# Patient Record
Sex: FEMALE | Race: WHITE | NOT HISPANIC OR LATINO | Employment: OTHER | URBAN - METROPOLITAN AREA
[De-identification: names, ages, dates, MRNs, and addresses within clinical notes are randomized per-mention and may not be internally consistent; named-entity substitution may affect disease eponyms.]

---

## 2017-01-02 PROBLEM — N17.9 ACUTE RENAL FAILURE (ARF) (HCC): Status: ACTIVE | Noted: 2017-01-02

## 2017-01-03 ENCOUNTER — APPOINTMENT (OUTPATIENT)
Dept: RADIOLOGY | Facility: MEDICAL CENTER | Age: 77
DRG: 418 | End: 2017-01-03
Attending: HOSPITALIST
Payer: MEDICARE

## 2017-01-03 PROBLEM — J98.11 ATELECTASIS: Status: ACTIVE | Noted: 2017-01-03

## 2017-01-03 PROBLEM — J90 PLEURAL EFFUSION, BILATERAL: Status: ACTIVE | Noted: 2017-01-03

## 2017-01-03 PROCEDURE — 71020 DX-CHEST-2 VIEWS: CPT

## 2017-01-04 ENCOUNTER — HOME HEALTH ADMISSION (OUTPATIENT)
Dept: HOME HEALTH SERVICES | Facility: HOME HEALTHCARE | Age: 77
End: 2017-01-04
Payer: MEDICARE

## 2017-01-04 ENCOUNTER — TELEPHONE (OUTPATIENT)
Dept: MEDICAL GROUP | Facility: MEDICAL CENTER | Age: 77
End: 2017-01-04

## 2017-01-04 NOTE — TELEPHONE ENCOUNTER
1. [facility]                                         Call Back Number: [phone] (home)         Patient approves a detailed voicemail message: yes    [facility] WANTS TO KNOW IF YOU ARE WILLING TO FOLLOW PATIENT FOR HOME HEALTH

## 2017-01-06 ENCOUNTER — HOME CARE VISIT (OUTPATIENT)
Dept: HOME HEALTH SERVICES | Facility: HOME HEALTHCARE | Age: 77
End: 2017-01-06
Payer: MEDICARE

## 2017-01-06 PROCEDURE — 665001 SOC-HOME HEALTH

## 2017-01-06 PROCEDURE — G0162 HHC RN E&M PLAN SVS, 15 MIN: HCPCS

## 2017-01-06 PROCEDURE — 665998 HH PPS REVENUE CREDIT

## 2017-01-06 PROCEDURE — 665999 HH PPS REVENUE DEBIT

## 2017-01-07 VITALS
BODY MASS INDEX: 32.27 KG/M2 | HEART RATE: 72 BPM | RESPIRATION RATE: 14 BRPM | SYSTOLIC BLOOD PRESSURE: 112 MMHG | HEIGHT: 64 IN | DIASTOLIC BLOOD PRESSURE: 60 MMHG | TEMPERATURE: 99.4 F | WEIGHT: 189 LBS

## 2017-01-07 PROCEDURE — 665999 HH PPS REVENUE DEBIT

## 2017-01-07 PROCEDURE — 665998 HH PPS REVENUE CREDIT

## 2017-01-07 SDOH — ECONOMIC STABILITY: HOUSING INSECURITY: UNSAFE APPLIANCES: 0

## 2017-01-07 SDOH — ECONOMIC STABILITY: HOUSING INSECURITY: UNSAFE COOKING RANGE AREA: 0

## 2017-01-07 SDOH — ECONOMIC STABILITY: HOUSING INSECURITY: HOME SAFETY: IALS PRESENT IN THE HOME.

## 2017-01-07 SDOH — ECONOMIC STABILITY: HOUSING INSECURITY
HOME SAFETY: IN THE HOME. SMOKE ALARMS ARE PRESENT AND FUNCTIONAL ON EACH LEVEL OF THE HOME. PATIENT DOES HAVE A FIRE ESCAPE PLAN DEVELOPED. PATIENT DOES NOT HAVE FLAMMABLE MATERIALS PRESENT IN THE HOME PRESENTING A FIRE HAZARD. NO EVIDENCE FOUND OF SMOKING MATER

## 2017-01-07 SDOH — ECONOMIC STABILITY: HOUSING INSECURITY
HOME SAFETY: OXYGEN SAFETY RISK ASSESSMENT PERFORMED. PATIENT DOES NOT HAVE A NO SMOKING SIGN POSTED IN THE HOME., PT WAS GIVEN A NO SMOKING SIGN AND PROVIDED EDUCATION ABOUT WHY IT IS IMPORTANT TO PLACE ONE. PATIENT DOES HAVE A WORKING FIRE EXTINGUISHER PRESENT

## 2017-01-07 ASSESSMENT — ENCOUNTER SYMPTOMS
VOMITING: DENIES
NAUSEA: DENIES

## 2017-01-07 ASSESSMENT — PATIENT HEALTH QUESTIONNAIRE - PHQ9
2. FEELING DOWN, DEPRESSED, IRRITABLE, OR HOPELESS: 00
1. LITTLE INTEREST OR PLEASURE IN DOING THINGS: 00

## 2017-01-07 ASSESSMENT — ACTIVITIES OF DAILY LIVING (ADL)
OASIS_M1830: 01
HOME_HEALTH_OASIS: 01

## 2017-01-08 PROCEDURE — 665998 HH PPS REVENUE CREDIT

## 2017-01-08 PROCEDURE — 665999 HH PPS REVENUE DEBIT

## 2017-01-09 ENCOUNTER — HOME CARE VISIT (OUTPATIENT)
Dept: HOME HEALTH SERVICES | Facility: HOME HEALTHCARE | Age: 77
End: 2017-01-09
Payer: MEDICARE

## 2017-01-09 PROCEDURE — 665999 HH PPS REVENUE DEBIT

## 2017-01-09 PROCEDURE — 665998 HH PPS REVENUE CREDIT

## 2017-01-09 PROCEDURE — G0496 LPN CARE TRAIN/EDU IN HH: HCPCS

## 2017-01-10 ENCOUNTER — HOME CARE VISIT (OUTPATIENT)
Dept: HOME HEALTH SERVICES | Facility: HOME HEALTHCARE | Age: 77
End: 2017-01-10
Payer: MEDICARE

## 2017-01-10 VITALS
RESPIRATION RATE: 16 BRPM | HEART RATE: 75 BPM | TEMPERATURE: 100.2 F | DIASTOLIC BLOOD PRESSURE: 57 MMHG | SYSTOLIC BLOOD PRESSURE: 107 MMHG

## 2017-01-10 VITALS
RESPIRATION RATE: 16 BRPM | TEMPERATURE: 99.1 F | HEART RATE: 58 BPM | SYSTOLIC BLOOD PRESSURE: 124 MMHG | DIASTOLIC BLOOD PRESSURE: 58 MMHG

## 2017-01-10 VITALS — TEMPERATURE: 99.4 F | RESPIRATION RATE: 16 BRPM

## 2017-01-10 PROCEDURE — G0151 HHCP-SERV OF PT,EA 15 MIN: HCPCS

## 2017-01-10 PROCEDURE — 665999 HH PPS REVENUE DEBIT

## 2017-01-10 PROCEDURE — 665998 HH PPS REVENUE CREDIT

## 2017-01-10 PROCEDURE — G0152 HHCP-SERV OF OT,EA 15 MIN: HCPCS

## 2017-01-10 SDOH — ECONOMIC STABILITY: HOUSING INSECURITY: UNSAFE COOKING RANGE AREA: 0

## 2017-01-10 SDOH — ECONOMIC STABILITY: HOUSING INSECURITY
HOME SAFETY: OXYGEN SAFETY RISK ASSESSMENT PERFORMED. PATIENT DOES HAVE A NO SMOKING SIGN POSTED IN THE HOME. PATIENT DOES HAVE A WORKING FIRE EXTINGUISHER PRESENT IN THE HOME. SMOKE ALARMS ARE PRESENT AND FUNCTIONAL ON EACH LEVEL OF THE HOME. PATIENT DOES HAVE A

## 2017-01-10 SDOH — ECONOMIC STABILITY: HOUSING INSECURITY
HOME SAFETY: FIRE ESCAPE PLAN DEVELOPED. PATIENT DOES NOT HAVE FLAMMABLE MATERIALS PRESENT IN THE HOME PRESENTING A FIRE HAZARD. NO EVIDENCE FOUND OF SMOKING MATERIALS PRESENT IN THE HOME.

## 2017-01-10 SDOH — ECONOMIC STABILITY: HOUSING INSECURITY: UNSAFE APPLIANCES: 0

## 2017-01-10 ASSESSMENT — ACTIVITIES OF DAILY LIVING (ADL)
DRESSING_UB_ASSISTANCE: 0
LAUNDRY_ASSISTANCE: 6
DRESSING_LB_ASSISTANCE: 0
MEAL_PREP_ASSISTANCE: 5
DRESSING_UB_ASSISTANCE: 0
EATING_ASSISTANCE: 0
ADLS_COMMENTS: <!--EPICS-->SEE OT REPORT<!--EPICE-->
ORAL_CARE_ASSISTANCE: 0
HOUSEKEEPING_ASSISTANCE: 6
TELEPHONE_ASSISTANCE: 0
GROOMING_ASSISTANCE: 0
BATHING_ASSISTANCE: 0
HOUSEKEEPING_ASSISTANCE: 5
TOILETING_ASSISTANCE: 0
TOILETING_ASSISTANCE: 0
IADLS_COMMENTS: <!--EPICS-->SEE OT REPORT<!--EPICE-->
EATING_ASSISTANCE: 0
TRANSPORTATION_ASSISTANCE: 6
SHOPPING_ASSISTANCE: 6
IADLS_COMMENTS: <!--EPICS-->PT HAS FAMILY/PCG TO HELP WITH IADL'S AS NEEDED.<!--EPICE-->
TRANSPORTATION_ASSISTANCE: 6
SHOPPING_ASSISTANCE: 6
ORAL_CARE_ASSISTANCE: 0
LAUNDRY_ASSISTANCE: 6
DRESSING_LB_ASSISTANCE: 0
TELEPHONE_ASSISTANCE: 0
GROOMING_ASSISTANCE: 0
MEAL_PREP_ASSISTANCE: 6
BATHING_ASSISTANCE: 0
DRESSING_UB_EQUIPMENT_USED: REACHER

## 2017-01-10 ASSESSMENT — GAIT ASSESSMENTS
INITIATION OF GAIT IMMEDIATELY AFTER GO: 1
SURVEY COMPLETE: TRUE
TRUNK: 2
RIGHT STEP PASS: 1
STEP SYMMETRY: 1
LEFT STEP PASS: 1
STEP CONTINUITY: 1
TIME: 1
LEFT STEP CLEAR: 1
GAIT SCORE: 11
PATH: 1
RIGHT STEP CLEAR: 1
BALANCE AND GAIT SCORE: 27

## 2017-01-10 ASSESSMENT — BALANCE ASSESSMENTS
TURNING 360 DEGREES STEPS: 1
IMMEDIATE STANDING BALANCE FIRST 5 SECONDS: 2
STANDING BALANCE: 2
SITTING DOWN: 2
SURVEY COMPLETE: TRUE
TURNING 360 DEGREES STEADINESS: 1
EYES CLOSED AT MAXIMUM POSITION NUDGED: 1
ATTEMPTS TO ARISE: 2
SITTING BALANCE: 1
ARISES: 2
BALANCE SCORE: 16
NUDGED: 2

## 2017-01-11 PROCEDURE — 665998 HH PPS REVENUE CREDIT

## 2017-01-11 PROCEDURE — 665999 HH PPS REVENUE DEBIT

## 2017-01-12 PROCEDURE — 665999 HH PPS REVENUE DEBIT

## 2017-01-12 PROCEDURE — 665998 HH PPS REVENUE CREDIT

## 2017-01-13 ENCOUNTER — HOME CARE VISIT (OUTPATIENT)
Dept: HOME HEALTH SERVICES | Facility: HOME HEALTHCARE | Age: 77
End: 2017-01-13
Payer: MEDICARE

## 2017-01-13 VITALS
RESPIRATION RATE: 14 BRPM | WEIGHT: 196.13 LBS | HEART RATE: 67 BPM | SYSTOLIC BLOOD PRESSURE: 116 MMHG | BODY MASS INDEX: 33.65 KG/M2 | TEMPERATURE: 99.3 F | DIASTOLIC BLOOD PRESSURE: 84 MMHG

## 2017-01-13 PROCEDURE — 665999 HH PPS REVENUE DEBIT

## 2017-01-13 PROCEDURE — 665998 HH PPS REVENUE CREDIT

## 2017-01-13 PROCEDURE — G0495 RN CARE TRAIN/EDU IN HH: HCPCS

## 2017-01-14 PROCEDURE — 665999 HH PPS REVENUE DEBIT

## 2017-01-14 PROCEDURE — 665998 HH PPS REVENUE CREDIT

## 2017-01-15 PROCEDURE — 665999 HH PPS REVENUE DEBIT

## 2017-01-15 PROCEDURE — 665998 HH PPS REVENUE CREDIT

## 2017-01-16 ENCOUNTER — HOME CARE VISIT (OUTPATIENT)
Dept: HOME HEALTH SERVICES | Facility: HOME HEALTHCARE | Age: 77
End: 2017-01-16
Payer: MEDICARE

## 2017-01-16 PROCEDURE — 665999 HH PPS REVENUE DEBIT

## 2017-01-16 PROCEDURE — 665998 HH PPS REVENUE CREDIT

## 2017-01-16 PROCEDURE — G0495 RN CARE TRAIN/EDU IN HH: HCPCS

## 2017-01-17 VITALS — HEART RATE: 61 BPM | TEMPERATURE: 98.4 F | RESPIRATION RATE: 16 BRPM

## 2017-01-17 PROCEDURE — 665999 HH PPS REVENUE DEBIT

## 2017-01-17 PROCEDURE — 665998 HH PPS REVENUE CREDIT

## 2017-01-17 ASSESSMENT — ACTIVITIES OF DAILY LIVING (ADL): HOME_HEALTH_OASIS: 01

## 2017-01-17 ASSESSMENT — ENCOUNTER SYMPTOMS
POOR JUDGMENT: 1
DEBILITATING PAIN: 1

## 2017-01-18 PROCEDURE — 665999 HH PPS REVENUE DEBIT

## 2017-01-18 PROCEDURE — 665998 HH PPS REVENUE CREDIT

## 2017-01-19 PROCEDURE — 665998 HH PPS REVENUE CREDIT

## 2017-01-19 PROCEDURE — 665999 HH PPS REVENUE DEBIT

## 2017-01-20 ENCOUNTER — HOME CARE VISIT (OUTPATIENT)
Dept: HOME HEALTH SERVICES | Facility: HOME HEALTHCARE | Age: 77
End: 2017-01-20
Payer: MEDICARE

## 2017-01-20 PROCEDURE — 665998 HH PPS REVENUE CREDIT

## 2017-01-20 PROCEDURE — G0496 LPN CARE TRAIN/EDU IN HH: HCPCS

## 2017-01-20 PROCEDURE — 665999 HH PPS REVENUE DEBIT

## 2017-01-21 VITALS
TEMPERATURE: 100 F | SYSTOLIC BLOOD PRESSURE: 98 MMHG | DIASTOLIC BLOOD PRESSURE: 54 MMHG | HEART RATE: 64 BPM | RESPIRATION RATE: 18 BRPM

## 2017-01-21 PROCEDURE — 665998 HH PPS REVENUE CREDIT

## 2017-01-21 PROCEDURE — 665999 HH PPS REVENUE DEBIT

## 2017-01-21 ASSESSMENT — ACTIVITIES OF DAILY LIVING (ADL)
EATING_ASSISTANCE: 0
TRANSPORTATION_ASSISTANCE: 6
HOUSEKEEPING_ASSISTANCE: 5
SHOPPING_ASSISTANCE: 6
ORAL_CARE_ASSISTANCE: 0
LAUNDRY_ASSISTANCE: 6
MEAL_PREP_ASSISTANCE: 5
SHOPPING_ASSISTANCE: 5
TELEPHONE_ASSISTANCE: 0
TOILETING_ASSISTANCE: 0
BATHING_ASSISTANCE: 0
GROOMING_ASSISTANCE: 0
MEAL_PREP_ASSISTANCE: 4
IADLS_COMMENTS: <!--EPICS-->PT HAS FAMILY/PCG TO HELP WITH IADL'S AS NEEDED.<!--EPICE-->
DRESSING_LB_ASSISTANCE: 0
DRESSING_UB_ASSISTANCE: 0

## 2017-01-21 ASSESSMENT — ENCOUNTER SYMPTOMS: RESPIRATORY SYMPTOMS COMMENTS: NO SOB NOTED OR REPORTED.

## 2017-01-22 PROCEDURE — 665999 HH PPS REVENUE DEBIT

## 2017-01-22 PROCEDURE — 665998 HH PPS REVENUE CREDIT

## 2017-01-23 PROCEDURE — 665998 HH PPS REVENUE CREDIT

## 2017-01-23 PROCEDURE — 665999 HH PPS REVENUE DEBIT

## 2017-01-24 PROCEDURE — 665999 HH PPS REVENUE DEBIT

## 2017-01-24 PROCEDURE — 665998 HH PPS REVENUE CREDIT

## 2017-01-25 PROCEDURE — 665999 HH PPS REVENUE DEBIT

## 2017-01-25 PROCEDURE — G0180 MD CERTIFICATION HHA PATIENT: HCPCS | Performed by: INTERNAL MEDICINE

## 2017-01-25 PROCEDURE — 665998 HH PPS REVENUE CREDIT

## 2017-01-25 RX ORDER — HYDROCHLOROTHIAZIDE 25 MG/1
TABLET ORAL
Qty: 30 TAB | Refills: 0 | Status: SHIPPED | OUTPATIENT
Start: 2017-01-25 | End: 2017-02-01 | Stop reason: SDUPTHER

## 2017-01-25 RX ORDER — OMEPRAZOLE 20 MG/1
CAPSULE, DELAYED RELEASE ORAL
Qty: 30 CAP | Refills: 0 | Status: SHIPPED | OUTPATIENT
Start: 2017-01-25 | End: 2017-02-16 | Stop reason: SDUPTHER

## 2017-01-26 PROCEDURE — 665998 HH PPS REVENUE CREDIT

## 2017-01-26 PROCEDURE — 665999 HH PPS REVENUE DEBIT

## 2017-01-27 ENCOUNTER — HOME CARE VISIT (OUTPATIENT)
Dept: HOME HEALTH SERVICES | Facility: HOME HEALTHCARE | Age: 77
End: 2017-01-27
Payer: MEDICARE

## 2017-01-27 ENCOUNTER — HOME CARE VISIT (OUTPATIENT)
Dept: HOME HEALTH SERVICES | Facility: HOME HEALTHCARE | Age: 77
End: 2017-01-27

## 2017-01-27 VITALS
RESPIRATION RATE: 20 BRPM | TEMPERATURE: 98.8 F | HEART RATE: 55 BPM | DIASTOLIC BLOOD PRESSURE: 50 MMHG | SYSTOLIC BLOOD PRESSURE: 106 MMHG

## 2017-01-27 PROCEDURE — 665998 HH PPS REVENUE CREDIT

## 2017-01-27 PROCEDURE — 665999 HH PPS REVENUE DEBIT

## 2017-01-27 PROCEDURE — G0162 HHC RN E&M PLAN SVS, 15 MIN: HCPCS

## 2017-01-27 SDOH — ECONOMIC STABILITY: HOUSING INSECURITY
HOME SAFETY: FIRE ESCAPE PLAN DEVELOPED. PATIENT DOES HAVE FLAMMABLE MATERIALS PRESENT IN THE HOME PRESENTING A FIRE HAZARD. NO EVIDENCE FOUND OF SMOKING MATERIALS PRESENT IN THE HOME.

## 2017-01-28 PROCEDURE — 665999 HH PPS REVENUE DEBIT

## 2017-01-28 PROCEDURE — 665998 HH PPS REVENUE CREDIT

## 2017-01-29 PROCEDURE — 665998 HH PPS REVENUE CREDIT

## 2017-01-29 PROCEDURE — 665999 HH PPS REVENUE DEBIT

## 2017-01-30 PROCEDURE — 665998 HH PPS REVENUE CREDIT

## 2017-01-30 PROCEDURE — 665999 HH PPS REVENUE DEBIT

## 2017-01-30 ASSESSMENT — ENCOUNTER SYMPTOMS: DEPRESSED MOOD: 1

## 2017-01-31 ENCOUNTER — HOSPITAL ENCOUNTER (OUTPATIENT)
Dept: LAB | Facility: MEDICAL CENTER | Age: 77
End: 2017-01-31
Attending: NURSE PRACTITIONER
Payer: MEDICARE

## 2017-01-31 ENCOUNTER — OFFICE VISIT (OUTPATIENT)
Dept: MEDICAL GROUP | Facility: MEDICAL CENTER | Age: 77
End: 2017-01-31
Payer: MEDICARE

## 2017-01-31 VITALS
HEART RATE: 77 BPM | DIASTOLIC BLOOD PRESSURE: 72 MMHG | TEMPERATURE: 98.2 F | HEIGHT: 62 IN | WEIGHT: 182 LBS | OXYGEN SATURATION: 97 % | SYSTOLIC BLOOD PRESSURE: 110 MMHG | BODY MASS INDEX: 33.49 KG/M2

## 2017-01-31 DIAGNOSIS — E78.5 DYSLIPIDEMIA: ICD-10-CM

## 2017-01-31 DIAGNOSIS — E11.9 CONTROLLED TYPE 2 DIABETES MELLITUS WITHOUT COMPLICATION, WITHOUT LONG-TERM CURRENT USE OF INSULIN (HCC): ICD-10-CM

## 2017-01-31 DIAGNOSIS — Z86.711 HX PULMONARY EMBOLISM: ICD-10-CM

## 2017-01-31 DIAGNOSIS — E89.0 POSTOPERATIVE HYPOTHYROIDISM: ICD-10-CM

## 2017-01-31 DIAGNOSIS — E83.42 HYPOMAGNESEMIA: ICD-10-CM

## 2017-01-31 DIAGNOSIS — Z79.01 CHRONIC ANTICOAGULATION: ICD-10-CM

## 2017-01-31 DIAGNOSIS — M54.50 CHRONIC LOW BACK PAIN WITHOUT SCIATICA, UNSPECIFIED BACK PAIN LATERALITY: ICD-10-CM

## 2017-01-31 DIAGNOSIS — G89.29 CHRONIC LOW BACK PAIN WITHOUT SCIATICA, UNSPECIFIED BACK PAIN LATERALITY: ICD-10-CM

## 2017-01-31 DIAGNOSIS — I10 ESSENTIAL HYPERTENSION: ICD-10-CM

## 2017-01-31 DIAGNOSIS — Z90.49 S/P CHOLECYSTECTOMY: ICD-10-CM

## 2017-01-31 PROBLEM — J98.11 ATELECTASIS: Status: RESOLVED | Noted: 2017-01-03 | Resolved: 2017-01-31

## 2017-01-31 PROBLEM — N17.9 ACUTE RENAL FAILURE (ARF) (HCC): Status: RESOLVED | Noted: 2017-01-02 | Resolved: 2017-01-31

## 2017-01-31 PROBLEM — J90 PLEURAL EFFUSION, BILATERAL: Status: RESOLVED | Noted: 2017-01-03 | Resolved: 2017-01-31

## 2017-01-31 LAB
ALBUMIN SERPL BCP-MCNC: 4.7 G/DL (ref 3.2–4.9)
ALBUMIN/GLOB SERPL: 1.6 G/DL
ALP SERPL-CCNC: 43 U/L (ref 30–99)
ALT SERPL-CCNC: 8 U/L (ref 2–50)
ANION GAP SERPL CALC-SCNC: 16 MMOL/L (ref 0–11.9)
AST SERPL-CCNC: 19 U/L (ref 12–45)
BILIRUB SERPL-MCNC: 1 MG/DL (ref 0.1–1.5)
BUN SERPL-MCNC: 21 MG/DL (ref 8–22)
CALCIUM SERPL-MCNC: 8.4 MG/DL (ref 8.5–10.5)
CHLORIDE SERPL-SCNC: 97 MMOL/L (ref 96–112)
CHOLEST SERPL-MCNC: 100 MG/DL (ref 100–199)
CO2 SERPL-SCNC: 26 MMOL/L (ref 20–33)
CREAT SERPL-MCNC: 1.3 MG/DL (ref 0.5–1.4)
GLOBULIN SER CALC-MCNC: 2.9 G/DL (ref 1.9–3.5)
GLUCOSE SERPL-MCNC: 88 MG/DL (ref 65–99)
HDLC SERPL-MCNC: 29 MG/DL
LDLC SERPL CALC-MCNC: 36 MG/DL
MAGNESIUM SERPL-MCNC: 1.1 MG/DL (ref 1.5–2.5)
POTASSIUM SERPL-SCNC: 3.8 MMOL/L (ref 3.6–5.5)
PROT SERPL-MCNC: 7.6 G/DL (ref 6–8.2)
SODIUM SERPL-SCNC: 139 MMOL/L (ref 135–145)
TRIGL SERPL-MCNC: 177 MG/DL (ref 0–149)
TSH SERPL DL<=0.005 MIU/L-ACNC: 25.94 UIU/ML (ref 0.3–3.7)
VIT B12 SERPL-MCNC: 290 PG/ML (ref 211–911)

## 2017-01-31 PROCEDURE — 3044F HG A1C LEVEL LT 7.0%: CPT | Performed by: NURSE PRACTITIONER

## 2017-01-31 PROCEDURE — G8432 DEP SCR NOT DOC, RNG: HCPCS | Performed by: NURSE PRACTITIONER

## 2017-01-31 PROCEDURE — 80061 LIPID PANEL: CPT

## 2017-01-31 PROCEDURE — G8482 FLU IMMUNIZE ORDER/ADMIN: HCPCS | Performed by: NURSE PRACTITIONER

## 2017-01-31 PROCEDURE — 665999 HH PPS REVENUE DEBIT

## 2017-01-31 PROCEDURE — 665998 HH PPS REVENUE CREDIT

## 2017-01-31 PROCEDURE — 36415 COLL VENOUS BLD VENIPUNCTURE: CPT

## 2017-01-31 PROCEDURE — 82607 VITAMIN B-12: CPT

## 2017-01-31 PROCEDURE — 99214 OFFICE O/P EST MOD 30 MIN: CPT | Performed by: NURSE PRACTITIONER

## 2017-01-31 PROCEDURE — 83735 ASSAY OF MAGNESIUM: CPT

## 2017-01-31 PROCEDURE — 84443 ASSAY THYROID STIM HORMONE: CPT

## 2017-01-31 PROCEDURE — 4040F PNEUMOC VAC/ADMIN/RCVD: CPT | Performed by: NURSE PRACTITIONER

## 2017-01-31 PROCEDURE — G8419 CALC BMI OUT NRM PARAM NOF/U: HCPCS | Performed by: NURSE PRACTITIONER

## 2017-01-31 PROCEDURE — 3014F SCREEN MAMMO DOC REV: CPT | Mod: 8P | Performed by: NURSE PRACTITIONER

## 2017-01-31 PROCEDURE — G8598 ASA/ANTIPLAT THER USED: HCPCS | Performed by: NURSE PRACTITIONER

## 2017-01-31 PROCEDURE — 80053 COMPREHEN METABOLIC PANEL: CPT

## 2017-01-31 PROCEDURE — 1111F DSCHRG MED/CURRENT MED MERGE: CPT | Performed by: NURSE PRACTITIONER

## 2017-01-31 PROCEDURE — 3017F COLORECTAL CA SCREEN DOC REV: CPT | Performed by: NURSE PRACTITIONER

## 2017-01-31 PROCEDURE — 1101F PT FALLS ASSESS-DOCD LE1/YR: CPT | Performed by: NURSE PRACTITIONER

## 2017-01-31 PROCEDURE — 4004F PT TOBACCO SCREEN RCVD TLK: CPT | Performed by: NURSE PRACTITIONER

## 2017-01-31 RX ORDER — OXYCODONE AND ACETAMINOPHEN 7.5; 325 MG/1; MG/1
1 TABLET ORAL 2 TIMES DAILY
Qty: 60 TAB | Refills: 0 | Status: SHIPPED | OUTPATIENT
Start: 2017-01-31 | End: 2017-04-12 | Stop reason: SDUPTHER

## 2017-01-31 ASSESSMENT — ENCOUNTER SYMPTOMS: BACK PAIN: 1

## 2017-01-31 NOTE — PROGRESS NOTES
Subjective:      Sanjuanita Sosa is a 76 y.o. female who presents with Hospital Follow-up            HPI Sanjuanita Sosa is here today for follow-up on multiple problems. I have not seen patient since March of last year.      1. Chronic low back pain without sciatica, unspecified back pain laterality  Patient has history of chronic back pain for which she has been on both Percocet and hydrocodone in the past. She is currently on hydrocodone and does not feel it is helpful and would like to go back to Percocet. She also has been using this for postsurgical pain.    2. Dyslipidemia  Patient due for lipid panel and is currently on a statin and reports no side effects.    3. Hx pulmonary embolism  Patient has history of PE and DVT for which she is currently on Pradaxa.    4. Essential hypertension  Blood pressure has remained well controlled on amlodipine and losartan which patient states she continues to take.    5. Controlled type 2 diabetes mellitus without complication, without long-term current use of insulin (CMS-[facility])  Patient currently on metformin and glimepiride. She had a hemoglobin A1c through the hospital was good at 6.6 on December 28. She reports no hypoglycemia. Again, I have not seen patient for her diabetes since March.    6. Postoperative hypothyroidism  Patient had thyroid cancer for which she had thyroidectomy and now follows with endocrinology. She is currently on levothyroxine through their office.    7. S/P cholecystectomy  Patient had a cholecystectomy and then had complications with hypo-magnesium, hypokalemia and confusion. She underwent a second surgery a few months ago to remove the remaining stones. She is supposed to have follow-up with her surgeon tomorrow.    8. Chronic anticoagulation  Patient currently on Pradaxa but feels this is too expensive and would like to switch to something less expensive. Her next appointment with Dr. simmons in March.    Current Outpatient  Prescriptions   Medication Sig Dispense Refill   • oxycodone-acetaminophen (PERCOCET) 7.5-325 MG per tablet Take 1 Tab by mouth 2 Times a Day. 60 Tab 0   • amlodipine (NORVASC) 10 MG Tab TAKE ONE TABLET BY MOUTH ONCE DAILY ** GENERIC FOR NORVASC 30 Tab 0   • dabigatran (PRADAXA) 150 MG Cap capsule Take 1 Cap by mouth 2 Times a Day. 60 Cap 10   • atorvastatin (LIPITOR) 80 MG tablet Take 1 Tab by mouth every evening. 30 Tab 11   • metformin (GLUCOPHAGE) 500 MG Tab TAKE 2 TABLETS BY MOUTH EACH MORNING WITH  BREAKFAST AND TAKE 1 TABLET EACH EVENING   WITH DINNER 90 Tab 11   • glimepiride (AMARYL) 2 MG Tab Take 2 mg by mouth every morning.     • hydrochlorothiazide (HYDRODIURIL) 25 MG Tab TAKE ONE TABLET BY MOUTH EVERY DAY 30 Tab 0   • omeprazole (PRILOSEC) 20 MG delayed-release capsule TAKE ONE CAPSULE BY MOUTH EVERY DAY 30 Cap 0   • Cholecalciferol (VITAMIN D PO) Spray 1.25 mg in mouth/throat every 7 days.     • levothyroxine (SYNTHROID) 100 MCG Tab Take 1 Tab by mouth Every morning on an empty stomach. 30 Tab 3   • calcium carbonate (CALCIUM CARBONATE) 500 MG Tab Take 1 Tab by mouth 2 times a day, with meals. 60 Tab 3   • docusate sodium 100 MG Cap Take 100 mg by mouth every morning. 30 Cap 0   • ondansetron (ZOFRAN ODT) 4 MG TABLET DISPERSIBLE Take 1 Tab by mouth every four hours as needed for Nausea/Vomiting (give PO if IV route is unavailable. May give per feeding tube.). 10 Tab 0   • losartan (COZAAR) 50 MG Tab TAKE ONE TABLET BY MOUTH ONCE DAILY ** GENERIC FOR COZAAR 30 Tab 0   • trazodone (DESYREL) 100 MG Tab Take 1 Tab by mouth 1 time daily as needed for Sleep. 30 Tab 11     No current facility-administered medications for this visit.     Facility-Administered Medications Ordered in Other Visits   Medication Dose Route Frequency Provider Last Rate Last Dose   • iodixanol (VISIPAQUE) SOLN    Intra-Op Once PRN Shekhar Rosado M.D.   10 mL at 01/18/17 0619     Social History   Substance Use Topics   • Smoking  "status: Current Every Day Smoker -- 0.50 packs/day for 40 years     Types: Cigarettes     Last Attempt to Quit: 02/27/2013   • Smokeless tobacco: Never Used   • Alcohol Use: No     Family History   Problem Relation Age of Onset   • Hyperlipidemia Mother    • Stroke Mother    • Hyperlipidemia Father    • Stroke Father    • Heart Disease Brother      CABG     Past Medical History   Diagnosis Date   • Nonspecific abnormal electrocardiogram (ECG) (EKG) 3/19/2012   • Autoimmune hepatitis (CMS-HCC) 3/19/2012   • H/O: HTN (hypertension) 3/19/2012   • Mixed hyperlipidemia 3/19/2012   • Murmur 3/19/2012   • Overweight(278.02) 3/19/2012   • Preoperative cardiovascular examination 3/19/2012   • Palpitations    • Hyperlipidemia    • Hypertension    • Arthritis    • Unspecified urinary incontinence    • Hepatitis B    • Heart valve disease    • Breath shortness    • Pain    • Diabetes      pre-diabetic   • Gallstones    • AAA (abdominal aortic aneurysm) (CMS-[facility])    • Anticoagulation monitoring, special range    • Kidney disease    • Hiatus hernia syndrome    • Cancer (CMS-HCC)      thyroid   • Disorder of thyroid 2016     thyroid cancer   • Blood clotting disorder (CMS-HCC) 2015     clot in leg and lung   • Arrhythmia    • Cataract      left eye needs surgery       Review of Systems   Musculoskeletal: Positive for back pain.   All other systems reviewed and are negative.         Objective:     /72 mmHg  Pulse 77  Temp(Src) 36.8 °C (98.2 °F)  Ht 1.575 m (5' 2.01\")  Wt 82.555 kg (182 lb)  BMI 33.28 kg/m2  SpO2 97%  LMP 10/12/1970     Physical Exam   Constitutional: She is oriented to person, place, and time. She appears well-developed and well-nourished. No distress.   HENT:   Head: Normocephalic and atraumatic.   Right Ear: External ear normal.   Left Ear: External ear normal.   Nose: Nose normal.   Eyes: Right eye exhibits no discharge. Left eye exhibits no discharge.   Neck: Normal range of motion. Neck supple. No " thyromegaly present.   Cardiovascular: Normal rate, regular rhythm and normal heart sounds.  Exam reveals no gallop and no friction rub.    No murmur heard.  Pulmonary/Chest: Effort normal and breath sounds normal. She has no wheezes. She has no rales.   Musculoskeletal: She exhibits no edema or tenderness.   Neurological: She is alert and oriented to person, place, and time. She displays normal reflexes.   Skin: Skin is warm and dry. No rash noted. She is not diaphoretic.   Psychiatric: She has a normal mood and affect. Her behavior is normal. Judgment and thought content normal.   Nursing note and vitals reviewed.              Assessment/Plan:     1. Chronic low back pain without sciatica, unspecified back pain laterality  Patient's Percocet was refilled. I reminded her not to use the medicine makes her too drowsy. I reminded her of this medicine cannot be prescribed with refills. Pharmacy review shows she is not abusing the medicine.  - oxycodone-acetaminophen (PERCOCET) 7.5-325 MG per tablet; Take 1 Tab by mouth 2 Times a Day.  Dispense: 60 Tab; Refill: 0    2. Dyslipidemia  Patient due for lab work to see if she is getting adequate control from her statin.  - LIPID PROFILE; Future    3. Hx pulmonary embolism  I advised patient to contact the Coumadin clinic to see about switching her off Pradaxa which she states is becoming too expensive for her.    4. Essential hypertension  Blood pressure remains well controlled on current medicines.    5. Controlled type 2 diabetes mellitus without complication, without long-term current use of insulin (CMS-HCC)  Hemoglobin A1c from one month ago was very good and no change in medicines made. I will recheck magnesium levels which have been low in the past.  - COMP METABOLIC PANEL; Future  - MAGNESIUM  - VITAMIN B12; Future    6. Postoperative hypothyroidism  Patient is following with endocrinology and has an appointment in one month.  - TSH; Future    7. S/P  cholecystectomy  I advised patient that she should see her surgeon for post hospital follow-up. She was not wanting to go to the appointment tomorrow.  - REFERRAL TO PHYSICAL THERAPY Reason for Therapy: Eval/Treat/Report    8. Chronic anticoagulation  Patient will be following with the Coumadin clinic in the next month.

## 2017-01-31 NOTE — MR AVS SNAPSHOT
"        Sanjuanita Sosa   2017 8:20 AM   Office Visit   MRN: [id number]    Department:  [address] [place] [address]   Dept Phone:  [phone]    Description:  Female : 1940   Provider:  WENDY Baker           Reason for Visit     Hospital Follow-up surgery       Allergies as of 2017     Allergen Noted Reactions    Aspirin 2015   Anaphylaxis    Penicillins 2015   Anaphylaxis    As a child      You were diagnosed with     Chronic low back pain without sciatica, unspecified back pain laterality   [8743304]       Dyslipidemia   [280118]       Hx pulmonary embolism   [597561]       Essential hypertension   [9174773]       Controlled type 2 diabetes mellitus without complication, without long-term current use of insulin (CMS-HCC)   [8159496]       Postoperative hypothyroidism   [790630]       S/P cholecystectomy   [551842]       Chronic anticoagulation   [739051]         Vital Signs     Blood Pressure Pulse Temperature Height Weight Body Mass Index    110/72 mmHg 77 36.8 °C (98.2 °F) 1.575 m (5' 2.01\") 82.555 kg (182 lb) 33.28 kg/m2    Oxygen Saturation Last Menstrual Period Smoking Status             97% 10/12/1970 Current Every Day Smoker         Basic Information     Date Of Birth Sex Race Ethnicity Preferred Language    1940 Female White Non- English      Your appointments     2017 To Be Determined   SN-HH-HOME VISIT with ANTHONY Briceno [facility] (--)    [facility] [address] [facility]  [facility] [address]   [phone]            2017 To Be Determined   [facility]    [facility] [address]  [facility] [address]   [phone]            Feb 15, 2017 To Be Determined   SN-HH-HOME VISIT with ANTHONY Briceno [facility] (--)    [facility] [address]  [facility] [address]   [phone]            2017 To Be Determined   SN-HH-HOME VISIT with ANTHONY Briceno [facility] (--)    [facility] [address]  [place] [address]   [phone]            Feb 27, 2017 To Be Determined   SN-HH-OASIS RECERTIFICATION with Christiana Sauer R.N.   [facility] (--)    [address] [facility]  [place] [address]   [phone]            Feb 28, 2017  1:00 PM   Established Patient with Sundar Albert M.D.   [facility]    [address]  [place] [address] [phone]           You will be receiving a confirmation call a few days before your appointment from our automated call confirmation system.            Mar 14, 2017  9:40 AM   Follow Up Visit with Michael J Bloch, M.D.   [facility]  (--)    [address] [facility] [id number] [phone]              Problem List              ICD-10-CM Priority Class Noted - Resolved    Autoimmune hepatitis (CMS-HCC) (Chronic) K75.4 High  3/19/2012 - Present    Carotid artery stenosis I65.29   10/7/2014 - Present    Insomnia G47.00   11/4/2014 - Present    History of hepatitis B Z86.19   4/28/2015 - Present    Dyslipidemia E78.5   4/28/2015 - Present    Hx pulmonary embolism Z86.711   5/12/2015 - Present    Essential hypertension I10   8/5/2015 - Present    BMI 33.0-33.9,adult Z68.33   9/8/2015 - Present    Risk for falls Z91.81   9/8/2015 - Present    CKD (chronic kidney disease) stage 3, GFR 30-59 ml/min N18.3   11/19/2015 - Present    Vitamin D deficiency disease E55.9   11/24/2015 - Present    Abdominal aneurysm (HCC) I71.4   11/24/2015 - Present    Diabetes mellitus type II, controlled (CMS-HCC) E11.9   11/24/2015 - Present    Gastroesophageal reflux disease without esophagitis K21.9   12/3/2015 - Present    Anemia D64.9   12/29/2016 - Present    Chronic low back pain without sciatica M54.5, G89.29   1/31/2017 - Present    Postoperative hypothyroidism E89.0   1/31/2017 - Present    Chronic anticoagulation Z79.01   1/31/2017 - Present      Health Maintenance        Date  Due Completion Dates    PAP SMEAR 3/16/1963 ---    IMM ZOSTER VACCINE 3/16/2002 ---    MAMMOGRAM 10/7/2015 10/7/2014 (Prv Comp)    Override on 10/7/2014: Previously completed    IMM PNEUMOCOCCAL 65+ (ADULT) LOW/MEDIUM RISK SERIES (2 of 2 - PCV13) 8/5/2016 8/5/2015    DIABETES MONOFILAMENT / LE EXAM 8/5/2016 8/5/2015 (Done)    Override on 8/5/2015: Done    RETINAL SCREENING 8/12/2016 8/12/2015 (Done)    Override on 8/12/2015: Done    IMM INFLUENZA (1) 9/1/2016 10/12/2015, 11/1/2012    BONE DENSITY 10/7/2016 10/7/2011 (Prv Comp)    Override on 10/7/2011: Previously completed    URINE ACR / MICROALBUMIN 3/16/2017 3/16/2016, 11/17/2015, 10/15/2014    A1C SCREENING 6/28/2017 12/28/2016, 11/16/2016, 8/16/2016, 3/15/2016, 11/17/2015, 4/28/2015, 4/21/2015, 10/14/2014, 3/7/2013    FASTING LIPID PROFILE 12/29/2017 12/29/2016, 11/16/2016, 8/16/2016, 6/22/2016, 3/15/2016, 11/17/2015, 4/29/2015, 10/14/2014, 3/14/2013    SERUM CREATININE 1/3/2018 1/3/2017, 1/2/2017, 1/1/2017, 12/31/2016, 12/30/2016, 12/29/2016, 12/28/2016, 11/16/2016, 10/6/2016, 8/16/2016, 6/22/2016, 5/10/2016, 11/17/2015, 10/12/2015, 10/11/2015, 10/10/2015, 10/9/2015, 10/8/2015, 10/7/2015, 10/6/2015, 4/30/2015, 4/29/2015, 4/28/2015, 4/27/2015, 4/21/2015, 1/31/2015, 10/14/2014, 3/14/2013, 3/13/2013, 3/12/2013, 3/8/2013, 3/7/2013, 3/4/2013, 3/2/2013, 3/1/2013, 2/28/2013, 2/26/2013, 5/19/2009    COLONOSCOPY 10/7/2019 10/7/2009 (Prv Comp)    Override on 10/7/2009: Previously completed    IMM DTaP/Tdap/Td Vaccine (2 - Td) 3/6/2023 3/6/2013            Current Immunizations     HIB Vaccine (ACTHIB/HIBERIX) 3/6/2013 11:45 AM    INFLUENZA VACCINE H1N1 10/12/2016    Influenza TIV (IM) 11/1/2012    Influenza Vaccine Adult HD 10/12/2015  8:46 AM    Meningococcal Polysaccharide Vaccine MPSV4 3/6/2013 12:00 PM    Pneumococcal Vaccine (PCV7) Historical Data 10/12/2016    Pneumococcal Vaccine (UF)Historical Data 12/1/2012    Pneumococcal polysaccharide vaccine (PPSV-23)  8/5/2015    Tdap Vaccine 3/6/2013 11:45 AM      Below and/or attached are the medications your provider expects you to take. Review all of your home medications and newly ordered medications with your provider and/or pharmacist. Follow medication instructions as directed by your provider and/or pharmacist. Please keep your medication list with you and share with your provider. Update the information when medications are discontinued, doses are changed, or new medications (including over-the-counter products) are added; and carry medication information at all times in the event of emergency situations     Allergies:  ASPIRIN - Anaphylaxis     PENICILLINS - Anaphylaxis               Medications  Valid as of: January 31, 2017 -  9:15 AM    Generic Name Brand Name Tablet Size Instructions for use    AmLODIPine Besylate (Tab) NORVASC 10 MG TAKE ONE TABLET BY MOUTH ONCE DAILY ** GENERIC FOR NORVASC        Atorvastatin Calcium (Tab) LIPITOR 80 MG Take 1 Tab by mouth every evening.        Cholecalciferol   Spray 1.25 mg in mouth/throat every 7 days.        Dabigatran Etexilate Mesylate (Cap) PRADAXA 150 MG Take 1 Cap by mouth 2 Times a Day.        Docusate Sodium (Cap)  MG Take 100 mg by mouth every morning.        Glimepiride (Tab) AMARYL 2 MG Take 2 mg by mouth every morning.        HydroCHLOROthiazide (Tab) HYDRODIURIL 25 MG TAKE ONE TABLET BY MOUTH EVERY DAY        Levothyroxine Sodium (Tab) SYNTHROID 100 MCG Take 1 Tab by mouth Every morning on an empty stomach.        Losartan Potassium (Tab) COZAAR 50 MG TAKE ONE TABLET BY MOUTH ONCE DAILY ** GENERIC FOR COZAAR        MetFORMIN HCl (Tab) GLUCOPHAGE 500 MG TAKE 2 TABLETS BY MOUTH EACH MORNING WITH  BREAKFAST AND TAKE 1 TABLET EACH EVENING   WITH DINNER        Omeprazole (CAPSULE DELAYED RELEASE) PRILOSEC 20 MG TAKE ONE CAPSULE BY MOUTH EVERY DAY        Ondansetron (TABLET DISPERSIBLE) ZOFRAN ODT 4 MG Take 1 Tab by mouth every four hours as needed for  Nausea/Vomiting (give PO if IV route is unavailable. May give per feeding tube.).        Oxycodone-Acetaminophen (Tab) PERCOCET 7.5-325 MG Take 1 Tab by mouth 2 Times a Day.        Oyster Shell (Tab) OS-MARKELL 500 500 MG Take 1 Tab by mouth 2 times a day, with meals.        TraZODone HCl (Tab) DESYREL 100 MG Take 1 Tab by mouth 1 time daily as needed for Sleep.        .                 Medicines prescribed today were sent to:     [id number]  [place], [place] - [address] [facility]    [address] [facility] [place] [address]    Phone: [phone] Fax: [phone]    Open 24 Hours?: No      Medication refill instructions:       If your prescription bottle indicates you have medication refills left, it is not necessary to call your provider’s office. Please contact your pharmacy and they will refill your medication.    If your prescription bottle indicates you do not have any refills left, you may request refills at any time through one of the following ways: The online [email or web address] system (except Urgent Care), by calling your provider’s office, or by asking your pharmacy to contact your provider’s office with a refill request. Medication refills are processed only during regular business hours and may not be available until the next business day. Your provider may request additional information or to have a follow-up visit with you prior to refilling your medication.   *Please Note: Medication refills are assigned a new Rx number when refilled electronically. Your pharmacy may indicate that no refills were authorized even though a new prescription for the same medication is available at the pharmacy. Please request the medicine by name with the pharmacy before contacting your provider for a refill.        Your To Do List     Future Labs/Procedures Complete By Expires    COMP METABOLIC PANEL  As directed 2/1/2018    LIPID PROFILE  As directed 2/1/2018    TSH  As directed 2/1/2018    VITAMIN B12  As directed 1/31/2018         Referral     A referral request has been sent to our patient care coordination department. Please allow 3-5 business days for us to process this request and contact you either by phone or mail. If you do not hear from us by the 5th business day, please call us at [phone].           MyChart Status: Patient Declined

## 2017-02-01 ENCOUNTER — OFFICE VISIT (OUTPATIENT)
Dept: VASCULAR LAB | Facility: MEDICAL CENTER | Age: 77
End: 2017-02-01
Attending: NURSE PRACTITIONER
Payer: MEDICARE

## 2017-02-01 VITALS
HEIGHT: 62 IN | SYSTOLIC BLOOD PRESSURE: 84 MMHG | WEIGHT: 183 LBS | HEART RATE: 168 BPM | DIASTOLIC BLOOD PRESSURE: 60 MMHG | BODY MASS INDEX: 33.68 KG/M2

## 2017-02-01 DIAGNOSIS — Z86.711 HX PULMONARY EMBOLISM: ICD-10-CM

## 2017-02-01 DIAGNOSIS — E89.0 POSTOPERATIVE HYPOTHYROIDISM: ICD-10-CM

## 2017-02-01 DIAGNOSIS — E11.9 CONTROLLED TYPE 2 DIABETES MELLITUS WITHOUT COMPLICATION, WITHOUT LONG-TERM CURRENT USE OF INSULIN (HCC): ICD-10-CM

## 2017-02-01 DIAGNOSIS — O22.30 DVT (DEEP VEIN THROMBOSIS) IN PREGNANCY: ICD-10-CM

## 2017-02-01 DIAGNOSIS — E78.5 DYSLIPIDEMIA: ICD-10-CM

## 2017-02-01 DIAGNOSIS — E78.5 HYPERLIPIDEMIA, UNSPECIFIED HYPERLIPIDEMIA TYPE: ICD-10-CM

## 2017-02-01 DIAGNOSIS — I71.43 ANEURYSM OF INFRARENAL ABDOMINAL AORTA (HCC): ICD-10-CM

## 2017-02-01 DIAGNOSIS — I10 ESSENTIAL HYPERTENSION: ICD-10-CM

## 2017-02-01 DIAGNOSIS — F17.210 CIGARETTE NICOTINE DEPENDENCE WITHOUT COMPLICATION: ICD-10-CM

## 2017-02-01 PROCEDURE — 3044F HG A1C LEVEL LT 7.0%: CPT | Performed by: INTERNAL MEDICINE

## 2017-02-01 PROCEDURE — 665999 HH PPS REVENUE DEBIT

## 2017-02-01 PROCEDURE — 3017F COLORECTAL CA SCREEN DOC REV: CPT | Performed by: INTERNAL MEDICINE

## 2017-02-01 PROCEDURE — 3014F SCREEN MAMMO DOC REV: CPT | Mod: 8P | Performed by: INTERNAL MEDICINE

## 2017-02-01 PROCEDURE — 99214 OFFICE O/P EST MOD 30 MIN: CPT | Performed by: INTERNAL MEDICINE

## 2017-02-01 PROCEDURE — 1101F PT FALLS ASSESS-DOCD LE1/YR: CPT | Performed by: INTERNAL MEDICINE

## 2017-02-01 PROCEDURE — G8432 DEP SCR NOT DOC, RNG: HCPCS | Performed by: INTERNAL MEDICINE

## 2017-02-01 PROCEDURE — 4004F PT TOBACCO SCREEN RCVD TLK: CPT | Performed by: INTERNAL MEDICINE

## 2017-02-01 PROCEDURE — G8598 ASA/ANTIPLAT THER USED: HCPCS | Performed by: INTERNAL MEDICINE

## 2017-02-01 PROCEDURE — 1111F DSCHRG MED/CURRENT MED MERGE: CPT | Performed by: INTERNAL MEDICINE

## 2017-02-01 PROCEDURE — 99212 OFFICE O/P EST SF 10 MIN: CPT

## 2017-02-01 PROCEDURE — 665998 HH PPS REVENUE CREDIT

## 2017-02-01 PROCEDURE — G8482 FLU IMMUNIZE ORDER/ADMIN: HCPCS | Performed by: INTERNAL MEDICINE

## 2017-02-01 PROCEDURE — G8419 CALC BMI OUT NRM PARAM NOF/U: HCPCS | Performed by: INTERNAL MEDICINE

## 2017-02-01 PROCEDURE — 4040F PNEUMOC VAC/ADMIN/RCVD: CPT | Performed by: INTERNAL MEDICINE

## 2017-02-01 RX ORDER — HYDROCHLOROTHIAZIDE 25 MG/1
12.5 TABLET ORAL DAILY
Qty: 30 TAB | Refills: 11
Start: 2017-02-01 | End: 2017-03-14

## 2017-02-01 RX ORDER — LEVOTHYROXINE SODIUM 0.12 MG/1
125 TABLET ORAL
Qty: 30 TAB | Refills: 11 | Status: SHIPPED | OUTPATIENT
Start: 2017-02-01 | End: 2017-03-10 | Stop reason: SDUPTHER

## 2017-02-01 RX ORDER — ATORVASTATIN CALCIUM 80 MG/1
40 TABLET, FILM COATED ORAL EVERY EVENING
Qty: 30 TAB | Refills: 11
Start: 2017-02-01 | End: 2017-05-11 | Stop reason: SDUPTHER

## 2017-02-01 ASSESSMENT — ENCOUNTER SYMPTOMS
SHORTNESS OF BREATH: 0
FOCAL WEAKNESS: 0
BRUISES/BLEEDS EASILY: 0
PALPITATIONS: 0
DIZZINESS: 0
CLAUDICATION: 0
HEADACHES: 0
NERVOUS/ANXIOUS: 0
MYALGIAS: 0
HEMOPTYSIS: 0
BACK PAIN: 1
SPEECH CHANGE: 0
WEIGHT LOSS: 0
COUGH: 0
DEPRESSION: 1
WHEEZING: 0

## 2017-02-01 NOTE — PATIENT INSTRUCTIONS
BRING IN A LIST OF ALL MEDICATIONS YOU ARE ACTUALLY TAKING TO THE NEXT VISIT    BELLY PAIN - Talk to Dr. Valles    KIDNEY ISSUES and LOW CALCIUM - Talk to Dr. Najjar    CHOLESTEROL  - Decrease atorvastatin to 1/2 pill daily    BLOOD PRESSURE  - decrease hydrochlorothiazide (HCTZ or hydrodiuril) to 1/2 tab daily  - continue same amlodipine for now  - continue same losartan for now    DIABETES  - continue glimeride and metformin  - call if you have lows    BLOOD THINNERS  - stop clopidogrel or plavix  - stop pradaxa  - start xarelto 20 mg daily    THYROID   - take new dose of thyroid repletion  -  follow up with Dr. Armendariz    NO SMOKING    Follow Up:  March 14 at 940a    Michael Bloch, MD  [phone]

## 2017-02-02 ENCOUNTER — HOME CARE VISIT (OUTPATIENT)
Dept: HOME HEALTH SERVICES | Facility: HOME HEALTHCARE | Age: 77
End: 2017-02-02
Payer: MEDICARE

## 2017-02-02 PROCEDURE — G0495 RN CARE TRAIN/EDU IN HH: HCPCS

## 2017-02-02 PROCEDURE — 665998 HH PPS REVENUE CREDIT

## 2017-02-02 PROCEDURE — 665999 HH PPS REVENUE DEBIT

## 2017-02-03 VITALS — TEMPERATURE: 98.7 F | HEART RATE: 83 BPM | RESPIRATION RATE: 20 BRPM

## 2017-02-03 PROCEDURE — 665999 HH PPS REVENUE DEBIT

## 2017-02-03 PROCEDURE — 665998 HH PPS REVENUE CREDIT

## 2017-02-03 ASSESSMENT — ENCOUNTER SYMPTOMS
POOR JUDGMENT: 1
DEBILITATING PAIN: 1

## 2017-02-04 PROCEDURE — 665998 HH PPS REVENUE CREDIT

## 2017-02-04 PROCEDURE — 665999 HH PPS REVENUE DEBIT

## 2017-02-05 PROCEDURE — 665998 HH PPS REVENUE CREDIT

## 2017-02-05 PROCEDURE — 665999 HH PPS REVENUE DEBIT

## 2017-02-06 PROCEDURE — 665999 HH PPS REVENUE DEBIT

## 2017-02-06 PROCEDURE — 665998 HH PPS REVENUE CREDIT

## 2017-02-07 PROCEDURE — 665998 HH PPS REVENUE CREDIT

## 2017-02-07 PROCEDURE — 665999 HH PPS REVENUE DEBIT

## 2017-02-08 ENCOUNTER — HOME CARE VISIT (OUTPATIENT)
Dept: HOME HEALTH SERVICES | Facility: HOME HEALTHCARE | Age: 77
End: 2017-02-08
Payer: MEDICARE

## 2017-02-08 ENCOUNTER — OFFICE VISIT (OUTPATIENT)
Dept: NEPHROLOGY | Facility: MEDICAL CENTER | Age: 77
End: 2017-02-08
Payer: MEDICARE

## 2017-02-08 VITALS
BODY MASS INDEX: 33.31 KG/M2 | HEIGHT: 62 IN | RESPIRATION RATE: 16 BRPM | HEART RATE: 107 BPM | SYSTOLIC BLOOD PRESSURE: 122 MMHG | DIASTOLIC BLOOD PRESSURE: 60 MMHG | OXYGEN SATURATION: 92 % | WEIGHT: 181 LBS | TEMPERATURE: 99.2 F

## 2017-02-08 DIAGNOSIS — E11.9 CONTROLLED TYPE 2 DIABETES MELLITUS WITHOUT COMPLICATION, WITHOUT LONG-TERM CURRENT USE OF INSULIN (HCC): ICD-10-CM

## 2017-02-08 DIAGNOSIS — I10 ESSENTIAL HYPERTENSION: ICD-10-CM

## 2017-02-08 DIAGNOSIS — N18.30 CKD (CHRONIC KIDNEY DISEASE) STAGE 3, GFR 30-59 ML/MIN (HCC): ICD-10-CM

## 2017-02-08 PROCEDURE — 3014F SCREEN MAMMO DOC REV: CPT | Mod: 8P | Performed by: INTERNAL MEDICINE

## 2017-02-08 PROCEDURE — G8482 FLU IMMUNIZE ORDER/ADMIN: HCPCS | Performed by: INTERNAL MEDICINE

## 2017-02-08 PROCEDURE — G0162 HHC RN E&M PLAN SVS, 15 MIN: HCPCS

## 2017-02-08 PROCEDURE — 665999 HH PPS REVENUE DEBIT

## 2017-02-08 PROCEDURE — G8598 ASA/ANTIPLAT THER USED: HCPCS | Performed by: INTERNAL MEDICINE

## 2017-02-08 PROCEDURE — G8419 CALC BMI OUT NRM PARAM NOF/U: HCPCS | Performed by: INTERNAL MEDICINE

## 2017-02-08 PROCEDURE — 3044F HG A1C LEVEL LT 7.0%: CPT | Performed by: INTERNAL MEDICINE

## 2017-02-08 PROCEDURE — 3017F COLORECTAL CA SCREEN DOC REV: CPT | Performed by: INTERNAL MEDICINE

## 2017-02-08 PROCEDURE — 1101F PT FALLS ASSESS-DOCD LE1/YR: CPT | Performed by: INTERNAL MEDICINE

## 2017-02-08 PROCEDURE — 4040F PNEUMOC VAC/ADMIN/RCVD: CPT | Performed by: INTERNAL MEDICINE

## 2017-02-08 PROCEDURE — 99214 OFFICE O/P EST MOD 30 MIN: CPT | Performed by: INTERNAL MEDICINE

## 2017-02-08 PROCEDURE — G8432 DEP SCR NOT DOC, RNG: HCPCS | Performed by: INTERNAL MEDICINE

## 2017-02-08 PROCEDURE — 665998 HH PPS REVENUE CREDIT

## 2017-02-08 PROCEDURE — 1036F TOBACCO NON-USER: CPT | Performed by: INTERNAL MEDICINE

## 2017-02-08 ASSESSMENT — ENCOUNTER SYMPTOMS
FEVER: 0
DIZZINESS: 1
VOMITING: 0
CHILLS: 0
ROS GI COMMENTS: DECREASED APPETITE
HYPERTENSION: 1
NAUSEA: 0
ORTHOPNEA: 0
SHORTNESS OF BREATH: 0

## 2017-02-08 NOTE — PROGRESS NOTES
"Subjective:      Sanjuanita Sosa is a 76 y.o. female who presents with Hypertension and Chronic Kidney Disease    Patient was recently hospitalized for cholecystectomy, hypertension is present on the lower side after hospitalization, she has occasional dizziness, decreased by mouth intake and poor appetite.        Hypertension  This is a chronic problem. The current episode started more than 1 year ago. The problem is unchanged. The problem is uncontrolled. Pertinent negatives include no chest pain, orthopnea, peripheral edema or shortness of breath. There are no associated agents to hypertension. Risk factors for coronary artery disease include post-menopausal state and diabetes mellitus. Past treatments include ACE inhibitors, calcium channel blockers and diuretics. The current treatment provides significant improvement. There are no compliance problems.  Hypertensive end-organ damage includes kidney disease. Identifiable causes of hypertension include chronic renal disease.   Chronic Kidney Disease  This is a chronic problem. The current episode started more than 1 year ago. The problem occurs constantly. The problem has been unchanged. Pertinent negatives include no chest pain, chills, fever, nausea, urinary symptoms or vomiting.       Review of Systems   Constitutional: Negative for fever and chills.   Respiratory: Negative for shortness of breath.    Cardiovascular: Negative for chest pain, orthopnea and leg swelling.   Gastrointestinal: Negative for nausea and vomiting.        Decreased appetite   Genitourinary: Negative for dysuria, urgency and frequency.   Neurological: Positive for dizziness.          Objective:     /60 mmHg  Pulse 107  Temp(Src) 37.3 °C (99.2 °F)  Resp 16  Ht 1.575 m (5' 2\")  Wt 82.101 kg (181 lb)  BMI 33.10 kg/m2  SpO2 92%  LMP 10/12/1970     Physical Exam   Constitutional: She is oriented to person, place, and time. She appears well-developed and well-nourished. No " distress.   HENT:   Head: Normocephalic and atraumatic.   Nose: Nose normal.   Eyes: Right eye exhibits no discharge. Left eye exhibits no discharge. No scleral icterus.   Cardiovascular: Normal rate and regular rhythm.    Pulmonary/Chest: Effort normal and breath sounds normal.   Musculoskeletal: She exhibits no edema.   Neurological: She is alert and oriented to person, place, and time.   Skin: Skin is warm and dry. She is not diaphoretic. No erythema.   Psychiatric: She has a normal mood and affect. Her behavior is normal.   Nursing note and vitals reviewed.              Assessment/Plan:     1. Essential hypertension  Blood pressure is on the lower side  I advised the patient to stop Norvasc as well as hydrochlorothiazide  Continued low salt diet  Check her blood pressure at home regularly and call us with the results    2. CKD (chronic kidney disease) stage 3, GFR 30-59 ml/min  Creatinine stable  No uremic symptoms  Renal dose all medication  Avoid nephrotoxins    3. Controlled type 2 diabetes mellitus without complication, without long-term current use of insulin (CMS-[facility])

## 2017-02-08 NOTE — MR AVS SNAPSHOT
"        Sanjuanita Sosa   2017 10:45 AM   Office Visit   MRN: [id number]    Department:  [facility]   Dept Phone:  [phone]    Description:  Female : 1940   Provider:  Fadi Najjar, M.D.           Reason for Visit     Hypertension     Chronic Kidney Disease           Allergies as of 2017     Allergen Noted Reactions    Aspirin 2015   Anaphylaxis    Penicillins 2015   Anaphylaxis    As a child      You were diagnosed with     Essential hypertension   [0491514]       CKD (chronic kidney disease) stage 3, GFR 30-59 ml/min   [069505]       Controlled type 2 diabetes mellitus without complication, without long-term current use of insulin (CMS-HCC)   [6874446]         Vital Signs     Blood Pressure Pulse Temperature Respirations Height Weight    122/60 mmHg 107 37.3 °C (99.2 °F) 16 1.575 m (5' 2\") 82.101 kg (181 lb)    Body Mass Index Oxygen Saturation Last Menstrual Period Smoking Status          33.10 kg/m2 92% 10/12/1970 Former Smoker        Basic Information     Date Of Birth Sex Race Ethnicity Preferred Language    1940 Female White Non- English      Your appointments     Feb 15, 2017 To Be Determined   SN-HH-HOME VISIT with Christiana Sauer R.N.   [facility] (--)    [address] [address]  [facility] [address]   [phone]            2017 To Be Determined   SN-HH-HOME VISIT with Christiana Sauer R.N.   [facility] (--)    [address]  [facility] [address]   [phone]            2017 To Be Determined   SN-HH-OASIS RECERTIFICATION with Christiana Sauer R.N.   [facility] (--)    [facility] [address]  [facility] [address]   [phone]            2017  1:00 PM   Established Patient with Sundar Albert M.D.   [facility]    [address], [address]  [facility] [address] [phone]           You will be receiving a confirmation call a few days before your appointment from our automated " call confirmation system.            Mar 14, 2017  9:40 AM   Follow Up Visit with Michael J Bloch, M.D.   [facility]  (--)    [address] [address]   [phone]            May 09, 2017 10:00 AM   Follow Up Visit with Michael J Bloch, M.D.   [facility]  (--)    [address] [address]   [phone]              Problem List              ICD-10-CM Priority Class Noted - Resolved    Autoimmune hepatitis (CMS-HCC) (Chronic) K75.4 High  3/19/2012 - Present    Carotid artery stenosis I65.29   10/7/2014 - Present    Insomnia G47.00   11/4/2014 - Present    History of hepatitis B Z86.19   4/28/2015 - Present    Dyslipidemia E78.5   4/28/2015 - Present    Hx pulmonary embolism Z86.711   5/12/2015 - Present    Essential hypertension I10   8/5/2015 - Present    BMI 33.0-33.9,adult Z68.33   9/8/2015 - Present    Risk for falls Z91.81   9/8/2015 - Present    CKD (chronic kidney disease) stage 3, GFR 30-59 ml/min N18.3   11/19/2015 - Present    Vitamin D deficiency disease E55.9   11/24/2015 - Present    Abdominal aneurysm (HCC) I71.4   11/24/2015 - Present    Diabetes mellitus type II, controlled (CMS-HCC) E11.9   11/24/2015 - Present    Gastroesophageal reflux disease without esophagitis K21.9   12/3/2015 - Present    Anemia D64.9   12/29/2016 - Present    Chronic low back pain without sciatica M54.5, G89.29   1/31/2017 - Present    Postoperative hypothyroidism E89.0   1/31/2017 - Present    Chronic anticoagulation Z79.01   1/31/2017 - Present      Health Maintenance        Date Due Completion Dates    PAP SMEAR 3/16/1963 ---    IMM ZOSTER VACCINE 3/16/2002 ---    MAMMOGRAM 10/7/2015 10/7/2014 (Prv Comp)    Override on 10/7/2014: Previously completed    IMM PNEUMOCOCCAL 65+ (ADULT) LOW/MEDIUM RISK SERIES (2 of 2 - PCV13) 8/5/2016 8/5/2015    DIABETES MONOFILAMENT / LE EXAM 8/5/2016  8/5/2015 (Done)    Override on 8/5/2015: Done    RETINAL SCREENING 8/12/2016 8/12/2015 (Done)    Override on 8/12/2015: Done    IMM INFLUENZA (1) 9/1/2016 10/12/2015, 11/1/2012    BONE DENSITY 10/7/2016 10/7/2011 (Prv Comp)    Override on 10/7/2011: Previously completed    URINE ACR / MICROALBUMIN 3/16/2017 3/16/2016, 11/17/2015, 10/15/2014    A1C SCREENING 6/28/2017 12/28/2016, 11/16/2016, 8/16/2016, 3/15/2016, 11/17/2015, 4/28/2015, 4/21/2015, 10/14/2014, 3/7/2013    FASTING LIPID PROFILE 1/31/2018 1/31/2017, 12/29/2016, 11/16/2016, 8/16/2016, 6/22/2016, 3/15/2016, 11/17/2015, 4/29/2015, 10/14/2014, 3/14/2013    SERUM CREATININE 1/31/2018 1/31/2017, 1/3/2017, 1/2/2017, 1/1/2017, 12/31/2016, 12/30/2016, 12/29/2016, 12/28/2016, 11/16/2016, 10/6/2016, 8/16/2016, 6/22/2016, 5/10/2016, 11/17/2015, 10/12/2015, 10/11/2015, 10/10/2015, 10/9/2015, 10/8/2015, 10/7/2015, 10/6/2015, 4/30/2015, 4/29/2015, 4/28/2015, 4/27/2015, 4/21/2015, 1/31/2015, 10/14/2014, 3/14/2013, 3/13/2013, 3/12/2013, 3/8/2013, 3/7/2013, 3/4/2013, 3/2/2013, 3/1/2013, 2/28/2013, 2/26/2013, 5/19/2009    COLONOSCOPY 10/7/2019 10/7/2009 (Prv Comp)    Override on 10/7/2009: Previously completed    IMM DTaP/Tdap/Td Vaccine (2 - Td) 3/6/2023 3/6/2013            Current Immunizations     HIB Vaccine (ACTHIB/HIBERIX) 3/6/2013 11:45 AM    INFLUENZA VACCINE H1N1 10/12/2016    Influenza TIV (IM) 11/1/2012    Influenza Vaccine Adult HD 10/12/2015  8:46 AM    Meningococcal Polysaccharide Vaccine MPSV4 3/6/2013 12:00 PM    Pneumococcal Vaccine (PCV7) Historical Data 10/12/2016    Pneumococcal Vaccine (UF)Historical Data 12/1/2012    Pneumococcal polysaccharide vaccine (PPSV-23) 8/5/2015    Tdap Vaccine 3/6/2013 11:45 AM      Below and/or attached are the medications your provider expects you to take. Review all of your home medications and newly ordered medications with your provider and/or pharmacist. Follow medication instructions as directed by your provider  and/or pharmacist. Please keep your medication list with you and share with your provider. Update the information when medications are discontinued, doses are changed, or new medications (including over-the-counter products) are added; and carry medication information at all times in the event of emergency situations     Allergies:  ASPIRIN - Anaphylaxis     PENICILLINS - Anaphylaxis               Medications  Valid as of: February 08, 2017 - 11:11 AM    Generic Name Brand Name Tablet Size Instructions for use    AmLODIPine Besylate (Tab) NORVASC 10 MG TAKE ONE TABLET BY MOUTH ONCE DAILY ** GENERIC FOR NORVASC        Atorvastatin Calcium (Tab) LIPITOR 80 MG Take 0.5 Tabs by mouth every evening.        Cholecalciferol   Spray 1.25 mg in mouth/throat every 7 days.        Docusate Sodium (Cap)  MG Take 100 mg by mouth every morning.        Glimepiride (Tab) AMARYL 2 MG Take 2 mg by mouth every morning.        HydroCHLOROthiazide (Tab) HYDRODIURIL 25 MG Take 0.5 Tabs by mouth every day.        Levothyroxine Sodium (Tab) SYNTHROID 125 MCG Take 1 Tab by mouth Every morning on an empty stomach.        Losartan Potassium (Tab) COZAAR 50 MG TAKE ONE TABLET BY MOUTH ONCE DAILY ** GENERIC FOR COZAAR        Magnesium (Cap) Magnesium 100 MG Take 1 Cap by mouth every day.        MetFORMIN HCl (Tab) GLUCOPHAGE 500 MG TAKE 2 TABLETS BY MOUTH EACH MORNING WITH  BREAKFAST AND TAKE 1 TABLET EACH EVENING   WITH DINNER        Omeprazole (CAPSULE DELAYED RELEASE) PRILOSEC 20 MG TAKE ONE CAPSULE BY MOUTH EVERY DAY        Ondansetron (TABLET DISPERSIBLE) ZOFRAN ODT 4 MG Take 1 Tab by mouth every four hours as needed for Nausea/Vomiting (give PO if IV route is unavailable. May give per feeding tube.).        Oxycodone-Acetaminophen (Tab) PERCOCET 7.5-325 MG Take 1 Tab by mouth 2 Times a Day.        Oyster Shell (Tab) OS-MARKELL 500 500 MG Take 1 Tab by mouth 2 times a day, with meals.        Rivaroxaban (Tab) XARELTO 20 MG Take 1 Tab by  mouth with dinner.        TraZODone HCl (Tab) DESYREL 100 MG Take 1 Tab by mouth 1 time daily as needed for Sleep.        .                 Medicines prescribed today were sent to:     [facility] [place] [facility] [address] [facility] [place] [address]    Phone: [phone] Fax: [phone]    Open 24 Hours?: No      Medication refill instructions:       If your prescription bottle indicates you have medication refills left, it is not necessary to call your provider’s office. Please contact your pharmacy and they will refill your medication.    If your prescription bottle indicates you do not have any refills left, you may request refills at any time through one of the following ways: The online Instantis system (except Urgent Care), by calling your provider’s office, or by asking your pharmacy to contact your provider’s office with a refill request. Medication refills are processed only during regular business hours and may not be available until the next business day. Your provider may request additional information or to have a follow-up visit with you prior to refilling your medication.   *Please Note: Medication refills are assigned a new Rx number when refilled electronically. Your pharmacy may indicate that no refills were authorized even though a new prescription for the same medication is available at the pharmacy. Please request the medicine by name with the pharmacy before contacting your provider for a refill.        Your To Do List     Future Labs/Procedures Complete By Expires    BASIC METABOLIC PANEL  As directed 2/8/2018         Instantis Status: Patient Declined

## 2017-02-09 VITALS
DIASTOLIC BLOOD PRESSURE: 50 MMHG | RESPIRATION RATE: 20 BRPM | SYSTOLIC BLOOD PRESSURE: 104 MMHG | HEART RATE: 93 BPM | TEMPERATURE: 100.7 F

## 2017-02-09 PROCEDURE — 665999 HH PPS REVENUE DEBIT

## 2017-02-09 PROCEDURE — 665998 HH PPS REVENUE CREDIT

## 2017-02-09 ASSESSMENT — ENCOUNTER SYMPTOMS: DEBILITATING PAIN: 1

## 2017-02-10 PROCEDURE — 665998 HH PPS REVENUE CREDIT

## 2017-02-10 PROCEDURE — 665999 HH PPS REVENUE DEBIT

## 2017-02-11 PROCEDURE — 665999 HH PPS REVENUE DEBIT

## 2017-02-11 PROCEDURE — 665998 HH PPS REVENUE CREDIT

## 2017-02-12 PROCEDURE — 665999 HH PPS REVENUE DEBIT

## 2017-02-12 PROCEDURE — 665998 HH PPS REVENUE CREDIT

## 2017-02-13 PROCEDURE — 665998 HH PPS REVENUE CREDIT

## 2017-02-13 PROCEDURE — 665999 HH PPS REVENUE DEBIT

## 2017-02-14 PROCEDURE — 665999 HH PPS REVENUE DEBIT

## 2017-02-14 PROCEDURE — 665998 HH PPS REVENUE CREDIT

## 2017-02-15 ENCOUNTER — HOME CARE VISIT (OUTPATIENT)
Dept: HOME HEALTH SERVICES | Facility: HOME HEALTHCARE | Age: 77
End: 2017-02-15
Payer: MEDICARE

## 2017-02-15 VITALS — TEMPERATURE: 99.7 F | DIASTOLIC BLOOD PRESSURE: 62 MMHG | SYSTOLIC BLOOD PRESSURE: 120 MMHG | RESPIRATION RATE: 20 BRPM

## 2017-02-15 PROCEDURE — G0162 HHC RN E&M PLAN SVS, 15 MIN: HCPCS

## 2017-02-15 PROCEDURE — 665999 HH PPS REVENUE DEBIT

## 2017-02-15 PROCEDURE — 665998 HH PPS REVENUE CREDIT

## 2017-02-15 SDOH — ECONOMIC STABILITY: HOUSING INSECURITY: UNSAFE APPLIANCES: 0

## 2017-02-15 SDOH — ECONOMIC STABILITY: HOUSING INSECURITY: UNSAFE COOKING RANGE AREA: 0

## 2017-02-15 ASSESSMENT — ACTIVITIES OF DAILY LIVING (ADL)
OASIS_M1830: 00
HOME_HEALTH_OASIS: 00

## 2017-02-16 PROCEDURE — 665998 HH PPS REVENUE CREDIT

## 2017-02-16 PROCEDURE — 665999 HH PPS REVENUE DEBIT

## 2017-02-16 RX ORDER — OMEPRAZOLE 20 MG/1
CAPSULE, DELAYED RELEASE ORAL
Qty: 30 CAP | Refills: 0 | Status: SHIPPED | OUTPATIENT
Start: 2017-02-16 | End: 2017-03-20 | Stop reason: SDUPTHER

## 2017-02-16 RX ORDER — LOSARTAN POTASSIUM 50 MG/1
TABLET ORAL
Qty: 30 TAB | Refills: 1 | Status: SHIPPED | OUTPATIENT
Start: 2017-02-16 | End: 2017-05-25 | Stop reason: SDUPTHER

## 2017-02-17 PROCEDURE — 665998 HH PPS REVENUE CREDIT

## 2017-02-17 PROCEDURE — 665999 HH PPS REVENUE DEBIT

## 2017-02-18 PROCEDURE — 665999 HH PPS REVENUE DEBIT

## 2017-02-18 PROCEDURE — 665998 HH PPS REVENUE CREDIT

## 2017-02-19 PROCEDURE — 665999 HH PPS REVENUE DEBIT

## 2017-02-19 PROCEDURE — 665998 HH PPS REVENUE CREDIT

## 2017-02-20 PROCEDURE — 665998 HH PPS REVENUE CREDIT

## 2017-02-20 PROCEDURE — 665999 HH PPS REVENUE DEBIT

## 2017-02-21 PROCEDURE — 665999 HH PPS REVENUE DEBIT

## 2017-02-21 PROCEDURE — 665998 HH PPS REVENUE CREDIT

## 2017-02-22 PROCEDURE — 665998 HH PPS REVENUE CREDIT

## 2017-02-22 PROCEDURE — 665999 HH PPS REVENUE DEBIT

## 2017-02-23 PROCEDURE — 665998 HH PPS REVENUE CREDIT

## 2017-02-23 PROCEDURE — 665999 HH PPS REVENUE DEBIT

## 2017-02-24 PROCEDURE — 665999 HH PPS REVENUE DEBIT

## 2017-02-24 PROCEDURE — 665998 HH PPS REVENUE CREDIT

## 2017-02-25 PROCEDURE — 665999 HH PPS REVENUE DEBIT

## 2017-02-25 PROCEDURE — 665998 HH PPS REVENUE CREDIT

## 2017-02-26 PROCEDURE — 665999 HH PPS REVENUE DEBIT

## 2017-02-26 PROCEDURE — 665998 HH PPS REVENUE CREDIT

## 2017-02-27 PROCEDURE — 665999 HH PPS REVENUE DEBIT

## 2017-02-27 PROCEDURE — 665998 HH PPS REVENUE CREDIT

## 2017-02-27 RX ORDER — LANCETS
EACH MISCELLANEOUS
Qty: 100 EACH | Refills: 11 | Status: SHIPPED | OUTPATIENT
Start: 2017-02-27 | End: 2018-03-21

## 2017-02-28 ENCOUNTER — OFFICE VISIT (OUTPATIENT)
Dept: ENDOCRINOLOGY | Facility: MEDICAL CENTER | Age: 77
End: 2017-02-28
Payer: MEDICARE

## 2017-02-28 VITALS
OXYGEN SATURATION: 93 % | SYSTOLIC BLOOD PRESSURE: 112 MMHG | DIASTOLIC BLOOD PRESSURE: 68 MMHG | HEART RATE: 91 BPM | BODY MASS INDEX: 30.35 KG/M2 | WEIGHT: 177.8 LBS | HEIGHT: 64 IN

## 2017-02-28 DIAGNOSIS — C73 PAPILLARY THYROID CARCINOMA (HCC): ICD-10-CM

## 2017-02-28 DIAGNOSIS — E89.0 POSTSURGICAL HYPOTHYROIDISM: ICD-10-CM

## 2017-02-28 PROCEDURE — 1101F PT FALLS ASSESS-DOCD LE1/YR: CPT | Performed by: INTERNAL MEDICINE

## 2017-02-28 PROCEDURE — 1036F TOBACCO NON-USER: CPT | Performed by: INTERNAL MEDICINE

## 2017-02-28 PROCEDURE — 665999 HH PPS REVENUE DEBIT

## 2017-02-28 PROCEDURE — 4040F PNEUMOC VAC/ADMIN/RCVD: CPT | Performed by: INTERNAL MEDICINE

## 2017-02-28 PROCEDURE — G8598 ASA/ANTIPLAT THER USED: HCPCS | Performed by: INTERNAL MEDICINE

## 2017-02-28 PROCEDURE — G8419 CALC BMI OUT NRM PARAM NOF/U: HCPCS | Performed by: INTERNAL MEDICINE

## 2017-02-28 PROCEDURE — 665998 HH PPS REVENUE CREDIT

## 2017-02-28 PROCEDURE — G8482 FLU IMMUNIZE ORDER/ADMIN: HCPCS | Performed by: INTERNAL MEDICINE

## 2017-02-28 PROCEDURE — 3017F COLORECTAL CA SCREEN DOC REV: CPT | Performed by: INTERNAL MEDICINE

## 2017-02-28 PROCEDURE — 99213 OFFICE O/P EST LOW 20 MIN: CPT | Mod: 25 | Performed by: INTERNAL MEDICINE

## 2017-02-28 PROCEDURE — 3014F SCREEN MAMMO DOC REV: CPT | Mod: 8P | Performed by: INTERNAL MEDICINE

## 2017-02-28 NOTE — PROGRESS NOTES
PCP: WENDY Baker    CC: Thyroid cancer    HPI:  Sanjuanita Sosa is a 76 y.o. old patient who comes in today for follow-up.     Papillary thyroid carcinoma: She underwent total thyroidectomy on October 12, 2016. She received 100 mCi for radioactive iodine ablation in December 2016, post therapy scan showed uptake only in the neck. No history of neck radiation. No family history of thyroid cancer. She denies mild neck discomfort. No numbness or tingling in fingers.     Postsurgical hypothyroidism: She is currently on levothyroxine 125 µg daily. She reports compliance with medications. She takes levothyroxine on a stomach in the morning.    ROS:  Constitutional: No unintentional weight loss  Cardiac: No palpitations or racing heart  All other systems were reviewed and were negative.    Past Medical History:  Patient Active Problem List    Diagnosis Date Noted   • Autoimmune hepatitis (CMS-HCC) 03/19/2012     Priority: High   • Chronic low back pain without sciatica 01/31/2017   • Postoperative hypothyroidism 01/31/2017   • Chronic anticoagulation 01/31/2017   • Anemia 12/29/2016   • Gastroesophageal reflux disease without esophagitis 12/03/2015   • Vitamin D deficiency disease 11/24/2015   • Abdominal aneurysm (HCC) 11/24/2015   • Diabetes mellitus type II, controlled (CMS-[facility]) 11/24/2015   • CKD (chronic kidney disease) stage 3, GFR 30-59 ml/min 11/19/2015   • BMI 33.0-33.9,adult 09/08/2015   • Risk for falls 09/08/2015   • Essential hypertension 08/05/2015   • Hx pulmonary embolism 05/12/2015   • History of hepatitis B 04/28/2015   • Dyslipidemia 04/28/2015   • Insomnia 11/04/2014   • Carotid artery stenosis 10/07/2014       Past Surgical History:  Past Surgical History   Procedure Laterality Date   • Bladder suspension  5/27/2009     Performed by RINA HART at [facility]   • Cystoscopy  5/27/2009     [facility]   •  Rectocele repair  5/27/2009     Performed by RINA HART at [facility]   • Appendectomy     • Hysterectomy laparoscopy     • Knee replacement, total     • Tonsillectomy     • Exploratory laparotomy  2/27/2013     Performed by Edson Craft M.D. at SUR[facility]ERY [facility]   • Splenectomy  2/27/2013     Performed by Edson Craft M.D. at SURGERY [facility]   • Node dissection  2/27/2013     Performed by Edson Craft M.D. at SURGERY [facility]   • Oophorectomy  2/27/2013     Performed by Levi Nolan M.D. at SURGERY [facility]   • Thyroidectomy total N/A 10/12/2016     Procedure: THYROIDECTOMY TOTAL;  Surgeon: Nuno Duncan M.D.;  Location: [facility]ERY [facility] DAY [facility];  Service:    • Darren by laparoscopy  1/2/2017     Procedure: DARREN BY LAPAROSCOPY;  Surgeon: Chele Valles M.D.;  Location: SURGERY [facility];  Service:    • Ercp in or  12/30/2016     Procedure: ERCP IN OR;  Surgeon: Shekhar Rosado M.D.;  Location: SURGERY SAME DAY [facility];  Service:        Allergies:  Aspirin and Penicillins    Social History:  Social History     Social History   • Marital Status:      Spouse Name: N/A   • Number of Children: N/A   • Years of Education: N/A     Occupational History   • Not on file.     Social History Main Topics   • Smoking status: Former Smoker -- 0.50 packs/day for 40 years     Types: Cigarettes     Quit date: 02/27/2013   • Smokeless tobacco: Never Used   • Alcohol Use: No   • Drug Use: No   • Sexual Activity: Not on file     Other Topics Concern   • Not on file     Social History Narrative       Family History:  Family History   Problem Relation Age of Onset   • Hyperlipidemia Mother    • Stroke Mother    • Hyperlipidemia Father    • Stroke Father    • Heart Disease Brother      CABG       Medications:    Current outpatient prescriptions:   •  MICROLET LANCETS Misc, TEST BLOOD SUGAR TWO TIMES DAILY, Disp: 100  Each, Rfl: 11  •  losartan (COZAAR) 50 MG Tab, TAKE ONE TABLET BY MOUTH EVERY DAY ** GENERIC FOR COZAAR, Disp: 30 Tab, Rfl: 1  •  omeprazole (PRILOSEC) 20 MG delayed-release capsule, TAKE ONE CAPSULE BY MOUTH EVERY DAY, Disp: 30 Cap, Rfl: 0  •  levothyroxine (SYNTHROID) 125 MCG Tab, Take 1 Tab by mouth Every morning on an empty stomach., Disp: 30 Tab, Rfl: 11  •  hydrochlorothiazide (HYDRODIURIL) 25 MG Tab, Take 0.5 Tabs by mouth every day., Disp: 30 Tab, Rfl: 11  •  atorvastatin (LIPITOR) 80 MG tablet, Take 0.5 Tabs by mouth every evening., Disp: 30 Tab, Rfl: 11  •  rivaroxaban (XARELTO) 20 MG Tab tablet, Take 1 Tab by mouth with dinner., Disp: 30 Tab, Rfl: 11  •  Magnesium 100 MG Cap, Take 1 Cap by mouth every day., Disp: 30 Cap, Rfl: 1  •  Cholecalciferol (VITAMIN D PO), Spray 1.25 mg in mouth/throat every 7 days., Disp: , Rfl:   •  calcium carbonate (CALCIUM CARBONATE) 500 MG Tab, Take 1 Tab by mouth 2 times a day, with meals., Disp: 60 Tab, Rfl: 3  •  docusate sodium 100 MG Cap, Take 100 mg by mouth every morning., Disp: 30 Cap, Rfl: 0  •  amlodipine (NORVASC) 10 MG Tab, TAKE ONE TABLET BY MOUTH ONCE DAILY ** GENERIC FOR NORVASC, Disp: 30 Tab, Rfl: 0  •  trazodone (DESYREL) 100 MG Tab, Take 1 Tab by mouth 1 time daily as needed for Sleep., Disp: 30 Tab, Rfl: 11  •  metformin (GLUCOPHAGE) 500 MG Tab, TAKE 2 TABLETS BY MOUTH EACH MORNING WITH  BREAKFAST AND TAKE 1 TABLET EACH EVENING   WITH DINNER, Disp: 90 Tab, Rfl: 11  •  glimepiride (AMARYL) 2 MG Tab, Take 2 mg by mouth every morning., Disp: , Rfl:   •  oxycodone-acetaminophen (PERCOCET) 7.5-325 MG per tablet, Take 1 Tab by mouth 2 Times a Day., Disp: 60 Tab, Rfl: 0  •  ondansetron (ZOFRAN ODT) 4 MG TABLET DISPERSIBLE, Take 1 Tab by mouth every four hours as needed for Nausea/Vomiting (give PO if IV route is unavailable. May give per feeding tube.)., Disp: 10 Tab, Rfl: 0  •  losartan (COZAAR) 50 MG Tab, TAKE ONE TABLET BY MOUTH ONCE DAILY ** GENERIC FOR  "COZAAR, Disp: 30 Tab, Rfl: 0  No current facility-administered medications for this visit.    Facility-Administered Medications Ordered in Other Visits:   •  iodixanol (VISIPAQUE) SOLN, , , Intra-Op Once PRN, Shekhar Rosado M.D., 10 mL at 01/18/17 0619    Labs:  Results for NAKUL LAMAS (MRN [id number]) as of 2/28/2017 13:01   Ref. Range 8/30/2016 11:19 11/16/2016 10:09 11/16/2016 10:11 12/28/2016 09:03 1/2/2017 08:29 1/31/2017 09:12   TSH Latest Ref Range: 0.300-3.700 uIU/mL  61.740 (H)  64.910 (H)  25.940 (H)   Free T-4 Latest Ref Range: 0.53-1.43 ng/dL  0.43 (L)   <0.25 (L)    Thyroglobulin by LC-MC/MS-S/P Latest Ref Range: 1.3-31.8 ng/mL Not Applicable Not Applicable       Thyroglobulin Latest Ref Range: 1.3-31.8 ng/mL 7.8 14.1       Pth, Intact Latest Ref Range: 14.0-72.0 pg/mL     108.1 (H)    Anti-Thyroglobulin Latest Ref Range: 0.0-4.0 IU/mL <0.9 <0.9           Physical Examination:  Vital signs: /68 mmHg  Pulse 91  Ht 1.626 m (5' 4\")  Wt 80.65 kg (177 lb 12.8 oz)  BMI 30.50 kg/m2  SpO2 93%  LMP 10/12/1970  General: No apparent distress, cooperative  Neck: Well-healed thyroidectomy scar  Resp: Normal effort  Extremities: No edema  Neuro: Alert and oriented  Skin: No rash  Psych: Normal mood and affect    Assessment and Plan:    Papillary thyroid carcinoma  · Status post total thyroidectomy on October 12, 2016  · Status post radioactive iodine ablation with 100 mCi in December 2016, post therapy scan showed uptake only in the neck  · Pathology report from October 2016: 2 cm papillary thyroid carcinoma, classic variant, with extra thyroidal extension into strap muscles and 1/1 perithyroidal lymph node with metastatic papillary thyroid carcinoma  · We will obtain thyroid bed ultrasound in 4 months  · We will repeat labs now: TSH, free T4, thyroglobulin panel; and repeat these labs in 4 months as well    Postsurgical hypothyroidism  · Goal TSH at this time is less than 0.1  · We will " repeat TSH and free T4 now  · For now continue on levothyroxine 125 µg daily    Return in about 4 months (around 6/28/2017).    Thank you for allowing me to participate in the care of this patient.    Sundar Albert M.D.  11/03/2016    CC:   BETHANY Baker.    This note was created using voice recognition software (Dragon). The accuracy of the dictation is limited by the abilities of the software. I have reviewed the note prior to signing, however some errors in grammar and context are still possible. If you have any questions related to this note please do not hesitate to contact our office.

## 2017-02-28 NOTE — MR AVS SNAPSHOT
"        Sanjuanita Sosa   2017 1:00 PM   Office Visit   MRN: [id number]    Department:  Endocrinology [facility]   Dept Phone:  [phone]    Description:  Female : 1940   Provider:  Sundar Albert M.D.           Reason for Visit     Follow-Up Papillary Thyroid Carcinoma      Allergies as of 2017     Allergen Noted Reactions    Aspirin 2015   Anaphylaxis    Penicillins 2015   Anaphylaxis    As a child      You were diagnosed with     Papillary thyroid carcinoma (CMS-HCC)   [126397]         Vital Signs     Blood Pressure Pulse Height Weight Body Mass Index Oxygen Saturation    112/68 mmHg 91 1.626 m (5' 4\") 80.65 kg (177 lb 12.8 oz) 30.50 kg/m2 93%    Last Menstrual Period Smoking Status                10/12/1970 Former Smoker          Basic Information     Date Of Birth Sex Race Ethnicity Preferred Language    1940 Female White Non- English      Your appointments     2017  1:00 PM   Established Patient with Sundar Albert M.D.   [facility]    [address]  [facility] [id number] [phone]           You will be receiving a confirmation call a few days before your appointment from our automated call confirmation system.            Mar 14, 2017  9:40 AM   Follow Up Visit with Michael J Bloch, M.D.   [facility]  (--)    [address] [facility] [address]   [phone]            May 09, 2017 10:00 AM   Follow Up Visit with Michael J Bloch, M.D.   [facility]  (--)    [address] [place] [id number]   [phone]              Problem List              ICD-10-CM Priority Class Noted - Resolved    Autoimmune hepatitis (CMS-HCC) (Chronic) K75.4 High  3/19/2012 - Present    Carotid artery stenosis I65.29   10/7/2014 - Present    Insomnia G47.00   2014 - Present    History of hepatitis B " Z86.19   4/28/2015 - Present    Dyslipidemia E78.5   4/28/2015 - Present    Hx pulmonary embolism Z86.711   5/12/2015 - Present    Essential hypertension I10   8/5/2015 - Present    BMI 33.0-33.9,adult Z68.33   9/8/2015 - Present    Risk for falls Z91.81   9/8/2015 - Present    CKD (chronic kidney disease) stage 3, GFR 30-59 ml/min N18.3   11/19/2015 - Present    Vitamin D deficiency disease E55.9   11/24/2015 - Present    Abdominal aneurysm (HCC) I71.4   11/24/2015 - Present    Diabetes mellitus type II, controlled (CMS-HCC) E11.9   11/24/2015 - Present    Gastroesophageal reflux disease without esophagitis K21.9   12/3/2015 - Present    Anemia D64.9   12/29/2016 - Present    Chronic low back pain without sciatica M54.5, G89.29   1/31/2017 - Present    Postoperative hypothyroidism E89.0   1/31/2017 - Present    Chronic anticoagulation Z79.01   1/31/2017 - Present      Health Maintenance        Date Due Completion Dates    PAP SMEAR 3/16/1963 ---    IMM ZOSTER VACCINE 3/16/2002 ---    MAMMOGRAM 10/7/2015 10/7/2014 (Prv Comp)    Override on 10/7/2014: Previously completed    IMM PNEUMOCOCCAL 65+ (ADULT) LOW/MEDIUM RISK SERIES (2 of 2 - PCV13) 8/5/2016 8/5/2015    DIABETES MONOFILAMENT / LE EXAM 8/5/2016 8/5/2015 (Done)    Override on 8/5/2015: Done    RETINAL SCREENING 8/12/2016 8/12/2015 (Done)    Override on 8/12/2015: Done    IMM INFLUENZA (1) 9/1/2016 10/12/2015, 11/1/2012    BONE DENSITY 10/7/2016 10/7/2011 (Prv Comp)    Override on 10/7/2011: Previously completed    URINE ACR / MICROALBUMIN 3/16/2017 3/16/2016, 11/17/2015, 10/15/2014    A1C SCREENING 6/28/2017 12/28/2016, 11/16/2016, 8/16/2016, 3/15/2016, 11/17/2015, 4/28/2015, 4/21/2015, 10/14/2014, 3/7/2013    FASTING LIPID PROFILE 1/31/2018 1/31/2017, 12/29/2016, 11/16/2016, 8/16/2016, 6/22/2016, 3/15/2016, 11/17/2015, 4/29/2015, 10/14/2014, 3/14/2013    SERUM CREATININE 1/31/2018 1/31/2017, 1/3/2017, 1/2/2017, 1/1/2017, 12/31/2016, 12/30/2016, 12/29/2016,  12/28/2016, 11/16/2016, 10/6/2016, 8/16/2016, 6/22/2016, 5/10/2016, 11/17/2015, 10/12/2015, 10/11/2015, 10/10/2015, 10/9/2015, 10/8/2015, 10/7/2015, 10/6/2015, 4/30/2015, 4/29/2015, 4/28/2015, 4/27/2015, 4/21/2015, 1/31/2015, 10/14/2014, 3/14/2013, 3/13/2013, 3/12/2013, 3/8/2013, 3/7/2013, 3/4/2013, 3/2/2013, 3/1/2013, 2/28/2013, 2/26/2013, 5/19/2009    COLONOSCOPY 10/7/2019 10/7/2009 (Prv Comp)    Override on 10/7/2009: Previously completed    IMM DTaP/Tdap/Td Vaccine (2 - Td) 3/6/2023 3/6/2013            Current Immunizations     HIB Vaccine (ACTHIB/HIBERIX) 3/6/2013 11:45 AM    INFLUENZA VACCINE H1N1 10/12/2016    Influenza TIV (IM) 11/1/2012    Influenza Vaccine Adult HD 10/12/2015  8:46 AM    Meningococcal Polysaccharide Vaccine MPSV4 3/6/2013 12:00 PM    Pneumococcal Vaccine (PCV7) Historical Data 10/12/2016    Pneumococcal Vaccine (UF)Historical Data 12/1/2012    Pneumococcal polysaccharide vaccine (PPSV-23) 8/5/2015    Tdap Vaccine 3/6/2013 11:45 AM      Below and/or attached are the medications your provider expects you to take. Review all of your home medications and newly ordered medications with your provider and/or pharmacist. Follow medication instructions as directed by your provider and/or pharmacist. Please keep your medication list with you and share with your provider. Update the information when medications are discontinued, doses are changed, or new medications (including over-the-counter products) are added; and carry medication information at all times in the event of emergency situations     Allergies:  ASPIRIN - Anaphylaxis     PENICILLINS - Anaphylaxis               Medications  Valid as of: February 28, 2017 - 12:59 PM    Generic Name Brand Name Tablet Size Instructions for use    AmLODIPine Besylate (Tab) NORVASC 10 MG TAKE ONE TABLET BY MOUTH ONCE DAILY ** GENERIC FOR NORVASC        Atorvastatin Calcium (Tab) LIPITOR 80 MG Take 0.5 Tabs by mouth every evening.        Cholecalciferol    Spray 1.25 mg in mouth/throat every 7 days.        Docusate Sodium (Cap)  MG Take 100 mg by mouth every morning.        Glimepiride (Tab) AMARYL 2 MG Take 2 mg by mouth every morning.        HydroCHLOROthiazide (Tab) HYDRODIURIL 25 MG Take 0.5 Tabs by mouth every day.        Lancets (Misc) MICROLET LANCETS  TEST BLOOD SUGAR TWO TIMES DAILY        Levothyroxine Sodium (Tab) SYNTHROID 125 MCG Take 1 Tab by mouth Every morning on an empty stomach.        Losartan Potassium (Tab) COZAAR 50 MG TAKE ONE TABLET BY MOUTH ONCE DAILY ** GENERIC FOR COZAAR        Losartan Potassium (Tab) COZAAR 50 MG TAKE ONE TABLET BY MOUTH EVERY DAY ** GENERIC FOR COZAAR        Magnesium (Cap) Magnesium 100 MG Take 1 Cap by mouth every day.        MetFORMIN HCl (Tab) GLUCOPHAGE 500 MG TAKE 2 TABLETS BY MOUTH EACH MORNING WITH  BREAKFAST AND TAKE 1 TABLET EACH EVENING   WITH DINNER        Omeprazole (CAPSULE DELAYED RELEASE) PRILOSEC 20 MG TAKE ONE CAPSULE BY MOUTH EVERY DAY        Ondansetron (TABLET DISPERSIBLE) ZOFRAN ODT 4 MG Take 1 Tab by mouth every four hours as needed for Nausea/Vomiting (give PO if IV route is unavailable. May give per feeding tube.).        Oxycodone-Acetaminophen (Tab) PERCOCET 7.5-325 MG Take 1 Tab by mouth 2 Times a Day.        Oyster Shell (Tab) OS-MARKELL 500 500 MG Take 1 Tab by mouth 2 times a day, with meals.        Rivaroxaban (Tab) XARELTO 20 MG Take 1 Tab by mouth with dinner.        TraZODone HCl (Tab) DESYREL 100 MG Take 1 Tab by mouth 1 time daily as needed for Sleep.        .                 Medicines prescribed today were sent to:     [id number] [place] - [address] [address] [facility] [place] [address]    Phone: [phone] Fax: [phone]    Open 24 Hours?: No      Medication refill instructions:       If your prescription bottle indicates you have medication refills left, it is not necessary to call your provider’s office. Please contact your pharmacy and they will  refill your medication.    If your prescription bottle indicates you do not have any refills left, you may request refills at any time through one of the following ways: The online [facility] system (except Urgent Care), by calling your provider’s office, or by asking your pharmacy to contact your provider’s office with a refill request. Medication refills are processed only during regular business hours and may not be available until the next business day. Your provider may request additional information or to have a follow-up visit with you prior to refilling your medication.   *Please Note: Medication refills are assigned a new Rx number when refilled electronically. Your pharmacy may indicate that no refills were authorized even though a new prescription for the same medication is available at the pharmacy. Please request the medicine by name with the pharmacy before contacting your provider for a refill.        Your To Do List     Future Labs/Procedures Complete By Expires    FREE THYROXINE  6/18/2017 2/28/2018    THYROGLOBULIN, QT  6/18/2017 2/28/2018    TSH  6/18/2017 2/28/2018    US-SOFT TISSUES OF HEAD - NECK  6/19/2017 2/28/2018    FREE THYROXINE  As directed 2/28/2018    THYROGLOBULIN, QT  As directed 2/28/2018    TSH  As directed 2/28/2018         [employer] [facility] Status: Patient Declined

## 2017-03-01 DIAGNOSIS — Z86.711 HISTORY OF PULMONARY EMBOLISM: ICD-10-CM

## 2017-03-01 PROCEDURE — 665999 HH PPS REVENUE DEBIT

## 2017-03-01 PROCEDURE — 665998 HH PPS REVENUE CREDIT

## 2017-03-02 PROCEDURE — 665998 HH PPS REVENUE CREDIT

## 2017-03-02 PROCEDURE — 665999 HH PPS REVENUE DEBIT

## 2017-03-03 PROCEDURE — 665998 HH PPS REVENUE CREDIT

## 2017-03-03 PROCEDURE — 665999 HH PPS REVENUE DEBIT

## 2017-03-04 PROCEDURE — 665999 HH PPS REVENUE DEBIT

## 2017-03-04 PROCEDURE — 665998 HH PPS REVENUE CREDIT

## 2017-03-05 PROCEDURE — 665999 HH PPS REVENUE DEBIT

## 2017-03-05 PROCEDURE — 665998 HH PPS REVENUE CREDIT

## 2017-03-06 PROCEDURE — 665999 HH PPS REVENUE DEBIT

## 2017-03-06 PROCEDURE — 665998 HH PPS REVENUE CREDIT

## 2017-03-07 ENCOUNTER — HOSPITAL ENCOUNTER (OUTPATIENT)
Dept: LAB | Facility: MEDICAL CENTER | Age: 77
End: 2017-03-07
Attending: INTERNAL MEDICINE
Payer: MEDICARE

## 2017-03-07 DIAGNOSIS — N18.30 CKD (CHRONIC KIDNEY DISEASE) STAGE 3, GFR 30-59 ML/MIN (HCC): ICD-10-CM

## 2017-03-07 DIAGNOSIS — I10 ESSENTIAL HYPERTENSION: ICD-10-CM

## 2017-03-07 DIAGNOSIS — E11.9 CONTROLLED TYPE 2 DIABETES MELLITUS WITHOUT COMPLICATION, WITHOUT LONG-TERM CURRENT USE OF INSULIN (HCC): ICD-10-CM

## 2017-03-07 DIAGNOSIS — C73 PAPILLARY THYROID CARCINOMA (HCC): ICD-10-CM

## 2017-03-07 LAB
ANION GAP SERPL CALC-SCNC: 10 MMOL/L (ref 0–11.9)
BUN SERPL-MCNC: 27 MG/DL (ref 8–22)
CALCIUM SERPL-MCNC: 9.8 MG/DL (ref 8.5–10.5)
CHLORIDE SERPL-SCNC: 105 MMOL/L (ref 96–112)
CO2 SERPL-SCNC: 22 MMOL/L (ref 20–33)
CREAT SERPL-MCNC: 1.09 MG/DL (ref 0.5–1.4)
GLUCOSE SERPL-MCNC: 102 MG/DL (ref 65–99)
POTASSIUM SERPL-SCNC: 5.6 MMOL/L (ref 3.6–5.5)
SODIUM SERPL-SCNC: 137 MMOL/L (ref 135–145)
T4 FREE SERPL-MCNC: 1.49 NG/DL (ref 0.53–1.43)
TSH SERPL DL<=0.005 MIU/L-ACNC: 1.57 UIU/ML (ref 0.3–3.7)

## 2017-03-07 PROCEDURE — 36415 COLL VENOUS BLD VENIPUNCTURE: CPT

## 2017-03-07 PROCEDURE — 84443 ASSAY THYROID STIM HORMONE: CPT

## 2017-03-07 PROCEDURE — 86800 THYROGLOBULIN ANTIBODY: CPT

## 2017-03-07 PROCEDURE — 84432 ASSAY OF THYROGLOBULIN: CPT | Mod: 91

## 2017-03-07 PROCEDURE — 84439 ASSAY OF FREE THYROXINE: CPT

## 2017-03-08 ENCOUNTER — TELEPHONE (OUTPATIENT)
Dept: NEPHROLOGY | Facility: MEDICAL CENTER | Age: 77
End: 2017-03-08

## 2017-03-08 ENCOUNTER — HOSPITAL ENCOUNTER (OUTPATIENT)
Dept: LAB | Facility: MEDICAL CENTER | Age: 77
End: 2017-03-08
Attending: INTERNAL MEDICINE
Payer: MEDICARE

## 2017-03-08 DIAGNOSIS — N18.30 CKD (CHRONIC KIDNEY DISEASE) STAGE 3, GFR 30-59 ML/MIN (HCC): ICD-10-CM

## 2017-03-08 LAB
CREAT UR-MCNC: 38.7 MG/DL
MICROALBUMIN UR-MCNC: <0.7 MG/DL
MICROALBUMIN/CREAT UR: NORMAL MG/G (ref 0–30)

## 2017-03-08 PROCEDURE — 82570 ASSAY OF URINE CREATININE: CPT

## 2017-03-08 PROCEDURE — 82043 UR ALBUMIN QUANTITATIVE: CPT

## 2017-03-08 NOTE — TELEPHONE ENCOUNTER
Helena from the lab [phone] called, she states that the order for microalbumin creatinine ratio was cancelled yesterday because pt was unable to void, but pt returned today with sample. They need a new order placed by the end of day or they will have to toss sample and recollect once the order has been placed.

## 2017-03-09 LAB
THYROGLOB AB SERPL-ACNC: <0.9 IU/ML (ref 0–4)
THYROGLOB SERPL-MCNC: 0.5 NG/ML (ref 1.3–31.8)
THYROGLOB SERPL-MCNC: ABNORMAL NG/ML (ref 1.3–31.8)

## 2017-03-10 ENCOUNTER — TELEPHONE (OUTPATIENT)
Dept: ENDOCRINOLOGY | Facility: MEDICAL CENTER | Age: 77
End: 2017-03-10

## 2017-03-10 DIAGNOSIS — E89.0 POSTOPERATIVE HYPOTHYROIDISM: ICD-10-CM

## 2017-03-10 RX ORDER — LEVOTHYROXINE SODIUM 137 UG/1
137 TABLET ORAL
Qty: 30 TAB | Refills: 6 | Status: SHIPPED | OUTPATIENT
Start: 2017-03-10 | End: 2017-06-22 | Stop reason: SDUPTHER

## 2017-03-10 NOTE — TELEPHONE ENCOUNTER
Called Pt and notified    Thyroid hormone levels are not at goal.     Please advise patient that I have sent a new prescription for levothyroxine 137 µg daily. Repeat labs for TSH and free T4 in 6 weeks.    Thank you  Angi

## 2017-03-14 ENCOUNTER — OFFICE VISIT (OUTPATIENT)
Dept: VASCULAR LAB | Facility: MEDICAL CENTER | Age: 77
End: 2017-03-14
Attending: INTERNAL MEDICINE
Payer: MEDICARE

## 2017-03-14 VITALS
HEIGHT: 64 IN | HEART RATE: 91 BPM | WEIGHT: 175 LBS | BODY MASS INDEX: 29.88 KG/M2 | DIASTOLIC BLOOD PRESSURE: 41 MMHG | SYSTOLIC BLOOD PRESSURE: 91 MMHG

## 2017-03-14 DIAGNOSIS — E03.9 HYPOTHYROIDISM, UNSPECIFIED TYPE: ICD-10-CM

## 2017-03-14 DIAGNOSIS — I71.40 ABDOMINAL AORTIC ANEURYSM (AAA) WITHOUT RUPTURE (HCC): ICD-10-CM

## 2017-03-14 DIAGNOSIS — E78.5 DYSLIPIDEMIA: ICD-10-CM

## 2017-03-14 DIAGNOSIS — E11.9 CONTROLLED TYPE 2 DIABETES MELLITUS WITHOUT COMPLICATION, WITHOUT LONG-TERM CURRENT USE OF INSULIN (HCC): ICD-10-CM

## 2017-03-14 DIAGNOSIS — N18.3 CKD (CHRONIC KIDNEY DISEASE), STAGE 3 (MODERATE): ICD-10-CM

## 2017-03-14 DIAGNOSIS — I10 ESSENTIAL HYPERTENSION: ICD-10-CM

## 2017-03-14 DIAGNOSIS — O22.30 DVT (DEEP VEIN THROMBOSIS) IN PREGNANCY: ICD-10-CM

## 2017-03-14 PROCEDURE — 1101F PT FALLS ASSESS-DOCD LE1/YR: CPT | Performed by: INTERNAL MEDICINE

## 2017-03-14 PROCEDURE — 1036F TOBACCO NON-USER: CPT | Performed by: INTERNAL MEDICINE

## 2017-03-14 PROCEDURE — 99214 OFFICE O/P EST MOD 30 MIN: CPT | Performed by: INTERNAL MEDICINE

## 2017-03-14 PROCEDURE — 3044F HG A1C LEVEL LT 7.0%: CPT | Performed by: INTERNAL MEDICINE

## 2017-03-14 PROCEDURE — 99212 OFFICE O/P EST SF 10 MIN: CPT

## 2017-03-14 PROCEDURE — 4040F PNEUMOC VAC/ADMIN/RCVD: CPT | Performed by: INTERNAL MEDICINE

## 2017-03-14 PROCEDURE — G8482 FLU IMMUNIZE ORDER/ADMIN: HCPCS | Performed by: INTERNAL MEDICINE

## 2017-03-14 PROCEDURE — G8598 ASA/ANTIPLAT THER USED: HCPCS | Performed by: INTERNAL MEDICINE

## 2017-03-14 PROCEDURE — G8432 DEP SCR NOT DOC, RNG: HCPCS | Performed by: INTERNAL MEDICINE

## 2017-03-14 PROCEDURE — G8419 CALC BMI OUT NRM PARAM NOF/U: HCPCS | Performed by: INTERNAL MEDICINE

## 2017-03-14 PROCEDURE — 3014F SCREEN MAMMO DOC REV: CPT | Mod: 8P | Performed by: INTERNAL MEDICINE

## 2017-03-14 PROCEDURE — 3017F COLORECTAL CA SCREEN DOC REV: CPT | Performed by: INTERNAL MEDICINE

## 2017-03-14 RX ORDER — GLIMEPIRIDE 2 MG/1
TABLET ORAL
Qty: 30 TAB | Refills: 5 | Status: SHIPPED | OUTPATIENT
Start: 2017-03-14 | End: 2017-08-16 | Stop reason: SDUPTHER

## 2017-03-14 ASSESSMENT — ENCOUNTER SYMPTOMS
WHEEZING: 0
CLAUDICATION: 0
PALPITATIONS: 0
NERVOUS/ANXIOUS: 0
COUGH: 0
DIZZINESS: 0
DEPRESSION: 1
HEADACHES: 0
MYALGIAS: 0
SPEECH CHANGE: 0
BRUISES/BLEEDS EASILY: 0
WEIGHT LOSS: 0
SHORTNESS OF BREATH: 0
BACK PAIN: 1
FOCAL WEAKNESS: 0
HEMOPTYSIS: 0

## 2017-03-14 NOTE — PROGRESS NOTES
"  FOLLOW-UP VASCULAR VISIT  Subjective:   Sanjuanita Sosa is a 75 y.o. female who presents today 3/14/17 for   No chief complaint on file.    HPI:     Patient here for f/u of AAA, PE, anticoagulation, htn, and dyslipidemia  Will be going back to see nephrology - has an appt to see them    On xarelto -  but concerned that it is too expensive.  No bleeding.  Still on atorvastatin for cholesterol  States that bp has been ok in other MD offices, but low here today.  Not checking BP at home  Brings in list of meds - not on hctz  Leg swelling mild and tolerable  States she is taking glimiperide and metformin  FS usually around 100-120 at home  No hypoglycemic epsides  Went back to smoking 2-3 cigs per day  Energy level pretty good on this dose of thyroid    Social History   Substance Use Topics   • Smoking status: Former Smoker -- 0.50 packs/day for 40 years     Types: Cigarettes     Quit date: 02/27/2013   • Smokeless tobacco: Never Used   • Alcohol Use: No     DIET AND EXERCISE:  Weight Change: stable  Diet: Diabetic diet  Exercise: minimal exercise due to back pain    Review of Systems   Constitutional: Positive for malaise/fatigue. Negative for weight loss.   Respiratory: Negative for cough, hemoptysis, shortness of breath and wheezing.    Cardiovascular: Positive for leg swelling. Negative for chest pain, palpitations and claudication.   Musculoskeletal: Positive for back pain and joint pain. Negative for myalgias.   Neurological: Negative for dizziness, speech change, focal weakness and headaches.   Endo/Heme/Allergies: Does not bruise/bleed easily.   Psychiatric/Behavioral: Positive for depression. The patient is not nervous/anxious.       Objective:     Filed Vitals:    03/14/17 0926 03/14/17 0930   BP: 138/110 91/41   Pulse: 176 91   Height: 1.626 m (5' 4.02\")    Weight: 79.379 kg (175 lb)       Body mass index is 30.02 kg/(m^2).  Physical Exam   Constitutional: She is oriented to person, place, and time. " No distress.   Cardiovascular: Normal rate and regular rhythm.    Murmur heard.  Pulmonary/Chest: Effort normal and breath sounds normal. No respiratory distress. She has no wheezes. She has no rales.   Musculoskeletal: Normal range of motion. She exhibits no edema.   Neurological: She is alert and oriented to person, place, and time. No cranial nerve deficit. Coordination normal.   Skin: She is not diaphoretic.   Psychiatric: Her speech is normal and behavior is normal. Thought content normal. Her mood appears anxious. She exhibits a depressed mood. She expresses no suicidal ideation.     Lab Results   Component Value Date    CHOLSTRLTOT 100 01/31/2017    LDL 36 01/31/2017    HDL 29* 01/31/2017    TRIGLYCERIDE 177* 01/31/2017      Lab Results   Component Value Date    PROTHROMBTM 17.2* 12/28/2016    INR 1.36* 12/28/2016       Lab Results   Component Value Date    HBA1C 6.6* 12/28/2016      Lab Results   Component Value Date    SODIUM 137 03/07/2017    POTASSIUM 5.6* 03/07/2017    CHLORIDE 105 03/07/2017    CO2 22 03/07/2017    GLUCOSE 102* 03/07/2017    BUN 27* 03/07/2017    CREATININE 1.09 03/07/2017    IFAFRICA 59* 03/07/2017    IFNOTAFR 49* 03/07/2017        Lab Results   Component Value Date    WBC 10.7 01/03/2017    RBC 3.57* 01/03/2017    HEMOGLOBIN 11.0* 01/03/2017    HEMATOCRIT 33.0* 01/03/2017    MCV 92.4 01/03/2017    MCH 30.8 01/03/2017    MCHC 33.3* 01/03/2017    MPV 10.2 01/03/2017     CT chest 4/27/2015  There are pulmonary emboli extending into the right upper lobe, right middle lobe, right lower lobe, and left lower lobe. The main pulmonary artery is of normal caliber. No shift of the intraventricular septum or reflux of contrast into the hepatic veins. There are scattered arterial calcifications. No significant pericardial effusion.  There is mild left basilar atelectasis. No significant pleural fluid. The central airways are clear.  No adenopathy is identified.  Limited visualization of the upper  abdomen demonstrates severe atherosclerotic disease.    echo 4/28/2015  Technically difficult study.   Normal left ventricular size and function.  Left ventricular ejection fraction is 60% to 65%.  Grade I diastolic dysfunction - mitral inflow E/A is <1.0.  Mild mitral regurgitation.  Mild aortic stenosis.  Mild to moderate aortic insufficiency.  Moderate tricuspid regurgitation.  Right ventricular systolic pressure is estimated to be 43-48 mmHg.  No prior study is available for comparison.      CT scan chest abdomen and pelvis 10/2015   Thoracic abdominal aortic penetrating atheromatous ulcer without evidence for intramural hematoma or dissection.   Exclusion of early dissection or intramural hematoma (although no evidence for this entity) is not possible on this contrast only study.  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  Ventral hernia containing small bowel and without evidence for obstruction or inflammation    CTA 5/17/2016  1. Considerable partially ulcerated plaque within the aorta similar to previous findings. No dissection. No mediastinal hematoma.  2. The abdominal aorta measures up to 2.6 cm in diameter. No change compared with previous. No retroperitoneal hematoma.  3. High-grade stenosis celiac artery.  4. Atelectasis within both lungs and tiny right apical pleural-based nodule unchanged.  5. Surgical absent spleen. Small splenules and chronic loculated fluid collection left upper quadrant again demonstrated.  6. Diverticula colon without evidence of diverticulitis.  7. Ventral abdominal wall hernia containing nonobstructed small bowel loops.  8. Large and small bowel incompletely imaged. No free fluid within the abdomen identified.  9. Partial  resection pancreas.  10. Tiny gallstones.  11. Coronary artery calcifications.  12. 1.3 cm left lobe thyroid nodule. Ultrasound followup is consideration.    U/S Aorta Dec 2016:  1.  Fusiform infrarenal abdominal aortic aneurysm measures 2.9 x 2.9 cm in maximum axial dimensions and is located at the mid aortic level. Measurement on prior CT was 2.6 cm.  2.  Extensive aortic atherosclerosis.    Medical Decision Making:  Today's Assessment / Status / Plan:     Patient Type: Secondary Prevention    Etiology of Established CVD if Present:   1.  Ulceration thoracic aorta - not necessarily penetrating - stable on serial imaging.  2.  AAA, small  3.  DVT and PE      Lipid Management: Qualifies for Statin Therapy Based on 2013 ACC/AHA Guidelines: yes  Calculated 10-Year Risk of ASCVD: N/A  Currently on Statin: Yes  Perhaps overtreated on most recent labs  -continue decreased atorvastatin to 40 mg daily (1/2 tab)  -Report any myalgias immediately  -recheck fasting lipids prior to next visit    Blood Pressure Management:Goal: JNC8 (2013) Office BP Goal:<140/90; Under Control: yes  Unclear level of control  Can't afford home monitor at present  Has CKD but stable or improved  Does not appear to be on hctz at present  Mild leg swelling on amlodipine, but tolerable  -Continue Amlodipine 10 mg daily  -Continue losartan 50 mg daily    Glycemic Status: Diabetic-  last A1c under excellent control 5.8  -Continue metformin for now, but may need to consider d/c if GFR falls further  -continue glimiperide for now, but decrease dose or stop if a1c well controlled next visit  -Continue to follow fasting blood sugars at home  -Continue TLC  -Yearly flu shot, eye exam    Anti-Platelet/Anti-Coagulant Tx:   Unable to afford pradaxa or xarelto  Patient in agreement that long term benefit of anticoag likely outweighs risk  - will have patient come to anticoagulation clinic to arrange change from DOAC to warfarin    Smoking:  Continue complete  cessation    Physical Activity: encouraged exercise class 2-3 x per week    Weight Management and Nutrition: Dietary plan was discussed with patient at this visit including low fats and carb, diabetic diet    Other:     1. Chronic renal disease stage IIIB, improved to IIIA on most recent blood work.  Continue ARB and BP control. Check GFR, urine for ma, pth, vit D, and CBC before next visit. - will otherwise defer management to nephrology who she will be seeing next week  2. Hypocalcemia - defer management to nepnrology  3.  Hypothyroidism,  Dose recently increased by pcp.  Will defer all management to pcp  4.  H/o papillary thyroid cancer - defer to pcp and oncology  5.  Ulceration, thoracic aorta - stable on surveillance imaging.  Continue medical management  6.  AAA, small and stable on serial imaging - continue medical management and surveillance    Instructed to follow-up with PCP for remainder of adult medical needs: yes  We will partner with other providers in the management of established vascular disease and cardiometabolic risk factors.    Studies to Be Obtained: Abdominal aortic ultrasound 2-3 years  Labs to Be Obtained:  As above prior to next visit    Follow up in: 6-8 weeks    Michael J Bloch, M.D.     CC:   Dr. Najjar Dr. Devia Dr. Hansen David Burgio

## 2017-03-15 ENCOUNTER — TELEPHONE (OUTPATIENT)
Dept: VASCULAR LAB | Facility: MEDICAL CENTER | Age: 77
End: 2017-03-15

## 2017-03-15 NOTE — TELEPHONE ENCOUNTER
LM for patient to contact Anticoagulation Clinic to establish care.  She is to transition from Xarelto to warfarin.  Omid Min, DARYLD

## 2017-03-21 RX ORDER — AMLODIPINE BESYLATE 10 MG/1
TABLET ORAL
Qty: 30 TAB | Refills: 10 | Status: SHIPPED | OUTPATIENT
Start: 2017-03-21 | End: 2017-05-09

## 2017-03-21 RX ORDER — OMEPRAZOLE 20 MG/1
CAPSULE, DELAYED RELEASE ORAL
Qty: 30 CAP | Refills: 10 | Status: SHIPPED | OUTPATIENT
Start: 2017-03-21 | End: 2017-05-08 | Stop reason: SDUPTHER

## 2017-03-22 ENCOUNTER — ANTICOAGULATION VISIT (OUTPATIENT)
Dept: VASCULAR LAB | Facility: MEDICAL CENTER | Age: 77
End: 2017-03-22
Attending: INTERNAL MEDICINE
Payer: MEDICARE

## 2017-03-22 DIAGNOSIS — Z86.711 HX PULMONARY EMBOLISM: ICD-10-CM

## 2017-03-22 LAB
INR BLD: 1.1 (ref 0.9–1.2)
INR PPP: 1.1 (ref 2–3.5)

## 2017-03-22 PROCEDURE — 99203 OFFICE O/P NEW LOW 30 MIN: CPT | Performed by: NURSE PRACTITIONER

## 2017-03-22 PROCEDURE — 85610 PROTHROMBIN TIME: CPT

## 2017-03-22 RX ORDER — WARFARIN SODIUM 5 MG/1
TABLET ORAL
Qty: 30 TAB | Refills: 2 | Status: SHIPPED | OUTPATIENT
Start: 2017-03-22 | End: 2017-05-30

## 2017-03-22 NOTE — MR AVS SNAPSHOT
Sanjuanita Sosa   3/22/2017 10:00 AM   Anticoagulation Visit   MRN: [id number]    Department:  Vascular Medicine   Dept Phone:  [phone]    Description:  Female : 1940   Provider:  V EXAM 1           Allergies as of 3/22/2017     Allergen Noted Reactions    Aspirin 2015   Anaphylaxis    Penicillins 2015   Anaphylaxis    As a child      You were diagnosed with     Hx pulmonary embolism   [[id number]]         Vital Signs     Last Menstrual Period Smoking Status                10/12/1970 Former Smoker          Basic Information     Date Of Birth Sex Race Ethnicity Preferred Language    1940 Female White Non- English      Your appointments     Mar 22, 2017 10:00 AM   New Patient with IHVH EXAM 1   [facility]  (--)    [address] [facility] [place] [address]   [phone]            Mar 29, 2017 10:00 AM   Established Patient with IHVH EXAM 4   [facility]  (--)    [address] [facility] [address] [place] [address]   [phone]            May 09, 2017 10:00 AM   Follow Up Visit with Michael J Bloch, M.D.   [facility]  (--)    [address] [facility] [place] [address]   [phone]            2017 10:30 AM   US [id number] with [address] US 1   [facility] IMAGING - ULTRASOUND - [address])    [address]  [place] [id number] [phone]              Problem List              ICD-10-CM Priority Class Noted - Resolved    Autoimmune hepatitis (CMS-HCC) (Chronic) K75.4 High  3/19/2012 - Present    Carotid artery stenosis I65.29   10/7/2014 - Present    Insomnia G47.00   2014 - Present    History of hepatitis B Z86.19   2015 - Present    Dyslipidemia E78.5   2015 - Present    Hx pulmonary embolism Z86.711   2015 - Present    Essential hypertension I10   2015 - Present    BMI 33.0-33.9,adult  Z68.33   9/8/2015 - Present    Risk for falls Z91.81   9/8/2015 - Present    CKD (chronic kidney disease) stage 3, GFR 30-59 ml/min N18.3   11/19/2015 - Present    Vitamin D deficiency disease E55.9   11/24/2015 - Present    Abdominal aneurysm (HCC) I71.4   11/24/2015 - Present    Diabetes mellitus type II, controlled (CMS-HCC) E11.9   11/24/2015 - Present    Gastroesophageal reflux disease without esophagitis K21.9   12/3/2015 - Present    Anemia D64.9   12/29/2016 - Present    Chronic low back pain without sciatica M54.5, G89.29   1/31/2017 - Present    Postoperative hypothyroidism E89.0   1/31/2017 - Present    Chronic anticoagulation Z79.01   1/31/2017 - Present      Health Maintenance        Date Due Completion Dates    PAP SMEAR 3/16/1963 ---    IMM ZOSTER VACCINE 3/16/2002 ---    MAMMOGRAM 10/7/2015 10/7/2014 (Prv Comp)    Override on 10/7/2014: Previously completed    IMM PNEUMOCOCCAL 65+ (ADULT) LOW/MEDIUM RISK SERIES (2 of 2 - PCV13) 8/5/2016 8/5/2015    DIABETES MONOFILAMENT / LE EXAM 8/5/2016 8/5/2015 (Done)    Override on 8/5/2015: Done    RETINAL SCREENING 8/12/2016 8/12/2015 (Done)    Override on 8/12/2015: Done    IMM INFLUENZA (1) 9/1/2016 10/12/2015, 11/1/2012    BONE DENSITY 10/7/2016 10/7/2011 (Prv Comp)    Override on 10/7/2011: Previously completed    A1C SCREENING 6/28/2017 12/28/2016, 11/16/2016, 8/16/2016, 3/15/2016, 11/17/2015, 4/28/2015, 4/21/2015, 10/14/2014, 3/7/2013    FASTING LIPID PROFILE 1/31/2018 1/31/2017, 12/29/2016, 11/16/2016, 8/16/2016, 6/22/2016, 3/15/2016, 11/17/2015, 4/29/2015, 10/14/2014, 3/14/2013    SERUM CREATININE 3/7/2018 3/7/2017, 1/31/2017, 1/3/2017, 1/2/2017, 1/1/2017, 12/31/2016, 12/30/2016, 12/29/2016, 12/28/2016, 11/16/2016, 10/6/2016, 8/16/2016, 6/22/2016, 5/10/2016, 11/17/2015, 10/12/2015, 10/11/2015, 10/10/2015, 10/9/2015, 10/8/2015, 10/7/2015, 10/6/2015, 4/30/2015, 4/29/2015, 4/28/2015, 4/27/2015, 4/21/2015, 1/31/2015, 10/14/2014, 3/14/2013, 3/13/2013,  3/12/2013, 3/8/2013, 3/7/2013, 3/4/2013, 3/2/2013, 3/1/2013, 2/28/2013, 2/26/2013, 5/19/2009    URINE ACR / MICROALBUMIN 3/8/2018 3/8/2017, 3/16/2016, 11/17/2015, 10/15/2014    COLONOSCOPY 10/7/2019 10/7/2009 (Prv Comp)    Override on 10/7/2009: Previously completed    IMM DTaP/Tdap/Td Vaccine (2 - Td) 3/6/2023 3/6/2013            Results     POCT Protime      Component    INR    1.1                        Current Immunizations     HIB Vaccine (ACTHIB/HIBERIX) 3/6/2013 11:45 AM    INFLUENZA VACCINE H1N1 10/12/2016    Influenza TIV (IM) 11/1/2012    Influenza Vaccine Adult HD 10/12/2015  8:46 AM    Meningococcal Polysaccharide Vaccine MPSV4 3/6/2013 12:00 PM    Pneumococcal Vaccine (PCV7) Historical Data 10/12/2016    Pneumococcal Vaccine (UF)Historical Data 12/1/2012    Pneumococcal polysaccharide vaccine (PPSV-23) 8/5/2015    Tdap Vaccine 3/6/2013 11:45 AM      Below and/or attached are the medications your provider expects you to take. Review all of your home medications and newly ordered medications with your provider and/or pharmacist. Follow medication instructions as directed by your provider and/or pharmacist. Please keep your medication list with you and share with your provider. Update the information when medications are discontinued, doses are changed, or new medications (including over-the-counter products) are added; and carry medication information at all times in the event of emergency situations     Allergies:  ASPIRIN - Anaphylaxis     PENICILLINS - Anaphylaxis               Medications  Valid as of: March 22, 2017 -  9:50 AM    Generic Name Brand Name Tablet Size Instructions for use    AmLODIPine Besylate (Tab) NORVASC 10 MG TAKE ONE TABLET BY MOUTH ONCE DAILY ** GENERIC FOR NORVASC        AmLODIPine Besylate (Tab) NORVASC 10 MG TAKE ONE TABLET BY MOUTH EVERY DAY        Atorvastatin Calcium (Tab) LIPITOR 80 MG Take 0.5 Tabs by mouth every evening.        Cholecalciferol   Spray 1.25 mg in  mouth/throat every 7 days.        Docusate Sodium (Cap)  MG Take 100 mg by mouth every morning.        Glimepiride (Tab) AMARYL 2 MG TAKE 1 TABLET BY MOUTH EACH MORNING ** GENERIC FOR AMARYL        Lancets (Misc) MICROLET LANCETS  TEST BLOOD SUGAR TWO TIMES DAILY        Levothyroxine Sodium (Tab) SYNTHROID 137 MCG Take 1 Tab by mouth Every morning on an empty stomach.        Losartan Potassium (Tab) COZAAR 50 MG TAKE ONE TABLET BY MOUTH ONCE DAILY ** GENERIC FOR COZAAR        MetFORMIN HCl (Tab) GLUCOPHAGE 500 MG TAKE 2 TABLETS BY MOUTH EACH MORNING WITH  BREAKFAST AND TAKE 1 TABLET EACH EVENING   WITH DINNER        Omeprazole (CAPSULE DELAYED RELEASE) PRILOSEC 20 MG TAKE ONE CAPSULE BY MOUTH EVERY DAY        Oxycodone-Acetaminophen (Tab) PERCOCET 7.5-325 MG Take 1 Tab by mouth 2 Times a Day.        Oyster Shell (Tab) OS-MARKELL 500 500 MG Take 1 Tab by mouth 2 times a day, with meals.        Rivaroxaban (Tab) XARELTO 20 MG Take 1 Tab by mouth with dinner.        TraZODone HCl (Tab) DESYREL 100 MG Take 1 Tab by mouth 1 time daily as needed for Sleep.        Warfarin Sodium (Tab) COUMADIN 5 MG Take 1 tab by mouth daily or as directed by coumadin clinic        .                 Medicines prescribed today were sent to:     [facility] [address]  [place] [facility] [address] [address]    Phone: [phone] Fax: [phone]    Open 24 Hours?: No      Medication refill instructions:       If your prescription bottle indicates you have medication refills left, it is not necessary to call your provider’s office. Please contact your pharmacy and they will refill your medication.    If your prescription bottle indicates you do not have any refills left, you may request refills at any time through one of the following ways: The online [employer] system (except Urgent Care), by calling your provider’s office, or by asking your pharmacy to contact your provider’s office with a refill request.  Medication refills are processed only during regular business hours and may not be available until the next business day. Your provider may request additional information or to have a follow-up visit with you prior to refilling your medication.   *Please Note: Medication refills are assigned a new Rx number when refilled electronically. Your pharmacy may indicate that no refills were authorized even though a new prescription for the same medication is available at the pharmacy. Please request the medicine by name with the pharmacy before contacting your provider for a refill.        Warfarin Dosing Calendar   March 2017 Details    Sun Mon Tue Wed Thu Fri Sat        1               2               3               4                 5               6               7               8               9               10               11                 12               13               14               15               16               17               18                 19               20               21               22   1.1   2.5 mg   See details      23      2.5 mg   See details      24      2.5 mg   See details      25      2.5 mg   See details        26      2.5 mg   See details      27      2.5 mg   See details      28      2.5 mg   See details      29      2.5 mg         30               31                 Date Details   03/22 This INR check   Xarelto 20 mg daily   INR: 1.1      03/23 Xarelto 20 mg daily      03/24 Xarelto 20 mg daily      03/25 Xarelto 20 mg daily      03/26 Xarelto 20 mg daily      03/27 Xarelto 20 mg daily      03/28 Xarelto 20 mg daily       Date of next INR:  3/29/2017         How to take your warfarin dose     To take:  2.5 mg Take 0.5 of a 5 mg tablet.              MyChart Status: Patient Declined

## 2017-03-22 NOTE — PROGRESS NOTES
Anticoagulation Summary as of 3/22/2017     INR goal 2.0-3.0   Selected INR 1.1! (3/22/2017)   Maintenance plan 2.5 mg (5 mg x 0.5) every day   Weekly total 17.5 mg   Plan last modified Farheen Laws A.P.NMary (3/22/2017)   Next INR check 3/29/2017   Target end date Indefinite    Indications   DVT (deep venous thrombosis)  left (Resolved) [I82.402]  Hx pulmonary embolism [Z86.711]         Anticoagulation Episode Summary     INR check location     Preferred lab     Send INR reminders to     Comments       Anticoagulation Care Providers     Provider Role Specialty Phone number    WENDY Baker Referring Family Medicine [phone]    [facility] Responsible  [phone]        Patient is currently on Xarelto but is switching to warfarin due to cost. Hx of PE/DVTs in the past (no recent VTEs). She is taking Xarelto 20 mg once daily, however, she took her last tablet that night. Sample bottle of Xarelto provided. Will send rx for warfarin to [facility]. Pt will  today. Education given to patient regarding instructions for taking warfarin, food/drug interactions, drug/drug interactions and signs/symptoms of bleeding or thrombosis. Discussed anticoagulation in detail including dosing, INR levels and safety. Discussed smoking cessation as she currently does smoke but she is not interested at this time. Discussed avoidance of  OCPs.    INR 1.1 today. Reviewed her medications. No current symptoms of bleeding or thrombosis reported. Continue taking Xarelto 20 mg once daily (do not stop until directed to by our clinic) AND start warfarin 2.5 mg daily. She isn't able to return for one week, therefore, will start with low dose warfarin. Advised to monitor for any s/sx of bleeding and to seek medical attention if she notices this.    Next Appointment: Wednesday, March 29, 2017 at 10:00 am.     Farheen CARPENTER

## 2017-03-29 ENCOUNTER — ANTICOAGULATION VISIT (OUTPATIENT)
Dept: VASCULAR LAB | Facility: MEDICAL CENTER | Age: 77
End: 2017-03-29
Attending: INTERNAL MEDICINE
Payer: MEDICARE

## 2017-03-29 VITALS — HEART RATE: 79 BPM | DIASTOLIC BLOOD PRESSURE: 50 MMHG | SYSTOLIC BLOOD PRESSURE: 126 MMHG

## 2017-03-29 DIAGNOSIS — Z86.711 HX PULMONARY EMBOLISM: ICD-10-CM

## 2017-03-29 LAB
INR BLD: 1.1 (ref 0.9–1.2)
INR PPP: 1.1 (ref 2–3.5)

## 2017-03-29 PROCEDURE — 99212 OFFICE O/P EST SF 10 MIN: CPT | Performed by: PHARMACIST

## 2017-03-29 PROCEDURE — 85610 PROTHROMBIN TIME: CPT

## 2017-03-29 NOTE — MR AVS SNAPSHOT
Sanjuanita Sosa   3/29/2017 10:00 AM   Anticoagulation Visit   MRN: [id number]    Department:  Vascular Medicine   Dept Phone:  [phone]    Description:  Female : 1940   Provider:  V EXAM 4           Allergies as of 3/29/2017     Allergen Noted Reactions    Aspirin 2015   Anaphylaxis    Penicillins 2015   Anaphylaxis    As a child      You were diagnosed with     Hx pulmonary embolism   [[id number]]         Vital Signs     Blood Pressure Pulse Last Menstrual Period Smoking Status          126/50 mmHg 79 10/12/1970 Former Smoker        Basic Information     Date Of Birth Sex Race Ethnicity Preferred Language    1940 Female White Non- English      Your appointments     Mar 29, 2017 10:00 AM   Established Patient with IHV EXAM 4   [facility]  (--)    [address] [facility] [address]   [phone]            2017  9:45 AM   Established Patient with IHV EXAM 1   [facility]  (--)    [address] [facility] [address]   [phone]            May 09, 2017 10:00 AM   Follow Up Visit with Michael J Bloch, M.D.   [facility]  (--)    [address] [facility] [address]   [phone]            2017 10:30 AM   US [id number] with [address] US 1   [facility] IMAGING - ULTRASOUND - [address])    [address]  [facility] [id number] [phone]              Problem List              ICD-10-CM Priority Class Noted - Resolved    Autoimmune hepatitis (CMS-HCC) (Chronic) K75.4 High  3/19/2012 - Present    Carotid artery stenosis I65.29   10/7/2014 - Present    Insomnia G47.00   2014 - Present    History of hepatitis B Z86.19   2015 - Present    Dyslipidemia E78.5   2015 - Present    Hx pulmonary embolism Z86.711   2015 - Present    Essential hypertension I10    8/5/2015 - Present    BMI 33.0-33.9,adult Z68.33   9/8/2015 - Present    Risk for falls Z91.81   9/8/2015 - Present    CKD (chronic kidney disease) stage 3, GFR 30-59 ml/min N18.3   11/19/2015 - Present    Vitamin D deficiency disease E55.9   11/24/2015 - Present    Abdominal aneurysm (HCC) I71.4   11/24/2015 - Present    Diabetes mellitus type II, controlled (CMS-HCC) E11.9   11/24/2015 - Present    Gastroesophageal reflux disease without esophagitis K21.9   12/3/2015 - Present    Anemia D64.9   12/29/2016 - Present    Chronic low back pain without sciatica M54.5, G89.29   1/31/2017 - Present    Postoperative hypothyroidism E89.0   1/31/2017 - Present    Chronic anticoagulation Z79.01   1/31/2017 - Present      Health Maintenance        Date Due Completion Dates    PAP SMEAR 3/16/1963 ---    IMM ZOSTER VACCINE 3/16/2002 ---    MAMMOGRAM 10/7/2015 10/7/2014 (Prv Comp)    Override on 10/7/2014: Previously completed    IMM PNEUMOCOCCAL 65+ (ADULT) LOW/MEDIUM RISK SERIES (2 of 2 - PCV13) 8/5/2016 8/5/2015    DIABETES MONOFILAMENT / LE EXAM 8/5/2016 8/5/2015 (Done)    Override on 8/5/2015: Done    RETINAL SCREENING 8/12/2016 8/12/2015 (Done)    Override on 8/12/2015: Done    IMM INFLUENZA (1) 9/1/2016 10/12/2015, 11/1/2012    BONE DENSITY 10/7/2016 10/7/2011 (Prv Comp)    Override on 10/7/2011: Previously completed    A1C SCREENING 6/28/2017 12/28/2016, 11/16/2016, 8/16/2016, 3/15/2016, 11/17/2015, 4/28/2015, 4/21/2015, 10/14/2014, 3/7/2013    FASTING LIPID PROFILE 1/31/2018 1/31/2017, 12/29/2016, 11/16/2016, 8/16/2016, 6/22/2016, 3/15/2016, 11/17/2015, 4/29/2015, 10/14/2014, 3/14/2013    SERUM CREATININE 3/7/2018 3/7/2017, 1/31/2017, 1/3/2017, 1/2/2017, 1/1/2017, 12/31/2016, 12/30/2016, 12/29/2016, 12/28/2016, 11/16/2016, 10/6/2016, 8/16/2016, 6/22/2016, 5/10/2016, 11/17/2015, 10/12/2015, 10/11/2015, 10/10/2015, 10/9/2015, 10/8/2015, 10/7/2015, 10/6/2015, 4/30/2015, 4/29/2015, 4/28/2015, 4/27/2015, 4/21/2015,  1/31/2015, 10/14/2014, 3/14/2013, 3/13/2013, 3/12/2013, 3/8/2013, 3/7/2013, 3/4/2013, 3/2/2013, 3/1/2013, 2/28/2013, 2/26/2013, 5/19/2009    URINE ACR / MICROALBUMIN 3/8/2018 3/8/2017, 3/16/2016, 11/17/2015, 10/15/2014    COLONOSCOPY 10/7/2019 10/7/2009 (Prv Comp)    Override on 10/7/2009: Previously completed    IMM DTaP/Tdap/Td Vaccine (2 - Td) 3/6/2023 3/6/2013            Results     POCT Protime      Component    INR    1.1                        Current Immunizations     HIB Vaccine (ACTHIB/HIBERIX) 3/6/2013 11:45 AM    INFLUENZA VACCINE H1N1 10/12/2016    Influenza TIV (IM) 11/1/2012    Influenza Vaccine Adult HD 10/12/2015  8:46 AM    Meningococcal Polysaccharide Vaccine MPSV4 3/6/2013 12:00 PM    Pneumococcal Vaccine (PCV7) Historical Data 10/12/2016    Pneumococcal Vaccine (UF)Historical Data 12/1/2012    Pneumococcal polysaccharide vaccine (PPSV-23) 8/5/2015    Tdap Vaccine 3/6/2013 11:45 AM      Below and/or attached are the medications your provider expects you to take. Review all of your home medications and newly ordered medications with your provider and/or pharmacist. Follow medication instructions as directed by your provider and/or pharmacist. Please keep your medication list with you and share with your provider. Update the information when medications are discontinued, doses are changed, or new medications (including over-the-counter products) are added; and carry medication information at all times in the event of emergency situations     Allergies:  ASPIRIN - Anaphylaxis     PENICILLINS - Anaphylaxis               Medications  Valid as of: March 29, 2017 -  9:58 AM    Generic Name Brand Name Tablet Size Instructions for use    AmLODIPine Besylate (Tab) NORVASC 10 MG TAKE ONE TABLET BY MOUTH ONCE DAILY ** GENERIC FOR NORVASC        AmLODIPine Besylate (Tab) NORVASC 10 MG TAKE ONE TABLET BY MOUTH EVERY DAY        Atorvastatin Calcium (Tab) LIPITOR 80 MG Take 0.5 Tabs by mouth every evening.           Cholecalciferol   Spray 1.25 mg in mouth/throat every 7 days.        Docusate Sodium (Cap)  MG Take 100 mg by mouth every morning.        Glimepiride (Tab) AMARYL 2 MG TAKE 1 TABLET BY MOUTH EACH MORNING ** GENERIC FOR AMARYL        Lancets (Misc) MICROLET LANCETS  TEST BLOOD SUGAR TWO TIMES DAILY        Levothyroxine Sodium (Tab) SYNTHROID 137 MCG Take 1 Tab by mouth Every morning on an empty stomach.        Losartan Potassium (Tab) COZAAR 50 MG TAKE ONE TABLET BY MOUTH ONCE DAILY ** GENERIC FOR COZAAR        MetFORMIN HCl (Tab) GLUCOPHAGE 500 MG TAKE 2 TABLETS BY MOUTH EACH MORNING WITH  BREAKFAST AND TAKE 1 TABLET EACH EVENING   WITH DINNER        Omeprazole (CAPSULE DELAYED RELEASE) PRILOSEC 20 MG TAKE ONE CAPSULE BY MOUTH EVERY DAY        Oxycodone-Acetaminophen (Tab) PERCOCET 7.5-325 MG Take 1 Tab by mouth 2 Times a Day.        Oyster Shell (Tab) OS-MARKELL 500 500 MG Take 1 Tab by mouth 2 times a day, with meals.        TraZODone HCl (Tab) DESYREL 100 MG Take 1 Tab by mouth 1 time daily as needed for Sleep.        Warfarin Sodium (Tab) COUMADIN 5 MG Take 1 tab by mouth daily or as directed by coumadin clinic        .                 Medicines prescribed today were sent to:     [facility] [place], [place]  [facility] [address] [address]    Phone: [phone] Fax: [phone]    Open 24 Hours?: No      Medication refill instructions:       If your prescription bottle indicates you have medication refills left, it is not necessary to call your provider’s office. Please contact your pharmacy and they will refill your medication.    If your prescription bottle indicates you do not have any refills left, you may request refills at any time through one of the following ways: The online [employer] system (except Urgent Care), by calling your provider’s office, or by asking your pharmacy to contact your provider’s office with a refill request. Medication refills are processed  only during regular business hours and may not be available until the next business day. Your provider may request additional information or to have a follow-up visit with you prior to refilling your medication.   *Please Note: Medication refills are assigned a new Rx number when refilled electronically. Your pharmacy may indicate that no refills were authorized even though a new prescription for the same medication is available at the pharmacy. Please request the medicine by name with the pharmacy before contacting your provider for a refill.        Warfarin Dosing Calendar   March 2017 Details    Sun Mon Tue Wed Thu Fri Sat        1               2               3               4                 5               6               7               8               9               10               11                 12               13               14               15               16               17               18                 19               20               21               22               23               24               25                 26               27               28               29   1.1   5 mg   See details      30      5 mg         31      5 mg           Date Details   03/29 This INR check   INR: 1.1               How to take your warfarin dose     To take:  5 mg Take 1 of the 5 mg tablets.           Warfarin Dosing Calendar   April 2017 Details    Sun Mon Tue Wed Thu Fri Sat           1      5 mg           2      5 mg         3      5 mg         4      5 mg         5      5 mg         6               7               8                 9               10               11               12               13               14               15                 16               17               18               19               20               21               22                 23               24               25               26               27               28               29                 30                         Date Details   No additional details    Date of next INR:  4/5/2017         How to take your warfarin dose     To take:  5 mg Take 1 of the 5 mg tablets.              MyChart Status: Patient Declined

## 2017-03-29 NOTE — PROGRESS NOTES
Anticoagulation Summary as of 3/29/2017     INR goal 2.0-3.0   Selected INR 1.1! (3/29/2017)   Maintenance plan 5 mg (5 mg x 1) every day   Weekly total 35 mg   Plan last modified Omid Min PHARMD (3/29/2017)   Next INR check 4/5/2017   Target end date Indefinite    Indications   DVT (deep venous thrombosis)  left (Resolved) [I82.402]  Hx pulmonary embolism [Z86.711]         Anticoagulation Episode Summary     INR check location     Preferred lab     Send INR reminders to     Comments       Anticoagulation Care Providers     Provider Role Specialty Phone number    WENDY Baker Referring Family Medicine [phone]    [facility] Responsible  [phone]        Anticoagulation Patient Findings   Negatives Missed Doses, Extra Doses, Medication Changes, Antibiotic Use, Diet Changes, Dental/Other Procedures, Hospitalization, Bleeding Gums, Nose Bleeds, Blood in Urine, Blood in Stool, Any Bruising, Other Complaints        Patient is subtherapeutic today with INR of 1.1.  Pt denies any unusual s/s of bleeding, bruising, clotting or any changes to diet or medications.  She will continuing dosing Xarelto 20mg daily along with warfarin until INR >2.0.  Additional samples given.  Instructed her to increase to warfarin 5mg daily until next INR.  Follow up in 1 week as she only has transportation on Tues/Wed.    Omid Min, DARYLD

## 2017-04-05 ENCOUNTER — ANTICOAGULATION VISIT (OUTPATIENT)
Dept: VASCULAR LAB | Facility: MEDICAL CENTER | Age: 77
End: 2017-04-05
Attending: INTERNAL MEDICINE
Payer: MEDICARE

## 2017-04-05 DIAGNOSIS — Z86.711 HX PULMONARY EMBOLISM: ICD-10-CM

## 2017-04-05 LAB — INR PPP: 1.4 (ref 2–3.5)

## 2017-04-05 PROCEDURE — 99212 OFFICE O/P EST SF 10 MIN: CPT

## 2017-04-05 PROCEDURE — 85610 PROTHROMBIN TIME: CPT

## 2017-04-05 NOTE — PROGRESS NOTES
Anticoagulation Summary as of 4/5/2017     INR goal 2.0-3.0   Selected INR 1.4! (4/5/2017)   Maintenance plan 5 mg (5 mg x 1) on Mon, Wed, Fri; 7.5 mg (5 mg x 1.5) all other days   Weekly total 45 mg   Plan last modified Burke Matthew, PHARMD (4/5/2017)   Next INR check 4/11/2017   Target end date Indefinite    Indications   DVT (deep venous thrombosis)  left (Resolved) [I82.402]  Hx pulmonary embolism [Z86.711]         Anticoagulation Episode Summary     INR check location     Preferred lab     Send INR reminders to     Comments       Anticoagulation Care Providers     Provider Role Specialty Phone number    WENDY Baker Referring Family Medicine [phone]    [facility] Responsible  [phone]        Anticoagulation Patient Findings    Patient's INR was SUB therapeutic today in clinic.     Pt continues to take Xarelto while on warfarin therapy.  States her last DVT was >2 years ago, therefore will provide Xarelto therapy for only 2 more days of overlap.  Pt denies any unusual s/s of bleeding, bruising, clotting or any changes to diet or medications.  Bolus warfarin x2 days then begin 30% increased regimen.    Earliest pt can return is 6 days due to transportation issues.    Burke Matthew, PHARMD

## 2017-04-07 LAB — INR BLD: 1.4 (ref 0.9–1.2)

## 2017-04-11 ENCOUNTER — ANTICOAGULATION VISIT (OUTPATIENT)
Dept: VASCULAR LAB | Facility: MEDICAL CENTER | Age: 77
End: 2017-04-11
Attending: INTERNAL MEDICINE
Payer: MEDICARE

## 2017-04-11 DIAGNOSIS — Z86.711 HX PULMONARY EMBOLISM: ICD-10-CM

## 2017-04-11 LAB — INR PPP: 2.2 (ref 2–3.5)

## 2017-04-11 PROCEDURE — 85610 PROTHROMBIN TIME: CPT

## 2017-04-11 PROCEDURE — 99212 OFFICE O/P EST SF 10 MIN: CPT | Performed by: NURSE PRACTITIONER

## 2017-04-11 NOTE — MR AVS SNAPSHOT
Sanjuanita Sosa   2017 10:00 AM   Anticoagulation Visit   MRN: [id number]    Department:  Vascular Medicine   Dept Phone:  [phone]    Description:  Female : 1940   Provider:  [facility] EXAM 5           Allergies as of 2017     Allergen Noted Reactions    Aspirin 2015   Anaphylaxis    Penicillins 2015   Anaphylaxis    As a child      You were diagnosed with     Hx pulmonary embolism   [106651]         Vital Signs     Last Menstrual Period Smoking Status                10/12/1970 Former Smoker          Basic Information     Date Of Birth Sex Race Ethnicity Preferred Language    1940 Female White Non- English      Your appointments     2017 10:00 AM   Established Patient with IHV EXAM 5   [facility]  (--)    [address] [place] [address]   [phone]            2017 11:00 AM   Established Patient with WENDY Baker   [facility] [address]    [address]  [address] [address]  [facility] [id number] [phone]           You will be receiving a confirmation call a few days before your appointment from our automated call confirmation system.            2017 10:30 AM   Established Patient with IHV EXAM 5   [facility]  (--)    [address] [facility] [address]   [phone]            May 09, 2017 10:00 AM   Follow Up Visit with Michael J Bloch, M.D.   [facility]  (--)    [address] [place] [address]   [phone]            2017 10:30 AM   US [id number] with [address] US 1   [facility] - ULTRASOUND - [address]    [address]  [place] [id number] [phone]              Problem List              ICD-10-CM Priority Class Noted - Resolved    Autoimmune hepatitis (CMS-HCC) (Chronic) K75.4 High  3/19/2012 -  Present    Carotid artery stenosis I65.29   10/7/2014 - Present    Insomnia G47.00   11/4/2014 - Present    History of hepatitis B Z86.19   4/28/2015 - Present    Dyslipidemia E78.5   4/28/2015 - Present    Hx pulmonary embolism Z86.711   5/12/2015 - Present    Essential hypertension I10   8/5/2015 - Present    BMI 33.0-33.9,adult Z68.33   9/8/2015 - Present    Risk for falls Z91.81   9/8/2015 - Present    CKD (chronic kidney disease) stage 3, GFR 30-59 ml/min N18.3   11/19/2015 - Present    Vitamin D deficiency disease E55.9   11/24/2015 - Present    Abdominal aneurysm (HCC) I71.4   11/24/2015 - Present    Diabetes mellitus type II, controlled (CMS-HCC) E11.9   11/24/2015 - Present    Gastroesophageal reflux disease without esophagitis K21.9   12/3/2015 - Present    Anemia D64.9   12/29/2016 - Present    Chronic low back pain without sciatica M54.5, G89.29   1/31/2017 - Present    Postoperative hypothyroidism E89.0   1/31/2017 - Present    Chronic anticoagulation Z79.01   1/31/2017 - Present      Health Maintenance        Date Due Completion Dates    PAP SMEAR 3/16/1963 ---    IMM ZOSTER VACCINE 3/16/2002 ---    MAMMOGRAM 10/7/2015 10/7/2014 (Prv Comp)    Override on 10/7/2014: Previously completed    IMM PNEUMOCOCCAL 65+ (ADULT) LOW/MEDIUM RISK SERIES (2 of 2 - PCV13) 8/5/2016 8/5/2015    DIABETES MONOFILAMENT / LE EXAM 8/5/2016 8/5/2015 (Done)    Override on 8/5/2015: Done    RETINAL SCREENING 8/12/2016 8/12/2015 (Done)    Override on 8/12/2015: Done    BONE DENSITY 10/7/2016 10/7/2011 (Prv Comp)    Override on 10/7/2011: Previously completed    A1C SCREENING 6/28/2017 12/28/2016, 11/16/2016, 8/16/2016, 3/15/2016, 11/17/2015, 4/28/2015, 4/21/2015, 10/14/2014, 3/7/2013    FASTING LIPID PROFILE 1/31/2018 1/31/2017, 12/29/2016, 11/16/2016, 8/16/2016, 6/22/2016, 3/15/2016, 11/17/2015, 4/29/2015, 10/14/2014, 3/14/2013    SERUM CREATININE 3/7/2018 3/7/2017, 1/31/2017, 1/3/2017, 1/2/2017, 1/1/2017, 12/31/2016,  12/30/2016, 12/29/2016, 12/28/2016, 11/16/2016, 10/6/2016, 8/16/2016, 6/22/2016, 5/10/2016, 11/17/2015, 10/12/2015, 10/11/2015, 10/10/2015, 10/9/2015, 10/8/2015, 10/7/2015, 10/6/2015, 4/30/2015, 4/29/2015, 4/28/2015, 4/27/2015, 4/21/2015, 1/31/2015, 10/14/2014, 3/14/2013, 3/13/2013, 3/12/2013, 3/8/2013, 3/7/2013, 3/4/2013, 3/2/2013, 3/1/2013, 2/28/2013, 2/26/2013, 5/19/2009    URINE ACR / MICROALBUMIN 3/8/2018 3/8/2017, 3/16/2016, 11/17/2015, 10/15/2014    COLONOSCOPY 10/7/2019 10/7/2009 (Prv Comp)    Override on 10/7/2009: Previously completed    IMM DTaP/Tdap/Td Vaccine (2 - Td) 3/6/2023 3/6/2013            Results     POCT Protime      Component    INR    2.2                        Current Immunizations     HIB Vaccine (ACTHIB/HIBERIX) 3/6/2013 11:45 AM    INFLUENZA VACCINE H1N1 10/12/2016    Influenza TIV (IM) 11/1/2012    Influenza Vaccine Adult HD 10/12/2015  8:46 AM    Meningococcal Polysaccharide Vaccine MPSV4 3/6/2013 12:00 PM    Pneumococcal Vaccine (PCV7) Historical Data 10/12/2016    Pneumococcal Vaccine (UF)Historical Data 12/1/2012    Pneumococcal polysaccharide vaccine (PPSV-23) 8/5/2015    Tdap Vaccine 3/6/2013 11:45 AM      Below and/or attached are the medications your provider expects you to take. Review all of your home medications and newly ordered medications with your provider and/or pharmacist. Follow medication instructions as directed by your provider and/or pharmacist. Please keep your medication list with you and share with your provider. Update the information when medications are discontinued, doses are changed, or new medications (including over-the-counter products) are added; and carry medication information at all times in the event of emergency situations     Allergies:  ASPIRIN - Anaphylaxis     PENICILLINS - Anaphylaxis               Medications  Valid as of: April 11, 2017 -  9:57 AM    Generic Name Brand Name Tablet Size Instructions for use    AmLODIPine Besylate (Tab) NORVASC  10 MG TAKE ONE TABLET BY MOUTH ONCE DAILY ** GENERIC FOR NORVASC        AmLODIPine Besylate (Tab) NORVASC 10 MG TAKE ONE TABLET BY MOUTH EVERY DAY        Atorvastatin Calcium (Tab) LIPITOR 80 MG Take 0.5 Tabs by mouth every evening.        Cholecalciferol   Spray 1.25 mg in mouth/throat every 7 days.        Docusate Sodium (Cap)  MG Take 100 mg by mouth every morning.        Glimepiride (Tab) AMARYL 2 MG TAKE 1 TABLET BY MOUTH EACH MORNING ** GENERIC FOR AMARYL        Lancets (Misc) MICROLET LANCETS  TEST BLOOD SUGAR TWO TIMES DAILY        Levothyroxine Sodium (Tab) SYNTHROID 137 MCG Take 1 Tab by mouth Every morning on an empty stomach.        Losartan Potassium (Tab) COZAAR 50 MG TAKE ONE TABLET BY MOUTH ONCE DAILY ** GENERIC FOR COZAAR        MetFORMIN HCl (Tab) GLUCOPHAGE 500 MG TAKE 2 TABLETS BY MOUTH EACH MORNING WITH  BREAKFAST AND TAKE 1 TABLET EACH EVENING   WITH DINNER        Omeprazole (CAPSULE DELAYED RELEASE) PRILOSEC 20 MG TAKE ONE CAPSULE BY MOUTH EVERY DAY        Oxycodone-Acetaminophen (Tab) PERCOCET 7.5-325 MG Take 1 Tab by mouth 2 Times a Day.        Oyster Shell (Tab) OS-MARKELL 500 500 MG Take 1 Tab by mouth 2 times a day, with meals.        TraZODone HCl (Tab) DESYREL 100 MG Take 1 Tab by mouth 1 time daily as needed for Sleep.        Warfarin Sodium (Tab) COUMADIN 5 MG Take 1 tab by mouth daily or as directed by coumadin clinic        .                 Medicines prescribed today were sent to:     [id number] - [place], [place] - [address] [place] [address]    Phone: [phone] Fax: [phone]    Open 24 Hours?: No      Medication refill instructions:       If your prescription bottle indicates you have medication refills left, it is not necessary to call your provider’s office. Please contact your pharmacy and they will refill your medication.    If your prescription bottle indicates you do not have any refills left, you may request refills at any  time through one of the following ways: The online [employer] system (except Urgent Care), by calling your provider’s office, or by asking your pharmacy to contact your provider’s office with a refill request. Medication refills are processed only during regular business hours and may not be available until the next business day. Your provider may request additional information or to have a follow-up visit with you prior to refilling your medication.   *Please Note: Medication refills are assigned a new Rx number when refilled electronically. Your pharmacy may indicate that no refills were authorized even though a new prescription for the same medication is available at the pharmacy. Please request the medicine by name with the pharmacy before contacting your provider for a refill.        Warfarin Dosing Calendar   April 2017 Details    Sun Mon Tue Wed Thu Fri Sat           1                 2               3               4               5               6               7               8                 9               10               11   2.2   7.5 mg   See details      12      5 mg         13      7.5 mg         14      5 mg         15      7.5 mg           16      7.5 mg         17      7.5 mg         18      7.5 mg         19               20               21               22                 23               24               25               26               27               28               29                 30                      Date Details   04/11 This INR check   INR: 2.2       Date of next INR:  4/18/2017         How to take your warfarin dose     To take:  5 mg Take 1 of the 5 mg tablets.    To take:  7.5 mg Take 1.5 of the 5 mg tablets.              MyChart Status: Patient Declined

## 2017-04-11 NOTE — PROGRESS NOTES
Anticoagulation Summary as of 4/11/2017     INR goal 2.0-3.0   Selected INR 2.2 (4/11/2017)   Maintenance plan 5 mg (5 mg x 1) on Wed, Fri; 7.5 mg (5 mg x 1.5) all other days   Weekly total 47.5 mg   Plan last modified YUE BrennanPSTEFAN (4/11/2017)   Next INR check 4/18/2017   Target end date Indefinite    Indications   DVT (deep venous thrombosis)  left (Resolved) [I82.402]  Hx pulmonary embolism [Z86.711]         Anticoagulation Episode Summary     INR check location     Preferred lab     Send INR reminders to     Comments       Anticoagulation Care Providers     Provider Role Specialty Phone number    WENDY Baker Referring Family Medicine [phone]    [facility] Respons[facility]  [phone]        Anticoagulation Patient Findings    Pt is therapeutic today. Denies any changes in medications or diet. Med rec completed and reviewed. Patient denies any s/sx of bleeding or clotting. Pt took a dose slightly different than prescribed-- will continue this dosing as outlined in calendar. Will follow-up with pt in 1 week.    Brianna CARPENTER

## 2017-04-12 ENCOUNTER — OFFICE VISIT (OUTPATIENT)
Dept: MEDICAL GROUP | Facility: MEDICAL CENTER | Age: 77
End: 2017-04-12
Payer: MEDICARE

## 2017-04-12 VITALS
HEIGHT: 64 IN | RESPIRATION RATE: 16 BRPM | SYSTOLIC BLOOD PRESSURE: 116 MMHG | TEMPERATURE: 98.4 F | WEIGHT: 179 LBS | BODY MASS INDEX: 30.56 KG/M2 | DIASTOLIC BLOOD PRESSURE: 70 MMHG | HEART RATE: 85 BPM | OXYGEN SATURATION: 94 %

## 2017-04-12 DIAGNOSIS — N18.30 CKD (CHRONIC KIDNEY DISEASE) STAGE 3, GFR 30-59 ML/MIN (HCC): ICD-10-CM

## 2017-04-12 DIAGNOSIS — E11.9 CONTROLLED TYPE 2 DIABETES MELLITUS WITHOUT COMPLICATION, WITHOUT LONG-TERM CURRENT USE OF INSULIN (HCC): ICD-10-CM

## 2017-04-12 DIAGNOSIS — I10 ESSENTIAL HYPERTENSION: ICD-10-CM

## 2017-04-12 DIAGNOSIS — Z79.01 CHRONIC ANTICOAGULATION: ICD-10-CM

## 2017-04-12 DIAGNOSIS — M54.50 CHRONIC LOW BACK PAIN WITHOUT SCIATICA, UNSPECIFIED BACK PAIN LATERALITY: ICD-10-CM

## 2017-04-12 DIAGNOSIS — E89.0 POSTOPERATIVE HYPOTHYROIDISM: ICD-10-CM

## 2017-04-12 DIAGNOSIS — L65.9 HAIR LOSS: ICD-10-CM

## 2017-04-12 DIAGNOSIS — I71.40 ABDOMINAL AORTIC ANEURYSM WITHOUT RUPTURE (HCC): ICD-10-CM

## 2017-04-12 DIAGNOSIS — G89.29 CHRONIC LOW BACK PAIN WITHOUT SCIATICA, UNSPECIFIED BACK PAIN LATERALITY: ICD-10-CM

## 2017-04-12 LAB — INR BLD: 2.2 (ref 0.9–1.2)

## 2017-04-12 PROCEDURE — 99214 OFFICE O/P EST MOD 30 MIN: CPT | Performed by: NURSE PRACTITIONER

## 2017-04-12 PROCEDURE — 1036F TOBACCO NON-USER: CPT | Performed by: NURSE PRACTITIONER

## 2017-04-12 PROCEDURE — 4040F PNEUMOC VAC/ADMIN/RCVD: CPT | Performed by: NURSE PRACTITIONER

## 2017-04-12 PROCEDURE — G8417 CALC BMI ABV UP PARAM F/U: HCPCS | Performed by: NURSE PRACTITIONER

## 2017-04-12 PROCEDURE — G8598 ASA/ANTIPLAT THER USED: HCPCS | Performed by: NURSE PRACTITIONER

## 2017-04-12 PROCEDURE — G8432 DEP SCR NOT DOC, RNG: HCPCS | Performed by: NURSE PRACTITIONER

## 2017-04-12 PROCEDURE — 1101F PT FALLS ASSESS-DOCD LE1/YR: CPT | Performed by: NURSE PRACTITIONER

## 2017-04-12 PROCEDURE — 3017F COLORECTAL CA SCREEN DOC REV: CPT | Performed by: NURSE PRACTITIONER

## 2017-04-12 PROCEDURE — 3046F HEMOGLOBIN A1C LEVEL >9.0%: CPT | Mod: 8P | Performed by: NURSE PRACTITIONER

## 2017-04-12 RX ORDER — OXYCODONE AND ACETAMINOPHEN 7.5; 325 MG/1; MG/1
1 TABLET ORAL 2 TIMES DAILY
Qty: 60 TAB | Refills: 0 | Status: SHIPPED | OUTPATIENT
Start: 2017-04-12 | End: 2017-07-11 | Stop reason: SDUPTHER

## 2017-04-12 ASSESSMENT — ENCOUNTER SYMPTOMS: BACK PAIN: 1

## 2017-04-12 ASSESSMENT — PATIENT HEALTH QUESTIONNAIRE - PHQ9: CLINICAL INTERPRETATION OF PHQ2 SCORE: 0

## 2017-04-12 NOTE — MR AVS SNAPSHOT
"        Sanjuanitasaira Johnsonstevie   2017 11:00 AM   Office Visit   MRN: [id number]    Department:  [address] [place], [place] [address]   Dept Phone:  [phone]    Description:  Female : 1940   Provider:  WENDY Baker           Reason for Visit     Other losing hair      Allergies as of 2017     Allergen Noted Reactions    Aspirin 2015   Anaphylaxis    Penicillins 2015   Anaphylaxis    As a child      You were diagnosed with     CKD (chronic kidney disease) stage 3, GFR 30-59 ml/min   [185323]       Controlled type 2 diabetes mellitus without complication, without long-term current use of insulin (CMS-HCC)   [3999585]       Hair loss   [868955]       Abdominal aortic aneurysm without rupture (CMS-HCC)   [746416]       Essential hypertension   [0242856]       Postoperative hypothyroidism   [332250]       Chronic anticoagulation   [236012]       Chronic low back pain without sciatica, unspecified back pain laterality   [1896659]         Vital Signs     Blood Pressure Pulse Temperature Respirations Height Weight    116/70 mmHg 85 36.9 °C (98.4 °F) 16 1.626 m (5' 4\") 81.194 kg (179 lb)    Body Mass Index Oxygen Saturation Last Menstrual Period Smoking Status          30.71 kg/m2 94% 10/12/1970 Former Smoker        Basic Information     Date Of Birth Sex Race Ethnicity Preferred Language    1940 Female White Non- English      Your appointments     2017 10:30 AM   Established Patient with V EXAM 5   [facility]  (--)    [address] [facility] [place] [address]   [phone]            May 09, 2017 10:00 AM   Follow Up Visit with Michael J Bloch, M.D.   [facility]  (--)    [address] [facility] [place] [address]   [phone]            2017 10:30 AM   US [id number] with [address] US 1   [facility] - ULTRASOUND - [address]    [address]  [place] " [address]   [phone]            Jul 11, 2017 10:00 AM   Established Patient with WENDY Baker   [facility] [address]    [address]  [address]  [place] [address]   [phone]           You will be receiving a confirmation call a few days before your appointment from our automated call confirmation system.              Problem List              ICD-10-CM Priority Class Noted - Resolved    Autoimmune hepatitis (CMS-HCC) (Chronic) K75.4 High  3/19/2012 - Present    Carotid artery stenosis I65.29   10/7/2014 - Present    Insomnia G47.00   11/4/2014 - Present    History of hepatitis B Z86.19   4/28/2015 - Present    Dyslipidemia E78.5   4/28/2015 - Present    Hx pulmonary embolism Z86.711   5/12/2015 - Present    Essential hypertension I10   8/5/2015 - Present    BMI 33.0-33.9,adult Z68.33   9/8/2015 - Present    Risk for falls Z91.81   9/8/2015 - Present    CKD (chronic kidney disease) stage 3, GFR 30-59 ml/min N18.3   11/19/2015 - Present    Vitamin D deficiency disease E55.9   11/24/2015 - Present    Diabetes mellitus type II, controlled (CMS-HCC) E11.9   11/24/2015 - Present    Gastroesophageal reflux disease without esophagitis K21.9   12/3/2015 - Present    Chronic low back pain without sciatica M54.5, G89.29   1/31/2017 - Present    Postoperative hypothyroidism E89.0   1/31/2017 - Present    Chronic anticoagulation Z79.01   1/31/2017 - Present    Abdominal aortic aneurysm without rupture (CMS-HCC) I71.4   4/12/2017 - Present      Health Maintenance        Date Due Completion Dates    PAP SMEAR 3/16/1963 ---    IMM ZOSTER VACCINE 3/16/2002 ---    MAMMOGRAM 10/7/2015 10/7/2014 (Prv Comp)    Override on 10/7/2014: Previously completed    IMM PNEUMOCOCCAL 65+ (ADULT) LOW/MEDIUM RISK SERIES (2 of 2 - PCV13) 8/5/2016 8/5/2015    DIABETES MONOFILAMENT / LE EXAM 8/5/2016 8/5/2015 (Done)    Override on 8/5/2015: Done    RETINAL SCREENING 8/12/2016 8/12/2015 (Done)    Override on  8/12/2015: Done    BONE DENSITY 10/7/2016 10/7/2011 (Prv Comp)    Override on 10/7/2011: Previously completed    A1C SCREENING 6/28/2017 12/28/2016, 11/16/2016, 8/16/2016, 3/15/2016, 11/17/2015, 4/28/2015, 4/21/2015, 10/14/2014, 3/7/2013    FASTING LIPID PROFILE 1/31/2018 1/31/2017, 12/29/2016, 11/16/2016, 8/16/2016, 6/22/2016, 3/15/2016, 11/17/2015, 4/29/2015, 10/14/2014, 3/14/2013    SERUM CREATININE 3/7/2018 3/7/2017, 1/31/2017, 1/3/2017, 1/2/2017, 1/1/2017, 12/31/2016, 12/30/2016, 12/29/2016, 12/28/2016, 11/16/2016, 10/6/2016, 8/16/2016, 6/22/2016, 5/10/2016, 11/17/2015, 10/12/2015, 10/11/2015, 10/10/2015, 10/9/2015, 10/8/2015, 10/7/2015, 10/6/2015, 4/30/2015, 4/29/2015, 4/28/2015, 4/27/2015, 4/21/2015, 1/31/2015, 10/14/2014, 3/14/2013, 3/13/2013, 3/12/2013, 3/8/2013, 3/7/2013, 3/4/2013, 3/2/2013, 3/1/2013, 2/28/2013, 2/26/2013, 5/19/2009    URINE ACR / MICROALBUMIN 3/8/2018 3/8/2017, 3/16/2016, 11/17/2015, 10/15/2014    COLONOSCOPY 10/7/2019 10/7/2009 (Prv Comp)    Override on 10/7/2009: Previously completed    IMM DTaP/Tdap/Td Vaccine (2 - Td) 3/6/2023 3/6/2013            Current Immunizations     HIB Vaccine (ACTHIB/HIBERIX) 3/6/2013 11:45 AM    INFLUENZA VACCINE H1N1 10/12/2016    Influenza TIV (IM) 11/1/2012    Influenza Vaccine Adult HD 10/12/2015  8:46 AM    Meningococcal Polysaccharide Vaccine MPSV4 3/6/2013 12:00 PM    Pneumococcal Vaccine (PCV7) Historical Data 10/12/2016    Pneumococcal Vaccine (UF)Historical Data 12/1/2012    Pneumococcal polysaccharide vaccine (PPSV-23) 8/5/2015    Tdap Vaccine 3/6/2013 11:45 AM      Below and/or attached are the medications your provider expects you to take. Review all of your home medications and newly ordered medications with your provider and/or pharmacist. Follow medication instructions as directed by your provider and/or pharmacist. Please keep your medication list with you and share with your provider. Update the information when medications are discontinued,  doses are changed, or new medications (including over-the-counter products) are added; and carry medication information at all times in the event of emergency situations     Allergies:  ASPIRIN - Anaphylaxis     PENICILLINS - Anaphylaxis               Medications  Valid as of: April 12, 2017 - 11:21 AM    Generic Name Brand Name Tablet Size Instructions for use    AmLODIPine Besylate (Tab) NORVASC 10 MG TAKE ONE TABLET BY MOUTH ONCE DAILY ** GENERIC FOR NORVASC        AmLODIPine Besylate (Tab) NORVASC 10 MG TAKE ONE TABLET BY MOUTH EVERY DAY        Atorvastatin Calcium (Tab) LIPITOR 80 MG Take 0.5 Tabs by mouth every evening.        Cholecalciferol   Spray 1.25 mg in mouth/throat every 7 days.        Docusate Sodium (Cap)  MG Take 100 mg by mouth every morning.        Glimepiride (Tab) AMARYL 2 MG TAKE 1 TABLET BY MOUTH EACH MORNING ** GENERIC FOR AMARYL        Lancets (Misc) MICROLET LANCETS  TEST BLOOD SUGAR TWO TIMES DAILY        Levothyroxine Sodium (Tab) SYNTHROID 137 MCG Take 1 Tab by mouth Every morning on an empty stomach.        Losartan Potassium (Tab) COZAAR 50 MG TAKE ONE TABLET BY MOUTH ONCE DAILY ** GENERIC FOR COZAAR        MetFORMIN HCl (Tab) GLUCOPHAGE 500 MG TAKE 2 TABLETS BY MOUTH EACH MORNING WITH  BREAKFAST AND TAKE 1 TABLET EACH EVENING   WITH DINNER        Omeprazole (CAPSULE DELAYED RELEASE) PRILOSEC 20 MG TAKE ONE CAPSULE BY MOUTH EVERY DAY        Oxycodone-Acetaminophen (Tab) PERCOCET 7.5-325 MG Take 1 Tab by mouth 2 Times a Day.        Oyster Shell (Tab) OS-MARKELL 500 500 MG Take 1 Tab by mouth 2 times a day, with meals.        TraZODone HCl (Tab) DESYREL 100 MG Take 1 Tab by mouth 1 time daily as needed for Sleep.        Warfarin Sodium (Tab) COUMADIN 5 MG Take 1 tab by mouth daily or as directed by coumadin clinic        .                 Medicines prescribed today were sent to:     [id number] - [place], [place] - [address] [address] [facility] [place] [address]     Phone: [phone] Fax: [phone]    Open 24 Hours?: No      Medication refill instructions:       If your prescription bottle indicates you have medication refills left, it is not necessary to call your provider’s office. Please contact your pharmacy and they will refill your medication.    If your prescription bottle indicates you do not have any refills left, you may request refills at any time through one of the following ways: The online [employer] system (except Urgent Care), by calling your provider’s office, or by asking your pharmacy to contact your provider’s office with a refill request. Medication refills are processed only during regular business hours and may not be available until the next business day. Your provider may request additional information or to have a follow-up visit with you prior to refilling your medication.   *Please Note: Medication refills are assigned a new Rx number when refilled electronically. Your pharmacy may indicate that no refills were authorized even though a new prescription for the same medication is available at the pharmacy. Please request the medicine by name with the pharmacy before contacting your provider for a refill.        Referral     A referral request has been sent to our patient care coordination department. Please allow 3-5 business days for us to process this request and contact you either by phone or mail. If you do not hear from us by the 5th business day, please call us at [phone].           [employer] Status: Patient Declined

## 2017-04-12 NOTE — PROGRESS NOTES
Subjective:      Sanjuanita Sosa is a 77 y.o. female who presents with Other            Other    Sanjuanita Sosa is here today mainly for hair concerns but also follow-up on chronic problems   And medication refills.    1. CKD (chronic kidney disease) stage 3, GFR 30-59 ml/min  Patient has been seen by nephrology and recommendations were made. Her most recent GFR was 49. She is on a ARB and blood pressure has been controlled. She is on metformin but her GFR has never gone below 45.    2. Controlled type 2 diabetes mellitus without complication, without long-term current use of insulin (CMS-HCC)  Patient now follows with endocrinology for her diabetes. Her last hemoglobin A1c was 6.6 with glimepiride  and metformin low-dose. She does not have a follow-up appointment with endocrinology.    3. Hair loss  Patient's main concern today is she feels she has been losing her hair over the past few weeks. The only thing different she states is that she was started on Coumadin a few weeks ago per her request because of cost of her previous anticoagulant. She states it is a general hair loss and not just in one area.    4. Abdominal aortic aneurysm without rupture (CMS-HCC)  Patient's most recent imaging showed stable aneurysm without rupture. Blood pressure has been controlled.    5. Essential hypertension  Patient on amlodipine and losartan and reports no side effects.    6. Postoperative hypothyroidism  Most recent TSH was therapeutic and she goes to endocrinology.    7. Chronic anticoagulation  Patient recently switched off one of the newer anticoagulants due to cost and is being followed by the INR clinic with warfarin.    8. Chronic low back pain without sciatica, unspecified back pain laterality  Patient would like to get a refill on her Percocet. Typically, a prescription of #60 pills will last about 2 months or so. She does not appear to be getting medications from other offices. She reports no  dizziness or falls on the medicine. She is not on benzodiazepines.    Current Outpatient Prescriptions   Medication Sig Dispense Refill   • oxycodone-acetaminophen (PERCOCET) 7.5-325 MG per tablet Take 1 Tab by mouth 2 Times a Day. 60 Tab 0   • warfarin (COUMADIN) 5 MG Tab Take 1 tab by mouth daily or as directed by coumadin clinic 30 Tab 2   • omeprazole (PRILOSEC) 20 MG delayed-release capsule TAKE ONE CAPSULE BY MOUTH EVERY DAY 30 Cap 10   • amlodipine (NORVASC) 10 MG Tab TAKE ONE TABLET BY MOUTH EVERY DAY 30 Tab 10   • glimepiride (AMARYL) 2 MG Tab TAKE 1 TABLET BY MOUTH EACH MORNING ** GENERIC FOR AMARYL 30 Tab 5   • levothyroxine (SYNTHROID) 137 MCG Tab Take 1 Tab by mouth Every morning on an empty stomach. 30 Tab 6   • MICROLET LANCETS Misc TEST BLOOD SUGAR TWO TIMES DAILY 100 Each 11   • atorvastatin (LIPITOR) 80 MG tablet Take 0.5 Tabs by mouth every evening. 30 Tab 11   • Cholecalciferol (VITAMIN D PO) Spray 1.25 mg in mouth/throat every 7 days.     • calcium carbonate (CALCIUM CARBONATE) 500 MG Tab Take 1 Tab by mouth 2 times a day, with meals. 60 Tab 3   • docusate sodium 100 MG Cap Take 100 mg by mouth every morning. 30 Cap 0   • amlodipine (NORVASC) 10 MG Tab TAKE ONE TABLET BY MOUTH ONCE DAILY ** GENERIC FOR NORVASC 30 Tab 0   • losartan (COZAAR) 50 MG Tab TAKE ONE TABLET BY MOUTH ONCE DAILY ** GENERIC FOR COZAAR 30 Tab 0   • trazodone (DESYREL) 100 MG Tab Take 1 Tab by mouth 1 time daily as needed for Sleep. 30 Tab 11   • metformin (GLUCOPHAGE) 500 MG Tab TAKE 2 TABLETS BY MOUTH EACH MORNING WITH  BREAKFAST AND TAKE 1 TABLET EACH EVENING   WITH DINNER 90 Tab 11     No current facility-administered medications for this visit.     Facility-Administered Medications Ordered in Other Visits   Medication Dose Route Frequency Provider Last Rate Last Dose   • iodixanol (VISIPAQUE) SOLN    Intra-Op Once PRN Shekhar Rosado M.D.   10 mL at 01/18/17 0619     Social History   Substance Use Topics   • Smoking  "status: Former Smoker -- 0.50 packs/day for 40 years     Types: Cigarettes     Quit date: 02/27/2013   • Smokeless tobacco: Never Used   • Alcohol Use: No     Past Medical History   Diagnosis Date   • Nonspecific abnormal electrocardiogram (ECG) (EKG) 3/19/2012   • Autoimmune hepatitis (CMS-HCC) 3/19/2012   • H/O: HTN (hypertension) 3/19/2012   • Mixed hyperlipidemia 3/19/2012   • Murmur 3/19/2012   • Overweight(278.02) 3/19/2012   • Preoperative cardiovascular examination 3/19/2012   • Palpitations    • Hyperlipidemia    • Hypertension    • Arthritis    • Unspecified urinary incontinence    • Hepatitis B    • Heart valve disease    • Breath shortness    • Pain    • Diabetes      pre-diabetic   • Gallstones    • AAA (abdominal aortic aneurysm) (CMS-HCC)    • Anticoagulation monitoring, special range    • Kidney disease    • Hiatus hernia syndrome    • Cancer (CMS-[facility])      thyroid   • Disorder of thyroid 2016     thyroid cancer   • Blood clotting disorder (CMS-HCC) 2015     clot in leg and lung   • Arrhythmia    • Cataract      left eye needs surgery     Family History   Problem Relation Age of Onset   • Hyperlipidemia Mother    • Stroke Mother    • Hyperlipidemia Father    • Stroke Father    • Heart Disease Brother      CABG       Review of Systems   Musculoskeletal: Positive for back pain.   All other systems reviewed and are negative.         Objective:     /70 mmHg  Pulse 85  Temp(Src) 36.9 °C (98.4 °F)  Resp 16  Ht 1.626 m (5' 4\")  Wt 81.194 kg (179 lb)  BMI 30.71 kg/m2  SpO2 94%  LMP 10/12/1970     Physical Exam   Constitutional: She is oriented to person, place, and time. She appears well-developed and well-nourished. No distress.   HENT:   Head: Normocephalic and atraumatic.   Right Ear: External ear normal.   Left Ear: External ear normal.   Nose: Nose normal.   Eyes: Right eye exhibits no discharge. Left eye exhibits no discharge.   Neck: Normal range of motion. Neck supple. No thyromegaly " "present.   Cardiovascular: Normal rate, regular rhythm and normal heart sounds.  Exam reveals no gallop and no friction rub.    No murmur heard.  Pulmonary/Chest: Effort normal and breath sounds normal. She has no wheezes. She has no rales.   Musculoskeletal: She exhibits no edema or tenderness.        Lumbar back: She exhibits decreased range of motion and pain.   Neurological: She is alert and oriented to person, place, and time. She displays normal reflexes.   Skin: Skin is warm and dry. No rash noted. She is not diaphoretic.   There is a mild diffuse thinning of the hair.   Psychiatric: She has a normal mood and affect. Her behavior is normal. Judgment and thought content normal.   Nursing note and vitals reviewed.              Assessment/Plan:     1. CKD (chronic kidney disease) stage 3, GFR 30-59 ml/min  I reminded patient that review of note shows she is supposed to follow back with nephrology. If her GFR starts going lower she will need to come off her metformin but currently it is at 49.    2. Controlled type 2 diabetes mellitus without complication, without long-term current use of insulin (CMS-HCC)  Hemoglobin A1c at 6.6 and she follows with endocrinology. If GFR falls below 45 she will need to have change in medication. She currently does not want to go on insulin or make any changes. I reminded her to make a follow-up appointment with endocrinology.    3. Hair loss  I reviewed on \"up to date\"possible medications that can cause hair loss and anticoagulants are listed. Patient's symptoms also started soon after starting on Coumadin. I explained that I have never seen this person only, but it is possible. I will refer her to dermatology to see if anything else can be done. She does not wish to switch back to her more expensive anticoagulant.  - REFERRAL TO DERMATOLOGY    4. Abdominal aortic aneurysm without rupture (CMS-HCC)  Appears stable.    5. Essential hypertension  Blood pressure well controlled on " current medication and no hypotension reported.    6. Postoperative hypothyroidism  Last TSH therapeutic and she will continue to follow with endocrinology.    7. Chronic anticoagulation  Patient will continue to follow through the Coumadin clinic.    8. Chronic low back pain without sciatica, unspecified back pain laterality  Patient provided one month supply of Percocet after signing drug contract. She does not wish to be referred to pain management or go back to physical therapy.  - oxycodone-acetaminophen (PERCOCET) 7.5-325 MG per tablet; Take 1 Tab by mouth 2 Times a Day.  Dispense: 60 Tab; Refill: 0  - Controlled Substance Treatment Agreement

## 2017-04-18 ENCOUNTER — ANTICOAGULATION VISIT (OUTPATIENT)
Dept: VASCULAR LAB | Facility: MEDICAL CENTER | Age: 77
End: 2017-04-18
Attending: INTERNAL MEDICINE
Payer: MEDICARE

## 2017-04-18 DIAGNOSIS — Z86.711 HX PULMONARY EMBOLISM: ICD-10-CM

## 2017-04-18 LAB — INR PPP: 2.2 (ref 2–3.5)

## 2017-04-18 PROCEDURE — 99211 OFF/OP EST MAY X REQ PHY/QHP: CPT | Performed by: NURSE PRACTITIONER

## 2017-04-18 PROCEDURE — 85610 PROTHROMBIN TIME: CPT

## 2017-04-18 NOTE — PROGRESS NOTES
OP Anticoagulation Service Note    Date: 4/18/2017       Plan:  Pt is therapeutic.  Denies any s/s of bleeding or clotting.  Denies any changes in medications or diet. Will continue warfarin dosing as follows.  Follow up appointment in 2 week(s).       Anticoagulation Summary as of 4/18/2017     INR goal 2.0-3.0   Selected INR 2.2 (4/18/2017)   Maintenance plan 5 mg (5 mg x 1) on Wed, Fri; 7.5 mg (5 mg x 1.5) all other days   Weekly total 47.5 mg   Plan last modified Brianna Dillon A.P.NMary (4/11/2017)   Next INR check 5/2/2017   Target end date Indefinite    Indications   DVT (deep venous thrombosis)  left (Resolved) [I82.402]  Hx pulmonary embolism [Z86.711]         Anticoagulation Episode Summary     INR check location     Preferred lab     Send INR reminders to     Comments       Anticoagulation Care Providers     Provider Role Specialty Phone number    YUE BakerPMaryNMary Referring Family Medicine [phone]    [facility] Responsible  [phone]            PAULINE Grimaldo  [facility]

## 2017-04-18 NOTE — MR AVS SNAPSHOT
Sanjuanitasaira Sosa   2017 10:30 AM   Anticoagulation Visit   MRN: [id number]    Department:  Vascular Medicine   Dept Phone:  [phone]    Description:  Female : 1940   Provider:  [facility] EXAM 5           Allergies as of 2017     Allergen Noted Reactions    Aspirin 2015   Anaphylaxis    Penicillins 2015   Anaphylaxis    As a child      You were diagnosed with     Hx pulmonary embolism   [[id number]]         Vital Signs     Last Menstrual Period Smoking Status                10/12/1970 Former Smoker          Basic Information     Date Of Birth Sex Race Ethnicity Preferred Language    1940 Female White Non- English      Your appointments     May 02, 2017 10:00 AM   Established Patient with [facility] EXAM 4   [facility]  (--)    [address]  [place] [address]   [phone]            May 09, 2017 10:00 AM   Follow Up Visit with Michael J Bloch, M.D.   [facility]  (--)    [address] [place] [address]   [phone]            2017 10:30 AM   US [id number] with [address]   [facility] - ULTRASOUND - [address]    [address]  [place] [id number] [phone]            2017 10:00 AM   Established Patient with WENDY Baker   [facility] [address]    [address]  [address]  [place] [address] [phone]           You will be receiving a confirmation call a few days before your appointment from our automated call confirmation system.              Problem List              ICD-10-CM Priority Class Noted - Resolved    Autoimmune hepatitis (CMS-HCC) (Chronic) K75.4 High  3/19/2012 - Present    Carotid artery stenosis I65.29   10/7/2014 - Present    Insomnia G47.00   2014 - Present    History of hepatitis B Z86.19   2015 - Present    Dyslipidemia E78.5   2015 -  Present    Hx pulmonary embolism Z86.711   5/12/2015 - Present    Essential hypertension I10   8/5/2015 - Present    BMI 33.0-33.9,adult Z68.33   9/8/2015 - Present    Risk for falls Z91.81   9/8/2015 - Present    CKD (chronic kidney disease) stage 3, GFR 30-59 ml/min N18.3   11/19/2015 - Present    Vitamin D deficiency disease E55.9   11/24/2015 - Present    Diabetes mellitus type II, controlled (CMS-HCC) E11.9   11/24/2015 - Present    Gastroesophageal reflux disease without esophagitis K21.9   12/3/2015 - Present    Chronic low back pain without sciatica M54.5, G89.29   1/31/2017 - Present    Postoperative hypothyroidism E89.0   1/31/2017 - Present    Chronic anticoagulation Z79.01   1/31/2017 - Present    Abdominal aortic aneurysm without rupture (CMS-HCC) I71.4   4/12/2017 - Present      Health Maintenance        Date Due Completion Dates    PAP SMEAR 3/16/1963 ---    IMM ZOSTER VACCINE 3/16/2002 ---    IMM PNEUMOCOCCAL 65+ (ADULT) LOW/MEDIUM RISK SERIES (2 of 2 - PCV13) 8/5/2016 8/5/2015    DIABETES MONOFILAMENT / LE EXAM 8/5/2016 8/5/2015 (Done)    Override on 8/5/2015: Done    RETINAL SCREENING 8/12/2016 8/12/2015 (Done)    Override on 8/12/2015: Done    BONE DENSITY 10/7/2016 10/7/2011 (Prv Comp)    Override on 10/7/2011: Previously completed    A1C SCREENING 6/28/2017 12/28/2016, 11/16/2016, 8/16/2016, 3/15/2016, 11/17/2015, 4/28/2015, 4/21/2015, 10/14/2014, 3/7/2013    FASTING LIPID PROFILE 1/31/2018 1/31/2017, 12/29/2016, 11/16/2016, 8/16/2016, 6/22/2016, 3/15/2016, 11/17/2015, 4/29/2015, 10/14/2014, 3/14/2013    SERUM CREATININE 3/7/2018 3/7/2017, 1/31/2017, 1/3/2017, 1/2/2017, 1/1/2017, 12/31/2016, 12/30/2016, 12/29/2016, 12/28/2016, 11/16/2016, 10/6/2016, 8/16/2016, 6/22/2016, 5/10/2016, 11/17/2015, 10/12/2015, 10/11/2015, 10/10/2015, 10/9/2015, 10/8/2015, 10/7/2015, 10/6/2015, 4/30/2015, 4/29/2015, 4/28/2015, 4/27/2015, 4/21/2015, 1/31/2015, 10/14/2014, 3/14/2013, 3/13/2013, 3/12/2013, 3/8/2013,  3/7/2013, 3/4/2013, 3/2/2013, 3/1/2013, 2/28/2013, 2/26/2013, 5/19/2009    URINE ACR / MICROALBUMIN 3/8/2018 3/8/2017, 3/16/2016, 11/17/2015, 10/15/2014    COLONOSCOPY 10/7/2019 10/7/2009 (Prv Comp)    Override on 10/7/2009: Previously completed    IMM DTaP/Tdap/Td Vaccine (2 - Td) 3/6/2023 3/6/2013            Results     POCT Protime      Component    INR    2.2                        Current Immunizations     HIB Vaccine (ACTHIB/HIBERIX) 3/6/2013 11:45 AM    INFLUENZA VACCINE H1N1 10/12/2016    Influenza TIV (IM) 11/1/2012    Influenza Vaccine Adult HD 10/12/2015  8:46 AM    Meningococcal Polysaccharide Vaccine MPSV4 3/6/2013 12:00 PM    Pneumococcal Vaccine (PCV7) Historical Data 10/12/2016    Pneumococcal Vaccine (UF)Historical Data 12/1/2012    Pneumococcal polysaccharide vaccine (PPSV-23) 8/5/2015    Tdap Vaccine 3/6/2013 11:45 AM      Below and/or attached are the medications your provider expects you to take. Review all of your home medications and newly ordered medications with your provider and/or pharmacist. Follow medication instructions as directed by your provider and/or pharmacist. Please keep your medication list with you and share with your provider. Update the information when medications are discontinued, doses are changed, or new medications (including over-the-counter products) are added; and carry medication information at all times in the event of emergency situations     Allergies:  ASPIRIN - Anaphylaxis     PENICILLINS - Anaphylaxis               Medications  Valid as of: April 18, 2017 - 10:30 AM    Generic Name Brand Name Tablet Size Instructions for use    AmLODIPine Besylate (Tab) NORVASC 10 MG TAKE ONE TABLET BY MOUTH ONCE DAILY ** GENERIC FOR NORVASC        AmLODIPine Besylate (Tab) NORVASC 10 MG TAKE ONE TABLET BY MOUTH EVERY DAY        Atorvastatin Calcium (Tab) LIPITOR 80 MG Take 0.5 Tabs by mouth every evening.        Cholecalciferol   Spray 1.25 mg in mouth/throat every 7 days.         Docusate Sodium (Cap)  MG Take 100 mg by mouth every morning.        Glimepiride (Tab) AMARYL 2 MG TAKE 1 TABLET BY MOUTH EACH MORNING ** GENERIC FOR AMARYL        Lancets (Misc) MICROLET LANCETS  TEST BLOOD SUGAR TWO TIMES DAILY        Levothyroxine Sodium (Tab) SYNTHROID 137 MCG Take 1 Tab by mouth Every morning on an empty stomach.        Losartan Potassium (Tab) COZAAR 50 MG TAKE ONE TABLET BY MOUTH ONCE DAILY ** GENERIC FOR COZAAR        MetFORMIN HCl (Tab) GLUCOPHAGE 500 MG TAKE 2 TABLETS BY MOUTH EACH MORNING WITH  BREAKFAST AND TAKE 1 TABLET EACH EVENING   WITH DINNER        Omeprazole (CAPSULE DELAYED RELEASE) PRILOSEC 20 MG TAKE ONE CAPSULE BY MOUTH EVERY DAY        Oxycodone-Acetaminophen (Tab) PERCOCET 7.5-325 MG Take 1 Tab by mouth 2 Times a Day.        Oyster Shell (Tab) OS-MARKELL 500 500 MG Take 1 Tab by mouth 2 times a day, with meals.        TraZODone HCl (Tab) DESYREL 100 MG Take 1 Tab by mouth 1 time daily as needed for Sleep.        Warfarin Sodium (Tab) COUMADIN 5 MG Take 1 tab by mouth daily or as directed by coumadin clinic        .                 Medicines prescribed today were sent to:     [id number] - [place], [place] - [address] [address]    Phone: [phone] Fax: [phone]    Open 24 Hours?: No      Medication refill instructions:       If your prescription bottle indicates you have medication refills left, it is not necessary to call your provider’s office. Please contact your pharmacy and they will refill your medication.    If your prescription bottle indicates you do not have any refills left, you may request refills at any time through one of the following ways: The online [employer] system (except Urgent Care), by calling your provider’s office, or by asking your pharmacy to contact your provider’s office with a refill request. Medication refills are processed only during regular business hours and may not be available until  the next business day. Your provider may request additional information or to have a follow-up visit with you prior to refilling your medication.   *Please Note: Medication refills are assigned a new Rx number when refilled electronically. Your pharmacy may indicate that no refills were authorized even though a new prescription for the same medication is available at the pharmacy. Please request the medicine by name with the pharmacy before contacting your provider for a refill.        Warfarin Dosing Calendar   April 2017 Details    Sun Mon Tue Wed Thu Fri Sat           1                 2               3               4               5               6               7               8                 9               10               11               12               13               14               15                 16               17               18   2.2   7.5 mg   See details      19      5 mg         20      7.5 mg         21      5 mg         22      7.5 mg           23      7.5 mg         24      7.5 mg         25      7.5 mg         26      5 mg         27      7.5 mg         28      5 mg         29      7.5 mg           30      7.5 mg                Date Details   04/18 This INR check   INR: 2.2               How to take your warfarin dose     To take:  5 mg Take 1 of the 5 mg tablets.    To take:  7.5 mg Take 1.5 of the 5 mg tablets.           Warfarin Dosing Calendar   May 2017 Details    Sun Mon Tue Wed Thu Fri Sat      1      7.5 mg         2      7.5 mg         3               4               5               6                 7               8               9               10               11               12               13                 14               15               16               17               18               19               20                 21               22               23               24               25               26               27                 28               29                 30               31                   Date Details   No additional details    Date of next INR:  5/2/2017         How to take your warfarin dose     To take:  7.5 mg Take 1.5 of the 5 mg tablets.              MyChart Status: Patient Declined

## 2017-04-19 LAB — INR BLD: 2.2 (ref 0.9–1.2)

## 2017-05-02 ENCOUNTER — ANTICOAGULATION VISIT (OUTPATIENT)
Dept: VASCULAR LAB | Facility: MEDICAL CENTER | Age: 77
End: 2017-05-02
Attending: INTERNAL MEDICINE
Payer: MEDICARE

## 2017-05-02 DIAGNOSIS — Z86.711 HX PULMONARY EMBOLISM: ICD-10-CM

## 2017-05-02 LAB — INR PPP: 2 (ref 2–3.5)

## 2017-05-02 PROCEDURE — 85610 PROTHROMBIN TIME: CPT

## 2017-05-02 PROCEDURE — 99211 OFF/OP EST MAY X REQ PHY/QHP: CPT | Performed by: NURSE PRACTITIONER

## 2017-05-02 NOTE — PROGRESS NOTES
OP Anticoagulation Service Note    Date: 5/2/2017       Plan:  Pt is therapeutic.  Denies any s/s of bleeding or clotting.  Denies any changes in medications or diet. Will continue warfarin dosing as follows.  Follow up appointment in 4 week(s).       Anticoagulation Summary as of 5/2/2017     INR goal 2.0-3.0   Selected INR 2.0 (5/2/2017)   Maintenance plan 5 mg (5 mg x 1) on Wed, Fri; 7.5 mg (5 mg x 1.5) all other days   Weekly total 47.5 mg   Plan last modified Brianna Dillon A.P.NMary (4/11/2017)   Next INR check 5/30/2017   Target end date Indefinite    Indications   DVT (deep venous thrombosis)  left (Resolved) [I82.402]  Hx pulmonary embolism [Z86.711]         Anticoagulation Episode Summary     INR check location     Preferred lab     Send INR reminders to     Comments       Anticoagulation Care Providers     Provider Role Specialty Phone number    YUE BakerP.NMary Referring Family Medicine [phone]    [facility] Antico[facility] Responsible  [phone]            PAULINE Grimaldo  [facility] [facility]

## 2017-05-02 NOTE — MR AVS SNAPSHOT
Sanjuanita Sosa   2017 10:00 AM   Anticoagulation Visit   MRN: [id number]    Department:  Vascular Medicine   Dept Phone:  [phone]    Description:  Female : 1940   Provider:  [facility] EXAM 4           Allergies as of 2017     Allergen Noted Reactions    Aspirin 2015   Anaphylaxis    Penicillins 2015   Anaphylaxis    As a child      You were diagnosed with     Hx pulmonary embolism   [[id number]]         Vital Signs     Last Menstrual Period Smoking Status                10/12/1970 Former Smoker          Basic Information     Date Of Birth Sex Race Ethnicity Preferred Language    1940 Female White Non- English      Your appointments     May 02, 2017 10:00 AM   Established Patient with [facility] EXAM 4   [facility]  (--)    [address]  [place] [address]   [phone]            May 09, 2017 10:00 AM   Follow Up Visit with Michael J Bloch, M.D.   [facility]  (--)    [address]  [place] [address]   [phone]            May 30, 2017 10:30 AM   Established Patient with IHV EXAM 5   [facility]  (--)    [address] [place] [address]   [phone]            2017 10:30 AM   US [id number] with [address] US 1   [facility] IMAGING - ULTRASOUND - [address]    [address]  [place] [address]   [phone]            2017 10:00 AM   Established Patient with WENDY Baker   [facility] [address]    [address]  [place] [address] [phone]           You will be receiving a confirmation call a few days before your appointment from our automated call confirmation system.              Problem List              ICD-10-CM Priority Class Noted - Resolved    Autoimmune hepatitis (CMS-HCC) (Chronic) K75.4 High  3/19/2012 -  Present    Carotid artery stenosis I65.29   10/7/2014 - Present    Insomnia G47.00   11/4/2014 - Present    History of hepatitis B Z86.19   4/28/2015 - Present    Dyslipidemia E78.5   4/28/2015 - Present    Hx pulmonary embolism Z86.711   5/12/2015 - Present    Essential hypertension I10   8/5/2015 - Present    BMI 33.0-33.9,adult Z68.33   9/8/2015 - Present    Risk for falls Z91.81   9/8/2015 - Present    CKD (chronic kidney disease) stage 3, GFR 30-59 ml/min N18.3   11/19/2015 - Present    Vitamin D deficiency disease E55.9   11/24/2015 - Present    Diabetes mellitus type II, controlled (CMS-HCC) E11.9   11/24/2015 - Present    Gastroesophageal reflux disease without esophagitis K21.9   12/3/2015 - Present    Chronic low back pain without sciatica M54.5, G89.29   1/31/2017 - Present    Postoperative hypothyroidism E89.0   1/31/2017 - Present    Chronic anticoagulation Z79.01   1/31/2017 - Present    Abdominal aortic aneurysm without rupture (CMS-HCC) I71.4   4/12/2017 - Present      Health Maintenance        Date Due Completion Dates    PAP SMEAR 3/16/1963 ---    IMM ZOSTER VACCINE 3/16/2002 ---    IMM PNEUMOCOCCAL 65+ (ADULT) LOW/MEDIUM RISK SERIES (2 of 2 - PCV13) 8/5/2016 8/5/2015    DIABETES MONOFILAMENT / LE EXAM 8/5/2016 8/5/2015 (Done)    Override on 8/5/2015: Done    RETINAL SCREENING 8/12/2016 8/12/2015 (Done)    Override on 8/12/2015: Done    BONE DENSITY 10/7/2016 10/7/2011 (Prv Comp)    Override on 10/7/2011: Previously completed    A1C SCREENING 6/28/2017 12/28/2016, 11/16/2016, 8/16/2016, 3/15/2016, 11/17/2015, 4/28/2015, 4/21/2015, 10/14/2014, 3/7/2013    FASTING LIPID PROFILE 1/31/2018 1/31/2017, 12/29/2016, 11/16/2016, 8/16/2016, 6/22/2016, 3/15/2016, 11/17/2015, 4/29/2015, 10/14/2014, 3/14/2013    SERUM CREATININE 3/7/2018 3/7/2017, 1/31/2017, 1/3/2017, 1/2/2017, 1/1/2017, 12/31/2016, 12/30/2016, 12/29/2016, 12/28/2016, 11/16/2016, 10/6/2016, 8/16/2016, 6/22/2016, 5/10/2016, 11/17/2015,  10/12/2015, 10/11/2015, 10/10/2015, 10/9/2015, 10/8/2015, 10/7/2015, 10/6/2015, 4/30/2015, 4/29/2015, 4/28/2015, 4/27/2015, 4/21/2015, 1/31/2015, 10/14/2014, 3/14/2013, 3/13/2013, 3/12/2013, 3/8/2013, 3/7/2013, 3/4/2013, 3/2/2013, 3/1/2013, 2/28/2013, 2/26/2013, 5/19/2009    URINE ACR / MICROALBUMIN 3/8/2018 3/8/2017, 3/16/2016, 11/17/2015, 10/15/2014    COLONOSCOPY 10/7/2019 10/7/2009 (Prv Comp)    Override on 10/7/2009: Previously completed    IMM DTaP/Tdap/Td Vaccine (2 - Td) 3/6/2023 3/6/2013            Results     POCT Protime      Component    INR    2.0                        Current Immunizations     HIB Vaccine (ACTHIB/HIBERIX) 3/6/2013 11:45 AM    INFLUENZA VACCINE H1N1 10/12/2016    Influenza TIV (IM) 11/1/2012    Influenza Vaccine Adult HD 10/12/2015  8:46 AM    Meningococcal Polysaccharide Vaccine MPSV4 3/6/2013 12:00 PM    Pneumococcal Vaccine (PCV7) Historical Data 10/12/2016    Pneumococcal Vaccine (UF)Historical Data 12/1/2012    Pneumococcal polysaccharide vaccine (PPSV-23) 8/5/2015    Tdap Vaccine 3/6/2013 11:45 AM      Below and/or attached are the medications your provider expects you to take. Review all of your home medications and newly ordered medications with your provider and/or pharmacist. Follow medication instructions as directed by your provider and/or pharmacist. Please keep your medication list with you and share with your provider. Update the information when medications are discontinued, doses are changed, or new medications (including over-the-counter products) are added; and carry medication information at all times in the event of emergency situations     Allergies:  ASPIRIN - Anaphylaxis     PENICILLINS - Anaphylaxis               Medications  Valid as of: May 02, 2017 -  9:38 AM    Generic Name Brand Name Tablet Size Instructions for use    AmLODIPine Besylate (Tab) NORVASC 10 MG TAKE ONE TABLET BY MOUTH ONCE DAILY ** GENERIC FOR NORVASC        AmLODIPine Besylate (Tab) NORVASC  10 MG TAKE ONE TABLET BY MOUTH EVERY DAY        Atorvastatin Calcium (Tab) LIPITOR 80 MG Take 0.5 Tabs by mouth every evening.        Cholecalciferol   Spray 1.25 mg in mouth/throat every 7 days.        Docusate Sodium (Cap)  MG Take 100 mg by mouth every morning.        Glimepiride (Tab) AMARYL 2 MG TAKE 1 TABLET BY MOUTH EACH MORNING ** GENERIC FOR AMARYL        Lancets (Misc) MICROLET LANCETS  TEST BLOOD SUGAR TWO TIMES DAILY        Levothyroxine Sodium (Tab) SYNTHROID 137 MCG Take 1 Tab by mouth Every morning on an empty stomach.        Losartan Potassium (Tab) COZAAR 50 MG TAKE ONE TABLET BY MOUTH ONCE DAILY ** GENERIC FOR COZAAR        MetFORMIN HCl (Tab) GLUCOPHAGE 500 MG TAKE 2 TABLETS BY MOUTH EACH MORNING WITH  BREAKFAST AND TAKE 1 TABLET EACH EVENING   WITH DINNER        Omeprazole (CAPSULE DELAYED RELEASE) PRILOSEC 20 MG TAKE ONE CAPSULE BY MOUTH EVERY DAY        Oxycodone-Acetaminophen (Tab) PERCOCET 7.5-325 MG Take 1 Tab by mouth 2 Times a Day.        Oyster Shell (Tab) OS-MARKELL 500 500 MG Take 1 Tab by mouth 2 times a day, with meals.        TraZODone HCl (Tab) DESYREL 100 MG Take 1 Tab by mouth 1 time daily as needed for Sleep.        Warfarin Sodium (Tab) COUMADIN 5 MG Take 1 tab by mouth daily or as directed by coumadin clinic        .                 Medicines prescribed today were sent to:     [facility] - [place], [place] - [address] [address]    Phone: [phone] Fax: [phone]    Open 24 Hours?: No      Medication refill instructions:       If your prescription bottle indicates you have medication refills left, it is not necessary to call your provider’s office. Please contact your pharmacy and they will refill your medication.    If your prescription bottle indicates you do not have any refills left, you may request refills at any time through one of the following ways: The online Slingjot system (except Urgent Care), by calling your  provider’s office, or by asking your pharmacy to contact your provider’s office with a refill request. Medication refills are processed only during regular business hours and may not be available until the next business day. Your provider may request additional information or to have a follow-up visit with you prior to refilling your medication.   *Please Note: Medication refills are assigned a new Rx number when refilled electronically. Your pharmacy may indicate that no refills were authorized even though a new prescription for the same medication is available at the pharmacy. Please request the medicine by name with the pharmacy before contacting your provider for a refill.        Warfarin Dosing Calendar   May 2017 Details    Sun Mon Tue Wed Thu Fri Sat      1               2   2.0   7.5 mg   See details      3      5 mg         4      7.5 mg         5      5 mg         6      7.5 mg           7      7.5 mg         8      7.5 mg         9      7.5 mg         10      5 mg         11      7.5 mg         12      5 mg         13      7.5 mg           14      7.5 mg         15      7.5 mg         16      7.5 mg         17      5 mg         18      7.5 mg         19      5 mg         20      7.5 mg           21      7.5 mg         22      7.5 mg         23      7.5 mg         24      5 mg         25      7.5 mg         26      5 mg         27      7.5 mg           28      7.5 mg         29      7.5 mg         30      7.5 mg         31                   Date Details   05/02 This INR check   INR: 2.0       Date of next INR:  5/30/2017         How to take your warfarin dose     To take:  5 mg Take 1 of the 5 mg tablets.    To take:  7.5 mg Take 1.5 of the 5 mg tablets.              PictureMe Universe Access Code: [email or web address]  Expires: 6/1/2017  9:38 AM    PictureMe Universe  A secure, online tool to manage your health information     [employer]’s PictureMe Universe® is a secure, online tool that connects you to your personalized health  information from the privacy of your home -- day or night - making it very easy for you to manage your healthcare. Once the activation process is completed, you can even access your medical information using the Rebel Monkey nighat, which is available for free in the Apple Nighat store or Google Play store.     Rebel Monkey provides the following levels of access (as shown below):   My Chart Features   [facility] Primary [facility] Doctor [facility]  Specialists [facility]  Urgent  Care Non-[facility]  Primary Care  Doctor   Email your healthcare team securely and privately 24/7 X X X    Manage appointments: schedule your next appointment; view details of past/upcoming appointments X      Request prescription refills. X      View recent personal medical records, including lab and immunizations X X X X   View health record, including health history, allergies, medications X X X X   Read reports about your outpatient visits, procedures, consult and ER notes X X X X   See your discharge summary, which is a recap of your hospital and/or ER visit that includes your diagnosis, lab results, and care plan. X X       How to register for Rebel Monkey:  1. Go to  [email or web address].  2. Click on the Sign Up Now box, which takes you to the New Member Sign Up page. You will need to provide the following information:  a. Enter your Rebel Monkey Access Code exactly as it appears at the top of this page. (You will not need to use this code after you’ve completed the sign-up process. If you do not sign up before the expiration date, you must request a new code.)   b. Enter your date of birth.   c. Enter your home email address.   d. Click Submit, and follow the next screen’s instructions.  3. Create a Rebel Monkey ID. This will be your Rebel Monkey login ID and cannot be changed, so think of one that is secure and easy to remember.  4. Create a Rebel Monkey password. You can change your password at any time.  5. Enter your Password Reset Question and Answer. This can be used at a later  time if you forget your password.   6. Enter your e-mail address. This allows you to receive e-mail notifications when new information is available in Divide.  7. Click Sign Up. You can now view your health information.    For assistance activating your Divide account, call [phone]

## 2017-05-08 DIAGNOSIS — E55.9 VITAMIN D DEFICIENCY DISEASE: ICD-10-CM

## 2017-05-09 ENCOUNTER — OFFICE VISIT (OUTPATIENT)
Dept: VASCULAR LAB | Facility: MEDICAL CENTER | Age: 77
End: 2017-05-09
Attending: INTERNAL MEDICINE
Payer: MEDICARE

## 2017-05-09 VITALS
BODY MASS INDEX: 31.58 KG/M2 | DIASTOLIC BLOOD PRESSURE: 63 MMHG | HEIGHT: 64 IN | HEART RATE: 79 BPM | SYSTOLIC BLOOD PRESSURE: 120 MMHG | WEIGHT: 185 LBS

## 2017-05-09 DIAGNOSIS — I10 ESSENTIAL HYPERTENSION: ICD-10-CM

## 2017-05-09 DIAGNOSIS — E78.5 DYSLIPIDEMIA: ICD-10-CM

## 2017-05-09 DIAGNOSIS — E11.9 CONTROLLED TYPE 2 DIABETES MELLITUS WITHOUT COMPLICATION, WITHOUT LONG-TERM CURRENT USE OF INSULIN (HCC): ICD-10-CM

## 2017-05-09 DIAGNOSIS — Z86.711 HX PULMONARY EMBOLISM: ICD-10-CM

## 2017-05-09 DIAGNOSIS — N18.3 CKD (CHRONIC KIDNEY DISEASE), STAGE 3 (MODERATE): ICD-10-CM

## 2017-05-09 PROCEDURE — G8598 ASA/ANTIPLAT THER USED: HCPCS | Performed by: INTERNAL MEDICINE

## 2017-05-09 PROCEDURE — G8432 DEP SCR NOT DOC, RNG: HCPCS | Performed by: INTERNAL MEDICINE

## 2017-05-09 PROCEDURE — 1101F PT FALLS ASSESS-DOCD LE1/YR: CPT | Performed by: INTERNAL MEDICINE

## 2017-05-09 PROCEDURE — 3017F COLORECTAL CA SCREEN DOC REV: CPT | Performed by: INTERNAL MEDICINE

## 2017-05-09 PROCEDURE — G8417 CALC BMI ABV UP PARAM F/U: HCPCS | Performed by: INTERNAL MEDICINE

## 2017-05-09 PROCEDURE — 99214 OFFICE O/P EST MOD 30 MIN: CPT | Performed by: INTERNAL MEDICINE

## 2017-05-09 PROCEDURE — 1036F TOBACCO NON-USER: CPT | Performed by: INTERNAL MEDICINE

## 2017-05-09 PROCEDURE — 4040F PNEUMOC VAC/ADMIN/RCVD: CPT | Performed by: INTERNAL MEDICINE

## 2017-05-09 PROCEDURE — 99212 OFFICE O/P EST SF 10 MIN: CPT

## 2017-05-09 PROCEDURE — 3046F HEMOGLOBIN A1C LEVEL >9.0%: CPT | Mod: 8P | Performed by: INTERNAL MEDICINE

## 2017-05-09 RX ORDER — ERGOCALCIFEROL 1.25 MG/1
50000 CAPSULE ORAL
Qty: 4 CAP | Refills: 11 | Status: SHIPPED | OUTPATIENT
Start: 2017-05-09 | End: 2017-05-30 | Stop reason: SDUPTHER

## 2017-05-09 RX ORDER — LOSARTAN POTASSIUM 50 MG/1
TABLET ORAL
Qty: 30 TAB | Refills: 11 | Status: ON HOLD | OUTPATIENT
Start: 2017-05-09 | End: 2018-03-02

## 2017-05-09 RX ORDER — TRAZODONE HYDROCHLORIDE 100 MG/1
100 TABLET ORAL
Qty: 30 TAB | Refills: 11 | Status: SHIPPED | OUTPATIENT
Start: 2017-05-09 | End: 2017-05-31 | Stop reason: SDUPTHER

## 2017-05-09 RX ORDER — OMEPRAZOLE 20 MG/1
20 CAPSULE, DELAYED RELEASE ORAL
Qty: 30 CAP | Refills: 11 | Status: SHIPPED | OUTPATIENT
Start: 2017-05-09 | End: 2018-03-05 | Stop reason: SDUPTHER

## 2017-05-09 ASSESSMENT — ENCOUNTER SYMPTOMS
FOCAL WEAKNESS: 0
HEMOPTYSIS: 0
SPEECH CHANGE: 0
BACK PAIN: 1
WHEEZING: 0
DEPRESSION: 1
FALLS: 1
CLAUDICATION: 0
MYALGIAS: 0
COUGH: 0
NERVOUS/ANXIOUS: 0
HEADACHES: 0
WEIGHT LOSS: 0
SHORTNESS OF BREATH: 0
DIZZINESS: 0
PALPITATIONS: 0
BRUISES/BLEEDS EASILY: 0

## 2017-05-09 NOTE — PROGRESS NOTES
"  FOLLOW-UP VASCULAR VISIT  Subjective:   Sanjuanita Sosa is a 75 y.o. female who presents today 5/9/2017 for   Chief Complaint   Patient presents with   • Amb Vascular Reason For Visit     HPI:     Patient here for f/u of AAA, PE, anticoagulation, htn, and dyslipidemia  Has had a couple of falls.  Denies syncope  Denies lightheadedness  Poor historian, but it sounds like these are ground level falls - tripping  On atorvastatin - 1/2 tablet  Not checking BP at home  Does not appear to be on hctz  Good energy on her current thyroid medications  No change in mild leg swelling.   Still on glimiperide - 100-130  No hypoglycemic epsides  No bleeding on warfarin - following up regularly in warfarin clinic  Has increased to 7 cigs daily  No f/u with nephrology as far as she knows  Denies taking K supplement - has about 1/2 banana daily    Social History   Substance Use Topics   • Smoking status: Former Smoker -- 0.50 packs/day for 40 years     Types: Cigarettes     Quit date: 02/27/2013   • Smokeless tobacco: Never Used   • Alcohol Use: No     DIET AND EXERCISE:  Weight Change: stable  Diet: Diabetic diet  Exercise: minimal exercise due to back pain    Review of Systems   Constitutional: Positive for malaise/fatigue. Negative for weight loss.   Respiratory: Negative for cough, hemoptysis, shortness of breath and wheezing.    Cardiovascular: Positive for leg swelling. Negative for chest pain, palpitations and claudication.   Musculoskeletal: Positive for back pain, joint pain and falls. Negative for myalgias.   Neurological: Negative for dizziness, speech change, focal weakness and headaches.   Endo/Heme/Allergies: Does not bruise/bleed easily.   Psychiatric/Behavioral: Positive for depression. The patient is not nervous/anxious.       Objective:     Filed Vitals:    05/09/17 1001 05/09/17 1008   BP: 129/57 120/63   Pulse: 82 79   Height: 1.626 m (5' 4.02\")    Weight: 83.915 kg (185 lb)       Body mass index is " 31.74 kg/(m^2).  Physical Exam   Constitutional: She is oriented to person, place, and time. No distress.   Cardiovascular: Normal rate and regular rhythm.    Murmur heard.  Pulmonary/Chest: Effort normal and breath sounds normal. No respiratory distress. She has no wheezes. She has no rales.   Musculoskeletal: Normal range of motion. She exhibits no edema.   Neurological: She is alert and oriented to person, place, and time. No cranial nerve deficit. Coordination normal.   Bit of a wide spread unsteady gait   Skin: She is not diaphoretic.   Psychiatric: Her speech is normal and behavior is normal. Thought content normal. She expresses no suicidal ideation.     Lab Results   Component Value Date    CHOLSTRLTOT 100 01/31/2017    LDL 36 01/31/2017    HDL 29* 01/31/2017    TRIGLYCERIDE 177* 01/31/2017      Lab Results   Component Value Date    PROTHROMBTM 17.2* 12/28/2016    INR 2.0 05/02/2017       Lab Results   Component Value Date    HBA1C 6.6* 12/28/2016      Lab Results   Component Value Date    SODIUM 137 03/07/2017    POTASSIUM 5.6* 03/07/2017    CHLORIDE 105 03/07/2017    CO2 22 03/07/2017    GLUCOSE 102* 03/07/2017    BUN 27* 03/07/2017    CREATININE 1.09 03/07/2017    IFAFRICA 59* 03/07/2017    IFNOTAFR 49* 03/07/2017        Lab Results   Component Value Date    WBC 10.7 01/03/2017    RBC 3.57* 01/03/2017    HEMOGLOBIN 11.0* 01/03/2017    HEMATOCRIT 33.0* 01/03/2017    MCV 92.4 01/03/2017    MCH 30.8 01/03/2017    MCHC 33.3* 01/03/2017    MPV 10.2 01/03/2017     CT chest 4/27/2015  There are pulmonary emboli extending into the right upper lobe, right middle lobe, right lower lobe, and left lower lobe. The main pulmonary artery is of normal caliber. No shift of the intraventricular septum or reflux of contrast into the hepatic veins. There are scattered arterial calcifications. No significant pericardial effusion.  There is mild left basilar atelectasis. No significant pleural fluid. The central airways are  clear.  No adenopathy is identified.  Limited visualization of the upper abdomen demonstrates severe atherosclerotic disease.    echo 4/28/2015  Technically difficult study.   Normal left ventricular size and function.  Left ventricular ejection fraction is 60% to 65%.  Grade I diastolic dysfunction - mitral inflow E/A is <1.0.  Mild mitral regurgitation.  Mild aortic stenosis.  Mild to moderate aortic insufficiency.  Moderate tricuspid regurgitation.  Right ventricular systolic pressure is estimated to be 43-48 mmHg.  No prior study is available for comparison.      CT scan chest abdomen and pelvis 10/2015   Thoracic abdominal aortic penetrating atheromatous ulcer without evidence for intramural hematoma or dissection.   Exclusion of early dissection or intramural hematoma (although no evidence for this entity) is not possible on this contrast only study.  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  Ventral hernia containing small bowel and without evidence for obstruction or inflammation    CTA 5/17/2016  1. Considerable partially ulcerated plaque within the aorta similar to previous findings. No dissection. No mediastinal hematoma.  2. The abdominal aorta measures up to 2.6 cm in diameter. No change compared with previous. No retroperitoneal hematoma.  3. High-grade stenosis celiac artery.  4. Atelectasis within both lungs and tiny right apical pleural-based nodule unchanged.  5. Surgical absent spleen. Small splenules and chronic loculated fluid collection left upper quadrant again demonstrated.  6. Diverticula colon without evidence of diverticulitis.  7. Ventral abdominal wall hernia containing nonobstructed small bowel loops.  8. Large and small bowel  incompletely imaged. No free fluid within the abdomen identified.  9. Partial resection pancreas.  10. Tiny gallstones.  11. Coronary artery calcifications.  12. 1.3 cm left lobe thyroid nodule. Ultrasound followup is consideration.    U/S Aorta Dec 2016:  1.  Fusiform infrarenal abdominal aortic aneurysm measures 2.9 x 2.9 cm in maximum axial dimensions and is located at the mid aortic level. Measurement on prior CT was 2.6 cm.  2.  Extensive aortic atherosclerosis.    Medical Decision Making:  Today's Assessment / Status / Plan:     Patient Type: Secondary Prevention    Etiology of Established CVD if Present:   1.  Ulceration thoracic aorta - not necessarily penetrating - stable on serial imaging.  2.  AAA, small  3.  DVT and PE      Lipid Management: Qualifies for Statin Therapy Based on 2013 ACC/AHA Guidelines: yes  Calculated 10-Year Risk of ASCVD: N/A  Currently on Statin: Yes  Perhaps overtreated on most recent labs - has decreased atorva dose  -continue decreased atorvastatin to 40 mg daily (1/2 tab)  -Report any myalgias immediately  -recheck fasting lipids in about 3 weeks    Blood Pressure Management:Goal: JNC8 (2013) Office BP Goal:<140/90; Under Control: yes  Under excellent control at least in office  Can't afford home monitor at present  Has CKD but stable or improved  Does not appear to be on hctz at present  Mild leg swelling on amlodipine, but tolerable  -Continue Amlodipine 10 mg daily  -Continue losartan 50 mg daily    Glycemic Status: Diabetic-  last A1c under excellent control 5.8  -Continue metformin for now, but may need to consider d/c if GFR falls further  -continue glimiperide for now, but decrease dose or stop if a1c well controlled next visit  -Continue to follow fasting blood sugars at home  -Continue TLC  -Yearly flu shot, eye exam    Anti-Platelet/Anti-Coagulant Tx:   Unable to afford pradaxa or xarelto  Patient in agreement that long term benefit of anticoag likely outweighs risk  -  continue indefinite anticoagulation with warfarin  - follow up in coag clinic    Smoking:  Has had significant recidivism.  Unwilling to consider quit date at present.  Will continue to address at each visit    Physical Activity: encouraged exercise class 2-3 x per week    Weight Management and Nutrition: Dietary plan was discussed with patient at this visit including low fats and carb, diabetic diet    Other:     1. Chronic renal disease stage IIIB, relatively stable.  Continue ARB and BP control. Check GFR, urine for ma, pth, vit D, and CBC in 3 weeks. Recheck K - follow up with nephrology as needed or scheduled  2. Hypocalcemia - defer management to nephrology  3.  Hypothyroidism,  Dose recently increased by pcp.  Recheck TSH.  Otherwise Will defer all management to pcp  4.  H/o papillary thyroid cancer - defer to pcp and oncology  5.  Ulceration, thoracic aorta - stable on surveillance imaging.  Continue medical management  6.  AAA, small and stable on serial imaging - continue medical management and surveillance - will repeat u/s every 2-3 years  7.  Falls - does not sound like lightheadedness, syncope or presyncope.  Defer further w/u if indicated to PCP.  Perhaps pt/ot or neuro consult may be helpful if continues    Instructed to follow-up with PCP for remainder of adult medical needs: yes  We will partner with other providers in the management of established vascular disease and cardiometabolic risk factors.    Studies to Be Obtained: Abdominal aortic ultrasound 2-3 years  Labs to Be Obtained:  As above prior to next visit    Follow up in: 3 months    Michael J Bloch, M.D.     CC:   Dr. Najjar Dr. Hansen David Burgio

## 2017-05-11 DIAGNOSIS — E78.5 HYPERLIPIDEMIA, UNSPECIFIED HYPERLIPIDEMIA TYPE: ICD-10-CM

## 2017-05-11 RX ORDER — ATORVASTATIN CALCIUM 80 MG/1
40 TABLET, FILM COATED ORAL EVERY EVENING
Qty: 30 TAB | Refills: 11 | Status: SHIPPED | OUTPATIENT
Start: 2017-05-11 | End: 2018-06-02 | Stop reason: SDUPTHER

## 2017-05-12 LAB — INR BLD: 2 (ref 0.9–1.2)

## 2017-05-23 ENCOUNTER — HOSPITAL ENCOUNTER (OUTPATIENT)
Dept: LAB | Facility: MEDICAL CENTER | Age: 77
End: 2017-05-23
Attending: INTERNAL MEDICINE
Payer: MEDICARE

## 2017-05-23 DIAGNOSIS — E78.5 DYSLIPIDEMIA: ICD-10-CM

## 2017-05-23 DIAGNOSIS — I10 ESSENTIAL HYPERTENSION: ICD-10-CM

## 2017-05-23 DIAGNOSIS — E11.9 CONTROLLED TYPE 2 DIABETES MELLITUS WITHOUT COMPLICATION, WITHOUT LONG-TERM CURRENT USE OF INSULIN (HCC): ICD-10-CM

## 2017-05-23 DIAGNOSIS — N18.3 CKD (CHRONIC KIDNEY DISEASE), STAGE 3 (MODERATE): ICD-10-CM

## 2017-05-23 DIAGNOSIS — E03.9 HYPOTHYROIDISM, UNSPECIFIED TYPE: ICD-10-CM

## 2017-05-23 LAB
25(OH)D3 SERPL-MCNC: 57 NG/ML (ref 30–100)
ALBUMIN SERPL BCP-MCNC: 3.9 G/DL (ref 3.2–4.9)
ALBUMIN/GLOB SERPL: 1.1 G/DL
ALP SERPL-CCNC: 65 U/L (ref 30–99)
ALT SERPL-CCNC: 12 U/L (ref 2–50)
ANION GAP SERPL CALC-SCNC: 6 MMOL/L (ref 0–11.9)
AST SERPL-CCNC: 15 U/L (ref 12–45)
BASOPHILS # BLD AUTO: 1.2 % (ref 0–1.8)
BASOPHILS # BLD: 0.12 K/UL (ref 0–0.12)
BILIRUB SERPL-MCNC: 0.5 MG/DL (ref 0.1–1.5)
BUN SERPL-MCNC: 27 MG/DL (ref 8–22)
CALCIUM SERPL-MCNC: 9.5 MG/DL (ref 8.5–10.5)
CHLORIDE SERPL-SCNC: 106 MMOL/L (ref 96–112)
CHOLEST SERPL-MCNC: 164 MG/DL (ref 100–199)
CO2 SERPL-SCNC: 27 MMOL/L (ref 20–33)
CREAT SERPL-MCNC: 1.11 MG/DL (ref 0.5–1.4)
CREAT UR-MCNC: 72.3 MG/DL
EOSINOPHIL # BLD AUTO: 1.16 K/UL (ref 0–0.51)
EOSINOPHIL NFR BLD: 11.3 % (ref 0–6.9)
ERYTHROCYTE [DISTWIDTH] IN BLOOD BY AUTOMATED COUNT: 45.2 FL (ref 35.9–50)
EST. AVERAGE GLUCOSE BLD GHB EST-MCNC: 137 MG/DL
GFR SERPL CREATININE-BSD FRML MDRD: 48 ML/MIN/1.73 M 2
GLOBULIN SER CALC-MCNC: 3.5 G/DL (ref 1.9–3.5)
GLUCOSE SERPL-MCNC: 84 MG/DL (ref 65–99)
HBA1C MFR BLD: 6.4 % (ref 0–5.6)
HCT VFR BLD AUTO: 39 % (ref 37–47)
HDLC SERPL-MCNC: 55 MG/DL
HGB BLD-MCNC: 12.7 G/DL (ref 12–16)
IMM GRANULOCYTES # BLD AUTO: 0.03 K/UL (ref 0–0.11)
IMM GRANULOCYTES NFR BLD AUTO: 0.3 % (ref 0–0.9)
LDLC SERPL CALC-MCNC: 78 MG/DL
LYMPHOCYTES # BLD AUTO: 2.9 K/UL (ref 1–4.8)
LYMPHOCYTES NFR BLD: 28.2 % (ref 22–41)
MCH RBC QN AUTO: 29.5 PG (ref 27–33)
MCHC RBC AUTO-ENTMCNC: 32.6 G/DL (ref 33.6–35)
MCV RBC AUTO: 90.5 FL (ref 81.4–97.8)
MICROALBUMIN UR-MCNC: <0.7 MG/DL
MICROALBUMIN/CREAT UR: NORMAL MG/G (ref 0–30)
MONOCYTES # BLD AUTO: 1.03 K/UL (ref 0–0.85)
MONOCYTES NFR BLD AUTO: 10 % (ref 0–13.4)
NEUTROPHILS # BLD AUTO: 5.06 K/UL (ref 2–7.15)
NEUTROPHILS NFR BLD: 49 % (ref 44–72)
NRBC # BLD AUTO: 0 K/UL
NRBC BLD AUTO-RTO: 0 /100 WBC
PLATELET # BLD AUTO: 449 K/UL (ref 164–446)
PMV BLD AUTO: 9.4 FL (ref 9–12.9)
POTASSIUM SERPL-SCNC: 4.7 MMOL/L (ref 3.6–5.5)
PROT SERPL-MCNC: 7.4 G/DL (ref 6–8.2)
PTH-INTACT SERPL-MCNC: 37.5 PG/ML (ref 14–72)
RBC # BLD AUTO: 4.31 M/UL (ref 4.2–5.4)
SODIUM SERPL-SCNC: 139 MMOL/L (ref 135–145)
T4 SERPL-MCNC: 12.8 UG/DL (ref 4–12)
TRIGL SERPL-MCNC: 157 MG/DL (ref 0–149)
TSH SERPL DL<=0.005 MIU/L-ACNC: 1.25 UIU/ML (ref 0.3–3.7)
WBC # BLD AUTO: 10.3 K/UL (ref 4.8–10.8)

## 2017-05-23 PROCEDURE — 85025 COMPLETE CBC W/AUTO DIFF WBC: CPT

## 2017-05-23 PROCEDURE — 83970 ASSAY OF PARATHORMONE: CPT

## 2017-05-23 PROCEDURE — 82306 VITAMIN D 25 HYDROXY: CPT

## 2017-05-23 PROCEDURE — 83036 HEMOGLOBIN GLYCOSYLATED A1C: CPT | Mod: GA

## 2017-05-23 PROCEDURE — 84436 ASSAY OF TOTAL THYROXINE: CPT

## 2017-05-23 PROCEDURE — 82570 ASSAY OF URINE CREATININE: CPT

## 2017-05-23 PROCEDURE — 80053 COMPREHEN METABOLIC PANEL: CPT

## 2017-05-23 PROCEDURE — 80061 LIPID PANEL: CPT

## 2017-05-23 PROCEDURE — 84443 ASSAY THYROID STIM HORMONE: CPT

## 2017-05-23 PROCEDURE — 36415 COLL VENOUS BLD VENIPUNCTURE: CPT

## 2017-05-23 PROCEDURE — 82043 UR ALBUMIN QUANTITATIVE: CPT

## 2017-05-25 RX ORDER — LOSARTAN POTASSIUM 50 MG/1
TABLET ORAL
Qty: 30 TAB | Refills: 10 | Status: SHIPPED | OUTPATIENT
Start: 2017-05-25 | End: 2018-05-29 | Stop reason: SDUPTHER

## 2017-05-30 ENCOUNTER — ANTICOAGULATION VISIT (OUTPATIENT)
Dept: VASCULAR LAB | Facility: MEDICAL CENTER | Age: 77
End: 2017-05-30
Attending: INTERNAL MEDICINE
Payer: MEDICARE

## 2017-05-30 DIAGNOSIS — E55.9 VITAMIN D DEFICIENCY DISEASE: ICD-10-CM

## 2017-05-30 DIAGNOSIS — Z86.711 HISTORY OF PULMONARY EMBOLISM: ICD-10-CM

## 2017-05-30 DIAGNOSIS — Z86.711 HX PULMONARY EMBOLISM: ICD-10-CM

## 2017-05-30 LAB
INR BLD: 2.1 (ref 0.9–1.2)
INR PPP: 2.1 (ref 2–3.5)

## 2017-05-30 PROCEDURE — 99211 OFF/OP EST MAY X REQ PHY/QHP: CPT | Performed by: NURSE PRACTITIONER

## 2017-05-30 PROCEDURE — 85610 PROTHROMBIN TIME: CPT

## 2017-05-30 RX ORDER — ERGOCALCIFEROL 1.25 MG/1
50000 CAPSULE ORAL
Qty: 4 CAP | Refills: 11 | Status: SHIPPED | OUTPATIENT
Start: 2017-05-30 | End: 2018-07-28 | Stop reason: SDUPTHER

## 2017-05-30 RX ORDER — WARFARIN SODIUM 5 MG/1
TABLET ORAL
Qty: 135 TAB | Refills: 1 | Status: SHIPPED | OUTPATIENT
Start: 2017-05-30 | End: 2018-01-25 | Stop reason: SDUPTHER

## 2017-05-30 NOTE — PROGRESS NOTES
OP Anticoagulation Service Note    Date: 5/30/2017       Plan:  Pt is therapeutic.  Denies any s/s of bleeding or clotting.  Denies any changes in medications or diet. Will continue warfarin dosing as follows.  Follow up appointment in 5 week(s).       Anticoagulation Summary as of 5/30/2017     INR goal 2.0-3.0   Selected INR 2.1 (5/30/2017)   Maintenance plan 5 mg (5 mg x 1) on Wed, Fri; 7.5 mg (5 mg x 1.5) all other days   Weekly total 47.5 mg   Plan last modified Brianna Dillon A.P.NMary (4/11/2017)   Next INR check 7/5/2017   Target end date Indefinite    Indications   DVT (deep venous thrombosis)  left (Resolved) [I82.402]  Hx pulmonary embolism [Z86.711]         Anticoagulation Episode Summary     INR check location     Preferred lab     Send INR reminders to     Comments       Anticoagulation Care Providers     Provider Role Specialty Phone number    YUE BakerP.NMary Referring Family Medicine [phone]    [facility] Responsible  [phone]            PAULINE Grimaldo  [facility]

## 2017-05-30 NOTE — MR AVS SNAPSHOT
Sanjuanita Sosa   2017 10:30 AM   Anticoagulation Visit   MRN: [id number]    Department:  Vascular Medicine   Dept Phone:  [phone]    Description:  Female : 1940   Provider:  [facility] EXAM 5           Allergies as of 2017     Allergen Noted Reactions    Aspirin 2015   Anaphylaxis    Penicillins 2015   Anaphylaxis    As a child      You were diagnosed with     Hx pulmonary embolism   [789182]         Vital Signs     Last Menstrual Period Smoking Status                10/12/1970 Former Smoker          Basic Information     Date Of Birth Sex Race Ethnicity Preferred Language    1940 Female White Non- English      Your appointments     2017 10:30 AM   US [id number] with [address]   [facility] IMAGING - ULTRASOUND - [address]    [address]  [place] [address]   [phone]            2017  9:45 AM   Established Patient with [facility] EXAM 4   [facility]  (--)    [address] [facility] [place] [address]   [phone]            2017 10:00 AM   Established Patient with WENDY Baker   [facility] [address]    [address]  [address]  [place] [address] [phone]           You will be receiving a confirmation call a few days before your appointment from our automated call confirmation system.            Aug 08, 2017  9:40 AM   Follow Up Visit with Michael J Bloch, M.D.   [facility]  (--)    [address] [facility] [place] [address]   [phone]              Problem List              ICD-10-CM Priority Class Noted - Resolved    Autoimmune hepatitis (CMS-HCC) (Chronic) K75.4 High  3/19/2012 - Present    Carotid artery stenosis I65.29   10/7/2014 - Present    Insomnia G47.00   2014 - Present    History of hepatitis B Z86.19   2015 - Present    Dyslipidemia E78.5   2015 -  Present    Hx pulmonary embolism Z86.711   5/12/2015 - Present    Essential hypertension I10   8/5/2015 - Present    BMI 33.0-33.9,adult Z68.33   9/8/2015 - Present    Risk for falls Z91.81   9/8/2015 - Present    CKD (chronic kidney disease) stage 3, GFR 30-59 ml/min N18.3   11/19/2015 - Present    Vitamin D deficiency disease E55.9   11/24/2015 - Present    Diabetes mellitus type II, controlled (CMS-HCC) E11.9   11/24/2015 - Present    Gastroesophageal reflux disease without esophagitis K21.9   12/3/2015 - Present    Chronic low back pain without sciatica M54.5, G89.29   1/31/2017 - Present    Postoperative hypothyroidism E89.0   1/31/2017 - Present    Chronic anticoagulation Z79.01   1/31/2017 - Present    Abdominal aortic aneurysm without rupture (CMS-HCC) I71.4   4/12/2017 - Present      Health Maintenance        Date Due Completion Dates    PAP SMEAR 3/16/1963 ---    IMM ZOSTER VACCINE 3/16/2002 ---    IMM PNEUMOCOCCAL 65+ (ADULT) LOW/MEDIUM RISK SERIES (2 of 2 - PCV13) 8/5/2016 8/5/2015    DIABETES MONOFILAMENT / LE EXAM 8/5/2016 8/5/2015 (Done)    Override on 8/5/2015: Done    RETINAL SCREENING 8/12/2016 8/12/2015 (Done)    Override on 8/12/2015: Done    BONE DENSITY 10/7/2016 10/7/2011 (Prv Comp)    Override on 10/7/2011: Previously completed    A1C SCREENING 11/23/2017 5/23/2017, 12/28/2016, 11/16/2016, 8/16/2016, 3/15/2016, 11/17/2015, 4/28/2015, 4/21/2015, 10/14/2014, 3/7/2013    FASTING LIPID PROFILE 5/23/2018 5/23/2017, 1/31/2017, 12/29/2016, 11/16/2016, 8/16/2016, 6/22/2016, 3/15/2016, 11/17/2015, 4/29/2015, 10/14/2014, 3/14/2013    URINE ACR / MICROALBUMIN 5/23/2018 5/23/2017, 3/8/2017, 3/16/2016, 11/17/2015, 10/15/2014    SERUM CREATININE 5/23/2018 5/23/2017, 3/7/2017, 1/31/2017, 1/3/2017, 1/2/2017, 1/1/2017, 12/31/2016, 12/30/2016, 12/29/2016, 12/28/2016, 11/16/2016, 10/6/2016, 8/16/2016, 6/22/2016, 5/10/2016, 11/17/2015, 10/12/2015, 10/11/2015, 10/10/2015, 10/9/2015, 10/8/2015, 10/7/2015,  10/6/2015, 4/30/2015, 4/29/2015, 4/28/2015, 4/27/2015, 4/21/2015, 1/31/2015, 10/14/2014, 3/14/2013, 3/13/2013, 3/12/2013, 3/8/2013, 3/7/2013, 3/4/2013, 3/2/2013, 3/1/2013, 2/28/2013, 2/26/2013, 5/19/2009    COLONOSCOPY 10/7/2019 10/7/2009 (Prv Comp)    Override on 10/7/2009: Previously completed    IMM DTaP/Tdap/Td Vaccine (2 - Td) 3/6/2023 3/6/2013            Results     POCT Protime      Component    INR    2.1                        Current Immunizations     HIB Vaccine (ACTHIB/HIBERIX) 3/6/2013 11:45 AM    INFLUENZA VACCINE H1N1 10/12/2016    Influenza TIV (IM) 11/1/2012    Influenza Vaccine Adult HD 10/12/2015  8:46 AM    Meningococcal Polysaccharide Vaccine MPSV4 3/6/2013 12:00 PM    Pneumococcal Vaccine (PCV7) Historical Data 10/12/2016    Pneumococcal Vaccine (UF)Historical Data 12/1/2012    Pneumococcal polysaccharide vaccine (PPSV-23) 8/5/2015    Tdap Vaccine 3/6/2013 11:45 AM      Below and/or attached are the medications your provider expects you to take. Review all of your home medications and newly ordered medications with your provider and/or pharmacist. Follow medication instructions as directed by your provider and/or pharmacist. Please keep your medication list with you and share with your provider. Update the information when medications are discontinued, doses are changed, or new medications (including over-the-counter products) are added; and carry medication information at all times in the event of emergency situations     Allergies:  ASPIRIN - Anaphylaxis     PENICILLINS - Anaphylaxis               Medications  Valid as of: May 30, 2017 - 10:30 AM    Generic Name Brand Name Tablet Size Instructions for use    AmLODIPine Besylate (Tab) NORVASC 10 MG TAKE ONE TABLET BY MOUTH ONCE DAILY ** GENERIC FOR NORVASC        Atorvastatin Calcium (Tab) LIPITOR 80 MG Take 0.5 Tabs by mouth every evening.        Docusate Sodium (Cap)  MG Take 100 mg by mouth every morning.        Ergocalciferol (Cap)  DRISDOL 08144 UNITS Take 1 Cap by mouth every 7 days.        Glimepiride (Tab) AMARYL 2 MG TAKE 1 TABLET BY MOUTH EACH MORNING ** GENERIC FOR AMARYL        Lancets (Misc) MICROLET LANCETS  TEST BLOOD SUGAR TWO TIMES DAILY        Levothyroxine Sodium (Tab) SYNTHROID 137 MCG Take 1 Tab by mouth Every morning on an empty stomach.        Losartan Potassium (Tab) COZAAR 50 MG Take daily        Losartan Potassium (Tab) COZAAR 50 MG TAKE ONE TABLET BY MOUTH EVERY DAY        MetFORMIN HCl (Tab) GLUCOPHAGE 500 MG TAKE 2 TABLETS BY MOUTH EACH MORNING WITH  BREAKFAST AND TAKE 1 TABLET EACH EVENING   WITH DINNER        Omeprazole (CAPSULE DELAYED RELEASE) PRILOSEC 20 MG Take 1 Cap by mouth every day.        Oxycodone-Acetaminophen (Tab) PERCOCET 7.5-325 MG Take 1 Tab by mouth 2 Times a Day.        Oyster Shell (Tab) OS-MARKELL 500 500 MG Take 1 Tab by mouth 2 times a day, with meals.        TraZODone HCl (Tab) DESYREL 100 MG Take 1 Tab by mouth 1 time daily as needed for Sleep.        Warfarin Sodium (Tab) COUMADIN 5 MG Take 1 tab by mouth daily or as directed by coumadin clinic        .                 Medicines prescribed today were sent to:     [id number] - [place], [place] - [address]    [address] [facility] [address]    Phone: [phone] Fax: [phone]    Open 24 Hours?: No    [facility] - [place], [place] - [address] [address]    Phone: [phone] Fax: [phone]    Open 24 Hours?: No      Medication refill instructions:       If your prescription bottle indicates you have medication refills left, it is not necessary to call your provider’s office. Please contact your pharmacy and they will refill your medication.    If your prescription bottle indicates you do not have any refills left, you may request refills at any time through one of the following ways: The online Finco system (except Urgent Care), by calling your provider’s office, or by asking  your pharmacy to contact your provider’s office with a refill request. Medication refills are processed only during regular business hours and may not be available until the next business day. Your provider may request additional information or to have a follow-up visit with you prior to refilling your medication.   *Please Note: Medication refills are assigned a new Rx number when refilled electronically. Your pharmacy may indicate that no refills were authorized even though a new prescription for the same medication is available at the pharmacy. Please request the medicine by name with the pharmacy before contacting your provider for a refill.        Warfarin Dosing Calendar   May 2017 Details    Sun Mon Tue Wed Thu Fri Sat      1               2               3               4               5               6                 7               8               9               10               11               12               13                 14               15               16               17               18               19               20                 21               22               23               24               25               26               27                 28               29               30   2.1   7.5 mg   See details      31      5 mg             Date Details   05/30 This INR check   INR: 2.1               How to take your warfarin dose     To take:  5 mg Take 1 of the 5 mg tablets.    To take:  7.5 mg Take 1.5 of the 5 mg tablets.           Warfarin Dosing Calendar   June 2017 Details    Sun Mon Tue Wed Thu Fri Sat         1      7.5 mg         2      5 mg         3      7.5 mg           4      7.5 mg         5      7.5 mg         6      7.5 mg         7      5 mg         8      7.5 mg         9      5 mg         10      7.5 mg           11      7.5 mg         12      7.5 mg         13      7.5 mg         14      5 mg         15      7.5 mg         16      5 mg         17      7.5 mg             18      7.5 mg         19      7.5 mg         20      7.5 mg         21      5 mg         22      7.5 mg         23      5 mg         24      7.5 mg           25      7.5 mg         26      7.5 mg         27      7.5 mg         28      5 mg         29      7.5 mg         30      5 mg           Date Details   No additional details            How to take your warfarin dose     To take:  5 mg Take 1 of the 5 mg tablets.    To take:  7.5 mg Take 1.5 of the 5 mg tablets.           Warfarin Dosing Calendar   July 2017 Details    Sun Mon Tue Wed Thu Fri Sat           1      7.5 mg           2      7.5 mg         3      7.5 mg         4      7.5 mg         5      5 mg         6               7               8                 9               10               11               12               13               14               15                 16               17               18               19               20               21               22                 23               24               25               26               27               28               29                 30               31                     Date Details   No additional details    Date of next INR:  7/5/2017         How to take your warfarin dose     To take:  5 mg Take 1 of the 5 mg tablets.    To take:  7.5 mg Take 1.5 of the 5 mg tablets.              Doculogy Access Code: [email or web address]  Expires: 6/1/2017  9:38 AM    Doculogy  A secure, online tool to manage your health information     [employer] Doculogy® is a secure, online tool that connects you to your personalized health information from the privacy of your home -- day or night - making it very easy for you to manage your healthcare. Once the activation process is completed, you can even access your medical information using the Doculogy nighat, which is available for free in the Apple Nighat store or Google Play store.     Doculogy provides the following levels of access (as shown  below):   My Chart Features   [facility] Doctor [facility]  Urgent  Care Non-[facility]  Primary Care  Doctor   Email your healthcare team securely and privately 24/7 X X X    Manage appointments: schedule your next appointment; view details of past/upcoming appointments X      Request prescription refills. X      View recent personal medical records, including lab and immunizations X X X X   View health record, including health history, allergies, medications X X X X   Read reports about your outpatient visits, procedures, consult and ER notes X X X X   See your discharge summary, which is a recap of your hospital and/or ER visit that includes your diagnosis, lab results, and care plan. X X       How to register for Targazyme:  1. Go to  [email or web address].  2. Click on the Sign Up Now box, which takes you to the New Member Sign Up page. You will need to provide the following information:  a. Enter your Targazyme Access Code exactly as it appears at the top of this page. (You will not need to use this code after you’ve completed the sign-up process. If you do not sign up before the expiration date, you must request a new code.)   b. Enter your date of birth.   c. Enter your home email address.   d. Click Submit, and follow the next screen’s instructions.  3. Create a Targazyme ID. This will be your Targazyme login ID and cannot be changed, so think of one that is secure and easy to remember.  4. Create a Targazyme password. You can change your password at any time.  5. Enter your Password Reset Question and Answer. This can be used at a later time if you forget your password.   6. Enter your e-mail address. This allows you to receive e-mail notifications when new information is available in Targazyme.  7. Click Sign Up. You can now view your health information.    For assistance activating your Targazyme account, call [phone]

## 2017-05-31 DIAGNOSIS — E11.9 CONTROLLED TYPE 2 DIABETES MELLITUS WITHOUT COMPLICATION, WITHOUT LONG-TERM CURRENT USE OF INSULIN (HCC): ICD-10-CM

## 2017-05-31 RX ORDER — TRAZODONE HYDROCHLORIDE 100 MG/1
100 TABLET ORAL
Qty: 30 TAB | Refills: 11 | Status: SHIPPED | OUTPATIENT
Start: 2017-05-31 | End: 2017-07-11 | Stop reason: SDUPTHER

## 2017-06-06 ENCOUNTER — HOSPITAL ENCOUNTER (OUTPATIENT)
Dept: RADIOLOGY | Facility: MEDICAL CENTER | Age: 77
End: 2017-06-06
Attending: INTERNAL MEDICINE
Payer: MEDICARE

## 2017-06-06 DIAGNOSIS — C73 PAPILLARY THYROID CARCINOMA (HCC): ICD-10-CM

## 2017-06-06 PROCEDURE — 76536 US EXAM OF HEAD AND NECK: CPT

## 2017-06-06 RX ORDER — AMLODIPINE BESYLATE 10 MG/1
TABLET ORAL
Qty: 90 TAB | Refills: 3 | Status: SHIPPED | OUTPATIENT
Start: 2017-06-06 | End: 2017-11-08

## 2017-06-13 ENCOUNTER — OFFICE VISIT (OUTPATIENT)
Dept: ENDOCRINOLOGY | Facility: MEDICAL CENTER | Age: 77
End: 2017-06-13
Payer: MEDICARE

## 2017-06-13 VITALS
HEART RATE: 85 BPM | BODY MASS INDEX: 32.33 KG/M2 | OXYGEN SATURATION: 90 % | HEIGHT: 64 IN | SYSTOLIC BLOOD PRESSURE: 120 MMHG | DIASTOLIC BLOOD PRESSURE: 62 MMHG | WEIGHT: 189.4 LBS

## 2017-06-13 DIAGNOSIS — E89.0 POSTSURGICAL HYPOTHYROIDISM: ICD-10-CM

## 2017-06-13 DIAGNOSIS — E11.9 TYPE 2 DIABETES MELLITUS WITHOUT COMPLICATION, WITHOUT LONG-TERM CURRENT USE OF INSULIN (HCC): ICD-10-CM

## 2017-06-13 DIAGNOSIS — C73 PAPILLARY THYROID CARCINOMA (HCC): ICD-10-CM

## 2017-06-13 PROCEDURE — 99214 OFFICE O/P EST MOD 30 MIN: CPT | Performed by: INTERNAL MEDICINE

## 2017-06-13 NOTE — MR AVS SNAPSHOT
"        Sanjuanita Sosa   2017 8:20 AM   Office Visit   MRN: [id number]    Department:  Endocrinology [facility]   Dept Phone:  [phone]    Description:  Female : 1940   Provider:  Sundar Albert M.D.           Reason for Visit     Follow-Up Papillary Thyroid Carcinoma      Allergies as of 2017     Allergen Noted Reactions    Aspirin 2015   Anaphylaxis    Penicillins 2015   Anaphylaxis    As a child      You were diagnosed with     Papillary thyroid carcinoma (CMS-HCC)   [680413]       Postsurgical hypothyroidism   [244.0.ICD-9-CM]       Type 2 diabetes mellitus without complication, without long-term current use of insulin (CMS-HCC)   [1080034]         Vital Signs     Blood Pressure Pulse Height Weight Body Mass Index Oxygen Saturation    120/62 mmHg 85 1.613 m (5' 3.5\") 85.911 kg (189 lb 6.4 oz) 33.02 kg/m2 90%    Last Menstrual Period Smoking Status                10/12/1970 Former Smoker          Basic Information     Date Of Birth Sex Race Ethnicity Preferred Language    1940 Female White Non- English      Your appointments     2017  9:45 AM   Established Patient with [facility] EXAM 4   [facility]  (--)    [address] [facility] [address]   [phone]            2017 10:00 AM   Established Patient with WENDY Baker   [facility] [address]    [address]  [address]  [facility] [address] [phone]           You will be receiving a confirmation call a few days before your appointment from our automated call confirmation system.            Aug 08, 2017  9:40 AM   Follow Up Visit with Michael J Bloch, M.D.   [facility]  (--)    [address] [facility] [address]   [phone]            Oct 11, 2017  9:00 AM   Established Patient with Sundar Albert M.D.   [facility] ([facility]" [place])    [address]  [place] [address] [phone]           You will be receiving a confirmation call a few days before your appointment from our automated call confirmation system.              Problem List              ICD-10-CM Priority Class Noted - Resolved    Autoimmune hepatitis (CMS-HCC) (Chronic) K75.4 High  3/19/2012 - Present    Carotid artery stenosis I65.29   10/7/2014 - Present    Insomnia G47.00   11/4/2014 - Present    History of hepatitis B Z86.19   4/28/2015 - Present    Dyslipidemia E78.5   4/28/2015 - Present    Hx pulmonary embolism Z86.711   5/12/2015 - Present    Essential hypertension I10   8/5/2015 - Present    BMI 33.0-33.9,adult Z68.33   9/8/2015 - Present    Risk for falls Z91.81   9/8/2015 - Present    CKD (chronic kidney disease) stage 3, GFR 30-59 ml/min N18.3   11/19/2015 - Present    Vitamin D deficiency disease E55.9   11/24/2015 - Present    Diabetes mellitus type II, controlled (CMS-HCC) E11.9   11/24/2015 - Present    Gastroesophageal reflux disease without esophagitis K21.9   12/3/2015 - Present    Chronic low back pain without sciatica M54.5, G89.29   1/31/2017 - Present    Postoperative hypothyroidism E89.0   1/31/2017 - Present    Chronic anticoagulation Z79.01   1/31/2017 - Present    Abdominal aortic aneurysm without rupture (CMS-HCC) I71.4   4/12/2017 - Present      Health Maintenance        Date Due Completion Dates    PAP SMEAR 3/16/1963 ---    IMM ZOSTER VACCINE 3/16/2002 ---    IMM PNEUMOCOCCAL 65+ (ADULT) LOW/MEDIUM RISK SERIES (2 of 2 - PCV13) 8/5/2016 8/5/2015    DIABETES MONOFILAMENT / LE EXAM 8/5/2016 8/5/2015 (Done)    Override on 8/5/2015: Done    RETINAL SCREENING 8/12/2016 8/12/2015 (Done)    Override on 8/12/2015: Done    BONE DENSITY 10/7/2016 10/7/2011 (Prv Comp)    Override on 10/7/2011: Previously completed    A1C SCREENING 11/23/2017 5/23/2017, 12/28/2016, 11/16/2016, 8/16/2016, 3/15/2016, 11/17/2015, 4/28/2015, 4/21/2015,  10/14/2014, 3/7/2013    FASTING LIPID PROFILE 5/23/2018 5/23/2017, 1/31/2017, 12/29/2016, 11/16/2016, 8/16/2016, 6/22/2016, 3/15/2016, 11/17/2015, 4/29/2015, 10/14/2014, 3/14/2013    URINE ACR / MICROALBUMIN 5/23/2018 5/23/2017, 3/8/2017, 3/16/2016, 11/17/2015, 10/15/2014    SERUM CREATININE 5/23/2018 5/23/2017, 3/7/2017, 1/31/2017, 1/3/2017, 1/2/2017, 1/1/2017, 12/31/2016, 12/30/2016, 12/29/2016, 12/28/2016, 11/16/2016, 10/6/2016, 8/16/2016, 6/22/2016, 5/10/2016, 11/17/2015, 10/12/2015, 10/11/2015, 10/10/2015, 10/9/2015, 10/8/2015, 10/7/2015, 10/6/2015, 4/30/2015, 4/29/2015, 4/28/2015, 4/27/2015, 4/21/2015, 1/31/2015, 10/14/2014, 3/14/2013, 3/13/2013, 3/12/2013, 3/8/2013, 3/7/2013, 3/4/2013, 3/2/2013, 3/1/2013, 2/28/2013, 2/26/2013, 5/19/2009    COLONOSCOPY 10/7/2019 10/7/2009 (Prv Comp)    Override on 10/7/2009: Previously completed    IMM DTaP/Tdap/Td Vaccine (2 - Td) 3/6/2023 3/6/2013            Current Immunizations     HIB Vaccine (ACTHIB/HIBERIX) 3/6/2013 11:45 AM    INFLUENZA VACCINE H1N1 10/12/2016    Influenza TIV (IM) 11/1/2012    Influenza Vaccine Adult HD 10/12/2015  8:46 AM    Meningococcal Polysaccharide Vaccine MPSV4 3/6/2013 12:00 PM    Pneumococcal Vaccine (PCV7) Historical Data 10/12/2016    Pneumococcal Vaccine (UF)Historical Data 12/1/2012    Pneumococcal polysaccharide vaccine (PPSV-23) 8/5/2015    Tdap Vaccine 3/6/2013 11:45 AM      Below and/or attached are the medications your provider expects you to take. Review all of your home medications and newly ordered medications with your provider and/or pharmacist. Follow medication instructions as directed by your provider and/or pharmacist. Please keep your medication list with you and share with your provider. Update the information when medications are discontinued, doses are changed, or new medications (including over-the-counter products) are added; and carry medication information at all times in the event of emergency situations      Allergies:  ASPIRIN - Anaphylaxis     PENICILLINS - Anaphylaxis               Medications  Valid as of: June 13, 2017 -  8:29 AM    Generic Name Brand Name Tablet Size Instructions for use    AmLODIPine Besylate (Tab) NORVASC 10 MG TAKE ONE TABLET BY MOUTH ONCE DAILY ** GENERIC FOR NORVASC        Atorvastatin Calcium (Tab) LIPITOR 80 MG Take 0.5 Tabs by mouth every evening.        Docusate Sodium (Cap)  MG Take 100 mg by mouth every morning.        Ergocalciferol (Cap) DRISDOL 54716 UNITS Take 1 Cap by mouth every 7 days.        Glimepiride (Tab) AMARYL 2 MG TAKE 1 TABLET BY MOUTH EACH MORNING ** GENERIC FOR AMARYL        Lancets (Misc) MICROLET LANCETS  TEST BLOOD SUGAR TWO TIMES DAILY        Levothyroxine Sodium (Tab) SYNTHROID 137 MCG Take 1 Tab by mouth Every morning on an empty stomach.        Losartan Potassium (Tab) COZAAR 50 MG Take daily        Losartan Potassium (Tab) COZAAR 50 MG TAKE ONE TABLET BY MOUTH EVERY DAY        MetFORMIN HCl (Tab) GLUCOPHAGE 500 MG TAKE 2 TABLETS BY MOUTH EACH MORNING WITH  BREAKFAST AND TAKE 1 TABLET EACH EVENING   WITH DINNER        Omeprazole (CAPSULE DELAYED RELEASE) PRILOSEC 20 MG Take 1 Cap by mouth every day.        Oxycodone-Acetaminophen (Tab) PERCOCET 7.5-325 MG Take 1 Tab by mouth 2 Times a Day.        Oyster Shell (Tab) OS-MARKELL 500 500 MG Take 1 Tab by mouth 2 times a day, with meals.        TraZODone HCl (Tab) DESYREL 100 MG Take 1 Tab by mouth 1 time daily as needed for Sleep.        Warfarin Sodium (Tab) COUMADIN 5 MG Take one to one and one-half (1-1.5) tablets daily as directed by [facility]        .                 Medicines prescribed today were sent to:     [id number] - [place], [place] - [address]    [address] [place] [address]    Phone: [phone] Fax: [phone]    Open 24 Hours?: No      Medication refill instructions:       If your prescription bottle indicates you have medication refills left, it is  not necessary to call your provider’s office. Please contact your pharmacy and they will refill your medication.    If your prescription bottle indicates you do not have any refills left, you may request refills at any time through one of the following ways: The online CorMed[employer] system (except Urgent Care), by calling your provider’s office, or by asking your pharmacy to contact your provider’s office with a refill request. Medication refills are processed only during regular business hours and may not be available until the next business day. Your provider may request additional information or to have a follow-up visit with you prior to refilling your medication.   *Please Note: Medication refills are assigned a new Rx number when refilled electronically. Your pharmacy may indicate that no refills were authorized even though a new prescription for the same medication is available at the pharmacy. Please request the medicine by name with the pharmacy before contacting your provider for a refill.        Your To Do List     Future Labs/Procedures Complete By Expires    TSH WITH REFLEX TO FT4  8/11/2017 6/13/2019    ANTITHYROGLOBULIN AB  10/2/2017 6/13/2018    FREE THYROXINE  10/2/2017 6/13/2018    THYROGLOBULIN, QT  10/2/2017 6/13/2018    TSH  10/2/2017 6/13/2018    ANTITHYROGLOBULIN AB  As directed 6/13/2018    FREE THYROXINE  As directed 6/13/2018    THYROGLOBULIN, QT  As directed 6/13/2018    TSH  As directed 6/13/2018         [facility] Status: Patient Declined

## 2017-06-13 NOTE — PROGRESS NOTES
PCP: WENDY Baker    CC: Thyroid cancer    HPI:  Sanjuanita Sosa is a 76 y.o. old patient who comes in today for follow-up.     Papillary thyroid carcinoma: She underwent total thyroidectomy in October 2016. She received 100 mCi for radioactive iodine ablation in December 2016, post therapy scan showed uptake only in the neck. No history of neck radiation. No family history of thyroid cancer. She recently noticed a lump in the left neck, thyroid ultrasound shows to borderline enlarged lymph nodes in the left neck.    Postsurgical hypothyroidism: She is currently on levothyroxine 137 µg daily. She reports compliance with medications.     Type 2 diabetes: Recent hemoglobin A1c is 6.4%. She is on glimepiride. No hypoglycemia.    ROS:  Constitutional: No unintentional weight loss  Cardiac: No palpitations or racing heart    Past Medical History:  Patient Active Problem List    Diagnosis Date Noted   • Autoimmune hepatitis (CMS-[facility]) 03/19/2012     Priority: High   • Abdominal aortic aneurysm without rupture (CMS-[facility]) 04/12/2017   • Chronic low back pain without sciatica 01/31/2017   • Postoperative hypothyroidism 01/31/2017   • Chronic anticoagulation 01/31/2017   • Gastroesophageal reflux disease without esophagitis 12/03/2015   • Vitamin D deficiency disease 11/24/2015   • Diabetes mellitus type II, controlled (CMS-[facility]) 11/24/2015   • CKD (chronic kidney disease) stage 3, GFR 30-59 ml/min 11/19/2015   • BMI 33.0-33.9,adult 09/08/2015   • Risk for falls 09/08/2015   • Essential hypertension 08/05/2015   • Hx pulmonary embolism 05/12/2015   • History of hepatitis B 04/28/2015   • Dyslipidemia 04/28/2015   • Insomnia 11/04/2014   • Carotid artery stenosis 10/07/2014       Past Surgical History:  Past Surgical History   Procedure Laterality Date   • Bladder suspension  5/27/2009     Performed by RINA HART at SURGERY SAME DAY [facility]   • Cystoscopy  5/27/2009     Performed by RINA HART  BRIDGET at SURGERY SAME DAY [facility] ORS   • Rectocele repair  5/27/2009     Performed by RINA HART at SURGERY SAME DAY [facility]   • Appendectomy     • Hysterectomy laparoscopy     • Knee replacement, total     • Tonsillectomy     • Exploratory laparotomy  2/27/2013     Performed by Edson Craft M.D. at SURGERY [facility]   • Splenectomy  2/27/2013     Performed by Edson Craft M.D. at SURGERY [facility]   • Node dissection  2/27/2013     Performed by Edson Craft M.D. at SURGERY [facility]   • Oophorectomy  2/27/2013     Performed by Levi Nolan M.D. at SURGERY [facility]   • Thyroidectomy total N/A 10/12/2016     Procedure: THYROIDECTOMY TOTAL;  Surgeon: Nuno Duncan M.D.;  Location: SURGERY SAME DAY [facility];  Service:    • Darren by laparoscopy  1/2/2017     Procedure: DARREN BY LAPAROSCOPY;  Surgeon: Chele Valles M.D.;  Location: SURGERY [facility];  Service:    • Ercp in or  12/30/2016     Procedure: ERCP IN OR;  Surgeon: Shekhar Rosado M.D.;  Location: SURGERY SAME DAY [facility];  Service:        Allergies:  Aspirin and Penicillins    Social History:  Social History     Social History   • Marital Status:      Spouse Name: N/A   • Number of Children: N/A   • Years of Education: N/A     Occupational History   • Not on file.     Social History Main Topics   • Smoking status: Former Smoker -- 0.50 packs/day for 40 years     Types: Cigarettes     Quit date: 02/27/2013   • Smokeless tobacco: Never Used   • Alcohol Use: No   • Drug Use: No   • Sexual Activity: Not on file     Other Topics Concern   • Not on file     Social History Narrative       Family History:  Family History   Problem Relation Age of Onset   • Hyperlipidemia Mother    • Stroke Mother    • Hyperlipidemia Father    • Stroke Father    • Heart Disease Brother      CABG       Medications:    Current outpatient prescriptions:   •  amlodipine (NORVASC) 10 MG Tab,  TAKE ONE TABLET BY MOUTH ONCE DAILY ** GENERIC FOR NORVASC, Disp: 90 Tab, Rfl: 3  •  trazodone (DESYREL) 100 MG Tab, Take 1 Tab by mouth 1 time daily as needed for Sleep., Disp: 30 Tab, Rfl: 11  •  metformin (GLUCOPHAGE) 500 MG Tab, TAKE 2 TABLETS BY MOUTH EACH MORNING WITH  BREAKFAST AND TAKE 1 TABLET EACH EVENING   WITH DINNER, Disp: 90 Tab, Rfl: 11  •  vitamin D, Ergocalciferol, (DRISDOL) 76659 UNITS Cap capsule, Take 1 Cap by mouth every 7 days., Disp: 4 Cap, Rfl: 11  •  warfarin (COUMADIN) 5 MG Tab, Take one to one and one-half (1-1.5) tablets daily as directed by [facility], Disp: 135 Tab, Rfl: 1  •  losartan (COZAAR) 50 MG Tab, TAKE ONE TABLET BY MOUTH EVERY DAY, Disp: 30 Tab, Rfl: 10  •  atorvastatin (LIPITOR) 80 MG tablet, Take 0.5 Tabs by mouth every evening., Disp: 30 Tab, Rfl: 11  •  omeprazole (PRILOSEC) 20 MG delayed-release capsule, Take 1 Cap by mouth every day., Disp: 30 Cap, Rfl: 11  •  losartan (COZAAR) 50 MG Tab, Take daily, Disp: 30 Tab, Rfl: 11  •  oxycodone-acetaminophen (PERCOCET) 7.5-325 MG per tablet, Take 1 Tab by mouth 2 Times a Day., Disp: 60 Tab, Rfl: 0  •  glimepiride (AMARYL) 2 MG Tab, TAKE 1 TABLET BY MOUTH EACH MORNING ** GENERIC FOR AMARYL, Disp: 30 Tab, Rfl: 5  •  levothyroxine (SYNTHROID) 137 MCG Tab, Take 1 Tab by mouth Every morning on an empty stomach., Disp: 30 Tab, Rfl: 6  •  MICROLET LANCETS Misc, TEST BLOOD SUGAR TWO TIMES DAILY, Disp: 100 Each, Rfl: 11  •  calcium carbonate (CALCIUM CARBONATE) 500 MG Tab, Take 1 Tab by mouth 2 times a day, with meals., Disp: 60 Tab, Rfl: 3  •  docusate sodium 100 MG Cap, Take 100 mg by mouth every morning., Disp: 30 Cap, Rfl: 0  No current facility-administered medications for this visit.    Facility-Administered Medications Ordered in Other Visits:   •  iodixanol (VISIPAQUE) SOLN, , , Intra-Op Once PRN, Shekhar Rosado M.D., 10 mL at 01/18/17 0619    Labs:  Results for NAKUL LAMAS (MRN [id number]) as of  "6/13/2017 08:28   Ref. Range 3/7/2017 10:19 5/23/2017 09:38   TSH Latest Ref Range: 0.300-3.700 uIU/mL 1.570 1.250   Thyroxine -T4 Latest Ref Range: 4.0-12.0 ug/dL  12.8 (H)   Free T-4 Latest Ref Range: 0.53-1.43 ng/dL 1.49 (H)    Thyroglobulin by LC-MC/MS-S/P Latest Ref Range: 1.3-31.8 ng/mL Not Applicable    Thyroglobulin Latest Ref Range: 1.3-31.8 ng/mL 0.5 (L)    Pth, Intact Latest Ref Range: 14.0-72.0 pg/mL  37.5   Anti-Thyroglobulin Latest Ref Range: 0.0-4.0 IU/mL <0.9    Results for NAKUL LAMAS (MRN [id number]) as of 6/13/2017 08:28   Ref. Range 5/23/2017 09:38   Glycohemoglobin Latest Ref Range: 0.0-5.6 % 6.4 (H)   Estim. Avg Glu Latest Units: mg/dL 137   Cholesterol,Tot Latest Ref Range: 100-199 mg/dL 164   Triglycerides Latest Ref Range: 0-149 mg/dL 157 (H)   HDL Latest Ref Range: >=40 mg/dL 55   LDL Latest Ref Range: <100 mg/dL 78       Physical Examination:  Vital signs: /62 mmHg  Pulse 85  Ht 1.613 m (5' 3.5\")  Wt 85.911 kg (189 lb 6.4 oz)  BMI 33.02 kg/m2  SpO2 90%  LMP 10/12/1970  General: No apparent distress, cooperative  Neck: Well-healed thyroidectomy scar  Resp: Normal effort  Extremities: No edema  Neuro: Alert and oriented  Skin: No rash  Psych: Normal mood and affect    Assessment and Plan:    Papillary thyroid carcinoma  · Status post total thyroidectomy in October 2016  · Status post radioactive iodine ablation with 100 mCi in December 2016, post therapy scan showed uptake only in the neck  · Pathology report from October 2016: 2 cm papillary thyroid carcinoma, classic variant, with extra thyroidal extension into strap muscles and 1/1 perithyroidal lymph node with metastatic papillary thyroid carcinoma  · Thyroid bed ultrasound in June 2017 showed to borderline enlarged lymph nodes in the left neck  · Most recent thyroglobulin level is 0.5 in March 2017  · We will repeat thyroglobulin panel now and based on the trend of thyroglobulin levels we might have to consider " biopsy of the lymph nodes in the left neck, in any case we will repeat ultrasound in 6 months  · Repeat thyroglobulin panel in 4 months as well    Postsurgical hypothyroidism  · Goal TSH at this time is less than 0.1  · We will repeat TSH and free T4 now, in 2 months and then in 4 months  · For now continue on levothyroxine 137 µg daily    Type 2 diabetes without complications, without current long-term use of insulin  · Hemoglobin A1c last month was 6.4%  · Continue glimepiride    Return in about 4 months (around 10/13/2017).    Thank you for allowing me to participate in the care of this patient.    Sundar Albert M.D.     CC:   BETHANY Baker.    This note was created using voice recognition software (Dragon). The accuracy of the dictation is limited by the abilities of the software. I have reviewed the note prior to signing, however some errors in grammar and context are still possible. If you have any questions related to this note please do not hesitate to contact our office.

## 2017-06-21 ENCOUNTER — HOSPITAL ENCOUNTER (OUTPATIENT)
Dept: LAB | Facility: MEDICAL CENTER | Age: 77
End: 2017-06-21
Attending: INTERNAL MEDICINE
Payer: MEDICARE

## 2017-06-21 DIAGNOSIS — E89.0 POSTSURGICAL HYPOTHYROIDISM: ICD-10-CM

## 2017-06-21 DIAGNOSIS — C73 PAPILLARY THYROID CARCINOMA (HCC): ICD-10-CM

## 2017-06-21 LAB
T4 FREE SERPL-MCNC: 1.72 NG/DL (ref 0.53–1.43)
TSH SERPL DL<=0.005 MIU/L-ACNC: 1.24 UIU/ML (ref 0.3–3.7)

## 2017-06-21 PROCEDURE — 84439 ASSAY OF FREE THYROXINE: CPT

## 2017-06-21 PROCEDURE — 86800 THYROGLOBULIN ANTIBODY: CPT

## 2017-06-21 PROCEDURE — 36415 COLL VENOUS BLD VENIPUNCTURE: CPT

## 2017-06-21 PROCEDURE — 84432 ASSAY OF THYROGLOBULIN: CPT

## 2017-06-21 PROCEDURE — 84443 ASSAY THYROID STIM HORMONE: CPT

## 2017-06-22 DIAGNOSIS — E89.0 POSTOPERATIVE HYPOTHYROIDISM: ICD-10-CM

## 2017-06-22 RX ORDER — LEVOTHYROXINE SODIUM 0.15 MG/1
150 TABLET ORAL
Qty: 30 TAB | Refills: 6 | Status: SHIPPED | OUTPATIENT
Start: 2017-06-22 | End: 2017-09-14 | Stop reason: SDUPTHER

## 2017-06-23 ENCOUNTER — TELEPHONE (OUTPATIENT)
Dept: ENDOCRINOLOGY | Facility: MEDICAL CENTER | Age: 77
End: 2017-06-23

## 2017-06-23 NOTE — TELEPHONE ENCOUNTER
Called Pt and notified    Please inform patient that thyroid hormone levels are still slightly lower than goal, I am sending new prescription for levothyroxine 150 µg daily. Labs for TSH and free T4 in 2 months.    Thank you  Angi

## 2017-06-30 ENCOUNTER — TELEPHONE (OUTPATIENT)
Dept: MEDICAL GROUP | Facility: MEDICAL CENTER | Age: 77
End: 2017-06-30

## 2017-06-30 NOTE — TELEPHONE ENCOUNTER
Future Appointments       Provider Department Center    7/5/2017 9:45 AM [facility] EXAM 4 [facility]      7/11/2017 10:00 AM WENDY Baker [facility] [address]    8/8/2017 9:40 AM Michael J Bloch, M.D. [facility] for Heart and Vascular Health      10/11/2017 9:00 AM Sundar Albert M.D. [facility]        ESTABLISHED PATIENT PRE-VISIT PLANNING     Note: Patient will not be contacted if there is no indication to call.     1.  Reviewed note from last office visit with PCP and/or other med group provider: Yes    2.  If any orders were placed at last visit, do we have Results/Consult Notes?        •  Labs - Labs were not ordered at last office visit.       •  Imaging - Imaging was not ordered at last office visit.       •  Referrals - Referral ordered, patient has NOT been seen.    3.  Immunizations were updated in [facility] using WebIZ?: Yes       •  Web Iz Recommendations: HEPATITIS A  TD ZOSTAVAX (Shingles)    4.  Patient is due for the following Health Maintenance Topics:   Health Maintenance Due   Topic Date Due   • PAP SMEAR  03/16/1961   • IMM ZOSTER VACCINE  03/16/2002   • IMM PNEUMOCOCCAL 65+ (ADULT) LOW/MEDIUM RISK SERIES (2 of 2 - PCV13) 08/05/2016   • DIABETES MONOFILAMENT / LE EXAM  08/05/2016   • RETINAL SCREENING  08/12/2016   • BONE DENSITY  10/07/2016   • Annual Wellness Visit  03/24/2017           5.  Patient was not informed to arrive 15 min prior to their scheduled appointment and bring in their medication bottles.

## 2017-07-05 ENCOUNTER — ANTICOAGULATION VISIT (OUTPATIENT)
Dept: VASCULAR LAB | Facility: MEDICAL CENTER | Age: 77
End: 2017-07-05
Attending: INTERNAL MEDICINE
Payer: MEDICARE

## 2017-07-05 VITALS — DIASTOLIC BLOOD PRESSURE: 41 MMHG | HEART RATE: 69 BPM | SYSTOLIC BLOOD PRESSURE: 113 MMHG

## 2017-07-05 DIAGNOSIS — Z86.711 HX PULMONARY EMBOLISM: ICD-10-CM

## 2017-07-05 LAB — INR PPP: 2.2 (ref 2–3.5)

## 2017-07-05 PROCEDURE — 85610 PROTHROMBIN TIME: CPT

## 2017-07-05 PROCEDURE — 99211 OFF/OP EST MAY X REQ PHY/QHP: CPT | Performed by: NURSE PRACTITIONER

## 2017-07-05 NOTE — MR AVS SNAPSHOT
Sanjuanita Sosa   2017 9:45 AM   Anticoagulation Visit   MRN: [id number]    Department:  Vascular Medicine   Dept Phone:  [phone]    Description:  Female : 1940   Provider:  [facility] EXAM 4           Allergies as of 2017     Allergen Noted Reactions    Aspirin 2015   Anaphylaxis    Penicillins 2015   Anaphylaxis    As a child      You were diagnosed with     Hx pulmonary embolism   [[id number]]         Vital Signs     Blood Pressure Pulse Last Menstrual Period Smoking Status          113/41 mmHg 69 10/12/1970 Former Smoker        Basic Information     Date Of Birth Sex Race Ethnicity Preferred Language    1940 Female White Non- English      Your appointments     2017  9:45 AM   Established Patient with IHV EXAM 4   [facility] for [facility] and [facility]  (--)    [address] [place] [address]   [phone]            2017 10:00 AM   Established Patient with WENDY Baker   [facility] [address]    [address]  [address]  [facility] [address] [phone]           You will be receiving a confirmation call a few days before your appointment from our automated call confirmation system.            Aug 08, 2017  9:40 AM   Follow Up Visit with Michael J Bloch, M.D.   [facility]  (--)    [address] [place] [address]   [phone]            Aug 08, 2017 10:00 AM   Established Patient with IHV EXAM 5   [facility]  (--)    [address] [place] [address]   [phone]            Oct 11, 2017  9:00 AM   Established Patient with Sundar Albert M.D.   [facility]    [address]  [facility] [address] [phone]           You will be receiving a confirmation call a few days before your appointment from our  automated call confirmation system.              Problem List              ICD-10-CM Priority Class Noted - Resolved    Autoimmune hepatitis (CMS-HCC) (Chronic) K75.4 High  3/19/2012 - Present    Carotid artery stenosis I65.29   10/7/2014 - Present    Insomnia G47.00   11/4/2014 - Present    History of hepatitis B Z86.19   4/28/2015 - Present    Dyslipidemia E78.5   4/28/2015 - Present    Hx pulmonary embolism Z86.711   5/12/2015 - Present    Essential hypertension I10   8/5/2015 - Present    BMI 33.0-33.9,adult Z68.33   9/8/2015 - Present    Risk for falls Z91.81   9/8/2015 - Present    CKD (chronic kidney disease) stage 3, GFR 30-59 ml/min N18.3   11/19/2015 - Present    Vitamin D deficiency disease E55.9   11/24/2015 - Present    Diabetes mellitus type II, controlled (CMS-HCC) E11.9   11/24/2015 - Present    Gastroesophageal reflux disease without esophagitis K21.9   12/3/2015 - Present    Chronic low back pain without sciatica M54.5, G89.29   1/31/2017 - Present    Postoperative hypothyroidism E89.0   1/31/2017 - Present    Chronic anticoagulation Z79.01   1/31/2017 - Present    Abdominal aortic aneurysm without rupture (CMS-HCC) I71.4   4/12/2017 - Present      Health Maintenance        Date Due Completion Dates    PAP SMEAR 3/16/1961 ---    IMM ZOSTER VACCINE 3/16/2002 ---    IMM PNEUMOCOCCAL 65+ (ADULT) LOW/MEDIUM RISK SERIES (2 of 2 - PCV13) 8/5/2016 8/5/2015    DIABETES MONOFILAMENT / LE EXAM 8/5/2016 8/5/2015 (Done)    Override on 8/5/2015: Done    RETINAL SCREENING 8/12/2016 8/12/2015 (Done)    Override on 8/12/2015: Done    BONE DENSITY 10/7/2016 10/7/2011 (Prv Comp)    Override on 10/7/2011: Previously completed    IMM INFLUENZA (1) 9/1/2017 10/12/2015, 11/1/2012    A1C SCREENING 11/23/2017 5/23/2017, 12/28/2016, 11/16/2016, 8/16/2016, 3/15/2016, 11/17/2015, 4/28/2015, 4/21/2015, 10/14/2014, 3/7/2013    FASTING LIPID PROFILE 5/23/2018 5/23/2017, 1/31/2017, 12/29/2016, 11/16/2016, 8/16/2016, 6/22/2016,  3/15/2016, 11/17/2015, 4/29/2015, 10/14/2014, 3/14/2013    URINE ACR / MICROALBUMIN 5/23/2018 5/23/2017, 3/8/2017, 12/28/2016, 3/16/2016, 11/17/2015, 10/15/2014    SERUM CREATININE 5/23/2018 5/23/2017, 3/7/2017, 1/31/2017, 1/3/2017, 1/2/2017, 1/1/2017, 12/31/2016, 12/30/2016, 12/29/2016, 12/28/2016, 11/16/2016, 10/6/2016, 8/16/2016, 6/22/2016, 5/10/2016, 11/17/2015, 10/12/2015, 10/11/2015, 10/10/2015, 10/9/2015, 10/8/2015, 10/7/2015, 10/6/2015, 4/30/2015, 4/29/2015, 4/28/2015, 4/27/2015, 4/21/2015, 1/31/2015, 10/14/2014, 3/14/2013, 3/13/2013, 3/12/2013, 3/8/2013, 3/7/2013, 3/4/2013, 3/2/2013, 3/1/2013, 2/28/2013, 2/26/2013, 5/19/2009    COLONOSCOPY 10/7/2019 10/7/2009 (Prv Comp)    Override on 10/7/2009: Previously completed    IMM DTaP/Tdap/Td Vaccine (2 - Td) 3/6/2023 3/6/2013            Results     POCT Protime      Component    INR    2.2                        Current Immunizations     HIB Vaccine (ACTHIB/HIBERIX) 3/6/2013 11:45 AM    INFLUENZA VACCINE H1N1 10/12/2016    Influenza TIV (IM) 11/1/2012    Influenza Vaccine Adult HD 10/12/2015  8:46 AM    Meningococcal Polysaccharide Vaccine MPSV4 3/6/2013 12:00 PM    Pneumococcal Vaccine (PCV7) Historical Data 10/12/2016    Pneumococcal Vaccine (UF)Historical Data 12/1/2012    Pneumococcal polysaccharide vaccine (PPSV-23) 8/5/2015    Tdap Vaccine 3/6/2013 11:45 AM      Below and/or attached are the medications your provider expects you to take. Review all of your home medications and newly ordered medications with your provider and/or pharmacist. Follow medication instructions as directed by your provider and/or pharmacist. Please keep your medication list with you and share with your provider. Update the information when medications are discontinued, doses are changed, or new medications (including over-the-counter products) are added; and carry medication information at all times in the event of emergency situations     Allergies:  ASPIRIN - Anaphylaxis      PENICILLINS - Anaphylaxis               Medications  Valid as of: July 05, 2017 -  9:26 AM    Generic Name Brand Name Tablet Size Instructions for use    AmLODIPine Besylate (Tab) NORVASC 10 MG TAKE ONE TABLET BY MOUTH ONCE DAILY ** GENERIC FOR NORVASC        Atorvastatin Calcium (Tab) LIPITOR 80 MG Take 0.5 Tabs by mouth every evening.        Docusate Sodium (Cap)  MG Take 100 mg by mouth every morning.        Ergocalciferol (Cap) DRISDOL 47458 UNITS Take 1 Cap by mouth every 7 days.        Glimepiride (Tab) AMARYL 2 MG TAKE 1 TABLET BY MOUTH EACH MORNING ** GENERIC FOR AMARYL        Lancets (Misc) MICROLET LANCETS  TEST BLOOD SUGAR TWO TIMES DAILY        Levothyroxine Sodium (Tab) SYNTHROID 150 MCG Take 1 Tab by mouth Every morning on an empty stomach.        Losartan Potassium (Tab) COZAAR 50 MG Take daily        Losartan Potassium (Tab) COZAAR 50 MG TAKE ONE TABLET BY MOUTH EVERY DAY        MetFORMIN HCl (Tab) GLUCOPHAGE 500 MG TAKE 2 TABLETS BY MOUTH EACH MORNING WITH  BREAKFAST AND TAKE 1 TABLET EACH EVENING   WITH DINNER        Omeprazole (CAPSULE DELAYED RELEASE) PRILOSEC 20 MG Take 1 Cap by mouth every day.        Oxycodone-Acetaminophen (Tab) PERCOCET 7.5-325 MG Take 1 Tab by mouth 2 Times a Day.        Oyster Shell (Tab) OS-MARKELL 500 500 MG Take 1 Tab by mouth 2 times a day, with meals.        TraZODone HCl (Tab) DESYREL 100 MG Take 1 Tab by mouth 1 time daily as needed for Sleep.        Warfarin Sodium (Tab) COUMADIN 5 MG Take one to one and one-half (1-1.5) tablets daily as directed by [facility]        .                 Medicines prescribed today were sent to:     [id number] - [place], [place] - [address] [place] [address]    Phone: [phone] Fax: [phone]    Open 24 Hours?: No      Medication refill instructions:       If your prescription bottle indicates you have medication refills left, it is not necessary to call your provider’s  office. Please contact your pharmacy and they will refill your medication.    If your prescription bottle indicates you do not have any refills left, you may request refills at any time through one of the following ways: The online Linkua system (except Urgent Care), by calling your provider’s office, or by asking your pharmacy to contact your provider’s office with a refill request. Medication refills are processed only during regular business hours and may not be available until the next business day. Your provider may request additional information or to have a follow-up visit with you prior to refilling your medication.   *Please Note: Medication refills are assigned a new Rx number when refilled electronically. Your pharmacy may indicate that no refills were authorized even though a new prescription for the same medication is available at the pharmacy. Please request the medicine by name with the pharmacy before contacting your provider for a refill.        Warfarin Dosing Calendar   July 2017 Details    Sun Mon Tue Wed Thu Fri Sat           1                 2               3               4               5   2.2   5 mg   See details      6      7.5 mg         7      5 mg         8      7.5 mg           9      7.5 mg         10      7.5 mg         11      7.5 mg         12      5 mg         13      7.5 mg         14      5 mg         15      7.5 mg           16      7.5 mg         17      7.5 mg         18      7.5 mg         19      5 mg         20      7.5 mg         21      5 mg         22      7.5 mg           23      7.5 mg         24      7.5 mg         25      7.5 mg         26      5 mg         27      7.5 mg         28      5 mg         29      7.5 mg           30      7.5 mg         31      7.5 mg               Date Details   07/05 This INR check   INR: 2.2               How to take your warfarin dose     To take:  5 mg Take 1 of the 5 mg tablets.    To take:  7.5 mg Take 1.5 of the 5 mg tablets.            Warfarin Dosing Calendar   August 2017 Details    Sun Mon Tue Wed Thu Fri Sat       1      7.5 mg         2      5 mg         3      7.5 mg         4      5 mg         5      7.5 mg           6      7.5 mg         7      7.5 mg         8      7.5 mg         9               10               11               12                 13               14               15               16               17               18               19                 20               21               22               23               24               25               26                 27               28               29               30               31                  Date Details   No additional details    Date of next INR:  8/8/2017         How to take your warfarin dose     To take:  5 mg Take 1 of the 5 mg tablets.    To take:  7.5 mg Take 1.5 of the 5 mg tablets.              MyChart Status: Patient Declined

## 2017-07-05 NOTE — PROGRESS NOTES
Anticoagulation Summary as of 7/5/2017     INR goal 2.0-3.0   Selected INR 2.2 (7/5/2017)   Maintenance plan 5 mg (5 mg x 1) on Wed, Fri; 7.5 mg (5 mg x 1.5) all other days   Weekly total 47.5 mg   No change documented YUE VickPSTEFAN   Plan last modified WENDY Brennan (4/11/2017)   Next INR check 8/16/2017   Target end date Indefinite    Indications   DVT (deep venous thrombosis)  left (Resolved) [I82.402]  Hx pulmonary embolism [Z86.711]         Anticoagulation Episode Summary     INR check location     Preferred lab     Send INR reminders to     Comments       Anticoagulation Care Providers     Provider Role Specialty Phone number    WENDY Baker Referring Family Medicine [phone]    [facility] Responsible  [phone]        Patient is therapeutic today. Denies any medication or diet changes. No current symptoms of bleeding or thrombosis reported. Continue current regimen. Follow up in 5 weeks.    Next Appointment: Tuesday, August 8, 2017 at 10:00 am.     Farheen CARPENTER

## 2017-07-07 LAB — INR BLD: 2.2 (ref 0.9–1.2)

## 2017-07-10 RX ORDER — CALCIUM CARBONATE 500(1250)
500 TABLET ORAL 2 TIMES DAILY WITH MEALS
Qty: 60 TAB | Refills: 5 | Status: SHIPPED | OUTPATIENT
Start: 2017-07-10 | End: 2017-10-10

## 2017-07-11 ENCOUNTER — OFFICE VISIT (OUTPATIENT)
Dept: MEDICAL GROUP | Facility: MEDICAL CENTER | Age: 77
End: 2017-07-11
Payer: MEDICARE

## 2017-07-11 VITALS
BODY MASS INDEX: 33.66 KG/M2 | TEMPERATURE: 97.7 F | RESPIRATION RATE: 16 BRPM | HEART RATE: 75 BPM | OXYGEN SATURATION: 95 % | WEIGHT: 190 LBS | HEIGHT: 63 IN | SYSTOLIC BLOOD PRESSURE: 120 MMHG | DIASTOLIC BLOOD PRESSURE: 68 MMHG

## 2017-07-11 DIAGNOSIS — Z79.01 CHRONIC ANTICOAGULATION: ICD-10-CM

## 2017-07-11 DIAGNOSIS — I71.40 ABDOMINAL AORTIC ANEURYSM WITHOUT RUPTURE (HCC): ICD-10-CM

## 2017-07-11 DIAGNOSIS — Z91.81 RISK FOR FALLS: ICD-10-CM

## 2017-07-11 DIAGNOSIS — I65.21 STENOSIS OF RIGHT CAROTID ARTERY: ICD-10-CM

## 2017-07-11 DIAGNOSIS — Z86.711 HX PULMONARY EMBOLISM: ICD-10-CM

## 2017-07-11 DIAGNOSIS — E11.9 CONTROLLED TYPE 2 DIABETES MELLITUS WITHOUT COMPLICATION, WITHOUT LONG-TERM CURRENT USE OF INSULIN (HCC): ICD-10-CM

## 2017-07-11 DIAGNOSIS — F51.01 PRIMARY INSOMNIA: ICD-10-CM

## 2017-07-11 DIAGNOSIS — Z00.00 MEDICARE ANNUAL WELLNESS VISIT, SUBSEQUENT: ICD-10-CM

## 2017-07-11 DIAGNOSIS — M54.50 CHRONIC LOW BACK PAIN WITHOUT SCIATICA, UNSPECIFIED BACK PAIN LATERALITY: ICD-10-CM

## 2017-07-11 DIAGNOSIS — E78.5 DYSLIPIDEMIA: ICD-10-CM

## 2017-07-11 DIAGNOSIS — G89.29 CHRONIC LOW BACK PAIN WITHOUT SCIATICA, UNSPECIFIED BACK PAIN LATERALITY: ICD-10-CM

## 2017-07-11 DIAGNOSIS — E89.0 POSTOPERATIVE HYPOTHYROIDISM: ICD-10-CM

## 2017-07-11 DIAGNOSIS — I10 ESSENTIAL HYPERTENSION: ICD-10-CM

## 2017-07-11 DIAGNOSIS — E55.9 VITAMIN D DEFICIENCY DISEASE: ICD-10-CM

## 2017-07-11 DIAGNOSIS — K21.9 GASTROESOPHAGEAL REFLUX DISEASE WITHOUT ESOPHAGITIS: ICD-10-CM

## 2017-07-11 DIAGNOSIS — N18.30 CKD (CHRONIC KIDNEY DISEASE) STAGE 3, GFR 30-59 ML/MIN (HCC): ICD-10-CM

## 2017-07-11 PROCEDURE — 99213 OFFICE O/P EST LOW 20 MIN: CPT | Mod: 25 | Performed by: NURSE PRACTITIONER

## 2017-07-11 PROCEDURE — G0439 PPPS, SUBSEQ VISIT: HCPCS | Mod: 25 | Performed by: NURSE PRACTITIONER

## 2017-07-11 RX ORDER — TRAZODONE HYDROCHLORIDE 100 MG/1
150 TABLET ORAL
Qty: 45 TAB | Refills: 11 | Status: SHIPPED | OUTPATIENT
Start: 2017-07-11 | End: 2017-09-12 | Stop reason: SDUPTHER

## 2017-07-11 RX ORDER — OXYCODONE AND ACETAMINOPHEN 7.5; 325 MG/1; MG/1
1 TABLET ORAL 2 TIMES DAILY
Qty: 60 TAB | Refills: 0 | Status: SHIPPED | OUTPATIENT
Start: 2017-07-11 | End: 2017-09-12 | Stop reason: SDUPTHER

## 2017-07-11 ASSESSMENT — PATIENT HEALTH QUESTIONNAIRE - PHQ9: CLINICAL INTERPRETATION OF PHQ2 SCORE: 0

## 2017-07-11 NOTE — PROGRESS NOTES
CC: Patient is here today for annual Medicare wellness visit as well as to discuss dosage changes for her sleeping pills and refills on narcotics for back pain.    HPI:   Sanjuanita presents today with the following.    1. Medicare annual wellness visit, subsequent  Screening performed below.    2. Chronic low back pain without sciatica, unspecified back pain laterality  Patient has requested a refill on her Percocet. A prescription typically last for a few months and pharmacy review shows she is not going to other offices for medicine. She cannot take anti-inflammatories because of her diabetes and Tylenol does not always work. She denies side effects from the medicine.    3. Primary insomnia  Patient currently on trazodone 100 mg and has had problems with sleep for many years. She has denied sleep apnea symptoms. She states the medicine does not work as well as it used to and once took 2 pills which worked better. She would like to go to a higher dosage.    4. Stenosis of right carotid artery  Previously found on imaging but patient reports no CVA symptoms.    5. Dyslipidemia  Patient continues on Lipitor 80 mg and reports no myalgias.    6. Hx pulmonary embolism  Patient on lifelong Coumadin therapy for which she follows with the Coumadin clinic.    7. Essential hypertension  Blood pressure has been well controlled on amlodipine and losartan.    8. CKD (chronic kidney disease) stage 3, GFR 30-59 ml/min  GFR has remained stable and is not decreasing.    9. Vitamin D deficiency disease  Patient taking calcium and vitamin D.    10. Controlled type 2 diabetes mellitus without complication, without long-term current use of insulin (CMS-[facility])  Patient follows with endocrinology for this and last hemoglobin A1c was in the 6 range.    11. Gastroesophageal reflux disease without esophagitis  Patient reports no problems with acid reflux as long she takes her PPI.    12. Postoperative hypothyroidism  Patient follows with  endocrinology who is monitoring TSH and providing levothyroxine.    13. Chronic anticoagulation  Patient follows at the Coumadin clinic and has an appointment with Dr. phillips this month.    14. Abdominal aortic aneurysm without rupture (CMS-HCC)  This has remained stable and is being imaged as required.    15. Risk for falls  Patient has a walker but she had that she does not like to use it. She reports no recent falls.      Depression Screening    Little interest or pleasure in doing things?  0 - not at all  Feeling down, depressed , or hopeless? 0 - not at all  Trouble falling or staying asleep, or sleeping too much?     Feeling tired or having little energy?     Poor appetite or overeating?     Feeling bad about yourself - or that you are a failure or have let yourself or your family down?    Trouble concentrating on things, such as reading the newspaper or watching television?    Moving or speaking so slowly that other people could have noticed.  Or the opposite - being so fidgety or restless that you have been moving around a lot more than usual?     Thoughts that you would be better off dead, or of hurting yourself?     Patient Health Questionnaire Score:    If depressive symptoms identified deferred to follow up visit unless specifically addressed in assessment and plan.    Interpretation of PHQ-9 Total Score   Score Severity   1-4 Minimal Depression   5-9 Mild Depression   10-14 Moderate Depression   15-19 Moderately Severe Depression   20-27 Severe Depression    Screening for Cognitive Impairment    Three Minute Recall (banana, sunrise, fence)  3/3    Draw clock face with all 12 numbers set to the hand to show 10 minures past 11 o'clock  1    If cognitive concerns identified deferred to follow up visit unless specifically addressed in assessment and plan.    Fall Risk Assessment    Has the patient had two or more falls in the last year or any fall with injury in the last year?  Yes  If Fall Risk identified  deferred to follow up visit unless specifically addressed in assessment and plan.    Safety Assessment    Throw rugs on floor.  Yes  Handrails on all stairs.  Yes  Good lighting in all hallways.  Yes  Difficulty hearing.  No  Patient counseled about all safety risks that were identified.    Functional Assessment ADLs    Are there any barriers preventing you from cooking for yourself or meeting nutritional needs?  No.    Are there any barriers preventing you from driving safely or obtaining transportation?  No.    Are there any barriers preventing you from using a telephone or calling for help?  No.    Are there any barriers preventing you from shopping?  No.    Are there any barriers preventing you from taking care of your own finances?  No.    Are there any barriers preventing you from managing your medications?  No.    Are currently engaing any exercise or physical activity?  No.       Health Maintenance Summary                IMM ZOSTER VACCINE Overdue 3/16/2002     IMM PNEUMOCOCCAL 65+ (ADULT) LOW/MEDIUM RISK SERIES Overdue 8/5/2016      Done 8/5/2015 Imm Admin: Pneumococcal polysaccharide vaccine (PPSV-23)    DIABETES MONOFILAMENT / LE EXAM Overdue 8/5/2016      Done 8/5/2015     RETINAL SCREENING Overdue 8/12/2016      Done 8/12/2015     BONE DENSITY Overdue 10/7/2016      Previously completed 10/7/2011     Annual Wellness Visit Overdue 3/24/2017      Done 3/23/2016 Visit Dx: Medicare annual wellness visit, subsequent     Patient has more history with this topic...    IMM INFLUENZA Next Due 9/1/2017      Done 10/12/2015 Imm Admin: Influenza Vaccine Adult HD     Patient has more history with this topic...    A1C SCREENING Next Due 11/23/2017      Done 5/23/2017 HEMOGLOBIN A1C (A)     Patient has more history with this topic...    FASTING LIPID PROFILE Next Due 5/23/2018      Done 5/23/2017 LIPID PROFILE (A)     Patient has more history with this topic...    URINE ACR / MICROALBUMIN Next Due 5/23/2018      Done  5/23/2017 MICROALBUMIN CREAT RATIO URINE     Patient has more history with this topic...    SERUM CREATININE Next Due 5/23/2018      Done 5/23/2017 COMP METABOLIC PANEL (A)     Patient has more history with this topic...    COLONOSCOPY Next Due 10/7/2019      Previously completed 10/7/2009     IMM DTaP/Tdap/Td Vaccine Next Due 3/6/2023      Done 3/6/2013 Imm Admin: Tdap Vaccine          Patient Care Team:  WENDY Baker as PCP - General (Family Medicine)  Romero Peña M.D. as Consulting Physician (Gastroenterology)  Fadi Najjar, M.D. (Nephrology)  Sundar Albert M.D. as Consulting Physician (Endocrinology)        Patient Active Problem List    Diagnosis Date Noted   • Primary insomnia 07/11/2017   • Abdominal aortic aneurysm without rupture (CMS-HCC) 04/12/2017   • Chronic low back pain without sciatica 01/31/2017   • Postoperative hypothyroidism 01/31/2017   • Chronic anticoagulation 01/31/2017   • Gastroesophageal reflux disease without esophagitis 12/03/2015   • Vitamin D deficiency disease 11/24/2015   • Diabetes mellitus type II, controlled (CMS-HCC) 11/24/2015   • CKD (chronic kidney disease) stage 3, GFR 30-59 ml/min 11/19/2015   • BMI 33.0-33.9,adult 09/08/2015   • Risk for falls 09/08/2015   • Essential hypertension 08/05/2015   • Hx pulmonary embolism 05/12/2015   • Dyslipidemia 04/28/2015   • Carotid artery stenosis 10/07/2014       Current Outpatient Prescriptions   Medication Sig Dispense Refill   • trazodone (DESYREL) 100 MG Tab Take 1.5 Tabs by mouth 1 time daily as needed for Sleep. 45 Tab 11   • oxycodone-acetaminophen (PERCOCET) 7.5-325 MG per tablet Take 1 Tab by mouth 2 Times a Day. 60 Tab 0   • calcium carbonate (CALCIUM CARBONATE) 500 MG Tab Take 1 Tab by mouth 2 times a day, with meals. 60 Tab 5   • levothyroxine (SYNTHROID) 150 MCG Tab Take 1 Tab by mouth Every morning on an empty stomach. 30 Tab 6   • amlodipine (NORVASC) 10 MG Tab TAKE ONE TABLET BY MOUTH ONCE DAILY **  "GENERIC FOR NORVASC 90 Tab 3   • metformin (GLUCOPHAGE) 500 MG Tab TAKE 2 TABLETS BY MOUTH EACH MORNING WITH  BREAKFAST AND TAKE 1 TABLET EACH EVENING   WITH DINNER 90 Tab 11   • vitamin D, Ergocalciferol, (DRISDOL) 26038 UNITS Cap capsule Take 1 Cap by mouth every 7 days. 4 Cap 11   • warfarin (COUMADIN) 5 MG Tab Take one to one and one-half (1-1.5) tablets daily as directed by [facility] 135 Tab 1   • losartan (COZAAR) 50 MG Tab TAKE ONE TABLET BY MOUTH EVERY DAY 30 Tab 10   • atorvastatin (LIPITOR) 80 MG tablet Take 0.5 Tabs by mouth every evening. 30 Tab 11   • omeprazole (PRILOSEC) 20 MG delayed-release capsule Take 1 Cap by mouth every day. 30 Cap 11   • losartan (COZAAR) 50 MG Tab Take daily 30 Tab 11   • glimepiride (AMARYL) 2 MG Tab TAKE 1 TABLET BY MOUTH EACH MORNING ** GENERIC FOR AMARYL 30 Tab 5   • MICROLET LANCETS Misc TEST BLOOD SUGAR TWO TIMES DAILY 100 Each 11   • docusate sodium 100 MG Cap Take 100 mg by mouth every morning. 30 Cap 0     No current facility-administered medications for this visit.     Facility-Administered Medications Ordered in Other Visits   Medication Dose Route Frequency Provider Last Rate Last Dose   • iodixanol (VISIPAQUE) SOLN    Intra-Op Once PRN Shekhar Rosado M.D.   10 mL at 01/18/17 0619         Allergies as of 07/11/2017 - Khoa as Reviewed 07/11/2017   Allergen Reaction Noted   • Aspirin Anaphylaxis 04/27/2015   • Penicillins Anaphylaxis 04/27/2015        ROS: As per HPI.    /68 mmHg  Pulse 75  Temp(Src) 36.5 °C (97.7 °F)  Resp 16  Ht 1.6 m (5' 3\")  Wt 86.183 kg (190 lb)  BMI 33.67 kg/m2  SpO2 95%  LMP 10/12/1970    Physical Exam:  Gen:         Alert and oriented, No apparent distress.  Neck:        No Lymphadenopathy or Bruits.  Lungs:     Clear to auscultation bilaterally  CV:          Regular rate and rhythm. No murmurs, rubs or gallops.  Abd:         Soft non tender, non distended. Normal active bowel sounds.  No  " Hepatosplenomegaly, No pulsatile masses.                   Ext:          No clubbing, cyanosis, edema. Some stiffness with walking through halls. Patient able to feel monofilament to all areas of feet bilaterally.      Assessment and Plan.   77 y.o. female with the following issues.    1. Medicare annual wellness visit, subsequent  Annual wellness topics discussed, review of chronic medical problems completed    - Annual Wellness Visit - Includes PPPS Subsequent ()    2. Chronic low back pain without sciatica, unspecified back pain laterality  Patient does not appear to be abusing medication and the prescription typically last a few months. She will continue to only use it as needed and may use Tylenol in between. She does not wish to go to pain management or physical therapy. She is not on benzodiazepines.  - oxycodone-acetaminophen (PERCOCET) 7.5-325 MG per tablet; Take 1 Tab by mouth 2 Times a Day.  Dispense: 60 Tab; Refill: 0    3. Primary insomnia  I advised patient not to take 200 mg of trazodone but she has built up some tolerance to 100 mg so I will increase it only 150 mg per night.  - trazodone (DESYREL) 100 MG Tab; Take 1.5 Tabs by mouth 1 time daily as needed for Sleep.  Dispense: 45 Tab; Refill: 11    4. Stenosis of right carotid artery  Patient should have repeat ultrasound in the next year.    5. Dyslipidemia  Patient will do lab work yearly and continue on her statin.    6. Hx pulmonary embolism  Patient on chronic anticoagulation through the Coumadin clinic.    7. Essential hypertension  Blood pressure has remained controlled.    8. CKD (chronic kidney disease) stage 3, GFR 30-59 ml/min  GFR has remained stable.    9. Vitamin D deficiency disease  Patient will continue on vitamin D as long as endocrinology feels necessary.    10. Controlled type 2 diabetes mellitus without complication, without long-term current use of insulin (CMS-[facility])  Patient will be following with endocrinology next month.  She may be developing some neuropathy based on her symptoms.  - Diabetic Monofilament Lower Extremity Exam    11. Gastroesophageal reflux disease without esophagitis  Patient will use her PPI as needed.    12. Postoperative hypothyroidism  TSH levels being evaluated by endocrinology regularly.    13. Chronic anticoagulation  Patient will continue with the Coumadin clinic and has appointment with Dr. phillips.    14. Abdominal aortic aneurysm without rupture (CMS-HCC)  Patient will get abdominal ultrasound as requested by the vascular clinic.    15. Risk for falls    - Patient identified as fall risk.  Appropriate orders and counseling given.

## 2017-07-11 NOTE — MR AVS SNAPSHOT
"        Sanjuanitasaira Johnsonstevie   2017 10:00 AM   Office Visit   MRN: [id number]    Department:  [address] [place] [address]   Dept Phone:  [phone]    Description:  Female : 1940   Provider:  WENDY Baker           Reason for Visit     Follow-Up 3 month       Allergies as of 2017     Allergen Noted Reactions    Aspirin 2015   Anaphylaxis    Penicillins 2015   Anaphylaxis    As a child      You were diagnosed with     Medicare annual wellness visit, subsequent   [008548]       Chronic low back pain without sciatica, unspecified back pain laterality   [6416121]       Primary insomnia   [404148]       Stenosis of right carotid artery   [649415]       Dyslipidemia   [543932]       Hx pulmonary embolism   [872285]       Essential hypertension   [7768010]       CKD (chronic kidney disease) stage 3, GFR 30-59 ml/min   [366469]       Vitamin D deficiency disease   [318298]       Controlled type 2 diabetes mellitus without complication, without long-term current use of insulin (CMS-HCC)   [7337942]       Gastroesophageal reflux disease without esophagitis   [073798]       Postoperative hypothyroidism   [628028]       Chronic anticoagulation   [948061]       Abdominal aortic aneurysm without rupture (CMS-HCC)   [866563]       Risk for falls   [869510]         Vital Signs     Blood Pressure Pulse Temperature Respirations Height Weight    120/68 mmHg 75 36.5 °C (97.7 °F) 16 1.6 m (5' 3\") 86.183 kg (190 lb)    Body Mass Index Oxygen Saturation Last Menstrual Period Smoking Status          33.67 kg/m2 95% 10/12/1970 Former Smoker        Basic Information     Date Of Birth Sex Race Ethnicity Preferred Language    1940 Female White Non- English      Your appointments     Aug 08, 2017  9:40 AM   Follow Up Visit with Michael J Bloch, M.D.   [facility]  (--)    [address] [facility] [address]   [phone]            Aug 08, " 2017 10:00 AM   Established Patient with [facility] EXAM 5   [facility]  (--)    [address]  [place] [address]   [phone]            Oct 10, 2017 10:00 AM   Established Patient with WENDY Baker   [facility] [address]    [address]  [place] [id number] [phone]           You will be receiving a confirmation call a few days before your appointment from our automated call confirmation system.            Oct 11, 2017  9:00 AM   Established Patient with Sundar Albert M.D.   [facility])    [address]  [facility] [address] [phone]           You will be receiving a confirmation call a few days before your appointment from our automated call confirmation system.              Problem List              ICD-10-CM Priority Class Noted - Resolved    Carotid artery stenosis I65.29   10/7/2014 - Present    History of hepatitis B Z86.19   4/28/2015 - Present    Dyslipidemia E78.5   4/28/2015 - Present    Hx pulmonary embolism Z86.711   5/12/2015 - Present    Essential hypertension I10   8/5/2015 - Present    BMI 33.0-33.9,adult Z68.33   9/8/2015 - Present    Risk for falls Z91.81   9/8/2015 - Present    CKD (chronic kidney disease) stage 3, GFR 30-59 ml/min N18.3   11/19/2015 - Present    Vitamin D deficiency disease E55.9   11/24/2015 - Present    Diabetes mellitus type II, controlled (CMS-HCC) E11.9   11/24/2015 - Present    Gastroesophageal reflux disease without esophagitis K21.9   12/3/2015 - Present    Chronic low back pain without sciatica M54.5, G89.29   1/31/2017 - Present    Postoperative hypothyroidism E89.0   1/31/2017 - Present    Chronic anticoagulation Z79.01   1/31/2017 - Present    Abdominal aortic aneurysm without rupture (CMS-HCC) I71.4   4/12/2017 - Present    Primary insomnia F51.01   7/11/2017 - Present      Health Maintenance          Date Due Completion Dates    IMM ZOSTER VACCINE 3/16/2002 ---    IMM PNEUMOCOCCAL 65+ (ADULT) LOW/MEDIUM RISK SERIES (2 of 2 - PCV13) 8/5/2016 8/5/2015    DIABETES MONOFILAMENT / LE EXAM 8/5/2016 8/5/2015 (Done)    Override on 8/5/2015: Done    RETINAL SCREENING 8/12/2016 8/12/2015 (Done)    Override on 8/12/2015: Done    BONE DENSITY 10/7/2016 10/7/2011 (Prv Comp)    Override on 10/7/2011: Previously completed    IMM INFLUENZA (1) 9/1/2017 10/12/2015, 11/1/2012    A1C SCREENING 11/23/2017 5/23/2017, 12/28/2016, 11/16/2016, 8/16/2016, 3/15/2016, 11/17/2015, 4/28/2015, 4/21/2015, 10/14/2014, 3/7/2013    FASTING LIPID PROFILE 5/23/2018 5/23/2017, 1/31/2017, 12/29/2016, 11/16/2016, 8/16/2016, 6/22/2016, 3/15/2016, 11/17/2015, 4/29/2015, 10/14/2014, 3/14/2013    URINE ACR / MICROALBUMIN 5/23/2018 5/23/2017, 3/8/2017, 12/28/2016, 3/16/2016, 11/17/2015, 10/15/2014    SERUM CREATININE 5/23/2018 5/23/2017, 3/7/2017, 1/31/2017, 1/3/2017, 1/2/2017, 1/1/2017, 12/31/2016, 12/30/2016, 12/29/2016, 12/28/2016, 11/16/2016, 10/6/2016, 8/16/2016, 6/22/2016, 5/10/2016, 11/17/2015, 10/12/2015, 10/11/2015, 10/10/2015, 10/9/2015, 10/8/2015, 10/7/2015, 10/6/2015, 4/30/2015, 4/29/2015, 4/28/2015, 4/27/2015, 4/21/2015, 1/31/2015, 10/14/2014, 3/14/2013, 3/13/2013, 3/12/2013, 3/8/2013, 3/7/2013, 3/4/2013, 3/2/2013, 3/1/2013, 2/28/2013, 2/26/2013, 5/19/2009    COLONOSCOPY 10/7/2019 10/7/2009 (Prv Comp)    Override on 10/7/2009: Previously completed    IMM DTaP/Tdap/Td Vaccine (2 - Td) 3/6/2023 3/6/2013            Current Immunizations     HIB Vaccine (ACTHIB/HIBERIX) 3/6/2013 11:45 AM    INFLUENZA VACCINE H1N1 10/12/2016    Influenza TIV (IM) 11/1/2012    Influenza Vaccine Adult HD 10/12/2015  8:46 AM    Meningococcal Polysaccharide Vaccine MPSV4 3/6/2013 12:00 PM    Pneumococcal Vaccine (PCV7) Historical Data 10/12/2016    Pneumococcal Vaccine (UF)Historical Data 12/1/2012    Pneumococcal polysaccharide vaccine (PPSV-23) 8/5/2015    Tdap  Vaccine 3/6/2013 11:45 AM      Below and/or attached are the medications your provider expects you to take. Review all of your home medications and newly ordered medications with your provider and/or pharmacist. Follow medication instructions as directed by your provider and/or pharmacist. Please keep your medication list with you and share with your provider. Update the information when medications are discontinued, doses are changed, or new medications (including over-the-counter products) are added; and carry medication information at all times in the event of emergency situations     Allergies:  ASPIRIN - Anaphylaxis     PENICILLINS - Anaphylaxis               Medications  Valid as of: July 11, 2017 - 10:17 AM    Generic Name Brand Name Tablet Size Instructions for use    AmLODIPine Besylate (Tab) NORVASC 10 MG TAKE ONE TABLET BY MOUTH ONCE DAILY ** GENERIC FOR NORVASC        Atorvastatin Calcium (Tab) LIPITOR 80 MG Take 0.5 Tabs by mouth every evening.        Docusate Sodium (Cap)  MG Take 100 mg by mouth every morning.        Ergocalciferol (Cap) DRISDOL 90377 UNITS Take 1 Cap by mouth every 7 days.        Glimepiride (Tab) AMARYL 2 MG TAKE 1 TABLET BY MOUTH EACH MORNING ** GENERIC FOR AMARYL        Lancets (Misc) MICROLET LANCETS  TEST BLOOD SUGAR TWO TIMES DAILY        Levothyroxine Sodium (Tab) SYNTHROID 150 MCG Take 1 Tab by mouth Every morning on an empty stomach.        Losartan Potassium (Tab) COZAAR 50 MG Take daily        Losartan Potassium (Tab) COZAAR 50 MG TAKE ONE TABLET BY MOUTH EVERY DAY        MetFORMIN HCl (Tab) GLUCOPHAGE 500 MG TAKE 2 TABLETS BY MOUTH EACH MORNING WITH  BREAKFAST AND TAKE 1 TABLET EACH EVENING   WITH DINNER        Omeprazole (CAPSULE DELAYED RELEASE) PRILOSEC 20 MG Take 1 Cap by mouth every day.        Oxycodone-Acetaminophen (Tab) PERCOCET 7.5-325 MG Take 1 Tab by mouth 2 Times a Day.        Oyster Shell (Tab) OS-MARKELL 500 500 MG Take 1 Tab by mouth 2 times a day, with  meals.        TraZODone HCl (Tab) DESYREL 100 MG Take 1.5 Tabs by mouth 1 time daily as needed for Sleep.        Warfarin Sodium (Tab) COUMADIN 5 MG Take one to one and one-half (1-1.5) tablets daily as directed by [facility]        .                 Medicines prescribed today were sent to:     [id number] - [place], [place] - [address] [place] [address]    Phone: [phone] Fax: [phone]    Open 24 Hours?: No      Medication refill instructions:       If your prescription bottle indicates you have medication refills left, it is not necessary to call your provider’s office. Please contact your pharmacy and they will refill your medication.    If your prescription bottle indicates you do not have any refills left, you may request refills at any time through one of the following ways: The online [facility] system (except Urgent Care), by calling your provider’s office, or by asking your pharmacy to contact your provider’s office with a refill request. Medication refills are processed only during regular business hours and may not be available until the next business day. Your provider may request additional information or to have a follow-up visit with you prior to refilling your medication.   *Please Note: Medication refills are assigned a new Rx number when refilled electronically. Your pharmacy may indicate that no refills were authorized even though a new prescription for the same medication is available at the pharmacy. Please request the medicine by name with the pharmacy before contacting your provider for a refill.           Scutumhart Status: Patient Declined

## 2017-08-08 ENCOUNTER — OFFICE VISIT (OUTPATIENT)
Dept: VASCULAR LAB | Facility: MEDICAL CENTER | Age: 77
End: 2017-08-08
Attending: INTERNAL MEDICINE
Payer: MEDICARE

## 2017-08-08 VITALS
DIASTOLIC BLOOD PRESSURE: 49 MMHG | HEART RATE: 93 BPM | WEIGHT: 193 LBS | BODY MASS INDEX: 34.2 KG/M2 | HEIGHT: 63 IN | SYSTOLIC BLOOD PRESSURE: 98 MMHG

## 2017-08-08 DIAGNOSIS — I71.40 ABDOMINAL AORTIC ANEURYSM (AAA) WITHOUT RUPTURE (HCC): ICD-10-CM

## 2017-08-08 DIAGNOSIS — N18.3 CKD (CHRONIC KIDNEY DISEASE), STAGE 3 (MODERATE): ICD-10-CM

## 2017-08-08 DIAGNOSIS — E03.9 HYPOTHYROIDISM, UNSPECIFIED TYPE: ICD-10-CM

## 2017-08-08 DIAGNOSIS — E11.9 CONTROLLED TYPE 2 DIABETES MELLITUS WITHOUT COMPLICATION, WITHOUT LONG-TERM CURRENT USE OF INSULIN (HCC): ICD-10-CM

## 2017-08-08 DIAGNOSIS — Z86.711 HX PULMONARY EMBOLISM: ICD-10-CM

## 2017-08-08 DIAGNOSIS — E78.5 DYSLIPIDEMIA: ICD-10-CM

## 2017-08-08 LAB — INR PPP: 2.2 (ref 2–3.5)

## 2017-08-08 PROCEDURE — 85610 PROTHROMBIN TIME: CPT

## 2017-08-08 PROCEDURE — 99214 OFFICE O/P EST MOD 30 MIN: CPT | Performed by: INTERNAL MEDICINE

## 2017-08-08 PROCEDURE — 99211 OFF/OP EST MAY X REQ PHY/QHP: CPT

## 2017-08-08 ASSESSMENT — ENCOUNTER SYMPTOMS
BRUISES/BLEEDS EASILY: 0
PALPITATIONS: 0
HEADACHES: 0
SPEECH CHANGE: 0
DEPRESSION: 1
SHORTNESS OF BREATH: 0
FOCAL WEAKNESS: 0
WEIGHT LOSS: 0
DIZZINESS: 0
WHEEZING: 0
NERVOUS/ANXIOUS: 0
CLAUDICATION: 0
COUGH: 0
HEMOPTYSIS: 0
FALLS: 1
MYALGIAS: 0
BACK PAIN: 1

## 2017-08-08 NOTE — PROGRESS NOTES
Anticoagulation Summary as of 8/8/2017     INR goal 2.0-3.0   Selected INR 2.2 (8/8/2017)   Maintenance plan 5 mg (5 mg x 1) on Wed, Fri; 7.5 mg (5 mg x 1.5) all other days   Weekly total 47.5 mg   No change documented Ailyn Kaplan   Plan last modified YUE BrennanPSTEFAN (4/11/2017)   Next INR check 9/19/2017   Target end date Indefinite    Indications   DVT (deep venous thrombosis)  left (Resolved) [I82.402]  Hx pulmonary embolism [Z86.711]         Anticoagulation Episode Summary     INR check location     Preferred lab     Send INR reminders to     Comments       Anticoagulation Care Providers     Provider Role Specialty Phone number    WENDY Baker Referring Family Medicine [phone]    [facility] Responsible  [phone]        Anticoagulation Patient Findings   Negatives Missed Doses, Extra Doses, Medication Changes, Antibiotic Use, Diet Changes, Dental/Other Procedures, Hospitalization, Bleeding Gums, Nose Bleeds, Blood in Urine, Blood in Stool, Any Bruising, Other Complaints        Patient seen in clinic today, and INR was therapeutic at 2.2.  Confirmed the current warfarin dosing regimen and patient compliance. Patient denies any interval changes to diet and/or medications. Patient denies any signs/symptoms of bleeding or clotting. Patient will continue with the current warfarin dosing regimen, and will follow up again in 6 weeks.    Next appt:  Tuesday, Sept 19, 2017  10am    Edelmira BaltazarD

## 2017-08-08 NOTE — PROGRESS NOTES
"  FOLLOW-UP VASCULAR VISIT  Subjective:   Sanjuanita Ssoa is a 75 y.o. female who presents today 8/8/2017 for   Chief Complaint   Patient presents with   • Amb Vascular Reason For Visit     HPI:     Patient here for f/u of AAA, PE, anticoagulation, htn, and dyslipidemia  Has been struggling with insomnia - seeing PCP  No further falls.  Denies lightheadedness  On atorvastatin - 1/2 tablet  Not checking BP at home  Good energy on her current thyroid medications  No change in mild leg swelling.   FS usually around 100 - has had a couple of relative lows  No hypoglycemic epsides  No bleeding on warfarin - following up regularly in warfarin clinic  Has increased to 7-10 cigs daily  No f/u with nephrology as far as she knows  No nsaids    Social History   Substance Use Topics   • Smoking status: Former Smoker -- 0.50 packs/day for 40 years     Types: Cigarettes     Quit date: 02/27/2013   • Smokeless tobacco: Never Used   • Alcohol Use: No     DIET AND EXERCISE:  Weight Change: stable  Diet: reasonable Diabetic diet  Exercise: minimal exercise due to back pain    Review of Systems   Constitutional: Positive for malaise/fatigue. Negative for weight loss.   Respiratory: Negative for cough, hemoptysis, shortness of breath and wheezing.    Cardiovascular: Positive for leg swelling. Negative for chest pain, palpitations and claudication.   Musculoskeletal: Positive for back pain, joint pain and falls. Negative for myalgias.   Neurological: Negative for dizziness, speech change, focal weakness and headaches.   Endo/Heme/Allergies: Does not bruise/bleed easily.   Psychiatric/Behavioral: Positive for depression. The patient is not nervous/anxious.       Objective:     Filed Vitals:    08/08/17 0927 08/08/17 0930   BP: 118/37 98/49   Pulse: 89 93   Height: 1.6 m (5' 3\")    Weight: 87.544 kg (193 lb)       Body mass index is 34.2 kg/(m^2).  Physical Exam   Constitutional: She is oriented to person, place, and time. No " distress.   Cardiovascular: Normal rate and regular rhythm.    Murmur heard.  Pulmonary/Chest: Effort normal and breath sounds normal. No respiratory distress. She has no wheezes. She has no rales.   Musculoskeletal: Normal range of motion. She exhibits no edema.   Neurological: She is alert and oriented to person, place, and time. No cranial nerve deficit. Coordination normal.   Bit of a wide spread unsteady gait   Skin: She is not diaphoretic.   Psychiatric: Her speech is normal and behavior is normal. Thought content normal. She expresses no suicidal ideation.     Lab Results   Component Value Date    CHOLSTRLTOT 164 05/23/2017    LDL 78 05/23/2017    HDL 55 05/23/2017    TRIGLYCERIDE 157* 05/23/2017      Lab Results   Component Value Date    PROTHROMBTM 17.2* 12/28/2016    INR 2.2 08/08/2017       Lab Results   Component Value Date    HBA1C 6.4* 05/23/2017      Lab Results   Component Value Date    SODIUM 139 05/23/2017    POTASSIUM 4.7 05/23/2017    CHLORIDE 106 05/23/2017    CO2 27 05/23/2017    GLUCOSE 84 05/23/2017    BUN 27* 05/23/2017    CREATININE 1.11 05/23/2017    IFAFRICA 58* 05/23/2017    IFNOTAFR 48* 05/23/2017        Lab Results   Component Value Date    WBC 10.3 05/23/2017    RBC 4.31 05/23/2017    HEMOGLOBIN 12.7 05/23/2017    HEMATOCRIT 39.0 05/23/2017    MCV 90.5 05/23/2017    MCH 29.5 05/23/2017    MCHC 32.6* 05/23/2017    MPV 9.4 05/23/2017     CT chest 4/27/2015  There are pulmonary emboli extending into the right upper lobe, right middle lobe, right lower lobe, and left lower lobe. The main pulmonary artery is of normal caliber. No shift of the intraventricular septum or reflux of contrast into the hepatic veins. There are scattered arterial calcifications. No significant pericardial effusion.  There is mild left basilar atelectasis. No significant pleural fluid. The central airways are clear.  No adenopathy is identified.  Limited visualization of the upper abdomen demonstrates severe  atherosclerotic disease.    echo 4/28/2015  Technically difficult study.   Normal left ventricular size and function.  Left ventricular ejection fraction is 60% to 65%.  Grade I diastolic dysfunction - mitral inflow E/A is <1.0.  Mild mitral regurgitation.  Mild aortic stenosis.  Mild to moderate aortic insufficiency.  Moderate tricuspid regurgitation.  Right ventricular systolic pressure is estimated to be 43-48 mmHg.  No prior study is available for comparison.      CT scan chest abdomen and pelvis 10/2015   Thoracic abdominal aortic penetrating atheromatous ulcer without evidence for intramural hematoma or dissection.   Exclusion of early dissection or intramural hematoma (although no evidence for this entity) is not possible on this contrast only study.  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  Ventral hernia containing small bowel and without evidence for obstruction or inflammation    CTA 5/17/2016  1. Considerable partially ulcerated plaque within the aorta similar to previous findings. No dissection. No mediastinal hematoma.  2. The abdominal aorta measures up to 2.6 cm in diameter. No change compared with previous. No retroperitoneal hematoma.  3. High-grade stenosis celiac artery.  4. Atelectasis within both lungs and tiny right apical pleural-based nodule unchanged.  5. Surgical absent spleen. Small splenules and chronic loculated fluid collection left upper quadrant again demonstrated.  6. Diverticula colon without evidence of diverticulitis.  7. Ventral abdominal wall hernia containing nonobstructed small bowel loops.  8. Large and small bowel incompletely imaged. No free fluid within the abdomen identified.  9. Partial resection pancreas.  10. Tiny  gallstones.  11. Coronary artery calcifications.  12. 1.3 cm left lobe thyroid nodule. Ultrasound followup is consideration.    U/S Aorta Dec 2016:  1.  Fusiform infrarenal abdominal aortic aneurysm measures 2.9 x 2.9 cm in maximum axial dimensions and is located at the mid aortic level. Measurement on prior CT was 2.6 cm.  2.  Extensive aortic atherosclerosis.    Medical Decision Making:  Today's Assessment / Status / Plan:     Patient Type: Secondary Prevention    Etiology of Established CVD if Present:   1.  Ulceration thoracic aorta - not necessarily penetrating - stable on serial imaging.  2.  AAA, small  3.  DVT and PE      Lipid Management: Qualifies for Statin Therapy Based on 2013 ACC/AHA Guidelines: yes  Calculated 10-Year Risk of ASCVD: N/A  Currently on Statin: Yes  Excellent control on most recent blood work (may)  -continue decreased atorvastatin to 40 mg daily (1/2 tab)  -Report any myalgias immediately  -recheck fasting lipids prior to next visit    Blood Pressure Management:Goal: JNC8 (2013) Office BP Goal:<140/90; Under Control: yes  Under excellent control at least in office  Can't afford home monitor at present  Has CKD but stable or improved  Does not appear to be on hctz at present  Mild leg swelling on amlodipine, but tolerable  -Continue Amlodipine 10 mg daily  -Continue losartan 50 mg daily  - if BP remains low consider decrease in dose of amlodipine or even discontinuation at next visit  - if any degree of increased albuminuria would consider increase in losartan to 100 mg daily    Glycemic Status: Diabetic-  last A1c under excellent control  -Continue metformin for now, but may need to consider d/c if GFR falls further  -continue glimiperide for now, but decrease dose or stop if a1c well controlled next visit  -Continue to follow fasting blood sugars at home - call if hypoglycemic episodes  -Continue TLC  -Yearly flu shot, eye exam - reminded patient  - recheck A1C prior to next  visit    Anti-Platelet/Anti-Coagulant Tx:   Unable to afford pradaxa or xarelto  Patient in agreement that long term benefit of anticoag likely outweighs risk  - continue indefinite anticoagulation with warfarin  - follow up in coag clinic    Smoking:  Has had significant recidivism.  Unwilling to consider quit date at present.  Will continue to address at each visit    Physical Activity: encouraged exercise class 2-3 x per week    Weight Management and Nutrition: Dietary plan was discussed with patient at this visit including low fats and carb, diabetic diet    Other:     1. Chronic renal disease stage G3B in past with modest improvement to G3A on most recent blood work. No albuminuria (A0) in past. No longer seeing nephrology., Continue ARB and BP control. Check GFR, urine for ma, pth, vit D, and CBC prior to next visit.   2.  Hypothyroidism with h/o papillary thyroid cancer. On levothyroxien - dose adjusted by PCP. Recheck TSH.  Otherwise Will defer all management to pcp  3.  Ulceration, thoracic aorta - stable on surveillance imaging.  Continue medical management  4.  AAA, small and stable on serial imaging - continue medical management and surveillance - will repeat u/s every 2-3 years - next end of 2018 or early 2019    Instructed to follow-up with PCP for remainder of adult medical needs: yes  We will partner with other providers in the management of established vascular disease and cardiometabolic risk factors.    Studies to Be Obtained: Abdominal aortic ultrasound 2-3 years  Labs to Be Obtained:  As above prior to next visit    Follow up in: 3 months    Michael J Bloch, M.D.     CC:   Osmin Armendariz

## 2017-08-08 NOTE — MR AVS SNAPSHOT
Sanjuanitasaira Johnsonstevie   2017 10:00 AM   Anticoagulation Visit   MRN: [id number]    Department:  Vascular Medicine   Dept Phone:  [phone]    Description:  Female : 1940   Provider:  [facility] EXAM 5           Allergies as of 2017     Allergen Noted Reactions    Aspirin 2015   Anaphylaxis    Penicillins 2015   Anaphylaxis    As a child      You were diagnosed with     Hx pulmonary embolism   [[id number]]         Vital Signs     Last Menstrual Period Smoking Status                10/12/1970 Former Smoker          Basic Information     Date Of Birth Sex Race Ethnicity Preferred Language    1940 Female White Non- English      Your appointments     Aug 08, 2017  9:40 AM   Follow Up Visit with Michael J Bloch, M.D.   [facility]  (--)    [address] [facility] [address]   [phone]            Aug 08, 2017 10:00 AM   Established Patient with [facility] EXAM 5   [facility]  (--)    [address] [facility] [address]   [phone]            Sep 19, 2017 10:00 AM   Established Patient with [facility] EXAM 4   [facility]  (--)    [address] [facility] [address]   [phone]            Oct 10, 2017 10:00 AM   Established Patient with WENDY Baker   [facility] [address]    [address]  [address]  [facility] [id number] [phone]           You will be receiving a confirmation call a few days before your appointment from our automated call confirmation system.            Oct 11, 2017  9:00 AM   Established Patient with Sundar Albert M.D.   [facility]    [address]  [facility] [address] [phone]           You will be receiving a confirmation call a few days before your appointment from our automated call confirmation  system.              Problem List              ICD-10-CM Priority Class Noted - Resolved    Carotid artery stenosis I65.29   10/7/2014 - Present    Dyslipidemia E78.5   4/28/2015 - Present    Hx pulmonary embolism Z86.711   5/12/2015 - Present    Essential hypertension I10   8/5/2015 - Present    BMI 33.0-33.9,adult Z68.33   9/8/2015 - Present    Risk for falls Z91.81   9/8/2015 - Present    CKD (chronic kidney disease) stage 3, GFR 30-59 ml/min N18.3   11/19/2015 - Present    Vitamin D deficiency disease E55.9   11/24/2015 - Present    Diabetes mellitus type II, controlled (CMS-HCC) E11.9   11/24/2015 - Present    Gastroesophageal reflux disease without esophagitis K21.9   12/3/2015 - Present    Chronic low back pain without sciatica M54.5, G89.29   1/31/2017 - Present    Postoperative hypothyroidism E89.0   1/31/2017 - Present    Chronic anticoagulation Z79.01   1/31/2017 - Present    Abdominal aortic aneurysm without rupture (CMS-HCC) I71.4   4/12/2017 - Present    Primary insomnia F51.01   7/11/2017 - Present      Health Maintenance        Date Due Completion Dates    IMM ZOSTER VACCINE 3/16/2002 ---    IMM PNEUMOCOCCAL 65+ (ADULT) LOW/MEDIUM RISK SERIES (2 of 2 - PCV13) 8/5/2016 8/5/2015    RETINAL SCREENING 8/12/2016 8/12/2015 (Done)    Override on 8/12/2015: Done    BONE DENSITY 10/7/2016 10/7/2011 (Prv Comp)    Override on 10/7/2011: Previously completed    IMM INFLUENZA (1) 9/1/2017 10/12/2015, 11/1/2012    A1C SCREENING 11/23/2017 5/23/2017, 12/28/2016, 11/16/2016, 8/16/2016, 3/15/2016, 11/17/2015, 4/28/2015, 4/21/2015, 10/14/2014, 3/7/2013    FASTING LIPID PROFILE 5/23/2018 5/23/2017, 1/31/2017, 12/29/2016, 11/16/2016, 8/16/2016, 6/22/2016, 3/15/2016, 11/17/2015, 4/29/2015, 10/14/2014, 3/14/2013    URINE ACR / MICROALBUMIN 5/23/2018 5/23/2017, 3/8/2017, 12/28/2016, 3/16/2016, 11/17/2015, 10/15/2014    SERUM CREATININE 5/23/2018 5/23/2017, 3/7/2017, 1/31/2017, 1/3/2017, 1/2/2017, 1/1/2017, 12/31/2016,  12/30/2016, 12/29/2016, 12/28/2016, 11/16/2016, 10/6/2016, 8/16/2016, 6/22/2016, 5/10/2016, 11/17/2015, 10/12/2015, 10/11/2015, 10/10/2015, 10/9/2015, 10/8/2015, 10/7/2015, 10/6/2015, 4/30/2015, 4/29/2015, 4/28/2015, 4/27/2015, 4/21/2015, 1/31/2015, 10/14/2014, 3/14/2013, 3/13/2013, 3/12/2013, 3/8/2013, 3/7/2013, 3/4/2013, 3/2/2013, 3/1/2013, 2/28/2013, 2/26/2013, 5/19/2009    DIABETES MONOFILAMENT / LE EXAM 7/11/2018 7/11/2017, 8/5/2015 (Done)    Override on 8/5/2015: Done    COLONOSCOPY 10/7/2019 10/7/2009 (Prv Comp)    Override on 10/7/2009: Previously completed    IMM DTaP/Tdap/Td Vaccine (2 - Td) 3/6/2023 3/6/2013            Results     POCT Protime      Component    INR    2.2                        Current Immunizations     HIB Vaccine (ACTHIB/HIBERIX) 3/6/2013 11:45 AM    INFLUENZA VACCINE H1N1 10/12/2016    Influenza TIV (IM) 11/1/2012    Influenza Vaccine Adult HD 10/12/2015  8:46 AM    Meningococcal Polysaccharide Vaccine MPSV4 3/6/2013 12:00 PM    Pneumococcal Vaccine (PCV7) Historical Data 10/12/2016    Pneumococcal Vaccine (UF)Historical Data 12/1/2012    Pneumococcal polysaccharide vaccine (PPSV-23) 8/5/2015    Tdap Vaccine 3/6/2013 11:45 AM      Below and/or attached are the medications your provider expects you to take. Review all of your home medications and newly ordered medications with your provider and/or pharmacist. Follow medication instructions as directed by your provider and/or pharmacist. Please keep your medication list with you and share with your provider. Update the information when medications are discontinued, doses are changed, or new medications (including over-the-counter products) are added; and carry medication information at all times in the event of emergency situations     Allergies:  ASPIRIN - Anaphylaxis     PENICILLINS - Anaphylaxis               Medications  Valid as of: August 08, 2017 -  9:17 AM    Generic Name Brand Name Tablet Size Instructions for use    AmLODIPine  Besylate (Tab) NORVASC 10 MG TAKE ONE TABLET BY MOUTH ONCE DAILY ** GENERIC FOR NORVASC        Atorvastatin Calcium (Tab) LIPITOR 80 MG Take 0.5 Tabs by mouth every evening.        Docusate Sodium (Cap)  MG Take 100 mg by mouth every morning.        Ergocalciferol (Cap) DRISDOL 53804 UNITS Take 1 Cap by mouth every 7 days.        Glimepiride (Tab) AMARYL 2 MG TAKE 1 TABLET BY MOUTH EACH MORNING ** GENERIC FOR AMARYL        Lancets (Misc) MICROLET LANCETS  TEST BLOOD SUGAR TWO TIMES DAILY        Levothyroxine Sodium (Tab) SYNTHROID 150 MCG Take 1 Tab by mouth Every morning on an empty stomach.        Losartan Potassium (Tab) COZAAR 50 MG Take daily        Losartan Potassium (Tab) COZAAR 50 MG TAKE ONE TABLET BY MOUTH EVERY DAY        MetFORMIN HCl (Tab) GLUCOPHAGE 500 MG TAKE 2 TABLETS BY MOUTH EACH MORNING WITH  BREAKFAST AND TAKE 1 TABLET EACH EVENING   WITH DINNER        Omeprazole (CAPSULE DELAYED RELEASE) PRILOSEC 20 MG Take 1 Cap by mouth every day.        Oxycodone-Acetaminophen (Tab) PERCOCET 7.5-325 MG Take 1 Tab by mouth 2 Times a Day.        Oyster Shell (Tab) OS-MARKELL 500 500 MG Take 1 Tab by mouth 2 times a day, with meals.        TraZODone HCl (Tab) DESYREL 100 MG Take 1.5 Tabs by mouth 1 time daily as needed for Sleep.        Warfarin Sodium (Tab) COUMADIN 5 MG Take one to one and one-half (1-1.5) tablets daily as directed by [facility]        .                 Medicines prescribed today were sent to:     [id number] - [place], [place] - [address] [address]    Phone: [phone] Fax: [phone]    Open 24 Hours?: No      Medication refill instructions:       If your prescription bottle indicates you have medication refills left, it is not necessary to call your provider’s office. Please contact your pharmacy and they will refill your medication.    If your prescription bottle indicates you do not have any refills left, you may  request refills at any time through one of the following ways: The online [employer] system (except Urgent Care), by calling your provider’s office, or by asking your pharmacy to contact your provider’s office with a refill request. Medication refills are processed only during regular business hours and may not be available until the next business day. Your provider may request additional information or to have a follow-up visit with you prior to refilling your medication.   *Please Note: Medication refills are assigned a new Rx number when refilled electronically. Your pharmacy may indicate that no refills were authorized even though a new prescription for the same medication is available at the pharmacy. Please request the medicine by name with the pharmacy before contacting your provider for a refill.        Warfarin Dosing Calendar August 2017 Details    Sun Mon Tue Wed Thu Fri Sat       1               2               3               4               5                 6               7               8   2.2   7.5 mg   See details      9      5 mg         10      7.5 mg         11      5 mg         12      7.5 mg           13      7.5 mg         14      7.5 mg         15      7.5 mg         16      5 mg         17      7.5 mg         18      5 mg         19      7.5 mg           20      7.5 mg         21      7.5 mg         22      7.5 mg         23      5 mg         24      7.5 mg         25      5 mg         26      7.5 mg           27      7.5 mg         28      7.5 mg         29      7.5 mg         30      5 mg         31      7.5 mg            Date Details   08/08 This INR check   INR: 2.2               How to take your warfarin dose     To take:  5 mg Take 1 of the 5 mg tablets.    To take:  7.5 mg Take 1.5 of the 5 mg tablets.           Warfarin Dosing Calendar September 2017 Details    Sun Mon Tue Wed Thu Fri Sat          1      5 mg         2      7.5 mg           3      7.5 mg         4      7.5 mg           5      7.5 mg         6      5 mg         7      7.5 mg         8      5 mg         9      7.5 mg           10      7.5 mg         11      7.5 mg         12      7.5 mg         13      5 mg         14      7.5 mg         15      5 mg         16      7.5 mg           17      7.5 mg         18      7.5 mg         19      7.5 mg         20               21               22               23                 24               25               26               27               28               29               30                Date Details   No additional details    Date of next INR:  9/19/2017         How to take your warfarin dose     To take:  5 mg Take 1 of the 5 mg tablets.    To take:  7.5 mg Take 1.5 of the 5 mg tablets.              MyChart Status: Patient Declined

## 2017-08-08 NOTE — MR AVS SNAPSHOT
Sanjuanitasaira Johnsonstevie   2017 9:40 AM   Appointment   MRN: [id number]    Department:  Vascular Medicine   Dept Phone:  [phone]    Description:  Female : 1940   Provider:  Michael J Bloch, M.D.           Allergies as of 2017     Allergen Noted Reactions    Aspirin 2015   Anaphylaxis    Penicillins 2015   Anaphylaxis    As a child      Vital Signs     Last Menstrual Period Smoking Status                10/12/1970 Former Smoker          Basic Information     Date Of Birth Sex Race Ethnicity Preferred Language    1940 Female White Non- English      Your appointments     Aug 08, 2017  9:40 AM   Follow Up Visit with Michael J Bloch, M.D.   [facility]  (--)    [address] [facility] [place] [address]   [phone]            Aug 08, 2017 10:00 AM   Established Patient with IHVH EXAM 5   [facility]  (--)    [address] [facility] [place] [address]   [phone]            Sep 19, 2017 10:00 AM   Established Patient with IHVH EXAM 4   [facility]  (--)    [address] [facility] [place] [address]   [phone]            Oct 10, 2017 10:00 AM   Established Patient with WENDY Baker   [facility] [address]    [address]  [address]  [facility] [address] [phone]           You will be receiving a confirmation call a few days before your appointment from our automated call confirmation system.            Oct 11, 2017  9:00 AM   Established Patient with Sundar Albert M.D.   [facility])    [address]  [facility] [address] [phone]           You will be receiving a confirmation call a few days before your appointment from our automated call confirmation system.              Problem List              ICD-10-CM Priority Class  Noted - Resolved    Carotid artery stenosis I65.29   10/7/2014 - Present    Dyslipidemia E78.5   4/28/2015 - Present    Hx pulmonary embolism Z86.711   5/12/2015 - Present    Essential hypertension I10   8/5/2015 - Present    BMI 33.0-33.9,adult Z68.33   9/8/2015 - Present    Risk for falls Z91.81   9/8/2015 - Present    CKD (chronic kidney disease) stage 3, GFR 30-59 ml/min N18.3   11/19/2015 - Present    Vitamin D deficiency disease E55.9   11/24/2015 - Present    Diabetes mellitus type II, controlled (CMS-HCC) E11.9   11/24/2015 - Present    Gastroesophageal reflux disease without esophagitis K21.9   12/3/2015 - Present    Chronic low back pain without sciatica M54.5, G89.29   1/31/2017 - Present    Postoperative hypothyroidism E89.0   1/31/2017 - Present    Chronic anticoagulation Z79.01   1/31/2017 - Present    Abdominal aortic aneurysm without rupture (CMS-HCC) I71.4   4/12/2017 - Present    Primary insomnia F51.01   7/11/2017 - Present      Health Maintenance        Date Due Completion Dates    IMM ZOSTER VACCINE 3/16/2002 ---    IMM PNEUMOCOCCAL 65+ (ADULT) LOW/MEDIUM RISK SERIES (2 of 2 - PCV13) 8/5/2016 8/5/2015    RETINAL SCREENING 8/12/2016 8/12/2015 (Done)    Override on 8/12/2015: Done    BONE DENSITY 10/7/2016 10/7/2011 (Prv Comp)    Override on 10/7/2011: Previously completed    IMM INFLUENZA (1) 9/1/2017 10/12/2015, 11/1/2012    A1C SCREENING 11/23/2017 5/23/2017, 12/28/2016, 11/16/2016, 8/16/2016, 3/15/2016, 11/17/2015, 4/28/2015, 4/21/2015, 10/14/2014, 3/7/2013    FASTING LIPID PROFILE 5/23/2018 5/23/2017, 1/31/2017, 12/29/2016, 11/16/2016, 8/16/2016, 6/22/2016, 3/15/2016, 11/17/2015, 4/29/2015, 10/14/2014, 3/14/2013    URINE ACR / MICROALBUMIN 5/23/2018 5/23/2017, 3/8/2017, 12/28/2016, 3/16/2016, 11/17/2015, 10/15/2014    SERUM CREATININE 5/23/2018 5/23/2017, 3/7/2017, 1/31/2017, 1/3/2017, 1/2/2017, 1/1/2017, 12/31/2016, 12/30/2016, 12/29/2016, 12/28/2016, 11/16/2016, 10/6/2016, 8/16/2016,  6/22/2016, 5/10/2016, 11/17/2015, 10/12/2015, 10/11/2015, 10/10/2015, 10/9/2015, 10/8/2015, 10/7/2015, 10/6/2015, 4/30/2015, 4/29/2015, 4/28/2015, 4/27/2015, 4/21/2015, 1/31/2015, 10/14/2014, 3/14/2013, 3/13/2013, 3/12/2013, 3/8/2013, 3/7/2013, 3/4/2013, 3/2/2013, 3/1/2013, 2/28/2013, 2/26/2013, 5/19/2009    DIABETES MONOFILAMENT / LE EXAM 7/11/2018 7/11/2017, 8/5/2015 (Done)    Override on 8/5/2015: Done    COLONOSCOPY 10/7/2019 10/7/2009 (Prv Comp)    Override on 10/7/2009: Previously completed    IMM DTaP/Tdap/Td Vaccine (2 - Td) 3/6/2023 3/6/2013            Current Immunizations     HIB Vaccine (ACTHIB/HIBERIX) 3/6/2013 11:45 AM    INFLUENZA VACCINE H1N1 10/12/2016    Influenza TIV (IM) 11/1/2012    Influenza Vaccine Adult HD 10/12/2015  8:46 AM    Meningococcal Polysaccharide Vaccine MPSV4 3/6/2013 12:00 PM    Pneumococcal Vaccine (PCV7) Historical Data 10/12/2016    Pneumococcal Vaccine (UF)Historical Data 12/1/2012    Pneumococcal polysaccharide vaccine (PPSV-23) 8/5/2015    Tdap Vaccine 3/6/2013 11:45 AM      Below and/or attached are the medications your provider expects you to take. Review all of your home medications and newly ordered medications with your provider and/or pharmacist. Follow medication instructions as directed by your provider and/or pharmacist. Please keep your medication list with you and share with your provider. Update the information when medications are discontinued, doses are changed, or new medications (including over-the-counter products) are added; and carry medication information at all times in the event of emergency situations     Allergies:  ASPIRIN - Anaphylaxis     PENICILLINS - Anaphylaxis               Medications  Valid as of: August 08, 2017 -  9:16 AM    Generic Name Brand Name Tablet Size Instructions for use    AmLODIPine Besylate (Tab) NORVASC 10 MG TAKE ONE TABLET BY MOUTH ONCE DAILY ** GENERIC FOR NORVASC        Atorvastatin Calcium (Tab) LIPITOR 80 MG Take 0.5  Tabs by mouth every evening.        Docusate Sodium (Cap)  MG Take 100 mg by mouth every morning.        Ergocalciferol (Cap) DRISDOL 12014 UNITS Take 1 Cap by mouth every 7 days.        Glimepiride (Tab) AMARYL 2 MG TAKE 1 TABLET BY MOUTH EACH MORNING ** GENERIC FOR AMARYL        Lancets (Misc) MICROLET LANCETS  TEST BLOOD SUGAR TWO TIMES DAILY        Levothyroxine Sodium (Tab) SYNTHROID 150 MCG Take 1 Tab by mouth Every morning on an empty stomach.        Losartan Potassium (Tab) COZAAR 50 MG Take daily        Losartan Potassium (Tab) COZAAR 50 MG TAKE ONE TABLET BY MOUTH EVERY DAY        MetFORMIN HCl (Tab) GLUCOPHAGE 500 MG TAKE 2 TABLETS BY MOUTH EACH MORNING WITH  BREAKFAST AND TAKE 1 TABLET EACH EVENING   WITH DINNER        Omeprazole (CAPSULE DELAYED RELEASE) PRILOSEC 20 MG Take 1 Cap by mouth every day.        Oxycodone-Acetaminophen (Tab) PERCOCET 7.5-325 MG Take 1 Tab by mouth 2 Times a Day.        Oyster Shell (Tab) OS-MARKELL 500 500 MG Take 1 Tab by mouth 2 times a day, with meals.        TraZODone HCl (Tab) DESYREL 100 MG Take 1.5 Tabs by mouth 1 time daily as needed for Sleep.        Warfarin Sodium (Tab) COUMADIN 5 MG Take one to one and one-half (1-1.5) tablets daily as directed by [facility]        .                 Medicines prescribed today were sent to:     [id number] - [place], [place] - [address] [address]    Phone: [phone] Fax: [phone]    Open 24 Hours?: No      Medication refill instructions:       If your prescription bottle indicates you have medication refills left, it is not necessary to call your provider’s office. Please contact your pharmacy and they will refill your medication.    If your prescription bottle indicates you do not have any refills left, you may request refills at any time through one of the following ways: The online [employer] system (except Urgent Care), by calling your provider’s office, or by  asking your pharmacy to contact your provider’s office with a refill request. Medication refills are processed only during regular business hours and may not be available until the next business day. Your provider may request additional information or to have a follow-up visit with you prior to refilling your medication.   *Please Note: Medication refills are assigned a new Rx number when refilled electronically. Your pharmacy may indicate that no refills were authorized even though a new prescription for the same medication is available at the pharmacy. Please request the medicine by name with the pharmacy before contacting your provider for a refill.           MyChart Status: Patient Declined

## 2017-08-16 RX ORDER — GLIMEPIRIDE 2 MG/1
TABLET ORAL
Qty: 30 TAB | Refills: 11 | Status: SHIPPED | OUTPATIENT
Start: 2017-08-16 | End: 2018-05-07 | Stop reason: SDUPTHER

## 2017-08-18 LAB — INR BLD: 2.2 (ref 0.9–1.2)

## 2017-08-22 ENCOUNTER — HOSPITAL ENCOUNTER (OUTPATIENT)
Dept: RADIOLOGY | Facility: MEDICAL CENTER | Age: 77
End: 2017-08-22
Attending: NURSE PRACTITIONER
Payer: MEDICARE

## 2017-08-22 ENCOUNTER — OFFICE VISIT (OUTPATIENT)
Dept: MEDICAL GROUP | Facility: MEDICAL CENTER | Age: 77
End: 2017-08-22
Payer: MEDICARE

## 2017-08-22 VITALS
HEIGHT: 62 IN | BODY MASS INDEX: 35.33 KG/M2 | TEMPERATURE: 98.7 F | HEART RATE: 74 BPM | WEIGHT: 192 LBS | DIASTOLIC BLOOD PRESSURE: 68 MMHG | OXYGEN SATURATION: 94 % | RESPIRATION RATE: 16 BRPM | SYSTOLIC BLOOD PRESSURE: 118 MMHG

## 2017-08-22 DIAGNOSIS — I10 ESSENTIAL HYPERTENSION: ICD-10-CM

## 2017-08-22 DIAGNOSIS — R05.9 COUGH: ICD-10-CM

## 2017-08-22 DIAGNOSIS — E11.9 CONTROLLED TYPE 2 DIABETES MELLITUS WITHOUT COMPLICATION, WITHOUT LONG-TERM CURRENT USE OF INSULIN (HCC): ICD-10-CM

## 2017-08-22 DIAGNOSIS — E89.0 POSTOPERATIVE HYPOTHYROIDISM: ICD-10-CM

## 2017-08-22 PROCEDURE — 99213 OFFICE O/P EST LOW 20 MIN: CPT | Performed by: NURSE PRACTITIONER

## 2017-08-22 PROCEDURE — 71020 DX-CHEST-2 VIEWS: CPT

## 2017-08-22 RX ORDER — AZITHROMYCIN 250 MG/1
TABLET, FILM COATED ORAL
Qty: 1 QUANTITY SUFFICIENT | Refills: 0 | Status: SHIPPED | OUTPATIENT
Start: 2017-08-22 | End: 2017-09-12

## 2017-08-22 ASSESSMENT — ENCOUNTER SYMPTOMS: COUGH: 1

## 2017-08-22 NOTE — MR AVS SNAPSHOT
"        Sanjuanita Sosa   2017 1:20 PM   Office Visit   MRN: [id number]    Department:  [address] [place] [address] Group   Dept Phone:  [phone]    Description:  Female : 1940   Provider:  WENDY Baker           Reason for Visit     Cough 1 week      Allergies as of 2017     Allergen Noted Reactions    Aspirin 2015   Anaphylaxis    Penicillins 2015   Anaphylaxis    As a child      You were diagnosed with     Cough   [786.2.ICD-9-CM]       Controlled type 2 diabetes mellitus without complication, without long-term current use of insulin (CMS-formerly [facility])   [2492799]       Essential hypertension   [8962221]       Postoperative hypothyroidism   [849283]         Vital Signs     Blood Pressure Pulse Temperature Respirations Height Weight    118/68 mmHg 74 37.1 °C (98.7 °F) 16 1.575 m (5' 2\") 87.091 kg (192 lb)    Body Mass Index Oxygen Saturation Last Menstrual Period Smoking Status          35.11 kg/m2 94% 10/12/1970 Former Smoker        Basic Information     Date Of Birth Sex Race Ethnicity Preferred Language    1940 Female White Non- English      Your appointments     Aug 22, 2017  1:45 PM   XRAY 15 with [address]   [facility] - DIAGNOSTIC - [address]    [address]  [facility] [address] [phone]            Sep 19, 2017 10:00 AM   Established Patient with IHVH EXAM 4   [facility] and V[facility]  (--)    [address] [facility] [address]   [phone]            Oct 10, 2017 10:00 AM   Established Patient with YUE BakerPSTEFAN   [facility] [address]    [address]  [address]  [facility] [id number] [phone]           You will be receiving a confirmation call a few days before your appointment from our automated call confirmation system.            Oct 11, 2017  9:00 AM   Established Patient with Sundar Albert M.D.   [facility] ([facility] [place])    [address]  [place] [address] [phone]           You will be receiving a confirmation call a few days before your appointment from our automated call confirmation system.            Nov 08, 2017  9:20 AM   Follow Up Visit with VASCULAR NURSE PRACTITIONER   [facility] for Heart and Vascular Health  (--)    [address] [facility]  [place] [address]   [phone]              Problem List              ICD-10-CM Priority Class Noted - Resolved    Carotid artery stenosis I65.29   10/7/2014 - Present    Dyslipidemia E78.5   4/28/2015 - Present    Hx pulmonary embolism Z86.711   5/12/2015 - Present    Essential hypertension I10   8/5/2015 - Present    BMI 33.0-33.9,adult Z68.33   9/8/2015 - Present    Risk for falls Z91.81   9/8/2015 - Present    CKD (chronic kidney disease) stage 3, GFR 30-59 ml/min N18.3   11/19/2015 - Present    Vitamin D deficiency disease E55.9   11/24/2015 - Present    Diabetes mellitus type II, controlled (CMS-HCC) E11.9   11/24/2015 - Present    Gastroesophageal reflux disease without esophagitis K21.9   12/3/2015 - Present    Chronic low back pain without sciatica M54.5, G89.29   1/31/2017 - Present    Postoperative hypothyroidism E89.0   1/31/2017 - Present    Chronic anticoagulation Z79.01   1/31/2017 - Present    Abdominal aortic aneurysm without rupture (CMS-HCC) I71.4   4/12/2017 - Present    Primary insomnia F51.01   7/11/2017 - Present      Health Maintenance        Date Due Completion Dates    IMM ZOSTER VACCINE 3/16/2002 ---    IMM PNEUMOCOCCAL 65+ (ADULT) LOW/MEDIUM RISK SERIES (2 of 2 - PCV13) 8/5/2016 8/5/2015    RETINAL SCREENING 8/12/2016 8/12/2015 (Done)    Override on 8/12/2015: Done    BONE DENSITY 10/7/2016 10/7/2011 (Prv Comp)    Override on 10/7/2011: Previously completed    IMM INFLUENZA (1) 9/1/2017 10/12/2015, 11/1/2012    A1C SCREENING 11/23/2017 5/23/2017, 12/28/2016, 11/16/2016, 8/16/2016, 3/15/2016, 11/17/2015,  4/28/2015, 4/21/2015, 10/14/2014, 3/7/2013    FASTING LIPID PROFILE 5/23/2018 5/23/2017, 1/31/2017, 12/29/2016, 11/16/2016, 8/16/2016, 6/22/2016, 3/15/2016, 11/17/2015, 4/29/2015, 10/14/2014, 3/14/2013    URINE ACR / MICROALBUMIN 5/23/2018 5/23/2017, 3/8/2017, 12/28/2016, 3/16/2016, 11/17/2015, 10/15/2014    SERUM CREATININE 5/23/2018 5/23/2017, 3/7/2017, 1/31/2017, 1/3/2017, 1/2/2017, 1/1/2017, 12/31/2016, 12/30/2016, 12/29/2016, 12/28/2016, 11/16/2016, 10/6/2016, 8/16/2016, 6/22/2016, 5/10/2016, 11/17/2015, 10/12/2015, 10/11/2015, 10/10/2015, 10/9/2015, 10/8/2015, 10/7/2015, 10/6/2015, 4/30/2015, 4/29/2015, 4/28/2015, 4/27/2015, 4/21/2015, 1/31/2015, 10/14/2014, 3/14/2013, 3/13/2013, 3/12/2013, 3/8/2013, 3/7/2013, 3/4/2013, 3/2/2013, 3/1/2013, 2/28/2013, 2/26/2013, 5/19/2009    DIABETES MONOFILAMENT / LE EXAM 7/11/2018 7/11/2017, 8/5/2015 (Done)    Override on 8/5/2015: Done    COLONOSCOPY 10/7/2019 10/7/2009 (Prv Comp)    Override on 10/7/2009: Previously completed    IMM DTaP/Tdap/Td Vaccine (2 - Td) 3/6/2023 3/6/2013            Current Immunizations     HIB Vaccine (ACTHIB/HIBERIX) 3/6/2013 11:45 AM    INFLUENZA VACCINE H1N1 10/12/2016    Influenza TIV (IM) 11/1/2012    Influenza Vaccine Adult HD 10/12/2015  8:46 AM    Meningococcal Polysaccharide Vaccine MPSV4 3/6/2013 12:00 PM    Pneumococcal Vaccine (PCV7) Historical Data 10/12/2016    Pneumococcal Vaccine (UF)Historical Data 12/1/2012    Pneumococcal polysaccharide vaccine (PPSV-23) 8/5/2015    Tdap Vaccine 3/6/2013 11:45 AM      Below and/or attached are the medications your provider expects you to take. Review all of your home medications and newly ordered medications with your provider and/or pharmacist. Follow medication instructions as directed by your provider and/or pharmacist. Please keep your medication list with you and share with your provider. Update the information when medications are discontinued, doses are changed, or new medications  (including over-the-counter products) are added; and carry medication information at all times in the event of emergency situations     Allergies:  ASPIRIN - Anaphylaxis     PENICILLINS - Anaphylaxis               Medications  Valid as of: August 22, 2017 -  1:44 PM    Generic Name Brand Name Tablet Size Instructions for use    AmLODIPine Besylate (Tab) NORVASC 10 MG TAKE ONE TABLET BY MOUTH ONCE DAILY ** GENERIC FOR NORVASC        Atorvastatin Calcium (Tab) LIPITOR 80 MG Take 0.5 Tabs by mouth every evening.        Docusate Sodium (Cap)  MG Take 100 mg by mouth every morning.        Ergocalciferol (Cap) DRISDOL 94633 units Take 1 Cap by mouth every 7 days.        Glimepiride (Tab) AMARYL 2 MG TAKE  ONE TABLET BY MOUTH EVERY MORNING        Lancets (Misc) MICROLET LANCETS  TEST BLOOD SUGAR TWO TIMES DAILY        Levothyroxine Sodium (Tab) SYNTHROID 150 MCG Take 1 Tab by mouth Every morning on an empty stomach.        Losartan Potassium (Tab) COZAAR 50 MG Take daily        Losartan Potassium (Tab) COZAAR 50 MG TAKE ONE TABLET BY MOUTH EVERY DAY        MetFORMIN HCl (Tab) GLUCOPHAGE 500 MG TAKE 2 TABLETS BY MOUTH EACH MORNING WITH  BREAKFAST AND TAKE 1 TABLET EACH EVENING   WITH DINNER        Omeprazole (CAPSULE DELAYED RELEASE) PRILOSEC 20 MG Take 1 Cap by mouth every day.        Oxycodone-Acetaminophen (Tab) PERCOCET 7.5-325 MG Take 1 Tab by mouth 2 Times a Day.        Oyster Shell (Tab) OS-MARKELL 500 500 MG Take 1 Tab by mouth 2 times a day, with meals.        TraZODone HCl (Tab) DESYREL 100 MG Take 1.5 Tabs by mouth 1 time daily as needed for Sleep.        Warfarin Sodium (Tab) COUMADIN 5 MG Take one to one and one-half (1-1.5) tablets daily as directed by [facility]        .                 Medicines prescribed today were sent to:     [id number] - [place], [place] - [address] [place] [address]    Phone: [phone] Fax: [phone]    Open 24 Hours?: No        Medication refill instructions:       If your prescription bottle indicates you have medication refills left, it is not necessary to call your provider’s office. Please contact your pharmacy and they will refill your medication.    If your prescription bottle indicates you do not have any refills left, you may request refills at any time through one of the following ways: The online [employer] system (except Urgent Care), by calling your provider’s office, or by asking your pharmacy to contact your provider’s office with a refill request. Medication refills are processed only during regular business hours and may not be available until the next business day. Your provider may request additional information or to have a follow-up visit with you prior to refilling your medication.   *Please Note: Medication refills are assigned a new Rx number when refilled electronically. Your pharmacy may indicate that no refills were authorized even though a new prescription for the same medication is available at the pharmacy. Please request the medicine by name with the pharmacy before contacting your provider for a refill.        Your To Do List     Future Labs/Procedures Complete By Expires    DX-CHEST-2 VIEWS  As directed 2/22/2018         [employer] Status: Patient Declined

## 2017-08-22 NOTE — PROGRESS NOTES
Subjective:      Sanjuanita Sosa is a 77 y.o. female who presents with Cough            Cough    Sanjuanita Sosa is here today mainly for complaints of cough.      1. Cough  Patient reports her symptoms started about a week ago with cough and congestion. She states she sometimes expectorates clear phlegm. She reports she actually feels improved over the last 2 days but is still coughing and bringing up phlegm. She has tried over-the-counter medicines without help. She denies fever, chest pain, lower extremity edema, PND, or dizziness.    2. Controlled type 2 diabetes mellitus without complication, without long-term current use of insulin (CMS-HCC)  Patient being followed by endocrinology and last hemoglobin A1c was within acceptable range on medication.    3. Essential hypertension  Blood pressure continues to be well controlled on current medicine.    4. Postoperative hypothyroidism  Patient followed by endocrinology and TSH was therapeutic on last testing.    Social History   Substance Use Topics   • Smoking status: Former Smoker -- 0.50 packs/day for 40 years     Types: Cigarettes     Quit date: 02/27/2013   • Smokeless tobacco: Never Used   • Alcohol Use: No     Past Medical History   Diagnosis Date   • Nonspecific abnormal electrocardiogram (ECG) (EKG) 3/19/2012   • Autoimmune hepatitis (CMS-HCC) 3/19/2012   • H/O: HTN (hypertension) 3/19/2012   • Mixed hyperlipidemia 3/19/2012   • Murmur 3/19/2012   • Overweight 3/19/2012   • Preoperative cardiovascular examination 3/19/2012   • Palpitations    • Hyperlipidemia    • Hypertension    • Arthritis    • Unspecified urinary incontinence    • Hepatitis B    • Heart valve disease    • Breath shortness    • Pain    • Diabetes      pre-diabetic   • Gallstones    • AAA (abdominal aortic aneurysm) (CMS-HCC)    • Anticoagulation monitoring, special range    • Kidney disease    • Hiatus hernia syndrome    • Cancer (CMS-[facility])      thyroid   • Disorder of  thyroid 2016     thyroid cancer   • Blood clotting disorder (CMS-HCC) 2015     clot in leg and lung   • Arrhythmia    • Cataract      left eye needs surgery     Current Outpatient Prescriptions   Medication Sig Dispense Refill   • glimepiride (AMARYL) 2 MG Tab TAKE  ONE TABLET BY MOUTH EVERY MORNING 30 Tab 11   • trazodone (DESYREL) 100 MG Tab Take 1.5 Tabs by mouth 1 time daily as needed for Sleep. 45 Tab 11   • oxycodone-acetaminophen (PERCOCET) 7.5-325 MG per tablet Take 1 Tab by mouth 2 Times a Day. 60 Tab 0   • calcium carbonate (CALCIUM CARBONATE) 500 MG Tab Take 1 Tab by mouth 2 times a day, with meals. 60 Tab 5   • levothyroxine (SYNTHROID) 150 MCG Tab Take 1 Tab by mouth Every morning on an empty stomach. 30 Tab 6   • amlodipine (NORVASC) 10 MG Tab TAKE ONE TABLET BY MOUTH ONCE DAILY ** GENERIC FOR NORVASC 90 Tab 3   • metformin (GLUCOPHAGE) 500 MG Tab TAKE 2 TABLETS BY MOUTH EACH MORNING WITH  BREAKFAST AND TAKE 1 TABLET EACH EVENING   WITH DINNER 90 Tab 11   • vitamin D, Ergocalciferol, (DRISDOL) 57005 UNITS Cap capsule Take 1 Cap by mouth every 7 days. 4 Cap 11   • atorvastatin (LIPITOR) 80 MG tablet Take 0.5 Tabs by mouth every evening. 30 Tab 11   • omeprazole (PRILOSEC) 20 MG delayed-release capsule Take 1 Cap by mouth every day. 30 Cap 11   • losartan (COZAAR) 50 MG Tab Take daily 30 Tab 11   • MICROLET LANCETS Misc TEST BLOOD SUGAR TWO TIMES DAILY 100 Each 11   • docusate sodium 100 MG Cap Take 100 mg by mouth every morning. 30 Cap 0   • warfarin (COUMADIN) 5 MG Tab Take one to one and one-half (1-1.5) tablets daily as directed by [facility] 135 Tab 1   • losartan (COZAAR) 50 MG Tab TAKE ONE TABLET BY MOUTH EVERY DAY 30 Tab 10     No current facility-administered medications for this visit.     Facility-Administered Medications Ordered in Other Visits   Medication Dose Route Frequency Provider Last Rate Last Dose   • iodixanol (VISIPAQUE) SOLN    Intra-Op Once PRN Shekhar  "EDUARD Rosado   10 mL at 01/18/17 0619     Family History   Problem Relation Age of Onset   • Hyperlipidemia Mother    • Stroke Mother    • Hyperlipidemia Father    • Stroke Father    • Heart Disease Brother      CABG       Review of Systems   Respiratory: Positive for cough.    All other systems reviewed and are negative.         Objective:     /68 mmHg  Pulse 74  Temp(Src) 37.1 °C (98.7 °F)  Resp 16  Ht 1.575 m (5' 2\")  Wt 87.091 kg (192 lb)  BMI 35.11 kg/m2  SpO2 94%  LMP 10/12/1970     Physical Exam   Constitutional: She is oriented to person, place, and time. She appears well-developed and well-nourished. No distress.   HENT:   Head: Normocephalic and atraumatic.   Right Ear: External ear normal.   Left Ear: External ear normal.   Nose: Nose normal.   Eyes: Right eye exhibits no discharge. Left eye exhibits no discharge.   Neck: Normal range of motion. Neck supple. No thyromegaly present.   Cardiovascular: Normal rate, regular rhythm and normal heart sounds.  Exam reveals no gallop and no friction rub.    No murmur heard.  Pulmonary/Chest: Effort normal. She has no wheezes. She has rhonchi. She has no rales.   Musculoskeletal: She exhibits no edema or tenderness.   Neurological: She is alert and oriented to person, place, and time. She displays normal reflexes.   Skin: Skin is warm and dry. No rash noted. She is not diaphoretic.   Psychiatric: She has a normal mood and affect. Her behavior is normal. Judgment and thought content normal.   Nursing note and vitals reviewed.              Assessment/Plan:     1. Cough  Patient is afebrile, she is not tachycardic and blood pressure is within normal range. I discussed going on antibiotics but doxycycline and Zithromax can potentially interact with her Coumadin and she would therefore need to have adjustment made through the Coumadin clinic. She is also allergic to penicillin which decreases the choices for respiratory illness. Omnicef is not covered " by her insurance. We agreed that she would go for a chest x-ray and if abnormal we will contact the Coumadin clinic and start her on Zithromax.  - DX-CHEST-2 VIEWS; Future    2. Controlled type 2 diabetes mellitus without complication, without long-term current use of insulin (CMS-[facility])  Patient will be following with endocrinology in a month and a half and previous hemoglobin A1c was good and she reports no hypoglycemia.    3. Essential hypertension  Blood pressure currently well controlled on amlodipine and losartan.    4. Postoperative hypothyroidism  Patient taken her levothyroxine and lab work ordered by the vascular clinic for follow-up.

## 2017-08-23 ENCOUNTER — TELEPHONE (OUTPATIENT)
Dept: VASCULAR LAB | Facility: MEDICAL CENTER | Age: 77
End: 2017-08-23

## 2017-08-23 NOTE — TELEPHONE ENCOUNTER
Patient called stating that she will be starting a 5 day course of azithromycin and was wondering if her dose will need to be changed. I instructed the patient to continue taking her regular warfarin regimen. I re-scheduled her follow up appointment to a sooner date (09/05) @ 10.15am.    Giovanny Lazaro.D

## 2017-08-25 ENCOUNTER — TELEPHONE (OUTPATIENT)
Dept: VASCULAR LAB | Facility: MEDICAL CENTER | Age: 77
End: 2017-08-25

## 2017-08-25 ENCOUNTER — OFFICE VISIT (OUTPATIENT)
Dept: URGENT CARE | Facility: CLINIC | Age: 77
End: 2017-08-25
Payer: MEDICARE

## 2017-08-25 ENCOUNTER — ANTICOAGULATION MONITORING (OUTPATIENT)
Dept: VASCULAR LAB | Facility: MEDICAL CENTER | Age: 77
End: 2017-08-25

## 2017-08-25 VITALS
WEIGHT: 191 LBS | TEMPERATURE: 99.1 F | SYSTOLIC BLOOD PRESSURE: 114 MMHG | HEART RATE: 82 BPM | DIASTOLIC BLOOD PRESSURE: 60 MMHG | RESPIRATION RATE: 16 BRPM | HEIGHT: 62 IN | OXYGEN SATURATION: 91 % | BODY MASS INDEX: 35.15 KG/M2

## 2017-08-25 DIAGNOSIS — Z86.711 HX PULMONARY EMBOLISM: ICD-10-CM

## 2017-08-25 DIAGNOSIS — L03.012 CELLULITIS OF LEFT MIDDLE FINGER: Primary | ICD-10-CM

## 2017-08-25 PROCEDURE — 99214 OFFICE O/P EST MOD 30 MIN: CPT | Performed by: PHYSICIAN ASSISTANT

## 2017-08-25 RX ORDER — SULFAMETHOXAZOLE AND TRIMETHOPRIM 800; 160 MG/1; MG/1
1 TABLET ORAL 2 TIMES DAILY
Qty: 14 TAB | Refills: 0 | Status: SHIPPED | OUTPATIENT
Start: 2017-08-25 | End: 2017-09-01

## 2017-08-25 NOTE — MR AVS SNAPSHOT
"        Sanjuanita Sosa   2017 3:00 PM   Office Visit   MRN: [id number]    Department:  [facility]   Dept Phone:  [phone]    Description:  Female : 1940   Provider:  Stanislaw Farias PA-C           Reason for Visit     Finger Pain x 3 days/ left middle finger      Allergies as of 2017     Allergen Noted Reactions    Aspirin 2015   Anaphylaxis    Penicillins 2015   Anaphylaxis    As a child      You were diagnosed with     Cellulitis of left middle finger   [[id number]]  -  Primary       Vital Signs     Blood Pressure Pulse Temperature Respirations Height Weight    114/60 mmHg 82 37.3 °C (99.1 °F) 16 1.575 m (5' 2.01\") 86.637 kg (191 lb)    Body Mass Index Oxygen Saturation Last Menstrual Period Smoking Status          34.93 kg/m2 91% 10/12/1970 Former Smoker        Basic Information     Date Of Birth Sex Race Ethnicity Preferred Language    1940 Female White Non- English      Your appointments     Sep 06, 2017  2:15 PM   Established Patient with [facility] EXAM 4   [facility]  (--)    [address] [facility] [address]   [phone]            Oct 10, 2017 10:00 AM   Established Patient with WENDY Baker   [facility] [address]    [address]  [address]  [facility] [id number] [phone]           You will be receiving a confirmation call a few days before your appointment from our automated call confirmation system.            Oct 11, 2017  9:00 AM   Established Patient with Sundar Albert M.D.   [facility]    [address]  [facility] [address] [phone]           You will be receiving a confirmation call a few days before your appointment from our automated call confirmation system.            2017  9:20 AM   Follow Up Visit with VASCULAR NURSE PRACTITIONER   [facility]  (--)    [address] [address]   [phone]              Problem List              ICD-10-CM Priority Class Noted - Resolved    Carotid artery stenosis I65.29   10/7/2014 - Present    Dyslipidemia E78.5   4/28/2015 - Present    Hx pulmonary embolism Z86.711   5/12/2015 - Present    Essential hypertension I10   8/5/2015 - Present    BMI 33.0-33.9,adult Z68.33   9/8/2015 - Present    Risk for falls Z91.81   9/8/2015 - Present    CKD (chronic kidney disease) stage 3, GFR 30-59 ml/min N18.3   11/19/2015 - Present    Vitamin D deficiency disease E55.9   11/24/2015 - Present    Diabetes mellitus type II, controlled (CMS-HCC) E11.9   11/24/2015 - Present    Gastroesophageal reflux disease without esophagitis K21.9   12/3/2015 - Present    Chronic low back pain without sciatica M54.5, G89.29   1/31/2017 - Present    Postoperative hypothyroidism E89.0   1/31/2017 - Present    Chronic anticoagulation Z79.01   1/31/2017 - Present    Abdominal aortic aneurysm without rupture (CMS-HCC) I71.4   4/12/2017 - Present    Primary insomnia F51.01   7/11/2017 - Present      Health Maintenance        Date Due Completion Dates    IMM ZOSTER VACCINE 3/16/2002 ---    IMM PNEUMOCOCCAL 65+ (ADULT) LOW/MEDIUM RISK SERIES (2 of 2 - PCV13) 8/5/2016 8/5/2015    RETINAL SCREENING 8/12/2016 8/12/2015 (Done)    Override on 8/12/2015: Done    BONE DENSITY 10/7/2016 10/7/2011 (Prv Comp)    Override on 10/7/2011: Previously completed    IMM INFLUENZA (1) 9/1/2017 10/12/2015, 11/1/2012    A1C SCREENING 11/23/2017 5/23/2017, 12/28/2016, 11/16/2016, 8/16/2016, 3/15/2016, 11/17/2015, 4/28/2015, 4/21/2015, 10/14/2014, 3/7/2013    FASTING LIPID PROFILE 5/23/2018 5/23/2017, 1/31/2017, 12/29/2016, 11/16/2016, 8/16/2016, 6/22/2016, 3/15/2016, 11/17/2015, 4/29/2015, 10/14/2014, 3/14/2013    URINE ACR / MICROALBUMIN 5/23/2018 5/23/2017, 3/8/2017, 12/28/2016, 3/16/2016, 11/17/2015, 10/15/2014    SERUM CREATININE 5/23/2018  5/23/2017, 3/7/2017, 1/31/2017, 1/3/2017, 1/2/2017, 1/1/2017, 12/31/2016, 12/30/2016, 12/29/2016, 12/28/2016, 11/16/2016, 10/6/2016, 8/16/2016, 6/22/2016, 5/10/2016, 11/17/2015, 10/12/2015, 10/11/2015, 10/10/2015, 10/9/2015, 10/8/2015, 10/7/2015, 10/6/2015, 4/30/2015, 4/29/2015, 4/28/2015, 4/27/2015, 4/21/2015, 1/31/2015, 10/14/2014, 3/14/2013, 3/13/2013, 3/12/2013, 3/8/2013, 3/7/2013, 3/4/2013, 3/2/2013, 3/1/2013, 2/28/2013, 2/26/2013, 5/19/2009    DIABETES MONOFILAMENT / LE EXAM 7/11/2018 7/11/2017, 8/5/2015 (Done)    Override on 8/5/2015: Done    COLONOSCOPY 10/7/2019 10/7/2009 (Prv Comp)    Override on 10/7/2009: Previously completed    IMM DTaP/Tdap/Td Vaccine (2 - Td) 3/6/2023 3/6/2013            Current Immunizations     HIB Vaccine (ACTHIB/HIBERIX) 3/6/2013 11:45 AM    INFLUENZA VACCINE H1N1 10/12/2016    Influenza TIV (IM) 11/1/2012    Influenza Vaccine Adult HD 10/12/2015  8:46 AM    Meningococcal Polysaccharide Vaccine MPSV4 3/6/2013 12:00 PM    Pneumococcal Vaccine (PCV7) Historical Data 10/12/2016    Pneumococcal Vaccine (UF)Historical Data 12/1/2012    Pneumococcal polysaccharide vaccine (PPSV-23) 8/5/2015    Tdap Vaccine 3/6/2013 11:45 AM      Below and/or attached are the medications your provider expects you to take. Review all of your home medications and newly ordered medications with your provider and/or pharmacist. Follow medication instructions as directed by your provider and/or pharmacist. Please keep your medication list with you and share with your provider. Update the information when medications are discontinued, doses are changed, or new medications (including over-the-counter products) are added; and carry medication information at all times in the event of emergency situations     Allergies:  ASPIRIN - Anaphylaxis     PENICILLINS - Anaphylaxis               Medications  Valid as of: August 25, 2017 -  4:20 PM    Generic Name Brand Name Tablet Size Instructions for use    AmLODIPine  Besylate (Tab) NORVASC 10 MG TAKE ONE TABLET BY MOUTH ONCE DAILY ** GENERIC FOR NORVASC        Atorvastatin Calcium (Tab) LIPITOR 80 MG Take 0.5 Tabs by mouth every evening.        Azithromycin (Tab) ZITHROMAX 250 MG One Pack        Docusate Sodium (Cap)  MG Take 100 mg by mouth every morning.        Ergocalciferol (Cap) DRISDOL 24571 units Take 1 Cap by mouth every 7 days.        Glimepiride (Tab) AMARYL 2 MG TAKE  ONE TABLET BY MOUTH EVERY MORNING        Lancets (Misc) MICROLET LANCETS  TEST BLOOD SUGAR TWO TIMES DAILY        Levothyroxine Sodium (Tab) SYNTHROID 150 MCG Take 1 Tab by mouth Every morning on an empty stomach.        Losartan Potassium (Tab) COZAAR 50 MG Take daily        Losartan Potassium (Tab) COZAAR 50 MG TAKE ONE TABLET BY MOUTH EVERY DAY        MetFORMIN HCl (Tab) GLUCOPHAGE 500 MG TAKE 2 TABLETS BY MOUTH EACH MORNING WITH  BREAKFAST AND TAKE 1 TABLET EACH EVENING   WITH DINNER        Omeprazole (CAPSULE DELAYED RELEASE) PRILOSEC 20 MG Take 1 Cap by mouth every day.        Oxycodone-Acetaminophen (Tab) PERCOCET 7.5-325 MG Take 1 Tab by mouth 2 Times a Day.        Oyster Shell (Tab) OS-MARKELL 500 500 MG Take 1 Tab by mouth 2 times a day, with meals.        Sulfamethoxazole-Trimethoprim (Tab) BACTRIM -160 MG Take 1 Tab by mouth 2 times a day for 7 days.        TraZODone HCl (Tab) DESYREL 100 MG Take 1.5 Tabs by mouth 1 time daily as needed for Sleep.        Warfarin Sodium (Tab) COUMADIN 5 MG Take one to one and one-half (1-1.5) tablets daily as directed by [facility]        .                 Medicines prescribed today were sent to:     [id number] - [place], [place] - [address]    [address] [place] [address]    Phone: [phone] Fax: [phone]    Open 24 Hours?: No      Medication refill instructions:       If your prescription bottle indicates you have medication refills left, it is not necessary to call your provider’s office. Please  contact your pharmacy and they will refill your medication.    If your prescription bottle indicates you do not have any refills left, you may request refills at any time through one of the following ways: The online [employer] system (except Urgent Care), by calling your provider’s office, or by asking your pharmacy to contact your provider’s office with a refill request. Medication refills are processed only during regular business hours and may not be available until the next business day. Your provider may request additional information or to have a follow-up visit with you prior to refilling your medication.   *Please Note: Medication refills are assigned a new Rx number when refilled electronically. Your pharmacy may indicate that no refills were authorized even though a new prescription for the same medication is available at the pharmacy. Please request the medicine by name with the pharmacy before contacting your provider for a refill.        Instructions    Cellulitis  Cellulitis is an infection of the skin and the tissue beneath it. The infected area is usually red and tender. Cellulitis occurs most often in the arms and lower legs.   CAUSES   Cellulitis is caused by bacteria that enter the skin through cracks or cuts in the skin. The most common types of bacteria that cause cellulitis are staphylococci and streptococci.  SIGNS AND SYMPTOMS   · Redness and warmth.  · Swelling.  · Tenderness or pain.  · Fever.  DIAGNOSIS   Your health care provider can usually determine what is wrong based on a physical exam. Blood tests may also be done.  TREATMENT   Treatment usually involves taking an antibiotic medicine.  HOME CARE INSTRUCTIONS   · Take your antibiotic medicine as directed by your health care provider. Finish the antibiotic even if you start to feel better.  · Keep the infected arm or leg elevated to reduce swelling.  · Apply a warm cloth to the affected area up to 4 times per day to relieve pain.  · Take  medicines only as directed by your health care provider.  · Keep all follow-up visits as directed by your health care provider.  SEEK MEDICAL CARE IF:   · You notice red streaks coming from the infected area.  · Your red area gets larger or turns dark in color.  · Your bone or joint underneath the infected area becomes painful after the skin has healed.  · Your infection returns in the same area or another area.  · You notice a swollen bump in the infected area.  · You develop new symptoms.  · You have a fever.  SEEK IMMEDIATE MEDICAL CARE IF:   · You feel very sleepy.  · You develop vomiting or diarrhea.  · You have a general ill feeling (malaise) with muscle aches and pains.  MAKE SURE YOU:   · Understand these instructions.  · Will watch your condition.  · Will get help right away if you are not doing well or get worse.     This information is not intended to replace advice given to you by your health care provider. Make sure you discuss any questions you have with your health care provider.     Document Released: 09/27/2006 Document Revised: 01/08/2016 Document Reviewed: 03/04/2013  [employer] Interactive Patient Education ©2016 [employer]            MyChart Status: Patient Declined

## 2017-08-25 NOTE — PROGRESS NOTES
Subjective:      Pt is a 77 y.o. female who presents with Finger Pain            HPI  Pt notes left middle finger swelling and redness and difficulty bending her finger. She denies gout hx but notes that is the finger she uses to check her DM2 with multiple punctures and is concerned for cellulitis. Pt has not taken any Rx medications for this condition. Pt states the pain is a 4/10, aching in nature and worse at night. Pt denies CP, SOB, NVD, paresthesias, headaches, dizziness, change in vision, hives, or other joint pain. The pt's medication list, problem list, and allergies have been evaluated and reviewed during today's visit.    PMH:  Past Medical History   Diagnosis Date   • Nonspecific abnormal electrocardiogram (ECG) (EKG) 3/19/2012   • Autoimmune hepatitis (CMS-HCC) 3/19/2012   • H/O: HTN (hypertension) 3/19/2012   • Mixed hyperlipidemia 3/19/2012   • Murmur 3/19/2012   • Overweight 3/19/2012   • Preoperative cardiovascular examination 3/19/2012   • Palpitations    • Hyperlipidemia    • Hypertension    • Arthritis    • Unspecified urinary incontinence    • Hepatitis B    • Heart valve disease    • Breath shortness    • Pain    • Diabetes      pre-diabetic   • Gallstones    • AAA (abdominal aortic aneurysm) (CMS-HCC)    • Anticoagulation monitoring, special range    • Kidney disease    • Hiatus hernia syndrome    • Cancer (CMS-HCC)      thyroid   • Disorder of thyroid 2016     thyroid cancer   • Blood clotting disorder (CMS-HCC) 2015     clot in leg and lung   • Arrhythmia    • Cataract      left eye needs surgery       PSH:  Past Surgical History   Procedure Laterality Date   • Bladder suspension  5/27/2009     Performed by RINA HART at [facility]   • Cystoscopy  5/27/2009     Performed by RINA HART at [facility]   • Rectocele repair  5/27/2009     Performed by RINA HART at [facility]   • Appendectomy     • Hysterectomy  laparoscopy     • Knee replacement, total     • Tonsillectomy     • Exploratory laparotomy  2013     Performed by Edson Craft M.D. at [facility]   • Splenectomy  2013     Performed by Edson Craft M.D. at SUR[facility]ERY [facility]   • Node dissection  2013     Performed by Edson Craft M.D. at SUR[facility]ERY [facility]   • Oophorectomy  2013     Performed by Levi Nolan M.D. at SUR[facility]ERY [facility]   • Thyroidectomy total N/A 10/12/2016     Procedure: THYROIDECTOMY TOTAL;  Surgeon: Nuno Duncan M.D.;  Location: [facility];  Service:    • Darren by laparoscopy  2017     Procedure: DARREN BY LAPAROSCOPY;  Surgeon: Chele Valles M.D.;  Location: SURGERY [facility];  Service:    • Ercp in or  2016     Procedure: ERCP IN OR;  Surgeon: Shekhar Rosado M.D.;  Location: [facility]ERY [facility];  Service:        Fam Hx:    family history includes Heart Disease in her brother; Hyperlipidemia in her father and mother; Stroke in her father and mother.  Family Status   Relation Status Death Age   • Mother     • Father         Soc HX:  Social History     Social History   • Marital Status:      Spouse Name: N/A   • Number of Children: N/A   • Years of Education: N/A     Occupational History   • Not on file.     Social History Main Topics   • Smoking status: Former Smoker -- 0.50 packs/day for 40 years     Types: Cigarettes     Quit date: 2013   • Smokeless tobacco: Never Used   • Alcohol Use: No   • Drug Use: No   • Sexual Activity: Not Currently     Other Topics Concern   • Not on file     Social History Narrative         Medications:    Current outpatient prescriptions:   •  sulfamethoxazole-trimethoprim (BACTRIM DS) 800-160 MG tablet, Take 1 Tab by mouth 2 times a day for 7 days., Disp: 14 Tab, Rfl: 0  •  azithromycin (ZITHROMAX Z-TERRELL) 250 MG Tab, One Pack, Disp: 1 Quantity  Sufficient, Rfl: 0  •  glimepiride (AMARYL) 2 MG Tab, TAKE  ONE TABLET BY MOUTH EVERY MORNING, Disp: 30 Tab, Rfl: 11  •  trazodone (DESYREL) 100 MG Tab, Take 1.5 Tabs by mouth 1 time daily as needed for Sleep., Disp: 45 Tab, Rfl: 11  •  oxycodone-acetaminophen (PERCOCET) 7.5-325 MG per tablet, Take 1 Tab by mouth 2 Times a Day., Disp: 60 Tab, Rfl: 0  •  calcium carbonate (CALCIUM CARBONATE) 500 MG Tab, Take 1 Tab by mouth 2 times a day, with meals., Disp: 60 Tab, Rfl: 5  •  levothyroxine (SYNTHROID) 150 MCG Tab, Take 1 Tab by mouth Every morning on an empty stomach., Disp: 30 Tab, Rfl: 6  •  amlodipine (NORVASC) 10 MG Tab, TAKE ONE TABLET BY MOUTH ONCE DAILY ** GENERIC FOR NORVASC, Disp: 90 Tab, Rfl: 3  •  metformin (GLUCOPHAGE) 500 MG Tab, TAKE 2 TABLETS BY MOUTH EACH MORNING WITH  BREAKFAST AND TAKE 1 TABLET EACH EVENING   WITH DINNER, Disp: 90 Tab, Rfl: 11  •  vitamin D, Ergocalciferol, (DRISDOL) 95361 UNITS Cap capsule, Take 1 Cap by mouth every 7 days., Disp: 4 Cap, Rfl: 11  •  warfarin (COUMADIN) 5 MG Tab, Take one to one and one-half (1-1.5) tablets daily as directed by [facility], Disp: 135 Tab, Rfl: 1  •  losartan (COZAAR) 50 MG Tab, TAKE ONE TABLET BY MOUTH EVERY DAY, Disp: 30 Tab, Rfl: 10  •  atorvastatin (LIPITOR) 80 MG tablet, Take 0.5 Tabs by mouth every evening., Disp: 30 Tab, Rfl: 11  •  omeprazole (PRILOSEC) 20 MG delayed-release capsule, Take 1 Cap by mouth every day., Disp: 30 Cap, Rfl: 11  •  losartan (COZAAR) 50 MG Tab, Take daily, Disp: 30 Tab, Rfl: 11  •  MICROLET LANCETS Misc, TEST BLOOD SUGAR TWO TIMES DAILY, Disp: 100 Each, Rfl: 11  •  docusate sodium 100 MG Cap, Take 100 mg by mouth every morning., Disp: 30 Cap, Rfl: 0  No current facility-administered medications for this visit.    Facility-Administered Medications Ordered in Other Visits:   •  iodixanol (VISIPAQUE) SOLN, , , Intra-Op Once PRN, Shekhar Rosado M.D., 10 mL at 01/18/17 0619      Allergies:  Aspirin  "and Penicillins      ROS  Constitutional: Negative for fever, chills and malaise/fatigue.   HENT: Negative for congestion and sore throat.    Eyes: Negative for blurred vision, double vision and photophobia.   Respiratory: Negative for cough and shortness of breath.    Cardiovascular: Negative for chest pain and palpitations.   Gastrointestinal: Negative for heartburn, nausea, vomiting, abdominal pain, diarrhea and constipation.   Genitourinary: Negative for dysuria and flank pain.   Musculoskeletal: Negative for joint pain and myalgias.   Skin: Negative for itching and rash. +left middle finger cellulitis  Neurological: Negative for dizziness, tingling and headaches.   Endo/Heme/Allergies: Does not bruise/bleed easily.   Psychiatric/Behavioral: Negative for depression. The patient is not nervous/anxious.           Objective:     /60 mmHg  Pulse 82  Temp(Src) 37.3 °C (99.1 °F)  Resp 16  Ht 1.575 m (5' 2.01\")  Wt 86.637 kg (191 lb)  BMI 34.93 kg/m2  SpO2 91%  LMP 10/12/1970     Physical Exam   Musculoskeletal:        Left hand: She exhibits decreased range of motion, tenderness and swelling. She exhibits no bony tenderness, normal two-point discrimination, normal capillary refill, no deformity and no laceration. Normal sensation noted. Normal strength noted.        Hands:        Constitutional: PT is oriented to person, place, and time. PT appears well-developed and well-nourished. No distress.   HENT:   Head: Normocephalic and atraumatic.   Mouth/Throat: Oropharynx is clear and moist. No oropharyngeal exudate.   Eyes: Conjunctivae normal and EOM are normal. Pupils are equal, round, and reactive to light.   Neck: Normal range of motion. Neck supple. No thyromegaly present.   Cardiovascular: Normal rate, regular rhythm, normal heart sounds and intact distal pulses.  Exam reveals no gallop and no friction rub.    No murmur heard.  Pulmonary/Chest: Effort normal and breath sounds normal. No respiratory " distress. PT has no wheezes. PT has no rales. Pt exhibits no tenderness.   Abdominal: Soft. Bowel sounds are normal. PT exhibits no distension and no mass. There is no tenderness. There is no rebound and no guarding.   Neurological: PT is alert and oriented to person, place, and time. PT has normal reflexes. No cranial nerve deficit.   Skin: Skin is warm and dry. No rash noted. PT is not diaphoretic. SEE [facility]      Psychiatric: PT has a normal mood and affect. PT behavior is normal. Judgment and thought content normal.          Assessment/Plan:     1. Cellulitis of left middle finger    - sulfamethoxazole-trimethoprim (BACTRIM DS) 800-160 MG tablet; Take 1 Tab by mouth 2 times a day for 7 days.  Dispense: 14 Tab; Refill: 0    STRICT ER precautions made  RICE therapy discussed  Gentle ROM exercises discussed  WBAT left hand  Ice/heat therapy discussed  NSAIDs for pain control  Rest, fluids encouraged.  AVS with medical info given.  Pt was in full understanding and agreement with the plan.  Follow-up as needed if symptoms worsen or fail to improve.

## 2017-08-25 NOTE — PROGRESS NOTES
PT TO start 10 day course of Septra DS.  Warfarin dosing reduced as above  Jennifer Patten, DARYLD

## 2017-09-06 ENCOUNTER — ANTICOAGULATION VISIT (OUTPATIENT)
Dept: VASCULAR LAB | Facility: MEDICAL CENTER | Age: 77
End: 2017-09-06
Attending: INTERNAL MEDICINE
Payer: MEDICARE

## 2017-09-06 DIAGNOSIS — Z86.711 HX PULMONARY EMBOLISM: ICD-10-CM

## 2017-09-06 LAB — INR PPP: 1.7 (ref 2–3.5)

## 2017-09-06 PROCEDURE — 85610 PROTHROMBIN TIME: CPT

## 2017-09-06 PROCEDURE — 99212 OFFICE O/P EST SF 10 MIN: CPT | Performed by: NURSE PRACTITIONER

## 2017-09-06 NOTE — PROGRESS NOTES
Anticoagulation Summary  As of 9/6/2017    INR goal:   2.0-3.0   TTR:   78.6 % (5.6 mo)   Today's INR:   1.7!   Maintenance plan:   5 mg (5 mg x 1) on Wed, Fri; 7.5 mg (5 mg x 1.5) all other days   Weekly total:   47.5 mg   Plan last modified:   WENDY Brennan (4/11/2017)   Next INR check:   9/13/2017   Target end date:   Indefinite    Indications    DVT (deep venous thrombosis)  left (Resolved) [I82.402]  Hx pulmonary embolism [Z86.711]             Anticoagulation Episode Summary     INR check location:       Preferred lab:       Send INR reminders to:       Comments:         Anticoagulation Care Providers     Provider Role Specialty Phone number    WENDY Baker Referring Family Medicine [phone]    [facility] Responsible  [phone]        Patient is subtherapeutic today. She finished Septra. She took the lower dose of warfarin as instructed. Denies any medication or diet changes. No current symptoms of bleeding or thrombosis reported. Will increase tonight's dose then continue current regimen. Follow up in 1 week.    Next Appointment: Tuesday, September 12, 2017 at 1:45 pm.     Farheen CARPENTER

## 2017-09-07 LAB — INR BLD: 1.7 (ref 0.9–1.2)

## 2017-09-12 ENCOUNTER — OFFICE VISIT (OUTPATIENT)
Dept: MEDICAL GROUP | Facility: MEDICAL CENTER | Age: 77
End: 2017-09-12
Payer: MEDICARE

## 2017-09-12 ENCOUNTER — ANTICOAGULATION VISIT (OUTPATIENT)
Dept: VASCULAR LAB | Facility: MEDICAL CENTER | Age: 77
End: 2017-09-12
Attending: INTERNAL MEDICINE
Payer: MEDICARE

## 2017-09-12 ENCOUNTER — HOSPITAL ENCOUNTER (OUTPATIENT)
Dept: LAB | Facility: MEDICAL CENTER | Age: 77
End: 2017-09-12
Attending: INTERNAL MEDICINE
Payer: MEDICARE

## 2017-09-12 VITALS
TEMPERATURE: 98.7 F | HEIGHT: 62 IN | OXYGEN SATURATION: 90 % | BODY MASS INDEX: 34.96 KG/M2 | RESPIRATION RATE: 16 BRPM | HEART RATE: 78 BPM | WEIGHT: 190 LBS | SYSTOLIC BLOOD PRESSURE: 124 MMHG | DIASTOLIC BLOOD PRESSURE: 62 MMHG

## 2017-09-12 DIAGNOSIS — M54.50 CHRONIC LOW BACK PAIN WITHOUT SCIATICA, UNSPECIFIED BACK PAIN LATERALITY: ICD-10-CM

## 2017-09-12 DIAGNOSIS — E03.9 HYPOTHYROIDISM, UNSPECIFIED TYPE: ICD-10-CM

## 2017-09-12 DIAGNOSIS — E11.9 CONTROLLED TYPE 2 DIABETES MELLITUS WITHOUT COMPLICATION, WITHOUT LONG-TERM CURRENT USE OF INSULIN (HCC): ICD-10-CM

## 2017-09-12 DIAGNOSIS — G89.29 CHRONIC LOW BACK PAIN WITHOUT SCIATICA, UNSPECIFIED BACK PAIN LATERALITY: ICD-10-CM

## 2017-09-12 DIAGNOSIS — N18.3 CKD (CHRONIC KIDNEY DISEASE), STAGE 3 (MODERATE): ICD-10-CM

## 2017-09-12 DIAGNOSIS — F51.01 PRIMARY INSOMNIA: ICD-10-CM

## 2017-09-12 DIAGNOSIS — Z86.711 HX PULMONARY EMBOLISM: ICD-10-CM

## 2017-09-12 DIAGNOSIS — E78.5 DYSLIPIDEMIA: ICD-10-CM

## 2017-09-12 DIAGNOSIS — J98.11 BILATERAL ATELECTASIS: ICD-10-CM

## 2017-09-12 DIAGNOSIS — R05.3 PERSISTENT COUGH: ICD-10-CM

## 2017-09-12 LAB
25(OH)D3 SERPL-MCNC: 67 NG/ML (ref 30–100)
ALBUMIN SERPL BCP-MCNC: 3.9 G/DL (ref 3.2–4.9)
ALBUMIN/GLOB SERPL: 1.1 G/DL
ALP SERPL-CCNC: 75 U/L (ref 30–99)
ALT SERPL-CCNC: 12 U/L (ref 2–50)
ANION GAP SERPL CALC-SCNC: 10 MMOL/L (ref 0–11.9)
AST SERPL-CCNC: 12 U/L (ref 12–45)
BILIRUB SERPL-MCNC: 0.7 MG/DL (ref 0.1–1.5)
BUN SERPL-MCNC: 24 MG/DL (ref 8–22)
CALCIUM SERPL-MCNC: 9.3 MG/DL (ref 8.5–10.5)
CHLORIDE SERPL-SCNC: 105 MMOL/L (ref 96–112)
CHOLEST SERPL-MCNC: 172 MG/DL (ref 100–199)
CO2 SERPL-SCNC: 24 MMOL/L (ref 20–33)
CREAT SERPL-MCNC: 1.03 MG/DL (ref 0.5–1.4)
ERYTHROCYTE [DISTWIDTH] IN BLOOD BY AUTOMATED COUNT: 50.8 FL (ref 35.9–50)
EST. AVERAGE GLUCOSE BLD GHB EST-MCNC: 148 MG/DL
GFR SERPL CREATININE-BSD FRML MDRD: 52 ML/MIN/1.73 M 2
GLOBULIN SER CALC-MCNC: 3.7 G/DL (ref 1.9–3.5)
GLUCOSE SERPL-MCNC: 99 MG/DL (ref 65–99)
HBA1C MFR BLD: 6.8 % (ref 0–5.6)
HCT VFR BLD AUTO: 39.1 % (ref 37–47)
HDLC SERPL-MCNC: 42 MG/DL
HGB BLD-MCNC: 12.7 G/DL (ref 12–16)
INR BLD: 2.5 (ref 0.9–1.2)
INR PPP: 2.5 (ref 2–3.5)
LDLC SERPL CALC-MCNC: 99 MG/DL
MCH RBC QN AUTO: 28.3 PG (ref 27–33)
MCHC RBC AUTO-ENTMCNC: 32.5 G/DL (ref 33.6–35)
MCV RBC AUTO: 87.1 FL (ref 81.4–97.8)
PLATELET # BLD AUTO: 452 K/UL (ref 164–446)
PMV BLD AUTO: 9.8 FL (ref 9–12.9)
POTASSIUM SERPL-SCNC: 4.2 MMOL/L (ref 3.6–5.5)
PROT SERPL-MCNC: 7.6 G/DL (ref 6–8.2)
PTH-INTACT SERPL-MCNC: 35.5 PG/ML (ref 14–72)
RBC # BLD AUTO: 4.49 M/UL (ref 4.2–5.4)
SODIUM SERPL-SCNC: 139 MMOL/L (ref 135–145)
T4 SERPL-MCNC: 12.9 UG/DL (ref 4–12)
TRIGL SERPL-MCNC: 157 MG/DL (ref 0–149)
TSH SERPL DL<=0.005 MIU/L-ACNC: 0.09 UIU/ML (ref 0.3–3.7)
WBC # BLD AUTO: 12.7 K/UL (ref 4.8–10.8)

## 2017-09-12 PROCEDURE — 83970 ASSAY OF PARATHORMONE: CPT

## 2017-09-12 PROCEDURE — 80061 LIPID PANEL: CPT

## 2017-09-12 PROCEDURE — 85610 PROTHROMBIN TIME: CPT

## 2017-09-12 PROCEDURE — 84436 ASSAY OF TOTAL THYROXINE: CPT

## 2017-09-12 PROCEDURE — 36415 COLL VENOUS BLD VENIPUNCTURE: CPT

## 2017-09-12 PROCEDURE — 83036 HEMOGLOBIN GLYCOSYLATED A1C: CPT | Mod: GA

## 2017-09-12 PROCEDURE — 84443 ASSAY THYROID STIM HORMONE: CPT

## 2017-09-12 PROCEDURE — 82306 VITAMIN D 25 HYDROXY: CPT

## 2017-09-12 PROCEDURE — 99214 OFFICE O/P EST MOD 30 MIN: CPT | Performed by: NURSE PRACTITIONER

## 2017-09-12 PROCEDURE — 80053 COMPREHEN METABOLIC PANEL: CPT

## 2017-09-12 PROCEDURE — 99211 OFF/OP EST MAY X REQ PHY/QHP: CPT | Performed by: PHARMACIST

## 2017-09-12 PROCEDURE — 85027 COMPLETE CBC AUTOMATED: CPT

## 2017-09-12 RX ORDER — OXYCODONE AND ACETAMINOPHEN 7.5; 325 MG/1; MG/1
1 TABLET ORAL 2 TIMES DAILY
Qty: 60 TAB | Refills: 0 | Status: SHIPPED | OUTPATIENT
Start: 2017-09-12 | End: 2017-09-12 | Stop reason: SDUPTHER

## 2017-09-12 RX ORDER — OXYCODONE AND ACETAMINOPHEN 7.5; 325 MG/1; MG/1
1 TABLET ORAL 2 TIMES DAILY
Qty: 60 TAB | Refills: 0 | Status: ON HOLD | OUTPATIENT
Start: 2017-09-12 | End: 2018-03-02

## 2017-09-12 RX ORDER — TRAZODONE HYDROCHLORIDE 100 MG/1
150 TABLET ORAL
Qty: 45 TAB | Refills: 11 | Status: SHIPPED | OUTPATIENT
Start: 2017-09-12 | End: 2018-08-18 | Stop reason: SDUPTHER

## 2017-09-12 ASSESSMENT — ENCOUNTER SYMPTOMS
COUGH: 1
BACK PAIN: 1

## 2017-09-12 NOTE — PROGRESS NOTES
Anticoagulation Summary  As of 9/12/2017    INR goal:   2.0-3.0   TTR:   78.1 % (5.8 mo)   Today's INR:   2.5   Maintenance plan:   5 mg (5 mg x 1) on Wed, Fri; 7.5 mg (5 mg x 1.5) all other days   Weekly total:   47.5 mg   Plan last modified:   YUE BrennanPSTEFAN (4/11/2017)   Next INR check:   9/26/2017   Target end date:   Indefinite    Indications    DVT (deep venous thrombosis)  left (Resolved) [I82.402]  Hx pulmonary embolism [Z86.711]             Anticoagulation Episode Summary     INR check location:       Preferred lab:       Send INR reminders to:       Comments:         Anticoagulation Care Providers     Provider Role Specialty Phone number    WENDY Baker Referring Family Medicine [phone]    [facility] Responsible  [phone]        Anticoagulation Patient Findings      HPI:  Sanjuanita Sosa seen in clinic today, on anticoagulation therapy with warfarin for DVT  Changes to current medical/health status since last appt: none  Denies signs/symptoms of bleeding and/or thrombosis since the last appt.    Denies any interval changes to diet  Denies any interval changes to medications since last appt.   Denies any complications or cost restrictions with current therapy.   Patient declined BP check      A/P   INR  therapeutic.   Patient to continue current warfarin dosing regimen.    Follow up appointment in 2 week(s).    Marli Aguilera, PharmD

## 2017-09-12 NOTE — PROGRESS NOTES
Subjective:      Sanjuanita Sosa is a 77 y.o. female who presents with Follow-Up (Pneumonia)            HPI Sanjuanita SosaIs here today for cough and refills on pain medication.      1. Chronic low back pain without sciatica, unspecified back pain laterality  Patient currently uses Percocet up to twice a day for low back pain. Her last prescription was filled approximately 2 months ago for #60 tablets. Pharmacy review shows she is not going to other offices for medication. She states sometimes this is the only medicine that helps with her back pain and she cannot take anti-inflammatories because she is on Coumadin.    2. Primary insomnia  Patient currently on trazodone and the dosage was increased recently but she states the pharmacy has not been providing her with the higher dosage and she needs this rewritten today.    3. Persistent cough  Patient was seen for a cough on her last visit and chest x-ray showed atelectasis/possible  Pneumonitis and pneumonia cannot be ruled out so she was placed on a Z-Will. She also went to urgent care a few days later for a finger infection and took Bactrim DS. She states her cough is improved but she still has a cough which comes and goes. She states there is no associated lower extremity edema, chest pain, or shortness of breath.    4. Bilateral atelectasis  Chest x-ray from 3 weeks ago shows possible atelectasis.    5. Controlled type 2 diabetes mellitus without complication, without long-term current use of insulin (CMS-[facility])  Patient's last hemoglobin A1c in May was 6.4 and patient states she did not realize she has lab work pending for Dr. Delgado.  Current Outpatient Prescriptions   Medication Sig Dispense Refill   • oxycodone-acetaminophen (PERCOCET) 7.5-325 MG per tablet Take 1 Tab by mouth 2 Times a Day. 60 Tab 0   • trazodone (DESYREL) 100 MG Tab Take 1.5 Tabs by mouth 1 time daily as needed for Sleep. 45 Tab 11   • glimepiride (AMARYL) 2 MG Tab TAKE  ONE  TABLET BY MOUTH EVERY MORNING 30 Tab 11   • calcium carbonate (CALCIUM CARBONATE) 500 MG Tab Take 1 Tab by mouth 2 times a day, with meals. 60 Tab 5   • levothyroxine (SYNTHROID) 150 MCG Tab Take 1 Tab by mouth Every morning on an empty stomach. 30 Tab 6   • amlodipine (NORVASC) 10 MG Tab TAKE ONE TABLET BY MOUTH ONCE DAILY ** GENERIC FOR NORVASC 90 Tab 3   • metformin (GLUCOPHAGE) 500 MG Tab TAKE 2 TABLETS BY MOUTH EACH MORNING WITH  BREAKFAST AND TAKE 1 TABLET EACH EVENING   WITH DINNER 90 Tab 11   • vitamin D, Ergocalciferol, (DRISDOL) 67389 UNITS Cap capsule Take 1 Cap by mouth every 7 days. 4 Cap 11   • warfarin (COUMADIN) 5 MG Tab Take one to one and one-half (1-1.5) tablets daily as directed by [facility] 135 Tab 1   • losartan (COZAAR) 50 MG Tab TAKE ONE TABLET BY MOUTH EVERY DAY 30 Tab 10   • atorvastatin (LIPITOR) 80 MG tablet Take 0.5 Tabs by mouth every evening. 30 Tab 11   • omeprazole (PRILOSEC) 20 MG delayed-release capsule Take 1 Cap by mouth every day. 30 Cap 11   • losartan (COZAAR) 50 MG Tab Take daily 30 Tab 11   • MICROLET LANCETS Misc TEST BLOOD SUGAR TWO TIMES DAILY 100 Each 11   • docusate sodium 100 MG Cap Take 100 mg by mouth every morning. 30 Cap 0     No current facility-administered medications for this visit.      Facility-Administered Medications Ordered in Other Visits   Medication Dose Route Frequency Provider Last Rate Last Dose   • iodixanol (VISIPAQUE) SOLN    Intra-Op Once PRN Shekhar Rosado M.D.   10 mL at 01/18/17 0619     Social History   Substance Use Topics   • Smoking status: Former Smoker     Packs/day: 0.50     Years: 40.00     Types: Cigarettes     Quit date: 2/27/2013   • Smokeless tobacco: Never Used   • Alcohol use No     Past Medical History:   Diagnosis Date   • Disorder of thyroid 2016    thyroid cancer   • Blood clotting disorder (CMS-HCC) 2015    clot in leg and lung   • Nonspecific abnormal electrocardiogram (ECG) (EKG) 3/19/2012   •  "Autoimmune hepatitis (CMS-HCC) 3/19/2012   • H/O: HTN (hypertension) 3/19/2012   • Mixed hyperlipidemia 3/19/2012   • Murmur 3/19/2012   • Overweight 3/19/2012   • Preoperative cardiovascular examination 3/19/2012   • AAA (abdominal aortic aneurysm) (CMS-HCC)    • Anticoagulation monitoring, special range    • Arrhythmia    • Arthritis    • Breath shortness    • Cancer (CMS-HCC)     thyroid   • Cataract     left eye needs surgery   • Diabetes     pre-diabetic   • Gallstones    • Heart valve disease    • Hepatitis B    • Hiatus hernia syndrome    • Hyperlipidemia    • Hypertension    • Kidney disease    • Pain    • Palpitations    • Unspecified urinary incontinence      Family History   Problem Relation Age of Onset   • Hyperlipidemia Mother    • Stroke Mother    • Hyperlipidemia Father    • Stroke Father    • Heart Disease Brother      CABG       Review of Systems   Respiratory: Positive for cough.    Musculoskeletal: Positive for back pain.   All other systems reviewed and are negative.         Objective:     /62   Pulse 78   Temp 37.1 °C (98.7 °F)   Resp 16   Ht 1.575 m (5' 2\")   Wt 86.2 kg (190 lb)   LMP 10/12/1970   SpO2 90%   BMI 34.75 kg/m²      Physical Exam   Constitutional: She is oriented to person, place, and time. She appears well-developed and well-nourished. No distress.   HENT:   Head: Normocephalic and atraumatic.   Right Ear: External ear normal.   Left Ear: External ear normal.   Nose: Nose normal.   Eyes: Right eye exhibits no discharge. Left eye exhibits no discharge.   Neck: Normal range of motion. Neck supple. No thyromegaly present.   Cardiovascular: Normal rate, regular rhythm and normal heart sounds.  Exam reveals no gallop and no friction rub.    No murmur heard.  Occasional skipped beat to auscultation.   Pulmonary/Chest: Effort normal and breath sounds normal. She has no wheezes. She has no rales.   Musculoskeletal: She exhibits no edema or tenderness.        Lumbar back: " She exhibits decreased range of motion and pain.   Neurological: She is alert and oriented to person, place, and time. She displays normal reflexes.   Skin: Skin is warm and dry. No rash noted. She is not diaphoretic.   Psychiatric: She has a normal mood and affect. Her behavior is normal. Judgment and thought content normal.   Nursing note and vitals reviewed.         Impression       Bilateral infrahilar atelectasis/possible pneumonitis. Developing pneumonia not excluded.   Reading Provider Reading Date   Spenser Tubbs M.D. Aug 22,           Assessment/Plan:     1. Chronic low back pain without sciatica, unspecified back pain laterality  I have provided patient with 2 prescriptions for Percocet which should last her 2-3 months. She does not want to go to pain management to look into other treatment options. She cannot take ibuprofen or anti-inflammatories.  - oxycodone-acetaminophen (PERCOCET) 7.5-325 MG per tablet; Take 1 Tab by mouth 2 Times a Day.  Dispense: 60 Tab; Refill: 0    2. Primary insomnia  Patient currently taking 1-1/2 tablets a day and I explained to her again when she cannot take benzodiazepine products when she is on narcotics.  - trazodone (DESYREL) 100 MG Tab; Take 1.5 Tabs by mouth 1 time daily as needed for Sleep.  Dispense: 45 Tab; Refill: 11    3. Persistent cough  Patient has already been through 2 courses of antibiotics and chest x-ray showed atelectasis so I will send her for CT of the lungs.    4. Bilateral atelectasis  I will check for CT of the lungs because of the atelectasis and persistent cough.  - CT-CHEST (THORAX) W/O; Future    5. Controlled type 2 diabetes mellitus without complication, without long-term current use of insulin (CMS-[facility])  Patient reminded she has lab work pending for the vascular clinic including a hemoglobin A1c.

## 2017-09-13 ENCOUNTER — HOSPITAL ENCOUNTER (OUTPATIENT)
Facility: MEDICAL CENTER | Age: 77
End: 2017-09-13
Attending: INTERNAL MEDICINE
Payer: MEDICARE

## 2017-09-13 LAB
CREAT UR-MCNC: 38.5 MG/DL
MICROALBUMIN UR-MCNC: <0.7 MG/DL
MICROALBUMIN/CREAT UR: NORMAL MG/G (ref 0–30)

## 2017-09-13 PROCEDURE — 82043 UR ALBUMIN QUANTITATIVE: CPT

## 2017-09-13 PROCEDURE — 82570 ASSAY OF URINE CREATININE: CPT

## 2017-09-14 ENCOUNTER — TELEPHONE (OUTPATIENT)
Dept: VASCULAR LAB | Facility: MEDICAL CENTER | Age: 77
End: 2017-09-14

## 2017-09-14 DIAGNOSIS — E89.0 POSTOPERATIVE HYPOTHYROIDISM: ICD-10-CM

## 2017-09-14 RX ORDER — LEVOTHYROXINE SODIUM 137 UG/1
150 TABLET ORAL
Qty: 30 TAB | Refills: 11 | Status: ON HOLD | OUTPATIENT
Start: 2017-09-14 | End: 2018-03-24

## 2017-09-14 NOTE — TELEPHONE ENCOUNTER
Blood work reviewed.  Thyroid appears to be overtreated.   Will decrease dose.  MA to inform patient.    Michael Bloch, MD  Vascular Care    Cc:  Efrain Armendariz

## 2017-09-26 ENCOUNTER — ANTICOAGULATION VISIT (OUTPATIENT)
Dept: VASCULAR LAB | Facility: MEDICAL CENTER | Age: 77
End: 2017-09-26
Attending: INTERNAL MEDICINE
Payer: MEDICARE

## 2017-09-26 DIAGNOSIS — Z86.711 HX PULMONARY EMBOLISM: ICD-10-CM

## 2017-09-26 LAB
INR BLD: 3.4 (ref 0.9–1.2)
INR PPP: 3.4 (ref 2–3.5)

## 2017-09-26 PROCEDURE — 99212 OFFICE O/P EST SF 10 MIN: CPT

## 2017-09-26 PROCEDURE — 85610 PROTHROMBIN TIME: CPT

## 2017-09-26 NOTE — PROGRESS NOTES
OP Anticoagulation Service Note    Date: 9/26/2017  There were no vitals filed for this visit.    Anticoagulation Summary  As of 9/26/2017    INR goal:   2.0-3.0   TTR:   76.4 % (6.3 mo)   Today's INR:   3.4!   Maintenance plan:   5 mg (5 mg x 1) on Wed, Fri; 7.5 mg (5 mg x 1.5) all other days   Weekly total:   47.5 mg   Plan last modified:   Brianna Dillon AMaryP.NMary (4/11/2017)   Next INR check:   10/10/2017   Target end date:   Indefinite    Indications    DVT (deep venous thrombosis)  left (Resolved) [I82.402]  Hx pulmonary embolism [Z86.711]             Anticoagulation Episode Summary     INR check location:       Preferred lab:       Send INR reminders to:       Comments:         Anticoagulation Care Providers     Provider Role Specialty Phone number    WENDY Baker Referring Family Medicine [phone]    [facility] Responsible  [phone]        Anticoagulation Patient Findings  Patient Findings     Negatives:   Signs/symptoms of thrombosis, Signs/symptoms of bleeding, Laboratory test error suspected, Change in health, Change in alcohol use, Change in activity, Upcoming invasive procedure, Emergency department visit, Upcoming dental procedure, Missed doses, Extra doses, Change in medications, Change in diet/appetite, Hospital admission, Bruising, Other complaints          HPI:   Sanjuanita Sosa seen in clinic today, on anticoagulation therapy with warfarin for hx of DVT and PE  Confirmed warfarin dosing regimen  Changes to current medical/health status since last appt: NONE  Denies signs/symptoms of bleeding and/or thrombosis since the last appt.    Denies any interval changes to diet  Denies any interval changes to medications since last appt.   Denies any complications or cost restrictions with current therapy.       A/P   INR  supra-therapeutic.   Tonight take 2.5 mg then resume usual regimen   Pt declines vitals    Follow up appointment in 3 week(s) d/t pts  schedule  Ciarra Dupree, Pharm D

## 2017-10-04 ENCOUNTER — TELEPHONE (OUTPATIENT)
Dept: MEDICAL GROUP | Facility: MEDICAL CENTER | Age: 77
End: 2017-10-04

## 2017-10-04 NOTE — TELEPHONE ENCOUNTER
Future Appointments       Provider Department Center    10/10/2017 10:00 AM WENDY Baker [facility] [address]    10/11/2017 9:00 AM Sundar Albert M.D. [facility] [address]    10/17/2017 11:30 AM [address] [facility] IMAGING - CT - [address]    10/18/2017 11:00 AM [facility] EXAM 5 [facility]      11/8/2017 9:20 AM VASCULAR NURSE PRACTITIONER [facility]          ESTABLISHED PATIENT PRE-VISIT PLANNING     Note: Patient will not be contacted if there is no indication to call.     1.  Reviewed note from last office visit with PCP and/or other med group provider: Yes    2.  If any orders were placed at last visit, do we have Results/Consult Notes?        •  Labs - Labs were not ordered at last office visit.       •  Imaging - Imaging was not ordered at last office visit.       •  Referrals - No referrals were ordered at last office visit.    3.  Immunizations were updated in Live Mobile using WebIZ?: Yes       •  Web Iz Recommendations: FLU and ZOSTAVAX (Shingles)    4.  Patient is due for the following Health Maintenance Topics:   Health Maintenance Due   Topic Date Due   • IMM ZOSTER VACCINE  03/16/2002   • IMM PNEUMOCOCCAL 65+ (ADULT) LOW/MEDIUM RISK SERIES (2 of 2 - PCV13) 08/05/2016   • RETINAL SCREENING  08/12/2016   • BONE DENSITY  10/07/2016   • IMM INFLUENZA (1) 09/01/2017           5.  Patient was not informed to arrive 15 min prior to their scheduled appointment and bring in their medication bottles.

## 2017-10-10 ENCOUNTER — OFFICE VISIT (OUTPATIENT)
Dept: MEDICAL GROUP | Facility: MEDICAL CENTER | Age: 77
End: 2017-10-10
Payer: MEDICARE

## 2017-10-10 VITALS
SYSTOLIC BLOOD PRESSURE: 140 MMHG | HEART RATE: 87 BPM | OXYGEN SATURATION: 93 % | BODY MASS INDEX: 35.51 KG/M2 | RESPIRATION RATE: 16 BRPM | HEIGHT: 62 IN | TEMPERATURE: 98.6 F | WEIGHT: 193 LBS | DIASTOLIC BLOOD PRESSURE: 68 MMHG

## 2017-10-10 DIAGNOSIS — G89.29 CHRONIC LOW BACK PAIN WITHOUT SCIATICA, UNSPECIFIED BACK PAIN LATERALITY: ICD-10-CM

## 2017-10-10 DIAGNOSIS — M54.50 CHRONIC LOW BACK PAIN WITHOUT SCIATICA, UNSPECIFIED BACK PAIN LATERALITY: ICD-10-CM

## 2017-10-10 DIAGNOSIS — E11.9 CONTROLLED TYPE 2 DIABETES MELLITUS WITHOUT COMPLICATION, WITHOUT LONG-TERM CURRENT USE OF INSULIN (HCC): ICD-10-CM

## 2017-10-10 DIAGNOSIS — L29.9 PRURITUS: ICD-10-CM

## 2017-10-10 DIAGNOSIS — Z79.891 CHRONIC USE OF OPIATE FOR THERAPEUTIC PURPOSE: ICD-10-CM

## 2017-10-10 DIAGNOSIS — Z23 NEED FOR PNEUMOCOCCAL VACCINE: ICD-10-CM

## 2017-10-10 PROCEDURE — 90670 PCV13 VACCINE IM: CPT | Performed by: NURSE PRACTITIONER

## 2017-10-10 PROCEDURE — 99213 OFFICE O/P EST LOW 20 MIN: CPT | Mod: 25 | Performed by: NURSE PRACTITIONER

## 2017-10-10 PROCEDURE — G0009 ADMIN PNEUMOCOCCAL VACCINE: HCPCS | Performed by: NURSE PRACTITIONER

## 2017-10-10 RX ORDER — FEXOFENADINE HCL 180 MG/1
180 TABLET ORAL DAILY
Qty: 30 TAB | COMMUNITY
Start: 2017-10-10 | End: 2018-03-21

## 2017-10-10 ASSESSMENT — ENCOUNTER SYMPTOMS
INSOMNIA: 1
BACK PAIN: 1

## 2017-10-10 NOTE — PROGRESS NOTES
Subjective:      Sanjuanita Sosa is a 77 y.o. female who presents with Follow-Up (routine f/u)            HPI Sanjuanita SosaIs here today to discuss pruritus as well as issues with her back.      1. Chronic low back pain without sciatica, unspecified back pain laterality  Patient has chronic back pain for which she currently takes approximately 1-2 Percocet per day. Her last MRI of the lumbar spine was in 2015 and showed multilevel degenerative changes as well as chronic compression deformity. She states the Percocet sometimes helps with the pain but not always. It sometimes keeps her up at night. She denies loss of strength of the extremities or loss of bowel or bladder control.    2. Chronic use of opiate for therapeutic purpose  Patient currently on #60 Percocet which usually lasts her almost 2 months. She has signed prior drug contract.    3. Pruritus  Patient states she has been noticing more pruritus of her skin lately. It is generalized and she has tried moisturizers with some relief.    4. Controlled type 2 diabetes mellitus without complication, without long-term current use of insulin (CMS-[facility])  Last hemoglobin A1c was just below 7.0 and she is currently on glimepiride. She does have an appointment with endocrinology within the month.    5. Need for pneumococcal vaccine  Patient due for influenza and pneumonia vaccine but we only have pneumonia vaccine available today.  Current Outpatient Prescriptions   Medication Sig Dispense Refill   • fexofenadine (ALLEGRA) 180 MG tablet Take 1 Tab by mouth every day. 30 Tab    • Misc. Devices Misc Use one test strip twice daily 60 Each 11   • levothyroxine (SYNTHROID) 137 MCG Tab Take 1 Tab by mouth Every morning on an empty stomach. 30 Tab 11   • oxycodone-acetaminophen (PERCOCET) 7.5-325 MG per tablet Take 1 Tab by mouth 2 Times a Day. 60 Tab 0   • trazodone (DESYREL) 100 MG Tab Take 1.5 Tabs by mouth 1 time daily as needed for Sleep. 45 Tab 11   •  glimepiride (AMARYL) 2 MG Tab TAKE  ONE TABLET BY MOUTH EVERY MORNING 30 Tab 11   • amlodipine (NORVASC) 10 MG Tab TAKE ONE TABLET BY MOUTH ONCE DAILY ** GENERIC FOR NORVASC 90 Tab 3   • metformin (GLUCOPHAGE) 500 MG Tab TAKE 2 TABLETS BY MOUTH EACH MORNING WITH  BREAKFAST AND TAKE 1 TABLET EACH EVENING   WITH DINNER 90 Tab 11   • vitamin D, Ergocalciferol, (DRISDOL) 08500 UNITS Cap capsule Take 1 Cap by mouth every 7 days. 4 Cap 11   • warfarin (COUMADIN) 5 MG Tab Take one to one and one-half (1-1.5) tablets daily as directed by [facility] 135 Tab 1   • atorvastatin (LIPITOR) 80 MG tablet Take 0.5 Tabs by mouth every evening. 30 Tab 11   • omeprazole (PRILOSEC) 20 MG delayed-release capsule Take 1 Cap by mouth every day. 30 Cap 11   • losartan (COZAAR) 50 MG Tab Take daily 30 Tab 11   • MICROLET LANCETS Misc TEST BLOOD SUGAR TWO TIMES DAILY 100 Each 11   • docusate sodium 100 MG Cap Take 100 mg by mouth every morning. 30 Cap 0   • losartan (COZAAR) 50 MG Tab TAKE ONE TABLET BY MOUTH EVERY DAY 30 Tab 10     No current facility-administered medications for this visit.      Facility-Administered Medications Ordered in Other Visits   Medication Dose Route Frequency Provider Last Rate Last Dose   • iodixanol (VISIPAQUE) SOLN    Intra-Op Once PRN Shekhar Rosado M.D.   10 mL at 01/18/17 0619     Social History   Substance Use Topics   • Smoking status: Current Every Day Smoker     Packs/day: 0.25     Years: 40.00     Types: Cigarettes   • Smokeless tobacco: Never Used   • Alcohol use No     Family History   Problem Relation Age of Onset   • Hyperlipidemia Mother    • Stroke Mother    • Hyperlipidemia Father    • Stroke Father    • Heart Disease Brother      CABG     Past Medical History:   Diagnosis Date   • Disorder of thyroid 2016    thyroid cancer   • Blood clotting disorder (CMS-HCC) 2015    clot in leg and lung   • Nonspecific abnormal electrocardiogram (ECG) (EKG) 3/19/2012   • Autoimmune  "hepatitis (CMS-HCC) 3/19/2012   • H/O: HTN (hypertension) 3/19/2012   • Mixed hyperlipidemia 3/19/2012   • Murmur 3/19/2012   • Overweight(278.02) 3/19/2012   • Preoperative cardiovascular examination 3/19/2012   • AAA (abdominal aortic aneurysm) (CMS-HCC)    • Anticoagulation monitoring, special range    • Arrhythmia    • Arthritis    • Breath shortness    • Cancer (CMS-HCC)     thyroid   • Cataract     left eye needs surgery   • Diabetes     pre-diabetic   • Gallstones    • Heart valve disease    • Hepatitis B    • Hiatus hernia syndrome    • Hyperlipidemia    • Hypertension    • Kidney disease    • Pain    • Palpitations    • Unspecified urinary incontinence        Review of Systems   Musculoskeletal: Positive for back pain.   Skin: Positive for itching. Negative for rash.   Psychiatric/Behavioral: The patient has insomnia.    All other systems reviewed and are negative.         Objective:     /68   Pulse 87   Temp 37 °C (98.6 °F)   Resp 16   Ht 1.575 m (5' 2\")   Wt 87.5 kg (193 lb)   LMP 10/12/1970   SpO2 93%   BMI 35.30 kg/m²      Physical Exam   Constitutional: She is oriented to person, place, and time. She appears well-developed and well-nourished. No distress.   HENT:   Head: Normocephalic and atraumatic.   Right Ear: External ear normal.   Left Ear: External ear normal.   Nose: Nose normal.   Eyes: Right eye exhibits no discharge. Left eye exhibits no discharge.   Neck: Normal range of motion. Neck supple. No thyromegaly present.   Cardiovascular: Normal rate, regular rhythm and normal heart sounds.  Exam reveals no gallop and no friction rub.    No murmur heard.  Pulmonary/Chest: Effort normal and breath sounds normal. She has no wheezes. She has no rales.   Musculoskeletal: She exhibits no edema or tenderness.        Lumbar back: She exhibits decreased range of motion and pain.   Neurological: She is alert and oriented to person, place, and time. She displays normal reflexes.   Skin: Skin " is warm and dry. No rash noted. She is not diaphoretic.   Psychiatric: She has a normal mood and affect. Her behavior is normal. Judgment and thought content normal.   Nursing note and vitals reviewed.              Assessment/Plan:     1. Chronic low back pain without sciatica, unspecified back pain laterality  Patient was advised that with her continuing pain issues she will be referred to pain management. She has signed prior drug contract that she had difficulty getting us a urine sample today for a urine drug screen. She should have enough medication to last her the next 2-3 months and if she decides she does not want to go to pain management in the future, she will need to wean off her narcotics.  - REFERRAL TO PAIN CLINIC    2. Chronic use of opiate for therapeutic purpose  Patient referred for further management.  - REFERRAL TO PAIN CLINIC    3. Pruritus  I recommended moisturizers and over-the-counter antihistamines to help with symptoms. I explained that this may have be a combination of the dry air in the [place] as well as her medications.  - fexofenadine (ALLEGRA) 180 MG tablet; Take 1 Tab by mouth every day.  Dispense: 30 Tab    4. Controlled type 2 diabetes mellitus without complication, without long-term current use of insulin (CMS-[facility])  Previous diabetes was doing well and apparently she will be following with endocrinology in the near future.    5. Need for pneumococcal vaccine  I have placed the below orders and discussed them with an approved delegating provider. The MA is performing the below orders under the direction of Dr. Arthur Bates 13 PCV-13

## 2017-10-11 ENCOUNTER — OFFICE VISIT (OUTPATIENT)
Dept: ENDOCRINOLOGY | Facility: MEDICAL CENTER | Age: 77
End: 2017-10-11
Payer: MEDICARE

## 2017-10-11 VITALS
HEIGHT: 62 IN | WEIGHT: 194.8 LBS | OXYGEN SATURATION: 94 % | HEART RATE: 92 BPM | DIASTOLIC BLOOD PRESSURE: 70 MMHG | BODY MASS INDEX: 35.85 KG/M2 | SYSTOLIC BLOOD PRESSURE: 128 MMHG

## 2017-10-11 DIAGNOSIS — C73 PAPILLARY THYROID CARCINOMA (HCC): ICD-10-CM

## 2017-10-11 DIAGNOSIS — E89.0 POSTSURGICAL HYPOTHYROIDISM: ICD-10-CM

## 2017-10-11 DIAGNOSIS — E11.9 TYPE 2 DIABETES MELLITUS WITHOUT COMPLICATION, WITHOUT LONG-TERM CURRENT USE OF INSULIN (HCC): ICD-10-CM

## 2017-10-11 PROCEDURE — 99214 OFFICE O/P EST MOD 30 MIN: CPT | Performed by: INTERNAL MEDICINE

## 2017-10-11 NOTE — PROGRESS NOTES
"PCP: WENDY Baker    CC: Thyroid cancer    HPI:  Sanjuanita Sosa is a 76 y.o. old patient who comes in today for follow-up.     Papillary thyroid carcinoma: She underwent total thyroidectomy in October 2016. She received 100 mCi for radioactive iodine ablation in December 2016, post therapy scan showed uptake only in the neck. She denies difficulty swallowing or breathing.    Postsurgical hypothyroidism: She is currently on levothyroxine 137 µg daily. She reports compliance with medications. She denies palpitations or racing heart.    Type 2 diabetes: Recent hemoglobin A1c is 6.8%. She is on glimepiride and metformin. Denies hypoglycemic episodes.    ROS:  Constitutional: No unintentional weight loss  Cardiac: No palpitations or racing heart    Past Medical History:  Patient Active Problem List    Diagnosis Date Noted   • Chronic use of opiate for therapeutic purpose 10/10/2017   • Primary insomnia 07/11/2017   • Abdominal aortic aneurysm without rupture (CMS-HCC) 04/12/2017   • Chronic low back pain without sciatica 01/31/2017   • Postoperative hypothyroidism 01/31/2017   • Chronic anticoagulation 01/31/2017   • Gastroesophageal reflux disease without esophagitis 12/03/2015   • Vitamin D deficiency disease 11/24/2015   • Controlled type 2 diabetes mellitus without complication, without long-term current use of insulin (CMS-HCC) 11/24/2015   • CKD (chronic kidney disease) stage 3, GFR 30-59 ml/min 11/19/2015   • BMI 33.0-33.9,adult 09/08/2015   • Risk for falls 09/08/2015   • Essential hypertension 08/05/2015   • Hx pulmonary embolism 05/12/2015   • Dyslipidemia 04/28/2015   • Carotid artery stenosis 10/07/2014     Physical Examination:  Vital signs: /70   Pulse 92   Ht 1.575 m (5' 2\")   Wt 88.4 kg (194 lb 12.8 oz)   LMP 10/12/1970   SpO2 94%   BMI 35.63 kg/m²   General: No apparent distress, cooperative  Neck: Well-healed thyroidectomy scar  Resp: Normal effort  Extremities: No " edema  Neuro: Alert and oriented  Skin: No rash  Psych: Normal mood and affect    Assessment and Plan:    Papillary thyroid carcinoma  · Status post total thyroidectomy in October 2016  · Status post radioactive iodine ablation with 100 mCi in December 2016, post therapy scan showed uptake only in the neck  · Pathology report from October 2016: 2 cm papillary thyroid carcinoma, classic variant, with extra thyroidal extension into strap muscles and 1/1 perithyroidal lymph node with metastatic papillary thyroid carcinoma  · Thyroid bed ultrasound in June 2017 showed two borderline enlarged lymph nodes in the left neck  · Most recent thyroglobulin level is 0.5 in March 2017  · Repeat labs in December 2017: TSH, free T4, thyroglobulin, thyroglobulin antibody  · Repeat thyroid bed ultrasound in December 2017    Postsurgical hypothyroidism  · Most recent TSH is 0.09, no hyperthyroid symptoms  · Goal TSH at this time is less than 0.1  · We will repeat TSH and free T4 in December 2070  · For now continue on levothyroxine 137 µg daily    Type 2 diabetes without complications, without current long-term use of insulin  · Hemoglobin A1c last month was 6.8%  · No changes to medications today    Return in about 2 months (around 12/11/2017).    Thank you for allowing me to participate in the care of this patient.    Sundar Albert M.D.     CC:   Efrain Armendariz A.P.N.    This note was created using voice recognition software (Dragon). The accuracy of the dictation is limited by the abilities of the software. I have reviewed the note prior to signing, however some errors in grammar and context are still possible. If you have any questions related to this note please do not hesitate to contact our office.

## 2017-10-17 ENCOUNTER — HOSPITAL ENCOUNTER (OUTPATIENT)
Dept: RADIOLOGY | Facility: MEDICAL CENTER | Age: 77
End: 2017-10-17
Attending: NURSE PRACTITIONER
Payer: MEDICARE

## 2017-10-17 DIAGNOSIS — J98.11 BILATERAL ATELECTASIS: ICD-10-CM

## 2017-10-17 PROCEDURE — 71250 CT THORAX DX C-: CPT

## 2017-10-18 ENCOUNTER — ANTICOAGULATION VISIT (OUTPATIENT)
Dept: VASCULAR LAB | Facility: MEDICAL CENTER | Age: 77
End: 2017-10-18
Attending: INTERNAL MEDICINE
Payer: MEDICARE

## 2017-10-18 VITALS — HEART RATE: 68 BPM | SYSTOLIC BLOOD PRESSURE: 102 MMHG | DIASTOLIC BLOOD PRESSURE: 75 MMHG

## 2017-10-18 DIAGNOSIS — Z86.711 HX PULMONARY EMBOLISM: ICD-10-CM

## 2017-10-18 LAB
INR BLD: 4.3 (ref 0.9–1.2)
INR PPP: 4.3 (ref 2–3.5)

## 2017-10-18 PROCEDURE — 85610 PROTHROMBIN TIME: CPT

## 2017-10-18 PROCEDURE — 99212 OFFICE O/P EST SF 10 MIN: CPT | Performed by: NURSE PRACTITIONER

## 2017-10-18 NOTE — PROGRESS NOTES
Anticoagulation Summary  As of 10/18/2017    INR goal:   2.0-3.0   TTR:   68.4 % (7 mo)   Today's INR:   4.3!   Maintenance plan:   5 mg (5 mg x 1) on Mon, Wed, Fri; 7.5 mg (5 mg x 1.5) all other days   Weekly total:   45 mg   Plan last modified:   YUE VickPSTEFAN (10/18/2017)   Next INR check:   11/8/2017   Target end date:   Indefinite    Indications    DVT (deep venous thrombosis)  left (Resolved) [I82.402]  Hx pulmonary embolism [Z86.711]             Anticoagulation Episode Summary     INR check location:       Preferred lab:       Send INR reminders to:       Comments:         Anticoagulation Care Providers     Provider Role Specialty Phone number    WENDY Baker Referring Family Medicine [phone]    [facility] Responsible  [phone]        Anticoagulation Patient Findings      HPI:  Sanjuanita Sosa seen in clinic today for follow up on anticoagulation therapy in the presence of chronic anticoagulation. Denies any changes to current medical/health status since last appointment. Denies any medication or diet changes. No current symptoms of bleeding or thrombosis reported.    A/P:   INR supratherapeutic. INR trended up. Will decrease regimen. BP recorded in vitals.    Follow up appointment in 3 week(s) per pt.    Next Appointment: Wednesday, November 8, 2017 at 9:45 am.     Farheen CARPENTER

## 2017-10-24 ENCOUNTER — APPOINTMENT (OUTPATIENT)
Dept: RADIOLOGY | Facility: MEDICAL CENTER | Age: 77
End: 2017-10-24
Attending: EMERGENCY MEDICINE
Payer: MEDICARE

## 2017-10-24 ENCOUNTER — HOSPITAL ENCOUNTER (EMERGENCY)
Facility: MEDICAL CENTER | Age: 77
End: 2017-10-24
Attending: EMERGENCY MEDICINE
Payer: MEDICARE

## 2017-10-24 VITALS
RESPIRATION RATE: 16 BRPM | TEMPERATURE: 96.5 F | BODY MASS INDEX: 32.44 KG/M2 | DIASTOLIC BLOOD PRESSURE: 54 MMHG | HEIGHT: 64 IN | SYSTOLIC BLOOD PRESSURE: 127 MMHG | WEIGHT: 190 LBS | OXYGEN SATURATION: 97 % | HEART RATE: 65 BPM

## 2017-10-24 DIAGNOSIS — R10.32 LEFT LOWER QUADRANT PAIN: ICD-10-CM

## 2017-10-24 LAB
ALBUMIN SERPL BCP-MCNC: 3.7 G/DL (ref 3.2–4.9)
ALBUMIN/GLOB SERPL: 1.1 G/DL
ALP SERPL-CCNC: 73 U/L (ref 30–99)
ALT SERPL-CCNC: 7 U/L (ref 2–50)
ANION GAP SERPL CALC-SCNC: 7 MMOL/L (ref 0–11.9)
APPEARANCE UR: CLEAR
AST SERPL-CCNC: 13 U/L (ref 12–45)
BASOPHILS # BLD AUTO: 0.5 % (ref 0–1.8)
BASOPHILS # BLD: 0.05 K/UL (ref 0–0.12)
BILIRUB SERPL-MCNC: 0.5 MG/DL (ref 0.1–1.5)
BUN SERPL-MCNC: 18 MG/DL (ref 8–22)
CALCIUM SERPL-MCNC: 9 MG/DL (ref 8.5–10.5)
CHLORIDE SERPL-SCNC: 105 MMOL/L (ref 96–112)
CO2 SERPL-SCNC: 25 MMOL/L (ref 20–33)
COLOR UR AUTO: YELLOW
CREAT SERPL-MCNC: 1.02 MG/DL (ref 0.5–1.4)
EOSINOPHIL # BLD AUTO: 0.74 K/UL (ref 0–0.51)
EOSINOPHIL NFR BLD: 7.5 % (ref 0–6.9)
ERYTHROCYTE [DISTWIDTH] IN BLOOD BY AUTOMATED COUNT: 52.1 FL (ref 35.9–50)
GFR SERPL CREATININE-BSD FRML MDRD: 53 ML/MIN/1.73 M 2
GLOBULIN SER CALC-MCNC: 3.3 G/DL (ref 1.9–3.5)
GLUCOSE SERPL-MCNC: 118 MG/DL (ref 65–99)
GLUCOSE UR QL STRIP.AUTO: NEGATIVE MG/DL
HCT VFR BLD AUTO: 36.5 % (ref 37–47)
HGB BLD-MCNC: 11.7 G/DL (ref 12–16)
IMM GRANULOCYTES # BLD AUTO: 0.03 K/UL (ref 0–0.11)
IMM GRANULOCYTES NFR BLD AUTO: 0.3 % (ref 0–0.9)
KETONES UR QL STRIP.AUTO: NEGATIVE MG/DL
LEUKOCYTE ESTERASE UR QL STRIP.AUTO: ABNORMAL
LIPASE SERPL-CCNC: 11 U/L (ref 11–82)
LYMPHOCYTES # BLD AUTO: 1.94 K/UL (ref 1–4.8)
LYMPHOCYTES NFR BLD: 19.6 % (ref 22–41)
MCH RBC QN AUTO: 27.8 PG (ref 27–33)
MCHC RBC AUTO-ENTMCNC: 32.1 G/DL (ref 33.6–35)
MCV RBC AUTO: 86.7 FL (ref 81.4–97.8)
MONOCYTES # BLD AUTO: 1.22 K/UL (ref 0–0.85)
MONOCYTES NFR BLD AUTO: 12.3 % (ref 0–13.4)
NEUTROPHILS # BLD AUTO: 5.91 K/UL (ref 2–7.15)
NEUTROPHILS NFR BLD: 59.8 % (ref 44–72)
NITRITE UR QL STRIP.AUTO: NEGATIVE
NRBC # BLD AUTO: 0 K/UL
NRBC BLD AUTO-RTO: 0 /100 WBC
PH UR STRIP.AUTO: 5.5 [PH]
PLATELET # BLD AUTO: 450 K/UL (ref 164–446)
PMV BLD AUTO: 9.4 FL (ref 9–12.9)
POTASSIUM SERPL-SCNC: 3.9 MMOL/L (ref 3.6–5.5)
PROT SERPL-MCNC: 7 G/DL (ref 6–8.2)
PROT UR QL STRIP: NEGATIVE MG/DL
RBC # BLD AUTO: 4.21 M/UL (ref 4.2–5.4)
RBC UR QL AUTO: ABNORMAL
SODIUM SERPL-SCNC: 137 MMOL/L (ref 135–145)
SP GR UR: <=1.005
WBC # BLD AUTO: 9.9 K/UL (ref 4.8–10.8)

## 2017-10-24 PROCEDURE — 96375 TX/PRO/DX INJ NEW DRUG ADDON: CPT

## 2017-10-24 PROCEDURE — 700105 HCHG RX REV CODE 258: Performed by: EMERGENCY MEDICINE

## 2017-10-24 PROCEDURE — A9270 NON-COVERED ITEM OR SERVICE: HCPCS | Performed by: EMERGENCY MEDICINE

## 2017-10-24 PROCEDURE — 96374 THER/PROPH/DIAG INJ IV PUSH: CPT | Mod: XU

## 2017-10-24 PROCEDURE — 85025 COMPLETE CBC W/AUTO DIFF WBC: CPT

## 2017-10-24 PROCEDURE — 74177 CT ABD & PELVIS W/CONTRAST: CPT

## 2017-10-24 PROCEDURE — 81002 URINALYSIS NONAUTO W/O SCOPE: CPT

## 2017-10-24 PROCEDURE — 80053 COMPREHEN METABOLIC PANEL: CPT

## 2017-10-24 PROCEDURE — 700102 HCHG RX REV CODE 250 W/ 637 OVERRIDE(OP): Performed by: EMERGENCY MEDICINE

## 2017-10-24 PROCEDURE — 99285 EMERGENCY DEPT VISIT HI MDM: CPT

## 2017-10-24 PROCEDURE — 83690 ASSAY OF LIPASE: CPT

## 2017-10-24 RX ORDER — HYDROCODONE BITARTRATE AND ACETAMINOPHEN 5; 325 MG/1; MG/1
1-2 TABLET ORAL EVERY 6 HOURS PRN
Qty: 10 TAB | Refills: 0 | Status: SHIPPED | OUTPATIENT
Start: 2017-10-24 | End: 2018-03-07

## 2017-10-24 RX ORDER — SODIUM CHLORIDE 9 MG/ML
INJECTION, SOLUTION INTRAVENOUS CONTINUOUS
Status: DISCONTINUED | OUTPATIENT
Start: 2017-10-24 | End: 2017-10-24 | Stop reason: HOSPADM

## 2017-10-24 RX ORDER — MORPHINE SULFATE 4 MG/ML
4 INJECTION, SOLUTION INTRAMUSCULAR; INTRAVENOUS ONCE
Status: DISCONTINUED | OUTPATIENT
Start: 2017-10-24 | End: 2017-10-24 | Stop reason: HOSPADM

## 2017-10-24 RX ORDER — HYDROCODONE BITARTRATE AND ACETAMINOPHEN 5; 325 MG/1; MG/1
1 TABLET ORAL ONCE
Status: COMPLETED | OUTPATIENT
Start: 2017-10-24 | End: 2017-10-24

## 2017-10-24 RX ORDER — ONDANSETRON 2 MG/ML
4 INJECTION INTRAMUSCULAR; INTRAVENOUS ONCE
Status: DISCONTINUED | OUTPATIENT
Start: 2017-10-24 | End: 2017-10-24 | Stop reason: HOSPADM

## 2017-10-24 RX ADMIN — HYDROCODONE BITARTRATE AND ACETAMINOPHEN 1 TABLET: 5; 325 TABLET ORAL at 09:24

## 2017-10-24 RX ADMIN — SODIUM CHLORIDE 1000 ML: 9 INJECTION, SOLUTION INTRAVENOUS at 08:54

## 2017-10-24 ASSESSMENT — PAIN SCALES - GENERAL
PAINLEVEL_OUTOF10: 2
PAINLEVEL_OUTOF10: 2

## 2017-10-24 NOTE — ED PROVIDER NOTES
ED Provider Note    CHIEF COMPLAINT  Chief Complaint   Patient presents with   • LUQ Pain       HPI  Sanjuanita Sosa is a 77 y.o. female who presentsWith abdominal pain, left lower quadrant pain, for the last 4 days, pain all day everyday. Pain severe dull gradual in onset. Nonradiating, no nausea no vomiting no diarrhea no fever no chills no dysuria urgency or frequency. Evidently she did have a CAT scan of her chest in 2 weeks ago because of pneumonia however cat scan of her abdomen. She has not had similar pain in the past.    REVIEW OF SYSTEMS  See HPI for further details. History of abdominal aortic aneurysm, although hepatitis diabetes, heart valve is hepatitis B at hypertension and thyroid cancer and cholecystectomy diabetesDenies other G.I., G.U.. endrocine, cardiovascular, respriatory or neurological problems.  All other systems are negative.     PAST MEDICAL HISTORY  Past Medical History:   Diagnosis Date   • AAA (abdominal aortic aneurysm) (CMS-HCC)    • Anticoagulation monitoring, special range    • Arrhythmia    • Arthritis    • Autoimmune hepatitis (CMS-HCC) 3/19/2012   • Blood clotting disorder (CMS-HCC) 2015    clot in leg and lung   • Breath shortness    • Cancer (CMS-HCC)     thyroid   • Cataract     left eye needs surgery   • Diabetes     pre-diabetic   • Disorder of thyroid 2016    thyroid cancer   • Gallstones    • H/O: HTN (hypertension) 3/19/2012   • Heart valve disease    • Hepatitis B    • Hiatus hernia syndrome    • Hyperlipidemia    • Hypertension    • Kidney disease    • Mixed hyperlipidemia 3/19/2012   • Murmur 3/19/2012   • Nonspecific abnormal electrocardiogram (ECG) (EKG) 3/19/2012   • Overweight(278.02) 3/19/2012   • Pain    • Palpitations    • Preoperative cardiovascular examination 3/19/2012   • Unspecified urinary incontinence        FAMILY HISTORY  Family History   Problem Relation Age of Onset   • Hyperlipidemia Mother    • Stroke Mother    • Hyperlipidemia Father    •  Stroke Father    • Heart Disease Brother      CABG       SOCIAL HISTORY  Social History     Social History   • Marital status:      Spouse name: N/A   • Number of children: N/A   • Years of education: N/A     Social History Main Topics   • Smoking status: Current Every Day Smoker     Packs/day: 0.25     Years: 40.00     Types: Cigarettes   • Smokeless tobacco: Never Used   • Alcohol use No   • Drug use: No   • Sexual activity: Not Currently     Other Topics Concern   • Not on file     Social History Narrative   • No narrative on file       SURGICAL HISTORY  Past Surgical History:   Procedure Laterality Date   • DARREN BY LAPAROSCOPY  1/2/2017    Procedure: DARREN BY LAPAROSCOPY;  Surgeon: Chele Valles M.D.;  Location: SURGERY [facility];  Service:    • ERCP IN OR  12/30/2016    Procedure: ERCP IN OR;  Surgeon: Shekhar Rosado M.D.;  Location: SURGERY SAME DAY [facility];  Service:    • THYROIDECTOMY TOTAL N/A 10/12/2016    Procedure: THYROIDECTOMY TOTAL;  Surgeon: Nuno Duncan M.D.;  Location: SURGERY SAME DAY [facility];  Service:    • EXPLORATORY LAPAROTOMY  2/27/2013    Performed by Edson Craft M.D. at SURGERY [facility]   • SPLENECTOMY  2/27/2013    Performed by Edson Craft M.D. at SURGERY [facility]   • NODE DISSECTION  2/27/2013    Performed by Edson Craft M.D. at SURGERY [facility]   • OOPHORECTOMY  2/27/2013    Performed by Levi Nolan M.D. at SURGERY [facility]   • BLADDER SUSPENSION  5/27/2009    Performed by RINA HART at SURGERY SAME DAY [facility]   • CYSTOSCOPY  5/27/2009    Performed by RINA HART at SURGERY SAME DAY [facility]   • RECTOCELE REPAIR  5/27/2009    Performed by RINA HART at SURGERY SAME DAY [facility]   • APPENDECTOMY     • HYSTERECTOMY LAPAROSCOPY     • KNEE REPLACEMENT, TOTAL     • TONSILLECTOMY         CURRENT MEDICATIONS  Home Medications     Reviewed by Luz ASHLEY  "ANTHONY Salvador (Registered Nurse) on 10/24/17 at 0828  Med List Status: Partial   Medication Last Dose Status   amlodipine (NORVASC) 10 MG Tab 10/11/2017 Active   atorvastatin (LIPITOR) 80 MG tablet 10/11/2017 Active   docusate sodium 100 MG Cap 10/11/2017 Active   fexofenadine (ALLEGRA) 180 MG tablet 10/11/2017 Active   glimepiride (AMARYL) 2 MG Tab 10/11/2017 Active   levothyroxine (SYNTHROID) 137 MCG Tab 10/11/2017 Active   losartan (COZAAR) 50 MG Tab 10/11/2017 Active   losartan (COZAAR) 50 MG Tab 10/11/2017 Active   metformin (GLUCOPHAGE) 500 MG Tab 10/11/2017 Active   MICROLET LANCETS Misc 10/11/2017 Active   Misc. Devices Misc 10/11/2017 Active   omeprazole (PRILOSEC) 20 MG delayed-release capsule 10/11/2017 Active   oxycodone-acetaminophen (PERCOCET) 7.5-325 MG per tablet 10/11/2017 Active   trazodone (DESYREL) 100 MG Tab 10/11/2017 Active   vitamin D, Ergocalciferol, (DRISDOL) 76465 UNITS Cap capsule 10/11/2017 Active   warfarin (COUMADIN) 5 MG Tab 10/11/2017 Active                ALLERGIES  Allergies   Allergen Reactions   • Aspirin Anaphylaxis   • Penicillins Anaphylaxis     As a child       PHYSICAL EXAM  VITAL SIGNS: /46   Pulse 66   Temp 35.8 °C (96.5 °F)   Resp 15   Ht 1.626 m (5' 4\")   Wt 86.2 kg (190 lb)   LMP 10/12/1970   SpO2 96%   BMI 32.61 kg/m²    Constitutional: Well developed, Well nourished, No acute distress, Non-toxic appearance.   HENT: Normocephalic, Atraumatic, Bilateral external ears normal, Oropharynx moist, No oral exudates, Nose normal.   Eyes: PERRL, EOMI, Conjunctiva normal, No discharge.   Neck: Normal range of motion, No tenderness, Supple, No stridor.   Lymphatic: No lymphadenopathy noted.   Cardiovascular: Normal heart rate, Normal rhythm, No murmurs, No rubs, No gallops.   Thorax & Lungs: Normal breath sounds, No respiratory distress, No wheezing, No chest tenderness.   Abdomen:Tenderness left lower quadrant, no guarding no rigidity and the abdomen is soft.  No " masses, No pulsatile masses.. Reexamination abdomen 1:30 PM, no pain heart  Skin: Warm, Dry, No erythema, No rash.   Back: No tenderness, No CVA tenderness.   Extremities: Intact distal pulses, No edema, No tenderness, No cyanosis, No clubbing.   Musculoskeletal: Good range of motion in all major joints. No tenderness to palpation or major deformities noted.   Neurologic: Alert & oriented x 3, Normal motor function, Normal sensory function, No focal deficits noted.   Psychiatric: Affect normal, Judgment normal, Mood normal. Anxiety about her pain      RADIOLOGY/PROCEDURES  CT-ABDOMEN-PELVIS WITH   Final Result      1.  Postoperative changes as described.   2.  Minimal pneumobilia, likely postoperative.   3.  Probable chronic fluid collection or mass lateral to the greater curvature stomach, overall decreased in size from prior exam.   4.  Low-attenuation lesion in the nito hepatis, unchanged and of uncertain significance.   5.  Nonobstructing ventral abdominal hernia.   6.  No focal mesenteric inflammatory process.   7.  Abdominal aortic ectasia with chronic dissection, unchanged from prior exam.  No periaortic fluid collection.            COURSE & MEDICAL DECISION MAKING  Pertinent Labs & Imaging studies reviewed. (See chart for details) white count normal hematocrit 37 platelet count normal there is no shift. Electrolytes normal renal function and normal liver function normal, urinalysis trace leukocyte esterase.     This patient understands that abdominal pain may be very elusive. At this point there is no evidence of an acute abdominal emergency. However if the pain returns or is no better in 12 hours or any vomiting they should return to the emergency room immediately. Just because the lab and x-rays are normal does not rule out a severe abdominal emergency    She comes in today complaining of left lower quadrant pain. Her white count is normal, urinalysis unremarkable. CAT scan demonstrates no acute  abnormalities. She does have some tenderness in the left lower abdomen however, by 1:30 PM that tenderness has gone. Reexamination of her abdomen at 1:30 PM, no pain. Nevertheless I will give her some Norco for pain. I have checked with pharmacy database  FINAL IMPRESSION  1.   1. Left lower quadrant pain          2.   3.     Disposition  Discharge instructions are understood. This patient is to return if fever vomiting or no better in 12 hours. Follow up with the [facility] clinic or private physician. Information sheets onAbdominal pain  Electronically signed by: Devyn Garcia, 10/24/2017 9:03 AM

## 2017-10-24 NOTE — DISCHARGE INSTRUCTIONS
Abdominal Pain, Adult  Many things can cause abdominal pain. Usually, abdominal pain is not caused by a disease and will improve without treatment. It can often be observed and treated at home. Your health care provider will do a physical exam and possibly order blood tests and X-rays to help determine the seriousness of your pain. However, in many cases, more time must pass before a clear cause of the pain can be found. Before that point, your health care provider may not know if you need more testing or further treatment.  HOME CARE INSTRUCTIONS   Monitor your abdominal pain for any changes. The following actions may help to alleviate any discomfort you are experiencing:  · Only take over-the-counter or prescription medicines as directed by your health care provider.  · Do not take laxatives unless directed to do so by your health care provider.  · Try a clear liquid diet (broth, tea, or water) as directed by your health care provider. Slowly move to a bland diet as tolerated.  SEEK MEDICAL CARE IF:  · You have unexplained abdominal pain.  · You have abdominal pain associated with nausea or diarrhea.  · You have pain when you urinate or have a bowel movement.  · You experience abdominal pain that wakes you in the night.  · You have abdominal pain that is worsened or improved by eating food.  · You have abdominal pain that is worsened with eating fatty foods.  · You have a fever.  SEEK IMMEDIATE MEDICAL CARE IF:   · Your pain does not go away within 2 hours.  · You keep throwing up (vomiting).  · Your pain is felt only in portions of the abdomen, such as the right side or the left lower portion of the abdomen.  · You pass bloody or black tarry stools.  MAKE SURE YOU:  · Understand these instructions.    · Will watch your condition.    · Will get help right away if you are not doing well or get worse.       This information is not intended to replace advice given to you by your health care provider. Make sure you  discuss any questions you have with your health care provider.     Document Released: 09/27/2006 Document Revised: 01/08/2016 Document Reviewed: 08/27/2014  [employer] Interactive Patient Education ©2016 [employer]  Return if fever, vomiting or if no better in 12 hours.

## 2017-10-24 NOTE — ED NOTES
Pt bib ems c/o luq pain since Saturday radiating to back. Pt states pain is constant and nothing makes better or worse. Denies fevers. Denies n/v/d. Pt states has had dry cough. Nad. vss

## 2017-10-24 NOTE — ED NOTES
Pt given discharge instructions/prescription/ home care instructions, pt verbalized understanding of instructions given, pt ambulatory to [facility], called pt a cab in [facility].

## 2017-10-24 NOTE — ED NOTES
..Pt updated on poc. Expresses no needs at this time. Pt has call light in hand and verbalizes understanding of use. Pt nad. Will continue to monitor

## 2017-10-24 NOTE — ED NOTES
"Pt ambulated to restroom to attempt urine sample pt states she can not go...\"i can only go pee at home\"   "

## 2017-10-25 ENCOUNTER — PATIENT OUTREACH (OUTPATIENT)
Dept: HEALTH INFORMATION MANAGEMENT | Facility: OTHER | Age: 77
End: 2017-10-25

## 2017-10-25 NOTE — PROGRESS NOTES
Placed discharge outreach phone call to patient s/p ER discharge 10/24/17.  Received recording stating that pt has not set up voicemail.  No answer, no option to leave voicemail.  Discharge outreach letter mailed to patient.

## 2017-10-25 NOTE — LETTER
Sanjuanita Sosa  [address]  [place] [address]    October 25, 2017      Dear Sanjuanita Sosa,    [facility] wants to ensure your discharge home is safe and you or your loved ones have had all of your questions answered regarding your care after you leave the hospital.    Our discharge team was unsuccessful in our attempts to contact you telephonically and we wanted to be sure that you had a list of resources and contact information should you have any questions regarding your hospital stay, follow-up instructions, or active medical symptoms.    Questions or Concerns Regarding… Contact   Medical Questions Related to Your Discharge  (7 days a week, 8am-5pm) Contact a Nurse Care Coordinator   [phone]   Medical Questions Not Related to Your Discharge  (24 hours a day / 7 days a week)  Contact the Nurse Health Line   [phone]    Medications or Discharge Instructions Refer to your discharge packet   or contact your -[phone]   Follow-up Appointment(s) Schedule your appointment via [employer]   or contact Scheduling [phone]   Billing Review your statement via [employer]  or contact Billing [phone]   Medical Records Review your records via [employer]   or contact Medical Records [phone]     You can also easily access your medical information, test results and upcoming appointments via the [employer] free online health management tool. You can learn more and sign up at [employer]. For assistance setting up your [employer] account, please call [phone].    Once again, we want to ensure your discharge home is safe and that you have a clear understanding of any next steps in your care. If you have any questions or concerns, please do not hesitate to contact us, we are here for you. Thank you for choosing [facility] for your healthcare needs.    Sincerely,      Your [facility] Healthcare Team

## 2017-11-08 ENCOUNTER — APPOINTMENT (OUTPATIENT)
Dept: VASCULAR LAB | Facility: MEDICAL CENTER | Age: 77
End: 2017-11-08
Attending: INTERNAL MEDICINE
Payer: MEDICARE

## 2017-11-08 ENCOUNTER — OFFICE VISIT (OUTPATIENT)
Dept: VASCULAR LAB | Facility: MEDICAL CENTER | Age: 77
End: 2017-11-08
Attending: NURSE PRACTITIONER
Payer: MEDICARE

## 2017-11-08 ENCOUNTER — ANTICOAGULATION MONITORING (OUTPATIENT)
Dept: VASCULAR LAB | Facility: MEDICAL CENTER | Age: 77
End: 2017-11-08

## 2017-11-08 VITALS
WEIGHT: 193 LBS | HEART RATE: 80 BPM | DIASTOLIC BLOOD PRESSURE: 49 MMHG | BODY MASS INDEX: 32.95 KG/M2 | SYSTOLIC BLOOD PRESSURE: 107 MMHG | HEIGHT: 64 IN

## 2017-11-08 DIAGNOSIS — E11.9 CONTROLLED TYPE 2 DIABETES MELLITUS WITHOUT COMPLICATION, WITHOUT LONG-TERM CURRENT USE OF INSULIN (HCC): ICD-10-CM

## 2017-11-08 DIAGNOSIS — Z86.711 HX PULMONARY EMBOLISM: ICD-10-CM

## 2017-11-08 DIAGNOSIS — E89.0 POSTOPERATIVE HYPOTHYROIDISM: ICD-10-CM

## 2017-11-08 DIAGNOSIS — Z79.01 CHRONIC ANTICOAGULATION: ICD-10-CM

## 2017-11-08 DIAGNOSIS — E55.9 VITAMIN D DEFICIENCY DISEASE: ICD-10-CM

## 2017-11-08 DIAGNOSIS — E78.5 DYSLIPIDEMIA: ICD-10-CM

## 2017-11-08 DIAGNOSIS — I71.40 ABDOMINAL AORTIC ANEURYSM WITHOUT RUPTURE (HCC): ICD-10-CM

## 2017-11-08 DIAGNOSIS — N18.30 CKD (CHRONIC KIDNEY DISEASE) STAGE 3, GFR 30-59 ML/MIN (HCC): ICD-10-CM

## 2017-11-08 DIAGNOSIS — I10 ESSENTIAL HYPERTENSION: ICD-10-CM

## 2017-11-08 LAB — INR PPP: 2.2 (ref 2–3.5)

## 2017-11-08 PROCEDURE — 99213 OFFICE O/P EST LOW 20 MIN: CPT | Performed by: NURSE PRACTITIONER

## 2017-11-08 PROCEDURE — 99212 OFFICE O/P EST SF 10 MIN: CPT | Performed by: NURSE PRACTITIONER

## 2017-11-08 PROCEDURE — 85610 PROTHROMBIN TIME: CPT

## 2017-11-08 RX ORDER — AMLODIPINE BESYLATE 5 MG/1
5 TABLET ORAL DAILY
Qty: 30 TAB | Refills: 5 | Status: ON HOLD | OUTPATIENT
Start: 2017-11-08 | End: 2018-03-02

## 2017-11-08 ASSESSMENT — ENCOUNTER SYMPTOMS
HEMOPTYSIS: 0
COUGH: 0
BRUISES/BLEEDS EASILY: 0
WEIGHT LOSS: 0
SPEECH CHANGE: 0
SHORTNESS OF BREATH: 0
DIZZINESS: 0
MYALGIAS: 0
HEADACHES: 0
NERVOUS/ANXIOUS: 0
BACK PAIN: 1
DEPRESSION: 1
FALLS: 1
CLAUDICATION: 0
PALPITATIONS: 0
WHEEZING: 0
FOCAL WEAKNESS: 0

## 2017-11-08 NOTE — PROGRESS NOTES
Anticoagulation Summary  As of 11/8/2017    INR goal:   2.0-3.0   TTR:   65.6 % (7.7 mo)   Today's INR:   2.2   Maintenance plan:   5 mg (5 mg x 1) on Mon, Wed, Fri; 7.5 mg (5 mg x 1.5) all other days   Weekly total:   45 mg   Plan last modified:   WNEDY Vick (10/18/2017)   Next INR check:   12/6/2017   Target end date:   Indefinite    Indications    DVT (deep venous thrombosis)  left (Resolved) [I82.402]  Hx pulmonary embolism [Z86.711]             Anticoagulation Episode Summary     INR check location:       Preferred lab:       Send INR reminders to:       Comments:         Anticoagulation Care Providers     Provider Role Specialty Phone number    WENDY Baker Referring Family Medicine [phone]    [facility] Responsible  [phone]        Anticoagulation Patient Findings            Sanjuanita Sosa seen in clinic today  INR  is-therapeutic.    Denies signs/symptoms of bleeding and/or thrombosis.    Denies changes to diet or medications.Pt may start on Glucerna 1/2 cup a day around 2pm due to low blood sugars.   Follow up appointment in 4 week(s).      Continue current medication regimen.        WENDY Alvarado

## 2017-11-08 NOTE — PROGRESS NOTES
"  FOLLOW-UP VASCULAR VISIT  Subjective:   Sanjuanita Sosa is a 75 y.o. female who presents today 11/08/2017 for   Chief Complaint   Patient presents with   • Amb Vascular Reason For Visit     HPI:     Patient here for f/u of AAA, PE, anticoagulation, htn, and dyslipidemia  Denies falls  Not checking BP at home.   On warfarin checking INR regularly  No bleeding   has had some low readings around 2pm  On Metformin and Glimepiride  Smoking 6 to 10 cigs daily  No NSAIDS  Lt leg swelling minor none on RT  Back pain seeing pain specialist  Denies chest pain, SOB, palpitations, TIA or stroke symptoms, or claudication  On atorvastatin 80 taking 1/2 tablet    Social History   Substance Use Topics   • Smoking status: Current Every Day Smoker     Packs/day: 0.25     Years: 40.00     Types: Cigarettes   • Smokeless tobacco: Never Used   • Alcohol use No     DIET AND EXERCISE:  Weight Change: stable  Diet: reasonable Diabetic diet  Exercise: minimal exercise due to back pain    Review of Systems   Constitutional: Positive for malaise/fatigue. Negative for weight loss.   Respiratory: Negative for cough, hemoptysis, shortness of breath and wheezing.    Cardiovascular: Positive for leg swelling. Negative for chest pain, palpitations and claudication.   Musculoskeletal: Positive for back pain, falls and joint pain. Negative for myalgias.   Neurological: Negative for dizziness, speech change, focal weakness and headaches.   Endo/Heme/Allergies: Does not bruise/bleed easily.   Psychiatric/Behavioral: Positive for depression. The patient is not nervous/anxious.       Objective:     Vitals:    11/08/17 0900 11/08/17 0905   BP: 130/102 107/49   Pulse: (!) 237 80   Weight: 87.5 kg (193 lb)    Height: 1.626 m (5' 4\")       Body mass index is 33.13 kg/m².  Physical Exam   Constitutional: She is oriented to person, place, and time. No distress.   Cardiovascular: Normal rate and regular rhythm.    Murmur heard.  Pulmonary/Chest: " Effort normal and breath sounds normal. No respiratory distress. She has no wheezes. She has no rales.   Musculoskeletal: Normal range of motion. She exhibits no edema.   Neurological: She is alert and oriented to person, place, and time. No cranial nerve deficit. Coordination normal.   Bit of a wide spread unsteady gait   Skin: She is not diaphoretic.   Psychiatric: Her speech is normal and behavior is normal. Thought content normal. She expresses no suicidal ideation.     Lab Results   Component Value Date    CHOLSTRLTOT 172 09/12/2017    LDL 99 09/12/2017    HDL 42 09/12/2017    TRIGLYCERIDE 157 (H) 09/12/2017      Lab Results   Component Value Date    PROTHROMBTM 17.2 (H) 12/28/2016    INR 2.2 11/08/2017       Lab Results   Component Value Date    HBA1C 6.8 (H) 09/12/2017      Lab Results   Component Value Date    SODIUM 137 10/24/2017    POTASSIUM 3.9 10/24/2017    CHLORIDE 105 10/24/2017    CO2 25 10/24/2017    GLUCOSE 118 (H) 10/24/2017    BUN 18 10/24/2017    CREATININE 1.02 10/24/2017    IFAFRICA >60 10/24/2017    IFNOTAFR 53 (A) 10/24/2017        Lab Results   Component Value Date    WBC 9.9 10/24/2017    RBC 4.21 10/24/2017    HEMOGLOBIN 11.7 (L) 10/24/2017    HEMATOCRIT 36.5 (L) 10/24/2017    MCV 86.7 10/24/2017    MCH 27.8 10/24/2017    MCHC 32.1 (L) 10/24/2017    MPV 9.4 10/24/2017     CT chest 4/27/2015  There are pulmonary emboli extending into the right upper lobe, right middle lobe, right lower lobe, and left lower lobe. The main pulmonary artery is of normal caliber. No shift of the intraventricular septum or reflux of contrast into the hepatic veins. There are scattered arterial calcifications. No significant pericardial effusion.  There is mild left basilar atelectasis. No significant pleural fluid. The central airways are clear.  No adenopathy is identified.  Limited visualization of the upper abdomen demonstrates severe atherosclerotic disease.    echo 4/28/2015  Technically difficult study.    Normal left ventricular size and function.  Left ventricular ejection fraction is 60% to 65%.  Grade I diastolic dysfunction - mitral inflow E/A is <1.0.  Mild mitral regurgitation.  Mild aortic stenosis.  Mild to moderate aortic insufficiency.  Moderate tricuspid regurgitation.  Right ventricular systolic pressure is estimated to be 43-48 mmHg.  No prior study is available for comparison.      CT scan chest abdomen and pelvis 10/2015   Thoracic abdominal aortic penetrating atheromatous ulcer without evidence for intramural hematoma or dissection.   Exclusion of early dissection or intramural hematoma (although no evidence for this entity) is not possible on this contrast only study.  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  Ventral hernia containing small bowel and without evidence for obstruction or inflammation    CTA 5/17/2016  1. Considerable partially ulcerated plaque within the aorta similar to previous findings. No dissection. No mediastinal hematoma.  2. The abdominal aorta measures up to 2.6 cm in diameter. No change compared with previous. No retroperitoneal hematoma.  3. High-grade stenosis celiac artery.  4. Atelectasis within both lungs and tiny right apical pleural-based nodule unchanged.  5. Surgical absent spleen. Small splenules and chronic loculated fluid collection left upper quadrant again demonstrated.  6. Diverticula colon without evidence of diverticulitis.  7. Ventral abdominal wall hernia containing nonobstructed small bowel loops.  8. Large and small bowel incompletely imaged. No free fluid within the abdomen identified.  9. Partial resection pancreas.  10. Tiny gallstones.  11. Coronary artery calcifications.  12. 1.3 cm left lobe  thyroid nodule. Ultrasound followup is consideration.    U/S Aorta Dec 2016:  1.  Fusiform infrarenal abdominal aortic aneurysm measures 2.9 x 2.9 cm in maximum axial dimensions and is located at the mid aortic level. Measurement on prior CT was 2.6 cm.  2.  Extensive aortic atherosclerosis.    CT abd and Pelvis Oct 24, 2017  1.  Postoperative changes as described.  2.  Minimal pneumobilia, likely postoperative.  3.  Probable chronic fluid collection or mass lateral to the greater curvature stomach, overall decreased in size from prior exam.  4.  Low-attenuation lesion in the nito hepatis, unchanged and of uncertain significance.  5.  Nonobstructing ventral abdominal hernia.  6.  No focal mesenteric inflammatory process.  7.  Abdominal aortic ectasia with chronic dissection, unchanged from prior exam.  No periaortic fluid collection.    Medical Decision Making:  Today's Assessment / Status / Plan:     Patient Type: Secondary Prevention    Etiology of Established CVD if Present:   1.  Ulceration thoracic aorta - not necessarily penetrating - stable on serial imaging.  2.  AAA, small  3.  DVT and PE      Lipid Management: Qualifies for Statin Therapy Based on 2013 ACC/AHA Guidelines: yes  Calculated 10-Year Risk of ASCVD: N/A  Currently on Statin: Yes  Excellent control on most recent blood work (may)  -continue decreased atorvastatin to 40 mg daily (1/2 tab)  -Report any myalgias immediately  -recheck fasting lipids prior to next visit    Blood Pressure Management:Goal: JNC8 (2013) Office BP Goal:<140/90; Under Control: yes  Under excellent control at least in office  Can't afford home monitor at present  Has CKD but stable or improved  Does not appear to be on hctz at present  Mild leg swelling on amlodipine, but tolerable  -Decrease Amlodipine 5 mg daily  -Continue losartan 50 mg daily  - if BP remains low consider decrease in dose of amlodipine or even discontinuation at next visit  - if any degree of increased  albuminuria would consider increase in losartan to 100 mg daily    Glycemic Status: Diabetic-  last A1c under excellent control  -Continue metformin for now, but may need to consider d/c if GFR falls further  -continue glimiperide for now, but decrease dose or stop if a1c well controlled next visit.  -Continue to follow fasting blood sugars at home - call if hypoglycemic episodes  -Eat a high protein snack around 2pm due to the stretch in meal times.   - Continue TLC  -Yearly flu shot, eye exam - reminded patient  - recheck A1C prior to next visit    Anti-Platelet/Anti-Coagulant Tx:   Unable to afford pradaxa or xarelto  Patient in agreement that long term benefit of anticoag likely outweighs risk  - Continue indefinite anticoagulation with warfarin  - Follow up in coag clinic    Smoking:  Has had significant recidivism. Discussed cutting amount in half and consider Nicoderm patch. She will think about it. Addressed fulfilling relaxing affect with playing cards ect instead of smoking. Unwilling to consider quit date at present.  Will continue to address at each visit    Physical Activity: encouraged exercise class 2-3 x per week    Weight Management and Nutrition: Dietary plan was discussed with patient at this visit including low fats and carb, diabetic diet    Other:     1. Chronic renal disease stage G3B in past with modest improvement to G3A on most recent blood work. No albuminuria (A0) in past. No longer seeing nephrology., Continue ARB and BP control. Check GFR, urine for ma, pth, vit D, and CBC prior to next visit.   2.  Hypothyroidism with h/o papillary thyroid cancer. On levothyroxien - dose adjusted by PCP. Recheck TSH.  Otherwise Will defer all management to pcp  3.  Ulceration, thoracic aorta - stable on surveillance imaging.  Continue medical management  4.  AAA, small and stable on serial imaging - continue medical management and surveillance - will repeat u/s every 2-3 years - next end of 2018 or  early 2019    Instructed to follow-up with PCP for remainder of adult medical needs: yes  We will partner with other providers in the management of established vascular disease and cardiometabolic risk factors.    Studies to Be Obtained: Abdominal aortic ultrasound 2-3 years  Labs to Be Obtained: A1c, CBC, TSH, Lipids, MA.    Follow up in: 3 months    WENDY Alvarado     CC:   Osmin Armendariz

## 2017-11-24 ENCOUNTER — APPOINTMENT (OUTPATIENT)
Dept: RADIOLOGY | Facility: MEDICAL CENTER | Age: 77
End: 2017-11-24
Attending: EMERGENCY MEDICINE
Payer: MEDICARE

## 2017-11-24 ENCOUNTER — HOSPITAL ENCOUNTER (EMERGENCY)
Facility: MEDICAL CENTER | Age: 77
End: 2017-11-24
Attending: EMERGENCY MEDICINE
Payer: MEDICARE

## 2017-11-24 VITALS
OXYGEN SATURATION: 96 % | TEMPERATURE: 98.7 F | BODY MASS INDEX: 33.38 KG/M2 | HEART RATE: 79 BPM | RESPIRATION RATE: 16 BRPM | SYSTOLIC BLOOD PRESSURE: 121 MMHG | DIASTOLIC BLOOD PRESSURE: 77 MMHG | HEIGHT: 64 IN | WEIGHT: 195.55 LBS

## 2017-11-24 DIAGNOSIS — S80.01XA CONTUSION OF RIGHT KNEE, INITIAL ENCOUNTER: ICD-10-CM

## 2017-11-24 PROCEDURE — 73564 X-RAY EXAM KNEE 4 OR MORE: CPT | Mod: RT

## 2017-11-24 PROCEDURE — A9270 NON-COVERED ITEM OR SERVICE: HCPCS | Performed by: EMERGENCY MEDICINE

## 2017-11-24 PROCEDURE — 99284 EMERGENCY DEPT VISIT MOD MDM: CPT

## 2017-11-24 PROCEDURE — 700102 HCHG RX REV CODE 250 W/ 637 OVERRIDE(OP): Performed by: EMERGENCY MEDICINE

## 2017-11-24 PROCEDURE — 73501 X-RAY EXAM HIP UNI 1 VIEW: CPT | Mod: RT

## 2017-11-24 PROCEDURE — 73564 X-RAY EXAM KNEE 4 OR MORE: CPT | Mod: LT

## 2017-11-24 RX ORDER — HYDROCODONE BITARTRATE AND ACETAMINOPHEN 5; 325 MG/1; MG/1
1 TABLET ORAL ONCE
Status: COMPLETED | OUTPATIENT
Start: 2017-11-24 | End: 2017-11-24

## 2017-11-24 RX ADMIN — HYDROCODONE BITARTRATE AND ACETAMINOPHEN 1 TABLET: 5; 325 TABLET ORAL at 12:44

## 2017-11-24 ASSESSMENT — LIFESTYLE VARIABLES: DO YOU DRINK ALCOHOL: NO

## 2017-11-24 ASSESSMENT — PAIN SCALES - GENERAL
PAINLEVEL_OUTOF10: 5
PAINLEVEL_OUTOF10: 9

## 2017-11-24 NOTE — DISCHARGE INSTRUCTIONS
Contusion  A contusion is a deep bruise. Contusions happen when an injury causes bleeding under the skin. Signs of bruising include pain, puffiness (swelling), and discolored skin. The contusion may turn blue, purple, or yellow.  HOME CARE   · Put ice on the injured area.  ¨ Put ice in a plastic bag.  ¨ Place a towel between your skin and the bag.  ¨ Leave the ice on for 15-20 minutes, 03-04 times a day.  · Only take medicine as told by your doctor.  · Rest the injured area.  · If possible, raise (elevate) the injured area to lessen puffiness.  GET HELP RIGHT AWAY IF:   · You have more bruising or puffiness.  · You have pain that is getting worse.  · Your puffiness or pain is not helped by medicine.  MAKE SURE YOU:   · Understand these instructions.  · Will watch your condition.  · Will get help right away if you are not doing well or get worse.     This information is not intended to replace advice given to you by your health care provider. Make sure you discuss any questions you have with your health care provider.     Document Released: 06/05/2009 Document Revised: 03/11/2013 Document Reviewed: 10/22/2012  [employer] Patient Education ©2016 [employer]

## 2017-11-24 NOTE — ED NOTES
To room in .  [facility] GLF, landed on bilateral knees.  Hx total knee replacement L knee.  Chart placed for ERP eval.

## 2017-11-24 NOTE — ED NOTES
Pt states understanding of dc instructions and f/u care. Pt able to amb w/ cane assisted gait. Pt calling for a ride home.

## 2017-11-24 NOTE — ED PROVIDER NOTES
"ED Provider Note      CHIEF COMPLAINT   Chief Complaint   Patient presents with   • Fall     yesterday   • Knee Pain     bilateral s/p fall       HPI   Sanjuanita Sosa is a 77 y.o. female who presentsWith right knee pain. She missed a step following tripping and landed on both knees. Has pain primarily in the right knee, but also some throbbing nonradiating pain over the anterior left knee. No swelling or laceration. No other trauma. She felt okay last night, but the pain was persistent and may be worsened today and therefore she comes to the ER.    REVIEW OF SYSTEMS   Pertinent negative: As above    PAST MEDICAL HISTORY   Past Medical History:   Diagnosis Date   • AAA (abdominal aortic aneurysm) (CMS-[facility])    • Anticoagulation monitoring, special range    • Arrhythmia    • Arthritis    • Autoimmune hepatitis (CMS-HCC) 3/19/2012   • Blood clotting disorder (CMS-[facility]) 2015    clot in leg and lung   • Breath shortness    • Cancer (CMS-[facility])     thyroid   • Cataract     left eye needs surgery   • Diabetes     pre-diabetic   • Disorder of thyroid 2016    thyroid cancer   • Gallstones    • H/O: HTN (hypertension) 3/19/2012   • Heart valve disease    • Hepatitis B    • Hiatus hernia syndrome    • Hyperlipidemia    • Hypertension    • Kidney disease    • Mixed hyperlipidemia 3/19/2012   • Murmur 3/19/2012   • Nonspecific abnormal electrocardiogram (ECG) (EKG) 3/19/2012   • Overweight(278.02) 3/19/2012   • Pain    • Palpitations    • Preoperative cardiovascular examination 3/19/2012   • Unspecified urinary incontinence        SOCIAL HISTORY  Social History   Substance Use Topics   • Smoking status: Current Every Day Smoker     Packs/day: 0.25     Years: 40.00     Types: Cigarettes   • Smokeless tobacco: Never Used   • Alcohol use No       ALLERGIES   See chart    PHYSICAL EXAM  VITAL SIGNS: /63   Pulse 79   Temp 37.1 °C (98.7 °F) (Temporal)   Resp 16   Ht 1.626 m (5' 4\")   Wt 88.7 kg (195 lb 8.8 oz)   LMP " 10/12/1970   SpO2 96%   BMI 33.57 kg/m²   Head: Atraumatic  Eyes: Eyes normal inspection  Neck: has full range of motion, normal inspection.  Constitutional: No acute distress   Cardiovascular: Normal heart rate. PulsesOn dorsalis pedis  Thorax & Lungs: No respiratory distress  Skin: Intact  Musculoskeletal: Knee replacement scars left knee Tenderness to palpation over both inferior patella is worse on the right than the left. No joint effusion. No ligamentous instability or crepitus on range of motion. Extensor mechanism is intact. No tenderness more proximally or distally. Compartments soft.  Neurologic:  Normal sensory and motor    RADIOLOGY/PROCEDURES  DX-HIP-UNILATERAL-WITH PELVIS-1 VIEW RIGHT   Final Result      No radiographic evidence of acute traumatic injury.      DX-KNEE COMPLETE 4+ LEFT   Final Result      Status post left total knee replacement without evidence of fracture or dislocation.               INTERPRETING LOCATION:  [address], [place], [address]      DX-KNEE COMPLETE 4+ RIGHT   Final Result      Degenerative change and small joint effusion without evidence of fracture.               INTERPRETING LOCATION:  [address], [place], [address]         Imaging is interpreted by radiologistReviewed by me    COURSE & MEDICAL DECISION MAKING  Analgesia for possible fracture-Norco    Patient presents after ground-level fall with bilateral knee pain. Exam was relatively benign. Obtained x-rays these were negative for fracture. She is able to ambulate in the ER. I advised ibuprofen as needed. Advised follow-up with primary doctor in 1 week. Return to ER for worsening, not improving or concern.    FINAL IMPRESSION  1. Bilateral knee contusions      This dictation was created using voice recognition software. The accuracy of the dictation is limited to the abilities of the software. I expect there may be some errors of grammar and possibly content. The nursing notes were reviewed and certain aspects of this  information were incorporated into this note.      Electronically signed by: Karl Ludwig, 11/24/2017 2:36 PM

## 2017-11-25 ENCOUNTER — PATIENT OUTREACH (OUTPATIENT)
Dept: HEALTH INFORMATION MANAGEMENT | Facility: OTHER | Age: 77
End: 2017-11-25

## 2017-11-25 NOTE — LETTER
Sanjuanita Sosa  [address]  [place] [address]    November 25, 2017      Dear Sanjuanita Sosa,    [facility] wants to ensure your discharge home is safe and you or your loved ones have had all of your questions answered regarding your care after you leave the hospital.    Our discharge team was unsuccessful in our attempts to contact you telephonically and we wanted to be sure that you had a list of resources and contact information should you have any questions regarding your hospital stay, follow-up instructions, or active medical symptoms.    Questions or Concerns Regarding… Contact   Medical Questions Related to Your Discharge  (7 days a week, 8am-5pm) Contact a Nurse Care Coordinator   [phone]   Medical Questions Not Related to Your Discharge  (24 hours a day / 7 days a week)  Contact the Nurse Health Line   [phone]    Medications or Discharge Instructions Refer to your discharge packet   or contact your -[phone]   Follow-up Appointment(s) Schedule your appointment via [employer]   or contact Scheduling [phone]   Billing Review your statement via [employer]  or contact Billing [phone]   Medical Records Review your records via [employer]   or contact Medical Records [phone]     You can also easily access your medical information, test results and upcoming appointments via the [employer] free online health management tool. You can learn more and sign up at [email or web address]. For assistance setting up your [employer] account, please call [phone].    Once again, we want to ensure your discharge home is safe and that you have a clear understanding of any next steps in your care. If you have any questions or concerns, please do not hesitate to contact us, we are here for you. Thank you for choosing [facility] for your healthcare needs.    Sincerely,      Your [facility] Healthcare Team

## 2017-12-06 ENCOUNTER — ANTICOAGULATION VISIT (OUTPATIENT)
Dept: VASCULAR LAB | Facility: MEDICAL CENTER | Age: 77
End: 2017-12-06
Attending: INTERNAL MEDICINE
Payer: MEDICARE

## 2017-12-06 DIAGNOSIS — Z86.711 HX PULMONARY EMBOLISM: ICD-10-CM

## 2017-12-06 LAB — INR PPP: 2.6 (ref 2–3.5)

## 2017-12-06 PROCEDURE — 99211 OFF/OP EST MAY X REQ PHY/QHP: CPT | Performed by: NURSE PRACTITIONER

## 2017-12-06 PROCEDURE — 85610 PROTHROMBIN TIME: CPT

## 2017-12-06 NOTE — PROGRESS NOTES
Anticoagulation Summary  As of 12/6/2017    INR goal:   2.0-3.0   TTR:   69.4 % (8.6 mo)   Today's INR:   2.6   Maintenance plan:   5 mg (5 mg x 1) on Mon, Wed, Fri; 7.5 mg (5 mg x 1.5) all other days   Weekly total:   45 mg   No change documented:   WENDY Vick   Plan last modified:   WENDY Vick (10/18/2017)   Next INR check:   1/10/2018   Target end date:   Indefinite    Indications    DVT (deep venous thrombosis)  left (Resolved) [I82.402]  Hx pulmonary embolism [Z86.711]             Anticoagulation Episode Summary     INR check location:       Preferred lab:       Send INR reminders to:       Comments:         Anticoagulation Care Providers     Provider Role Specialty Phone number    WENDY Baker Referring Family Medicine [phone]    [facility] Responsible  [phone]        Anticoagulation Patient Findings      HPI:  Sanjuanita Sosa seen in clinic today for follow up on anticoagulation therapy in the presence of DVT/PE hx. Denies any changes to current medical/health status since last appointment. Denies any medication or diet changes. No current symptoms of bleeding or thrombosis reported.    A/P:   INR therapeutic. Continue current regimen. BP recorded in vitals.    Follow up appointment in 5 week(s).    Next Appointment: Wednesday, January 10, 2017 at 10:15 am.     Farheen CARPENTER

## 2017-12-07 LAB — INR BLD: 2.6 (ref 0.9–1.2)

## 2017-12-13 ENCOUNTER — APPOINTMENT (OUTPATIENT)
Dept: ENDOCRINOLOGY | Facility: MEDICAL CENTER | Age: 77
End: 2017-12-13
Payer: MEDICARE

## 2017-12-28 ENCOUNTER — TELEPHONE (OUTPATIENT)
Dept: MEDICAL GROUP | Facility: MEDICAL CENTER | Age: 77
End: 2017-12-28

## 2018-01-03 ENCOUNTER — ANTICOAGULATION VISIT (OUTPATIENT)
Dept: VASCULAR LAB | Facility: MEDICAL CENTER | Age: 78
End: 2018-01-03
Attending: INTERNAL MEDICINE
Payer: MEDICARE

## 2018-01-03 VITALS — SYSTOLIC BLOOD PRESSURE: 96 MMHG | DIASTOLIC BLOOD PRESSURE: 46 MMHG | HEART RATE: 79 BPM

## 2018-01-03 DIAGNOSIS — Z86.711 HX PULMONARY EMBOLISM: ICD-10-CM

## 2018-01-03 LAB
INR BLD: 3 (ref 0.9–1.2)
INR PPP: 3 (ref 2–3.5)

## 2018-01-03 PROCEDURE — 85610 PROTHROMBIN TIME: CPT

## 2018-01-03 PROCEDURE — 99211 OFF/OP EST MAY X REQ PHY/QHP: CPT | Performed by: NURSE PRACTITIONER

## 2018-01-03 NOTE — PROGRESS NOTES
Anticoagulation Summary  As of 1/3/2018    INR goal:   2.0-3.0   TTR:   72.3 % (9.6 mo)   Today's INR:   3.0   Maintenance plan:   5 mg (5 mg x 1) on Mon, Wed, Fri; 7.5 mg (5 mg x 1.5) all other days   Weekly total:   45 mg   Plan last modified:   YUE VickPSTEFAN (10/18/2017)   Next INR check:   2/7/2018   Target end date:   Indefinite    Indications    DVT (deep venous thrombosis)  left (Resolved) [I82.402]  Hx pulmonary embolism [Z86.711]             Anticoagulation Episode Summary     INR check location:       Preferred lab:       Send INR reminders to:       Comments:         Anticoagulation Care Providers     Provider Role Specialty Phone number    WENDY Baker Referring Family Medicine [phone]    [facility]agulation [facility] Responsible  [phone]        Anticoagulation Patient Findings      HPI:  Sanjuanita Sosa seen in clinic today for follow up on anticoagulation therapy in the presence of PE, DVT hx. Denies any changes to current medical/health status since last appointment. Denies any medication or diet changes. No current symptoms of bleeding or thrombosis reported.    A/P:   INR therapeutic. Continue current regimen. BP recorded in vitals.    Follow up appointment in 5 week(s).    Next Appointment: Wednesday, February 7, 2018 at 10:00 am.      Farheen CARPENTER

## 2018-01-08 RX ORDER — CALCIUM CARBONATE 500(1250)
TABLET ORAL
Qty: 60 TAB | Refills: 11 | Status: SHIPPED | OUTPATIENT
Start: 2018-01-08 | End: 2018-09-19 | Stop reason: SDUPTHER

## 2018-01-10 ENCOUNTER — APPOINTMENT (OUTPATIENT)
Dept: VASCULAR LAB | Facility: MEDICAL CENTER | Age: 78
End: 2018-01-10
Attending: INTERNAL MEDICINE
Payer: MEDICARE

## 2018-01-24 RX ORDER — AMLODIPINE BESYLATE 10 MG/1
TABLET ORAL
Qty: 90 TAB | Refills: 2 | Status: ON HOLD | OUTPATIENT
Start: 2018-01-24 | End: 2018-12-14

## 2018-01-25 DIAGNOSIS — Z86.711 HISTORY OF PULMONARY EMBOLISM: ICD-10-CM

## 2018-01-25 RX ORDER — WARFARIN SODIUM 5 MG/1
TABLET ORAL
Qty: 135 TAB | Refills: 1 | Status: SHIPPED | OUTPATIENT
Start: 2018-01-25 | End: 2018-03-21

## 2018-02-07 ENCOUNTER — ANTICOAGULATION VISIT (OUTPATIENT)
Dept: VASCULAR LAB | Facility: MEDICAL CENTER | Age: 78
End: 2018-02-07
Attending: INTERNAL MEDICINE
Payer: MEDICARE

## 2018-02-07 VITALS — SYSTOLIC BLOOD PRESSURE: 104 MMHG | HEART RATE: 90 BPM | DIASTOLIC BLOOD PRESSURE: 53 MMHG

## 2018-02-07 DIAGNOSIS — Z86.711 HX PULMONARY EMBOLISM: ICD-10-CM

## 2018-02-07 LAB
INR BLD: 2.7 (ref 0.9–1.2)
INR PPP: 2.7 (ref 2–3.5)

## 2018-02-07 PROCEDURE — 99211 OFF/OP EST MAY X REQ PHY/QHP: CPT | Performed by: NURSE PRACTITIONER

## 2018-02-07 PROCEDURE — 85610 PROTHROMBIN TIME: CPT

## 2018-02-07 NOTE — PROGRESS NOTES
Anticoagulation Summary  As of 2/7/2018    INR goal:   2.0-3.0   TTR:   75.4 % (10.7 mo)   Today's INR:   2.7   Maintenance plan:   5 mg (5 mg x 1) on Mon, Wed, Fri; 7.5 mg (5 mg x 1.5) all other days   Weekly total:   45 mg   Plan last modified:   YUE VickPSTEFAN (10/18/2017)   Next INR check:   3/21/2018   Target end date:   Indefinite    Indications    DVT (deep venous thrombosis)  left (Resolved) [I82.402]  Hx pulmonary embolism [Z86.711]             Anticoagulation Episode Summary     INR check location:       Preferred lab:       Send INR reminders to:       Comments:         Anticoagulation Care Providers     Provider Role Specialty Phone number    WENDY Baker Referring Family Medicine [phone]    [facility]ticoagulation [facility] Responsible  [phone]        Anticoagulation Patient Findings      HPI:  Sanjuanita Sosa seen in clinic today for follow up on anticoagulation therapy in the presence of DVT, PE hx. Denies any changes to current medical/health status since last appointment. Denies any medication or diet changes. No current symptoms of bleeding or thrombosis reported.    A/P:   INR therapeutic. Continue current regimen. BP recorded in vitals.    Follow up appointment in 6 week(s).    Next Appointment: Wednesday, March 21, 2018 at 10:15 am.      Farheen CARPENTER

## 2018-02-25 ENCOUNTER — APPOINTMENT (OUTPATIENT)
Dept: RADIOLOGY | Facility: MEDICAL CENTER | Age: 78
DRG: 391 | End: 2018-02-25
Attending: EMERGENCY MEDICINE
Payer: MEDICARE

## 2018-02-25 ENCOUNTER — HOSPITAL ENCOUNTER (INPATIENT)
Facility: MEDICAL CENTER | Age: 78
LOS: 4 days | DRG: 391 | End: 2018-03-02
Attending: EMERGENCY MEDICINE | Admitting: HOSPITALIST
Payer: MEDICARE

## 2018-02-25 DIAGNOSIS — R09.02 HYPOXIA: ICD-10-CM

## 2018-02-25 DIAGNOSIS — N39.0 ACUTE UTI: ICD-10-CM

## 2018-02-25 DIAGNOSIS — E87.20 LACTIC ACID ACIDOSIS: ICD-10-CM

## 2018-02-25 DIAGNOSIS — K52.9 GASTROENTERITIS: ICD-10-CM

## 2018-02-25 DIAGNOSIS — J96.01 ACUTE RESPIRATORY FAILURE WITH HYPOXIA (HCC): ICD-10-CM

## 2018-02-25 LAB
ALBUMIN SERPL BCP-MCNC: 3.8 G/DL (ref 3.2–4.9)
ALBUMIN/GLOB SERPL: 1.4 G/DL
ALP SERPL-CCNC: 61 U/L (ref 30–99)
ALT SERPL-CCNC: 10 U/L (ref 2–50)
ANION GAP SERPL CALC-SCNC: 11 MMOL/L (ref 0–11.9)
AST SERPL-CCNC: 14 U/L (ref 12–45)
BASOPHILS # BLD AUTO: 0.5 % (ref 0–1.8)
BASOPHILS # BLD: 0.05 K/UL (ref 0–0.12)
BILIRUB SERPL-MCNC: 0.5 MG/DL (ref 0.1–1.5)
BUN SERPL-MCNC: 26 MG/DL (ref 8–22)
CALCIUM SERPL-MCNC: 8.3 MG/DL (ref 8.5–10.5)
CHLORIDE SERPL-SCNC: 106 MMOL/L (ref 96–112)
CO2 SERPL-SCNC: 21 MMOL/L (ref 20–33)
CREAT SERPL-MCNC: 1.04 MG/DL (ref 0.5–1.4)
EOSINOPHIL # BLD AUTO: 0.14 K/UL (ref 0–0.51)
EOSINOPHIL NFR BLD: 1.3 % (ref 0–6.9)
ERYTHROCYTE [DISTWIDTH] IN BLOOD BY AUTOMATED COUNT: 51.2 FL (ref 35.9–50)
FLUAV RNA SPEC QL NAA+PROBE: NEGATIVE
FLUBV RNA SPEC QL NAA+PROBE: NEGATIVE
GLOBULIN SER CALC-MCNC: 2.8 G/DL (ref 1.9–3.5)
GLUCOSE SERPL-MCNC: 169 MG/DL (ref 65–99)
HCT VFR BLD AUTO: 40 % (ref 37–47)
HGB BLD-MCNC: 13 G/DL (ref 12–16)
IMM GRANULOCYTES # BLD AUTO: 0.05 K/UL (ref 0–0.11)
IMM GRANULOCYTES NFR BLD AUTO: 0.5 % (ref 0–0.9)
LACTATE BLD-SCNC: 2.4 MMOL/L (ref 0.5–2)
LIPASE SERPL-CCNC: 12 U/L (ref 11–82)
LYMPHOCYTES # BLD AUTO: 0.52 K/UL (ref 1–4.8)
LYMPHOCYTES NFR BLD: 4.7 % (ref 22–41)
MCH RBC QN AUTO: 27.8 PG (ref 27–33)
MCHC RBC AUTO-ENTMCNC: 32.5 G/DL (ref 33.6–35)
MCV RBC AUTO: 85.7 FL (ref 81.4–97.8)
MONOCYTES # BLD AUTO: 0.44 K/UL (ref 0–0.85)
MONOCYTES NFR BLD AUTO: 4 % (ref 0–13.4)
NEUTROPHILS # BLD AUTO: 9.75 K/UL (ref 2–7.15)
NEUTROPHILS NFR BLD: 89 % (ref 44–72)
NRBC # BLD AUTO: 0 K/UL
NRBC BLD-RTO: 0 /100 WBC
PLATELET # BLD AUTO: 455 K/UL (ref 164–446)
PMV BLD AUTO: 9.6 FL (ref 9–12.9)
POTASSIUM SERPL-SCNC: 4.3 MMOL/L (ref 3.6–5.5)
PROT SERPL-MCNC: 6.6 G/DL (ref 6–8.2)
RBC # BLD AUTO: 4.67 M/UL (ref 4.2–5.4)
SODIUM SERPL-SCNC: 138 MMOL/L (ref 135–145)
WBC # BLD AUTO: 11 K/UL (ref 4.8–10.8)

## 2018-02-25 PROCEDURE — 96374 THER/PROPH/DIAG INJ IV PUSH: CPT

## 2018-02-25 PROCEDURE — 96361 HYDRATE IV INFUSION ADD-ON: CPT

## 2018-02-25 PROCEDURE — 80053 COMPREHEN METABOLIC PANEL: CPT

## 2018-02-25 PROCEDURE — 83690 ASSAY OF LIPASE: CPT

## 2018-02-25 PROCEDURE — 85025 COMPLETE CBC W/AUTO DIFF WBC: CPT

## 2018-02-25 PROCEDURE — 74177 CT ABD & PELVIS W/CONTRAST: CPT

## 2018-02-25 PROCEDURE — 99285 EMERGENCY DEPT VISIT HI MDM: CPT

## 2018-02-25 PROCEDURE — 96375 TX/PRO/DX INJ NEW DRUG ADDON: CPT

## 2018-02-25 PROCEDURE — 700105 HCHG RX REV CODE 258: Performed by: EMERGENCY MEDICINE

## 2018-02-25 PROCEDURE — 93005 ELECTROCARDIOGRAM TRACING: CPT | Performed by: EMERGENCY MEDICINE

## 2018-02-25 PROCEDURE — 94760 N-INVAS EAR/PLS OXIMETRY 1: CPT

## 2018-02-25 PROCEDURE — 700111 HCHG RX REV CODE 636 W/ 250 OVERRIDE (IP): Performed by: EMERGENCY MEDICINE

## 2018-02-25 PROCEDURE — 700117 HCHG RX CONTRAST REV CODE 255: Performed by: EMERGENCY MEDICINE

## 2018-02-25 PROCEDURE — 83605 ASSAY OF LACTIC ACID: CPT

## 2018-02-25 PROCEDURE — 87502 INFLUENZA DNA AMP PROBE: CPT

## 2018-02-25 RX ORDER — ONDANSETRON 2 MG/ML
4 INJECTION INTRAMUSCULAR; INTRAVENOUS ONCE
Status: COMPLETED | OUTPATIENT
Start: 2018-02-25 | End: 2018-02-25

## 2018-02-25 RX ORDER — SODIUM CHLORIDE 9 MG/ML
1000 INJECTION, SOLUTION INTRAVENOUS ONCE
Status: COMPLETED | OUTPATIENT
Start: 2018-02-25 | End: 2018-02-26

## 2018-02-25 RX ORDER — SODIUM CHLORIDE 9 MG/ML
1000 INJECTION, SOLUTION INTRAVENOUS ONCE
Status: COMPLETED | OUTPATIENT
Start: 2018-02-26 | End: 2018-02-26

## 2018-02-25 RX ADMIN — FENTANYL CITRATE 50 MCG: 50 INJECTION, SOLUTION INTRAMUSCULAR; INTRAVENOUS at 21:08

## 2018-02-25 RX ADMIN — ONDANSETRON HYDROCHLORIDE 4 MG: 2 INJECTION, SOLUTION INTRAMUSCULAR; INTRAVENOUS at 21:08

## 2018-02-25 RX ADMIN — SODIUM CHLORIDE 1000 ML: 9 INJECTION, SOLUTION INTRAVENOUS at 21:09

## 2018-02-25 RX ADMIN — IOHEXOL 100 ML: 350 INJECTION, SOLUTION INTRAVENOUS at 22:27

## 2018-02-25 ASSESSMENT — PAIN SCALES - GENERAL
PAINLEVEL_OUTOF10: 6
PAINLEVEL_OUTOF10: 6

## 2018-02-26 ENCOUNTER — RESOLUTE PROFESSIONAL BILLING HOSPITAL PROF FEE (OUTPATIENT)
Dept: HOSPITALIST | Facility: MEDICAL CENTER | Age: 78
End: 2018-02-26
Payer: MEDICARE

## 2018-02-26 ENCOUNTER — HOSPITAL ENCOUNTER (OUTPATIENT)
Dept: RADIOLOGY | Facility: MEDICAL CENTER | Age: 78
End: 2018-02-26
Payer: MEDICARE

## 2018-02-26 PROBLEM — D75.839 THROMBOCYTOSIS: Status: ACTIVE | Noted: 2018-02-26

## 2018-02-26 PROBLEM — K52.9 GASTROENTERITIS: Status: ACTIVE | Noted: 2018-02-26

## 2018-02-26 PROBLEM — N39.0 UTI (URINARY TRACT INFECTION): Status: ACTIVE | Noted: 2018-02-26

## 2018-02-26 PROBLEM — R10.9 AP (ABDOMINAL PAIN): Status: ACTIVE | Noted: 2018-02-26

## 2018-02-26 PROBLEM — R11.2 NAUSEA AND VOMITING: Status: RESOLVED | Noted: 2018-02-26 | Resolved: 2018-02-26

## 2018-02-26 PROBLEM — J96.00 ACUTE RESPIRATORY FAILURE (HCC): Status: RESOLVED | Noted: 2018-02-26 | Resolved: 2018-02-26

## 2018-02-26 PROBLEM — J96.00 ACUTE RESPIRATORY FAILURE (HCC): Status: ACTIVE | Noted: 2018-02-26

## 2018-02-26 PROBLEM — Z79.01 CHRONIC ANTICOAGULATION: Status: RESOLVED | Noted: 2017-01-31 | Resolved: 2018-02-26

## 2018-02-26 PROBLEM — R11.2 NAUSEA AND VOMITING: Status: ACTIVE | Noted: 2018-02-26

## 2018-02-26 PROBLEM — I95.9 HYPOTENSION: Status: ACTIVE | Noted: 2018-02-26

## 2018-02-26 PROBLEM — R10.9 AP (ABDOMINAL PAIN): Status: RESOLVED | Noted: 2018-02-26 | Resolved: 2018-02-26

## 2018-02-26 LAB
ALBUMIN SERPL BCP-MCNC: 3.7 G/DL (ref 3.2–4.9)
ALBUMIN/GLOB SERPL: 1.4 G/DL
ALP SERPL-CCNC: 62 U/L (ref 30–99)
ALT SERPL-CCNC: 12 U/L (ref 2–50)
ANION GAP SERPL CALC-SCNC: 9 MMOL/L (ref 0–11.9)
APPEARANCE UR: CLEAR
AST SERPL-CCNC: 14 U/L (ref 12–45)
BACTERIA #/AREA URNS HPF: ABNORMAL /HPF
BASOPHILS # BLD AUTO: 0.4 % (ref 0–1.8)
BASOPHILS # BLD: 0.04 K/UL (ref 0–0.12)
BILIRUB SERPL-MCNC: 0.4 MG/DL (ref 0.1–1.5)
BILIRUB UR QL STRIP.AUTO: NEGATIVE
BUN SERPL-MCNC: 26 MG/DL (ref 8–22)
CALCIUM SERPL-MCNC: 7.8 MG/DL (ref 8.5–10.5)
CHLORIDE SERPL-SCNC: 106 MMOL/L (ref 96–112)
CHOLEST SERPL-MCNC: 119 MG/DL (ref 100–199)
CO2 SERPL-SCNC: 22 MMOL/L (ref 20–33)
COLOR UR: YELLOW
CREAT SERPL-MCNC: 1.15 MG/DL (ref 0.5–1.4)
EOSINOPHIL # BLD AUTO: 0.7 K/UL (ref 0–0.51)
EOSINOPHIL NFR BLD: 7.2 % (ref 0–6.9)
EPI CELLS #/AREA URNS HPF: NEGATIVE /HPF
ERYTHROCYTE [DISTWIDTH] IN BLOOD BY AUTOMATED COUNT: 53.6 FL (ref 35.9–50)
EST. AVERAGE GLUCOSE BLD GHB EST-MCNC: 143 MG/DL
GLOBULIN SER CALC-MCNC: 2.7 G/DL (ref 1.9–3.5)
GLUCOSE BLD-MCNC: 116 MG/DL (ref 65–99)
GLUCOSE BLD-MCNC: 118 MG/DL (ref 65–99)
GLUCOSE BLD-MCNC: 145 MG/DL (ref 65–99)
GLUCOSE SERPL-MCNC: 116 MG/DL (ref 65–99)
GLUCOSE UR STRIP.AUTO-MCNC: NEGATIVE MG/DL
HBA1C MFR BLD: 6.6 % (ref 0–5.6)
HCT VFR BLD AUTO: 36 % (ref 37–47)
HDLC SERPL-MCNC: 30 MG/DL
HGB BLD-MCNC: 11.4 G/DL (ref 12–16)
HYALINE CASTS #/AREA URNS LPF: ABNORMAL /LPF
IMM GRANULOCYTES # BLD AUTO: 0.03 K/UL (ref 0–0.11)
IMM GRANULOCYTES NFR BLD AUTO: 0.3 % (ref 0–0.9)
INR PPP: 2.26 (ref 0.87–1.13)
KETONES UR STRIP.AUTO-MCNC: ABNORMAL MG/DL
LACTATE BLD-SCNC: 1.4 MMOL/L (ref 0.5–2)
LDLC SERPL CALC-MCNC: 59 MG/DL
LEUKOCYTE ESTERASE UR QL STRIP.AUTO: ABNORMAL
LYMPHOCYTES # BLD AUTO: 1.64 K/UL (ref 1–4.8)
LYMPHOCYTES NFR BLD: 16.8 % (ref 22–41)
MCH RBC QN AUTO: 27.3 PG (ref 27–33)
MCHC RBC AUTO-ENTMCNC: 31.7 G/DL (ref 33.6–35)
MCV RBC AUTO: 86.1 FL (ref 81.4–97.8)
MICRO URNS: ABNORMAL
MONOCYTES # BLD AUTO: 0.77 K/UL (ref 0–0.85)
MONOCYTES NFR BLD AUTO: 7.9 % (ref 0–13.4)
NEUTROPHILS # BLD AUTO: 6.61 K/UL (ref 2–7.15)
NEUTROPHILS NFR BLD: 67.4 % (ref 44–72)
NITRITE UR QL STRIP.AUTO: NEGATIVE
NRBC # BLD AUTO: 0 K/UL
NRBC BLD-RTO: 0 /100 WBC
PH UR STRIP.AUTO: 5.5 [PH]
PLATELET # BLD AUTO: 442 K/UL (ref 164–446)
PMV BLD AUTO: 9.9 FL (ref 9–12.9)
POTASSIUM SERPL-SCNC: 4 MMOL/L (ref 3.6–5.5)
PROT SERPL-MCNC: 6.4 G/DL (ref 6–8.2)
PROT UR QL STRIP: NEGATIVE MG/DL
PROTHROMBIN TIME: 24.6 SEC (ref 12–14.6)
RBC # BLD AUTO: 4.18 M/UL (ref 4.2–5.4)
RBC # URNS HPF: ABNORMAL /HPF
RBC UR QL AUTO: ABNORMAL
SODIUM SERPL-SCNC: 137 MMOL/L (ref 135–145)
SP GR UR REFRACTOMETRY: >1.045
TRIGL SERPL-MCNC: 148 MG/DL (ref 0–149)
TSH SERPL DL<=0.005 MIU/L-ACNC: 0.97 UIU/ML (ref 0.38–5.33)
UROBILINOGEN UR STRIP.AUTO-MCNC: 0.2 MG/DL
WBC # BLD AUTO: 9.8 K/UL (ref 4.8–10.8)
WBC #/AREA URNS HPF: ABNORMAL /HPF

## 2018-02-26 PROCEDURE — 700105 HCHG RX REV CODE 258: Performed by: HOSPITALIST

## 2018-02-26 PROCEDURE — 770006 HCHG ROOM/CARE - MED/SURG/GYN SEMI*

## 2018-02-26 PROCEDURE — 96375 TX/PRO/DX INJ NEW DRUG ADDON: CPT

## 2018-02-26 PROCEDURE — 96376 TX/PRO/DX INJ SAME DRUG ADON: CPT

## 2018-02-26 PROCEDURE — 85610 PROTHROMBIN TIME: CPT

## 2018-02-26 PROCEDURE — 700111 HCHG RX REV CODE 636 W/ 250 OVERRIDE (IP): Performed by: EMERGENCY MEDICINE

## 2018-02-26 PROCEDURE — 36415 COLL VENOUS BLD VENIPUNCTURE: CPT

## 2018-02-26 PROCEDURE — 80061 LIPID PANEL: CPT

## 2018-02-26 PROCEDURE — 82962 GLUCOSE BLOOD TEST: CPT | Mod: 91

## 2018-02-26 PROCEDURE — A9270 NON-COVERED ITEM OR SERVICE: HCPCS | Performed by: EMERGENCY MEDICINE

## 2018-02-26 PROCEDURE — 87086 URINE CULTURE/COLONY COUNT: CPT

## 2018-02-26 PROCEDURE — 85025 COMPLETE CBC W/AUTO DIFF WBC: CPT

## 2018-02-26 PROCEDURE — 87077 CULTURE AEROBIC IDENTIFY: CPT

## 2018-02-26 PROCEDURE — 83036 HEMOGLOBIN GLYCOSYLATED A1C: CPT

## 2018-02-26 PROCEDURE — 87186 SC STD MICRODIL/AGAR DIL: CPT

## 2018-02-26 PROCEDURE — 96361 HYDRATE IV INFUSION ADD-ON: CPT

## 2018-02-26 PROCEDURE — A9270 NON-COVERED ITEM OR SERVICE: HCPCS | Performed by: HOSPITALIST

## 2018-02-26 PROCEDURE — 83605 ASSAY OF LACTIC ACID: CPT

## 2018-02-26 PROCEDURE — 700102 HCHG RX REV CODE 250 W/ 637 OVERRIDE(OP): Performed by: HOSPITALIST

## 2018-02-26 PROCEDURE — 700102 HCHG RX REV CODE 250 W/ 637 OVERRIDE(OP): Performed by: EMERGENCY MEDICINE

## 2018-02-26 PROCEDURE — 99223 1ST HOSP IP/OBS HIGH 75: CPT | Performed by: HOSPITALIST

## 2018-02-26 PROCEDURE — 84443 ASSAY THYROID STIM HORMONE: CPT

## 2018-02-26 PROCEDURE — G0378 HOSPITAL OBSERVATION PER HR: HCPCS

## 2018-02-26 PROCEDURE — 81001 URINALYSIS AUTO W/SCOPE: CPT

## 2018-02-26 PROCEDURE — 71045 X-RAY EXAM CHEST 1 VIEW: CPT

## 2018-02-26 PROCEDURE — 700105 HCHG RX REV CODE 258: Performed by: EMERGENCY MEDICINE

## 2018-02-26 PROCEDURE — 80053 COMPREHEN METABOLIC PANEL: CPT

## 2018-02-26 RX ORDER — SODIUM CHLORIDE 9 MG/ML
INJECTION, SOLUTION INTRAVENOUS CONTINUOUS
Status: DISCONTINUED | OUTPATIENT
Start: 2018-02-26 | End: 2018-02-28

## 2018-02-26 RX ORDER — CIPROFLOXACIN 2 MG/ML
400 INJECTION, SOLUTION INTRAVENOUS EVERY 12 HOURS
Status: DISCONTINUED | OUTPATIENT
Start: 2018-02-26 | End: 2018-02-26

## 2018-02-26 RX ORDER — HEPARIN SODIUM 5000 [USP'U]/ML
5000 INJECTION, SOLUTION INTRAVENOUS; SUBCUTANEOUS EVERY 8 HOURS
Status: DISCONTINUED | OUTPATIENT
Start: 2018-02-26 | End: 2018-02-26

## 2018-02-26 RX ORDER — WARFARIN SODIUM 5 MG/1
5 TABLET ORAL
Status: DISCONTINUED | OUTPATIENT
Start: 2018-02-26 | End: 2018-03-02 | Stop reason: HOSPADM

## 2018-02-26 RX ORDER — ATORVASTATIN CALCIUM 40 MG/1
40 TABLET, FILM COATED ORAL EVERY EVENING
Status: DISCONTINUED | OUTPATIENT
Start: 2018-02-26 | End: 2018-03-02 | Stop reason: HOSPADM

## 2018-02-26 RX ORDER — ONDANSETRON 2 MG/ML
4 INJECTION INTRAMUSCULAR; INTRAVENOUS EVERY 4 HOURS PRN
Status: DISCONTINUED | OUTPATIENT
Start: 2018-02-26 | End: 2018-03-02 | Stop reason: HOSPADM

## 2018-02-26 RX ORDER — HYDROMORPHONE HYDROCHLORIDE 2 MG/ML
0.5 INJECTION, SOLUTION INTRAMUSCULAR; INTRAVENOUS; SUBCUTANEOUS
Status: DISCONTINUED | OUTPATIENT
Start: 2018-02-26 | End: 2018-02-27

## 2018-02-26 RX ORDER — AMOXICILLIN 250 MG
2 CAPSULE ORAL 2 TIMES DAILY
Status: DISCONTINUED | OUTPATIENT
Start: 2018-02-26 | End: 2018-02-27

## 2018-02-26 RX ORDER — OXYCODONE HYDROCHLORIDE 5 MG/1
5 TABLET ORAL
Status: DISCONTINUED | OUTPATIENT
Start: 2018-02-26 | End: 2018-02-27

## 2018-02-26 RX ORDER — OXYCODONE HYDROCHLORIDE 10 MG/1
10 TABLET ORAL
Status: DISCONTINUED | OUTPATIENT
Start: 2018-02-26 | End: 2018-02-27

## 2018-02-26 RX ORDER — POLYETHYLENE GLYCOL 3350 17 G/17G
1 POWDER, FOR SOLUTION ORAL
Status: DISCONTINUED | OUTPATIENT
Start: 2018-02-26 | End: 2018-02-27

## 2018-02-26 RX ORDER — CEFTRIAXONE 2 G/1
2 INJECTION, POWDER, FOR SOLUTION INTRAMUSCULAR; INTRAVENOUS ONCE
Status: COMPLETED | OUTPATIENT
Start: 2018-02-26 | End: 2018-02-26

## 2018-02-26 RX ORDER — DEXTROSE MONOHYDRATE 25 G/50ML
25 INJECTION, SOLUTION INTRAVENOUS
Status: DISCONTINUED | OUTPATIENT
Start: 2018-02-26 | End: 2018-03-02 | Stop reason: HOSPADM

## 2018-02-26 RX ORDER — WARFARIN SODIUM 7.5 MG/1
7.5 TABLET ORAL
Status: DISCONTINUED | OUTPATIENT
Start: 2018-02-27 | End: 2018-03-02 | Stop reason: HOSPADM

## 2018-02-26 RX ORDER — LEVOTHYROXINE SODIUM 0.07 MG/1
150 TABLET ORAL
Status: DISCONTINUED | OUTPATIENT
Start: 2018-02-26 | End: 2018-03-02 | Stop reason: HOSPADM

## 2018-02-26 RX ORDER — ONDANSETRON 4 MG/1
4 TABLET, ORALLY DISINTEGRATING ORAL EVERY 4 HOURS PRN
Status: DISCONTINUED | OUTPATIENT
Start: 2018-02-26 | End: 2018-03-02 | Stop reason: HOSPADM

## 2018-02-26 RX ORDER — OXYCODONE AND ACETAMINOPHEN 7.5; 325 MG/1; MG/1
1 TABLET ORAL ONCE
Status: COMPLETED | OUTPATIENT
Start: 2018-02-26 | End: 2018-02-26

## 2018-02-26 RX ORDER — BISACODYL 10 MG
10 SUPPOSITORY, RECTAL RECTAL
Status: DISCONTINUED | OUTPATIENT
Start: 2018-02-26 | End: 2018-02-27

## 2018-02-26 RX ORDER — TRAZODONE HYDROCHLORIDE 150 MG/1
150 TABLET ORAL NIGHTLY PRN
Status: DISCONTINUED | OUTPATIENT
Start: 2018-02-26 | End: 2018-03-02 | Stop reason: HOSPADM

## 2018-02-26 RX ADMIN — FENTANYL CITRATE 50 MCG: 50 INJECTION, SOLUTION INTRAMUSCULAR; INTRAVENOUS at 00:07

## 2018-02-26 RX ADMIN — LEVOTHYROXINE SODIUM 150 MCG: 75 TABLET ORAL at 06:55

## 2018-02-26 RX ADMIN — OXYCODONE HYDROCHLORIDE 10 MG: 10 TABLET ORAL at 23:51

## 2018-02-26 RX ADMIN — SODIUM CHLORIDE 1000 ML: 9 INJECTION, SOLUTION INTRAVENOUS at 00:07

## 2018-02-26 RX ADMIN — SODIUM CHLORIDE: 9 INJECTION, SOLUTION INTRAVENOUS at 15:13

## 2018-02-26 RX ADMIN — OXYCODONE HYDROCHLORIDE 5 MG: 5 TABLET ORAL at 09:46

## 2018-02-26 RX ADMIN — ATORVASTATIN CALCIUM 40 MG: 20 TABLET, FILM COATED ORAL at 19:59

## 2018-02-26 RX ADMIN — CEFTRIAXONE SODIUM 2 G: 2 INJECTION, POWDER, FOR SOLUTION INTRAMUSCULAR; INTRAVENOUS at 01:42

## 2018-02-26 RX ADMIN — OXYCODONE HYDROCHLORIDE 10 MG: 10 TABLET ORAL at 18:06

## 2018-02-26 RX ADMIN — SODIUM CHLORIDE: 9 INJECTION, SOLUTION INTRAVENOUS at 06:28

## 2018-02-26 RX ADMIN — WARFARIN SODIUM 5 MG: 5 TABLET ORAL at 19:08

## 2018-02-26 RX ADMIN — OXYCODONE HYDROCHLORIDE AND ACETAMINOPHEN 1 TABLET: 7.5; 325 TABLET ORAL at 01:42

## 2018-02-26 ASSESSMENT — PAIN SCALES - GENERAL
PAINLEVEL_OUTOF10: 3
PAINLEVEL_OUTOF10: 0
PAINLEVEL_OUTOF10: 4
PAINLEVEL_OUTOF10: 8
PAINLEVEL_OUTOF10: 2
PAINLEVEL_OUTOF10: 8
PAINLEVEL_OUTOF10: 7

## 2018-02-26 ASSESSMENT — ENCOUNTER SYMPTOMS
EYE DISCHARGE: 0
SPEECH CHANGE: 0
COUGH: 0
DIARRHEA: 1
BRUISES/BLEEDS EASILY: 0
DIZZINESS: 0
CHILLS: 0
PALPITATIONS: 0
SENSORY CHANGE: 0
VOMITING: 1
DOUBLE VISION: 0
ABDOMINAL PAIN: 1
FLANK PAIN: 1
HEMOPTYSIS: 0
FEVER: 0
SHORTNESS OF BREATH: 0
FOCAL WEAKNESS: 0
DEPRESSION: 0
NAUSEA: 1
HALLUCINATIONS: 0
BLURRED VISION: 0
MYALGIAS: 1
WEAKNESS: 1
HEARTBURN: 0

## 2018-02-26 ASSESSMENT — PATIENT HEALTH QUESTIONNAIRE - PHQ9
1. LITTLE INTEREST OR PLEASURE IN DOING THINGS: NOT AT ALL
2. FEELING DOWN, DEPRESSED, IRRITABLE, OR HOPELESS: NOT AT ALL
SUM OF ALL RESPONSES TO PHQ9 QUESTIONS 1 AND 2: 0
SUM OF ALL RESPONSES TO PHQ QUESTIONS 1-9: 0

## 2018-02-26 ASSESSMENT — LIFESTYLE VARIABLES
SUBSTANCE_ABUSE: 0
EVER_SMOKED: YES
DO YOU DRINK ALCOHOL: NO

## 2018-02-26 NOTE — PROGRESS NOTES
78 y/o female admitted today for gastroenteritis.  Abdominal pain slightly improved.  No further nausea or vomiting.  Continue IVF.  Advance diet as tolerated.  Also found to have UTI.  Continue ceftriaxone.  Afebrile.  VSS.

## 2018-02-26 NOTE — DISCHARGE PLANNING
Pt live at [facility] and will take cab home if their van cannot pick her up.    Care Transition Team Assessment    Information Source  Orientation : Oriented x 4  Information Given By: Patient  Who is responsible for making decisions for patient? : Patient    Readmission Evaluation  Is this a readmission?: No    Elopement Risk  Legal Hold: No  Ambulatory or Self Mobile in Wheelchair: Yes  Disoriented: No  Psychiatric Symptoms: None  History of Wandering: No  Elopement this Admit: No  Vocalizing Wanting to Leave: No  Displays Behaviors, Body Language Wanting to Leave: No-Not at Risk for Elopement  Elopement Risk: Not at Risk for Elopement    Interdisciplinary Discharge Planning  Does Admitting Nurse Feel This Could be a Complex Discharge?: No  Primary Care Physician: Efrain Diaz  Lives with - Patient's Self Care Capacity:  ([facility] group home)  Patient or legal guardian wants to designate a caregiver (see row info): No  Support Systems: Home Care Staff  Housing / Facility: Assisted Living Residence  Name of Care Facility: [facility]  Do You Take your Prescribed Medications Regularly: Yes  Able to Return to Previous ADL's: Yes  Mobility Issues: No  Prior Services: Housekeeping / Homemaker Services, Other (Comments), Meals on Wheels  Assistance Needed: No  Durable Medical Equipment: Other - Specify (cane)    Discharge Preparedness  What is your plan after discharge?: Other (comment)  Prior Functional Level: Uses Cane  Difficulity with ADLs: None  Difficulity with IADLs: Driving  Difficulity with IADL Comments: utilizes van at [facility]    Functional Assesment  Prior Functional Level: Uses Cane    Finances  Financial Barriers to Discharge: No  Prescription Coverage: Yes    Vision / Hearing Impairment  Vision Impairment : Yes  Right Eye Vision: Wears Glasses, Impaired  Left Eye Vision: Wears Glasses, Impaired  Hearing Impairment : No    Values / Beliefs / Concerns  Values / Beliefs Concerns :  No      Domestic Abuse  Have you ever been the victim of abuse or violence?: No  Physical Abuse or Sexual Abuse: No  Verbal Abuse or Emotional Abuse: No  Possible Abuse Reported to:: Not Applicable     Discharge Risks or Barriers  Discharge risks or barriers?: No    Anticipated Discharge Information  Anticipated discharge disposition: Home  Discharge Address: facesheet

## 2018-02-26 NOTE — PROGRESS NOTES
Inpatient Anticoagulation Service Note    Date: 2/26/2018  Reason for Anticoagulation: Pulmonary Embolism (April 2015)        Hemoglobin Value: (!) 11.4  Hematocrit Value: (!) 36  Lab Platelet Value: 442  Target INR: 2.0 to 3.0    INR from last 7 days     Date/Time INR Value    02/26/18 0623 (!)  2.26        Dose from last 7 days     Date/Time Dose (mg)    02/26/18 0700  5        Average Dose (mg):  (Home dose per [facility] notes: 5 mg MWF, 7.5 mg AODs)  Significant Interactions: Thyroid Medications, Proton Pump Inhibitor, Statin, Antibiotics  Bridge Therapy: No     Comments: Home Coumadin to continue on admission for hx of PE diagnosed April 2015. Patient admitted with pyelonephritis. INR therapeutic on admission. New drug interaction with Rocephin. No sx of active bleeding noted. Will resume pt's home regimen. INR in AM.     Plan:  5 mg tonight. INR in AM.     Education Material Provided?: No (warfarin PTA )    Pharmacist suggested discharge dosing: Likely resume home regimen of 5 mg on MWF, 7.5 mg all other days      Eulogio Louie, PharmD

## 2018-02-26 NOTE — ED PROVIDER NOTES
ED Provider Note    Scribed for Ruben Tejada M.D. by Vimal Helton. 2/25/2018, 8:58 PM.    Primary care provider: WENDY Baker  Means of arrival: Ambulance  History obtained from: Patient  History limited by: None    CHIEF COMPLAINT  Chief Complaint   Patient presents with   • Nausea/Vomiting/Diarrhea   • Body Aches       HPI  Sanjuanita Sosa is a 77 y.o. female who presents to the Emergency Department for evaluation of nausea and vomiting onset a few days ago with worsening severity beginning at 5:30 AM today. She endorses associated diarrhea. The patient also reports to have lower back pain and and lower abdominal pain. The patient denies fever. She claims she received an influenza vaccination this season. The patient states she has not recently been admitted to the hospital or placed on antibiotics. She currently resides at [facility]. No alleviating or exacerbating factors are identified at this time.     REVIEW OF SYSTEMS  See HPI for further details. All other systems are negative.   C.    PAST MEDICAL HISTORY   has a past medical history of AAA (abdominal aortic aneurysm) (CMS-HCC); Anticoagulation monitoring, special range; Arrhythmia; Arthritis; Autoimmune hepatitis (CMS-[facility]) (3/19/2012); Blood clotting disorder (CMS-HCC) (2015); Breath shortness; Cancer (CMS-HCC); Cataract; Diabetes; Disorder of thyroid (2016); Gallstones; H/O: HTN (hypertension) (3/19/2012); Heart valve disease; Hepatitis B; Hiatus hernia syndrome; Hyperlipidemia; Hypertension; Kidney disease; Mixed hyperlipidemia (3/19/2012); Murmur (3/19/2012); Nonspecific abnormal electrocardiogram (ECG) (EKG) (3/19/2012); Overweight(278.02) (3/19/2012); Pain; Palpitations; Preoperative cardiovascular examination (3/19/2012); and Unspecified urinary incontinence.    SURGICAL HISTORY   has a past surgical history that includes bladder suspension (5/27/2009); cystoscopy (5/27/2009); rectocele repair (5/27/2009); appendectomy;  "hysterectomy laparoscopy; knee replacement, total; tonsillectomy; exploratory laparotomy (2/27/2013); splenectomy (2/27/2013); node dissection (2/27/2013); oophorectomy (2/27/2013); thyroidectomy total (N/A, 10/12/2016); feroz by laparoscopy (1/2/2017); and ercp in or (12/30/2016).    SOCIAL HISTORY  Social History   Substance Use Topics   • Smoking status: Current Every Day Smoker     Packs/day: 0.25     Years: 40.00     Types: Cigarettes   • Smokeless tobacco: Never Used   • Alcohol use No      History   Drug Use No       FAMILY HISTORY  Family History   Problem Relation Age of Onset   • Hyperlipidemia Mother    • Stroke Mother    • Hyperlipidemia Father    • Stroke Father    • Heart Disease Brother      CABG       CURRENT MEDICATIONS  Reviewed.  See Encounter Summary.     ALLERGIES  Allergies   Allergen Reactions   • Aspirin Anaphylaxis   • Penicillins Anaphylaxis     As a child       PHYSICAL EXAM  VITAL SIGNS: /49   Pulse 85   Temp 36 °C (96.8 °F)   Resp 16   Ht 1.626 m (5' 4\")   Wt 90.7 kg (200 lb)   LMP 10/12/1970   SpO2 95%   BMI 34.33 kg/m²   Constitutional: Alert in no apparent distress.  HENT: No signs of trauma, Bilateral external ears normal. Dry mucous membranes. Dry lips.  Eyes: Pupils are equal and reactive, Conjunctiva normal, Non-icteric.   Neck: Normal range of motion, No tenderness, Supple, No stridor.   Lymphatic: No lymphadenopathy noted.   Cardiovascular: Regular rate and rhythm, no murmurs. Current vital sign 90 systolic.   Thorax & Lungs: Normal breath sounds, No respiratory distress, No wheezing, No chest tenderness.   Abdomen: Bowel sounds normal, Soft, Left lower quadrant tenderness, No masses, No pulsatile masses. No peritoneal signs.  Skin: Warm, Dry, No erythema, No rash.   Back: No bony tenderness, No CVA tenderness.   Extremities: Intact distal pulses, No edema, No tenderness, No cyanosis  Musculoskeletal: Good range of motion in all major joints. No tenderness to " palpation or major deformities noted.   Neurologic: Alert , Normal motor function, Normal sensory function, No focal deficits noted.   Psychiatric: Affect normal, Judgment normal, Mood normal.     DIAGNOSTIC STUDIES / PROCEDURES     LABS  Results for orders placed or performed during the hospital encounter of 02/25/18   INFLUENZA A/B BY PCR   Result Value Ref Range    Influenza virus A RNA Negative Negative    Influenza virus B, PCR Negative Negative   CBC WITH DIFFERENTIAL   Result Value Ref Range    WBC 11.0 (H) 4.8 - 10.8 K/uL    RBC 4.67 4.20 - 5.40 M/uL    Hemoglobin 13.0 12.0 - 16.0 g/dL    Hematocrit 40.0 37.0 - 47.0 %    MCV 85.7 81.4 - 97.8 fL    MCH 27.8 27.0 - 33.0 pg    MCHC 32.5 (L) 33.6 - 35.0 g/dL    RDW 51.2 (H) 35.9 - 50.0 fL    Platelet Count 455 (H) 164 - 446 K/uL    MPV 9.6 9.0 - 12.9 fL    Neutrophils-Polys 89.00 (H) 44.00 - 72.00 %    Lymphocytes 4.70 (L) 22.00 - 41.00 %    Monocytes 4.00 0.00 - 13.40 %    Eosinophils 1.30 0.00 - 6.90 %    Basophils 0.50 0.00 - 1.80 %    Immature Granulocytes 0.50 0.00 - 0.90 %    Nucleated RBC 0.00 /100 WBC    Neutrophils (Absolute) 9.75 (H) 2.00 - 7.15 K/uL    Lymphs (Absolute) 0.52 (L) 1.00 - 4.80 K/uL    Monos (Absolute) 0.44 0.00 - 0.85 K/uL    Eos (Absolute) 0.14 0.00 - 0.51 K/uL    Baso (Absolute) 0.05 0.00 - 0.12 K/uL    Immature Granulocytes (abs) 0.05 0.00 - 0.11 K/uL    NRBC (Absolute) 0.00 K/uL   CMP   Result Value Ref Range    Sodium 138 135 - 145 mmol/L    Potassium 4.3 3.6 - 5.5 mmol/L    Chloride 106 96 - 112 mmol/L    Co2 21 20 - 33 mmol/L    Anion Gap 11.0 0.0 - 11.9    Glucose 169 (H) 65 - 99 mg/dL    Bun 26 (H) 8 - 22 mg/dL    Creatinine 1.04 0.50 - 1.40 mg/dL    Calcium 8.3 (L) 8.5 - 10.5 mg/dL    AST(SGOT) 14 12 - 45 U/L    ALT(SGPT) 10 2 - 50 U/L    Alkaline Phosphatase 61 30 - 99 U/L    Total Bilirubin 0.5 0.1 - 1.5 mg/dL    Albumin 3.8 3.2 - 4.9 g/dL    Total Protein 6.6 6.0 - 8.2 g/dL    Globulin 2.8 1.9 - 3.5 g/dL    A-G Ratio 1.4  g/dL   ESTIMATED GFR   Result Value Ref Range    GFR If African American >60 >60 mL/min/1.73 m 2    GFR If Non African American 52 (A) >60 mL/min/1.73 m 2   LIPASE   Result Value Ref Range    Lipase 12 11 - 82 U/L   LACTIC ACID   Result Value Ref Range    Lactic Acid 2.4 (H) 0.5 - 2.0 mmol/L   URINALYSIS (UA)   Result Value Ref Range    Micro Urine Req Microscopic     Color Yellow     Character Clear     Ph 5.5 5.0 - 8.0    Glucose Negative Negative mg/dL    Ketones Trace (A) Negative mg/dL    Protein Negative Negative mg/dL    Bilirubin Negative Negative    Urobilinogen, Urine 0.2 Negative    Nitrite Negative Negative    Leukocyte Esterase Trace (A) Negative    Occult Blood Small (A) Negative   LACTIC ACID   Result Value Ref Range    Lactic Acid 1.4 0.5 - 2.0 mmol/L   REFRACTOMETER SG   Result Value Ref Range    Specific Gravity >1.045    URINE MICROSCOPIC (W/UA)   Result Value Ref Range    WBC 20-50 (A) /hpf    RBC 2-5 (A) /hpf    Bacteria Few (A) None /hpf    Epithelial Cells Negative /hpf    Hyaline Cast 0-2 /lpf     All labs were reviewed by me.    EKG  12 Lead EKG interpreted by me to show:  Sinus rhythm    Axis: Normal  Intervals: Normal  No acute ST-T wave changes      RADIOLOGY  DX-CHEST-LIMITED (1 VIEW)   Final Result      No acute cardiopulmonary disease.      CT-ABDOMEN-PELVIS WITH   Final Result      1.  Increased colonic fluid suggesting gastroenteritis.   2.  No bowel obstruction or pneumoperitoneum.   3.  Postoperative changes as described.   4.  Colonic diverticulosis without evidence for diverticulitis.        The radiologist's interpretation of all radiological studies and images have been reviewed by me.    COURSE & MEDICAL DECISION MAKING  Pertinent Labs & Imaging studies reviewed. (See chart for details)        8:58 PM - Patient seen and examined at bedside. Patient will be treated with Zofran 4 mg and Sublimaze 50 mcg. She will additionally receive 1 L NS for dry mucous membranes. Ordered  Lactic Acid, Lipase, Urinalysis, CMP, CBC, Influenza A/B, Estimated GFR, CT-Abdomen, and EKG to evaluate her symptoms.     11:36 PM - Reviewed patient's lab and radiology results as shown above.     12:00 AM - Nurse reports patient is in pain. Patient will be medicated with Sublimaze 50 mcg and 1 L Ns.    2:35 AM - Patient reevaluated. She has had positive reaction to IV fluids. Patient informed she will be admitted to the ED due to hypoxia. She is agreeable.     2:48 AM - Consult with Dr. Mccall, Hospitalist, who agrees to admit the patient.    Decision Making:  This is a 77 y.o. year old female who presents with above complaint. Laboratory evaluation showing evidence of urinary tract infection. Initial lactic acid is elevated although repeat after fluid resuscitation is improved. Patient is recurrently hypoxic specimen with pain management although the patient is requiring ongoing pain management due to flank abdominal pain. CT imaging does not show any acute process although there is evidence of likely gastritis-like pathology. At this point given the patient's age and above findings about the patient brought in the hospital service for overnight observation and care.    DISPOSITION:  Patient will be admitted to Dr. Mccall, Hospitalist, in guarded condition.    FINAL IMPRESSION  1. Acute UTI    2. Hypoxia    3. Lactic acid acidosis          Vimal TOLBERT (Annia), am scribing for, and in the presence of, Ruben Tejada M.D..    Electronically signed by: Vimal Helton (Annia), 2/25/2018    Ruben TOLBERT M.D. personally performed the services described in this documentation, as scribed by Vimal Helton in my presence, and it is both accurate and complete.    The note accurately reflects work and decisions made by me.  Ruben Tejada  2/26/2018  3:30 AM        \

## 2018-02-26 NOTE — PROGRESS NOTES
Pt to unit by evelin. Up SBA. Admission profile completed. bloodwork collected and sent to lab. Pt on 2.5L per [place], desats into 80s on room air. pt does not wear oxygen at home.Pt updated on POC. Call light in reach.

## 2018-02-26 NOTE — ASSESSMENT & PLAN NOTE
Concern for UTI based on labs and dysuria   -continue PO cipro, confirmed to be E coli, pan-sensitive, will stop abx's on Friday

## 2018-02-26 NOTE — H&P
Hospital Medicine History and Physical    Date of Service  2/26/2018    Chief Complaint  Chief Complaint   Patient presents with   • Nausea/Vomiting/Diarrhea   • Body Aches       History of Presenting Illness  77 y.o. female who presented 2/25/2018 with nausea vomiting A few days ago worsening severity. The 5:30 AM yesterday. Endorses associated diarrhea also reports to have lower abdominal pain. Denies fever. She complains of left-sided flank pain radiating into her groin. She has dysuria. She currently resides at [facility]. She complains of throbbing type of pain worse with urination. Nothing makes the pain better. Denies fevers, chills or sewat.s + myalgias.   Primary Care Physician  Efrain Armendariz A.P.N.    Consultants  None    Code Status  Full    Review of Systems  Review of Systems   Constitutional: Negative for chills and fever.   HENT: Negative for congestion, hearing loss and tinnitus.    Eyes: Negative for blurred vision, double vision and discharge.   Respiratory: Negative for cough, hemoptysis and shortness of breath.    Cardiovascular: Negative for chest pain, palpitations and leg swelling.   Gastrointestinal: Positive for abdominal pain, diarrhea, nausea and vomiting. Negative for heartburn.   Genitourinary: Positive for dysuria and flank pain.   Musculoskeletal: Positive for myalgias. Negative for joint pain.   Skin: Negative for rash.   Neurological: Positive for weakness. Negative for dizziness, sensory change, speech change and focal weakness.   Endo/Heme/Allergies: Negative for environmental allergies. Does not bruise/bleed easily.   Psychiatric/Behavioral: Negative for depression, hallucinations and substance abuse.        Past Medical History  Past Medical History:   Diagnosis Date   • Disorder of thyroid 2016    thyroid cancer   • Blood clotting disorder (CMS-HCC) 2015    clot in leg and lung   • Nonspecific abnormal electrocardiogram (ECG) (EKG) 3/19/2012   • Autoimmune hepatitis  (CMS-HCC) 3/19/2012   • H/O: HTN (hypertension) 3/19/2012   • Mixed hyperlipidemia 3/19/2012   • Murmur 3/19/2012   • Overweight(278.02) 3/19/2012   • Preoperative cardiovascular examination 3/19/2012   • AAA (abdominal aortic aneurysm) (CMS-[facility])    • Anticoagulation monitoring, special range    • Arrhythmia    • Arthritis    • Breath shortness    • Cancer (CMS-[facility])     thyroid   • Cataract     left eye needs surgery   • Diabetes     pre-diabetic   • Gallstones    • Heart valve disease    • Hepatitis B    • Hiatus hernia syndrome    • Hyperlipidemia    • Hypertension    • Kidney disease    • Pain    • Palpitations    • Unspecified urinary incontinence        Surgical History  Past Surgical History:   Procedure Laterality Date   • DARREN BY LAPAROSCOPY  1/2/2017    Procedure: DARREN BY LAPAROSCOPY;  Surgeon: Chele Valles M.D.;  Location: SURGERY [facility];  Service:    • ERCP IN OR  12/30/2016    Procedure: ERCP IN OR;  Surgeon: Shekhar Rosado M.D.;  Location: SURGERY SAME DAY [facility];  Service:    • THYROIDECTOMY TOTAL N/A 10/12/2016    Procedure: THYROIDECTOMY TOTAL;  Surgeon: Nuno Duncan M.D.;  Location: SURGERY SAME DAY [facility];  Service:    • EXPLORATORY LAPAROTOMY  2/27/2013    Performed by Edson Craft M.D. at SURGERY [facility]   • SPLENECTOMY  2/27/2013    Performed by Edson Craft M.D. at [facility]   • NODE DISSECTION  2/27/2013    Performed by Edson Craft M.D. at SURGERY [facility]   • OOPHORECTOMY  2/27/2013    Performed by Levi Nolan M.D. at [facility]   • BLADDER SUSPENSION  5/27/2009    Performed by RINA HART at SURGERY SAME DAY [facility]   • CYSTOSCOPY  5/27/2009    Performed by RINA HART at SURGERY SAME DAY [facility]   • RECTOCELE REPAIR  5/27/2009    Performed by RINA HART at SURGERY SAME DAY [facility]   • APPENDECTOMY     • HYSTERECTOMY LAPAROSCOPY     • KNEE  REPLACEMENT, TOTAL     • TONSILLECTOMY         Medications  Current Facility-Administered Medications on File Prior to Encounter   Medication Dose Route Frequency Provider Last Rate Last Dose   • iodixanol (VISIPAQUE) SOLN    Intra-Op Once PRN Shekhar Rosado M.D.   10 mL at 01/18/17 0619     Current Outpatient Prescriptions on File Prior to Encounter   Medication Sig Dispense Refill   • warfarin (COUMADIN) 5 MG Tab Take one to one and one-half (1-1.5) tablets daily as directed by [facility] 135 Tab 1   • amlodipine (NORVASC) 10 MG Tab TAKE ONE TABLET BY MOUTH ONCE A DAY 90 Tab 2   • calcium carbonate (OS-MARKELL 500) 500 MG Tab TAKE ONE TABLET BY MOUTH TWICE DAILY WITH MEALS 60 Tab 11   • amlodipine (NORVASC) 5 MG Tab Take 1 Tab by mouth every day. 30 Tab 5   • hydrocodone-acetaminophen (NORCO) 5-325 MG Tab per tablet Take 1-2 Tabs by mouth every 6 hours as needed. 10 Tab 0   • fexofenadine (ALLEGRA) 180 MG tablet Take 1 Tab by mouth every day. 30 Tab    • Misc. Devices Misc Use one test strip twice daily 60 Each 11   • levothyroxine (SYNTHROID) 137 MCG Tab Take 1 Tab by mouth Every morning on an empty stomach. 30 Tab 11   • oxycodone-acetaminophen (PERCOCET) 7.5-325 MG per tablet Take 1 Tab by mouth 2 Times a Day. 60 Tab 0   • trazodone (DESYREL) 100 MG Tab Take 1.5 Tabs by mouth 1 time daily as needed for Sleep. 45 Tab 11   • glimepiride (AMARYL) 2 MG Tab TAKE  ONE TABLET BY MOUTH EVERY MORNING 30 Tab 11   • metformin (GLUCOPHAGE) 500 MG Tab TAKE 2 TABLETS BY MOUTH EACH MORNING WITH  BREAKFAST AND TAKE 1 TABLET EACH EVENING   WITH DINNER 90 Tab 11   • vitamin D, Ergocalciferol, (DRISDOL) 53740 UNITS Cap capsule Take 1 Cap by mouth every 7 days. 4 Cap 11   • losartan (COZAAR) 50 MG Tab TAKE ONE TABLET BY MOUTH EVERY DAY 30 Tab 10   • atorvastatin (LIPITOR) 80 MG tablet Take 0.5 Tabs by mouth every evening. 30 Tab 11   • omeprazole (PRILOSEC) 20 MG delayed-release capsule Take 1 Cap by mouth  every day. 30 Cap 11   • losartan (COZAAR) 50 MG Tab Take daily 30 Tab 11   • MICROLET LANCETS Misc TEST BLOOD SUGAR TWO TIMES DAILY 100 Each 11   • docusate sodium 100 MG Cap Take 100 mg by mouth every morning. 30 Cap 0       Family History  Family History   Problem Relation Age of Onset   • Hyperlipidemia Mother    • Stroke Mother    • Hyperlipidemia Father    • Stroke Father    • Heart Disease Brother      CABG       Social History  Social History   Substance Use Topics   • Smoking status: Current Every Day Smoker     Packs/day: 0.25     Years: 40.00     Types: Cigarettes   • Smokeless tobacco: Never Used   • Alcohol use No       Allergies  Allergies   Allergen Reactions   • Aspirin Anaphylaxis   • Penicillins Anaphylaxis     As a child        Physical Exam  Laboratory   Hemodynamics  Temp (24hrs), Av °C (96.8 °F), Min:36 °C (96.8 °F), Max:36 °C (96.8 °F)   Temperature: 36 °C (96.8 °F)  Pulse  Av.8  Min: 69  Max: 89 Heart Rate (Monitored): 68  Blood Pressure : 112/49, NIBP: 104/39      Respiratory      Respiration: 16, Pulse Oximetry: 90 %             Physical Exam   Constitutional: She is oriented to person, place, and time. She appears well-developed and well-nourished.   HENT:   Head: Normocephalic and atraumatic.   Eyes: Conjunctivae and EOM are normal. Pupils are equal, round, and reactive to light.   Neck: Normal range of motion. Neck supple. No JVD present.   Cardiovascular: Normal rate, regular rhythm, normal heart sounds and intact distal pulses.    Abdominal: Soft. Bowel sounds are normal. She exhibits no distension. There is tenderness.   Left flank ttp   Musculoskeletal: Normal range of motion. She exhibits no edema.   Neurological: She is alert and oriented to person, place, and time. She exhibits normal muscle tone.   Skin: Skin is warm and dry. No erythema.   Psychiatric: She has a normal mood and affect. Her behavior is normal. Judgment and thought content normal.       Recent Labs       02/25/18 2006   WBC  11.0*   RBC  4.67   HEMOGLOBIN  13.0   HEMATOCRIT  40.0   MCV  85.7   MCH  27.8   MCHC  32.5*   RDW  51.2*   PLATELETCT  455*   MPV  9.6     Recent Labs      02/25/18 2006   SODIUM  138   POTASSIUM  4.3   CHLORIDE  106   CO2  21   GLUCOSE  169*   BUN  26*   CREATININE  1.04   CALCIUM  8.3*     Recent Labs      02/25/18 2006 02/25/18   2113   ALTSGPT  10   --    ASTSGOT  14   --    ALKPHOSPHAT  61   --    TBILIRUBIN  0.5   --    LIPASE   --   12   GLUCOSE  169*   --                  Lab Results   Component Value Date    TROPONINI 0.07 (H) 10/07/2015     Urinalysis:    Lab Results  Component Value Date/Time   SPECGRAVITY >1.045 02/26/2018 0010   GLUCOSEUR Negative 02/26/2018 0010   KETONES Trace (A) 02/26/2018 0010   NITRITE Negative 02/26/2018 0010   WBCURINE 20-50 (A) 02/26/2018 0010   RBCURINE 2-5 (A) 02/26/2018 0010   BACTERIA Few (A) 02/26/2018 0010   EPITHELCELL Negative 02/26/2018 0010        Imaging  Reviewed   Assessment/Plan     I anticipate this patient is appropriate for observation status at this time.    * Gastroenteritis   Assessment & Plan    Symptomatic management   IVF  Anti emetics  Pain control         Thrombocytosis (CMS-HCC)   Assessment & Plan    Monitor        Hypotension   Assessment & Plan    IVF with improvement        UTI (urinary tract infection)   Assessment & Plan    Concern for UTI based on labs and dysuria   Will treat with iv abx   Follow culture   descilate as clinically appropriate         Abdominal aortic aneurysm without rupture (CMS-HCC)- (present on admission)   Assessment & Plan    Hx of stable         Postoperative hypothyroidism- (present on admission)   Assessment & Plan    Cont levo        Controlled type 2 diabetes mellitus without complication, without long-term current use of insulin (CMS-HCC)- (present on admission)   Assessment & Plan    SSI         Essential hypertension- (present on admission)   Assessment & Plan    Hold anti hypertensives  slightly hypotensive resume as clinically appropriate         Hx pulmonary embolism- (present on admission)   Assessment & Plan    Cont comadin         Dyslipidemia- (present on admission)   Assessment & Plan    Cont statin   Check lipid panel         Carotid artery stenosis- (present on admission)   Assessment & Plan    Hx of             VTE prophylaxis: coumadin

## 2018-02-26 NOTE — PROGRESS NOTES
Pt tolerating clear liquid diet without nausea/vomiting/pain; VO to advance diet to full liquids;Nursing communication to advance as tolerated placed; .

## 2018-02-26 NOTE — ASSESSMENT & PLAN NOTE
Symptomatic management   -no more IVF's at this time, try to wean off o2  Anti emetics  Pain control

## 2018-02-26 NOTE — PROGRESS NOTES
Bedside report received 0710. POC discussed with pt; Pt c/o left side abdomen pain, medicated per MAR. Initiating clear liq diet, will monitor for toleration;Discussed with Eva CARPENTER; Pt denies urinary symptoms; Clean catch urine sent to lab;  O2 decreased to 1L, saturation of 94%, will wean as tolerated; all questions answered at this time.

## 2018-02-26 NOTE — ED TRIAGE NOTES
Pt started with n/v/d since 8am, 10 x vomit, pt has stomach pain to generalized abdomen, and bilat flank area with body aches

## 2018-02-26 NOTE — ED NOTES
Spoke with hospitalized , did not know pt was being admitted very apologetic and will be down shortly

## 2018-02-27 LAB
GLUCOSE BLD-MCNC: 121 MG/DL (ref 65–99)
GLUCOSE BLD-MCNC: 130 MG/DL (ref 65–99)
GLUCOSE BLD-MCNC: 139 MG/DL (ref 65–99)
GLUCOSE BLD-MCNC: 96 MG/DL (ref 65–99)

## 2018-02-27 PROCEDURE — 700105 HCHG RX REV CODE 258: Performed by: HOSPITALIST

## 2018-02-27 PROCEDURE — 770006 HCHG ROOM/CARE - MED/SURG/GYN SEMI*

## 2018-02-27 PROCEDURE — 700102 HCHG RX REV CODE 250 W/ 637 OVERRIDE(OP): Performed by: INTERNAL MEDICINE

## 2018-02-27 PROCEDURE — 700102 HCHG RX REV CODE 250 W/ 637 OVERRIDE(OP): Performed by: HOSPITALIST

## 2018-02-27 PROCEDURE — A9270 NON-COVERED ITEM OR SERVICE: HCPCS | Performed by: HOSPITALIST

## 2018-02-27 PROCEDURE — 700111 HCHG RX REV CODE 636 W/ 250 OVERRIDE (IP): Performed by: HOSPITALIST

## 2018-02-27 PROCEDURE — A9270 NON-COVERED ITEM OR SERVICE: HCPCS | Performed by: INTERNAL MEDICINE

## 2018-02-27 PROCEDURE — 99232 SBSQ HOSP IP/OBS MODERATE 35: CPT | Performed by: INTERNAL MEDICINE

## 2018-02-27 PROCEDURE — 82962 GLUCOSE BLOOD TEST: CPT | Mod: 91

## 2018-02-27 RX ORDER — OXYCODONE HYDROCHLORIDE 5 MG/1
5 TABLET ORAL
Status: DISCONTINUED | OUTPATIENT
Start: 2018-02-27 | End: 2018-02-28

## 2018-02-27 RX ORDER — ACETAMINOPHEN 500 MG
500 TABLET ORAL EVERY 6 HOURS
Status: DISCONTINUED | OUTPATIENT
Start: 2018-02-27 | End: 2018-03-02 | Stop reason: HOSPADM

## 2018-02-27 RX ADMIN — CEFTRIAXONE 2 G: 2 INJECTION, POWDER, FOR SOLUTION INTRAMUSCULAR; INTRAVENOUS at 00:18

## 2018-02-27 RX ADMIN — WARFARIN SODIUM 7.5 MG: 7.5 TABLET ORAL at 20:07

## 2018-02-27 RX ADMIN — SODIUM CHLORIDE: 9 INJECTION, SOLUTION INTRAVENOUS at 00:26

## 2018-02-27 RX ADMIN — LEVOTHYROXINE SODIUM 150 MCG: 75 TABLET ORAL at 06:09

## 2018-02-27 RX ADMIN — TRAZODONE HYDROCHLORIDE 150 MG: 150 TABLET ORAL at 21:35

## 2018-02-27 RX ADMIN — ACETAMINOPHEN 500 MG: 500 TABLET ORAL at 08:00

## 2018-02-27 RX ADMIN — ACETAMINOPHEN 500 MG: 500 TABLET ORAL at 17:52

## 2018-02-27 RX ADMIN — ATORVASTATIN CALCIUM 40 MG: 20 TABLET, FILM COATED ORAL at 21:00

## 2018-02-27 RX ADMIN — ACETAMINOPHEN 500 MG: 500 TABLET ORAL at 13:17

## 2018-02-27 RX ADMIN — OXYCODONE HYDROCHLORIDE 5 MG: 5 TABLET ORAL at 03:35

## 2018-02-27 RX ADMIN — OXYCODONE HYDROCHLORIDE 5 MG: 5 TABLET ORAL at 21:59

## 2018-02-27 ASSESSMENT — PAIN SCALES - GENERAL
PAINLEVEL_OUTOF10: 4
PAINLEVEL_OUTOF10: 3
PAINLEVEL_OUTOF10: 7

## 2018-02-27 ASSESSMENT — LIFESTYLE VARIABLES: DO YOU DRINK ALCOHOL: NO

## 2018-02-27 ASSESSMENT — PATIENT HEALTH QUESTIONNAIRE - PHQ9
1. LITTLE INTEREST OR PLEASURE IN DOING THINGS: NOT AT ALL
SUM OF ALL RESPONSES TO PHQ9 QUESTIONS 1 AND 2: 0
SUM OF ALL RESPONSES TO PHQ QUESTIONS 1-9: 0
2. FEELING DOWN, DEPRESSED, IRRITABLE, OR HOPELESS: NOT AT ALL

## 2018-02-27 ASSESSMENT — ENCOUNTER SYMPTOMS
LOSS OF CONSCIOUSNESS: 0
DIZZINESS: 0
DEPRESSION: 0
NECK PAIN: 0
FOCAL WEAKNESS: 0
WEIGHT LOSS: 0
MYALGIAS: 0
NAUSEA: 1
FEVER: 0
SHORTNESS OF BREATH: 0
ROS GI COMMENTS: DECREASED
VOMITING: 0
CHILLS: 0
PALPITATIONS: 0
BLURRED VISION: 0
ABDOMINAL PAIN: 1

## 2018-02-27 ASSESSMENT — PAIN SCALES - WONG BAKER: WONGBAKER_NUMERICALRESPONSE: DOESN'T HURT AT ALL

## 2018-02-27 NOTE — PROGRESS NOTES
Pt to transfer to [facility], bed ready. Report called to Angi ROSE. Pt updated on POC.

## 2018-02-27 NOTE — PROGRESS NOTES
Patient transferred from [place] [id number]. Report received from Kelley. Patient transferred in . Requested to use the bathroom, able to ambulate with stand by assist and a cane. Patient on 2L oxygen via NC , A/ox4. Pleasant and cooperative.

## 2018-02-27 NOTE — PROGRESS NOTES
Pt transferred to floor to room S623-2 by wheelchair with transport tech. All belongings sent with patient. Pt c/o abd pain, medicated per MAR prior to transfer.

## 2018-02-27 NOTE — CARE PLAN
Problem: Infection  Goal: Will remain free from infection  abx administered per MAR     Problem: Pain Management  Goal: Pain level will decrease to patient's comfort goal  PRN pain meds administered per MAR

## 2018-02-27 NOTE — PROGRESS NOTES
Two RN skin assessment completed.  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   Otherwise skin is intact.

## 2018-02-27 NOTE — PROGRESS NOTES
Assessment completed. PIV to right AC infiltrated, new PIV inserted to left hand. Pt denies any pain at this time. Oxygen desat to 79% on room air, 2.5L per NC applied with saturation now at 90-93%.

## 2018-02-28 ENCOUNTER — APPOINTMENT (OUTPATIENT)
Dept: RADIOLOGY | Facility: MEDICAL CENTER | Age: 78
DRG: 391 | End: 2018-02-28
Attending: INTERNAL MEDICINE
Payer: MEDICARE

## 2018-02-28 PROBLEM — M25.552 BILATERAL HIP PAIN: Status: ACTIVE | Noted: 2018-02-28

## 2018-02-28 PROBLEM — M25.551 BILATERAL HIP PAIN: Status: ACTIVE | Noted: 2018-02-28

## 2018-02-28 LAB
BACTERIA UR CULT: ABNORMAL
BACTERIA UR CULT: ABNORMAL
GLUCOSE BLD-MCNC: 109 MG/DL (ref 65–99)
GLUCOSE BLD-MCNC: 114 MG/DL (ref 65–99)
GLUCOSE BLD-MCNC: 118 MG/DL (ref 65–99)
INR PPP: 1.99 (ref 0.87–1.13)
PROTHROMBIN TIME: 22.3 SEC (ref 12–14.6)
SIGNIFICANT IND 70042: ABNORMAL
SITE SITE: ABNORMAL
SOURCE SOURCE: ABNORMAL

## 2018-02-28 PROCEDURE — 72100 X-RAY EXAM L-S SPINE 2/3 VWS: CPT

## 2018-02-28 PROCEDURE — 770006 HCHG ROOM/CARE - MED/SURG/GYN SEMI*

## 2018-02-28 PROCEDURE — 700102 HCHG RX REV CODE 250 W/ 637 OVERRIDE(OP): Performed by: INTERNAL MEDICINE

## 2018-02-28 PROCEDURE — 700102 HCHG RX REV CODE 250 W/ 637 OVERRIDE(OP): Performed by: HOSPITALIST

## 2018-02-28 PROCEDURE — 85610 PROTHROMBIN TIME: CPT

## 2018-02-28 PROCEDURE — A9270 NON-COVERED ITEM OR SERVICE: HCPCS | Performed by: INTERNAL MEDICINE

## 2018-02-28 PROCEDURE — 36415 COLL VENOUS BLD VENIPUNCTURE: CPT

## 2018-02-28 PROCEDURE — 82962 GLUCOSE BLOOD TEST: CPT

## 2018-02-28 PROCEDURE — 99232 SBSQ HOSP IP/OBS MODERATE 35: CPT | Performed by: INTERNAL MEDICINE

## 2018-02-28 PROCEDURE — A9270 NON-COVERED ITEM OR SERVICE: HCPCS | Performed by: HOSPITALIST

## 2018-02-28 RX ORDER — OXYCODONE HYDROCHLORIDE 5 MG/1
5 TABLET ORAL
Status: COMPLETED | OUTPATIENT
Start: 2018-02-28 | End: 2018-02-28

## 2018-02-28 RX ORDER — CIPROFLOXACIN 250 MG/1
250 TABLET, FILM COATED ORAL EVERY 12 HOURS
Status: DISCONTINUED | OUTPATIENT
Start: 2018-02-28 | End: 2018-03-02 | Stop reason: HOSPADM

## 2018-02-28 RX ADMIN — TRAZODONE HYDROCHLORIDE 150 MG: 150 TABLET ORAL at 21:34

## 2018-02-28 RX ADMIN — LEVOTHYROXINE SODIUM 150 MCG: 75 TABLET ORAL at 07:15

## 2018-02-28 RX ADMIN — CIPROFLOXACIN HYDROCHLORIDE 250 MG: 250 TABLET, FILM COATED ORAL at 21:35

## 2018-02-28 RX ADMIN — CIPROFLOXACIN HYDROCHLORIDE 250 MG: 250 TABLET, FILM COATED ORAL at 09:12

## 2018-02-28 RX ADMIN — ATORVASTATIN CALCIUM 40 MG: 20 TABLET, FILM COATED ORAL at 21:35

## 2018-02-28 RX ADMIN — OXYCODONE HYDROCHLORIDE 5 MG: 5 TABLET ORAL at 21:35

## 2018-02-28 RX ADMIN — WARFARIN SODIUM 5 MG: 5 TABLET ORAL at 18:45

## 2018-02-28 ASSESSMENT — ENCOUNTER SYMPTOMS
DIZZINESS: 0
LOSS OF CONSCIOUSNESS: 0
DEPRESSION: 0
DOUBLE VISION: 0
ABDOMINAL PAIN: 1
FOCAL WEAKNESS: 0
NAUSEA: 0
PALPITATIONS: 0
MYALGIAS: 0
FEVER: 0
VOMITING: 0
SHORTNESS OF BREATH: 0

## 2018-02-28 ASSESSMENT — PATIENT HEALTH QUESTIONNAIRE - PHQ9
SUM OF ALL RESPONSES TO PHQ9 QUESTIONS 1 AND 2: 0
1. LITTLE INTEREST OR PLEASURE IN DOING THINGS: NOT AT ALL
2. FEELING DOWN, DEPRESSED, IRRITABLE, OR HOPELESS: NOT AT ALL
SUM OF ALL RESPONSES TO PHQ QUESTIONS 1-9: 0

## 2018-02-28 ASSESSMENT — PAIN SCALES - WONG BAKER
WONGBAKER_NUMERICALRESPONSE: DOESN'T HURT AT ALL

## 2018-02-28 ASSESSMENT — PAIN SCALES - GENERAL
PAINLEVEL_OUTOF10: 5
PAINLEVEL_OUTOF10: 3
PAINLEVEL_OUTOF10: 3
PAINLEVEL_OUTOF10: 5

## 2018-02-28 ASSESSMENT — LIFESTYLE VARIABLES: DO YOU DRINK ALCOHOL: NO

## 2018-02-28 NOTE — PROGRESS NOTES
[facility] Hospitalist Progress Note    Date of Service: 2018    Chief Complaint  77 y.o. female admitted 2018 with NV    Interval Problem Update  NV-feels overall better today, intermittent belly pain, but tolerating food oral intake ok. Has some increased pain in the bilateral hip regions that is worse than usual, most prominent after standing up, no recent trauma.     Consultants/Specialty  NONE    Disposition  medical        Review of Systems   Constitutional: Negative for fever.        Feels better today     HENT: Negative for hearing loss.    Eyes: Negative for double vision.   Respiratory: Negative for shortness of breath.    Cardiovascular: Negative for palpitations.   Gastrointestinal: Positive for abdominal pain. Negative for nausea and vomiting.        Improved further today   Genitourinary: Negative for dysuria.   Musculoskeletal: Positive for joint pain. Negative for myalgias.   Skin: Negative for rash.   Neurological: Negative for dizziness, focal weakness and loss of consciousness.   Psychiatric/Behavioral: Negative for depression and suicidal ideas.   All other systems reviewed and are negative.     Physical Exam  Laboratory/Imaging   Hemodynamics  Temp (24hrs), Av.6 °C (97.9 °F), Min:36.2 °C (97.2 °F), Max:37 °C (98.6 °F)   Temperature: 36.2 °C (97.2 °F)  Pulse  Av.8  Min: 64  Max: 89    Blood Pressure : 135/62      Respiratory      Respiration: 20, Pulse Oximetry: 94 %        RUL Breath Sounds: Clear, RML Breath Sounds: Clear, RLL Breath Sounds: Clear, LUDA Breath Sounds: Clear, LLL Breath Sounds: Clear    Fluids    Intake/Output Summary (Last 24 hours) at 18 1226  Last data filed at 18 1138   Gross per 24 hour   Intake              780 ml   Output                0 ml   Net              780 ml       Nutrition  Orders Placed This Encounter   Procedures   • DIET ORDER     Standing Status:   Standing     Number of Occurrences:   1     Order Specific Question:   Diet:      Answer:   Diabetic [3]     Order Specific Question:   Diet:     Answer:   Low Fiber(GI Soft) [2]     Physical Exam   Constitutional: She is oriented to person, place, and time. She appears well-developed and well-nourished.   HENT:   Head: Normocephalic and atraumatic.   Mouth/Throat: No oropharyngeal exudate.   Eyes: Pupils are equal, round, and reactive to light. Right eye exhibits no discharge. Left eye exhibits no discharge.   Neck: Normal range of motion. Neck supple.   Cardiovascular: Normal rate, regular rhythm, normal heart sounds and intact distal pulses.    Pulmonary/Chest: Effort normal and breath sounds normal. No stridor.   Abdominal: Soft. Bowel sounds are normal. She exhibits no distension. There is tenderness (mild and generalized, about the same today).   Musculoskeletal: Normal range of motion. She exhibits no edema.   Neurological: She is alert and oriented to person, place, and time. Coordination normal.   Skin: Skin is warm and dry. No rash noted.   Psychiatric: She has a normal mood and affect.   Nursing note and vitals reviewed.      Recent Labs      02/25/18 2006 02/26/18   0623   WBC  11.0*  9.8   RBC  4.67  4.18*   HEMOGLOBIN  13.0  11.4*   HEMATOCRIT  40.0  36.0*   MCV  85.7  86.1   MCH  27.8  27.3   MCHC  32.5*  31.7*   RDW  51.2*  53.6*   PLATELETCT  455*  442   MPV  9.6  9.9     Recent Labs      02/25/18 2006 02/26/18   0623   SODIUM  138  137   POTASSIUM  4.3  4.0   CHLORIDE  106  106   CO2  21  22   GLUCOSE  169*  116*   BUN  26*  26*   CREATININE  1.04  1.15   CALCIUM  8.3*  7.8*     Recent Labs      02/26/18 0623  02/28/18   0333   INR  2.26*  1.99*         Recent Labs      02/26/18   0623   TRIGLYCERIDE  148   HDL  30*   LDL  59          Assessment/Plan     * Gastroenteritis- (present on admission)   Assessment & Plan    Symptomatic management   -no more IVF's at this time, try to wean off o2  Anti emetics  Pain control         Bilateral hip pain- (present on admission)    Assessment & Plan    -Xray of LS, chronic issue, mild exacerbation at this time, PT eval        Thrombocytosis (CMS-HCC)- (present on admission)   Assessment & Plan    Monitor        Hypotension- (present on admission)   Assessment & Plan    -resolved        AP (abdominal pain)- (present on admission)   Assessment & Plan    -likely 2/2 viral gastroenteritis, supportive treatment for now, monitor lytes, continues to improve, continue current diet, no IVF's for now        UTI (urinary tract infection)- (present on admission)   Assessment & Plan    Concern for UTI based on labs and dysuria   -continue PO cipro, confirmed to be E coli, pan-sensitive, will stop abx's on Friday        Abdominal aortic aneurysm without rupture (CMS-HCC)- (present on admission)   Assessment & Plan    Hx of stable         Chronic anticoagulation- (present on admission)   Assessment & Plan    -INR is therapeutic, continue current coumadin dosing        Postoperative hypothyroidism- (present on admission)   Assessment & Plan    Cont levothyroxine        Controlled type 2 diabetes mellitus without complication, without long-term current use of insulin (CMS-HCC)- (present on admission)   Assessment & Plan    SSI, sugars remain well controlled at this time, CTM        Essential hypertension- (present on admission)   Assessment & Plan    Hold outpatient BP medications, consider re-adding slowly tomorrow depending on numbers        Hx pulmonary embolism- (present on admission)   Assessment & Plan    Cont comadin         Dyslipidemia- (present on admission)   Assessment & Plan    Cont statin   Check lipid panel         Carotid artery stenosis- (present on admission)   Assessment & Plan    Hx of           Quality-Core Measures   Reviewed items::  Labs reviewed and Medications reviewed  Espinoza catheter::  No Espinoza  DVT prophylaxis pharmacological::  Warfarin (Coumadin)  Assessed for rehabilitation services:  Patient returned to prior level of  function, rehabilitation not indicated at this time

## 2018-02-28 NOTE — PROGRESS NOTES
Inpatient Anticoagulation Service Note    Date: 2/28/2018  Reason for Anticoagulation: Pulmonary Embolism (April 2015)        Hemoglobin Value: (!) 11.4  Hematocrit Value: (!) 36  Lab Platelet Value: 442  Target INR: 2.0 to 3.0    INR from last 7 days     Date/Time INR Value    02/28/18 0333 (!)  1.99    02/26/18 0623 (!)  2.26        Dose from last 7 days     Date/Time Dose (mg)    02/28/18 1200  5    02/27/18 1600  7.5    02/26/18 0700  5        Significant Interactions: Antibiotics, Statin, Thyroid Medications  Bridge Therapy: No     HPI: 78 yo female admitted on 2/25/18 for pyelonephritis, treating with ceftriaxone. Patient is chronically anticoagulated with warfarin therapy for h/o PE in April 2015 with INR goal of 2 - 3. Warfarin is [facility] by [facility] and per records, patient takes warfarin 5mg po every Mon, Wed, Fri and warfarin 7.5mg po on all other days with consistent INR readings within therapeutic range.      Assessment: INR with trend downward, just barely into SUBtherapeutic range, 1.99 from 2.26. Patient received a dose of warfarin 7.5mg last night per usual home dosing regimen.   Potential drug-drug interactions identified with acute inpatient medications: Antibiotics   Potential drug-drug interactions identified with chronic home medications: Atorvastatin and Levothyroxine which are established interactions.   Inpatient Diet: All meals, low fiber (GI soft)      Plan: Continue usual home dosing regimen. Continue to monitor INR daily. Will likely give a small bolus dose tomorrow if INR continues to trend downward.      Education Material Provided?: No (Chronic warfarin patient)     Pharmacist suggested discharge dosing: warfarin 5mg po every Mon, Wed, Fri and warfarin 7.5mg po on all other days      Mally Mendoza, PharmD

## 2018-02-28 NOTE — PROGRESS NOTES
[facility] Hospitalist Progress Note    Date of Service: 2018    Chief Complaint  77 y.o. female admitted 2018 with NV    Interval Problem Update  NV-feels a little better today. Mild abdominal cramping, eat some food now. No obvious food poisoning.    Consultants/Specialty  NONE    Disposition  medical        Review of Systems   Constitutional: Negative for chills, fever and weight loss.   HENT: Negative for hearing loss and tinnitus.    Eyes: Negative for blurred vision.   Respiratory: Negative for shortness of breath.    Cardiovascular: Negative for chest pain, palpitations and leg swelling.   Gastrointestinal: Positive for abdominal pain and nausea. Negative for vomiting.        Decreased   Genitourinary: Negative for dysuria.   Musculoskeletal: Negative for myalgias and neck pain.   Skin: Negative for itching.   Neurological: Negative for dizziness, focal weakness and loss of consciousness.   Psychiatric/Behavioral: Negative for depression and suicidal ideas.   All other systems reviewed and are negative.     Physical Exam  Laboratory/Imaging   Hemodynamics  Temp (24hrs), Av.7 °C (98 °F), Min:36.4 °C (97.5 °F), Max:37.1 °C (98.7 °F)   Temperature: 37.1 °C (98.7 °F)  Pulse  Av.9  Min: 66  Max: 89    Blood Pressure : 122/46      Respiratory      Respiration: 18, Pulse Oximetry: 91 %        RUL Breath Sounds: Clear, RML Breath Sounds: Clear, RLL Breath Sounds: Diminished, LUDA Breath Sounds: Clear, LLL Breath Sounds: Diminished    Fluids    Intake/Output Summary (Last 24 hours) at 18 1631  Last data filed at 18 1330   Gross per 24 hour   Intake             1010 ml   Output                0 ml   Net             1010 ml       Nutrition  Orders Placed This Encounter   Procedures   • DIET ORDER     Standing Status:   Standing     Number of Occurrences:   1     Order Specific Question:   Diet:     Answer:   Diabetic [3]     Order Specific Question:   Diet:     Answer:   Low Fiber(GI Soft) [2]      Physical Exam   Constitutional: She is oriented to person, place, and time. She appears well-developed and well-nourished. No distress.   HENT:   Head: Normocephalic and atraumatic.   Mouth/Throat: No oropharyngeal exudate.   Eyes: Pupils are equal, round, and reactive to light. No scleral icterus.   Neck: Normal range of motion. Neck supple. No thyromegaly present.   Cardiovascular: Normal rate, regular rhythm, normal heart sounds and intact distal pulses.    No murmur heard.  Pulmonary/Chest: Effort normal and breath sounds normal. No respiratory distress. She has no wheezes.   Abdominal: Soft. Bowel sounds are normal. She exhibits no distension. There is tenderness (mild and generalized).   Musculoskeletal: Normal range of motion. She exhibits no edema or tenderness.   Neurological: She is alert and oriented to person, place, and time. No cranial nerve deficit.   Skin: Skin is warm and dry. No rash noted.   Psychiatric: She has a normal mood and affect.   Nursing note and vitals reviewed.      Recent Labs      02/25/18 2006 02/26/18   0623   WBC  11.0*  9.8   RBC  4.67  4.18*   HEMOGLOBIN  13.0  11.4*   HEMATOCRIT  40.0  36.0*   MCV  85.7  86.1   MCH  27.8  27.3   MCHC  32.5*  31.7*   RDW  51.2*  53.6*   PLATELETCT  455*  442   MPV  9.6  9.9     Recent Labs      02/25/18 2006 02/26/18   0623   SODIUM  138  137   POTASSIUM  4.3  4.0   CHLORIDE  106  106   CO2  21  22   GLUCOSE  169*  116*   BUN  26*  26*   CREATININE  1.04  1.15   CALCIUM  8.3*  7.8*     Recent Labs      02/26/18   0623   INR  2.26*         Recent Labs      02/26/18   0623   TRIGLYCERIDE  148   HDL  30*   LDL  59          Assessment/Plan     * Gastroenteritis- (present on admission)   Assessment & Plan    Symptomatic management   IVF, reduce flow rate today  Anti emetics  Pain control         Thrombocytosis (CMS-HCC)- (present on admission)   Assessment & Plan    Monitor        Hypotension- (present on admission)   Assessment & Plan     IVF with improvement        AP (abdominal pain)- (present on admission)   Assessment & Plan    -likely 2/2 viral gastroenteritis, supportive treatment for now, monitor lytes, advance diet as tolerated        UTI (urinary tract infection)- (present on admission)   Assessment & Plan    Concern for UTI based on labs and dysuria   Will treat with iv abx   Follow culture, growing Gram negative rods at this time, likely e coli        Abdominal aortic aneurysm without rupture (CMS-HCC)- (present on admission)   Assessment & Plan    Hx of stable         Chronic anticoagulation- (present on admission)   Assessment & Plan    -INR is therapeutic, continue current coumadin dosing        Postoperative hypothyroidism- (present on admission)   Assessment & Plan    Cont levothyroxine        Controlled type 2 diabetes mellitus without complication, without long-term current use of insulin (CMS-HCC)- (present on admission)   Assessment & Plan    SSI, sugars remain well controlled at this time, CTM        Essential hypertension- (present on admission)   Assessment & Plan    Hold outpatient BP medications, consider re-adding slowly tomorrow depending on numbers        Hx pulmonary embolism- (present on admission)   Assessment & Plan    Cont comadin         Dyslipidemia- (present on admission)   Assessment & Plan    Cont statin   Check lipid panel         Carotid artery stenosis- (present on admission)   Assessment & Plan    Hx of           Quality-Core Measures   Reviewed items::  Labs reviewed and Medications reviewed  Espinoza catheter::  No Espinoza  DVT prophylaxis pharmacological::  Warfarin (Coumadin)  Assessed for rehabilitation services:  Patient returned to prior level of function, rehabilitation not indicated at this time

## 2018-02-28 NOTE — CARE PLAN
Problem: Pain Management  Goal: Pain level will decrease to patient's comfort goal  Outcome: PROGRESSING AS EXPECTED  Pt c/o pain to back, contacted MD for pain med orders. Pt slept better tonight

## 2018-02-28 NOTE — PROGRESS NOTES
Pt alert and oriented x4 2.5L NC in place pt states pain is 8/10 to back, requests oxycodone, refuses to take tylenol as scheduled because her doctor recommended no tylenol due to history of liver impairment, pt educated that liver markers are normal. Hospitalist paged regarding pain medication as opiates have been d/c'd oxy5mg ordered. Pt refuses a new Iv, day shift rn reports that IV fluids were d/c'd but fluids are still ordered, as well as IV rocephin. Will contact hospitalist regarding these orders.     Order to d/c IV fluids and IV rocephin. Cipro PO 250mg BID ordered

## 2018-02-28 NOTE — PROGRESS NOTES
Inpatient Anticoagulation Service Note    Date: 2/27/2018  Reason for Anticoagulation: Pulmonary Embolism        Hemoglobin Value: (!) 11.4  Hematocrit Value: (!) 36  Lab Platelet Value: 442  Target INR: 2.0 to 3.0    INR from last 7 days     Date/Time INR Value    02/26/18 0623 (!)  2.26        Dose from last 7 days     Date/Time Dose (mg)    02/27/18 1600  7.5    02/26/18 0700  5        Average Dose (mg):  (Home dose per [facility] notes: 5 mg MWF, 7.5 mg AODs)  Significant Interactions: Antibiotics, Statin, Thyroid Medications  Bridge Therapy: No     HPI: 78 yo female admitted on 2/25/18 for pyelonephritis, treating with ceftriaxone. Patient is chronically anticoagulated with warfarin therapy for h/o PE in April 2015 with INR goal of 2 - 3. Warfarin is managed outpatient by the [facility] and per records, patient takes warfarin 5mg po every Mon, Wed, Fri and warfarin 7.5mg po on all other days with consistent INR readings within therapeutic range.     Assessment: INR remains therapeutic following resumption of usual home dose regimen.    Potential drug-drug interactions identified with acute inpatient medications: Antibiotics   Potential drug-drug interactions identified with chronic home medications: Atorvastatin and Levothyroxine which are established interactions.   Inpatient Diet: All meals, low fiber (GI soft)     Plan:  Continue usual home dosing regimen. Continue INR monitoring for a couple more days, will reduce frequency as patient demonstrates stabilization in range while acutely ill.     Education Material Provided?: No (Chronic warfarin patient)    Pharmacist suggested discharge dosing: warfarin 5mg po every Mon, Wed, Fri and warfarin 7.5mg po on all other days      Mally Mendoza, PharmD

## 2018-02-28 NOTE — ASSESSMENT & PLAN NOTE
-likely 2/2 viral gastroenteritis, supportive treatment for now, monitor lytes, intermittent nausea, benign belly exam, monitor, consider repeat scan tomorrow if continues

## 2018-02-28 NOTE — ASSESSMENT & PLAN NOTE
-Xray of LS which is negative for acute fx, old compression fracture noted and severe DDD, chronic issue, mild exacerbation at this time, PT eval

## 2018-02-28 NOTE — PROGRESS NOTES
Bedside report given from night shift RN.  Patient sitting up in bed listening in.  Complaints of pain at the IV site, patient requesting it be taken out.  Updated board with plan for the day, bed alarm armed, call light in reach, hourly rounding in place.

## 2018-03-01 ENCOUNTER — APPOINTMENT (OUTPATIENT)
Dept: RADIOLOGY | Facility: MEDICAL CENTER | Age: 78
DRG: 391 | End: 2018-03-01
Attending: INTERNAL MEDICINE
Payer: MEDICARE

## 2018-03-01 LAB
GLUCOSE BLD-MCNC: 132 MG/DL (ref 65–99)
GLUCOSE BLD-MCNC: 150 MG/DL (ref 65–99)
GLUCOSE BLD-MCNC: 176 MG/DL (ref 65–99)
INR PPP: 2.16 (ref 0.87–1.13)
LV EJECT FRACT  99904: 60
LV EJECT FRACT MOD 2C 99903: 37.23
LV EJECT FRACT MOD 4C 99902: 66.78
LV EJECT FRACT MOD BP 99901: 53.37
PROTHROMBIN TIME: 23.8 SEC (ref 12–14.6)

## 2018-03-01 PROCEDURE — A9270 NON-COVERED ITEM OR SERVICE: HCPCS | Performed by: INTERNAL MEDICINE

## 2018-03-01 PROCEDURE — 71046 X-RAY EXAM CHEST 2 VIEWS: CPT

## 2018-03-01 PROCEDURE — 700102 HCHG RX REV CODE 250 W/ 637 OVERRIDE(OP): Performed by: INTERNAL MEDICINE

## 2018-03-01 PROCEDURE — G8979 MOBILITY GOAL STATUS: HCPCS | Mod: CI

## 2018-03-01 PROCEDURE — 85610 PROTHROMBIN TIME: CPT

## 2018-03-01 PROCEDURE — 82962 GLUCOSE BLOOD TEST: CPT | Mod: 91

## 2018-03-01 PROCEDURE — 99232 SBSQ HOSP IP/OBS MODERATE 35: CPT | Performed by: INTERNAL MEDICINE

## 2018-03-01 PROCEDURE — 93306 TTE W/DOPPLER COMPLETE: CPT

## 2018-03-01 PROCEDURE — 93306 TTE W/DOPPLER COMPLETE: CPT | Mod: 26 | Performed by: INTERNAL MEDICINE

## 2018-03-01 PROCEDURE — G8980 MOBILITY D/C STATUS: HCPCS | Mod: CI

## 2018-03-01 PROCEDURE — A9270 NON-COVERED ITEM OR SERVICE: HCPCS | Performed by: HOSPITALIST

## 2018-03-01 PROCEDURE — G8978 MOBILITY CURRENT STATUS: HCPCS | Mod: CI

## 2018-03-01 PROCEDURE — 770006 HCHG ROOM/CARE - MED/SURG/GYN SEMI*

## 2018-03-01 PROCEDURE — 700102 HCHG RX REV CODE 250 W/ 637 OVERRIDE(OP): Performed by: HOSPITALIST

## 2018-03-01 PROCEDURE — 36415 COLL VENOUS BLD VENIPUNCTURE: CPT

## 2018-03-01 PROCEDURE — 93970 EXTREMITY STUDY: CPT

## 2018-03-01 PROCEDURE — 97161 PT EVAL LOW COMPLEX 20 MIN: CPT

## 2018-03-01 RX ADMIN — CIPROFLOXACIN HYDROCHLORIDE 250 MG: 250 TABLET, FILM COATED ORAL at 08:48

## 2018-03-01 RX ADMIN — ATORVASTATIN CALCIUM 40 MG: 20 TABLET, FILM COATED ORAL at 21:02

## 2018-03-01 RX ADMIN — WARFARIN SODIUM 7.5 MG: 7.5 TABLET ORAL at 17:55

## 2018-03-01 RX ADMIN — CIPROFLOXACIN HYDROCHLORIDE 250 MG: 250 TABLET, FILM COATED ORAL at 21:02

## 2018-03-01 RX ADMIN — ACETAMINOPHEN 500 MG: 500 TABLET ORAL at 23:04

## 2018-03-01 RX ADMIN — ACETAMINOPHEN 500 MG: 500 TABLET ORAL at 13:41

## 2018-03-01 RX ADMIN — TRAZODONE HYDROCHLORIDE 150 MG: 150 TABLET ORAL at 21:03

## 2018-03-01 RX ADMIN — LEVOTHYROXINE SODIUM 150 MCG: 75 TABLET ORAL at 06:38

## 2018-03-01 RX ADMIN — ACETAMINOPHEN 500 MG: 500 TABLET ORAL at 08:52

## 2018-03-01 RX ADMIN — ACETAMINOPHEN 500 MG: 500 TABLET ORAL at 17:52

## 2018-03-01 ASSESSMENT — COGNITIVE AND FUNCTIONAL STATUS - GENERAL
CLIMB 3 TO 5 STEPS WITH RAILING: A LITTLE
SUGGESTED CMS G CODE MODIFIER MOBILITY: CJ
MOBILITY SCORE: 22
WALKING IN HOSPITAL ROOM: A LITTLE

## 2018-03-01 ASSESSMENT — PAIN SCALES - GENERAL
PAINLEVEL_OUTOF10: 3
PAINLEVEL_OUTOF10: 0

## 2018-03-01 ASSESSMENT — ENCOUNTER SYMPTOMS
HEADACHES: 0
DEPRESSION: 0
BLURRED VISION: 0
MYALGIAS: 0
CHILLS: 0
WEIGHT LOSS: 0
NAUSEA: 1
VOMITING: 0
COUGH: 0
ABDOMINAL PAIN: 1
HEMOPTYSIS: 0

## 2018-03-01 ASSESSMENT — GAIT ASSESSMENTS
DISTANCE (FEET): 200
GAIT LEVEL OF ASSIST: STAND BY ASSIST

## 2018-03-01 ASSESSMENT — LIFESTYLE VARIABLES: DO YOU DRINK ALCOHOL: NO

## 2018-03-01 ASSESSMENT — PAIN SCALES - WONG BAKER
WONGBAKER_NUMERICALRESPONSE: DOESN'T HURT AT ALL
WONGBAKER_NUMERICALRESPONSE: DOESN'T HURT AT ALL

## 2018-03-01 NOTE — THERAPY
"Physical Therapy Evaluation completed.   Bed Mobility:  Supine to Sit: Contact Guard Assist  Transfers: Sit to Stand: Stand by Assist  Gait: Level Of Assist: Stand by Assist with No Equipment Needed       Plan of Care: Patient with no further skilled PT needs in the acute care setting at this time  Discharge Recommendations: Equipment: No Equipment Needed. Post-acute therapy Discharge to home with home health for additional skilled therapy services.    Pt presents very near her baseline functional mobility. She appears most limited by low back pain but did not demonstrate overt LOB and has cane for stabilization should she require it. Anticipate pt will self-progress with ongoing mobilization with nursing staff. She does not require further PT intervention at this time. Anticipate she can return home when medically clear with HHPT services.     See \"Rehab Therapy-Acute\" Patient Summary Report for complete documentation.     "

## 2018-03-01 NOTE — ASSESSMENT & PLAN NOTE
-continues with hypoxia  -repeat ECHO, check LE venous duplex, PA/lateral CXR with no obvious findings, could be atelectasis versus PE?  -Consider VQ or CTA, re-check chem panel in the AM, though she is therapeutic on coumadin  -Continue O2 supplementation, IS while awake, mobilize

## 2018-03-01 NOTE — PROGRESS NOTES
Pt alert and oriented x4, call-light in reach, pt in good spirits, requires a pain pill for bedtime as she takes one at home for back pain. IV d/c right hand. Requests trazadone,

## 2018-03-01 NOTE — PROGRESS NOTES
[facility] Hospitalist Progress Note    Date of Service: 3/1/2018    Chief Complaint  77 y.o. female admitted 2018 with NV    Interval Problem Update  NV-nausea came back this AM but was able to eat her breakfast. No abdominal pain, less frequent diarrhea    RF with hypoxia-continues. Personally reviewed PA/lateral CXR shows no obvious pleural effusions or consolidations. Denies any SOB currently.     Consultants/Specialty  NONE    Disposition  medical        Review of Systems   Constitutional: Negative for chills and weight loss.        Feels better today     HENT: Negative for hearing loss.    Eyes: Negative for blurred vision.   Respiratory: Negative for cough and hemoptysis.    Cardiovascular: Negative for chest pain.   Gastrointestinal: Positive for abdominal pain (scant) and nausea. Negative for vomiting.        Worse nausea today   Genitourinary: Negative for dysuria.   Musculoskeletal: Positive for joint pain. Negative for myalgias.   Skin: Negative for rash.   Neurological: Negative for headaches.   Psychiatric/Behavioral: Negative for depression.   All other systems reviewed and are negative.     Physical Exam  Laboratory/Imaging   Hemodynamics  Temp (24hrs), Av.4 °C (97.6 °F), Min:36.2 °C (97.1 °F), Max:36.7 °C (98 °F)   Temperature: 36.6 °C (97.8 °F)  Pulse  Av.1  Min: 60  Max: 89    Blood Pressure : 150/65      Respiratory      Respiration: 18, Pulse Oximetry: 92 %        RUL Breath Sounds: Clear, RML Breath Sounds: Clear, RLL Breath Sounds: Clear, LUDA Breath Sounds: Clear, LLL Breath Sounds: Clear    Fluids    Intake/Output Summary (Last 24 hours) at 18 1233  Last data filed at 18 1019   Gross per 24 hour   Intake              540 ml   Output                0 ml   Net              540 ml       Nutrition  Orders Placed This Encounter   Procedures   • DIET ORDER     Standing Status:   Standing     Number of Occurrences:   1     Order Specific Question:   Diet:     Answer:   Diabetic  [3]     Order Specific Question:   Diet:     Answer:   Low Fiber(GI Soft) [2]     Physical Exam   Constitutional: She is oriented to person, place, and time. She appears well-developed and well-nourished.   HENT:   Head: Normocephalic and atraumatic.   Eyes: Pupils are equal, round, and reactive to light. No scleral icterus.   Neck: Normal range of motion. Neck supple.   Cardiovascular: Normal rate, regular rhythm, normal heart sounds and intact distal pulses.    Pulmonary/Chest: Effort normal and breath sounds normal. No stridor.   Abdominal: Soft. Bowel sounds are normal. She exhibits no distension. There is tenderness (nearly gone). There is no rebound and no guarding.   Musculoskeletal: Normal range of motion. She exhibits edema (LLE with 1+, RLE with trace in the pre tibial regions).   Neurological: She is alert and oriented to person, place, and time. Coordination normal.   Skin: Skin is warm and dry. No rash noted.   Psychiatric: She has a normal mood and affect.   Nursing note and vitals reviewed.              Recent Labs      02/28/18   0333  03/01/18   0344   INR  1.99*  2.16*                  Assessment/Plan     * Gastroenteritis- (present on admission)   Assessment & Plan    Symptomatic management   -no more IVF's at this time, try to wean off o2  Anti emetics  Pain control         Bilateral hip pain- (present on admission)   Assessment & Plan    -Xray of LS, chronic issue, mild exacerbation at this time, PT eval        Acute respiratory failure (CMS-HCC)- (present on admission)   Assessment & Plan    -continues with hypoxia  -repeat ECHO, check LE venous duplex, PA/lateral CXR with no obvious findings, could be atelectasis versus PE?  -Consider VQ or CTA, re-check chem panel in the AM  -Continue O2 supplementation, IS while awake, mobilize        Thrombocytosis (CMS-HCC)- (present on admission)   Assessment & Plan    Monitor        Hypotension- (present on admission)   Assessment & Plan    -resolved         AP (abdominal pain)- (present on admission)   Assessment & Plan    -likely 2/2 viral gastroenteritis, supportive treatment for now, monitor lytes, continues to improve, continue current diet, no IVF's for now        UTI (urinary tract infection)- (present on admission)   Assessment & Plan    Concern for UTI based on labs and dysuria   -continue PO cipro, confirmed to be E coli, pan-sensitive, will stop abx's on Friday        Abdominal aortic aneurysm without rupture (CMS-HCC)- (present on admission)   Assessment & Plan    Hx of stable         Chronic anticoagulation- (present on admission)   Assessment & Plan    -INR is therapeutic, continue current coumadin dosing        Postoperative hypothyroidism- (present on admission)   Assessment & Plan    Cont levothyroxine        Controlled type 2 diabetes mellitus without complication, without long-term current use of insulin (CMS-HCC)- (present on admission)   Assessment & Plan    SSI, sugars remain well controlled at this time, CTM        Essential hypertension- (present on admission)   Assessment & Plan    Hold outpatient BP medications, consider re-adding slowly tomorrow depending on numbers        Hx pulmonary embolism- (present on admission)   Assessment & Plan    Cont comadin         Dyslipidemia- (present on admission)   Assessment & Plan    Cont statin   Check lipid panel         Carotid artery stenosis- (present on admission)   Assessment & Plan    Hx of           Quality-Core Measures   Reviewed items::  Labs reviewed and Medications reviewed  Espinoza catheter::  No Espinoza  DVT prophylaxis pharmacological::  Warfarin (Coumadin)  Assessed for rehabilitation services:  Patient returned to prior level of function, rehabilitation not indicated at this time

## 2018-03-01 NOTE — DISCHARGE PLANNING
Transitional Care Navigator:    Pt may benefit from home health services for continued therapies and medical management.  Please consider home health order and F2F for discharge planning.  Pt has utilized [facility]  in the past.

## 2018-03-01 NOTE — CARE PLAN
Problem: Infection  Goal: Will remain free from infection  Outcome: PROGRESSING AS EXPECTED  abx administered per MD order, pt drinking fluids, washes hands    Problem: Pain Management  Goal: Pain level will decrease to patient's comfort goal  Outcome: PROGRESSING AS EXPECTED  Pt given oxy 5mg PO per md orders for back pain

## 2018-03-01 NOTE — DISCHARGE PLANNING
Medical Social Work  PC to [facility]; okay to take patient back, just call before sending her.  As for [facility] they would like [facility].

## 2018-03-02 ENCOUNTER — PATIENT OUTREACH (OUTPATIENT)
Dept: HEALTH INFORMATION MANAGEMENT | Facility: OTHER | Age: 78
End: 2018-03-02

## 2018-03-02 VITALS
WEIGHT: 195.55 LBS | RESPIRATION RATE: 14 BRPM | OXYGEN SATURATION: 92 % | HEART RATE: 59 BPM | HEIGHT: 64 IN | TEMPERATURE: 97.8 F | DIASTOLIC BLOOD PRESSURE: 61 MMHG | BODY MASS INDEX: 33.38 KG/M2 | SYSTOLIC BLOOD PRESSURE: 137 MMHG

## 2018-03-02 PROBLEM — I95.9 HYPOTENSION: Status: RESOLVED | Noted: 2018-02-26 | Resolved: 2018-03-02

## 2018-03-02 PROBLEM — N39.0 UTI (URINARY TRACT INFECTION): Status: RESOLVED | Noted: 2018-02-26 | Resolved: 2018-03-02

## 2018-03-02 PROBLEM — R10.9 AP (ABDOMINAL PAIN): Status: RESOLVED | Noted: 2018-02-26 | Resolved: 2018-03-02

## 2018-03-02 PROBLEM — K52.9 GASTROENTERITIS: Status: RESOLVED | Noted: 2018-02-26 | Resolved: 2018-03-02

## 2018-03-02 PROBLEM — M25.551 BILATERAL HIP PAIN: Status: RESOLVED | Noted: 2018-02-28 | Resolved: 2018-03-02

## 2018-03-02 PROBLEM — M25.552 BILATERAL HIP PAIN: Status: RESOLVED | Noted: 2018-02-28 | Resolved: 2018-03-02

## 2018-03-02 PROBLEM — J96.00 ACUTE RESPIRATORY FAILURE (HCC): Status: RESOLVED | Noted: 2018-02-26 | Resolved: 2018-03-02

## 2018-03-02 LAB
ANION GAP SERPL CALC-SCNC: 10 MMOL/L (ref 0–11.9)
BASOPHILS # BLD AUTO: 0.4 % (ref 0–1.8)
BASOPHILS # BLD: 0.04 K/UL (ref 0–0.12)
BUN SERPL-MCNC: 13 MG/DL (ref 8–22)
CALCIUM SERPL-MCNC: 7.9 MG/DL (ref 8.5–10.5)
CHLORIDE SERPL-SCNC: 111 MMOL/L (ref 96–112)
CO2 SERPL-SCNC: 23 MMOL/L (ref 20–33)
CREAT SERPL-MCNC: 0.87 MG/DL (ref 0.5–1.4)
EOSINOPHIL # BLD AUTO: 1.43 K/UL (ref 0–0.51)
EOSINOPHIL NFR BLD: 16.3 % (ref 0–6.9)
ERYTHROCYTE [DISTWIDTH] IN BLOOD BY AUTOMATED COUNT: 50.4 FL (ref 35.9–50)
GIANT PLATELETS BLD QL SMEAR: NORMAL
GLUCOSE BLD-MCNC: 115 MG/DL (ref 65–99)
GLUCOSE BLD-MCNC: 123 MG/DL (ref 65–99)
GLUCOSE SERPL-MCNC: 92 MG/DL (ref 65–99)
HCT VFR BLD AUTO: 31.6 % (ref 37–47)
HGB BLD-MCNC: 10.4 G/DL (ref 12–16)
INR PPP: 2.47 (ref 0.87–1.13)
LYMPHOCYTES # BLD AUTO: 3.26 K/UL (ref 1–4.8)
LYMPHOCYTES NFR BLD: 37.1 % (ref 22–41)
MAGNESIUM SERPL-MCNC: 1.7 MG/DL (ref 1.5–2.5)
MANUAL DIFF BLD: NORMAL
MCH RBC QN AUTO: 27.7 PG (ref 27–33)
MCHC RBC AUTO-ENTMCNC: 32.9 G/DL (ref 33.6–35)
MCV RBC AUTO: 84.3 FL (ref 81.4–97.8)
MONOCYTES # BLD AUTO: 0.52 K/UL (ref 0–0.85)
MONOCYTES NFR BLD AUTO: 5.9 % (ref 0–13.4)
MORPHOLOGY BLD-IMP: NORMAL
NEUTROPHILS # BLD AUTO: 3.55 K/UL (ref 2–7.15)
NEUTROPHILS NFR BLD: 39.8 % (ref 44–72)
NEUTS BAND NFR BLD MANUAL: 0.5 % (ref 0–10)
NRBC # BLD AUTO: 0 K/UL
NRBC BLD-RTO: 0 /100 WBC
PLATELET # BLD AUTO: 397 K/UL (ref 164–446)
PLATELET BLD QL SMEAR: NORMAL
PMV BLD AUTO: 9.7 FL (ref 9–12.9)
POIKILOCYTOSIS BLD QL SMEAR: NORMAL
POTASSIUM SERPL-SCNC: 3.6 MMOL/L (ref 3.6–5.5)
PROTHROMBIN TIME: 26.4 SEC (ref 12–14.6)
RBC # BLD AUTO: 3.75 M/UL (ref 4.2–5.4)
RBC BLD AUTO: PRESENT
SODIUM SERPL-SCNC: 144 MMOL/L (ref 135–145)
WBC # BLD AUTO: 8.8 K/UL (ref 4.8–10.8)

## 2018-03-02 PROCEDURE — A9270 NON-COVERED ITEM OR SERVICE: HCPCS | Performed by: INTERNAL MEDICINE

## 2018-03-02 PROCEDURE — 85027 COMPLETE CBC AUTOMATED: CPT

## 2018-03-02 PROCEDURE — 700102 HCHG RX REV CODE 250 W/ 637 OVERRIDE(OP): Performed by: INTERNAL MEDICINE

## 2018-03-02 PROCEDURE — 82962 GLUCOSE BLOOD TEST: CPT | Mod: 91

## 2018-03-02 PROCEDURE — 85007 BL SMEAR W/DIFF WBC COUNT: CPT

## 2018-03-02 PROCEDURE — 83735 ASSAY OF MAGNESIUM: CPT

## 2018-03-02 PROCEDURE — A9270 NON-COVERED ITEM OR SERVICE: HCPCS | Performed by: HOSPITALIST

## 2018-03-02 PROCEDURE — 700102 HCHG RX REV CODE 250 W/ 637 OVERRIDE(OP): Performed by: HOSPITALIST

## 2018-03-02 PROCEDURE — 85610 PROTHROMBIN TIME: CPT

## 2018-03-02 PROCEDURE — 80048 BASIC METABOLIC PNL TOTAL CA: CPT

## 2018-03-02 PROCEDURE — 99239 HOSP IP/OBS DSCHRG MGMT >30: CPT | Performed by: INTERNAL MEDICINE

## 2018-03-02 PROCEDURE — 36415 COLL VENOUS BLD VENIPUNCTURE: CPT

## 2018-03-02 RX ADMIN — LEVOTHYROXINE SODIUM 150 MCG: 75 TABLET ORAL at 05:34

## 2018-03-02 RX ADMIN — CIPROFLOXACIN HYDROCHLORIDE 250 MG: 250 TABLET, FILM COATED ORAL at 09:27

## 2018-03-02 RX ADMIN — ACETAMINOPHEN 500 MG: 500 TABLET ORAL at 05:34

## 2018-03-02 RX ADMIN — ACETAMINOPHEN 500 MG: 500 TABLET ORAL at 12:12

## 2018-03-02 ASSESSMENT — PAIN SCALES - GENERAL: PAINLEVEL_OUTOF10: 0

## 2018-03-02 NOTE — DISCHARGE PLANNING
Received call from Charleen at [facility], referral has been accepted. [facility] notified.

## 2018-03-02 NOTE — PROGRESS NOTES
Inpatient Anticoagulation Service Note    Date: 3/1/2018  Reason for Anticoagulation: Pulmonary Embolism (April 2015)        Hemoglobin Value: (!) 11.4  Hematocrit Value: (!) 36  Lab Platelet Value: 442  Target INR: 2.0 to 3.0    INR from last 7 days     Date/Time INR Value    03/01/18 0344 (!)  2.16    02/28/18 0333 (!)  1.99    02/26/18 0623 (!)  2.26        Dose from last 7 days     Date/Time Dose (mg)    03/01/18 1600  7.5    02/28/18 1200  5    02/27/18 1600  7.5    02/26/18 0700  5        Average Dose (mg):  (Home dose per [facility] notes: 5 mg MWF, 7.5 mg AODs)  Significant Interactions: Antibiotics, Statin, Thyroid Medications (trazodone)  Bridge Therapy: No    Comments: Therapeutic INR. No recent labs available for review. No s/sx of overt bleeding noted per chart review. No changes to warfarin-drug interaction profile at this time.  Will give warfarin 7.5 mg po tonight. Cipro therapy to complete tomorrow. Repeat INR in AM.     Plan:  Warfarin 7.5 mg po tonight. Repeat INR in AM.   Education Material Provided?: No (chronic warfarin pt )  Pharmacist suggested discharge dosing: resume home dose of warfarin 5 mg po on Mon/Wed/Fri and 7.5 mg all other days.  Follow up INR within 4-5 days of discharge.      Sandra Adams, PharmD, BCOP

## 2018-03-02 NOTE — DISCHARGE PLANNING
Medical Social Work  Provided patient a cab voucher # [id number] as [facility] can not provide transportation anymore today.

## 2018-03-02 NOTE — CARE PLAN
Problem: Infection  Goal: Will remain free from infection  Outcome: PROGRESSING AS EXPECTED  Admitted for UTI, afebrile, PO abx given and taken    Problem: Discharge Barriers/Planning  Goal: Patient's continuum of care needs will be met  Outcome: PROGRESSING AS EXPECTED  Pt to DC to [facility]

## 2018-03-02 NOTE — PROGRESS NOTES
Pt is AOx4, no complains of pain, tolerated all oral medications, skin and sacral assessment done, on RA, call light in use, refused bed alarm on, charge RN aware, treaded socks on, bed locked in low position, plan of care discussed, all needs met at this time. For discharge today to [facility], transport will be attempted to be set up by pt. Will do home O2 eval predischarge.

## 2018-03-02 NOTE — PROGRESS NOTES
Patient is alert and oriented x 4. Able to make needs known. Assessment completed. Denies any pain and discomfort at this time. Patient educated regarding plan of care.    Bed locked, in lowest position, treaded socks on. Needs attended. No further needs at this time. Communication board updated. Call light and personal belongings within reach.

## 2018-03-02 NOTE — FACE TO FACE
Face to Face Supporting Documentation - Home Health    The encounter with this patient was in whole or in part the primary reason for home health admission.    Date of encounter:   Patient:                    MRN:                       YOB: 2016  Sanjuanita Sosa  [id number]  1940     Home health to see patient for:  Skilled Nursing care for assessment, interventions & education, Home health aide, Physical Therapy evaluation and treatment and Occupational therapy evaluation and treatment    Skilled need for:  New Onset Medical Diagnosis gastrotenteritis    Skilled nursing interventions to include:  Comment: needs help with therapies    Homebound status evidenced by:  Need the aid of supportive devices such as crutches, canes, wheelchairs or walkers. Leaving home requires a considerable and taxing effort. There is a normal inability to leave the home.    Community Physician to provide follow up care: BETHANY Baker.     Optional Interventions? No      I certify the face to face encounter for this home health care referral meets the CMS requirements and the encounter/clinical assessment with the patient was, in whole, or in part, for the medical condition(s) listed above, which is the primary reason for home health care. Based on my clinical findings: the service(s) are medically necessary, support the need for home health care, and the homebound criteria are met.  I certify that this patient has had a face to face encounter by myself.  Keagan Zabala M.D. - NPI: [id number]

## 2018-03-02 NOTE — DISCHARGE PLANNING
Medical Social Work  Patient is aware she will be going home today, does not want [facility] to set up transportation at this time.  Will be calling several friends to see if they can provide this.  Requested she let her nurse know.  Selected [facility].    Faxed Choice to [facility].

## 2018-03-02 NOTE — PROGRESS NOTES
Discharging patient home per physician order. Discharged with self, transported by volunteer. Pt provided with cab voucher.   Demonstrated understanding of discharge instructions, follow up appointments, home medications.  Ambulating with no assistance, voiding without difficulty, pain well controlled, tolerating oral medications, oxygen saturation greater than 90% , tolerating diet.   Educational handouts about UTI given and discussed.  Verbalized understanding of discharge instructions and educational handouts.  All questions answered.  Belongings with patient at time of discharge.    Pt's home O2 with pt upon DC.

## 2018-03-02 NOTE — PROGRESS NOTES
Patient refusing bed alarm. A&OX4. Able to ambulate self with steady gait. Education provided but continues to refuse. Verbalized understanding. Calls appropriately. Charge nurse notified.

## 2018-03-02 NOTE — PROGRESS NOTES
Inpatient Anticoagulation Service Note    Date: 3/2/2018  Reason for Anticoagulation: Pulmonary Embolism (April 2015)        Hemoglobin Value: (!) 10.4  Hematocrit Value: (!) 31.6  Lab Platelet Value: 397  Target INR: 2.0 to 3.0    INR from last 7 days     Date/Time INR Value    03/02/18 0238 (!)  2.47    03/01/18 0344 (!)  2.16    02/28/18 0333 (!)  1.99    02/26/18 0623 (!)  2.26        Dose from last 7 days     Date/Time Dose (mg)    03/02/18 1000  5    03/01/18 1600  7.5    02/28/18 1200  5    02/27/18 1600  7.5    02/26/18 0700  5        Average Dose (mg):  ((Home dose per [facility] notes: 5 mg MWF, 7.5 mg AODs))  Significant Interactions: Antibiotics, Statin, Thyroid Medications (trazodone)  Bridge Therapy: No     Comments: INR remains therapeutic.  H/H slowly trending down. Plts WNL. No s/sx of overt bleeding noted per chart review. No changes to warfarin-drug interaction profile at this time.  Cipro therapy to complete today.  Will give warfarin 5 mg po tonight per home dose regimen. Repeat INR in AM.  Consider changing warfarin draws to Mon/Wed/Fri if INR remains therapeutic tomorrow.     Plan:   warfarin 5 mg po tonight per home dose regimen. Repeat INR in AM.  Consider changing warfarin draws to Mon/Wed/Fri if INR remains therapeutic tomorrow.   Education Material Provided?: No (chronic warfarin pt )  Pharmacist suggested discharge dosing: resume home dose of warfarin 5 mg po on Mon/Wed/Fri and 7.5 mg all other days.  Follow up INR within 4-5 days of discharge.      Sandra Adams, PharmD, BCOP

## 2018-03-02 NOTE — DISCHARGE INSTRUCTIONS
Discharge Instructions    Discharged to home by taxi with self. Discharged via wheelchair, hospital escort: Yes.  Special equipment needed: Oxygen    Be sure to schedule a follow-up appointment with your primary care doctor or any specialists as instructed.     Discharge Plan:   Diet Plan: Discussed  Activity Level: Discussed  Smoking Cessation Offered: Patient Counseled  Confirmed Follow up Appointment: Patient to Call and Schedule Appointment  Confirmed Symptoms Management: Discussed  Medication Reconciliation Updated: Yes  Influenza Vaccine Indication: Not indicated: Previously immunized this influenza season and > 8 years of age    I understand that a diet low in cholesterol, fat, and sodium is recommended for good health. Unless I have been given specific instructions below for another diet, I accept this instruction as my diet prescription.   Other diet: diabetic, low fiber, heart healthy    Special Instructions: None    · Is patient discharged on Warfarin / Coumadin?   Yes    You are receiving the drug warfarin. Please understand the importance of monitoring warfarin with scheduled PT/INR blood draws.  Follow-up with a call to your personal Doctor's office in 3 days to schedule a PT/INR. .    IMPORTANT: HOW TO USE THIS INFORMATION:  This is a summary and does NOT have all possible information about this product. This information does not assure that this product is safe, effective, or appropriate for you. This information is not individual medical advice and does not substitute for the advice of your health care professional. Always ask your health care professional for complete information about this product and your specific health needs.      WARFARIN - ORAL (WARF-uh-rin)      COMMON BRAND NAME(S): Coumadin      WARNING:  Warfarin can cause very serious (possibly fatal) bleeding. This is more likely to occur when you first start taking this medication or if you take too much warfarin. To decrease your risk  "for bleeding, your doctor or other health care provider will monitor you closely and check your lab results (INR test) to make sure you are not taking too much warfarin. Keep all medical and laboratory appointments. Tell your doctor right away if you notice any signs of serious bleeding. See also Side Effects section.      USES:  This medication is used to treat blood clots (such as in deep vein thrombosis-DVT or pulmonary embolus-PE) and/or to prevent new clots from forming in your body. Preventing harmful blood clots helps to reduce the risk of a stroke or heart attack. Conditions that increase your risk of developing blood clots include a certain type of irregular heart rhythm (atrial fibrillation), heart valve replacement, recent heart attack, and certain surgeries (such as hip/knee replacement). Warfarin is commonly called a \"blood thinner,\" but the more correct term is \"anticoagulant.\" It helps to keep blood flowing smoothly in your body by decreasing the amount of certain substances (clotting proteins) in your blood.      HOW TO USE:  Read the Medication Guide provided by your pharmacist before you start taking warfarin and each time you get a refill. If you have any questions, ask your doctor or pharmacist. Take this medication by mouth with or without food as directed by your doctor or other health care professional, usually once a day. It is very important to take it exactly as directed. Do not increase the dose, take it more frequently, or stop using it unless directed by your doctor. Dosage is based on your medical condition, laboratory tests (such as INR), and response to treatment. Your doctor or other health care provider will monitor you closely while you are taking this medication to determine the right dose for you. Use this medication regularly to get the most benefit from it. To help you remember, take it at the same time each day. It is important to eat a balanced, consistent diet while taking " warfarin. Some foods can affect how warfarin works in your body and may affect your treatment and dose. Avoid sudden large increases or decreases in your intake of foods high in vitamin K (such as broccoli, cauliflower, cabbage, brussels sprouts, kale, spinach, and other green leafy vegetables, liver, green tea, certain vitamin supplements). If you are trying to lose weight, check with your doctor before you try to go on a diet. Cranberry products may also affect how your warfarin works. Limit the amount of cranberry juice (16 ounces/480 milliliters a day) or other cranberry products you may drink or eat.      SIDE EFFECTS:  Nausea, loss of appetite, or stomach/abdominal pain may occur. If any of these effects persist or worsen, tell your doctor or pharmacist promptly. Remember that your doctor has prescribed this medication because he or she has judged that the benefit to you is greater than the risk of side effects. Many people using this medication do not have serious side effects. This medication can cause serious bleeding if it affects your blood clotting proteins too much (shown by unusually high INR lab results). Even if your doctor stops your medication, this risk of bleeding can continue for up to a week. Tell your doctor right away if you have any signs of serious bleeding, including: unusual pain/swelling/discomfort, unusual/easy bruising, prolonged bleeding from cuts or gums, persistent/frequent nosebleeds, unusually heavy/prolonged menstrual flow, pink/dark urine, coughing up blood, vomit that is bloody or looks like coffee grounds, severe headache, dizziness/fainting, unusual or persistent tiredness/weakness, bloody/black/tarry stools, chest pain, shortness of breath, difficulty swallowing. Tell your doctor right away if any of these unlikely but serious side effects occur: persistent nausea/vomiting, severe stomach/abdominal pain, yellowing eyes/skin. This drug rarely has caused very serious (possibly  fatal) problems if its effects lead to small blood clots (usually at the beginning of treatment). This can lead to severe skin/tissue damage that may require surgery or amputation if left untreated. Patients with certain blood conditions (protein C or S deficiency) may be at greater risk. Get medical help right away if any of these rare but serious side effects occur: painful/red/purplish patches on the skin (such as on the toe, breast, abdomen), change in the amount of urine, vision changes, confusion, slurred speech, weakness on one side of the body. A very serious allergic reaction to this drug is rare. However, get medical help right away if you notice any symptoms of a serious allergic reaction, including: rash, itching/swelling (especially of the face/tongue/throat), severe dizziness, trouble breathing. This is not a complete list of possible side effects. If you notice other effects not listed above, contact your doctor or pharmacist. In the [place] - Call your doctor for medical advice about side effects. You may report side effects to FDA at 7-830-AYK-5587. In [place] - Call your doctor for medical advice about side effects. You may report side effects to [facility] [place] at 1-836.774.3190.      PRECAUTIONS:  Before taking warfarin, tell your doctor or pharmacist if you are allergic to it; or if you have any other allergies. This product may contain inactive ingredients, which can cause allergic reactions or other problems. Talk to your pharmacist for more details. Before using this medication, tell your doctor or pharmacist your medical history, especially of: blood disorders (such as anemia, hemophilia), bleeding problems (such as bleeding of the stomach/intestines, bleeding in the brain), blood vessel disorders (such as aneurysms), recent major injury/surgery, liver disease, alcohol use, mental/mood disorders (including memory problems), frequent falls/injuries. It is important that all your doctors and dentists  know that you take warfarin. Before having surgery or any medical/dental procedures, tell your doctor or dentist that you are taking this medication and about all the products you use (including prescription drugs, nonprescription drugs, and herbal products). Avoid getting injections into the muscles. If you must have an injection into a muscle (for example, a flu shot), it should be given in the arm. This way, it will be easier to check for bleeding and/or apply pressure bandages. This medication may cause stomach bleeding. Daily use of alcohol while using this medicine will increase your risk for stomach bleeding and may also affect how this medication works. Limit or avoid alcoholic beverages. If you have not been eating well, if you have an illness or infection that causes fever, vomiting, or diarrhea for more than 2 days, or if you start using any antibiotic medications, contact your doctor or pharmacist immediately because these conditions can affect how warfarin works. This medication can cause heavy bleeding. To lower the chance of getting cut, bruised, or injured, use great caution with sharp objects like safety razors and nail cutters. Use an electric razor when shaving and a soft toothbrush when brushing your teeth. Avoid activities such as contact sports. If you fall or injure yourself, especially if you hit your head, call your doctor immediately. Your doctor may need to check you. The Food & Drug Administration has stated that generic warfarin products are interchangeable. However, consult your doctor or pharmacist before switching warfarin products. Be careful not to take more than one medication that contains warfarin unless specifically directed by the doctor or health care provider who is monitoring your warfarin treatment. Older adults may be at greater risk for bleeding while using this drug. This medication is not recommended for use during pregnancy because of serious (possibly fatal) harm to  "an unborn baby. Discuss the use of reliable forms of birth control with your doctor. If you become pregnant or think you may be pregnant, tell your doctor immediately. If you are planning pregnancy, discuss a plan for managing your condition with your doctor before you become pregnant. Your doctor may switch the type of medication you use during pregnancy. Very small amounts of this medication may pass into breast milk but is unlikely to harm a nursing infant. Consult your doctor before breast-feeding.      DRUG INTERACTIONS:  Drug interactions may change how your medications work or increase your risk for serious side effects. This document does not contain all possible drug interactions. Keep a list of all the products you use (including prescription/nonprescription drugs and herbal products) and share it with your doctor and pharmacist. Do not start, stop, or change the dosage of any medicines without your doctor's approval. Warfarin interacts with many prescription, nonprescription, vitamin, and herbal products. This includes medications that are applied to the skin or inside the vagina or rectum. The interactions with warfarin usually result in an increase or decrease in the \"blood-thinning\" (anticoagulant) effect. Your doctor or other health care professional should closely monitor you to prevent serious bleeding or clotting problems. While taking warfarin, it is very important to tell your doctor or pharmacist of any changes in medications, vitamins, or herbal products that you are taking. Some products that may interact with this drug include: capecitabine, imatinib, mifepristone. Aspirin, aspirin-like drugs (salicylates), and nonsteroidal anti-inflammatory drugs (NSAIDs such as ibuprofen, naproxen, celecoxib) may have effects similar to warfarin. These drugs may increase the risk of bleeding problems if taken during treatment with warfarin. Carefully check all prescription/nonprescription product labels " (including drugs applied to the skin such as pain-relieving creams) since the products may contain NSAIDs or salicylates. Talk to your doctor about using a different medication (such as acetaminophen) to treat pain/fever. Low-dose aspirin and related drugs (such as clopidogrel, ticlopidine) should be continued if prescribed by your doctor for specific medical reasons such as heart attack or stroke prevention. Consult your doctor or pharmacist for more details. Many herbal products interact with warfarin. Tell your doctor before taking any herbal products, especially bromelains, coenzyme Q10, cranberry, danshen, dong quai, fenugreek, garlic, ginkgo biloba, ginseng, and Zoey's wort, among others. This medication may interfere with a certain laboratory test to measure theophylline levels, possibly causing false test results. Make sure laboratory personnel and all your doctors know you use this drug.      OVERDOSE:  If overdose is suspected, contact a poison control center or emergency room immediately.  residents can call the [employer] Poison Hotline at 1-882.343.1192. [place] residents can call a provincial poison control center. Symptoms of overdose may include: bloody/black/tarry stools, pink/dark urine, unusual/prolonged bleeding.      NOTES:  Do not share this medication with others. Laboratory and/or medical tests (such as INR, complete blood count) must be performed periodically to monitor your progress or check for side effects. Consult your doctor for more details.      MISSED DOSE:  For the best possible benefit, do not miss any doses. If you do miss a dose and remember on the same day, take it as soon as you remember. If you remember on the next day, skip the missed dose and resume your usual dosing schedule. Do not double the dose to catch up because this could increase your risk for bleeding. Keep a record of missed doses to give to your doctor or pharmacist. Contact your doctor or pharmacist if you  miss 2 or more doses in a row.      STORAGE:  Store at room temperature away from light and moisture. Do not store in the bathroom. Keep all medications away from children and pets. Do not flush medications down the toilet or pour them into a drain unless instructed to do so. Properly discard this product when it is  or no longer needed. Consult your pharmacist or local waste disposal company for more details about how to safely discard your product.      MEDICAL ALERT:  Your condition and medication can cause complications in a medical emergency. For information about enrolling in MedicAlert, call 1-468.349.8410 ([place]) or 1-398.312.1142 ([place]).      Information last revised 2010 Copyright(c)  [employer]             Depression / Suicide Risk    As you are discharged from this [facility] facility, it is important to learn how to keep safe from harming yourself.    Recognize the warning signs:  · Abrupt changes in personality, positive or negative- including increase in energy   · Giving away possessions  · Change in eating patterns- significant weight changes-  positive or negative  · Change in sleeping patterns- unable to sleep or sleeping all the time   · Unwillingness or inability to communicate  · Depression  · Unusual sadness, discouragement and loneliness  · Talk of wanting to die  · Neglect of personal appearance   · Rebelliousness- reckless behavior  · Withdrawal from people/activities they love  · Confusion- inability to concentrate     If you or a loved one observes any of these behaviors or has concerns about self-harm, here's what you can do:  · Talk about it- your feelings and reasons for harming yourself  · Remove any means that you might use to hurt yourself (examples: pills, rope, extension cords, firearm)  · Get professional help from the community (Mental Health, Substance Abuse, psychological counseling)  · Do not be alone:Call your Safe Contact- someone whom you  trust who will be there for you.  · Call your local CRISIS HOTLINE 878-5561 or 660-711-2969  · Call your local Children's Mobile Crisis Response Team [place] (512) 651-6206 or www.[employer]  · Call the toll free National Suicide Prevention Hotlines   · National Suicide Prevention Lifeline 867-656-QQPJ (6408)  · National Hope Line Network 800-SUICIDE (355-0594)      Urinary Tract Infection, Adult  Introduction  A urinary tract infection (UTI) is an infection of any part of the urinary tract. The urinary tract includes the:  · Kidneys.  · Ureters.  · Bladder.  · Urethra.  These organs make, store, and get rid of pee (urine) in the body.  Follow these instructions at home:  · Take over-the-counter and prescription medicines only as told by your doctor.  · If you were prescribed an antibiotic medicine, take it as told by your doctor. Do not stop taking the antibiotic even if you start to feel better.  · Avoid the following drinks:  ¨ Alcohol.  ¨ Caffeine.  ¨ Tea.  ¨ Carbonated drinks.  · Drink enough fluid to keep your pee clear or pale yellow.  · Keep all follow-up visits as told by your doctor. This is important.  · Make sure to:  ¨ Empty your bladder often and completely. Do not to hold pee for long periods of time.  ¨ Empty your bladder before and after sex.  ¨ Wipe from front to back after a bowel movement if you are female. Use each tissue one time when you wipe.  Contact a doctor if:  · You have back pain.  · You have a fever.  · You feel sick to your stomach (nauseous).  · You throw up (vomit).  · Your symptoms do not get better after 3 days.  · Your symptoms go away and then come back.  Get help right away if:  · You have very bad back pain.  · You have very bad lower belly (abdominal) pain.  · You are throwing up and cannot keep down any medicines or water.  This information is not intended to replace advice given to you by your health care provider. Make sure you discuss any questions you have  with your health care provider.  Document Released: 06/05/2009 Document Revised: 05/25/2017 Document Reviewed: 11/07/2016  © 2017 [employer]

## 2018-03-02 NOTE — FACE TO FACE
Face to Face Note  -  Durable Medical Equipment    Keagan Zabala M.D. - NPI: [id number]  I certify that this patient is under my care and that they had a durable medical equipment(DME)face to face encounter by myself that meets the physician DME face-to-face encounter requirements with this patient on:    Date of encounter:   Patient:                    MRN:                       YOB: 2016  Sanjuanita Sosa  [id number]  1940     The encounter with the patient was in whole, or in part, for the following medical condition, which is the primary reason for durable medical equipment:  COPD    I certify that, based on my findings, the following durable medical equipment is medically necessary:  Oxygen.    HOME O2 Saturation Measurements:(Values must be present for Home Oxygen orders)  Room air sat at rest: 90  Room air sat with amb: 90 (drops when talking while walking)  With liters of O2: 2, O2 sat at rest with O2: 90 (pt drops below 90%)  With Liters of O2: 2, O2 sat with amb with O2 : 92  Is the patient mobile?: Yes    My Clinical findings support the need for the above equipment due to:  Hypoxia    Supporting Symptoms: shortness of breath

## 2018-03-03 NOTE — DISCHARGE SUMMARY
CHIEF COMPLAINT ON ADMISSION  Chief Complaint   Patient presents with   • Nausea/Vomiting/Diarrhea   • Body Aches       CODE STATUS  Full Code    HPI & HOSPITAL COURSE  This is a 77 y.o. female here with above issues. She was admitted to the medical floor and found to have a UTI that eventually grew pan sensitive  E Coli. She was initially placed on IV CTX which was transitioned to dose reduced ciprofloxacin and received a total of 4 days, which completed here while admitted. Her GI symptoms are caused by viral gastrotenteritis and returned to baseline with supportive care.    She had some acute on chronic proximal lower back pain with negative 3-view xray of the lumbar spine. She was evaluated by PT/OT who recommended HH services, which were ordered.    She had persistent hypoxia with a continuous therapeutic INR. LE venous duplex was negative for DVT. ECHO showed no obvious abnormalities except for moderate aortic stenosis. There was no clear evidence of  Pneumonia either. Her oxygen requirements on ambulation are very low and she was told in the past that she need oxygen, but refused then. She is agreeable now. She is able to re-start all of her outpatient blood pressure medications as well.    The patient met 2-midnight criteria for an inpatient stay at the time of discharge.    Therefore, she is discharged in fair and stable condition with close outpatient follow-up.    SPECIFIC OUTPATIENT FOLLOW-UP  -F/U with her PCP in 1-2 weeks    DISCHARGE PROBLEM LIST  Principal Problem (Resolved):    Gastroenteritis POA: Yes  Active Problems:    Carotid artery stenosis POA: Yes    Dyslipidemia POA: Yes    Hx pulmonary embolism POA: Yes    Essential hypertension POA: Yes    Controlled type 2 diabetes mellitus without complication, without long-term current use of insulin (CMS-HCC) POA: Yes    Postoperative hypothyroidism POA: Yes    Chronic anticoagulation POA: Yes    Abdominal aortic aneurysm without rupture (CMS-HCC)  POA: Yes    Thrombocytosis (CMS-HCC) POA: Yes  Resolved Problems:    UTI (urinary tract infection) POA: Yes    AP (abdominal pain) POA: Yes    Nausea and vomiting POA: Yes    Hypotension POA: Yes    Acute respiratory failure (CMS-HCC) POA: Yes    Bilateral hip pain POA: Yes      FOLLOW UP  Future Appointments  Date Time Provider Department Center   3/7/2018 9:40 AM VASCULAR NURSE PRACTITIONER [facility] None   3/7/2018 1:20 PM Efrain Armendariz A.P.N. [address]   3/21/2018 10:15 AM V EXAM 4 [facility] None   4/10/2018 10:00 AM RAUL Baker.P.N. [address]     Efarin Armendariz A.P.N.  [address] [facility] [address]  [place] [address]  [phone]    In 2 weeks      [facility]  [address] [phone]        Efrain Armendariz A.P.N.  [address] [facility] [address] [place] [id number]  [phone]    Schedule an appointment as soon as possible for a visit in 1 week  follow up post hospitalization      MEDICATIONS ON DISCHARGE   Sanjuanita Sosa   Home Medication Instructions ELIDA:[id number]    Printed on:03/02/18 1631   Medication Information                      amlodipine (NORVASC) 10 MG Tab  TAKE ONE TABLET BY MOUTH ONCE A DAY             atorvastatin (LIPITOR) 80 MG tablet  Take 0.5 Tabs by mouth every evening.             calcium carbonate (OS-MARKELL 500) 500 MG Tab  TAKE ONE TABLET BY MOUTH TWICE DAILY WITH MEALS             docusate sodium 100 MG Cap  Take 100 mg by mouth every morning.             fexofenadine (ALLEGRA) 180 MG tablet  Take 1 Tab by mouth every day.             glimepiride (AMARYL) 2 MG Tab  TAKE  ONE TABLET BY MOUTH EVERY MORNING             hydrocodone-acetaminophen (NORCO) 5-325 MG Tab per tablet  Take 1-2 Tabs by mouth every 6 hours as needed.             levothyroxine (SYNTHROID) 137 MCG Tab  Take 1 Tab by mouth Every morning on an empty stomach.             losartan (COZAAR) 50 MG Tab  TAKE ONE TABLET BY MOUTH EVERY DAY             metformin (GLUCOPHAGE)  500 MG Tab  TAKE 2 TABLETS BY MOUTH EACH MORNING WITH  BREAKFAST AND TAKE 1 TABLET EACH EVENING   WITH DINNER             MICROLET LANCETS Misc  TEST BLOOD SUGAR TWO TIMES DAILY             Misc. Devices Misc  Use one test strip twice daily             omeprazole (PRILOSEC) 20 MG delayed-release capsule  Take 1 Cap by mouth every day.             trazodone (DESYREL) 100 MG Tab  Take 1.5 Tabs by mouth 1 time daily as needed for Sleep.             vitamin D, Ergocalciferol, (DRISDOL) 61162 UNITS Cap capsule  Take 1 Cap by mouth every 7 days.             warfarin (COUMADIN) 5 MG Tab  Take one to one and one-half (1-1.5) tablets daily as directed by [facility]                 DIET  Orders Placed This Encounter   Procedures   • DIET ORDER     Standing Status:   Standing     Number of Occurrences:   1     Order Specific Question:   Diet:     Answer:   Diabetic [3]     Order Specific Question:   Diet:     Answer:   Low Fiber(GI Soft) [2]       ACTIVITY  As tolerated.  Weight bearing as tolerated      CONSULTATIONS  -NONE    PROCEDURES  ECHOCARDIOGRAM COMP W/O CONT   Final Result      LE VENOUS DUPLEX - DVT ([facility] Only)   Final Result      DX-CHEST-2 VIEWS   Final Result      1.  Blunted costovertebral angles may represent atelectasis, scarring, or small bilateral pleural effusions.      DX-LUMBAR SPINE-2 OR 3 VIEWS   Final Result      1.  There is an old mild L4 superior endplate compression fracture.   2.  There is multilevel degenerative change.   3.  There is extensive atherosclerosis.      DX-CHEST-LIMITED (1 VIEW)   Final Result      No acute cardiopulmonary disease.      CT-ABDOMEN-PELVIS WITH   Final Result      1.  Increased colonic fluid suggesting gastroenteritis.   2.  No bowel obstruction or pneumoperitoneum.   3.  Postoperative changes as described.   4.  Colonic diverticulosis without evidence for diverticulitis.        ECHO-  Technically difficult study - adequate  information is obtained.   Left ventricular ejection fraction is visually estimated to be 60%.  Mild concentric left ventricular hypertrophy.  Mildly dilated left   atrium.  Moderate aortic stenosis. Mild-moderate aortic insufficiency.  Right ventricular systolic pressure is estimated to be 65 mmHg.    LE VENOUS DUPLEX-  Normal bilateral superficial and deep venous examination of the lower    extremities.    Limited evaluation of the posterior tibial and peroneal veins.       LABORATORY  Lab Results   Component Value Date/Time    SODIUM 144 03/02/2018 02:38 AM    POTASSIUM 3.6 03/02/2018 02:38 AM    CHLORIDE 111 03/02/2018 02:38 AM    CO2 23 03/02/2018 02:38 AM    GLUCOSE 92 03/02/2018 02:38 AM    BUN 13 03/02/2018 02:38 AM    CREATININE 0.87 03/02/2018 02:38 AM    CREATININE 0.8 05/19/2009 04:00 PM        Lab Results   Component Value Date/Time    WBC 8.8 03/02/2018 02:38 AM    HEMOGLOBIN 10.4 (L) 03/02/2018 02:38 AM    HEMATOCRIT 31.6 (L) 03/02/2018 02:38 AM    PLATELETCT 397 03/02/2018 02:38 AM        Total time of the discharge process exceeds 38 minutes

## 2018-03-05 ENCOUNTER — TELEPHONE (OUTPATIENT)
Dept: MEDICAL GROUP | Facility: MEDICAL CENTER | Age: 78
End: 2018-03-05

## 2018-03-05 ENCOUNTER — PATIENT OUTREACH (OUTPATIENT)
Dept: HEALTH INFORMATION MANAGEMENT | Facility: OTHER | Age: 78
End: 2018-03-05

## 2018-03-05 RX ORDER — OMEPRAZOLE 20 MG/1
20 CAPSULE, DELAYED RELEASE ORAL
Qty: 30 CAP | Refills: 11 | Status: SHIPPED | OUTPATIENT
Start: 2018-03-05 | End: 2018-12-01

## 2018-03-05 NOTE — DOCUMENTATION QUERY
"DOCUMENTATION QUERY    PROVIDERS: Please select “Cosign w/ note”to reply to query.    To better represent the severity of illness of your patient, please review the following information and exercise your independent professional judgment in responding to this query.     \"Acute respiratory failure\" is documented in the Progress Notes and Discharge Summary. Based upon the clinical findings, risk factors, and treatment, can this diagnosis be further specified?    • Acute respiratory failure with hypoxia  • Acute on chronic respiratory failure with hypoxia  • Other explanation of clinical findings  • Unable to determine    The medical record reflects the following:   Clinical Findings 2/25 - 3/2 Pulse Oximetry: 83 - 96; O2: 0 - 4 L  3/1 CXR: \"Blunted costovertebral angles may represent atelectasis, scarring, or small bilateral pleural effusions\" is noted.   Treatment Oxygen; RT; pulse oximetry; CXR   Risk Factors Age; current every day smoker; HTN; CAD; DM   Location within medical record Progress Notes, Discharge Summary, Radiology Results and Respiratory Flowsheet     Thank you,   Fanny Hernandez RN  Clinical   [phone]          "

## 2018-03-05 NOTE — TELEPHONE ENCOUNTER
1. Caller Name: Nurse                                         Call Back Number:       Patient approves a detailed voicemail message: N\A    Pt decline [facility].

## 2018-03-07 ENCOUNTER — OFFICE VISIT (OUTPATIENT)
Dept: VASCULAR LAB | Facility: MEDICAL CENTER | Age: 78
End: 2018-03-07
Attending: INTERNAL MEDICINE
Payer: MEDICARE

## 2018-03-07 ENCOUNTER — OFFICE VISIT (OUTPATIENT)
Dept: MEDICAL GROUP | Facility: MEDICAL CENTER | Age: 78
End: 2018-03-07
Payer: MEDICARE

## 2018-03-07 VITALS
RESPIRATION RATE: 16 BRPM | DIASTOLIC BLOOD PRESSURE: 76 MMHG | SYSTOLIC BLOOD PRESSURE: 118 MMHG | BODY MASS INDEX: 33.12 KG/M2 | OXYGEN SATURATION: 95 % | WEIGHT: 194 LBS | HEART RATE: 85 BPM | HEIGHT: 64 IN

## 2018-03-07 VITALS
BODY MASS INDEX: 33.12 KG/M2 | WEIGHT: 194 LBS | HEART RATE: 77 BPM | SYSTOLIC BLOOD PRESSURE: 125 MMHG | HEIGHT: 64 IN | DIASTOLIC BLOOD PRESSURE: 36 MMHG

## 2018-03-07 DIAGNOSIS — C73 PAPILLARY THYROID CARCINOMA (HCC): ICD-10-CM

## 2018-03-07 DIAGNOSIS — E11.9 CONTROLLED TYPE 2 DIABETES MELLITUS WITHOUT COMPLICATION, WITHOUT LONG-TERM CURRENT USE OF INSULIN (HCC): ICD-10-CM

## 2018-03-07 DIAGNOSIS — G89.29 CHRONIC MIDLINE LOW BACK PAIN WITHOUT SCIATICA: ICD-10-CM

## 2018-03-07 DIAGNOSIS — I10 ESSENTIAL HYPERTENSION: ICD-10-CM

## 2018-03-07 DIAGNOSIS — E78.5 DYSLIPIDEMIA: ICD-10-CM

## 2018-03-07 DIAGNOSIS — J96.01 ACUTE RESPIRATORY FAILURE WITH HYPOXIA (HCC): ICD-10-CM

## 2018-03-07 DIAGNOSIS — Z79.01 CHRONIC ANTICOAGULATION: ICD-10-CM

## 2018-03-07 DIAGNOSIS — E89.0 POSTOPERATIVE HYPOTHYROIDISM: ICD-10-CM

## 2018-03-07 DIAGNOSIS — I71.40 ABDOMINAL AORTIC ANEURYSM WITHOUT RUPTURE (HCC): ICD-10-CM

## 2018-03-07 DIAGNOSIS — M54.50 CHRONIC MIDLINE LOW BACK PAIN WITHOUT SCIATICA: ICD-10-CM

## 2018-03-07 DIAGNOSIS — I35.0 AORTIC STENOSIS, MODERATE: ICD-10-CM

## 2018-03-07 PROBLEM — Z79.891 CHRONIC USE OF OPIATE FOR THERAPEUTIC PURPOSE: Status: RESOLVED | Noted: 2017-10-10 | Resolved: 2018-03-07

## 2018-03-07 PROCEDURE — 99214 OFFICE O/P EST MOD 30 MIN: CPT | Performed by: NURSE PRACTITIONER

## 2018-03-07 PROCEDURE — 99212 OFFICE O/P EST SF 10 MIN: CPT | Performed by: NURSE PRACTITIONER

## 2018-03-07 PROCEDURE — 99213 OFFICE O/P EST LOW 20 MIN: CPT | Performed by: NURSE PRACTITIONER

## 2018-03-07 RX ORDER — HYDROCODONE BITARTRATE AND ACETAMINOPHEN 7.5; 325 MG/1; MG/1
1 TABLET ORAL EVERY 8 HOURS PRN
Qty: 30 TAB | Refills: 0 | Status: ON HOLD | OUTPATIENT
Start: 2018-03-07 | End: 2018-03-24

## 2018-03-07 ASSESSMENT — ENCOUNTER SYMPTOMS
WHEEZING: 0
MYALGIAS: 0
BACK PAIN: 1
SHORTNESS OF BREATH: 0
WEIGHT LOSS: 0
PALPITATIONS: 0
DIZZINESS: 0
BACK PAIN: 1
FALLS: 1
CLAUDICATION: 0
SPEECH CHANGE: 0
COUGH: 0
FOCAL WEAKNESS: 0
HEMOPTYSIS: 0
HEADACHES: 0
BRUISES/BLEEDS EASILY: 0
BLOOD IN STOOL: 0

## 2018-03-07 NOTE — PROGRESS NOTES
"  FOLLOW-UP VASCULAR VISIT  Subjective:   Sanjuanita Sosa is a 77 y.o. female who presents today 03/07/2018 for   Chief Complaint   Patient presents with   • Follow-Up     HPI:     Patient here for f/u of AAA, PE, anticoagulation, htn, and dyslipidemia  Denies falls  Not checking BP at home.   Was in hospital for UTI in Feb no change meds  On Metformin and Glimepiride  No hypoglycemia  Denies chest pain, SOB, palpitations, TIA or stroke type symptoms  Lt leg swelling continued, none on Rt leg  Back pain unresolved  On atorvastatin, denies myalgias  Continues to smoke  On Warfarin no bleeding issues    Social History   Substance Use Topics   • Smoking status: Current Every Day Smoker     Packs/day: 0.25     Years: 40.00     Types: Cigarettes   • Smokeless tobacco: Never Used   • Alcohol use No     DIET AND EXERCISE:  Weight Change: stable  Diet: reasonable Diabetic diet  Exercise: minimal exercise due to back pain    Review of Systems   Constitutional: Negative for weight loss.   Respiratory: Negative for cough, hemoptysis, shortness of breath and wheezing.    Cardiovascular: Positive for leg swelling. Negative for chest pain, palpitations and claudication.   Gastrointestinal: Negative for blood in stool.   Genitourinary: Negative for hematuria.   Musculoskeletal: Positive for back pain, falls and joint pain. Negative for myalgias.   Neurological: Negative for dizziness, speech change, focal weakness and headaches.   Endo/Heme/Allergies: Does not bruise/bleed easily.      Objective:     Vitals:    03/07/18 0909 03/07/18 0916   BP: 136/56 (!) 125/36   Pulse: 81 77   Weight: 88 kg (194 lb)    Height: 1.626 m (5' 4\")       Body mass index is 33.3 kg/m².  Physical Exam   Constitutional: She is oriented to person, place, and time. No distress.   Cardiovascular: Normal rate and regular rhythm.    Murmur heard.  Pulmonary/Chest: Effort normal and breath sounds normal. No respiratory distress. She has no wheezes. She " has no rales.   Musculoskeletal: Normal range of motion. She exhibits edema.   Neurological: She is alert and oriented to person, place, and time. No cranial nerve deficit. Coordination normal.   Bit of a wide spread unsteady gait   Skin: She is not diaphoretic.   Psychiatric: Her speech is normal and behavior is normal. Thought content normal. She expresses no suicidal ideation.     Lab Results   Component Value Date    CHOLSTRLTOT 119 02/26/2018    LDL 59 02/26/2018    HDL 30 (A) 02/26/2018    TRIGLYCERIDE 148 02/26/2018      Lab Results   Component Value Date    PROTHROMBTM 26.4 (H) 03/02/2018    INR 2.47 (H) 03/02/2018       Lab Results   Component Value Date    HBA1C 6.6 (H) 02/26/2018      Lab Results   Component Value Date    SODIUM 144 03/02/2018    POTASSIUM 3.6 03/02/2018    CHLORIDE 111 03/02/2018    CO2 23 03/02/2018    GLUCOSE 92 03/02/2018    BUN 13 03/02/2018    CREATININE 0.87 03/02/2018    IFAFR[place] >60 03/02/2018    IFNOTAFR >60 03/02/2018        Lab Results   Component Value Date    WBC 8.8 03/02/2018    RBC 3.75 (L) 03/02/2018    HEMOGLOBIN 10.4 (L) 03/02/2018    HEMATOCRIT 31.6 (L) 03/02/2018    MCV 84.3 03/02/2018    MCH 27.7 03/02/2018    MCHC 32.9 (L) 03/02/2018    MPV 9.7 03/02/2018     CT chest 4/27/2015  There are pulmonary emboli extending into the right upper lobe, right middle lobe, right lower lobe, and left lower lobe. The main pulmonary artery is of normal caliber. No shift of the intraventricular septum or reflux of contrast into the hepatic veins. There are scattered arterial calcifications. No significant pericardial effusion.  There is mild left basilar atelectasis. No significant pleural fluid. The central airways are clear.  No adenopathy is identified.  Limited visualization of the upper abdomen demonstrates severe atherosclerotic disease.    echo 4/28/2015  Technically difficult study.   Normal left ventricular size and function.  Left ventricular ejection fraction is 60% to  65%.  Grade I diastolic dysfunction - mitral inflow E/A is <1.0.  Mild mitral regurgitation.  Mild aortic stenosis.  Mild to moderate aortic insufficiency.  Moderate tricuspid regurgitation.  Right ventricular systolic pressure is estimated to be 43-48 mmHg.  No prior study is available for comparison.      CT scan chest abdomen and pelvis 10/2015   Thoracic abdominal aortic penetrating atheromatous ulcer without evidence for intramural hematoma or dissection.   Exclusion of early dissection or intramural hematoma (although no evidence for this entity) is not possible on this contrast only study.  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  Ventral hernia containing small bowel and without evidence for obstruction or inflammation    CTA 5/17/2016  1. Considerable partially ulcerated plaque within the aorta similar to previous findings. No dissection. No mediastinal hematoma.  2. The abdominal aorta measures up to 2.6 cm in diameter. No change compared with previous. No retroperitoneal hematoma.  3. High-grade stenosis celiac artery.  4. Atelectasis within both lungs and tiny right apical pleural-based nodule unchanged.  5. Surgical absent spleen. Small splenules and chronic loculated fluid collection left upper quadrant again demonstrated.  6. Diverticula colon without evidence of diverticulitis.  7. Ventral abdominal wall hernia containing nonobstructed small bowel loops.  8. Large and small bowel incompletely imaged. No free fluid within the abdomen identified.  9. Partial resection pancreas.  10. Tiny gallstones.  11. Coronary artery calcifications.  12. 1.3 cm left lobe thyroid nodule. Ultrasound followup is consideration.    U/S Aorta Dec 2016:  1.  Fusiform  infrarenal abdominal aortic aneurysm measures 2.9 x 2.9 cm in maximum axial dimensions and is located at the mid aortic level. Measurement on prior CT was 2.6 cm.  2.  Extensive aortic atherosclerosis.    CT abd and Pelvis Oct 24, 2017  1.  Postoperative changes as described.  2.  Minimal pneumobilia, likely postoperative.  3.  Probable chronic fluid collection or mass lateral to the greater curvature stomach, overall decreased in size from prior exam.  4.  Low-attenuation lesion in the nito hepatis, unchanged and of uncertain significance.  5.  Nonobstructing ventral abdominal hernia.  6.  No focal mesenteric inflammatory process.  7.  Abdominal aortic ectasia with chronic dissection, unchanged from prior exam.  No periaortic fluid collection  .  Echo March 2018  Technically difficult study - adequate information is obtained.   Left ventricular ejection fraction is visually estimated to be 60%.  Mild concentric left ventricular hypertrophy.  Mildly dilated left   atrium.  Moderate aortic stenosis. Mild-moderate aortic insufficiency.  Right ventricular systolic pressure is estimated to be 65 mmHg.    LE Venous duplex- March 2018   Normal bilateral superficial and deep venous examination of the lower    extremities.    Limited evaluation of the posterior tibial and peroneal veins.     Medical Decision Making:  Today's Assessment / Status / Plan:     Patient Type: Secondary Prevention    Etiology of Established CVD if Present:   1.  Ulceration thoracic aorta - not necessarily penetrating - stable on serial imaging.  2.  AAA, small  3.  DVT and PE      Lipid Management: Qualifies for Statin Therapy Based on 2013 ACC/AHA Guidelines: yes  Calculated 10-Year Risk of ASCVD: N/A  Currently on Statin: Yes  Excellent control on most recent blood work (Feb, 2018)  -continue atorvastatin to 40 mg daily (1/2 tab)  -Report any myalgias immediately  -recheck fasting lipids prior to next visit    Blood Pressure Management:Goal:  ACC/DEMETRIA Office BP Goal:< 130/80's; Under Control: yes  Under excellent control at least in office  Can't afford home monitor at present  Has CKD but stable or improved  Does not appear to be on hctz at present  Mild leg swelling but only in LT leg post DVT-Lynnette  Amlodipine at current dose  -Continue Amlodipine 5 mg daily  -Continue losartan 50 mg daily  - if BP remains low consider decrease in dose of amlodipine or even discontinuation at next visit  - if any degree of increased albuminuria would consider increase in losartan to 100 mg daily    Glycemic Status: Diabetic-  last A1c under excellent control  -Continue metformin for now, but may need to consider d/c if GFR falls further. Current GFR > 60  -continue glimiperide for now, but decrease dose or stop if a1c well controlled next visit.  -Continue to follow fasting blood sugars at home - call if hypoglycemic episodes  -Eat a high protein snack around 2pm due to the stretch in meal times.   - Continue TLC  -Yearly flu shot, eye exam - reminded patient  - recheck A1C prior to next visit    Anti-Platelet/Anti-Coagulant Tx:   Unable to afford pradaxa or xarelto  Patient in agreement that long term benefit of anticoag likely outweighs risk  - Continue indefinite anticoagulation with warfarin  - Follow up in coag clinic    Smoking:  Has had significant recidivism. Discussed cutting amount in half and consider Nicoderm patch. Pt is not wanting to quit at present, she is satisfied with smoking despite the risks. Will continue to address at each visit    Physical Activity: encouraged exercise class 2-3 x per week. Pt limited due to back and joint pain    Weight Management and Nutrition: Dietary plan was discussed with patient at this visit including low fats and carb, diabetic diet    Other:     1. Chronic renal disease stage G3B in past with modest improvement to G3A on most recent blood work. No albuminuria (A0) in past. No longer seeing nephrology., Continue ARB and  BP control. Check GFR, urine for ma,  vit D, and CBC prior to next visit.   2.  Hypothyroidism with h/o papillary thyroid cancer. On levothyroxine - dose adjusted by PCP. Recheck TSH.  Otherwise Will defer all management to pcp  3.  Ulceration, thoracic aorta - stable on surveillance imaging.  Continue medical management  4.  AAA, small and stable on serial imaging - continue medical management and surveillance - will repeat u/s every 2-3 years - next end of 2018 or early 2019    Instructed to follow-up with PCP for remainder of adult medical needs: yes  We will partner with other providers in the management of established vascular disease and cardiometabolic risk factors.    Studies to Be Obtained: Abdominal aortic ultrasound 2-3 years  Labs to Be Obtained: A1c, CBC, TSH, Lipids, MA., Vit D,     Follow up in: 6 months    RAUL Alvarado.P.ALEXEY.     Agree with above  Echo also demonstrates to difficult valvular disease with moderate aortic stenosis and mild to moderate aortic insufficiency, appears asymptomatic  Would recheck echo in one year. If valvular disease stable can likely increase interval of imaging to every 2 years    Michael J. Bloch, MD  Vascular Care    CC:   Osmin Armendariz

## 2018-03-07 NOTE — PROGRESS NOTES
Subjective:      Sanjuanita Sosa is a 77 y.o. female who presents with Hospital Follow-up        CC: Patient here today for follow-up after UTI at the hospital as well as requesting medications and need of referral.    HPI Sanjuanita Sosa      1. Acute respiratory failure with hypoxia (CMS-[facility])  Patient has previously shown occasional dips in her pulse ox but has declined oxygen. In the hospital she was shown to have hypoxia at night and oxygen was ordered for her. She states she still does not like to use it at night but is willing to have it available. Pulse ox in the office on room air is normal. She went to the emergency room because of problems with nausea and vomiting and was found to have a UTI.    2. Aortic stenosis, moderate  Echocardiogram done at the hospital showed moderate aortic stenosis with ejection fraction at 60% and mild concentric left ventricular hypertrophy.    3. Essential hypertension  Blood pressure has been well controlled on amlodipine.    4. Controlled type 2 diabetes mellitus without complication, without long-term current use of insulin (CMS-HCC)  Most recent hemoglobin A1c is 6.6 on metformin and glimepiride. GFR is greater than 60. She follows with endocrinology for this but is due for a six-month follow-up next month and has not made an appointment yet.    5. Chronic midline low back pain without sciatica  Patient is requesting a refill on pain medication for her back. I had explained on previous visit that any long-term medications now would require referral to pain management. She is asking for at least some data available when her back pain is severe. She does not want to go to pain management.    6. Postoperative hypothyroidism  Most recent TSH was 0.9 on medication and she follows with endocrinology.    7. Papillary thyroid carcinoma (CMS-HCC)  Previous thyroidectomy.    8. Abdominal aortic aneurysm without rupture (CMS-HCC)  Patient follows with vascular clinic  which is monitoring her aneurysm as well as her history of PE and need for chronic anticoagulation. She was just seen today. She continues to take her statin but does continue to smoke.  Social History   Substance Use Topics   • Smoking status: Current Every Day Smoker     Packs/day: 0.25     Years: 40.00     Types: Cigarettes   • Smokeless tobacco: Never Used   • Alcohol use No     Current Outpatient Prescriptions   Medication Sig Dispense Refill   • HYDROcodone-acetaminophen (NORCO) 7.5-325 MG per tablet Take 1 Tab by mouth every 8 hours as needed for up to 14 days. 30 Tab 0   • omeprazole (PRILOSEC) 20 MG delayed-release capsule Take 1 Cap by mouth every day. 30 Cap 11   • warfarin (COUMADIN) 5 MG Tab Take one to one and one-half (1-1.5) tablets daily as directed by [facility] 135 Tab 1   • amlodipine (NORVASC) 10 MG Tab TAKE ONE TABLET BY MOUTH ONCE A DAY 90 Tab 2   • calcium carbonate (OS-MARKELL 500) 500 MG Tab TAKE ONE TABLET BY MOUTH TWICE DAILY WITH MEALS 60 Tab 11   • fexofenadine (ALLEGRA) 180 MG tablet Take 1 Tab by mouth every day. 30 Tab    • Misc. Devices Misc Use one test strip twice daily 60 Each 11   • levothyroxine (SYNTHROID) 137 MCG Tab Take 1 Tab by mouth Every morning on an empty stomach. 30 Tab 11   • trazodone (DESYREL) 100 MG Tab Take 1.5 Tabs by mouth 1 time daily as needed for Sleep. 45 Tab 11   • glimepiride (AMARYL) 2 MG Tab TAKE  ONE TABLET BY MOUTH EVERY MORNING 30 Tab 11   • metformin (GLUCOPHAGE) 500 MG Tab TAKE 2 TABLETS BY MOUTH EACH MORNING WITH  BREAKFAST AND TAKE 1 TABLET EACH EVENING   WITH DINNER 90 Tab 11   • vitamin D, Ergocalciferol, (DRISDOL) 28668 UNITS Cap capsule Take 1 Cap by mouth every 7 days. 4 Cap 11   • losartan (COZAAR) 50 MG Tab TAKE ONE TABLET BY MOUTH EVERY DAY 30 Tab 10   • atorvastatin (LIPITOR) 80 MG tablet Take 0.5 Tabs by mouth every evening. 30 Tab 11   • MICROLET LANCETS Misc TEST BLOOD SUGAR TWO TIMES DAILY 100 Each 11   • docusate  "sodium 100 MG Cap Take 100 mg by mouth every morning. 30 Cap 0     No current facility-administered medications for this visit.      Facility-Administered Medications Ordered in Other Visits   Medication Dose Route Frequency Provider Last Rate Last Dose   • iodixanol (VISIPAQUE) SOLN    Intra-Op Once PRN Shekhar Rosado M.D.   10 mL at 01/18/17 0619     Family History   Problem Relation Age of Onset   • Hyperlipidemia Mother    • Stroke Mother    • Hyperlipidemia Father    • Stroke Father    • Heart Disease Brother      CABG     Past Medical History:   Diagnosis Date   • AAA (abdominal aortic aneurysm) (CMS-HCC)    • Anticoagulation monitoring, special range    • Arrhythmia    • Arthritis    • Autoimmune hepatitis (CMS-HCC) 3/19/2012   • Blood clotting disorder (CMS-HCC) 2015    clot in leg and lung   • Breath shortness    • Cancer (CMS-HCC)     thyroid   • Cataract     left eye needs surgery   • Diabetes     pre-diabetic   • Disorder of thyroid 2016    thyroid cancer   • Gallstones    • H/O: HTN (hypertension) 3/19/2012   • Heart valve disease    • Hepatitis B    • Hiatus hernia syndrome    • Hyperlipidemia    • Hypertension    • Kidney disease    • Mixed hyperlipidemia 3/19/2012   • Murmur 3/19/2012   • Nonspecific abnormal electrocardiogram (ECG) (EKG) 3/19/2012   • Overweight(278.02) 3/19/2012   • Pain    • Palpitations    • Preoperative cardiovascular examination 3/19/2012   • Unspecified urinary incontinence        Review of Systems   Musculoskeletal: Positive for back pain.   All other systems reviewed and are negative.         Objective:     /76   Pulse 85   Resp 16   Ht 1.626 m (5' 4\")   Wt 88 kg (194 lb)   LMP 10/12/1970   SpO2 95%   BMI 33.30 kg/m²      Physical Exam   Constitutional: She is oriented to person, place, and time. She appears well-developed and well-nourished. No distress.   HENT:   Head: Normocephalic and atraumatic.   Right Ear: External ear normal.   Left Ear: External ear " normal.   Nose: Nose normal.   Eyes: Right eye exhibits no discharge. Left eye exhibits no discharge.   Neck: Normal range of motion. Neck supple. No thyromegaly present.   Cardiovascular: Normal rate, regular rhythm and normal heart sounds.  Exam reveals no gallop and no friction rub.    No murmur heard.  Pulmonary/Chest: Effort normal and breath sounds normal. She has no wheezes. She has no rales.   Musculoskeletal: She exhibits no edema or tenderness.        Lumbar back: She exhibits pain.   Neurological: She is alert and oriented to person, place, and time. She displays normal reflexes.   Skin: Skin is warm and dry. No rash noted. She is not diaphoretic.   Psychiatric: She has a normal mood and affect. Her behavior is normal. Judgment and thought content normal.   Nursing note and vitals reviewed.              Assessment/Plan:     1. Acute respiratory failure with hypoxia (CMS-[facility])  Patient was advised to use her oxygen at night for as long as possible as well as for any exertional activity. She has no further problems with nausea and vomiting.    2. Aortic stenosis, moderate  With new diagnosis of moderate aortic stenosis, I recommended a follow-up with the valve specialist at cardiology to discuss treatment options.  - REFERRAL TO CARDIOLOGY    3. Essential hypertension  Blood pressure remains controlled on current medicines.    4. Controlled type 2 diabetes mellitus without complication, without long-term current use of insulin (CMS-HCC)  Hemoglobin A1c is good at 6.6 but I reminded patient she is due for her 6 month follow-up with endocrinology.    5. Chronic midline low back pain without sciatica  I again reminded patient I cannot provide long-term pain medication and she declines going to pain management. I will give her a prescription for Norco for acute pain for up to 2 weeks. She again declines physical therapy or any intervention otherwise. She signed a opiate consent form today and  was  appropriate.  - HYDROcodone-acetaminophen (NORCO) 7.5-325 MG per tablet; Take 1 Tab by mouth every 8 hours as needed for up to 14 days.  Dispense: 30 Tab; Refill: 0  - CONSENT FOR OPIATE PRESCRIPTION    6. Postoperative hypothyroidism  TSH therapeutic on current dose of medicine.    7. Papillary thyroid carcinoma (CMS-HCC)  Patient reminded to follow-up with endocrinology.    8. Abdominal aortic aneurysm without rupture (CMS-HCC)  Patient continues to follow with the vascular clinic which is providing her Coumadin and doing regular imaging.

## 2018-03-09 ENCOUNTER — TELEPHONE (OUTPATIENT)
Dept: MEDICAL GROUP | Facility: MEDICAL CENTER | Age: 78
End: 2018-03-09

## 2018-03-09 NOTE — TELEPHONE ENCOUNTER
1. Caller Name: Blaire ([employer])                                         Call Back Number:       Patient approves a detailed voicemail message: N\A    Can you please write an order to discontinue Oxygen with [facility]?

## 2018-03-13 DIAGNOSIS — Z79.01 CHRONIC ANTICOAGULATION: ICD-10-CM

## 2018-03-16 ENCOUNTER — OFFICE VISIT (OUTPATIENT)
Dept: URGENT CARE | Facility: CLINIC | Age: 78
End: 2018-03-16
Payer: MEDICARE

## 2018-03-16 VITALS
HEART RATE: 75 BPM | RESPIRATION RATE: 16 BRPM | WEIGHT: 194 LBS | SYSTOLIC BLOOD PRESSURE: 100 MMHG | DIASTOLIC BLOOD PRESSURE: 64 MMHG | TEMPERATURE: 98.8 F | OXYGEN SATURATION: 90 % | BODY MASS INDEX: 33.3 KG/M2

## 2018-03-16 DIAGNOSIS — E11.9 CONTROLLED TYPE 2 DIABETES MELLITUS WITHOUT COMPLICATION, WITHOUT LONG-TERM CURRENT USE OF INSULIN (HCC): ICD-10-CM

## 2018-03-16 DIAGNOSIS — Z79.01 CHRONIC ANTICOAGULATION: ICD-10-CM

## 2018-03-16 DIAGNOSIS — J11.1 FLU: ICD-10-CM

## 2018-03-16 DIAGNOSIS — J20.9 ACUTE WHEEZY BRONCHITIS: ICD-10-CM

## 2018-03-16 LAB
FLUAV+FLUBV AG SPEC QL IA: NORMAL
GLUCOSE BLD-MCNC: 69 MG/DL (ref 70–100)
INT CON NEG: NORMAL
INT CON POS: NORMAL

## 2018-03-16 PROCEDURE — 82962 GLUCOSE BLOOD TEST: CPT | Performed by: FAMILY MEDICINE

## 2018-03-16 PROCEDURE — 87804 INFLUENZA ASSAY W/OPTIC: CPT | Performed by: FAMILY MEDICINE

## 2018-03-16 PROCEDURE — 99214 OFFICE O/P EST MOD 30 MIN: CPT | Performed by: FAMILY MEDICINE

## 2018-03-16 RX ORDER — ALBUTEROL SULFATE 90 UG/1
2 AEROSOL, METERED RESPIRATORY (INHALATION) EVERY 6 HOURS PRN
Qty: 8.5 G | Refills: 3 | Status: SHIPPED | OUTPATIENT
Start: 2018-03-16 | End: 2018-11-25 | Stop reason: CLARIF

## 2018-03-16 RX ORDER — OSELTAMIVIR PHOSPHATE 75 MG/1
75 CAPSULE ORAL EVERY 12 HOURS
Qty: 10 CAP | Refills: 0 | Status: SHIPPED | OUTPATIENT
Start: 2018-03-16 | End: 2018-03-21

## 2018-03-16 RX ORDER — IPRATROPIUM BROMIDE AND ALBUTEROL SULFATE 2.5; .5 MG/3ML; MG/3ML
3 SOLUTION RESPIRATORY (INHALATION) ONCE
Status: COMPLETED | OUTPATIENT
Start: 2018-03-16 | End: 2018-03-16

## 2018-03-16 RX ORDER — AZITHROMYCIN 250 MG/1
TABLET, FILM COATED ORAL
Qty: 6 TAB | Refills: 0 | Status: SHIPPED | OUTPATIENT
Start: 2018-03-16 | End: 2018-03-21

## 2018-03-16 RX ADMIN — IPRATROPIUM BROMIDE AND ALBUTEROL SULFATE 3 ML: 2.5; .5 SOLUTION RESPIRATORY (INHALATION) at 12:27

## 2018-03-16 ASSESSMENT — ENCOUNTER SYMPTOMS
ORTHOPNEA: 0
COUGH: 1
CHILLS: 0
FOCAL WEAKNESS: 0
FEVER: 0
DIZZINESS: 0
WHEEZING: 1

## 2018-03-16 NOTE — PROGRESS NOTES
Subjective:      Sanjuanita Sosa is a 78 y.o. female who presents with Cough (C/o Chest congestion, SOB x5 days)    Chief Complaint   Patient presents with   • Cough     C/o Chest congestion, SOB x5 days        - This is a very pleasant 78 y.o. female with complaints of 5 days w/ cough wheezing and increased sputum w/ malaise. No NVFC. Says sometimes gets a little woozy if stands up to quick. She is eating drinking well             ALLERGIES:  Aspirin and Penicillins     PMH:  Past Medical History:   Diagnosis Date   • AAA (abdominal aortic aneurysm) (CMS-HCC)    • Anticoagulation monitoring, special range    • Arrhythmia    • Arthritis    • Autoimmune hepatitis (CMS-HCC) 3/19/2012   • Blood clotting disorder (CMS-[facility]) 2015    clot in leg and lung   • Breath shortness    • Cancer (CMS-[facility])     thyroid   • Cataract     left eye needs surgery   • Diabetes     pre-diabetic   • Disorder of thyroid 2016    thyroid cancer   • Gallstones    • H/O: HTN (hypertension) 3/19/2012   • Heart valve disease    • Hepatitis B    • Hiatus hernia syndrome    • Hyperlipidemia    • Hypertension    • Kidney disease    • Mixed hyperlipidemia 3/19/2012   • Murmur 3/19/2012   • Nonspecific abnormal electrocardiogram (ECG) (EKG) 3/19/2012   • Overweight(278.02) 3/19/2012   • Pain    • Palpitations    • Preoperative cardiovascular examination 3/19/2012   • Unspecified urinary incontinence         MEDS:    Current Outpatient Prescriptions:   •  oseltamivir (TAMIFLU) 75 MG Cap, Take 1 Cap by mouth every 12 hours for 5 days., Disp: 10 Cap, Rfl: 0  •  azithromycin (ZITHROMAX) 250 MG Tab, Use as directed, Disp: 6 Tab, Rfl: 0  •  albuterol 108 (90 Base) MCG/ACT Aero Soln inhalation aerosol, Inhale 2 Puffs by mouth every 6 hours as needed for Shortness of Breath., Disp: 8.5 g, Rfl: 3  •  omeprazole (PRILOSEC) 20 MG delayed-release capsule, Take 1 Cap by mouth every day., Disp: 30 Cap, Rfl: 11  •  warfarin (COUMADIN) 5 MG Tab, Take one to one  and one-half (1-1.5) tablets daily as directed by [facility], Disp: 135 Tab, Rfl: 1  •  amlodipine (NORVASC) 10 MG Tab, TAKE ONE TABLET BY MOUTH ONCE A DAY, Disp: 90 Tab, Rfl: 2  •  calcium carbonate (OS-MARKELL 500) 500 MG Tab, TAKE ONE TABLET BY MOUTH TWICE DAILY WITH MEALS, Disp: 60 Tab, Rfl: 11  •  fexofenadine (ALLEGRA) 180 MG tablet, Take 1 Tab by mouth every day., Disp: 30 Tab, Rfl:   •  Misc. Devices Misc, Use one test strip twice daily, Disp: 60 Each, Rfl: 11  •  levothyroxine (SYNTHROID) 137 MCG Tab, Take 1 Tab by mouth Every morning on an empty stomach., Disp: 30 Tab, Rfl: 11  •  trazodone (DESYREL) 100 MG Tab, Take 1.5 Tabs by mouth 1 time daily as needed for Sleep., Disp: 45 Tab, Rfl: 11  •  glimepiride (AMARYL) 2 MG Tab, TAKE  ONE TABLET BY MOUTH EVERY MORNING, Disp: 30 Tab, Rfl: 11  •  metformin (GLUCOPHAGE) 500 MG Tab, TAKE 2 TABLETS BY MOUTH EACH MORNING WITH  BREAKFAST AND TAKE 1 TABLET EACH EVENING   WITH DINNER, Disp: 90 Tab, Rfl: 11  •  vitamin D, Ergocalciferol, (DRISDOL) 60213 UNITS Cap capsule, Take 1 Cap by mouth every 7 days., Disp: 4 Cap, Rfl: 11  •  losartan (COZAAR) 50 MG Tab, TAKE ONE TABLET BY MOUTH EVERY DAY, Disp: 30 Tab, Rfl: 10  •  atorvastatin (LIPITOR) 80 MG tablet, Take 0.5 Tabs by mouth every evening., Disp: 30 Tab, Rfl: 11  •  MICROLET LANCETS Misc, TEST BLOOD SUGAR TWO TIMES DAILY, Disp: 100 Each, Rfl: 11  •  docusate sodium 100 MG Cap, Take 100 mg by mouth every morning., Disp: 30 Cap, Rfl: 0  •  Misc. Devices Misc, Please cancel oxygen per patient request., Disp: 1 Each, Rfl: 11  •  HYDROcodone-acetaminophen (NORCO) 7.5-325 MG per tablet, Take 1 Tab by mouth every 8 hours as needed for up to 14 days., Disp: 30 Tab, Rfl: 0  No current facility-administered medications for this visit.     Facility-Administered Medications Ordered in Other Visits:   •  iodixanol (VISIPAQUE) SOLN, , , Intra-Op Once PRN, Shekhar Rosado M.D., 10 mL at 01/18/17 0619    ** I  have documented what I find to be significant in regards to past medical, social, family and surgical history  in my HPI or under PMH/PSH/FH review section, otherwise it is contributory **           HPI    Review of Systems   Constitutional: Negative for chills and fever.   HENT: Positive for congestion.    Respiratory: Positive for cough and wheezing.    Cardiovascular: Negative for chest pain and orthopnea.   Neurological: Negative for dizziness and focal weakness.          Objective:     /64   Pulse 75   Temp 37.1 °C (98.8 °F)   Resp 16   Wt 88 kg (194 lb)   LMP 10/12/1970   SpO2 90%   BMI 33.30 kg/m²    91% RA  Physical Exam   Constitutional: She appears well-developed. No distress.   HENT:   Head: Normocephalic and atraumatic.   Mouth/Throat: Oropharynx is clear and moist.   Eyes: Conjunctivae are normal.   Neck: Neck supple.   Cardiovascular: Regular rhythm.    No murmur heard.  Pulmonary/Chest: Effort normal. No respiratory distress. She has wheezes.   Neurological: She is alert. She exhibits normal muscle tone.   Skin: Skin is warm and dry.   Psychiatric: She has a normal mood and affect. Judgment normal.   Nursing note and vitals reviewed.              Assessment/Plan:         1. Controlled type 2 diabetes mellitus without complication, without long-term current use of insulin (CMS-[facility])  POCT Glucose   2. Acute wheezy bronchitis  POCT Influenza A/B    ipratropium-albuterol (DUONEB) nebulizer solution 3 mL    DX-CHEST-2 VIEWS    azithromycin (ZITHROMAX) 250 MG Tab    albuterol 108 (90 Base) MCG/ACT Aero Soln inhalation aerosol   3. Flu  oseltamivir (TAMIFLU) 75 MG Cap   4. Chronic anticoagulation         - rest hydrate        Dx & d/c instructions discussed w/ patient and/or family members. Follow up w/ Prvt Dr or here in 2 days if not getting better, sooner if needed,  ER if worse and UC/PCP unavailable.        Possible side effects (i.e. Rash, GI upset/constipation, sedation, elevation of BP  or sugars) of any medications given discussed.

## 2018-03-21 ENCOUNTER — APPOINTMENT (OUTPATIENT)
Dept: VASCULAR LAB | Facility: MEDICAL CENTER | Age: 78
End: 2018-03-21
Payer: MEDICARE

## 2018-03-21 ENCOUNTER — RESOLUTE PROFESSIONAL BILLING HOSPITAL PROF FEE (OUTPATIENT)
Dept: HOSPITALIST | Facility: MEDICAL CENTER | Age: 78
End: 2018-03-21
Payer: MEDICARE

## 2018-03-21 ENCOUNTER — PATIENT OUTREACH (OUTPATIENT)
Dept: HEALTH INFORMATION MANAGEMENT | Facility: OTHER | Age: 78
End: 2018-03-21

## 2018-03-21 ENCOUNTER — APPOINTMENT (OUTPATIENT)
Dept: RADIOLOGY | Facility: MEDICAL CENTER | Age: 78
DRG: 093 | End: 2018-03-21
Attending: HOSPITALIST
Payer: MEDICARE

## 2018-03-21 ENCOUNTER — APPOINTMENT (OUTPATIENT)
Dept: RADIOLOGY | Facility: MEDICAL CENTER | Age: 78
DRG: 093 | End: 2018-03-21
Attending: EMERGENCY MEDICINE
Payer: MEDICARE

## 2018-03-21 ENCOUNTER — HOSPITAL ENCOUNTER (INPATIENT)
Facility: MEDICAL CENTER | Age: 78
LOS: 4 days | DRG: 093 | End: 2018-03-26
Attending: EMERGENCY MEDICINE | Admitting: HOSPITALIST
Payer: MEDICARE

## 2018-03-21 DIAGNOSIS — R26.2 INABILITY TO AMBULATE DUE TO RIGHT HIP: ICD-10-CM

## 2018-03-21 DIAGNOSIS — J96.01 ACUTE RESPIRATORY FAILURE WITH HYPOXIA (HCC): ICD-10-CM

## 2018-03-21 DIAGNOSIS — M54.50 CHRONIC MIDLINE LOW BACK PAIN WITHOUT SCIATICA: ICD-10-CM

## 2018-03-21 DIAGNOSIS — G89.29 CHRONIC MIDLINE LOW BACK PAIN WITHOUT SCIATICA: ICD-10-CM

## 2018-03-21 DIAGNOSIS — Z86.711 HX PULMONARY EMBOLISM: ICD-10-CM

## 2018-03-21 DIAGNOSIS — J10.1 INFLUENZA B: ICD-10-CM

## 2018-03-21 DIAGNOSIS — M25.551 ACUTE RIGHT HIP PAIN: ICD-10-CM

## 2018-03-21 DIAGNOSIS — M25.559 ARTHRALGIA OF HIP, UNSPECIFIED LATERALITY: Chronic | ICD-10-CM

## 2018-03-21 DIAGNOSIS — J44.9 CHRONIC OBSTRUCTIVE PULMONARY DISEASE, UNSPECIFIED COPD TYPE (HCC): ICD-10-CM

## 2018-03-21 PROBLEM — D72.829 LEUKOCYTOSIS: Status: ACTIVE | Noted: 2018-03-21

## 2018-03-21 PROBLEM — D64.9 ANEMIA: Status: ACTIVE | Noted: 2018-03-21

## 2018-03-21 PROBLEM — J11.1 INFLUENZA: Status: ACTIVE | Noted: 2018-03-21

## 2018-03-21 LAB
ALBUMIN SERPL BCP-MCNC: 3.7 G/DL (ref 3.2–4.9)
ALBUMIN/GLOB SERPL: 1.2 G/DL
ALP SERPL-CCNC: 62 U/L (ref 30–99)
ALT SERPL-CCNC: 11 U/L (ref 2–50)
ANION GAP SERPL CALC-SCNC: 12 MMOL/L (ref 0–11.9)
AST SERPL-CCNC: 14 U/L (ref 12–45)
BASOPHILS # BLD AUTO: 0.6 % (ref 0–1.8)
BASOPHILS # BLD: 0.08 K/UL (ref 0–0.12)
BILIRUB SERPL-MCNC: 0.7 MG/DL (ref 0.1–1.5)
BUN SERPL-MCNC: 22 MG/DL (ref 8–22)
CALCIUM SERPL-MCNC: 8.9 MG/DL (ref 8.5–10.5)
CHLORIDE SERPL-SCNC: 103 MMOL/L (ref 96–112)
CO2 SERPL-SCNC: 23 MMOL/L (ref 20–33)
CREAT SERPL-MCNC: 1.04 MG/DL (ref 0.5–1.4)
CRP SERPL HS-MCNC: 0.32 MG/DL (ref 0–0.75)
EOSINOPHIL # BLD AUTO: 1.01 K/UL (ref 0–0.51)
EOSINOPHIL NFR BLD: 7.2 % (ref 0–6.9)
ERYTHROCYTE [DISTWIDTH] IN BLOOD BY AUTOMATED COUNT: 49.9 FL (ref 35.9–50)
ERYTHROCYTE [SEDIMENTATION RATE] IN BLOOD BY WESTERGREN METHOD: 22 MM/HOUR (ref 0–30)
EST. AVERAGE GLUCOSE BLD GHB EST-MCNC: 137 MG/DL
GLOBULIN SER CALC-MCNC: 3.2 G/DL (ref 1.9–3.5)
GLUCOSE BLD-MCNC: 147 MG/DL (ref 65–99)
GLUCOSE BLD-MCNC: 165 MG/DL (ref 65–99)
GLUCOSE BLD-MCNC: 178 MG/DL (ref 65–99)
GLUCOSE BLD-MCNC: 99 MG/DL (ref 65–99)
GLUCOSE SERPL-MCNC: 104 MG/DL (ref 65–99)
HBA1C MFR BLD: 6.4 % (ref 0–5.6)
HCT VFR BLD AUTO: 35.8 % (ref 37–47)
HGB BLD-MCNC: 11.4 G/DL (ref 12–16)
IMM GRANULOCYTES # BLD AUTO: 0.07 K/UL (ref 0–0.11)
IMM GRANULOCYTES NFR BLD AUTO: 0.5 % (ref 0–0.9)
INR PPP: 4.4 (ref 0.87–1.13)
LYMPHOCYTES # BLD AUTO: 5.06 K/UL (ref 1–4.8)
LYMPHOCYTES NFR BLD: 36.1 % (ref 22–41)
MCH RBC QN AUTO: 26.6 PG (ref 27–33)
MCHC RBC AUTO-ENTMCNC: 31.8 G/DL (ref 33.6–35)
MCV RBC AUTO: 83.6 FL (ref 81.4–97.8)
MONOCYTES # BLD AUTO: 1.61 K/UL (ref 0–0.85)
MONOCYTES NFR BLD AUTO: 11.5 % (ref 0–13.4)
NEUTROPHILS # BLD AUTO: 6.19 K/UL (ref 2–7.15)
NEUTROPHILS NFR BLD: 44.1 % (ref 44–72)
NRBC # BLD AUTO: 0 K/UL
NRBC BLD-RTO: 0 /100 WBC
PLATELET # BLD AUTO: 464 K/UL (ref 164–446)
PMV BLD AUTO: 9.3 FL (ref 9–12.9)
POTASSIUM SERPL-SCNC: 4.1 MMOL/L (ref 3.6–5.5)
PROT SERPL-MCNC: 6.9 G/DL (ref 6–8.2)
PROTHROMBIN TIME: 41.8 SEC (ref 12–14.6)
RBC # BLD AUTO: 4.28 M/UL (ref 4.2–5.4)
SODIUM SERPL-SCNC: 138 MMOL/L (ref 135–145)
TSH SERPL DL<=0.005 MIU/L-ACNC: 2.19 UIU/ML (ref 0.38–5.33)
WBC # BLD AUTO: 14 K/UL (ref 4.8–10.8)

## 2018-03-21 PROCEDURE — 85652 RBC SED RATE AUTOMATED: CPT

## 2018-03-21 PROCEDURE — 80053 COMPREHEN METABOLIC PANEL: CPT

## 2018-03-21 PROCEDURE — 99220 PR INITIAL OBSERVATION CARE,LEVL III: CPT | Performed by: HOSPITALIST

## 2018-03-21 PROCEDURE — 700102 HCHG RX REV CODE 250 W/ 637 OVERRIDE(OP): Performed by: EMERGENCY MEDICINE

## 2018-03-21 PROCEDURE — 86140 C-REACTIVE PROTEIN: CPT

## 2018-03-21 PROCEDURE — 82962 GLUCOSE BLOOD TEST: CPT | Mod: 91

## 2018-03-21 PROCEDURE — G0378 HOSPITAL OBSERVATION PER HR: HCPCS

## 2018-03-21 PROCEDURE — 99285 EMERGENCY DEPT VISIT HI MDM: CPT

## 2018-03-21 PROCEDURE — 73700 CT LOWER EXTREMITY W/O DYE: CPT | Mod: RT

## 2018-03-21 PROCEDURE — 96374 THER/PROPH/DIAG INJ IV PUSH: CPT

## 2018-03-21 PROCEDURE — A9270 NON-COVERED ITEM OR SERVICE: HCPCS | Performed by: HOSPITALIST

## 2018-03-21 PROCEDURE — A9270 NON-COVERED ITEM OR SERVICE: HCPCS | Performed by: EMERGENCY MEDICINE

## 2018-03-21 PROCEDURE — 96372 THER/PROPH/DIAG INJ SC/IM: CPT

## 2018-03-21 PROCEDURE — 73501 X-RAY EXAM HIP UNI 1 VIEW: CPT | Mod: RT

## 2018-03-21 PROCEDURE — 84443 ASSAY THYROID STIM HORMONE: CPT

## 2018-03-21 PROCEDURE — 85610 PROTHROMBIN TIME: CPT

## 2018-03-21 PROCEDURE — 700102 HCHG RX REV CODE 250 W/ 637 OVERRIDE(OP): Performed by: HOSPITALIST

## 2018-03-21 PROCEDURE — 96376 TX/PRO/DX INJ SAME DRUG ADON: CPT

## 2018-03-21 PROCEDURE — 302255 BARRIER CREAM MOISTURE BAZA PROTECT (ZINC) 5OZ: Performed by: HOSPITALIST

## 2018-03-21 PROCEDURE — 83036 HEMOGLOBIN GLYCOSYLATED A1C: CPT

## 2018-03-21 PROCEDURE — 87040 BLOOD CULTURE FOR BACTERIA: CPT

## 2018-03-21 PROCEDURE — 700111 HCHG RX REV CODE 636 W/ 250 OVERRIDE (IP): Performed by: HOSPITALIST

## 2018-03-21 PROCEDURE — 85025 COMPLETE CBC W/AUTO DIFF WBC: CPT

## 2018-03-21 RX ORDER — AZITHROMYCIN 250 MG/1
250-500 TABLET, FILM COATED ORAL DAILY
Status: ON HOLD | COMMUNITY
Start: 2018-03-16 | End: 2018-03-24

## 2018-03-21 RX ORDER — BISACODYL 10 MG
10 SUPPOSITORY, RECTAL RECTAL
Status: DISCONTINUED | OUTPATIENT
Start: 2018-03-21 | End: 2018-03-26 | Stop reason: HOSPADM

## 2018-03-21 RX ORDER — LOSARTAN POTASSIUM 50 MG/1
50 TABLET ORAL
Status: DISCONTINUED | OUTPATIENT
Start: 2018-03-21 | End: 2018-03-26 | Stop reason: HOSPADM

## 2018-03-21 RX ORDER — HEPARIN SODIUM 5000 [USP'U]/ML
5000 INJECTION, SOLUTION INTRAVENOUS; SUBCUTANEOUS EVERY 8 HOURS
Status: DISCONTINUED | OUTPATIENT
Start: 2018-03-21 | End: 2018-03-21

## 2018-03-21 RX ORDER — ONDANSETRON 2 MG/ML
4 INJECTION INTRAMUSCULAR; INTRAVENOUS EVERY 4 HOURS PRN
Status: DISCONTINUED | OUTPATIENT
Start: 2018-03-21 | End: 2018-03-26 | Stop reason: HOSPADM

## 2018-03-21 RX ORDER — DEXTROSE MONOHYDRATE 25 G/50ML
25 INJECTION, SOLUTION INTRAVENOUS
Status: DISCONTINUED | OUTPATIENT
Start: 2018-03-21 | End: 2018-03-26 | Stop reason: HOSPADM

## 2018-03-21 RX ORDER — HYDROMORPHONE HYDROCHLORIDE 2 MG/ML
0.5 INJECTION, SOLUTION INTRAMUSCULAR; INTRAVENOUS; SUBCUTANEOUS
Status: DISCONTINUED | OUTPATIENT
Start: 2018-03-21 | End: 2018-03-26 | Stop reason: HOSPADM

## 2018-03-21 RX ORDER — WARFARIN SODIUM 5 MG/1
5-7.5 TABLET ORAL EVERY EVENING
COMMUNITY
End: 2019-03-26 | Stop reason: SDUPTHER

## 2018-03-21 RX ORDER — AMLODIPINE BESYLATE 10 MG/1
10 TABLET ORAL
Status: DISCONTINUED | OUTPATIENT
Start: 2018-03-21 | End: 2018-03-26 | Stop reason: HOSPADM

## 2018-03-21 RX ORDER — POLYETHYLENE GLYCOL 3350 17 G/17G
1 POWDER, FOR SOLUTION ORAL
Status: DISCONTINUED | OUTPATIENT
Start: 2018-03-21 | End: 2018-03-26 | Stop reason: HOSPADM

## 2018-03-21 RX ORDER — ONDANSETRON 4 MG/1
4 TABLET, ORALLY DISINTEGRATING ORAL EVERY 4 HOURS PRN
Status: DISCONTINUED | OUTPATIENT
Start: 2018-03-21 | End: 2018-03-26 | Stop reason: HOSPADM

## 2018-03-21 RX ORDER — OSELTAMIVIR PHOSPHATE 75 MG/1
75 CAPSULE ORAL 2 TIMES DAILY
Status: ON HOLD | COMMUNITY
Start: 2018-03-16 | End: 2018-03-24

## 2018-03-21 RX ORDER — AMOXICILLIN 250 MG
2 CAPSULE ORAL 2 TIMES DAILY
Status: DISCONTINUED | OUTPATIENT
Start: 2018-03-21 | End: 2018-03-26 | Stop reason: HOSPADM

## 2018-03-21 RX ORDER — OXYCODONE HYDROCHLORIDE 5 MG/1
5 TABLET ORAL
Status: DISCONTINUED | OUTPATIENT
Start: 2018-03-21 | End: 2018-03-26 | Stop reason: HOSPADM

## 2018-03-21 RX ORDER — OSELTAMIVIR PHOSPHATE 75 MG/1
75 CAPSULE ORAL EVERY 12 HOURS
Status: DISPENSED | OUTPATIENT
Start: 2018-03-21 | End: 2018-03-22

## 2018-03-21 RX ORDER — OXYCODONE HYDROCHLORIDE 10 MG/1
10 TABLET ORAL
Status: DISCONTINUED | OUTPATIENT
Start: 2018-03-21 | End: 2018-03-26 | Stop reason: HOSPADM

## 2018-03-21 RX ORDER — OMEPRAZOLE 20 MG/1
20 CAPSULE, DELAYED RELEASE ORAL
Status: DISCONTINUED | OUTPATIENT
Start: 2018-03-21 | End: 2018-03-26 | Stop reason: HOSPADM

## 2018-03-21 RX ORDER — ATORVASTATIN CALCIUM 40 MG/1
40 TABLET, FILM COATED ORAL EVERY EVENING
Status: DISCONTINUED | OUTPATIENT
Start: 2018-03-21 | End: 2018-03-26 | Stop reason: HOSPADM

## 2018-03-21 RX ORDER — FEXOFENADINE HCL 60 MG/1
180 TABLET, FILM COATED ORAL DAILY
Status: DISCONTINUED | OUTPATIENT
Start: 2018-03-21 | End: 2018-03-26 | Stop reason: HOSPADM

## 2018-03-21 RX ORDER — LEVOTHYROXINE SODIUM 0.15 MG/1
150 TABLET ORAL
Status: DISCONTINUED | OUTPATIENT
Start: 2018-03-21 | End: 2018-03-26 | Stop reason: HOSPADM

## 2018-03-21 RX ORDER — HYDROCODONE BITARTRATE AND ACETAMINOPHEN 5; 325 MG/1; MG/1
1 TABLET ORAL ONCE
Status: COMPLETED | OUTPATIENT
Start: 2018-03-21 | End: 2018-03-21

## 2018-03-21 RX ADMIN — LEVOTHYROXINE SODIUM 150 MCG: 150 TABLET ORAL at 08:57

## 2018-03-21 RX ADMIN — ATORVASTATIN CALCIUM 40 MG: 40 TABLET, FILM COATED ORAL at 20:56

## 2018-03-21 RX ADMIN — INSULIN HUMAN 1 UNITS: 100 INJECTION, SOLUTION PARENTERAL at 20:59

## 2018-03-21 RX ADMIN — HYDROCODONE BITARTRATE AND ACETAMINOPHEN 1 TABLET: 5; 325 TABLET ORAL at 03:28

## 2018-03-21 RX ADMIN — OXYCODONE HYDROCHLORIDE 5 MG: 5 TABLET ORAL at 06:39

## 2018-03-21 RX ADMIN — HYDROMORPHONE HYDROCHLORIDE 0.5 MG: 2 INJECTION INTRAMUSCULAR; INTRAVENOUS; SUBCUTANEOUS at 10:11

## 2018-03-21 RX ADMIN — OXYCODONE HYDROCHLORIDE 5 MG: 5 TABLET ORAL at 21:01

## 2018-03-21 RX ADMIN — HYDROMORPHONE HYDROCHLORIDE 0.5 MG: 2 INJECTION INTRAMUSCULAR; INTRAVENOUS; SUBCUTANEOUS at 17:21

## 2018-03-21 RX ADMIN — INSULIN HUMAN 1 UNITS: 100 INJECTION, SOLUTION PARENTERAL at 13:41

## 2018-03-21 RX ADMIN — OMEPRAZOLE 20 MG: 20 CAPSULE, DELAYED RELEASE ORAL at 08:55

## 2018-03-21 ASSESSMENT — ENCOUNTER SYMPTOMS
COUGH: 1
MYALGIAS: 0
SPEECH CHANGE: 0
SENSORY CHANGE: 0
VOMITING: 0
DOUBLE VISION: 0
EYE DISCHARGE: 0
CHILLS: 0
ABDOMINAL PAIN: 0
FEVER: 0
NAUSEA: 0
FLANK PAIN: 0
BLURRED VISION: 0
BACK PAIN: 1
HEARTBURN: 0
SHORTNESS OF BREATH: 0
HALLUCINATIONS: 0
DIZZINESS: 0
HEMOPTYSIS: 0
BRUISES/BLEEDS EASILY: 0
PALPITATIONS: 0
FOCAL WEAKNESS: 0
WEAKNESS: 1
DEPRESSION: 0

## 2018-03-21 ASSESSMENT — PATIENT HEALTH QUESTIONNAIRE - PHQ9
SUM OF ALL RESPONSES TO PHQ QUESTIONS 1-9: 0
SUM OF ALL RESPONSES TO PHQ9 QUESTIONS 1 AND 2: 0
1. LITTLE INTEREST OR PLEASURE IN DOING THINGS: NOT AT ALL
2. FEELING DOWN, DEPRESSED, IRRITABLE, OR HOPELESS: NOT AT ALL

## 2018-03-21 ASSESSMENT — LIFESTYLE VARIABLES
SUBSTANCE_ABUSE: 0
EVER_SMOKED: YES
ALCOHOL_USE: NO

## 2018-03-21 ASSESSMENT — PAIN SCALES - GENERAL
PAINLEVEL_OUTOF10: 4
PAINLEVEL_OUTOF10: 10
PAINLEVEL_OUTOF10: 9
PAINLEVEL_OUTOF10: 6
PAINLEVEL_OUTOF10: 5
PAINLEVEL_OUTOF10: 5
PAINLEVEL_OUTOF10: 4

## 2018-03-21 NOTE — PROGRESS NOTES
Inpatient Anticoagulation Service Note    Date: 3/21/2018  Reason for Anticoagulation: Pulmonary Embolism  Hemoglobin Value: (!) 11.4  Hematocrit Value: (!) 35.8  Lab Platelet Value: (!) 464  Target INR: 2.0 to 3.0  INR from last 7 days     Date/Time INR Value    03/21/18 0503 (!)  4.4        Dose from last 7 days     Date/Time Dose (mg)    03/21/18 0700  0        Average Dose (mg):  (5mg MWF, 7.5mg AOD)  Significant Interactions: Statin, Thyroid Medications  Bridge Therapy: No     Comments:   Confirmed home dose with patient. Follows with [facility] (same dose per notes).  H/H stable. No signs/symptoms of bleeding. ?Hip pain?  INR supratherapeutic due to flu/azithromycin therapy as outpatient    Plan:  Hold warfarin today, Trend INR. Pharmacy will follow.  Education Material Provided?: No  Pharmacist suggested discharge dosing: To be determined      Atul Hester, PharmD, BCPS

## 2018-03-21 NOTE — ED PROVIDER NOTES
"ED Provider Note    CHIEF COMPLAINT  Chief Complaint   Patient presents with   • Hip Pain     \"Right now it's a 1/10 pain but when I move it's a 12! I am afraid I coughed so hard I coughed my hip replacement out of place.\" Denies trauma or injury to area.        HPI    Primary care provider: WENDY Baker   History obtained from: Patient  History limited by: None     Sanjuanita Sosa is a 78 y.o. female who presents to the ED by EMS from [facility] for severe sharp right hip pain for the past 12-14 hours. She does not recall any particular incident/injury/trauma. She has had a cough and was diagnosed with influenza at urgent care recently. She was started on Tamiflu and still has 3 more days to go. Patient reports right hip replacement \"many years ago.\" She is afraid that she coughs so hard that it pulled a muscle. She states that the pain is not too bad if she holds still and she is still able to stand but she is unable to move her right hip without severe pain. She reports radiation into the right groin. She denies pain anywhere else. She denies weakness or sensory change. She denies any fever. She denies shortness breath or difficulty breathing. She denies nausea/vomiting/diarrhea/constipation/dysuria. She denies past history of similar hip pain.      REVIEW OF SYSTEMS  Please see HPI for pertinent positives/negatives.     PAST MEDICAL HISTORY  Past Medical History:   Diagnosis Date   • Disorder of thyroid 2016    thyroid cancer   • Blood clotting disorder (CMS-[facility]) 2015    clot in leg and lung   • Nonspecific abnormal electrocardiogram (ECG) (EKG) 3/19/2012   • Autoimmune hepatitis (CMS-HCC) 3/19/2012   • H/O: HTN (hypertension) 3/19/2012   • Mixed hyperlipidemia 3/19/2012   • Murmur 3/19/2012   • Overweight(278.02) 3/19/2012   • Preoperative cardiovascular examination 3/19/2012   • AAA (abdominal aortic aneurysm) (CMS-[facility])    • Anticoagulation monitoring, special range    • Arrhythmia    • " Arthritis    • Breath shortness    • Cancer (CMS-HCC)     thyroid   • Cataract     left eye needs surgery   • Diabetes     pre-diabetic   • Gallstones    • Heart valve disease    • Hepatitis B    • Hiatus hernia syndrome    • Hyperlipidemia    • Hypertension    • Kidney disease    • Pain    • Palpitations    • Unspecified urinary incontinence         SURGICAL HISTORY  Past Surgical History:   Procedure Laterality Date   • DARREN BY LAPAROSCOPY  1/2/2017    Procedure: DARREN BY LAPAROSCOPY;  Surgeon: Chele Valles M.D.;  Location: SURGERY [facility];  Service:    • ERCP IN OR  12/30/2016    Procedure: ERCP IN OR;  Surgeon: Shekhar Rosado M.D.;  Location: SURGERY SAME DAY [facility];  Service:    • THYROIDECTOMY TOTAL N/A 10/12/2016    Procedure: THYROIDECTOMY TOTAL;  Surgeon: Nuno Duncan M.D.;  Location: SURGERY SAME DAY [facility];  Service:    • EXPLORATORY LAPAROTOMY  2/27/2013    Performed by Edson Craft M.D. at SURGERY [facility]   • SPLENECTOMY  2/27/2013    Performed by Edson Craft M.D. at SURGERY [facility]   • NODE DISSECTION  2/27/2013    Performed by Edson Craft M.D. at SURGERY [facility]   • OOPHORECTOMY  2/27/2013    Performed by Levi Nolan M.D. at SURGERY [facility]   • BLADDER SUSPENSION  5/27/2009    Performed by RINA HART at SURGERY SAME DAY [facility]   • CYSTOSCOPY  5/27/2009    Performed by RINA HART at SURGERY SAME DAY [facility]   • RECTOCELE REPAIR  5/27/2009    Performed by RINA HART at SURGERY SAME DAY [facility]S   • APPENDECTOMY     • HYSTERECTOMY LAPAROSCOPY     • KNEE REPLACEMENT, TOTAL     • TONSILLECTOMY          SOCIAL HISTORY  Social History     Social History Main Topics   • Smoking status: Current Every Day Smoker     Packs/day: 0.25     Years: 40.00     Types: Cigarettes   • Smokeless tobacco: Never Used   • Alcohol use No   • Drug use: No   • Sexual activity: Not Currently  "       FAMILY HISTORY  Family History   Problem Relation Age of Onset   • Hyperlipidemia Mother    • Stroke Mother    • Hyperlipidemia Father    • Stroke Father    • Heart Disease Brother      CABG        CURRENT MEDICATIONS  Home Medications    **Home medications have not yet been reviewed for this encounter**          ALLERGIES  Allergies   Allergen Reactions   • Aspirin Anaphylaxis   • Penicillins Anaphylaxis     As a child  Tolerated Rocephin 2/2018        PHYSICAL EXAM  VITAL SIGNS: /48   Pulse 61   Temp 36.7 °C (98.1 °F)   Resp 18   Ht 1.626 m (5' 4\")   Wt 88 kg (194 lb)   LMP 10/12/1970   SpO2 98%   BMI 33.30 kg/m²  @SANDRA[[id number]::@     Pulse ox interpretation: 93% I interpret this pulse ox as normal     Constitutional: Well developed, well nourished, alert in no apparent distress, nontoxic appearance    HENT: No external signs of trauma, normocephalic, oropharynx moist and clear, nose normal   Eyes: PERRL, conjunctiva without erythema, no discharge, no icterus    Neck: Soft and supple, trachea midline, no stridor, no tenderness, no LAD, no JVD, good ROM    Cardiovascular: Regular rate and rhythm, no murmurs/rubs/gallops, strong distal pulses and good perfusion    Thorax & Lungs: No respiratory distress, CTAB  Abdomen: Soft, nontender, nondistended, no guarding, no rebound, normal BS    Back: No CVAT    Extremities: No cyanosis, no edema, no gross deformity, right hip with diffuse tenderness to palpation laterally and into the right groin with limited range of motion due to pain, left hip with full active range of motion without restrictions, intact distal pulses with brisk cap refill, sensation intact to touch throughout, 5 over 5 strength equal bilateral lower extremities    Skin: Warm, dry, no pallor/cyanosis, no rash noted    Lymphatic: No lymphadenopathy noted    Neuro: A/O times 3, no focal deficits noted    Psychiatric: Cooperative, normal mood and affect, normal judgement, appropriate for " clinical situation          DIAGNOSTIC STUDIES / PROCEDURES        LABS  All labs reviewed by me.     Results for orders placed or performed in visit on 03/16/18   POCT Glucose   Result Value Ref Range    Glucose - Accu-Ck 69 (A) 70 - 100 mg/dL   POCT Influenza A/B   Result Value Ref Range    Rapid Influenza A-B positive B     Internal Control Positive Valid     Internal Control Negative Valid         RADIOLOGY  The radiologist's interpretation of all radiological studies have been reviewed by me.     DX-HIP-UNILATERAL-WITH PELVIS-1 VIEW RIGHT   Final Result         1. Status post right total hip arthroplasty without evidence of hardware complication.             COURSE & MEDICAL DECISION MAKING  Nursing notes, VS, PMSFHx reviewed in chart.     Record review shows patient with positive influenza B on March 16, 2018.      Differential diagnoses considered include but are not limited to: Fx, dislocation, subluxation, contusion, strain, sprain, bursitis, tendonitis, neuropathy, radiculopathy     0440: D/W Dr. Mccall, hospitalist, will admit the patient      Patient brought by EMS to the ED with above complaint. X-ray of the right hip without evidence of acute bony injury. Patient was given Norco with no improvement of her pain. I discussed the findings with the patient. This is a pleasant patient in no acute distress and nontoxic in appearance. Patient states that she lives by herself and due to the fact that she can't ambulate due to her severe right hip pain she does not feel that she can take care of herself if discharged. Discussed with Dr. Mccall who graciously agreed to admit patient for further care.        FINAL IMPRESSION  1. Acute right hip pain    2. Inability to ambulate due to right hip    3. Influenza B           DISPOSITION  Patient will be admitted by hospitalist for further care        Electronically signed by: Brice Dukes, 3/21/2018 3:26 AM      Portions of this record were made with voice recognition  software.  Despite my review, spelling/grammar/context errors may still remain.  Interpretation of this chart should be taken in this context.

## 2018-03-21 NOTE — ASSESSMENT & PLAN NOTE
May have chronic component.  No evidence of bleed.  Iron studies showed low iron levels and low iron saturation. We'll replace.  Monitor H&H.

## 2018-03-21 NOTE — PROGRESS NOTES
Pt arrived to unit via gurney at 0830. Pt slid from gurney to bed with 2x assist due to severe hip pain, pt reports pain worst with movement. Pt for MRI this morning, will medicate for pain prior to transport. Pt oriented to room, unit, and plan of care. Admit profile complete, no vaccines required. Tele-monitor placed. All questions answered at this time. Call light within reach, fall precautions in place, will continue to monitor.

## 2018-03-21 NOTE — ED NOTES
Med rec complete per Pt at bedside  Allergies reviewed  Pt was started on 5 day courses of Azithromycin and Tamiflu  Pt completed Azithromycin but  Has ONE tablet left of Tamiflu course

## 2018-03-21 NOTE — ASSESSMENT & PLAN NOTE
X- ray negative for fracture.  Unable to obtain MRI due to metal joint replacement.  Denies fall or trauma.  CT scan of the right hip showed surgical change consistent with right hip arthroplasty. No evidence of fracture or dislocation of the arthroplasty components.   PT/OT recommended home with home health.  Pending arrangement for home health.

## 2018-03-21 NOTE — H&P
" Hospital Medicine History and Physical    Date of Service  3/21/2018    Chief Complaint  Chief Complaint   Patient presents with   • Hip Pain     \"Right now it's a 1/10 pain but when I move it's a 12! I am afraid I coughed so hard I coughed my hip replacement out of place.\" Denies trauma or injury to area.       History of Presenting Illness  78 y.o. female who presented 3/21/2018 with right-sided sharp hip pain. Started about 12-14 hours ago. No incident or injury. She states she woke up with this pain. She was recently diagnosed with influenza urgent care started on Tamiflu. Had a right hip replacement many years ago. Coughing fits make the pain worse. Pain not too bad until she stands. Radiation deep into the groin. Denies weakness or sensory change. Denies any fever. Denies any shortness of breath or difficulty breathing. Denies any nausea vomiting diarrhea constipation dysuria.    Primary Care Physician  YUE BakerPDAYANARA.    Consultants  none    Code Status  DNR    Review of Systems  Review of Systems   Constitutional: Negative for chills and fever.   HENT: Negative for congestion, hearing loss and tinnitus.    Eyes: Negative for blurred vision, double vision and discharge.   Respiratory: Positive for cough. Negative for hemoptysis and shortness of breath.    Cardiovascular: Negative for chest pain, palpitations and leg swelling.   Gastrointestinal: Negative for abdominal pain, heartburn, nausea and vomiting.   Genitourinary: Negative for dysuria and flank pain.   Musculoskeletal: Positive for back pain and joint pain. Negative for myalgias.   Skin: Negative for rash.   Neurological: Positive for weakness. Negative for dizziness, sensory change, speech change and focal weakness.   Endo/Heme/Allergies: Negative for environmental allergies. Does not bruise/bleed easily.   Psychiatric/Behavioral: Negative for depression, hallucinations and substance abuse.        Past Medical History  Past Medical History: "   Diagnosis Date   • Disorder of thyroid 2016    thyroid cancer   • Blood clotting disorder (CMS-HCC) 2015    clot in leg and lung   • Nonspecific abnormal electrocardiogram (ECG) (EKG) 3/19/2012   • Autoimmune hepatitis (CMS-HCC) 3/19/2012   • H/O: HTN (hypertension) 3/19/2012   • Mixed hyperlipidemia 3/19/2012   • Murmur 3/19/2012   • Overweight(278.02) 3/19/2012   • Preoperative cardiovascular examination 3/19/2012   • AAA (abdominal aortic aneurysm) (CMS-HCC)    • Anticoagulation monitoring, special range    • Arrhythmia    • Arthritis    • Breath shortness    • Cancer (CMS-HCC)     thyroid   • Cataract     left eye needs surgery   • Diabetes     pre-diabetic   • Gallstones    • Heart valve disease    • Hepatitis B    • Hiatus hernia syndrome    • Hyperlipidemia    • Hypertension    • Kidney disease    • Pain    • Palpitations    • Unspecified urinary incontinence        Surgical History  Past Surgical History:   Procedure Laterality Date   • DARREN BY LAPAROSCOPY  1/2/2017    Procedure: DARREN BY LAPAROSCOPY;  Surgeon: Chele Valles M.D.;  Location: SURGERY [facility];  Service:    • ERCP IN OR  12/30/2016    Procedure: ERCP IN OR;  Surgeon: Shekhar Rosado M.D.;  Location: SURGERY SAME DAY [facility];  Service:    • THYROIDECTOMY TOTAL N/A 10/12/2016    Procedure: THYROIDECTOMY TOTAL;  Surgeon: Nuno Duncan M.D.;  Location: SURGERY SAME DAY [facility];  Service:    • EXPLORATORY LAPAROTOMY  2/27/2013    Performed by Edson Craft M.D. at SURGERY [facility]   • SPLENECTOMY  2/27/2013    Performed by Esdon Craft M.D. at SURGERY [facility]   • NODE DISSECTION  2/27/2013    Performed by Edson Craft M.D. at [facility]   • OOPHORECTOMY  2/27/2013    Performed by Levi Nolan M.D. at [facility]   • BLADDER SUSPENSION  5/27/2009    Performed by RINA HART at SURGERY SAME DAY [facility]   • CYSTOSCOPY  5/27/2009     Performed by RINA HART at SURGERY SAME DAY [facility]   • RECTOCELE REPAIR  5/27/2009    Performed by RINA HART at SURGERY SAME DAY [facility]   • APPENDECTOMY     • HYSTERECTOMY LAPAROSCOPY     • KNEE REPLACEMENT, TOTAL     • TONSILLECTOMY         Medications  Current Facility-Administered Medications on File Prior to Encounter   Medication Dose Route Frequency Provider Last Rate Last Dose   • iodixanol (VISIPAQUE) SOLN    Intra-Op Once PRN Shekhar Rosado M.D.   10 mL at 01/18/17 0619     Current Outpatient Prescriptions on File Prior to Encounter   Medication Sig Dispense Refill   • oseltamivir (TAMIFLU) 75 MG Cap Take 1 Cap by mouth every 12 hours for 5 days. 10 Cap 0   • azithromycin (ZITHROMAX) 250 MG Tab Use as directed 6 Tab 0   • albuterol 108 (90 Base) MCG/ACT Aero Soln inhalation aerosol Inhale 2 Puffs by mouth every 6 hours as needed for Shortness of Breath. 8.5 g 3   • Misc. Devices Misc Please cancel oxygen per patient request. 1 Each 11   • HYDROcodone-acetaminophen (NORCO) 7.5-325 MG per tablet Take 1 Tab by mouth every 8 hours as needed for up to 14 days. 30 Tab 0   • omeprazole (PRILOSEC) 20 MG delayed-release capsule Take 1 Cap by mouth every day. 30 Cap 11   • warfarin (COUMADIN) 5 MG Tab Take one to one and one-half (1-1.5) tablets daily as directed by [facility] 135 Tab 1   • amlodipine (NORVASC) 10 MG Tab TAKE ONE TABLET BY MOUTH ONCE A DAY 90 Tab 2   • calcium carbonate (OS-MARKELL 500) 500 MG Tab TAKE ONE TABLET BY MOUTH TWICE DAILY WITH MEALS 60 Tab 11   • fexofenadine (ALLEGRA) 180 MG tablet Take 1 Tab by mouth every day. 30 Tab    • Misc. Devices Misc Use one test strip twice daily 60 Each 11   • levothyroxine (SYNTHROID) 137 MCG Tab Take 1 Tab by mouth Every morning on an empty stomach. 30 Tab 11   • trazodone (DESYREL) 100 MG Tab Take 1.5 Tabs by mouth 1 time daily as needed for Sleep. 45 Tab 11   • glimepiride (AMARYL) 2 MG Tab TAKE  ONE TABLET  BY MOUTH EVERY MORNING 30 Tab 11   • metformin (GLUCOPHAGE) 500 MG Tab TAKE 2 TABLETS BY MOUTH EACH MORNING WITH  BREAKFAST AND TAKE 1 TABLET EACH EVENING   WITH DINNER 90 Tab 11   • vitamin D, Ergocalciferol, (DRISDOL) 77512 UNITS Cap capsule Take 1 Cap by mouth every 7 days. 4 Cap 11   • losartan (COZAAR) 50 MG Tab TAKE ONE TABLET BY MOUTH EVERY DAY 30 Tab 10   • atorvastatin (LIPITOR) 80 MG tablet Take 0.5 Tabs by mouth every evening. 30 Tab 11   • MICROLET LANCETS Misc TEST BLOOD SUGAR TWO TIMES DAILY 100 Each 11   • docusate sodium 100 MG Cap Take 100 mg by mouth every morning. 30 Cap 0       Family History  Family History   Problem Relation Age of Onset   • Hyperlipidemia Mother    • Stroke Mother    • Hyperlipidemia Father    • Stroke Father    • Heart Disease Brother      CABG       Social History  Social History   Substance Use Topics   • Smoking status: Current Every Day Smoker     Packs/day: 0.25     Years: 40.00     Types: Cigarettes   • Smokeless tobacco: Never Used   • Alcohol use No       Allergies  Allergies   Allergen Reactions   • Aspirin Anaphylaxis   • Penicillins Anaphylaxis     As a child  Tolerated Rocephin 2018        Physical Exam  Laboratory   Hemodynamics  Temp (24hrs), Av.7 °C (98.1 °F), Min:36.7 °C (98.1 °F), Max:36.7 °C (98.1 °F)   Temperature: 36.7 °C (98.1 °F)  Pulse  Av  Min: 54  Max: 76 Heart Rate (Monitored): (!) 55  Blood Pressure : 143/48, NIBP: (!) 144/36      Respiratory      Respiration: 18, Pulse Oximetry: 94 %             Physical Exam   Constitutional: She is oriented to person, place, and time. She appears well-developed and well-nourished. No distress.   HENT:   Head: Normocephalic and atraumatic.   Eyes: Conjunctivae are normal. Pupils are equal, round, and reactive to light.   Neck: Normal range of motion. Neck supple. No JVD present.   Cardiovascular: Normal rate, regular rhythm, normal heart sounds and intact distal pulses.    No murmur  heard.  Pulmonary/Chest: Effort normal and breath sounds normal. No respiratory distress.   Abdominal: Soft. Bowel sounds are normal. She exhibits no distension. There is no tenderness.   Musculoskeletal:   Right hip pain ttp, reduced Rom 2/2 to pain   Neurological: She is alert and oriented to person, place, and time. She exhibits normal muscle tone.   Skin: Skin is warm and dry. No erythema.   Psychiatric: She has a normal mood and affect. Her behavior is normal. Judgment and thought content normal.   Nursing note and vitals reviewed.              No results for input(s): ALTSGPT, ASTSGOT, ALKPHOSPHAT, TBILIRUBIN, DBILIRUBIN, GAMMAGT, AMYLASE, LIPASE, ALB, PREALBUMIN, GLUCOSE in the last 72 hours.              Lab Results   Component Value Date    TROPONINI 0.07 (H) 10/07/2015     Urinalysis:    Lab Results  Component Value Date/Time   SPECGRAVITY >1.045 02/26/2018 0010   GLUCOSEUR Negative 02/26/2018 0010   KETONES Trace (A) 02/26/2018 0010   NITRITE Negative 02/26/2018 0010   WBCURINE 20-50 (A) 02/26/2018 0010   RBCURINE 2-5 (A) 02/26/2018 0010   BACTERIA Few (A) 02/26/2018 0010   EPITHELCELL Negative 02/26/2018 0010        Imaging  reviewed   Assessment/Plan     I anticipate this patient is appropriate for observation status at this time.    * Hip pain   Assessment & Plan    Right sided no acute trauma   Right hip xray w/ out evidence of etiology   Does have some leukocytosis in setting of influenzae +  On tamiflu; will check ESR/CRP   Will order MRI hip   Treat pain   Pt/OT ordered        Leukocytosis   Assessment & Plan    2/2 to flu?   Will order esr/crp   Cultures         Anemia   Assessment & Plan    Consider lab workup         Influenza   Assessment & Plan    Cont tamiflu        Thrombocytosis (CMS-HCC)- (present on admission)   Assessment & Plan    trend        Chronic anticoagulation- (present on admission)   Assessment & Plan    Cont warfarin         Gastroesophageal reflux disease without esophagitis-  (present on admission)   Assessment & Plan    Cont ppi         Controlled type 2 diabetes mellitus without complication, without long-term current use of insulin (CMS-HCC)- (present on admission)   Assessment & Plan    SSI ordered         BMI 33.0-33.9,adult- (present on admission)   Assessment & Plan    Encourage weight loss        Essential hypertension- (present on admission)   Assessment & Plan    Cont home meds        Hx pulmonary embolism- (present on admission)   Assessment & Plan    Cont warfarin pharm dose        Dyslipidemia- (present on admission)   Assessment & Plan    Cont home statin            VTE prophylaxis: heparin

## 2018-03-21 NOTE — DISCHARGE PLANNING
Transitional Care Navigator:    Pt was recently discharged home on 3/2/18 with [facility] services.  PT/OT evaluations pending.  If it is determined that pt is safe to return home, please consider home health and F orders for discharge planning.

## 2018-03-21 NOTE — ED TRIAGE NOTES
"Sanjuanita Sosa  78 y.o. female  Chief Complaint   Patient presents with   • Hip Pain     \"Right now it's a 1/10 pain but when I move it's a 12! I am afraid I coughed so hard I coughed my hip replacement out of place.\" Denies trauma or injury to area.       BIB EMS with above cc  Pt is alert and oriented, speaking in full sentences, follows commands and responds appropriately to questions.Pt in obvious discomfort   "

## 2018-03-22 PROBLEM — M25.559 HIP PAIN: Chronic | Status: ACTIVE | Noted: 2018-03-21

## 2018-03-22 LAB
ALBUMIN SERPL BCP-MCNC: 3.4 G/DL (ref 3.2–4.9)
ALBUMIN/GLOB SERPL: 1.2 G/DL
ALP SERPL-CCNC: 72 U/L (ref 30–99)
ALT SERPL-CCNC: 14 U/L (ref 2–50)
ANION GAP SERPL CALC-SCNC: 8 MMOL/L (ref 0–11.9)
AST SERPL-CCNC: 24 U/L (ref 12–45)
BASOPHILS # BLD AUTO: 0.4 % (ref 0–1.8)
BASOPHILS # BLD: 0.04 K/UL (ref 0–0.12)
BILIRUB SERPL-MCNC: 0.7 MG/DL (ref 0.1–1.5)
BUN SERPL-MCNC: 21 MG/DL (ref 8–22)
CALCIUM SERPL-MCNC: 8.4 MG/DL (ref 8.5–10.5)
CHLORIDE SERPL-SCNC: 105 MMOL/L (ref 96–112)
CHOLEST SERPL-MCNC: 108 MG/DL (ref 100–199)
CO2 SERPL-SCNC: 24 MMOL/L (ref 20–33)
CREAT SERPL-MCNC: 1.05 MG/DL (ref 0.5–1.4)
EOSINOPHIL # BLD AUTO: 1.09 K/UL (ref 0–0.51)
EOSINOPHIL NFR BLD: 9.6 % (ref 0–6.9)
ERYTHROCYTE [DISTWIDTH] IN BLOOD BY AUTOMATED COUNT: 51.8 FL (ref 35.9–50)
ERYTHROCYTE [DISTWIDTH] IN BLOOD BY AUTOMATED COUNT: 53.4 FL (ref 35.9–50)
GLOBULIN SER CALC-MCNC: 2.8 G/DL (ref 1.9–3.5)
GLUCOSE BLD-MCNC: 124 MG/DL (ref 65–99)
GLUCOSE BLD-MCNC: 149 MG/DL (ref 65–99)
GLUCOSE BLD-MCNC: 208 MG/DL (ref 65–99)
GLUCOSE BLD-MCNC: 208 MG/DL (ref 65–99)
GLUCOSE SERPL-MCNC: 127 MG/DL (ref 65–99)
HCT VFR BLD AUTO: 30.1 % (ref 37–47)
HCT VFR BLD AUTO: 36.2 % (ref 37–47)
HDLC SERPL-MCNC: 28 MG/DL
HGB BLD-MCNC: 11.4 G/DL (ref 12–16)
HGB BLD-MCNC: 9.8 G/DL (ref 12–16)
IMM GRANULOCYTES # BLD AUTO: 0.06 K/UL (ref 0–0.11)
IMM GRANULOCYTES NFR BLD AUTO: 0.5 % (ref 0–0.9)
INR PPP: 4.43 (ref 0.87–1.13)
LDLC SERPL CALC-MCNC: 58 MG/DL
LYMPHOCYTES # BLD AUTO: 2.85 K/UL (ref 1–4.8)
LYMPHOCYTES NFR BLD: 25 % (ref 22–41)
MCH RBC QN AUTO: 27.7 PG (ref 27–33)
MCH RBC QN AUTO: 28.1 PG (ref 27–33)
MCHC RBC AUTO-ENTMCNC: 31.5 G/DL (ref 33.6–35)
MCHC RBC AUTO-ENTMCNC: 32.6 G/DL (ref 33.6–35)
MCV RBC AUTO: 86.2 FL (ref 81.4–97.8)
MCV RBC AUTO: 88.1 FL (ref 81.4–97.8)
MONOCYTES # BLD AUTO: 1.51 K/UL (ref 0–0.85)
MONOCYTES NFR BLD AUTO: 13.3 % (ref 0–13.4)
NEUTROPHILS # BLD AUTO: 5.84 K/UL (ref 2–7.15)
NEUTROPHILS NFR BLD: 51.2 % (ref 44–72)
NRBC # BLD AUTO: 0 K/UL
NRBC BLD-RTO: 0 /100 WBC
PLATELET # BLD AUTO: 358 K/UL (ref 164–446)
PLATELET # BLD AUTO: 432 K/UL (ref 164–446)
PMV BLD AUTO: 10.4 FL (ref 9–12.9)
PMV BLD AUTO: 9.8 FL (ref 9–12.9)
POTASSIUM SERPL-SCNC: 4.5 MMOL/L (ref 3.6–5.5)
PROT SERPL-MCNC: 6.2 G/DL (ref 6–8.2)
PROTHROMBIN TIME: 42 SEC (ref 12–14.6)
RBC # BLD AUTO: 3.49 M/UL (ref 4.2–5.4)
RBC # BLD AUTO: 4.11 M/UL (ref 4.2–5.4)
SODIUM SERPL-SCNC: 137 MMOL/L (ref 135–145)
TRIGL SERPL-MCNC: 112 MG/DL (ref 0–149)
WBC # BLD AUTO: 11.4 K/UL (ref 4.8–10.8)
WBC # BLD AUTO: 12.6 K/UL (ref 4.8–10.8)

## 2018-03-22 PROCEDURE — 85610 PROTHROMBIN TIME: CPT | Mod: 91

## 2018-03-22 PROCEDURE — 82962 GLUCOSE BLOOD TEST: CPT

## 2018-03-22 PROCEDURE — 700102 HCHG RX REV CODE 250 W/ 637 OVERRIDE(OP): Performed by: HOSPITALIST

## 2018-03-22 PROCEDURE — 97535 SELF CARE MNGMENT TRAINING: CPT

## 2018-03-22 PROCEDURE — A9270 NON-COVERED ITEM OR SERVICE: HCPCS | Performed by: HOSPITALIST

## 2018-03-22 PROCEDURE — 80061 LIPID PANEL: CPT

## 2018-03-22 PROCEDURE — 80053 COMPREHEN METABOLIC PANEL: CPT

## 2018-03-22 PROCEDURE — 700102 HCHG RX REV CODE 250 W/ 637 OVERRIDE(OP): Performed by: NURSE PRACTITIONER

## 2018-03-22 PROCEDURE — 51798 US URINE CAPACITY MEASURE: CPT

## 2018-03-22 PROCEDURE — 85027 COMPLETE CBC AUTOMATED: CPT

## 2018-03-22 PROCEDURE — 85025 COMPLETE CBC W/AUTO DIFF WBC: CPT

## 2018-03-22 PROCEDURE — 83550 IRON BINDING TEST: CPT

## 2018-03-22 PROCEDURE — 83540 ASSAY OF IRON: CPT

## 2018-03-22 PROCEDURE — 99225 PR SUBSEQUENT OBSERVATION CARE,LEVEL II: CPT | Performed by: HOSPITALIST

## 2018-03-22 PROCEDURE — 700111 HCHG RX REV CODE 636 W/ 250 OVERRIDE (IP): Performed by: NURSE PRACTITIONER

## 2018-03-22 PROCEDURE — 36415 COLL VENOUS BLD VENIPUNCTURE: CPT

## 2018-03-22 PROCEDURE — 700111 HCHG RX REV CODE 636 W/ 250 OVERRIDE (IP): Performed by: HOSPITALIST

## 2018-03-22 PROCEDURE — 700105 HCHG RX REV CODE 258: Performed by: INTERNAL MEDICINE

## 2018-03-22 PROCEDURE — 770006 HCHG ROOM/CARE - MED/SURG/GYN SEMI*

## 2018-03-22 PROCEDURE — 96376 TX/PRO/DX INJ SAME DRUG ADON: CPT

## 2018-03-22 PROCEDURE — A9270 NON-COVERED ITEM OR SERVICE: HCPCS | Performed by: NURSE PRACTITIONER

## 2018-03-22 PROCEDURE — 96372 THER/PROPH/DIAG INJ SC/IM: CPT

## 2018-03-22 RX ORDER — PREDNISONE 20 MG/1
20 TABLET ORAL DAILY
Status: DISCONTINUED | OUTPATIENT
Start: 2018-03-22 | End: 2018-03-26 | Stop reason: HOSPADM

## 2018-03-22 RX ORDER — SODIUM CHLORIDE 9 MG/ML
1000 INJECTION, SOLUTION INTRAVENOUS ONCE
Status: COMPLETED | OUTPATIENT
Start: 2018-03-22 | End: 2018-03-22

## 2018-03-22 RX ORDER — GABAPENTIN 100 MG/1
100 CAPSULE ORAL 3 TIMES DAILY
Status: DISCONTINUED | OUTPATIENT
Start: 2018-03-22 | End: 2018-03-26 | Stop reason: HOSPADM

## 2018-03-22 RX ADMIN — INSULIN HUMAN 2 UNITS: 100 INJECTION, SOLUTION PARENTERAL at 20:24

## 2018-03-22 RX ADMIN — OXYCODONE HYDROCHLORIDE 10 MG: 10 TABLET ORAL at 06:11

## 2018-03-22 RX ADMIN — AMLODIPINE BESYLATE 10 MG: 10 TABLET ORAL at 10:15

## 2018-03-22 RX ADMIN — GABAPENTIN 100 MG: 100 CAPSULE ORAL at 17:00

## 2018-03-22 RX ADMIN — LOSARTAN POTASSIUM 50 MG: 50 TABLET, FILM COATED ORAL at 10:16

## 2018-03-22 RX ADMIN — OXYCODONE HYDROCHLORIDE 10 MG: 10 TABLET ORAL at 13:24

## 2018-03-22 RX ADMIN — STANDARDIZED SENNA CONCENTRATE AND DOCUSATE SODIUM 2 TABLET: 8.6; 5 TABLET, FILM COATED ORAL at 10:15

## 2018-03-22 RX ADMIN — OXYCODONE HYDROCHLORIDE 10 MG: 10 TABLET ORAL at 02:32

## 2018-03-22 RX ADMIN — LEVOTHYROXINE SODIUM 150 MCG: 150 TABLET ORAL at 05:37

## 2018-03-22 RX ADMIN — HYDROMORPHONE HYDROCHLORIDE 0.5 MG: 2 INJECTION INTRAMUSCULAR; INTRAVENOUS; SUBCUTANEOUS at 02:03

## 2018-03-22 RX ADMIN — INSULIN HUMAN 2 UNITS: 100 INJECTION, SOLUTION PARENTERAL at 18:47

## 2018-03-22 RX ADMIN — OXYCODONE HYDROCHLORIDE 10 MG: 10 TABLET ORAL at 10:16

## 2018-03-22 RX ADMIN — SODIUM CHLORIDE 1000 ML: 9 INJECTION, SOLUTION INTRAVENOUS at 20:53

## 2018-03-22 RX ADMIN — PREDNISONE 20 MG: 20 TABLET ORAL at 13:24

## 2018-03-22 RX ADMIN — ATORVASTATIN CALCIUM 40 MG: 40 TABLET, FILM COATED ORAL at 20:15

## 2018-03-22 RX ADMIN — OMEPRAZOLE 20 MG: 20 CAPSULE, DELAYED RELEASE ORAL at 10:15

## 2018-03-22 ASSESSMENT — ENCOUNTER SYMPTOMS
ABDOMINAL PAIN: 0
TREMORS: 0
CHILLS: 0
NAUSEA: 0
DIZZINESS: 0
WEAKNESS: 1
HEADACHES: 0
COUGH: 1
VOMITING: 0
PALPITATIONS: 0
WHEEZING: 0
SPEECH CHANGE: 0
SHORTNESS OF BREATH: 0
SENSORY CHANGE: 0
BLURRED VISION: 0
FEVER: 0
DIARRHEA: 0
CONSTIPATION: 0
TINGLING: 0
DOUBLE VISION: 0

## 2018-03-22 ASSESSMENT — LIFESTYLE VARIABLES: DO YOU DRINK ALCOHOL: NO

## 2018-03-22 ASSESSMENT — PAIN SCALES - GENERAL
PAINLEVEL_OUTOF10: ASSUMED PAIN PRESENT
PAINLEVEL_OUTOF10: 6
PAINLEVEL_OUTOF10: 10
PAINLEVEL_OUTOF10: 3
PAINLEVEL_OUTOF10: 8
PAINLEVEL_OUTOF10: 2
PAINLEVEL_OUTOF10: 9

## 2018-03-22 ASSESSMENT — COGNITIVE AND FUNCTIONAL STATUS - GENERAL
CLIMB 3 TO 5 STEPS WITH RAILING: TOTAL
WALKING IN HOSPITAL ROOM: A LOT
STANDING UP FROM CHAIR USING ARMS: A LOT
MOVING TO AND FROM BED TO CHAIR: UNABLE
TURNING FROM BACK TO SIDE WHILE IN FLAT BAD: UNABLE
MOBILITY SCORE: 8
SUGGESTED CMS G CODE MODIFIER MOBILITY: CM
MOVING FROM LYING ON BACK TO SITTING ON SIDE OF FLAT BED: UNABLE

## 2018-03-22 ASSESSMENT — PAIN SCALES - WONG BAKER
WONGBAKER_NUMERICALRESPONSE: HURTS JUST A LITTLE BIT
WONGBAKER_NUMERICALRESPONSE: HURTS A WHOLE LOT

## 2018-03-22 ASSESSMENT — GAIT ASSESSMENTS: GAIT LEVEL OF ASSIST: UNABLE TO PARTICIPATE

## 2018-03-22 NOTE — PROGRESS NOTES
Pt AOx4, with O2 @ 4LPM/NC. Assessment done per CDU. Plan of care discussed with patient and  verbalizes understanding. Complaints of severe right hip pain. Medicated as scheduled.

## 2018-03-22 NOTE — PROGRESS NOTES
Patient admitted earlier today.   H&P reviewed  Continue plan of care, pt can't get mri hip due to hardware, ct hip done and did not show acute findings.

## 2018-03-22 NOTE — PROGRESS NOTES
[facility]    Date of Service: 3/22/2018    Chief Complaint  78 y.o. female admitted 3/21/2018 with right hip pain.    Interval Problem Update  X-ray negative for fx.  Unable to obtain MRI secondary to metal joint replacements.    Pain persists.  Worse with movement.  Pending PT/OT.  Denies numbness or tingling.      Consultants/Specialty  N/A    Disposition  TBD.        Review of Systems   Constitutional: Negative for chills and fever.   HENT: Negative for congestion.    Eyes: Negative for blurred vision and double vision.   Respiratory: Positive for cough. Negative for shortness of breath and wheezing.    Cardiovascular: Negative for chest pain, palpitations and leg swelling.   Gastrointestinal: Negative for abdominal pain, constipation, diarrhea, nausea and vomiting.   Genitourinary: Negative for dysuria.   Musculoskeletal: Positive for joint pain.   Skin: Negative for itching and rash.   Neurological: Positive for weakness. Negative for dizziness, tingling, tremors, sensory change, speech change and headaches.      Physical Exam  Laboratory/Imaging   Hemodynamics  Temp (24hrs), Av.6 °C (97.9 °F), Min:36.2 °C (97.1 °F), Max:37.3 °C (99.1 °F)   Temperature: 37.3 °C (99.1 °F)  Pulse  Av.3  Min: 53  Max: 76   Blood Pressure : 119/56      Respiratory      Respiration: 19, Pulse Oximetry: 91 %     Work Of Breathing / Effort: Mild  RUL Breath Sounds: Diminished, RML Breath Sounds: Diminished, RLL Breath Sounds: Diminished, LUDA Breath Sounds: Diminished, LLL Breath Sounds: Diminished    Fluids    Intake/Output Summary (Last 24 hours) at 18 1517  Last data filed at 18 0600   Gross per 24 hour   Intake              300 ml   Output              400 ml   Net             -100 ml       Nutrition  Orders Placed This Encounter   Procedures   • Diet Order     Standing Status:   Standing     Number of Occurrences:   1     Order Specific Question:   Diet:     Answer:   Regular [1]      Physical Exam   Constitutional: She is oriented to person, place, and time. She appears well-developed and well-nourished. No distress.   HENT:   Head: Normocephalic and atraumatic.   Mouth/Throat: No oropharyngeal exudate.   Eyes: Conjunctivae are normal. Right eye exhibits no discharge. Left eye exhibits no discharge.   Neck: Neck supple. No tracheal deviation present.   Cardiovascular: Normal rate, regular rhythm, normal heart sounds and intact distal pulses.    No murmur heard.  Pulmonary/Chest: Effort normal and breath sounds normal. No stridor. No respiratory distress. She has no wheezes.   Abdominal: Soft. Bowel sounds are normal. She exhibits no distension. There is no tenderness. There is no rebound.   Musculoskeletal: She exhibits tenderness. She exhibits no edema.   Right hip tenderness.   Neurological: She is alert and oriented to person, place, and time. No cranial nerve deficit. She exhibits normal muscle tone.   Skin: Skin is warm and dry. No rash noted. She is not diaphoretic. No erythema.       Recent Labs      03/21/18   0503  03/22/18   0529   WBC  14.0*  11.4*   RBC  4.28  4.11*   HEMOGLOBIN  11.4*  11.4*   HEMATOCRIT  35.8*  36.2*   MCV  83.6  88.1   MCH  26.6*  27.7   MCHC  31.8*  31.5*   RDW  49.9  53.4*   PLATELETCT  464*  432   MPV  9.3  10.4     Recent Labs      03/21/18   0503  03/22/18   0529   SODIUM  138  137   POTASSIUM  4.1  4.5   CHLORIDE  103  105   CO2  23  24   GLUCOSE  104*  127*   BUN  22  21   CREATININE  1.04  1.05   CALCIUM  8.9  8.4*     Recent Labs      03/21/18   0503  03/22/18   0529   INR  4.40*  4.43*         Recent Labs      03/22/18   0529   TRIGLYCERIDE  112   HDL  28*   LDL  58          Assessment/Plan     * Hip pain   Assessment & Plan    Pain unchanged.  X- ray negative for fracture.  Unable to obtain MRI due to metal joint replacement.  Denies fall or trauma.  PT/OT to follow  Start neurontin and steroid trial.          Leukocytosis   Assessment & Plan    2/2  to flu?   Improving.  Afebrile.  Continue to monitor.         Anemia   Assessment & Plan    May have chronic component.  No evidence of bleed.  Iron study pending.  Monitor cbc.        Influenza   Assessment & Plan    Cont tamiflu        Thrombocytosis (CMS-HCC)- (present on admission)   Assessment & Plan    PLT WNL.        Chronic anticoagulation- (present on admission)   Assessment & Plan    Cont warfarin         Gastroesophageal reflux disease without esophagitis- (present on admission)   Assessment & Plan    Cont ppi         Controlled type 2 diabetes mellitus without complication, without long-term current use of insulin (CMS-HCC)- (present on admission)   Assessment & Plan    SSI while inpatient.        BMI 33.0-33.9,adult- (present on admission)   Assessment & Plan    Encourage weight loss        Essential hypertension- (present on admission)   Assessment & Plan    Continue norvasc and losartan.        Hx pulmonary embolism- (present on admission)   Assessment & Plan    Cont warfarin pharm dose        Dyslipidemia- (present on admission)   Assessment & Plan    Cont home statin          Quality-Core Measures   Reviewed items::  Radiology images reviewed, Labs reviewed, Medications reviewed and EKG reviewed  Espinoza catheter::  No Espinoza  DVT prophylaxis pharmacological::  Warfarin (Coumadin)  Ulcer Prophylaxis::  Not indicated

## 2018-03-22 NOTE — PROGRESS NOTES
Pt a&o. Moans and groans at times due to right hip pain. Oxycodone given with some relief. On 3L o2 nc. Denies sob. Fair appetite. Unable to ambulate due to pain with movement. Turns side to side with 1-2 person assist. Able to make needs known.

## 2018-03-22 NOTE — RESPIRATORY CARE
COPD EDUCATION by COPD CLINICAL EDUCATOR  3/22/2018 at 6:44 AM by Bella Ramirez     Patient reviewed by COPD education team. Patient does not qualify for COPD program.

## 2018-03-22 NOTE — PROGRESS NOTES
Received  bedside report from May RN. Assumed pt care. Pt is sleeping in bed. No distress noted.

## 2018-03-22 NOTE — PROGRESS NOTES
Inpatient Anticoagulation Service Note    Date: 3/22/2018  Reason for Anticoagulation: Pulmonary Embolism        Hemoglobin Value: (!) 11.4  Hematocrit Value: (!) 36.2  Lab Platelet Value: 432  Target INR: 2.0 to 3.0    INR from last 7 days     Date/Time INR Value    03/22/18 0529 (!)  4.43    03/21/18 0503 (!)  4.4        Dose from last 7 days     Date/Time Dose (mg)    03/22/18 0900  0    03/21/18 0700  0        Average Dose (mg):  (5mg MWF, 7.5mg AOD)  Significant Interactions: Statin, Thyroid Medications  Bridge Therapy: No     Comments: INR continues to rise.  No signs of bleeding noted per chart review. CT of hip without notation of hematoma.  Will continue to hold dose at this time and monitor INR.    Plan:  Hold dose  Education Material Provided?: No  Pharmacist suggested discharge dosing: Possibly resume home regimen of warfarin 5 mg Mon, Wed, Fri and warfarin 7.5 mg all other days, as patient no longer on azithromycin or tamiflu.  INR within 48 hours of discharge.     Radha Hernandez, PharmD., BCPS  PGY-2 Critical Care Resident  [phone]

## 2018-03-23 PROBLEM — R79.1 SUPRATHERAPEUTIC INR: Status: ACTIVE | Noted: 2018-03-23

## 2018-03-23 LAB
GLUCOSE BLD-MCNC: 133 MG/DL (ref 65–99)
GLUCOSE BLD-MCNC: 205 MG/DL (ref 65–99)
GLUCOSE BLD-MCNC: 226 MG/DL (ref 65–99)
GLUCOSE BLD-MCNC: 282 MG/DL (ref 65–99)
INR PPP: 3.37 (ref 0.87–1.13)
IRON SATN MFR SERPL: 5 % (ref 15–55)
IRON SERPL-MCNC: 16 UG/DL (ref 40–170)
PROTHROMBIN TIME: 33.8 SEC (ref 12–14.6)
TIBC SERPL-MCNC: 305 UG/DL (ref 250–450)

## 2018-03-23 PROCEDURE — 700111 HCHG RX REV CODE 636 W/ 250 OVERRIDE (IP): Performed by: FAMILY MEDICINE

## 2018-03-23 PROCEDURE — A9270 NON-COVERED ITEM OR SERVICE: HCPCS | Performed by: HOSPITALIST

## 2018-03-23 PROCEDURE — 700102 HCHG RX REV CODE 250 W/ 637 OVERRIDE(OP): Performed by: FAMILY MEDICINE

## 2018-03-23 PROCEDURE — G8978 MOBILITY CURRENT STATUS: HCPCS | Mod: CK

## 2018-03-23 PROCEDURE — 700102 HCHG RX REV CODE 250 W/ 637 OVERRIDE(OP): Performed by: HOSPITALIST

## 2018-03-23 PROCEDURE — 85610 PROTHROMBIN TIME: CPT

## 2018-03-23 PROCEDURE — 97162 PT EVAL MOD COMPLEX 30 MIN: CPT

## 2018-03-23 PROCEDURE — 700102 HCHG RX REV CODE 250 W/ 637 OVERRIDE(OP): Performed by: NURSE PRACTITIONER

## 2018-03-23 PROCEDURE — 770006 HCHG ROOM/CARE - MED/SURG/GYN SEMI*

## 2018-03-23 PROCEDURE — 700105 HCHG RX REV CODE 258: Performed by: INTERNAL MEDICINE

## 2018-03-23 PROCEDURE — 700111 HCHG RX REV CODE 636 W/ 250 OVERRIDE (IP): Performed by: NURSE PRACTITIONER

## 2018-03-23 PROCEDURE — 97165 OT EVAL LOW COMPLEX 30 MIN: CPT

## 2018-03-23 PROCEDURE — 51798 US URINE CAPACITY MEASURE: CPT

## 2018-03-23 PROCEDURE — G8987 SELF CARE CURRENT STATUS: HCPCS | Mod: CJ

## 2018-03-23 PROCEDURE — G8979 MOBILITY GOAL STATUS: HCPCS | Mod: CI

## 2018-03-23 PROCEDURE — 82962 GLUCOSE BLOOD TEST: CPT

## 2018-03-23 PROCEDURE — 99233 SBSQ HOSP IP/OBS HIGH 50: CPT | Performed by: FAMILY MEDICINE

## 2018-03-23 PROCEDURE — A9270 NON-COVERED ITEM OR SERVICE: HCPCS | Performed by: NURSE PRACTITIONER

## 2018-03-23 PROCEDURE — G8988 SELF CARE GOAL STATUS: HCPCS | Mod: CI

## 2018-03-23 PROCEDURE — 36415 COLL VENOUS BLD VENIPUNCTURE: CPT

## 2018-03-23 PROCEDURE — A9270 NON-COVERED ITEM OR SERVICE: HCPCS | Performed by: FAMILY MEDICINE

## 2018-03-23 RX ORDER — WARFARIN SODIUM 2.5 MG/1
2.5 TABLET ORAL
Status: COMPLETED | OUTPATIENT
Start: 2018-03-23 | End: 2018-03-23

## 2018-03-23 RX ORDER — SODIUM CHLORIDE 9 MG/ML
1000 INJECTION, SOLUTION INTRAVENOUS ONCE
Status: COMPLETED | OUTPATIENT
Start: 2018-03-23 | End: 2018-03-23

## 2018-03-23 RX ORDER — NYSTATIN 100000 [USP'U]/G
POWDER TOPICAL 2 TIMES DAILY
Status: DISCONTINUED | OUTPATIENT
Start: 2018-03-23 | End: 2018-03-26 | Stop reason: HOSPADM

## 2018-03-23 RX ADMIN — OMEPRAZOLE 20 MG: 20 CAPSULE, DELAYED RELEASE ORAL at 08:14

## 2018-03-23 RX ADMIN — NYSTATIN: 100000 POWDER TOPICAL at 13:13

## 2018-03-23 RX ADMIN — OXYCODONE HYDROCHLORIDE 10 MG: 10 TABLET ORAL at 00:37

## 2018-03-23 RX ADMIN — INSULIN HUMAN 3 UNITS: 100 INJECTION, SOLUTION PARENTERAL at 18:16

## 2018-03-23 RX ADMIN — OXYCODONE HYDROCHLORIDE 10 MG: 10 TABLET ORAL at 04:00

## 2018-03-23 RX ADMIN — LEVOTHYROXINE SODIUM 150 MCG: 150 TABLET ORAL at 05:10

## 2018-03-23 RX ADMIN — FEXOFENADINE HCL 180 MG: 60 TABLET, FILM COATED ORAL at 08:14

## 2018-03-23 RX ADMIN — GABAPENTIN 100 MG: 100 CAPSULE ORAL at 20:58

## 2018-03-23 RX ADMIN — AMLODIPINE BESYLATE 10 MG: 10 TABLET ORAL at 08:14

## 2018-03-23 RX ADMIN — INSULIN HUMAN 2 UNITS: 100 INJECTION, SOLUTION PARENTERAL at 13:20

## 2018-03-23 RX ADMIN — OXYCODONE HYDROCHLORIDE 10 MG: 10 TABLET ORAL at 20:59

## 2018-03-23 RX ADMIN — LOSARTAN POTASSIUM 50 MG: 50 TABLET, FILM COATED ORAL at 08:14

## 2018-03-23 RX ADMIN — SODIUM CHLORIDE 1000 ML: 9 INJECTION, SOLUTION INTRAVENOUS at 03:40

## 2018-03-23 RX ADMIN — PREDNISONE 20 MG: 20 TABLET ORAL at 08:14

## 2018-03-23 RX ADMIN — STANDARDIZED SENNA CONCENTRATE AND DOCUSATE SODIUM 2 TABLET: 8.6; 5 TABLET, FILM COATED ORAL at 20:58

## 2018-03-23 RX ADMIN — GABAPENTIN 100 MG: 100 CAPSULE ORAL at 16:26

## 2018-03-23 RX ADMIN — WARFARIN SODIUM 2.5 MG: 2.5 TABLET ORAL at 16:28

## 2018-03-23 RX ADMIN — GABAPENTIN 100 MG: 100 CAPSULE ORAL at 08:14

## 2018-03-23 RX ADMIN — IRON SUCROSE 200 MG: 20 INJECTION, SOLUTION INTRAVENOUS at 13:14

## 2018-03-23 RX ADMIN — ATORVASTATIN CALCIUM 40 MG: 40 TABLET, FILM COATED ORAL at 20:58

## 2018-03-23 ASSESSMENT — COGNITIVE AND FUNCTIONAL STATUS - GENERAL
MOBILITY SCORE: 11
TURNING FROM BACK TO SIDE WHILE IN FLAT BAD: UNABLE
MOVING FROM LYING ON BACK TO SITTING ON SIDE OF FLAT BED: UNABLE
DRESSING REGULAR LOWER BODY CLOTHING: A LOT
DAILY ACTIVITIY SCORE: 20
MOVING TO AND FROM BED TO CHAIR: UNABLE
WALKING IN HOSPITAL ROOM: A LITTLE
HELP NEEDED FOR BATHING: A LITTLE
TOILETING: A LITTLE
SUGGESTED CMS G CODE MODIFIER DAILY ACTIVITY: CJ
SUGGESTED CMS G CODE MODIFIER MOBILITY: CL
CLIMB 3 TO 5 STEPS WITH RAILING: A LOT
STANDING UP FROM CHAIR USING ARMS: A LITTLE

## 2018-03-23 ASSESSMENT — ENCOUNTER SYMPTOMS
FALLS: 0
PALPITATIONS: 0
PHOTOPHOBIA: 0
SENSORY CHANGE: 0
HEADACHES: 0
HEARTBURN: 0
WEAKNESS: 1
FEVER: 0
TREMORS: 0
BLURRED VISION: 0
TINGLING: 0
DEPRESSION: 0
VOMITING: 0
BRUISES/BLEEDS EASILY: 0
SPEECH CHANGE: 0
SHORTNESS OF BREATH: 0
WEIGHT LOSS: 0
WHEEZING: 0
DOUBLE VISION: 0
DIZZINESS: 0
ORTHOPNEA: 0
COUGH: 1
NAUSEA: 0
CONSTIPATION: 0

## 2018-03-23 ASSESSMENT — ACTIVITIES OF DAILY LIVING (ADL): TOILETING: INDEPENDENT

## 2018-03-23 ASSESSMENT — PAIN SCALES - GENERAL
PAINLEVEL_OUTOF10: 8
PAINLEVEL_OUTOF10: 8
PAINLEVEL_OUTOF10: 5
PAINLEVEL_OUTOF10: 8
PAINLEVEL_OUTOF10: 7
PAINLEVEL_OUTOF10: 8

## 2018-03-23 ASSESSMENT — GAIT ASSESSMENTS
DISTANCE (FEET): 10
ASSISTIVE DEVICE: HAND HELD ASSIST
GAIT LEVEL OF ASSIST: MINIMAL ASSIST

## 2018-03-23 NOTE — PROGRESS NOTES
"Report received from Nurse May. Patient transported to room via hospital bed. Patient appears calm and in no acute distress. Patient is AAOx4. Patient on 4L O2, O2 stat monitoring placed in use. Patient oriented to room and call light. 2 RN skin check completed. Labs and orders reviewed. Assessment completed. Patient receiving NS Bolus at this time for hypotensive blood pressure, and low urine output. Will continue to monitor Vitals signs and I&Os for patient. Plan of care discussed with patient; questions have been answered at this time. Patient needs have been met at this time. Patient educated to call when needing assistance. Call light within reach. Non-Skid socks in place. Bed locked and in the lowest position. Hourly rounding in place. BP (!) 99/43   Pulse 70   Temp 37.1 °C (98.7 °F)   Resp 18   Ht 1.626 m (5' 4\")   Wt 88 kg (194 lb)   LMP 10/12/1970   SpO2 91%   Breastfeeding? No   BMI 33.30 kg/m² .   "

## 2018-03-23 NOTE — PROGRESS NOTES
2 RN skin check completed. Redness noted to bilateral heels, blanching. Rash noted to pannus and left groin area, barrier cream provided. Redness to sacrum area, blanching. Skin is intact. No open wounds noted. No pressure ulcers noted. No Espinoza present. No central line present. Pillows in use for support.

## 2018-03-23 NOTE — PROGRESS NOTES
[facility] Hospitalist Progress Note    Date of Service: 3/23/2018    Chief Complaint  78 y.o. female admitted 3/21/2018 with right hip pain.    Interval Problem Update  Stood up today and walked a few steps. Feels the pain has lessened. Still have occasional cough. Still requires oxygen. Start by mouth intake well. Denies any chest pain.    Consultants/Specialty  N/A    Disposition  TBD.        Review of Systems   Constitutional: Negative for fever and weight loss.   HENT: Negative for congestion, ear pain, hearing loss and tinnitus.    Eyes: Negative for blurred vision, double vision and photophobia.   Respiratory: Positive for cough. Negative for shortness of breath and wheezing.    Cardiovascular: Negative for chest pain, palpitations, orthopnea and leg swelling.   Gastrointestinal: Negative for constipation, heartburn, nausea and vomiting.   Genitourinary: Negative for dysuria and urgency.   Musculoskeletal: Positive for joint pain. Negative for falls.   Skin: Negative for rash.   Neurological: Positive for weakness. Negative for dizziness, tingling, tremors, sensory change, speech change and headaches.   Endo/Heme/Allergies: Does not bruise/bleed easily.   Psychiatric/Behavioral: Negative for depression and suicidal ideas.      Physical Exam  Laboratory/Imaging   Hemodynamics  Temp (24hrs), Av.8 °C (98.2 °F), Min:36.1 °C (97 °F), Max:37.3 °C (99.1 °F)   Temperature: 36.1 °C (97 °F)  Pulse  Av.8  Min: 53  Max: 80   Blood Pressure : 122/75      Respiratory      Respiration: 18, Pulse Oximetry: 91 %     Work Of Breathing / Effort: Mild  RUL Breath Sounds: Diminished, RML Breath Sounds: Diminished, RLL Breath Sounds: Diminished, LUDA Breath Sounds: Diminished, LLL Breath Sounds: Diminished    Fluids    Intake/Output Summary (Last 24 hours) at 18 1147  Last data filed at 18 1015   Gross per 24 hour   Intake             2460 ml   Output              475 ml   Net             1985 ml        Nutrition  Orders Placed This Encounter   Procedures   • Diet Order     Standing Status:   Standing     Number of Occurrences:   1     Order Specific Question:   Diet:     Answer:   Regular [1]     Physical Exam   Constitutional: She is oriented to person, place, and time. She appears well-developed and well-nourished. No distress.   HENT:   Head: Normocephalic and atraumatic.   Mouth/Throat: No oropharyngeal exudate.   Eyes: Conjunctivae are normal. Right eye exhibits no discharge. Left eye exhibits no discharge.   Neck: Neck supple. No JVD present. No tracheal deviation present.   Cardiovascular: Normal rate and regular rhythm.  Exam reveals no friction rub.    No murmur heard.  Pulmonary/Chest: Effort normal and breath sounds normal. No stridor. No respiratory distress. She has no wheezes.   Abdominal: Soft. Bowel sounds are normal. She exhibits no distension. There is no tenderness.   Musculoskeletal: She exhibits tenderness (mild tenderness on palpation of the right hip joint and the right SI joint.). She exhibits no edema.   Right hip tenderness.   Neurological: She is alert and oriented to person, place, and time. No cranial nerve deficit. She exhibits normal muscle tone.   Skin: Skin is warm and dry. No rash noted. No erythema.   Psychiatric: She has a normal mood and affect. Her behavior is normal.       Recent Labs      03/21/18   0503  03/22/18   0529  03/22/18   2339   WBC  14.0*  11.4*  12.6*   RBC  4.28  4.11*  3.49*   HEMOGLOBIN  11.4*  11.4*  9.8*   HEMATOCRIT  35.8*  36.2*  30.1*   MCV  83.6  88.1  86.2   MCH  26.6*  27.7  28.1   MCHC  31.8*  31.5*  32.6*   RDW  49.9  53.4*  51.8*   PLATELETCT  464*  432  358   MPV  9.3  10.4  9.8     Recent Labs      03/21/18   0503  03/22/18   0529   SODIUM  138  137   POTASSIUM  4.1  4.5   CHLORIDE  103  105   CO2  23  24   GLUCOSE  104*  127*   BUN  22  21   CREATININE  1.04  1.05   CALCIUM  8.9  8.4*     Recent Labs      03/21/18   0503  03/22/18   0529   03/22/18   2339   INR  4.40*  4.43*  3.37*         Recent Labs      03/22/18   0529   TRIGLYCERIDE  112   HDL  28*   LDL  58          Assessment/Plan     * Hip pain   Assessment & Plan    X- ray negative for fracture.  Unable to obtain MRI due to metal joint replacement.  Denies fall or trauma.  CT scan of the right hip showed surgical change consistent with right hip arthroplasty. No evidence of fracture or dislocation of the arthroplasty components.   PT/OT.  May need rehab/SNF.           Supratherapeutic INR- (present on admission)   Assessment & Plan    Continue to monitor. Goal INR between 2 and 3.        Leukocytosis   Assessment & Plan    2/2 to flu?   Improving.  Afebrile.  Continue to monitor.         Anemia   Assessment & Plan    May have chronic component.  No evidence of bleed.  Iron studies showed low iron levels and low iron saturation. We'll replace.  Monitor H&H.        Influenza   Assessment & Plan    Cont tamiflu        Thrombocytosis (CMS-HCC)- (present on admission)   Assessment & Plan    PLT WNL.        Chronic anticoagulation- (present on admission)   Assessment & Plan    Cont warfarin         Gastroesophageal reflux disease without esophagitis- (present on admission)   Assessment & Plan    Cont ppi         Controlled type 2 diabetes mellitus without complication, without long-term current use of insulin (CMS-HCC)- (present on admission)   Assessment & Plan    SSI while inpatient.        BMI 33.0-33.9,adult- (present on admission)   Assessment & Plan    Encourage weight loss        Essential hypertension- (present on admission)   Assessment & Plan    Continue norvasc and losartan.        Hx pulmonary embolism- (present on admission)   Assessment & Plan    Cont warfarin pharm dose  Monitor INR.        Dyslipidemia- (present on admission)   Assessment & Plan    Cont home statin        Plan of care discussed with RN during rounds.   Quality-Core Measures   Reviewed items::  Radiology images  reviewed, Labs reviewed, Medications reviewed and EKG reviewed  Espinoza catheter::  No Espinoza  DVT prophylaxis pharmacological::  Warfarin (Coumadin)  Ulcer Prophylaxis::  Not indicated

## 2018-03-23 NOTE — PROGRESS NOTES
RN notified Dr. Reddy in regards to patient trending hypotensive blood pressure and bladder scan result of 425ml. Per Dr. Reddy new orders received of NS Bolus 1,000 mL x Once and Straight Cath x Once. RN notified Dr. Reddy of patient trending Hemoglobin level, per Dr. Reddy no new orders received for repeat of CBC and UA.

## 2018-03-23 NOTE — THERAPY
"Occupational Therapy Evaluation completed.   Functional Status:  Supervision mobility, MIN LB ADLs  Plan of Care: Will benefit from Occupational Therapy 3 times per week  Discharge Recommendations:  Equipment: No Equipment Needed. Post-acute therapy Discharge to home with outpatient or home health for additional skilled therapy services.    Pt is a 79 y/o female admitted with intractable R hip pain and leukocytosis. Pt recently had influenza. Pt completed bed mobility with supervision. Transfers and ambulation with supervision with FWW. Advised use of FWW upon return to home. Recommend return to independent living with [facility] therapy. Pt agreeable. Pt would benefit from continued skilled OT in acute care to maximize safety and independence in ADLS and mobility prior to d/c.     See \"Rehab Therapy-Acute\" Patient Summary Report for complete documentation.    "

## 2018-03-23 NOTE — CARE PLAN
Problem: Safety  Goal: Will remain free from falls    Intervention: Assess risk factors for falls  Patient oriented to room and call light. Environment is clutter free. Personal possession and bedside table near patient. Bed locked and in the lowest position. Patient educated to call when needing assistance. Call light within reach. Non-skid socks in place. Hourly rounding in place.       Problem: Urinary Elimination:  Goal: Ability to reestablish a normal urinary elimination pattern will improve    Intervention: Assess and monitor for signs and symptoms of urinary retention  Monitoring patient intake and output. Patient encouraged to notify RN when needed to void. Patient encouraged to drink plenty of fluids. Monitoring patient for s/s of urinary retention.

## 2018-03-23 NOTE — THERAPY
"Physical Therapy Evaluation completed.   Bed Mobility:  Supine to Sit: Unable to Participate  Transfers: Sit to Stand: Contact Guard Assist  Gait: Level Of Assist: Minimal Assist with HHA; recommend use of FWW for offloading 2' to pain and to improve gait/balance       Plan of Care: Will benefit from Physical Therapy 3 times per week  Discharge Recommendations: Equipment: Will Continue to Assess for Equipment Needs. See below    Pt presents to physical therapy with decreased strength, endurance and functional mobility secondary to new onset of R intractable hip pain. Pt reports impaired bed mobility, as she states she was independent prior, and now needs a lot of help to get in and out of bed. She is able to perform sit<>stand/transfers with CGA, as long as she has something to hold onto. Pt able to ambulate about 10 ft with HHA and Celestine for the weightshift to the R. Pt would benefit from physical therapy while in the acute setting, and increased ambulation with nsg/CNA with FWW. Upon medical clearance/discharge, pt will likely be able to return to prior living environment ([facility]), with FWW for ambulation during recovery and with home PT for continuation of physical therapy services and eventual weaning from AD.    See \"Rehab Therapy-Acute\" Patient Summary Report for complete documentation.     "

## 2018-03-23 NOTE — PROGRESS NOTES
Patient informed of performing a straight cath, patient verbalizes understanding. Straight cath performed, 375 mL antony urine output noted.

## 2018-03-23 NOTE — PROGRESS NOTES
Patient a+o x 4, denies sob/lightheadedness/numbness/tingling/dizziness/chest pain/n/v/d.  Bladder scanned this am after void =310ml. Patient has voided 4 times with one incontinent episode with PT. No c/o pain with urination, pressure and/or burning. Tolerating diet. Blood sugar checks a/o with insulin coverage via s/s. C/o 7/10 right hip pain, gabapentin and prednisone a/o. No request for prn pain meds this shift, wctm. Supervision oob, steady gait.   Fall precautions/hourly rounding maintained, call light within reach and functioning, all items within reach.  Patient encouraged to call for assistance, poc reviewed with patient, ?'s/concerns answered. Patient educated on a prednisone and regular insulin.     Requested nystatin powder for patient 2/2 redness under abdominal folds and groin.   Iv iron admin a/o, no s&s of an allergic reaction. Remained with patient for 15 minutes after administration. No s&s of active bleeding. Coumadin to restart today. Patient aware and agreeable to plan.

## 2018-03-24 ENCOUNTER — APPOINTMENT (OUTPATIENT)
Dept: RADIOLOGY | Facility: MEDICAL CENTER | Age: 78
DRG: 093 | End: 2018-03-24
Attending: FAMILY MEDICINE
Payer: MEDICARE

## 2018-03-24 PROBLEM — R79.1 SUPRATHERAPEUTIC INR: Status: RESOLVED | Noted: 2018-03-23 | Resolved: 2018-03-24

## 2018-03-24 PROBLEM — D75.839 THROMBOCYTOSIS: Status: RESOLVED | Noted: 2018-02-26 | Resolved: 2018-03-24

## 2018-03-24 LAB
ALBUMIN SERPL BCP-MCNC: 3.2 G/DL (ref 3.2–4.9)
ALBUMIN SERPL BCP-MCNC: 3.3 G/DL (ref 3.2–4.9)
ALBUMIN/GLOB SERPL: 1.1 G/DL
ALBUMIN/GLOB SERPL: 1.1 G/DL
ALP SERPL-CCNC: 75 U/L (ref 30–99)
ALP SERPL-CCNC: 79 U/L (ref 30–99)
ALT SERPL-CCNC: 16 U/L (ref 2–50)
ALT SERPL-CCNC: 20 U/L (ref 2–50)
ANION GAP SERPL CALC-SCNC: 7 MMOL/L (ref 0–11.9)
ANION GAP SERPL CALC-SCNC: 7 MMOL/L (ref 0–11.9)
AST SERPL-CCNC: 14 U/L (ref 12–45)
AST SERPL-CCNC: 18 U/L (ref 12–45)
BASOPHILS # BLD AUTO: 0.2 % (ref 0–1.8)
BASOPHILS # BLD AUTO: 0.2 % (ref 0–1.8)
BASOPHILS # BLD: 0.02 K/UL (ref 0–0.12)
BASOPHILS # BLD: 0.03 K/UL (ref 0–0.12)
BILIRUB SERPL-MCNC: 0.4 MG/DL (ref 0.1–1.5)
BILIRUB SERPL-MCNC: 0.5 MG/DL (ref 0.1–1.5)
BUN SERPL-MCNC: 27 MG/DL (ref 8–22)
BUN SERPL-MCNC: 28 MG/DL (ref 8–22)
CALCIUM SERPL-MCNC: 8.1 MG/DL (ref 8.5–10.5)
CALCIUM SERPL-MCNC: 8.2 MG/DL (ref 8.5–10.5)
CHLORIDE SERPL-SCNC: 107 MMOL/L (ref 96–112)
CHLORIDE SERPL-SCNC: 109 MMOL/L (ref 96–112)
CO2 SERPL-SCNC: 22 MMOL/L (ref 20–33)
CO2 SERPL-SCNC: 22 MMOL/L (ref 20–33)
CREAT SERPL-MCNC: 1.09 MG/DL (ref 0.5–1.4)
CREAT SERPL-MCNC: 1.12 MG/DL (ref 0.5–1.4)
EOSINOPHIL # BLD AUTO: 0.04 K/UL (ref 0–0.51)
EOSINOPHIL # BLD AUTO: 0.22 K/UL (ref 0–0.51)
EOSINOPHIL NFR BLD: 0.4 % (ref 0–6.9)
EOSINOPHIL NFR BLD: 1.8 % (ref 0–6.9)
ERYTHROCYTE [DISTWIDTH] IN BLOOD BY AUTOMATED COUNT: 51.6 FL (ref 35.9–50)
ERYTHROCYTE [DISTWIDTH] IN BLOOD BY AUTOMATED COUNT: 52 FL (ref 35.9–50)
GLOBULIN SER CALC-MCNC: 2.8 G/DL (ref 1.9–3.5)
GLOBULIN SER CALC-MCNC: 3 G/DL (ref 1.9–3.5)
GLUCOSE BLD-MCNC: 121 MG/DL (ref 65–99)
GLUCOSE BLD-MCNC: 196 MG/DL (ref 65–99)
GLUCOSE BLD-MCNC: 202 MG/DL (ref 65–99)
GLUCOSE BLD-MCNC: 230 MG/DL (ref 65–99)
GLUCOSE BLD-MCNC: 272 MG/DL (ref 65–99)
GLUCOSE SERPL-MCNC: 127 MG/DL (ref 65–99)
GLUCOSE SERPL-MCNC: 185 MG/DL (ref 65–99)
HCT VFR BLD AUTO: 29.7 % (ref 37–47)
HCT VFR BLD AUTO: 30.5 % (ref 37–47)
HGB BLD-MCNC: 9.7 G/DL (ref 12–16)
HGB BLD-MCNC: 9.8 G/DL (ref 12–16)
IMM GRANULOCYTES # BLD AUTO: 0.05 K/UL (ref 0–0.11)
IMM GRANULOCYTES # BLD AUTO: 0.07 K/UL (ref 0–0.11)
IMM GRANULOCYTES NFR BLD AUTO: 0.4 % (ref 0–0.9)
IMM GRANULOCYTES NFR BLD AUTO: 0.6 % (ref 0–0.9)
INR PPP: 1.91 (ref 0.87–1.13)
LYMPHOCYTES # BLD AUTO: 2.06 K/UL (ref 1–4.8)
LYMPHOCYTES # BLD AUTO: 2.26 K/UL (ref 1–4.8)
LYMPHOCYTES NFR BLD: 18.5 % (ref 22–41)
LYMPHOCYTES NFR BLD: 18.6 % (ref 22–41)
MCH RBC QN AUTO: 27.9 PG (ref 27–33)
MCH RBC QN AUTO: 28 PG (ref 27–33)
MCHC RBC AUTO-ENTMCNC: 32.1 G/DL (ref 33.6–35)
MCHC RBC AUTO-ENTMCNC: 32.7 G/DL (ref 33.6–35)
MCV RBC AUTO: 85.8 FL (ref 81.4–97.8)
MCV RBC AUTO: 86.9 FL (ref 81.4–97.8)
MONOCYTES # BLD AUTO: 1.52 K/UL (ref 0–0.85)
MONOCYTES # BLD AUTO: 1.92 K/UL (ref 0–0.85)
MONOCYTES NFR BLD AUTO: 13.6 % (ref 0–13.4)
MONOCYTES NFR BLD AUTO: 15.8 % (ref 0–13.4)
NEUTROPHILS # BLD AUTO: 7.46 K/UL (ref 2–7.15)
NEUTROPHILS # BLD AUTO: 7.62 K/UL (ref 2–7.15)
NEUTROPHILS NFR BLD: 63 % (ref 44–72)
NEUTROPHILS NFR BLD: 66.9 % (ref 44–72)
NRBC # BLD AUTO: 0 K/UL
NRBC # BLD AUTO: 0 K/UL
NRBC BLD-RTO: 0 /100 WBC
NRBC BLD-RTO: 0 /100 WBC
PLATELET # BLD AUTO: 370 K/UL (ref 164–446)
PLATELET # BLD AUTO: 376 K/UL (ref 164–446)
PMV BLD AUTO: 10.2 FL (ref 9–12.9)
PMV BLD AUTO: 9.7 FL (ref 9–12.9)
POTASSIUM SERPL-SCNC: 4.5 MMOL/L (ref 3.6–5.5)
POTASSIUM SERPL-SCNC: 4.7 MMOL/L (ref 3.6–5.5)
PROT SERPL-MCNC: 6 G/DL (ref 6–8.2)
PROT SERPL-MCNC: 6.3 G/DL (ref 6–8.2)
PROTHROMBIN TIME: 21.6 SEC (ref 12–14.6)
RBC # BLD AUTO: 3.46 M/UL (ref 4.2–5.4)
RBC # BLD AUTO: 3.51 M/UL (ref 4.2–5.4)
SODIUM SERPL-SCNC: 136 MMOL/L (ref 135–145)
SODIUM SERPL-SCNC: 138 MMOL/L (ref 135–145)
WBC # BLD AUTO: 11.2 K/UL (ref 4.8–10.8)
WBC # BLD AUTO: 12.1 K/UL (ref 4.8–10.8)

## 2018-03-24 PROCEDURE — 80053 COMPREHEN METABOLIC PANEL: CPT

## 2018-03-24 PROCEDURE — 700102 HCHG RX REV CODE 250 W/ 637 OVERRIDE(OP): Performed by: HOSPITALIST

## 2018-03-24 PROCEDURE — 85610 PROTHROMBIN TIME: CPT

## 2018-03-24 PROCEDURE — 71045 X-RAY EXAM CHEST 1 VIEW: CPT

## 2018-03-24 PROCEDURE — 99233 SBSQ HOSP IP/OBS HIGH 50: CPT | Performed by: FAMILY MEDICINE

## 2018-03-24 PROCEDURE — 700102 HCHG RX REV CODE 250 W/ 637 OVERRIDE(OP): Performed by: FAMILY MEDICINE

## 2018-03-24 PROCEDURE — 85025 COMPLETE CBC W/AUTO DIFF WBC: CPT

## 2018-03-24 PROCEDURE — 700102 HCHG RX REV CODE 250 W/ 637 OVERRIDE(OP): Performed by: NURSE PRACTITIONER

## 2018-03-24 PROCEDURE — 700111 HCHG RX REV CODE 636 W/ 250 OVERRIDE (IP): Performed by: NURSE PRACTITIONER

## 2018-03-24 PROCEDURE — 770006 HCHG ROOM/CARE - MED/SURG/GYN SEMI*

## 2018-03-24 PROCEDURE — 36415 COLL VENOUS BLD VENIPUNCTURE: CPT

## 2018-03-24 PROCEDURE — 82962 GLUCOSE BLOOD TEST: CPT | Mod: 91

## 2018-03-24 PROCEDURE — A9270 NON-COVERED ITEM OR SERVICE: HCPCS | Performed by: HOSPITALIST

## 2018-03-24 PROCEDURE — A9270 NON-COVERED ITEM OR SERVICE: HCPCS | Performed by: FAMILY MEDICINE

## 2018-03-24 PROCEDURE — A9270 NON-COVERED ITEM OR SERVICE: HCPCS | Performed by: NURSE PRACTITIONER

## 2018-03-24 RX ORDER — WARFARIN SODIUM 5 MG/1
5 TABLET ORAL
Status: COMPLETED | OUTPATIENT
Start: 2018-03-24 | End: 2018-03-24

## 2018-03-24 RX ORDER — FERROUS SULFATE 325(65) MG
325 TABLET ORAL DAILY
Qty: 90 TAB | Refills: 0 | Status: SHIPPED | OUTPATIENT
Start: 2018-03-24 | End: 2018-11-25 | Stop reason: CLARIF

## 2018-03-24 RX ORDER — GABAPENTIN 100 MG/1
100 CAPSULE ORAL 3 TIMES DAILY
Qty: 90 CAP | Refills: 0 | Status: SHIPPED | OUTPATIENT
Start: 2018-03-24 | End: 2018-04-23

## 2018-03-24 RX ORDER — PREDNISONE 20 MG/1
TABLET ORAL
Qty: 4 TAB | Refills: 0 | Status: SHIPPED | OUTPATIENT
Start: 2018-03-25 | End: 2018-03-27

## 2018-03-24 RX ORDER — LEVOTHYROXINE SODIUM 0.15 MG/1
150 TABLET ORAL
Qty: 30 TAB | Refills: 0 | Status: SHIPPED | OUTPATIENT
Start: 2018-03-25 | End: 2018-04-30 | Stop reason: SDUPTHER

## 2018-03-24 RX ORDER — NYSTATIN 100000 [USP'U]/G
POWDER TOPICAL
Qty: 15 G | Refills: 0 | Status: SHIPPED | OUTPATIENT
Start: 2018-03-24 | End: 2018-11-25 | Stop reason: CLARIF

## 2018-03-24 RX ADMIN — LEVOTHYROXINE SODIUM 150 MCG: 150 TABLET ORAL at 06:09

## 2018-03-24 RX ADMIN — PREDNISONE 20 MG: 20 TABLET ORAL at 09:01

## 2018-03-24 RX ADMIN — POLYETHYLENE GLYCOL 3350 1 PACKET: 17 POWDER, FOR SOLUTION ORAL at 21:33

## 2018-03-24 RX ADMIN — INSULIN HUMAN 3 UNITS: 100 INJECTION, SOLUTION PARENTERAL at 21:34

## 2018-03-24 RX ADMIN — OXYCODONE HYDROCHLORIDE 10 MG: 10 TABLET ORAL at 09:21

## 2018-03-24 RX ADMIN — INSULIN HUMAN 1 UNITS: 100 INJECTION, SOLUTION PARENTERAL at 11:40

## 2018-03-24 RX ADMIN — OMEPRAZOLE 20 MG: 20 CAPSULE, DELAYED RELEASE ORAL at 08:58

## 2018-03-24 RX ADMIN — LOSARTAN POTASSIUM 50 MG: 50 TABLET, FILM COATED ORAL at 09:01

## 2018-03-24 RX ADMIN — INSULIN HUMAN 2 UNITS: 100 INJECTION, SOLUTION PARENTERAL at 18:27

## 2018-03-24 RX ADMIN — NYSTATIN: 100000 POWDER TOPICAL at 21:34

## 2018-03-24 RX ADMIN — GABAPENTIN 100 MG: 100 CAPSULE ORAL at 21:33

## 2018-03-24 RX ADMIN — STANDARDIZED SENNA CONCENTRATE AND DOCUSATE SODIUM 2 TABLET: 8.6; 5 TABLET, FILM COATED ORAL at 08:58

## 2018-03-24 RX ADMIN — WARFARIN SODIUM 5 MG: 5 TABLET ORAL at 17:36

## 2018-03-24 RX ADMIN — OXYCODONE HYDROCHLORIDE 10 MG: 10 TABLET ORAL at 00:18

## 2018-03-24 RX ADMIN — OXYCODONE HYDROCHLORIDE 10 MG: 10 TABLET ORAL at 16:48

## 2018-03-24 RX ADMIN — GABAPENTIN 100 MG: 100 CAPSULE ORAL at 09:00

## 2018-03-24 RX ADMIN — NYSTATIN: 100000 POWDER TOPICAL at 09:01

## 2018-03-24 RX ADMIN — GABAPENTIN 100 MG: 100 CAPSULE ORAL at 15:54

## 2018-03-24 RX ADMIN — OXYCODONE HYDROCHLORIDE 10 MG: 10 TABLET ORAL at 21:33

## 2018-03-24 RX ADMIN — ATORVASTATIN CALCIUM 40 MG: 40 TABLET, FILM COATED ORAL at 21:34

## 2018-03-24 ASSESSMENT — LIFESTYLE VARIABLES
DO YOU DRINK ALCOHOL: NO
EVER_SMOKED: YES

## 2018-03-24 ASSESSMENT — PAIN SCALES - GENERAL
PAINLEVEL_OUTOF10: 0
PAINLEVEL_OUTOF10: 6
PAINLEVEL_OUTOF10: 7
PAINLEVEL_OUTOF10: 6
PAINLEVEL_OUTOF10: 7
PAINLEVEL_OUTOF10: 7
PAINLEVEL_OUTOF10: 4
PAINLEVEL_OUTOF10: 0
PAINLEVEL_OUTOF10: 5
PAINLEVEL_OUTOF10: 5

## 2018-03-24 NOTE — CARE PLAN
Problem: Safety  Goal: Will remain free from injury  Outcome: PROGRESSING AS EXPECTED  Uses call light as expected.    Problem: Skin Integrity  Goal: Risk for impaired skin integrity will decrease  Outcome: PROGRESSING AS EXPECTED  No signs of skin breakdown.

## 2018-03-24 NOTE — PROGRESS NOTES
Patient alert and oriented x4. Calm and cooperative. Denies need to pee at this time, agreed to let RN know if she feels need to pee and if she still has the feeling after attempting to urinate. Complains of pain in right hip radiating down right leg. Currently not wearing O2, refusing. Wears 2L a home baseline. No questions or concerns about plan of care or medications. Call light within reach.

## 2018-03-24 NOTE — CARE PLAN
Problem: Infection  Goal: Will remain free from infection  Outcome: PROGRESSING AS EXPECTED  Pt educated on S/S of infection. Pt educated on importance of hand hygiene.     Problem: Discharge Barriers/Planning  Goal: Patient's continuum of care needs will be met  Outcome: PROGRESSING AS EXPECTED  Pt updated on possible discharge to group home. Working on transportation. Pt stated last time she was discharged a cab voucher was given to patient.

## 2018-03-24 NOTE — FACE TO FACE
Face to Face Note  -  Durable Medical Equipment    Ellen Jones M.D. - NPI: [id number]  I certify that this patient is under my care and that they have had a durable medical equipment(DME)face to face encounter by myself that meets the physician DME face-to-face encounter requirements with this patient on:    Date of encounter:   Patient:                    MRN:                       YOB: 2016  Sanjuanita Sosa  [id number]  1940     The encounter with the patient was in whole, or in part, for the following medical condition, which is the primary reason for durable medical equipment:  COPD    I certify that, based on my findings, the following durable medical equipment is medically necessary:  Oxygen.    HOME O2 Saturation Measurements:(Values must be present for Home Oxygen orders)  Room air sat at rest: 86  Room air sat with amb: 80  With liters of O2: 3, O2 sat at rest with O2: 94  With Liters of O2: 3, O2 sat with amb with O2 : 93  Is the patient mobile?: Yes    My Clinical findings support the need for the above equipment due to:  Hypoxia    Supporting Symptoms: Hypoxia/dyspnea     ------------------------------------------------------------------------------------------------------------------    Face to Face Supporting Documentation - Home Health    The encounter with this patient was in whole or in part the primary reason for home health admission.    Date of encounter:   Patient:                    MRN:                       YOB: 2016  Sanjuanita Sosa  [id number]  1940     Home health to see patient for:  Skilled Nursing care for assessment, interventions & education, Physical Therapy evaluation and treatment and Occupational therapy evaluation and treatment    Skilled need for:  Comment: PT/OT/Skilled    Skilled nursing interventions to include:  Comment: PT/OT/Skilled    Homebound evidenced status by:  Need the aid of supportive  devices such as crutches, canes, wheelchairs or walkers. Leaving home must require a considerable and taxing effort. There must exist a normal inability to leave the home.    Community Physician to provide follow up care: WENDY Baker     Optional Interventions    Wound information & treatment:    Home Infusion Therapy orders:    Line/Drain/Airway:    I certify the face to face encounter for this home care referral meets the CMS requirements and the encounter/clinical assessment with the patient was, in whole, or in part, for the medical condition(s) listed above, which is the primary reason for home health care. Based on my clinical findings: the service(s) are medically necessary, support the need for home health care, and the homebound criteria are met.  I certify that this patient has had a face to face encounter by myself.  Ellen Jones M.D. - NPI: [id number]    *Debility, frailty and advanced age in the absence of an acute deterioration or exacerbation of a condition do not qualify a patient for home health.

## 2018-03-24 NOTE — PROGRESS NOTES
Inpatient Anticoagulation Service Note    Date: 3/24/2018  Reason for Anticoagulation: Pulmonary Embolism        Hemoglobin Value: (!) 9.8  Hematocrit Value: (!) 30.5  Lab Platelet Value: 370  Target INR: 2.0 to 3.0    INR from last 7 days     Date/Time INR Value    03/23/18 2329 (!)  1.91    03/22/18 2339 (!)  3.37    03/22/18 0529 (!)  4.43    03/21/18 0503 (!)  4.4        Dose from last 7 days     Date/Time Dose (mg)    03/24/18 1300  5    03/23/18 1200  2.5    03/22/18 0900  0    03/21/18 0700  0        Average Dose (mg):  (5mg MWF, 7.5mg AOD)  Significant Interactions: Statin, Thyroid Medications, Corticosteroids  Bridge Therapy: No     Comments: INR now subtherapeutic. Received 2.5 mg last night after being held x 2 doses. Drop in H/H is noted, currently receiving iron replacement. Will give 5 mg tonight. Continue daily INR for now until more stable.    Plan:  Warfarin 5 mg PO once  Education Material Provided?: No  Pharmacist suggested discharge dosing: TBD. Warfarin 5 mg PO daily with INR follow-up in 2-3 days     Jalen Lau, PharmD, BCPS

## 2018-03-24 NOTE — DISCHARGE INSTRUCTIONS
Discharge Instructions    Discharged to home by taxi with self. Discharged via wheelchair, hospital escort: Yes.  Special equipment needed: Oxygen    Be sure to schedule a follow-up appointment with your primary care doctor or any specialists as instructed.     Discharge Plan:   Diet Plan: Discussed  Activity Level: Discussed  Smoking Cessation Offered: Patient Counseled  Confirmed Follow up Appointment: Appointment Scheduled  Confirmed Symptoms Management: Discussed  Medication Reconciliation Updated: Yes  Influenza Vaccine Indication: Not indicated: Previously immunized this influenza season and > 8 years of age    I understand that a diet low in cholesterol, fat, and sodium is recommended for good health. Unless I have been given specific instructions below for another diet, I accept this instruction as my diet prescription.   Other diet: Diabetic    Special Instructions: None    · Is patient discharged on Warfarin / Coumadin?   Yes    You are receiving the drug warfarin. Please understand the importance of monitoring warfarin with scheduled PT/INR blood draws.  Follow-up with a call to your personal Doctor's office in 3 days to schedule a PT/INR. .    IMPORTANT: HOW TO USE THIS INFORMATION:  This is a summary and does NOT have all possible information about this product. This information does not assure that this product is safe, effective, or appropriate for you. This information is not individual medical advice and does not substitute for the advice of your health care professional. Always ask your health care professional for complete information about this product and your specific health needs.      WARFARIN - ORAL (WARF-uh-rin)      COMMON BRAND NAME(S): Coumadin      WARNING:  Warfarin can cause very serious (possibly fatal) bleeding. This is more likely to occur when you first start taking this medication or if you take too much warfarin. To decrease your risk for bleeding, your doctor or other health  "care provider will monitor you closely and check your lab results (INR test) to make sure you are not taking too much warfarin. Keep all medical and laboratory appointments. Tell your doctor right away if you notice any signs of serious bleeding. See also Side Effects section.      USES:  This medication is used to treat blood clots (such as in deep vein thrombosis-DVT or pulmonary embolus-PE) and/or to prevent new clots from forming in your body. Preventing harmful blood clots helps to reduce the risk of a stroke or heart attack. Conditions that increase your risk of developing blood clots include a certain type of irregular heart rhythm (atrial fibrillation), heart valve replacement, recent heart attack, and certain surgeries (such as hip/knee replacement). Warfarin is commonly called a \"blood thinner,\" but the more correct term is \"anticoagulant.\" It helps to keep blood flowing smoothly in your body by decreasing the amount of certain substances (clotting proteins) in your blood.      HOW TO USE:  Read the Medication Guide provided by your pharmacist before you start taking warfarin and each time you get a refill. If you have any questions, ask your doctor or pharmacist. Take this medication by mouth with or without food as directed by your doctor or other health care professional, usually once a day. It is very important to take it exactly as directed. Do not increase the dose, take it more frequently, or stop using it unless directed by your doctor. Dosage is based on your medical condition, laboratory tests (such as INR), and response to treatment. Your doctor or other health care provider will monitor you closely while you are taking this medication to determine the right dose for you. Use this medication regularly to get the most benefit from it. To help you remember, take it at the same time each day. It is important to eat a balanced, consistent diet while taking warfarin. Some foods can affect how " warfarin works in your body and may affect your treatment and dose. Avoid sudden large increases or decreases in your intake of foods high in vitamin K (such as broccoli, cauliflower, cabbage, brussels sprouts, kale, spinach, and other green leafy vegetables, liver, green tea, certain vitamin supplements). If you are trying to lose weight, check with your doctor before you try to go on a diet. Cranberry products may also affect how your warfarin works. Limit the amount of cranberry juice (16 ounces/480 milliliters a day) or other cranberry products you may drink or eat.      SIDE EFFECTS:  Nausea, loss of appetite, or stomach/abdominal pain may occur. If any of these effects persist or worsen, tell your doctor or pharmacist promptly. Remember that your doctor has prescribed this medication because he or she has judged that the benefit to you is greater than the risk of side effects. Many people using this medication do not have serious side effects. This medication can cause serious bleeding if it affects your blood clotting proteins too much (shown by unusually high INR lab results). Even if your doctor stops your medication, this risk of bleeding can continue for up to a week. Tell your doctor right away if you have any signs of serious bleeding, including: unusual pain/swelling/discomfort, unusual/easy bruising, prolonged bleeding from cuts or gums, persistent/frequent nosebleeds, unusually heavy/prolonged menstrual flow, pink/dark urine, coughing up blood, vomit that is bloody or looks like coffee grounds, severe headache, dizziness/fainting, unusual or persistent tiredness/weakness, bloody/black/tarry stools, chest pain, shortness of breath, difficulty swallowing. Tell your doctor right away if any of these unlikely but serious side effects occur: persistent nausea/vomiting, severe stomach/abdominal pain, yellowing eyes/skin. This drug rarely has caused very serious (possibly fatal) problems if its effects lead  to small blood clots (usually at the beginning of treatment). This can lead to severe skin/tissue damage that may require surgery or amputation if left untreated. Patients with certain blood conditions (protein C or S deficiency) may be at greater risk. Get medical help right away if any of these rare but serious side effects occur: painful/red/purplish patches on the skin (such as on the toe, breast, abdomen), change in the amount of urine, vision changes, confusion, slurred speech, weakness on one side of the body. A very serious allergic reaction to this drug is rare. However, get medical help right away if you notice any symptoms of a serious allergic reaction, including: rash, itching/swelling (especially of the face/tongue/throat), severe dizziness, trouble breathing. This is not a complete list of possible side effects. If you notice other effects not listed above, contact your doctor or pharmacist. In the [place] - Call your doctor for medical advice about side effects. You may report side effects to FDA at 1-135-EMT-5712. In [place] - Call your doctor for medical advice about side effects. You may report side effects to Health [place] at 1-129.965.5443.      PRECAUTIONS:  Before taking warfarin, tell your doctor or pharmacist if you are allergic to it; or if you have any other allergies. This product may contain inactive ingredients, which can cause allergic reactions or other problems. Talk to your pharmacist for more details. Before using this medication, tell your doctor or pharmacist your medical history, especially of: blood disorders (such as anemia, hemophilia), bleeding problems (such as bleeding of the stomach/intestines, bleeding in the brain), blood vessel disorders (such as aneurysms), recent major injury/surgery, liver disease, alcohol use, mental/mood disorders (including memory problems), frequent falls/injuries. It is important that all your doctors and dentists know that you take warfarin. Before  having surgery or any medical/dental procedures, tell your doctor or dentist that you are taking this medication and about all the products you use (including prescription drugs, nonprescription drugs, and herbal products). Avoid getting injections into the muscles. If you must have an injection into a muscle (for example, a flu shot), it should be given in the arm. This way, it will be easier to check for bleeding and/or apply pressure bandages. This medication may cause stomach bleeding. Daily use of alcohol while using this medicine will increase your risk for stomach bleeding and may also affect how this medication works. Limit or avoid alcoholic beverages. If you have not been eating well, if you have an illness or infection that causes fever, vomiting, or diarrhea for more than 2 days, or if you start using any antibiotic medications, contact your doctor or pharmacist immediately because these conditions can affect how warfarin works. This medication can cause heavy bleeding. To lower the chance of getting cut, bruised, or injured, use great caution with sharp objects like safety razors and nail cutters. Use an electric razor when shaving and a soft toothbrush when brushing your teeth. Avoid activities such as contact sports. If you fall or injure yourself, especially if you hit your head, call your doctor immediately. Your doctor may need to check you. The Food & Drug Administration has stated that generic warfarin products are interchangeable. However, consult your doctor or pharmacist before switching warfarin products. Be careful not to take more than one medication that contains warfarin unless specifically directed by the doctor or health care provider who is monitoring your warfarin treatment. Older adults may be at greater risk for bleeding while using this drug. This medication is not recommended for use during pregnancy because of serious (possibly fatal) harm to an unborn baby. Discuss the use of  "reliable forms of birth control with your doctor. If you become pregnant or think you may be pregnant, tell your doctor immediately. If you are planning pregnancy, discuss a plan for managing your condition with your doctor before you become pregnant. Your doctor may switch the type of medication you use during pregnancy. Very small amounts of this medication may pass into breast milk but is unlikely to harm a nursing infant. Consult your doctor before breast-feeding.      DRUG INTERACTIONS:  Drug interactions may change how your medications work or increase your risk for serious side effects. This document does not contain all possible drug interactions. Keep a list of all the products you use (including prescription/nonprescription drugs and herbal products) and share it with your doctor and pharmacist. Do not start, stop, or change the dosage of any medicines without your doctor's approval. Warfarin interacts with many prescription, nonprescription, vitamin, and herbal products. This includes medications that are applied to the skin or inside the vagina or rectum. The interactions with warfarin usually result in an increase or decrease in the \"blood-thinning\" (anticoagulant) effect. Your doctor or other health care professional should closely monitor you to prevent serious bleeding or clotting problems. While taking warfarin, it is very important to tell your doctor or pharmacist of any changes in medications, vitamins, or herbal products that you are taking. Some products that may interact with this drug include: capecitabine, imatinib, mifepristone. Aspirin, aspirin-like drugs (salicylates), and nonsteroidal anti-inflammatory drugs (NSAIDs such as ibuprofen, naproxen, celecoxib) may have effects similar to warfarin. These drugs may increase the risk of bleeding problems if taken during treatment with warfarin. Carefully check all prescription/nonprescription product labels (including drugs applied to the skin " such as pain-relieving creams) since the products may contain NSAIDs or salicylates. Talk to your doctor about using a different medication (such as acetaminophen) to treat pain/fever. Low-dose aspirin and related drugs (such as clopidogrel, ticlopidine) should be continued if prescribed by your doctor for specific medical reasons such as heart attack or stroke prevention. Consult your doctor or pharmacist for more details. Many herbal products interact with warfarin. Tell your doctor before taking any herbal products, especially bromelains, coenzyme Q10, cranberry, danshen, dong quai, fenugreek, garlic, ginkgo biloba, ginseng, and [place] wort, among others. This medication may interfere with a certain laboratory test to measure theophylline levels, possibly causing false test results. Make sure laboratory personnel and all your doctors know you use this drug.      OVERDOSE:  If overdose is suspected, contact a poison control center or emergency room immediately.  residents can call the  National Poison Hotline at 1-975.416.3557. [place] residents can call a provincial poison control center. Symptoms of overdose may include: bloody/black/tarry stools, pink/dark urine, unusual/prolonged bleeding.      NOTES:  Do not share this medication with others. Laboratory and/or medical tests (such as INR, complete blood count) must be performed periodically to monitor your progress or check for side effects. Consult your doctor for more details.      MISSED DOSE:  For the best possible benefit, do not miss any doses. If you do miss a dose and remember on the same day, take it as soon as you remember. If you remember on the next day, skip the missed dose and resume your usual dosing schedule. Do not double the dose to catch up because this could increase your risk for bleeding. Keep a record of missed doses to give to your doctor or pharmacist. Contact your doctor or pharmacist if you miss 2 or more doses in a row.       STORAGE:  Store at room temperature away from light and moisture. Do not store in the bathroom. Keep all medications away from children and pets. Do not flush medications down the toilet or pour them into a drain unless instructed to do so. Properly discard this product when it is  or no longer needed. Consult your pharmacist or local waste disposal company for more details about how to safely discard your product.      MEDICAL ALERT:  Your condition and medication can cause complications in a medical emergency. For information about enrolling in MedicAlert, call 1-104.465.2929 ([place]) or 1-604.575.3183 ([place]).      Information last revised 2010 Copyright(c)  [employer]             Depression / Suicide Risk    As you are discharged from this [facility] facility, it is important to learn how to keep safe from harming yourself.    Recognize the warning signs:  · Abrupt changes in personality, positive or negative- including increase in energy   · Giving away possessions  · Change in eating patterns- significant weight changes-  positive or negative  · Change in sleeping patterns- unable to sleep or sleeping all the time   · Unwillingness or inability to communicate  · Depression  · Unusual sadness, discouragement and loneliness  · Talk of wanting to die  · Neglect of personal appearance   · Rebelliousness- reckless behavior  · Withdrawal from people/activities they love  · Confusion- inability to concentrate     If you or a loved one observes any of these behaviors or has concerns about self-harm, here's what you can do:  · Talk about it- your feelings and reasons for harming yourself  · Remove any means that you might use to hurt yourself (examples: pills, rope, extension cords, firearm)  · Get professional help from the community (Mental Health, Substance Abuse, psychological counseling)  · Do not be alone:Call your Safe Contact- someone whom you trust who will be there for  you.  · Call your local CRISIS HOTLINE 505-6627 or 672-086-7288  · Call your local Children's Mobile Crisis Response Team [place] (537) 566-5266 or www.[employer]  · Call the toll free National Suicide Prevention Hotlines   · National Suicide Prevention Lifeline 254-993-YBMJ (0028)  · National [employer] 800-SUICIDE (022-0572)

## 2018-03-24 NOTE — DISCHARGE SUMMARY
"CHIEF COMPLAINT ON ADMISSION  Chief Complaint   Patient presents with   • Hip Pain     \"Right now it's a 1/10 pain but when I move it's a 12! I am afraid I coughed so hard I coughed my hip replacement out of place.\" Denies trauma or injury to area.       CODE STATUS  DNR    HPI & HOSPITAL COURSE  This is a 78 y.o. female with past medical history of hip arthritis status post right hip ORIF and recent diagnosis of influenza diagnosed in urgent care, and history of paroxysmal A. fib and PE on Coumadin who came in with sharp right hip pain. Patient denied any trauma or fall. She attributed the sharp right hip pain to be secondary to intractable cough from her recent influenza. X-ray of the right hip showed no acute bony abnormalities or fractures. Patient was not a candidate for MRI due to hardware that she has on the right hip. CT scan of the right hip joint showed surgical changes consistent with right hip arthroplasty with no evidence of fracture or dislocation of the arthroplasty components. Patient was started on systemic urine lites and pain management. She continued and completed a course of Tamiflu during her hospital stay. Pharmacy monitor INR and dose her Coumadin accordingly. She was initially with supratherapeutic INR but then that resolved. She had reactive thrombocytosis which resolved over hospital course. Her white count was initially elevated which was attributed to stress and influenza and continued to be mildly elevated which was attributed to steroids that the patient receives as well. Her pain significantly improved over her hospital stay. She was able to ambulate with physical therapy who recommended home health. She was anemic and iron studies showed low iron levels and low iron saturation and was given IV iron infusion that followed with oral supplements. Her H&H were stable during her hospital stay. She was hemodynamically and clinically stable. Home health was arranged for the patient. " Transportation was arranged for the patient. She was discharged back to assisted living in stable condition.    The patient met 2-midnight criteria for an inpatient stay at the time of discharge.    Therefore, she is discharged in good and stable condition with close outpatient follow-up.    SPECIFIC OUTPATIENT FOLLOW-UP  Follow-up with PCP in one week.  Follow-up with PT/OT as per home health staff recommendations.    DISCHARGE PROBLEM LIST  Principal Problem:    Hip pain (Chronic) POA: Unknown  Active Problems:    Dyslipidemia POA: Yes    Hx pulmonary embolism POA: Yes    Essential hypertension POA: Yes    BMI 33.0-33.9,adult POA: Yes    Controlled type 2 diabetes mellitus without complication, without long-term current use of insulin (CMS-HCC) POA: Yes    Gastroesophageal reflux disease without esophagitis POA: Yes    Chronic anticoagulation POA: Yes    Influenza POA: Unknown    Anemia POA: Unknown    Leukocytosis POA: Unknown  Resolved Problems:    Thrombocytosis (CMS-HCC) POA: Yes    Supratherapeutic INR POA: Yes      FOLLOW UP  Future Appointments  Date Time Provider Department Center   3/27/2018 10:15 AM [facility] EXAM 4 [facility] None   3/27/2018 1:20 PM YUE BakerPMaryNMary [address]   4/10/2018 10:00 AM WENDY Baker [address]   4/24/2018 1:15 PM Carlos Guillen M.D. [facility] None   9/12/2018 9:40 AM VASCULAR NURSE PRACTITIONER V[facility] None     No follow-up provider specified.    MEDICATIONS ON DISCHARGE   Ian Sanjuanita Cervanteszabeth   Home Medication Instructions ELIDA:[id number]    Printed on:03/24/18 1053   Medication Information                      albuterol 108 (90 Base) MCG/ACT Aero Soln inhalation aerosol  Inhale 2 Puffs by mouth every 6 hours as needed for Shortness of Breath.             amlodipine (NORVASC) 10 MG Tab  TAKE ONE TABLET BY MOUTH ONCE A DAY             atorvastatin (LIPITOR) 80 MG tablet  Take 0.5 Tabs by mouth every evening.             calcium carbonate (OS-MARKELL 500) 500  MG Tab  TAKE ONE TABLET BY MOUTH TWICE DAILY WITH MEALS             ferrous sulfate 325 (65 Fe) MG tablet  Take 1 Tab by mouth every day.             gabapentin (NEURONTIN) 100 MG Cap  Take 1 Cap by mouth 3 times a day for 30 days.             glimepiride (AMARYL) 2 MG Tab  TAKE  ONE TABLET BY MOUTH EVERY MORNING             levothyroxine (SYNTHROID) 150 MCG Tab  Take 1 Tab by mouth Every morning on an empty stomach.             losartan (COZAAR) 50 MG Tab  TAKE ONE TABLET BY MOUTH EVERY DAY             metformin (GLUCOPHAGE) 500 MG Tab  TAKE 2 TABLETS BY MOUTH EACH MORNING WITH  BREAKFAST AND TAKE 1 TABLET EACH EVENING   WITH DINNER             nystatin (MYCOSTATIN) powder  Apply to affected area BID for 10 days.             omeprazole (PRILOSEC) 20 MG delayed-release capsule  Take 1 Cap by mouth every day.             predniSONE (DELTASONE) 20 MG Tab  Take 1 tab PO daily for 2 days then 1/2 tab PO for 2 days then stop.             trazodone (DESYREL) 100 MG Tab  Take 1.5 Tabs by mouth 1 time daily as needed for Sleep.             vitamin D, Ergocalciferol, (DRISDOL) 93566 UNITS Cap capsule  Take 1 Cap by mouth every 7 days.             warfarin (COUMADIN) 5 MG Tab  Take 5-7.5 mg by mouth every evening. 5 mg Monday Wednesday Friday  7.5 Tuesday Thursday Saturday Sunday                 DIET  Orders Placed This Encounter   Procedures   • Diet Order     Standing Status:   Standing     Number of Occurrences:   1     Order Specific Question:   Diet:     Answer:   Diabetic [3]       ACTIVITY  As tolerated.  Weight bearing as tolerated      CONSULTATIONS  None    PROCEDURES  None    LABORATORY  Lab Results   Component Value Date/Time    SODIUM 138 03/24/2018 02:09 AM    POTASSIUM 4.7 03/24/2018 02:09 AM    CHLORIDE 109 03/24/2018 02:09 AM    CO2 22 03/24/2018 02:09 AM    GLUCOSE 127 (H) 03/24/2018 02:09 AM    BUN 27 (H) 03/24/2018 02:09 AM    CREATININE 1.09 03/24/2018 02:09 AM    CREATININE 0.8 05/19/2009 04:00 PM         Lab Results   Component Value Date/Time    WBC 12.1 (H) 03/24/2018 02:09 AM    HEMOGLOBIN 9.8 (L) 03/24/2018 02:09 AM    HEMATOCRIT 30.5 (L) 03/24/2018 02:09 AM    PLATELETCT 370 03/24/2018 02:09 AM        Total time of the discharge process exceeds 38 minutes

## 2018-03-24 NOTE — PROGRESS NOTES
Assumed care of pt at 0700. Received report from RN. Pt A&Ox4. Assessment complete. Labs reviewed. Pt c/o pain in right hip, running down to right knee of 7/10. Pt medicated for pain per MAR, MD aware. MD explained to patient the pain in right knee is referred pain. Pt up standby assist to bedside commode. Pt on 3L O2 via NC, patient baseline is 2L, will try to wean patient down today per MD request. Pt and RN discussed plan of care. Pt questions answered. Pt needs are met at this time. Bed in lowest and locked position. Call light within reach. Upper bed rails up. Hourly rounding in place.

## 2018-03-25 PROBLEM — J81.1 PULMONARY EDEMA: Status: ACTIVE | Noted: 2018-03-25

## 2018-03-25 LAB
GLUCOSE BLD-MCNC: 114 MG/DL (ref 65–99)
GLUCOSE BLD-MCNC: 173 MG/DL (ref 65–99)
GLUCOSE BLD-MCNC: 243 MG/DL (ref 65–99)
GLUCOSE BLD-MCNC: 254 MG/DL (ref 65–99)
INR PPP: 1.42 (ref 0.87–1.13)
PROTHROMBIN TIME: 17 SEC (ref 12–14.6)

## 2018-03-25 PROCEDURE — A9270 NON-COVERED ITEM OR SERVICE: HCPCS | Performed by: HOSPITALIST

## 2018-03-25 PROCEDURE — 770006 HCHG ROOM/CARE - MED/SURG/GYN SEMI*

## 2018-03-25 PROCEDURE — 700102 HCHG RX REV CODE 250 W/ 637 OVERRIDE(OP): Performed by: NURSE PRACTITIONER

## 2018-03-25 PROCEDURE — 700102 HCHG RX REV CODE 250 W/ 637 OVERRIDE(OP): Performed by: HOSPITALIST

## 2018-03-25 PROCEDURE — A9270 NON-COVERED ITEM OR SERVICE: HCPCS | Performed by: NURSE PRACTITIONER

## 2018-03-25 PROCEDURE — 85610 PROTHROMBIN TIME: CPT

## 2018-03-25 PROCEDURE — 700102 HCHG RX REV CODE 250 W/ 637 OVERRIDE(OP): Performed by: FAMILY MEDICINE

## 2018-03-25 PROCEDURE — 36415 COLL VENOUS BLD VENIPUNCTURE: CPT

## 2018-03-25 PROCEDURE — 99233 SBSQ HOSP IP/OBS HIGH 50: CPT | Performed by: FAMILY MEDICINE

## 2018-03-25 PROCEDURE — A9270 NON-COVERED ITEM OR SERVICE: HCPCS | Performed by: FAMILY MEDICINE

## 2018-03-25 PROCEDURE — 82962 GLUCOSE BLOOD TEST: CPT | Mod: 91

## 2018-03-25 PROCEDURE — 700111 HCHG RX REV CODE 636 W/ 250 OVERRIDE (IP): Performed by: NURSE PRACTITIONER

## 2018-03-25 RX ORDER — FUROSEMIDE 20 MG/1
20 TABLET ORAL
Status: DISCONTINUED | OUTPATIENT
Start: 2018-03-25 | End: 2018-03-26 | Stop reason: HOSPADM

## 2018-03-25 RX ORDER — WARFARIN SODIUM 7.5 MG/1
7.5 TABLET ORAL
Status: COMPLETED | OUTPATIENT
Start: 2018-03-25 | End: 2018-03-25

## 2018-03-25 RX ADMIN — LEVOTHYROXINE SODIUM 150 MCG: 150 TABLET ORAL at 05:29

## 2018-03-25 RX ADMIN — PREDNISONE 20 MG: 20 TABLET ORAL at 09:26

## 2018-03-25 RX ADMIN — LOSARTAN POTASSIUM 50 MG: 50 TABLET, FILM COATED ORAL at 09:26

## 2018-03-25 RX ADMIN — OXYCODONE HYDROCHLORIDE 10 MG: 10 TABLET ORAL at 09:24

## 2018-03-25 RX ADMIN — ATORVASTATIN CALCIUM 40 MG: 40 TABLET, FILM COATED ORAL at 20:16

## 2018-03-25 RX ADMIN — GABAPENTIN 100 MG: 100 CAPSULE ORAL at 09:26

## 2018-03-25 RX ADMIN — WARFARIN SODIUM 7.5 MG: 7.5 TABLET ORAL at 17:27

## 2018-03-25 RX ADMIN — FEXOFENADINE HCL 180 MG: 60 TABLET, FILM COATED ORAL at 09:25

## 2018-03-25 RX ADMIN — INSULIN HUMAN 3 UNITS: 100 INJECTION, SOLUTION PARENTERAL at 20:13

## 2018-03-25 RX ADMIN — OXYCODONE HYDROCHLORIDE 10 MG: 10 TABLET ORAL at 05:29

## 2018-03-25 RX ADMIN — AMLODIPINE BESYLATE 10 MG: 10 TABLET ORAL at 09:25

## 2018-03-25 RX ADMIN — GABAPENTIN 100 MG: 100 CAPSULE ORAL at 15:35

## 2018-03-25 RX ADMIN — FUROSEMIDE 20 MG: 20 TABLET ORAL at 10:36

## 2018-03-25 RX ADMIN — OXYCODONE HYDROCHLORIDE 10 MG: 10 TABLET ORAL at 23:20

## 2018-03-25 RX ADMIN — STANDARDIZED SENNA CONCENTRATE AND DOCUSATE SODIUM 2 TABLET: 8.6; 5 TABLET, FILM COATED ORAL at 09:25

## 2018-03-25 RX ADMIN — OMEPRAZOLE 20 MG: 20 CAPSULE, DELAYED RELEASE ORAL at 09:26

## 2018-03-25 RX ADMIN — INSULIN HUMAN 2 UNITS: 100 INJECTION, SOLUTION PARENTERAL at 17:31

## 2018-03-25 RX ADMIN — GABAPENTIN 100 MG: 100 CAPSULE ORAL at 20:16

## 2018-03-25 RX ADMIN — OXYCODONE HYDROCHLORIDE 10 MG: 10 TABLET ORAL at 16:57

## 2018-03-25 RX ADMIN — INSULIN HUMAN 1 UNITS: 100 INJECTION, SOLUTION PARENTERAL at 11:28

## 2018-03-25 ASSESSMENT — ENCOUNTER SYMPTOMS
HEARTBURN: 0
HEMOPTYSIS: 0
SPUTUM PRODUCTION: 0
FALLS: 0
COUGH: 0
CONSTIPATION: 0
SENSORY CHANGE: 0
ABDOMINAL PAIN: 0
BLURRED VISION: 0
TREMORS: 0
HEADACHES: 0
BRUISES/BLEEDS EASILY: 0
DOUBLE VISION: 0
DIZZINESS: 0
TINGLING: 0
EYE PAIN: 0
PHOTOPHOBIA: 0
ORTHOPNEA: 0
NAUSEA: 0
SPEECH CHANGE: 0
FEVER: 0
WEIGHT LOSS: 0
SHORTNESS OF BREATH: 1
DEPRESSION: 0
CHILLS: 0
PALPITATIONS: 0
WHEEZING: 0
BACK PAIN: 1

## 2018-03-25 ASSESSMENT — LIFESTYLE VARIABLES: DO YOU DRINK ALCOHOL: NO

## 2018-03-25 ASSESSMENT — PAIN SCALES - GENERAL
PAINLEVEL_OUTOF10: 5
PAINLEVEL_OUTOF10: 5
PAINLEVEL_OUTOF10: 4
PAINLEVEL_OUTOF10: 5
PAINLEVEL_OUTOF10: 7
PAINLEVEL_OUTOF10: 4
PAINLEVEL_OUTOF10: 4
PAINLEVEL_OUTOF10: 8

## 2018-03-25 ASSESSMENT — PAIN SCALES - WONG BAKER: WONGBAKER_NUMERICALRESPONSE: HURTS JUST A LITTLE BIT

## 2018-03-25 NOTE — PROGRESS NOTES
Inpatient Anticoagulation Service Note    Date: 3/25/2018  Reason for Anticoagulation: Pulmonary Embolism        Hemoglobin Value: (!) 9.7  Hematocrit Value: (!) 29.7  Lab Platelet Value: 376  Target INR: 2.0 to 3.0    INR from last 7 days     Date/Time INR Value    03/24/18 2329 (!)  1.42    03/23/18 2329 (!)  1.91    03/22/18 2339 (!)  3.37    03/22/18 0529 (!)  4.43    03/21/18 0503 (!)  4.4        Dose from last 7 days     Date/Time Dose (mg)    03/25/18 1200  7.5    03/24/18 1300  5    03/23/18 1200  2.5    03/22/18 0900  0    03/21/18 0700  0        Average Dose (mg):  (5mg MWF, 7.5mg AOD)  Significant Interactions: Statin, Thyroid Medications, Corticosteroids  Bridge Therapy: No     Comments: Substantial drop in INR again today. H/H low, but stable. Platelets within normal limits. Of note, patient is refusing iron replacement. Hopefully will see the effects of 5 mg dose last night reflect on INR tomorrow. Will give 7.5 mg x 1 tonight. Continue to monitor daily INR for now until more stable.    Plan:  Warfarin 7.5 mg PO x 1  Education Material Provided?: No  Pharmacist suggested discharge dosing: TBD. Likely previous home regimen as outlined above.     Jalen Lau, PharmD, BCPS

## 2018-03-25 NOTE — PROGRESS NOTES
[facility] Hospitalist Progress Note    Date of Service: 3/25/2018    Chief Complaint  78 y.o. female admitted 3/21/2018 with right hip pain.    Interval Problem Update  Feels okay today. Pain on the right hip is resolving. Able to ambulate with a walker. Chest x-ray showed slight pulmonary edema. Saturating well on 2 L oxygen via nasal cannula.    Consultants/Specialty  N/A    Disposition  Home with home health.  Pending home health arrangement.  Pending home oxygen arrangement.        Review of Systems   Constitutional: Negative for chills, fever and weight loss.   HENT: Negative for ear discharge, ear pain, hearing loss and tinnitus.    Eyes: Negative for blurred vision, double vision, photophobia and pain.   Respiratory: Positive for shortness of breath. Negative for cough, hemoptysis, sputum production and wheezing.    Cardiovascular: Negative for chest pain, palpitations, orthopnea and leg swelling.   Gastrointestinal: Negative for abdominal pain, constipation, heartburn and nausea.   Genitourinary: Negative for dysuria, frequency and urgency.   Musculoskeletal: Positive for back pain (chronic) and joint pain. Negative for falls.   Skin: Negative for itching and rash.   Neurological: Negative for dizziness, tingling, tremors, sensory change, speech change and headaches.   Endo/Heme/Allergies: Negative for environmental allergies. Does not bruise/bleed easily.   Psychiatric/Behavioral: Negative for depression and suicidal ideas.      Physical Exam  Laboratory/Imaging   Hemodynamics  Temp (24hrs), Av.4 °C (97.6 °F), Min:36.2 °C (97.2 °F), Max:36.7 °C (98 °F)   Temperature: 36.5 °C (97.7 °F)  Pulse  Av.2  Min: 53  Max: 80   Blood Pressure : 131/77      Respiratory      Respiration: 16, Pulse Oximetry: 93 %     Work Of Breathing / Effort: Mild  RUL Breath Sounds: Diminished, RML Breath Sounds: Diminished, RLL Breath Sounds: Diminished, LUDA Breath Sounds: Diminished, LLL Breath Sounds:  Diminished    Fluids    Intake/Output Summary (Last 24 hours) at 03/25/18 1422  Last data filed at 03/25/18 0900   Gross per 24 hour   Intake              360 ml   Output                0 ml   Net              360 ml       Nutrition  Orders Placed This Encounter   Procedures   • Diet Order     Standing Status:   Standing     Number of Occurrences:   1     Order Specific Question:   Diet:     Answer:   Diabetic [3]     Physical Exam   Constitutional: She is oriented to person, place, and time. She appears well-developed and well-nourished. No distress.   HENT:   Head: Normocephalic and atraumatic.   Mouth/Throat: No oropharyngeal exudate.   Eyes: Conjunctivae are normal. Right eye exhibits no discharge. Left eye exhibits no discharge. No scleral icterus.   Neck: Neck supple. No JVD present. No tracheal deviation present.   Cardiovascular: Normal rate and regular rhythm.  Exam reveals no gallop and no friction rub.    No murmur heard.  Pulmonary/Chest: Effort normal and breath sounds normal. No stridor. No respiratory distress. She has no wheezes.   Abdominal: Soft. Bowel sounds are normal. She exhibits no distension. There is no tenderness.   Musculoskeletal: She exhibits tenderness (mild tenderness on palpation of the right hip joint and the right SI joint. Resolving). She exhibits no edema.   Right hip tenderness.   Neurological: She is alert and oriented to person, place, and time. No cranial nerve deficit. She exhibits normal muscle tone.   Skin: Skin is warm and dry. No rash noted. She is not diaphoretic. No erythema.   Psychiatric: She has a normal mood and affect. Her behavior is normal. Thought content normal.       Recent Labs      03/22/18   2339  03/24/18   0209  03/24/18   2329   WBC  12.6*  12.1*  11.2*   RBC  3.49*  3.51*  3.46*   HEMOGLOBIN  9.8*  9.8*  9.7*   HEMATOCRIT  30.1*  30.5*  29.7*   MCV  86.2  86.9  85.8   MCH  28.1  27.9  28.0   MCHC  32.6*  32.1*  32.7*   RDW  51.8*  52.0*  51.6*    PLATELETCT  358  370  376   MPV  9.8  10.2  9.7     Recent Labs      03/24/18   0209  03/24/18   2329   SODIUM  138  136   POTASSIUM  4.7  4.5   CHLORIDE  109  107   CO2  22  22   GLUCOSE  127*  185*   BUN  27*  28*   CREATININE  1.09  1.12   CALCIUM  8.2*  8.1*     Recent Labs      03/22/18   2339  03/23/18   2329  03/24/18   2329   INR  3.37*  1.91*  1.42*                  Assessment/Plan     * Hip pain   Assessment & Plan    X- ray negative for fracture.  Unable to obtain MRI due to metal joint replacement.  Denies fall or trauma.  CT scan of the right hip showed surgical change consistent with right hip arthroplasty. No evidence of fracture or dislocation of the arthroplasty components.   PT/OT recommended home with home health.  Pending arrangement for home health.          Pulmonary edema- (present on admission)   Assessment & Plan    Seen on chest x-ray. Recent echocardiogram showed ejection fraction 60% with no diastolic dysfunction but moderate aortic stenosis. We'll start low-dose Lasix.        Leukocytosis   Assessment & Plan    2/2 to steroids.  Stable.   Afebrile.  Continue to monitor.         Anemia   Assessment & Plan    May have chronic component.  No evidence of bleed.  Iron studies showed low iron levels and low iron saturation. We'll replace.  Monitor H&H.        Influenza   Assessment & Plan    Cont tamiflu        Chronic anticoagulation- (present on admission)   Assessment & Plan    Cont warfarin         Gastroesophageal reflux disease without esophagitis- (present on admission)   Assessment & Plan    Cont ppi         Controlled type 2 diabetes mellitus without complication, without long-term current use of insulin (CMS-[facility])- (present on admission)   Assessment & Plan    Hemoglobin A1c 6.4 .SSI while inpatient.        BMI 33.0-33.9,adult- (present on admission)   Assessment & Plan    Encourage weight loss        Essential hypertension- (present on admission)   Assessment & Plan    Continue  norvasc and losartan.        Hx pulmonary embolism- (present on admission)   Assessment & Plan    Cont warfarin pharm dose  Monitor INR.        Dyslipidemia- (present on admission)   Assessment & Plan    Cont home statin        Plan of care discussed with RN during rounds.   Quality-Core Measures   Reviewed items::  Radiology images reviewed, Labs reviewed, Medications reviewed and EKG reviewed  Espinoza catheter::  No Espinoza  DVT prophylaxis pharmacological::  Warfarin (Coumadin)  Ulcer Prophylaxis::  Not indicated

## 2018-03-25 NOTE — CARE PLAN
Problem: Discharge Barriers/Planning  Goal: Patient's continuum of care needs will be met  Outcome: PROGRESSING AS EXPECTED  SW working on getting patient O2 to use; to be discharged to home health.     Problem: Mobility  Goal: Risk for activity intolerance will decrease  Outcome: PROGRESSING AS EXPECTED  Patient ambulating further distances.

## 2018-03-25 NOTE — PROGRESS NOTES
Patient alert and oriented x4. Agrees overall pain level is decreasing in her right hip and only radiates down to her right knee. Will medicate with PRN pain med. Medications and plan of care reviewed. No questions or concerns. No SOB/dizziness. Resting on room air currently. Call light within reach. Bed locked and in lowest position.

## 2018-03-25 NOTE — PROGRESS NOTES
Assumed care of pt at 0700. Received report from RN. Pt A&Ox4. Assessment complete. Labs reviewed. Pt medicated for right hip/knee pain per MAR. Pt denies SOB, dizziness, chest pain at this time. Pt currently on 3L O2 via NC, patient baseline is 2L. Pt up to bathroom hand held assist, pt calls appropriately. Pt updated on discharge plan, SW met with patient this AM. Floor SW to follow up tomorrow per ALFRED note. Pt and RN discussed plan of care. Pt questions answered. Pt needs are met at this time. Bed in lowest and locked position. Call light within reach. Upper bed rails up. Hourly rounding in place.

## 2018-03-25 NOTE — ASSESSMENT & PLAN NOTE
Seen on chest x-ray. Recent echocardiogram showed ejection fraction 60% with no diastolic dysfunction but moderate aortic stenosis. We'll start low-dose Lasix.

## 2018-03-25 NOTE — CARE PLAN
Problem: Discharge Barriers/Planning  Goal: Patient's continuum of care needs will be met  Outcome: PROGRESSING AS EXPECTED  Pt updated on discharge plan. Floor SW to follow up with patient HH and with pt O2 company.     Problem: Mobility  Goal: Risk for activity intolerance will decrease  Outcome: PROGRESSING AS EXPECTED  Pt up ambulating in room and out to hallway today. Pt tolerating well with a steady gait. Pt denies SOB when ambulating with O2.

## 2018-03-25 NOTE — DISCHARGE PLANNING
Medical Social Work    Referral: Home health and home oxygen    Intervention: MSW met with pt at bedside. Pt states she had a home health agency when she was discharged last time, however she didn't like the physical therapist so she declined home health after her fist visit. Pt is open to having home health again, but wants to talk to the  through integrated [facility] services first. Kelsie #[phone] ext [phone]. Pt will need a portabnle tank through [facility] as her oxygen tank is at home. Pt states she will take a cab home. MSW provided pt her second IMM letter.    Plan: SW to follow up with pt Monday regarding home health choice and ordering portable tank through [facility].

## 2018-03-26 ENCOUNTER — HOME HEALTH ADMISSION (OUTPATIENT)
Dept: HOME HEALTH SERVICES | Facility: HOME HEALTHCARE | Age: 78
End: 2018-03-26
Payer: MEDICARE

## 2018-03-26 VITALS
TEMPERATURE: 97.5 F | WEIGHT: 196.65 LBS | HEART RATE: 58 BPM | SYSTOLIC BLOOD PRESSURE: 145 MMHG | DIASTOLIC BLOOD PRESSURE: 49 MMHG | BODY MASS INDEX: 33.57 KG/M2 | OXYGEN SATURATION: 96 % | HEIGHT: 64 IN | RESPIRATION RATE: 16 BRPM

## 2018-03-26 LAB
ALBUMIN SERPL BCP-MCNC: 3.3 G/DL (ref 3.2–4.9)
ALBUMIN/GLOB SERPL: 1.2 G/DL
ALP SERPL-CCNC: 59 U/L (ref 30–99)
ALT SERPL-CCNC: 12 U/L (ref 2–50)
ANION GAP SERPL CALC-SCNC: 5 MMOL/L (ref 0–11.9)
AST SERPL-CCNC: 12 U/L (ref 12–45)
BACTERIA BLD CULT: NORMAL
BASOPHILS # BLD AUTO: 0.3 % (ref 0–1.8)
BASOPHILS # BLD: 0.03 K/UL (ref 0–0.12)
BILIRUB SERPL-MCNC: 0.5 MG/DL (ref 0.1–1.5)
BUN SERPL-MCNC: 26 MG/DL (ref 8–22)
CALCIUM SERPL-MCNC: 8.3 MG/DL (ref 8.5–10.5)
CHLORIDE SERPL-SCNC: 105 MMOL/L (ref 96–112)
CO2 SERPL-SCNC: 28 MMOL/L (ref 20–33)
CREAT SERPL-MCNC: 1.2 MG/DL (ref 0.5–1.4)
EOSINOPHIL # BLD AUTO: 0.09 K/UL (ref 0–0.51)
EOSINOPHIL NFR BLD: 0.8 % (ref 0–6.9)
ERYTHROCYTE [DISTWIDTH] IN BLOOD BY AUTOMATED COUNT: 50.7 FL (ref 35.9–50)
GLOBULIN SER CALC-MCNC: 2.7 G/DL (ref 1.9–3.5)
GLUCOSE BLD-MCNC: 138 MG/DL (ref 65–99)
GLUCOSE BLD-MCNC: 167 MG/DL (ref 65–99)
GLUCOSE SERPL-MCNC: 160 MG/DL (ref 65–99)
HCT VFR BLD AUTO: 30.1 % (ref 37–47)
HGB BLD-MCNC: 9.8 G/DL (ref 12–16)
IMM GRANULOCYTES # BLD AUTO: 0.04 K/UL (ref 0–0.11)
IMM GRANULOCYTES NFR BLD AUTO: 0.3 % (ref 0–0.9)
INR PPP: 1.47 (ref 0.87–1.13)
LYMPHOCYTES # BLD AUTO: 2.77 K/UL (ref 1–4.8)
LYMPHOCYTES NFR BLD: 23.7 % (ref 22–41)
MCH RBC QN AUTO: 27.9 PG (ref 27–33)
MCHC RBC AUTO-ENTMCNC: 32.6 G/DL (ref 33.6–35)
MCV RBC AUTO: 85.8 FL (ref 81.4–97.8)
MONOCYTES # BLD AUTO: 1.68 K/UL (ref 0–0.85)
MONOCYTES NFR BLD AUTO: 14.4 % (ref 0–13.4)
NEUTROPHILS # BLD AUTO: 7.08 K/UL (ref 2–7.15)
NEUTROPHILS NFR BLD: 60.5 % (ref 44–72)
NRBC # BLD AUTO: 0 K/UL
NRBC BLD-RTO: 0 /100 WBC
PLATELET # BLD AUTO: 388 K/UL (ref 164–446)
PMV BLD AUTO: 10.1 FL (ref 9–12.9)
POTASSIUM SERPL-SCNC: 4.6 MMOL/L (ref 3.6–5.5)
PROT SERPL-MCNC: 6 G/DL (ref 6–8.2)
PROTHROMBIN TIME: 17.5 SEC (ref 12–14.6)
RBC # BLD AUTO: 3.51 M/UL (ref 4.2–5.4)
SIGNIFICANT IND 70042: NORMAL
SITE SITE: NORMAL
SODIUM SERPL-SCNC: 138 MMOL/L (ref 135–145)
SOURCE SOURCE: NORMAL
WBC # BLD AUTO: 11.7 K/UL (ref 4.8–10.8)

## 2018-03-26 PROCEDURE — 80053 COMPREHEN METABOLIC PANEL: CPT

## 2018-03-26 PROCEDURE — 700102 HCHG RX REV CODE 250 W/ 637 OVERRIDE(OP): Performed by: FAMILY MEDICINE

## 2018-03-26 PROCEDURE — 700102 HCHG RX REV CODE 250 W/ 637 OVERRIDE(OP): Performed by: HOSPITALIST

## 2018-03-26 PROCEDURE — A9270 NON-COVERED ITEM OR SERVICE: HCPCS | Performed by: HOSPITALIST

## 2018-03-26 PROCEDURE — A9270 NON-COVERED ITEM OR SERVICE: HCPCS | Performed by: FAMILY MEDICINE

## 2018-03-26 PROCEDURE — 36415 COLL VENOUS BLD VENIPUNCTURE: CPT

## 2018-03-26 PROCEDURE — A9270 NON-COVERED ITEM OR SERVICE: HCPCS | Performed by: NURSE PRACTITIONER

## 2018-03-26 PROCEDURE — 700102 HCHG RX REV CODE 250 W/ 637 OVERRIDE(OP): Performed by: NURSE PRACTITIONER

## 2018-03-26 PROCEDURE — 85025 COMPLETE CBC W/AUTO DIFF WBC: CPT

## 2018-03-26 PROCEDURE — 82962 GLUCOSE BLOOD TEST: CPT | Mod: 91

## 2018-03-26 PROCEDURE — 700111 HCHG RX REV CODE 636 W/ 250 OVERRIDE (IP): Performed by: NURSE PRACTITIONER

## 2018-03-26 PROCEDURE — 99239 HOSP IP/OBS DSCHRG MGMT >30: CPT | Performed by: FAMILY MEDICINE

## 2018-03-26 RX ORDER — POTASSIUM CHLORIDE 750 MG/1
10 TABLET, EXTENDED RELEASE ORAL DAILY
Qty: 15 EACH | Refills: 0 | Status: SHIPPED | OUTPATIENT
Start: 2018-03-26 | End: 2018-04-10

## 2018-03-26 RX ORDER — FUROSEMIDE 20 MG/1
20 TABLET ORAL DAILY
Qty: 15 TAB | Refills: 0 | Status: SHIPPED | OUTPATIENT
Start: 2018-03-27 | End: 2018-04-11

## 2018-03-26 RX ADMIN — GABAPENTIN 100 MG: 100 CAPSULE ORAL at 08:41

## 2018-03-26 RX ADMIN — LOSARTAN POTASSIUM 50 MG: 50 TABLET, FILM COATED ORAL at 08:40

## 2018-03-26 RX ADMIN — INSULIN HUMAN 1 UNITS: 100 INJECTION, SOLUTION PARENTERAL at 11:40

## 2018-03-26 RX ADMIN — PREDNISONE 20 MG: 20 TABLET ORAL at 08:40

## 2018-03-26 RX ADMIN — OMEPRAZOLE 20 MG: 20 CAPSULE, DELAYED RELEASE ORAL at 08:40

## 2018-03-26 RX ADMIN — LEVOTHYROXINE SODIUM 150 MCG: 150 TABLET ORAL at 05:34

## 2018-03-26 RX ADMIN — AMLODIPINE BESYLATE 10 MG: 10 TABLET ORAL at 08:41

## 2018-03-26 RX ADMIN — FUROSEMIDE 20 MG: 20 TABLET ORAL at 08:41

## 2018-03-26 RX ADMIN — FEXOFENADINE HCL 180 MG: 60 TABLET, FILM COATED ORAL at 08:41

## 2018-03-26 RX ADMIN — OXYCODONE HYDROCHLORIDE 10 MG: 10 TABLET ORAL at 05:34

## 2018-03-26 ASSESSMENT — LIFESTYLE VARIABLES
DO YOU DRINK ALCOHOL: NO
EVER_SMOKED: YES

## 2018-03-26 ASSESSMENT — PAIN SCALES - GENERAL
PAINLEVEL_OUTOF10: 3
PAINLEVEL_OUTOF10: 0
PAINLEVEL_OUTOF10: 2

## 2018-03-26 NOTE — DISCHARGE PLANNING
ATTN: Case Management  RE: Referral for Home Health                We would like to take this opportunity to thank you for submitting a referral for your patient to continue care with [facility]. Our skilled team is dedicated to helping all patients recover and gain independence in the home setting.            As of 3/26/18, we have accepted the above patient into our service. A [facility] clinician will be out to see the patient within 48 hours to conduct our initial visit. If you have any questions or concerns regarding the patient’s transition to Home Health, please do not hesitate to contact us. We are open for referrals 7 days a week from 8AM to 5PM at [phone].      We look forward to collaborating with you,  [facility] Team

## 2018-03-26 NOTE — DISCHARGE PLANNING
CCS received a DME choice form. Per the choice form the referral has been sent to [facility]

## 2018-03-26 NOTE — DISCHARGE PLANNING
ALFRED met with pt at bedside. SW identified HH LOC. Pt chose [facility]. Choice completed.  Pt is up to self, ambulatory. Pt states she has DME including FWW and cane at home but does not use them.   Pt is on services with [facility] for oxygen. Pt will take a cab home.    Pt lives at [facility].

## 2018-03-26 NOTE — DISCHARGE PLANNING
Per the request of the SW on floor CCS called and spoke to jonathon at [facility]. Jonathon stated that we would need a new order before a portable tank be delivered. Per Jonathon Original order that was received for this patient was incorrect. The Sw on floor has been notified via Email

## 2018-03-26 NOTE — DISCHARGE PLANNING
note:  MD agreed to discharge today.  Rounding done and pt said that she is independent, has a FWW at and comfortable to go home without any concerns.

## 2018-03-26 NOTE — PROGRESS NOTES
"Assumed care of pt at 0700. Received report from RN. Pt A&Ox4. Assessment complete. Labs reviewed. Pt denies pain at this time, \"I already got my pain pill this morning, I don't need anything right now.\" Pt up self at this time and steady on feet. No PIV, MD aware. Pt to be discharged today, with HH. Pt states she has O2 concentrator and an O2 tank at home. Pt to go home via cab when D/C orders in place. Pt and RN discussed plan of care. Pt questions answered. Pt needs are met at this time. Bed in lowest and locked position. Call light within reach. Upper bed rails up. Hourly rounding in place.   "

## 2018-03-26 NOTE — PROGRESS NOTES
Alert and oriented x4. Resting on 2L of O2 by Nc. Denies pain currently. Explains her back pain has improved significantly since she has been in the hospital. Medications and plan of care reviewed. Denies any needs currently. Bed locked and in lowest position. Call light within reach. Will continue to monitor.

## 2018-03-26 NOTE — CARE PLAN
"Problem: Discharge Barriers/Planning  Goal: Patient's continuum of care needs will be met  Outcome: PROGRESSING AS EXPECTED  Pt updated on discharge plan, pt is agreeable to plan. Pt to be discharged today.     Problem: Mobility  Goal: Risk for activity intolerance will decrease  Outcome: PROGRESSING AS EXPECTED  Pt up standby assist, steady on feet. Pt to be discharged home today. Pt states, \"I feel so much better today.\"      "

## 2018-03-26 NOTE — DISCHARGE PLANNING
CCS received a HH choice form. Per the choice form the referral has been sent to [facility]

## 2018-03-26 NOTE — PROGRESS NOTES
Pt discharged to [facility] via cab. Pt brought down to ED  by CNA. Pt discharge paperwork signed and in chart. All patient questions answered. No PIV present on discharge. Pt left with all belongings and with home O2 tank.

## 2018-03-27 ENCOUNTER — OFFICE VISIT (OUTPATIENT)
Dept: MEDICAL GROUP | Facility: MEDICAL CENTER | Age: 78
End: 2018-03-27
Payer: MEDICARE

## 2018-03-27 ENCOUNTER — PATIENT OUTREACH (OUTPATIENT)
Dept: HEALTH INFORMATION MANAGEMENT | Facility: OTHER | Age: 78
End: 2018-03-27

## 2018-03-27 VITALS
SYSTOLIC BLOOD PRESSURE: 118 MMHG | HEART RATE: 81 BPM | HEIGHT: 64 IN | RESPIRATION RATE: 16 BRPM | DIASTOLIC BLOOD PRESSURE: 72 MMHG | WEIGHT: 194 LBS | BODY MASS INDEX: 33.12 KG/M2 | OXYGEN SATURATION: 95 %

## 2018-03-27 DIAGNOSIS — D72.829 LEUKOCYTOSIS, UNSPECIFIED TYPE: ICD-10-CM

## 2018-03-27 DIAGNOSIS — I35.0 AORTIC STENOSIS, MODERATE: ICD-10-CM

## 2018-03-27 DIAGNOSIS — N18.30 CKD (CHRONIC KIDNEY DISEASE) STAGE 3, GFR 30-59 ML/MIN (HCC): ICD-10-CM

## 2018-03-27 DIAGNOSIS — E66.9 OBESITY (BMI 30-39.9): ICD-10-CM

## 2018-03-27 DIAGNOSIS — I10 ESSENTIAL HYPERTENSION: ICD-10-CM

## 2018-03-27 DIAGNOSIS — D50.9 IRON DEFICIENCY ANEMIA, UNSPECIFIED IRON DEFICIENCY ANEMIA TYPE: ICD-10-CM

## 2018-03-27 DIAGNOSIS — M25.559 ARTHRALGIA OF HIP, UNSPECIFIED LATERALITY: Chronic | ICD-10-CM

## 2018-03-27 DIAGNOSIS — E11.9 CONTROLLED TYPE 2 DIABETES MELLITUS WITHOUT COMPLICATION, WITHOUT LONG-TERM CURRENT USE OF INSULIN (HCC): ICD-10-CM

## 2018-03-27 PROBLEM — J11.1 INFLUENZA: Status: RESOLVED | Noted: 2018-03-21 | Resolved: 2018-03-27

## 2018-03-27 PROCEDURE — 99214 OFFICE O/P EST MOD 30 MIN: CPT | Performed by: NURSE PRACTITIONER

## 2018-03-27 NOTE — DISCHARGE SUMMARY
"CHIEF COMPLAINT ON ADMISSION  Chief Complaint   Patient presents with   • Hip Pain     \"Right now it's a 1/10 pain but when I move it's a 12! I am afraid I coughed so hard I coughed my hip replacement out of place.\" Denies trauma or injury to area.       CODE STATUS  Full     HPI & HOSPITAL COURSE  This is a 78 y.o. female with past medical history of hip arthritis status post right hip ORIF and recent diagnosis of influenza diagnosed in urgent care, and history of paroxysmal A. fib and PE on Coumadin who came in with sharp right hip pain. Patient denied any trauma or fall. She attributed the sharp right hip pain to be secondary to intractable cough from her recent influenza. X-ray of the right hip showed no acute bony abnormalities or fractures. Patient was not a candidate for MRI due to hardware that she has on the right hip. CT scan of the right hip joint showed surgical changes consistent with right hip arthroplasty with no evidence of fracture or dislocation of the arthroplasty components. Patient was started on systemic urine lites and pain management. She continued and completed a course of Tamiflu during her hospital stay. Pharmacy monitor INR and dose her Coumadin accordingly. She was initially with supratherapeutic INR but then that resolved. She had reactive thrombocytosis which resolved over hospital course. Her white count was initially elevated which was attributed to stress and influenza and continued to be mildly elevated which was attributed to steroids that the patient receives as well. Her pain significantly improved over her hospital stay. She was able to ambulate with physical therapy who recommended home health. She was anemic and iron studies showed low iron levels and low iron saturation and was given IV iron infusion that followed with oral supplements. Her H&H were stable during her hospital stay. She was hemodynamically and clinically stable. Home health was arranged for the patient. " Transportation was arranged for the patient. She was discharged back to assisted living in stable condition.    Addendum: PT/OT recommended home with home health. That was arranged for the patient along with home O2. Patient then was discharged in stable condition.     The patient met 2-midnight criteria for an inpatient stay at the time of discharge.    Therefore, she is discharged in good and stable condition with close outpatient follow-up.    SPECIFIC OUTPATIENT FOLLOW-UP  Follow-up with PCP in one week.  Follow-up with PT/OT as per home health staff recommendations.    DISCHARGE PROBLEM LIST  Principal Problem:    Hip pain (Chronic) POA: Unknown  Active Problems:    Dyslipidemia POA: Yes    Hx pulmonary embolism POA: Yes    Essential hypertension POA: Yes    BMI 33.0-33.9,adult POA: Yes    Controlled type 2 diabetes mellitus without complication, without long-term current use of insulin (CMS-H[facility]) POA: Yes    Gastroesophageal reflux disease without esophagitis POA: Yes    Chronic anticoagulation POA: Yes    Influenza POA: Unknown    Anemia POA: Unknown    Leukocytosis POA: Unknown    Pulmonary edema POA: Yes  Resolved Problems:    Thrombocytosis (CMS-[facility]) POA: Yes    Supratherapeutic INR POA: Yes      FOLLOW UP  Future Appointments  Date Time Provider Department Center   3/27/2018 10:15 AM [facility] EXAM 4 [facility] None   3/27/2018 1:20 PM YUE BakerPMaryNMary [address]   4/10/2018 10:00 AM DAO BakerNMary [address]   4/24/2018 1:15 PM Carlos Guillen M.D. [facility] None   9/12/2018 9:40 AM VASCULAR NURSE PRACTITIONER [facility] None     No follow-up provider specified.    MEDICATIONS ON DISCHARGE   Ian Sanjuanita Zainab   Home Medication Instructions ELIDA:[id number]    Printed on:03/24/18 1052   Medication Information                      albuterol 108 (90 Base) MCG/ACT Aero Soln inhalation aerosol  Inhale 2 Puffs by mouth every 6 hours as needed for Shortness of Breath.             amlodipine  (NORVASC) 10 MG Tab  TAKE ONE TABLET BY MOUTH ONCE A DAY             atorvastatin (LIPITOR) 80 MG tablet  Take 0.5 Tabs by mouth every evening.             calcium carbonate (OS-MARKELL 500) 500 MG Tab  TAKE ONE TABLET BY MOUTH TWICE DAILY WITH MEALS             ferrous sulfate 325 (65 Fe) MG tablet  Take 1 Tab by mouth every day.             gabapentin (NEURONTIN) 100 MG Cap  Take 1 Cap by mouth 3 times a day for 30 days.             glimepiride (AMARYL) 2 MG Tab  TAKE  ONE TABLET BY MOUTH EVERY MORNING             levothyroxine (SYNTHROID) 150 MCG Tab  Take 1 Tab by mouth Every morning on an empty stomach.             losartan (COZAAR) 50 MG Tab  TAKE ONE TABLET BY MOUTH EVERY DAY             metformin (GLUCOPHAGE) 500 MG Tab  TAKE 2 TABLETS BY MOUTH EACH MORNING WITH  BREAKFAST AND TAKE 1 TABLET EACH EVENING   WITH DINNER             nystatin (MYCOSTATIN) powder  Apply to affected area BID for 10 days.             omeprazole (PRILOSEC) 20 MG delayed-release capsule  Take 1 Cap by mouth every day.             predniSONE (DELTASONE) 20 MG Tab  Take 1 tab PO daily for 2 days then 1/2 tab PO for 2 days then stop.             trazodone (DESYREL) 100 MG Tab  Take 1.5 Tabs by mouth 1 time daily as needed for Sleep.             vitamin D, Ergocalciferol, (DRISDOL) 71891 UNITS Cap capsule  Take 1 Cap by mouth every 7 days.             warfarin (COUMADIN) 5 MG Tab  Take 5-7.5 mg by mouth every evening. 5 mg Monday Wednesday Friday  7.5 Tuesday Thursday Saturday Sunday                 DIET  No orders of the defined types were placed in this encounter.      ACTIVITY  As tolerated.  Weight bearing as tolerated      CONSULTATIONS  None    PROCEDURES  None    LABORATORY  Lab Results   Component Value Date/Time    SODIUM 138 03/26/2018 02:35 AM    POTASSIUM 4.6 03/26/2018 02:35 AM    CHLORIDE 105 03/26/2018 02:35 AM    CO2 28 03/26/2018 02:35 AM    GLUCOSE 160 (H) 03/26/2018 02:35 AM    BUN 26 (H) 03/26/2018 02:35 AM    CREATININE  1.20 03/26/2018 02:35 AM    CREATININE 0.8 05/19/2009 04:00 PM        Lab Results   Component Value Date/Time    WBC 11.7 (H) 03/26/2018 02:35 AM    HEMOGLOBIN 9.8 (L) 03/26/2018 02:35 AM    HEMATOCRIT 30.1 (L) 03/26/2018 02:35 AM    PLATELETCT 388 03/26/2018 02:35 AM        Total time of the discharge process exceeds 38 minutes

## 2018-03-27 NOTE — PROGRESS NOTES
Subjective:      Sanjuanita Sosa is a 78 y.o. female who presents with Hip Pain        CC: Patient here today for hospital follow-up for hip pain and diabetes.    HPI Sanjuanita Sosa      1. Arthralgia of hip, unspecified laterality  Patient was seen at the emergency room 6 days ago for complaint of hip pain. She has a history of right hip ORIF and was having pain in the hip with workup at the emergency room showing no evidence of hip injury. She had recently been treated through urgent care for influenza as well. She was given physical therapy at the hospital and then through home health care and reports that her hip is doing better. She is currently on gabapentin and Tylenol. I had advised her on a previous visit that she could no longer get opiate pain medication unless she went to pain management which she declined.    2. Essential hypertension  Blood pressure remains well controlled on current medicines.    3. Iron deficiency anemia, unspecified iron deficiency anemia type  Patient was found to be anemic in the hospital and was given IV iron transfusions and sent home on iron supplementation. She reports no overt bleeding. CBC was stable at time of discharge.    4. Aortic stenosis, moderate  Patient has a pending appointment with cardiology because of an echocardiogram done recently showing moderate aortic stenosis.    5. Leukocytosis, unspecified type  Patient was found to have leukocytosis in the hospital and it was felt was related to her recent influenza as well as steroids.    6. CKD (chronic kidney disease) stage 3, GFR 30-59 ml/min  Most recent GFR was at 44 which is slightly lower than average for patient.    7. Controlled type 2 diabetes mellitus without complication, without long-term current use of insulin (CMS-[facility])  Most recent hemoglobin A1c was 6.4 on current metformin and Amaryl dosage.    8. Obesity (BMI 30-39.9)  Weight is steady.  Social History   Substance Use Topics   •  Smoking status: Current Every Day Smoker     Packs/day: 0.25     Years: 40.00     Types: Cigarettes   • Smokeless tobacco: Never Used   • Alcohol use No     Current Outpatient Prescriptions   Medication Sig Dispense Refill   • furosemide (LASIX) 20 MG Tab Take 1 Tab by mouth every day for 15 days. 15 Tab 0   • potassium chloride SA (K-DUR) 10 MEQ Tab CR Take 1 Tab by mouth every day for 15 days. 15 Each 0   • gabapentin (NEURONTIN) 100 MG Cap Take 1 Cap by mouth 3 times a day for 30 days. 90 Cap 0   • nystatin (MYCOSTATIN) powder Apply to affected area BID for 10 days. 15 g 0   • levothyroxine (SYNTHROID) 150 MCG Tab Take 1 Tab by mouth Every morning on an empty stomach. 30 Tab 0   • ferrous sulfate 325 (65 Fe) MG tablet Take 1 Tab by mouth every day. 90 Tab 0   • warfarin (COUMADIN) 5 MG Tab Take 5-7.5 mg by mouth every evening. 5 mg Monday Wednesday Friday  7.5 Tuesday Thursday Saturday Sunday     • albuterol 108 (90 Base) MCG/ACT Aero Soln inhalation aerosol Inhale 2 Puffs by mouth every 6 hours as needed for Shortness of Breath. 8.5 g 3   • omeprazole (PRILOSEC) 20 MG delayed-release capsule Take 1 Cap by mouth every day. 30 Cap 11   • amlodipine (NORVASC) 10 MG Tab TAKE ONE TABLET BY MOUTH ONCE A DAY 90 Tab 2   • calcium carbonate (OS-MARKELL 500) 500 MG Tab TAKE ONE TABLET BY MOUTH TWICE DAILY WITH MEALS 60 Tab 11   • trazodone (DESYREL) 100 MG Tab Take 1.5 Tabs by mouth 1 time daily as needed for Sleep. 45 Tab 11   • glimepiride (AMARYL) 2 MG Tab TAKE  ONE TABLET BY MOUTH EVERY MORNING 30 Tab 11   • metformin (GLUCOPHAGE) 500 MG Tab TAKE 2 TABLETS BY MOUTH EACH MORNING WITH  BREAKFAST AND TAKE 1 TABLET EACH EVENING   WITH DINNER 90 Tab 11   • vitamin D, Ergocalciferol, (DRISDOL) 06207 UNITS Cap capsule Take 1 Cap by mouth every 7 days. 4 Cap 11   • losartan (COZAAR) 50 MG Tab TAKE ONE TABLET BY MOUTH EVERY DAY 30 Tab 10   • atorvastatin (LIPITOR) 80 MG tablet Take 0.5 Tabs by mouth every evening. 30 Tab 11     No  "current facility-administered medications for this visit.      Facility-Administered Medications Ordered in Other Visits   Medication Dose Route Frequency Provider Last Rate Last Dose   • iodixanol (VISIPAQUE) SOLN    Intra-Op Once PRN Shekhar Rosado M.D.   10 mL at 01/18/17 0619     Family History   Problem Relation Age of Onset   • Hyperlipidemia Mother    • Stroke Mother    • Hyperlipidemia Father    • Stroke Father    • Heart Disease Brother      CABG     Past Medical History:   Diagnosis Date   • AAA (abdominal aortic aneurysm) (CMS-HCC)    • Anticoagulation monitoring, special range    • Arrhythmia    • Arthritis    • Autoimmune hepatitis (CMS-[facility]) 3/19/2012   • Blood clotting disorder (CMS-HCC) 2015    clot in leg and lung   • Breath shortness    • Cancer (CMS-HCC)     thyroid   • Cataract     left eye needs surgery   • Diabetes     pre-diabetic   • Disorder of thyroid 2016    thyroid cancer   • Gallstones    • H/O: HTN (hypertension) 3/19/2012   • Heart valve disease    • Hepatitis B    • Hiatus hernia syndrome    • Hyperlipidemia    • Hypertension    • Kidney disease    • Mixed hyperlipidemia 3/19/2012   • Murmur 3/19/2012   • Nonspecific abnormal electrocardiogram (ECG) (EKG) 3/19/2012   • Overweight(278.02) 3/19/2012   • Pain    • Palpitations    • Preoperative cardiovascular examination 3/19/2012   • Unspecified urinary incontinence        Review of Systems   Musculoskeletal: Positive for joint pain.   All other systems reviewed and are negative.         Objective:     /72   Pulse 81   Resp 16   Ht 1.626 m (5' 4\")   Wt 88 kg (194 lb)   LMP 10/12/1970   SpO2 95%   BMI 33.30 kg/m²      Physical Exam   Musculoskeletal:   Patient able to ambulate in the halls with cane and no other assist.               Assessment/Plan:     1. Arthralgia of hip, unspecified laterality  Patient was referred for home health therapy and nurses from the hospital but she has not been contacted as of yet so I " advised her to contact home health to get set up for this.    2. Essential hypertension  I would like to check a urine post hospitalization.  - URINALYSIS,CULTURE IF INDICATED; Future    3. Iron deficiency anemia, unspecified iron deficiency anemia type  I will check to be sure patient is not overtly anemic and taking her iron.  - CBC WITHOUT DIFFERENTIAL; Future    4. Aortic stenosis, moderate  Patient has follow-up appointment with cardiology for her moderate aortic stenosis.    5. Leukocytosis, unspecified type  Patient's white blood cell count should be getting back to normal now she is off steroids and over her influenza.  - CBC WITHOUT DIFFERENTIAL; Future    6. CKD (chronic kidney disease) stage 3, GFR 30-59 ml/min  I will continue to monitor this center for GFR goes below 45, she will have to switch to another medicine for her diabetes.    7. Controlled type 2 diabetes mellitus without complication, without long-term current use of insulin (CMS-[facility])  Currently well controlled on low-dose metformin 3 times a day as well as glimepiride..    8. Obesity (BMI 30-39.9)    - Patient identified as having weight management issue.  Appropriate orders and counseling given.

## 2018-03-28 ENCOUNTER — HOME CARE VISIT (OUTPATIENT)
Dept: HOME HEALTH SERVICES | Facility: HOME HEALTHCARE | Age: 78
End: 2018-03-28
Payer: MEDICARE

## 2018-03-28 ENCOUNTER — PATIENT OUTREACH (OUTPATIENT)
Dept: HEALTH INFORMATION MANAGEMENT | Facility: OTHER | Age: 78
End: 2018-03-28

## 2018-03-28 VITALS
TEMPERATURE: 98.7 F | HEART RATE: 59 BPM | RESPIRATION RATE: 16 BRPM | OXYGEN SATURATION: 97 % | HEIGHT: 63 IN | BODY MASS INDEX: 34.41 KG/M2 | WEIGHT: 194.2 LBS | SYSTOLIC BLOOD PRESSURE: 126 MMHG | DIASTOLIC BLOOD PRESSURE: 58 MMHG

## 2018-03-28 PROCEDURE — 665999 HH PPS REVENUE DEBIT

## 2018-03-28 PROCEDURE — 665998 HH PPS REVENUE CREDIT

## 2018-03-28 PROCEDURE — 665001 SOC-HOME HEALTH

## 2018-03-28 PROCEDURE — G0493 RN CARE EA 15 MIN HH/HOSPICE: HCPCS

## 2018-03-28 SDOH — ECONOMIC STABILITY: HOUSING INSECURITY: UNSAFE COOKING RANGE AREA: 0

## 2018-03-28 SDOH — ECONOMIC STABILITY: HOUSING INSECURITY: UNSAFE APPLIANCES: 0

## 2018-03-28 ASSESSMENT — ACTIVITIES OF DAILY LIVING (ADL)
HOME_HEALTH_OASIS: 03
HOME_HEALTH_OASIS: 01

## 2018-03-28 ASSESSMENT — ENCOUNTER SYMPTOMS
VOMITING: DENIES
NAUSEA: DENIES

## 2018-03-28 NOTE — PROGRESS NOTES
Received referral from [facility] patient advocate Suzi for medication review. Outbound call to Sanjuanita. Left VM with my contact information and a request for a return call.     Socorro Fontaine, PharmD

## 2018-03-29 ENCOUNTER — PATIENT OUTREACH (OUTPATIENT)
Dept: HEALTH INFORMATION MANAGEMENT | Facility: OTHER | Age: 78
End: 2018-03-29

## 2018-03-29 ENCOUNTER — TELEPHONE (OUTPATIENT)
Dept: HEALTH INFORMATION MANAGEMENT | Facility: OTHER | Age: 78
End: 2018-03-29

## 2018-03-29 PROCEDURE — 665999 HH PPS REVENUE DEBIT

## 2018-03-29 PROCEDURE — 665998 HH PPS REVENUE CREDIT

## 2018-03-29 RX ORDER — LANCETS
EACH MISCELLANEOUS
Qty: 100 EACH | Refills: 5 | Status: SHIPPED | OUTPATIENT
Start: 2018-03-29 | End: 2018-09-21 | Stop reason: SDUPTHER

## 2018-03-29 RX ORDER — LANCETS
EACH MISCELLANEOUS
Qty: 100 EACH | Refills: 5 | Status: SHIPPED | OUTPATIENT
Start: 2018-03-29 | End: 2018-03-29 | Stop reason: SDUPTHER

## 2018-03-30 ENCOUNTER — PATIENT OUTREACH (OUTPATIENT)
Dept: HEALTH INFORMATION MANAGEMENT | Facility: OTHER | Age: 78
End: 2018-03-30

## 2018-03-30 ENCOUNTER — HOME CARE VISIT (OUTPATIENT)
Dept: HOME HEALTH SERVICES | Facility: HOME HEALTHCARE | Age: 78
End: 2018-03-30
Payer: MEDICARE

## 2018-03-30 VITALS
RESPIRATION RATE: 16 BRPM | OXYGEN SATURATION: 97 % | TEMPERATURE: 98.1 F | HEART RATE: 72 BPM | DIASTOLIC BLOOD PRESSURE: 60 MMHG | SYSTOLIC BLOOD PRESSURE: 107 MMHG

## 2018-03-30 PROCEDURE — G0151 HHCP-SERV OF PT,EA 15 MIN: HCPCS

## 2018-03-30 PROCEDURE — 665998 HH PPS REVENUE CREDIT

## 2018-03-30 PROCEDURE — 665999 HH PPS REVENUE DEBIT

## 2018-03-30 ASSESSMENT — ACTIVITIES OF DAILY LIVING (ADL)
ADLS_COMMENTS: <!--EPICS-->SEE OT REPORT<!--EPICE-->
IADLS_COMMENTS: <!--EPICS-->SEE OT REPORT<!--EPICE-->

## 2018-03-30 NOTE — PROGRESS NOTES
2nd attempt to reach patient regarding medication questions identified by IHD. Spoke with Sanjuanita. Patient states she has her medications figured out from home health care nursing visit. Reports no questions or concerns at this time. Provided patient with my contact information should she have questions in the future.     Socorro Fontaine, PharmD

## 2018-03-30 NOTE — TELEPHONE ENCOUNTER
Ivan Gaviria,    Med review completed. No significant issues noted.    Thanks,  Thony Peña, PharmD [id number]

## 2018-03-31 PROCEDURE — 665998 HH PPS REVENUE CREDIT

## 2018-03-31 PROCEDURE — 665999 HH PPS REVENUE DEBIT

## 2018-04-01 ENCOUNTER — HOME CARE VISIT (OUTPATIENT)
Dept: HOME HEALTH SERVICES | Facility: HOME HEALTHCARE | Age: 78
End: 2018-04-01
Payer: MEDICARE

## 2018-04-01 PROCEDURE — 665998 HH PPS REVENUE CREDIT

## 2018-04-01 PROCEDURE — 665999 HH PPS REVENUE DEBIT

## 2018-04-02 ENCOUNTER — ANTICOAGULATION MONITORING (OUTPATIENT)
Dept: VASCULAR LAB | Facility: MEDICAL CENTER | Age: 78
End: 2018-04-02

## 2018-04-02 ENCOUNTER — HOME CARE VISIT (OUTPATIENT)
Dept: HOME HEALTH SERVICES | Facility: HOME HEALTHCARE | Age: 78
End: 2018-04-02
Payer: MEDICARE

## 2018-04-02 VITALS
TEMPERATURE: 98.2 F | SYSTOLIC BLOOD PRESSURE: 118 MMHG | DIASTOLIC BLOOD PRESSURE: 66 MMHG | RESPIRATION RATE: 16 BRPM | HEART RATE: 80 BPM | OXYGEN SATURATION: 93 %

## 2018-04-02 DIAGNOSIS — Z86.711 HX PULMONARY EMBOLISM: ICD-10-CM

## 2018-04-02 LAB — INR PPP: 3.1 (ref 2–3.5)

## 2018-04-02 PROCEDURE — 665999 HH PPS REVENUE DEBIT

## 2018-04-02 PROCEDURE — G0299 HHS/HOSPICE OF RN EA 15 MIN: HCPCS

## 2018-04-02 PROCEDURE — 665998 HH PPS REVENUE CREDIT

## 2018-04-02 SDOH — ECONOMIC STABILITY: HOUSING INSECURITY: UNSAFE COOKING RANGE AREA: 0

## 2018-04-02 SDOH — ECONOMIC STABILITY: HOUSING INSECURITY: UNSAFE APPLIANCES: 0

## 2018-04-02 ASSESSMENT — ACTIVITIES OF DAILY LIVING (ADL): OASIS_M1830: 02

## 2018-04-02 NOTE — PROGRESS NOTES
Anticoagulation Summary  As of 4/2/2018    INR goal:   2.0-3.0   TTR:   76.5 % (11.4 mo)   Today's INR:   3.1!   Maintenance plan:   5 mg (5 mg x 1) on Mon, Wed, Fri; 7.5 mg (5 mg x 1.5) all other days   Weekly total:   45 mg   Plan last modified:   UYE VickPSTEFAN (10/18/2017)   Next INR check:   4/9/2018   Target end date:   Indefinite    Indications    DVT (deep venous thrombosis)  left (Resolved) [I82.402]  Hx pulmonary embolism [Z86.711]             Anticoagulation Episode Summary     INR check location:       Preferred lab:       Send INR reminders to:       Comments:   [facility]      Anticoagulation Care Providers     Provider Role Specialty Phone number    WENDY Baker Referring Family Medicine [phone]    [facility] Anticoagulation Services Responsible  [phone]        Anticoagulation Patient Findings        Spoke to patient on the phone.   INR  supra-therapeutic.   Denies signs/symptoms of bleeding and/or thrombosis.   Denies changes to diet or medications.   Follow up appointment in 1 week(s).    2.5mg tonight then continue weekly warfarin dose as noted      Ezequiel Coombs, PharmD

## 2018-04-03 PROCEDURE — 665998 HH PPS REVENUE CREDIT

## 2018-04-03 PROCEDURE — 665999 HH PPS REVENUE DEBIT

## 2018-04-04 PROCEDURE — 665998 HH PPS REVENUE CREDIT

## 2018-04-04 PROCEDURE — 665999 HH PPS REVENUE DEBIT

## 2018-04-05 ENCOUNTER — HOME CARE VISIT (OUTPATIENT)
Dept: HOME HEALTH SERVICES | Facility: HOME HEALTHCARE | Age: 78
End: 2018-04-05
Payer: MEDICARE

## 2018-04-05 ENCOUNTER — HOSPITAL ENCOUNTER (OUTPATIENT)
Facility: MEDICAL CENTER | Age: 78
End: 2018-04-05
Attending: NURSE PRACTITIONER
Payer: MEDICARE

## 2018-04-05 VITALS
DIASTOLIC BLOOD PRESSURE: 48 MMHG | HEART RATE: 77 BPM | SYSTOLIC BLOOD PRESSURE: 104 MMHG | RESPIRATION RATE: 16 BRPM | TEMPERATURE: 98.1 F | OXYGEN SATURATION: 94 %

## 2018-04-05 LAB
APPEARANCE UR: CLEAR
BACTERIA #/AREA URNS HPF: NEGATIVE /HPF
BASOPHILS # BLD AUTO: 0.4 % (ref 0–1.8)
BASOPHILS # BLD: 0.05 K/UL (ref 0–0.12)
BILIRUB UR QL STRIP.AUTO: NEGATIVE
COLOR UR: YELLOW
CULTURE IF INDICATED INDCX: YES UA CULTURE
EOSINOPHIL # BLD AUTO: 0.51 K/UL (ref 0–0.51)
EOSINOPHIL NFR BLD: 4.3 % (ref 0–6.9)
EPI CELLS #/AREA URNS HPF: ABNORMAL /HPF
ERYTHROCYTE [DISTWIDTH] IN BLOOD BY AUTOMATED COUNT: 60.9 FL (ref 35.9–50)
GLUCOSE UR STRIP.AUTO-MCNC: NEGATIVE MG/DL
HCT VFR BLD AUTO: 36.1 % (ref 37–47)
HGB BLD-MCNC: 11.4 G/DL (ref 12–16)
HYALINE CASTS #/AREA URNS LPF: ABNORMAL /LPF
IMM GRANULOCYTES # BLD AUTO: 0.07 K/UL (ref 0–0.11)
IMM GRANULOCYTES NFR BLD AUTO: 0.6 % (ref 0–0.9)
KETONES UR STRIP.AUTO-MCNC: NEGATIVE MG/DL
LEUKOCYTE ESTERASE UR QL STRIP.AUTO: ABNORMAL
LYMPHOCYTES # BLD AUTO: 1.77 K/UL (ref 1–4.8)
LYMPHOCYTES NFR BLD: 14.8 % (ref 22–41)
MCH RBC QN AUTO: 28.5 PG (ref 27–33)
MCHC RBC AUTO-ENTMCNC: 31.6 G/DL (ref 33.6–35)
MCV RBC AUTO: 90.3 FL (ref 81.4–97.8)
MICRO URNS: ABNORMAL
MONOCYTES # BLD AUTO: 1.4 K/UL (ref 0–0.85)
MONOCYTES NFR BLD AUTO: 11.7 % (ref 0–13.4)
NEUTROPHILS # BLD AUTO: 8.16 K/UL (ref 2–7.15)
NEUTROPHILS NFR BLD: 68.2 % (ref 44–72)
NITRITE UR QL STRIP.AUTO: NEGATIVE
NRBC # BLD AUTO: 0 K/UL
NRBC BLD-RTO: 0 /100 WBC
PH UR STRIP.AUTO: 6 [PH]
PLATELET # BLD AUTO: 456 K/UL (ref 164–446)
PMV BLD AUTO: 10.4 FL (ref 9–12.9)
PROT UR QL STRIP: NEGATIVE MG/DL
RBC # BLD AUTO: 4 M/UL (ref 4.2–5.4)
RBC # URNS HPF: ABNORMAL /HPF
RBC UR QL AUTO: NEGATIVE
SP GR UR STRIP.AUTO: 1.01
UROBILINOGEN UR STRIP.AUTO-MCNC: 0.2 MG/DL
WBC # BLD AUTO: 12 K/UL (ref 4.8–10.8)
WBC #/AREA URNS HPF: ABNORMAL /HPF

## 2018-04-05 PROCEDURE — 665998 HH PPS REVENUE CREDIT

## 2018-04-05 PROCEDURE — 81001 URINALYSIS AUTO W/SCOPE: CPT

## 2018-04-05 PROCEDURE — G0180 MD CERTIFICATION HHA PATIENT: HCPCS | Performed by: INTERNAL MEDICINE

## 2018-04-05 PROCEDURE — 665999 HH PPS REVENUE DEBIT

## 2018-04-05 PROCEDURE — G0299 HHS/HOSPICE OF RN EA 15 MIN: HCPCS

## 2018-04-05 PROCEDURE — 87086 URINE CULTURE/COLONY COUNT: CPT

## 2018-04-05 PROCEDURE — 85025 COMPLETE CBC W/AUTO DIFF WBC: CPT

## 2018-04-05 SDOH — ECONOMIC STABILITY: HOUSING INSECURITY
HOME SAFETY: TION ABOUT WHY IT IS IMPORTANT TO PLACE ONE. PATIENT DOES HAVE A WORKING FIRE EXTINGUISHER PRESENT IN THE HOME. SMOKE ALARMS ARE PRESENT AND FUNCTIONAL ON EACH LEVEL OF THE HOME. PATIENT DOES HAVE A FIRE ESCAPE PLAN DEVELOPED. PATIENT DOES NOT HAVE F

## 2018-04-05 SDOH — ECONOMIC STABILITY: HOUSING INSECURITY
HOME SAFETY: PT HAS THROW RUGS AND EXTENDED OXYGEN TUBING THAT CREATES A POTENTIAL RISK AS A TRIP/SLIP HAZARD.  OXYGEN SAFETY RISK ASSESSMENT PERFORMED. PATIENT DOES NOT HAVE A NO SMOKING SIGN POSTED IN THE HOME., PT WAS GIVEN A NO SMOKING SIGN AND PROVIDED EDUCA

## 2018-04-05 SDOH — ECONOMIC STABILITY: HOUSING INSECURITY
HOME SAFETY: LAMMABLE MATERIALS PRESENT IN THE HOME PRESENTING A FIRE HAZARD. NO EVIDENCE FOUND OF SMOKING MATERIALS PRESENT IN THE HOME.

## 2018-04-05 ASSESSMENT — ENCOUNTER SYMPTOMS
RESPIRATORY SYMPTOMS COMMENTS: NO CONCERNS AT THE TIME OF THIS ASSESSMENT.
VOMITING: DENIES
NAUSEA: DENIES

## 2018-04-06 PROCEDURE — 665998 HH PPS REVENUE CREDIT

## 2018-04-06 PROCEDURE — 665999 HH PPS REVENUE DEBIT

## 2018-04-06 RX ORDER — NITROFURANTOIN 25; 75 MG/1; MG/1
100 CAPSULE ORAL 2 TIMES DAILY
Qty: 14 CAP | Refills: 0 | Status: SHIPPED | OUTPATIENT
Start: 2018-04-06 | End: 2018-04-13

## 2018-04-07 LAB
BACTERIA UR CULT: NORMAL
SIGNIFICANT IND 70042: NORMAL
SITE SITE: NORMAL
SOURCE SOURCE: NORMAL

## 2018-04-07 PROCEDURE — 665998 HH PPS REVENUE CREDIT

## 2018-04-07 PROCEDURE — 665999 HH PPS REVENUE DEBIT

## 2018-04-08 PROCEDURE — 665998 HH PPS REVENUE CREDIT

## 2018-04-08 PROCEDURE — 665999 HH PPS REVENUE DEBIT

## 2018-04-09 ENCOUNTER — ANTICOAGULATION MONITORING (OUTPATIENT)
Dept: VASCULAR LAB | Facility: MEDICAL CENTER | Age: 78
End: 2018-04-09

## 2018-04-09 ENCOUNTER — HOME CARE VISIT (OUTPATIENT)
Dept: HOME HEALTH SERVICES | Facility: HOME HEALTHCARE | Age: 78
End: 2018-04-09
Payer: MEDICARE

## 2018-04-09 VITALS
SYSTOLIC BLOOD PRESSURE: 122 MMHG | HEART RATE: 74 BPM | RESPIRATION RATE: 16 BRPM | OXYGEN SATURATION: 95 % | TEMPERATURE: 98.2 F | DIASTOLIC BLOOD PRESSURE: 74 MMHG

## 2018-04-09 DIAGNOSIS — Z86.711 HX PULMONARY EMBOLISM: ICD-10-CM

## 2018-04-09 LAB — INR PPP: 3.6 (ref 2–3.5)

## 2018-04-09 PROCEDURE — G0299 HHS/HOSPICE OF RN EA 15 MIN: HCPCS

## 2018-04-09 PROCEDURE — 665999 HH PPS REVENUE DEBIT

## 2018-04-09 PROCEDURE — 665998 HH PPS REVENUE CREDIT

## 2018-04-09 SDOH — ECONOMIC STABILITY: HOUSING INSECURITY: UNSAFE COOKING RANGE AREA: 0

## 2018-04-09 SDOH — ECONOMIC STABILITY: HOUSING INSECURITY: UNSAFE APPLIANCES: 0

## 2018-04-09 NOTE — PROGRESS NOTES
No answer, no VM to report supratherapeutic INR of 3.6.  Order placed for next INR through [facility] .  Will attempt to reach patient at later time.  Omid Min, GiovannyD

## 2018-04-10 ENCOUNTER — OFFICE VISIT (OUTPATIENT)
Dept: MEDICAL GROUP | Facility: MEDICAL CENTER | Age: 78
End: 2018-04-10
Payer: MEDICARE

## 2018-04-10 VITALS
SYSTOLIC BLOOD PRESSURE: 120 MMHG | TEMPERATURE: 97.9 F | DIASTOLIC BLOOD PRESSURE: 76 MMHG | BODY MASS INDEX: 33.63 KG/M2 | WEIGHT: 189.82 LBS | HEIGHT: 63 IN | RESPIRATION RATE: 16 BRPM | HEART RATE: 78 BPM | OXYGEN SATURATION: 97 %

## 2018-04-10 DIAGNOSIS — I35.0 AORTIC STENOSIS, MODERATE: ICD-10-CM

## 2018-04-10 DIAGNOSIS — M54.50 CHRONIC MIDLINE LOW BACK PAIN WITHOUT SCIATICA: ICD-10-CM

## 2018-04-10 DIAGNOSIS — E11.9 CONTROLLED TYPE 2 DIABETES MELLITUS WITHOUT COMPLICATION, WITHOUT LONG-TERM CURRENT USE OF INSULIN (HCC): ICD-10-CM

## 2018-04-10 DIAGNOSIS — Z86.711 HX PULMONARY EMBOLISM: ICD-10-CM

## 2018-04-10 DIAGNOSIS — G89.29 CHRONIC MIDLINE LOW BACK PAIN WITHOUT SCIATICA: ICD-10-CM

## 2018-04-10 DIAGNOSIS — D50.9 IRON DEFICIENCY ANEMIA, UNSPECIFIED IRON DEFICIENCY ANEMIA TYPE: ICD-10-CM

## 2018-04-10 PROBLEM — D72.829 LEUKOCYTOSIS: Status: RESOLVED | Noted: 2018-03-21 | Resolved: 2018-04-10

## 2018-04-10 PROCEDURE — 665998 HH PPS REVENUE CREDIT

## 2018-04-10 PROCEDURE — 99213 OFFICE O/P EST LOW 20 MIN: CPT | Performed by: NURSE PRACTITIONER

## 2018-04-10 PROCEDURE — 665999 HH PPS REVENUE DEBIT

## 2018-04-10 ASSESSMENT — ENCOUNTER SYMPTOMS: BACK PAIN: 1

## 2018-04-10 ASSESSMENT — PAIN SCALES - GENERAL: PAINLEVEL: NO PAIN

## 2018-04-10 NOTE — PROGRESS NOTES
OP Telephone Anticoagulation Service Note    Date: 4/10/2018      Anticoagulation Summary  As of 4/9/2018    INR goal:   2.0-3.0   TTR:   75.0 % (11.6 mo)   Today's INR:   3.6!   Maintenance plan:   7.5 mg (5 mg x 1.5) on Tue, Thu, Sat; 5 mg (5 mg x 1) all other days   Weekly total:   42.5 mg   Plan last modified:   Ciarra Dupree PharmD (4/10/2018)   Next INR check:   4/16/2018   Target end date:   Indefinite    Indications    DVT (deep venous thrombosis)  left (Resolved) [I82.402]  Hx pulmonary embolism [Z86.711]             Anticoagulation Episode Summary     INR check location:       Preferred lab:       Send INR reminders to:       Comments:   [facility]      Anticoagulation Care Providers     Provider Role Specialty Phone number    WENDY Baker Referring Family Medicine [phone]    [facility] Anticoagulation Services Responsible  [phone]        Anticoagulation Patient Findings        Plan: Spoke with patient on the phone. Patient is supratherapeutic today. Confirmed dosing. No missed tablets in the last week. Pt reports she is currently on Macrobid which does not typically impact INR. Patient denies any signs or symptoms of bleeding or clotting. Instructed patient to call clinic if any unusual bleeding or bruising occurs. Will have pt take 2.5 mg then begin lower weekly regimen. Will follow-up with patient in 1 week(s).              Ciarra Dupree PharmD

## 2018-04-10 NOTE — PROGRESS NOTES
Subjective:      Sanjuanita Sosa is a 78 y.o. female who presents with Follow-Up        CC: Patient is here today mainly requesting narcotic pain medication but she is also due for lab work for her anemia.    HPI Sanjuanita Sosa    1. Chronic midline low back pain without sciatica  Patient has been on and off opioids for her back and hip pain for the past year or so. On previous visits I told her that if she felt she needed to be on the medicine long-term she would need to go to pain management and a referral had been placed. She did not follow through on this and is requesting a refill on [place] with her last prescription given a month ago for #30. She states she usually takes them when she is going out on a trip such as to the [place]. She states the back pain has not worsened and she uses a cane for mobility.    2. Hx pulmonary embolism  Patient continues to follow with the Coumadin clinic which is prescribing her anticoagulation. She has had no recurrence recently.    3. Controlled type 2 diabetes mellitus without complication, without long-term current use of insulin (CMS-[facility])  Last hemoglobin A1c was below 7.0 on current medicines.    4. Aortic stenosis, moderate  Patient had echocardiogram done recently which showed moderate aortic stenosis so a referral to cardiology was made and she has an appointment in the next month.    5. Iron deficiency anemia, unspecified iron deficiency anemia type  Patient showed decreased hemoglobin and hematocrit at the hospital but her most recent CBC from last week came back showing improvement with hemoglobin of 11.4 and hematocrit at 36. She also is finishing Macrobid for a recent possible UTI.  Social History   Substance Use Topics   • Smoking status: Current Every Day Smoker     Packs/day: 0.25     Years: 40.00     Types: Cigarettes   • Smokeless tobacco: Never Used   • Alcohol use No     Current Outpatient Prescriptions   Medication Sig Dispense Refill   •  nitrofurantoin monohydr macro (MACROBID) 100 MG Cap Take 1 Cap by mouth 2 times a day for 7 days. 14 Cap 0   • MICROLET LANCETS Misc Use one lancet Twice daily 100 Each 5   • HYDROcodone-acetaminophen (NORCO) 7.5-325 MG per tablet Take 1 Tab by mouth every 8 hours as needed for Severe Pain.     • furosemide (LASIX) 20 MG Tab Take 1 Tab by mouth every day for 15 days. 15 Tab 0   • potassium chloride SA (K-DUR) 10 MEQ Tab CR Take 1 Tab by mouth every day for 15 days. 15 Each 0   • gabapentin (NEURONTIN) 100 MG Cap Take 1 Cap by mouth 3 times a day for 30 days. 90 Cap 0   • nystatin (MYCOSTATIN) powder Apply to affected area BID for 10 days. 15 g 0   • levothyroxine (SYNTHROID) 150 MCG Tab Take 1 Tab by mouth Every morning on an empty stomach. 30 Tab 0   • ferrous sulfate 325 (65 Fe) MG tablet Take 1 Tab by mouth every day. 90 Tab 0   • warfarin (COUMADIN) 5 MG Tab Take 5-7.5 mg by mouth every evening. 5 mg Monday Wednesday Friday  7.5 Tuesday Thursday Saturday Sunday      • omeprazole (PRILOSEC) 20 MG delayed-release capsule Take 1 Cap by mouth every day. 30 Cap 11   • amlodipine (NORVASC) 10 MG Tab TAKE ONE TABLET BY MOUTH ONCE A DAY 90 Tab 2   • calcium carbonate (OS-MARKELL 500) 500 MG Tab TAKE ONE TABLET BY MOUTH TWICE DAILY WITH MEALS 60 Tab 11   • trazodone (DESYREL) 100 MG Tab Take 1.5 Tabs by mouth 1 time daily as needed for Sleep. 45 Tab 11   • glimepiride (AMARYL) 2 MG Tab TAKE  ONE TABLET BY MOUTH EVERY MORNING 30 Tab 11   • metformin (GLUCOPHAGE) 500 MG Tab TAKE 2 TABLETS BY MOUTH EACH MORNING WITH  BREAKFAST AND TAKE 1 TABLET EACH EVENING   WITH DINNER 90 Tab 11   • vitamin D, Ergocalciferol, (DRISDOL) 70544 UNITS Cap capsule Take 1 Cap by mouth every 7 days. 4 Cap 11   • losartan (COZAAR) 50 MG Tab TAKE ONE TABLET BY MOUTH EVERY DAY 30 Tab 10   • atorvastatin (LIPITOR) 80 MG tablet Take 0.5 Tabs by mouth every evening. 30 Tab 11   • albuterol 108 (90 Base) MCG/ACT Aero Soln inhalation aerosol Inhale 2 Puffs  "by mouth every 6 hours as needed for Shortness of Breath. 8.5 g 3     No current facility-administered medications for this visit.      Facility-Administered Medications Ordered in Other Visits   Medication Dose Route Frequency Provider Last Rate Last Dose   • iodixanol (VISIPAQUE) SOLN    Intra-Op Once PRN Shekhar Rosado M.D.   10 mL at 01/18/17 0619     Family History   Problem Relation Age of Onset   • Hyperlipidemia Mother    • Stroke Mother    • Hyperlipidemia Father    • Stroke Father    • Heart Disease Brother      CABG     Past Medical History:   Diagnosis Date   • AAA (abdominal aortic aneurysm) (CMS-HCC)    • Anticoagulation monitoring, special range    • Arrhythmia    • Arthritis    • Autoimmune hepatitis (CMS-HCC) 3/19/2012   • Blood clotting disorder (CMS-[facility]) 2015    clot in leg and lung   • Breath shortness    • Cancer (CMS-HCC)     thyroid   • Cataract     left eye needs surgery   • Diabetes     pre-diabetic   • Disorder of thyroid 2016    thyroid cancer   • Gallstones    • H/O: HTN (hypertension) 3/19/2012   • Heart valve disease    • Hepatitis B    • Hiatus hernia syndrome    • Hyperlipidemia    • Hypertension    • Kidney disease    • Mixed hyperlipidemia 3/19/2012   • Murmur 3/19/2012   • Nonspecific abnormal electrocardiogram (ECG) (EKG) 3/19/2012   • Overweight(278.02) 3/19/2012   • Pain    • Palpitations    • Preoperative cardiovascular examination 3/19/2012   • Unspecified urinary incontinence        Review of Systems   Musculoskeletal: Positive for back pain.   All other systems reviewed and are negative.         Objective:     /76   Pulse 78   Temp 36.6 °C (97.9 °F)   Resp 16   Ht 1.6 m (5' 3\")   Wt 86.1 kg (189 lb 13.1 oz)   LMP 10/12/1970   SpO2 97%   BMI 33.62 kg/m²      Physical Exam   Constitutional: She is oriented to person, place, and time. She appears well-developed and well-nourished. No distress.   HENT:   Head: Normocephalic and atraumatic.   Right Ear: External " ear normal.   Left Ear: External ear normal.   Nose: Nose normal.   Eyes: Right eye exhibits no discharge. Left eye exhibits no discharge.   Neck: Normal range of motion. Neck supple. No thyromegaly present.   Cardiovascular: Normal rate, regular rhythm and normal heart sounds.  Exam reveals no gallop and no friction rub.    No murmur heard.  Pulmonary/Chest: Effort normal and breath sounds normal. She has no wheezes. She has no rales.   Musculoskeletal: She exhibits no edema or tenderness.        Lumbar back: She exhibits decreased range of motion and pain.   Neurological: She is alert and oriented to person, place, and time. She displays normal reflexes.   Skin: Skin is warm and dry. No rash noted. She is not diaphoretic.   Psychiatric: She has a normal mood and affect. Her behavior is normal. Judgment and thought content normal.   Nursing note and vitals reviewed.              Assessment/Plan:     1. Chronic midline low back pain without sciatica  I advised patient that I gave her a prescription for #30 Norco last month and I again reviewed that I cannot prescribe her opioids on a regular basis. I again offered to refer her to pain management or physical therapy which she declined. She states she will use Tylenol and is to avoid NSAIDs because she is on Coumadin.    2. Hx pulmonary embolism  Patient will continue to follow with the Coumadin clinic.    3. Controlled type 2 diabetes mellitus without complication, without long-term current use of insulin (CMS-[facility])  Last hemoglobin A 1C was good on her current medications and she is due for an eye exam.  - REFERRAL TO OPHTHALMOLOGY    4. Aortic stenosis, moderate  Patient will be following with cardiology to see if anything more needs to be done for this.    5. Iron deficiency anemia, unspecified iron deficiency anemia type  Most recent CBC showed returned to baseline with very mild anemia.

## 2018-04-11 PROCEDURE — 665998 HH PPS REVENUE CREDIT

## 2018-04-11 PROCEDURE — 665999 HH PPS REVENUE DEBIT

## 2018-04-12 ENCOUNTER — HOME CARE VISIT (OUTPATIENT)
Dept: HOME HEALTH SERVICES | Facility: HOME HEALTHCARE | Age: 78
End: 2018-04-12
Payer: MEDICARE

## 2018-04-12 PROCEDURE — 665998 HH PPS REVENUE CREDIT

## 2018-04-12 PROCEDURE — 665999 HH PPS REVENUE DEBIT

## 2018-04-13 PROCEDURE — 665999 HH PPS REVENUE DEBIT

## 2018-04-13 PROCEDURE — 665998 HH PPS REVENUE CREDIT

## 2018-04-14 PROCEDURE — 665998 HH PPS REVENUE CREDIT

## 2018-04-14 PROCEDURE — 665999 HH PPS REVENUE DEBIT

## 2018-04-15 PROCEDURE — 665998 HH PPS REVENUE CREDIT

## 2018-04-15 PROCEDURE — 665999 HH PPS REVENUE DEBIT

## 2018-04-16 ENCOUNTER — HOME CARE VISIT (OUTPATIENT)
Dept: HOME HEALTH SERVICES | Facility: HOME HEALTHCARE | Age: 78
End: 2018-04-16
Payer: MEDICARE

## 2018-04-16 ENCOUNTER — ANTICOAGULATION MONITORING (OUTPATIENT)
Dept: VASCULAR LAB | Facility: MEDICAL CENTER | Age: 78
End: 2018-04-16

## 2018-04-16 VITALS
TEMPERATURE: 97.3 F | HEART RATE: 68 BPM | SYSTOLIC BLOOD PRESSURE: 128 MMHG | OXYGEN SATURATION: 95 % | DIASTOLIC BLOOD PRESSURE: 60 MMHG | RESPIRATION RATE: 16 BRPM

## 2018-04-16 DIAGNOSIS — Z86.711 HX PULMONARY EMBOLISM: ICD-10-CM

## 2018-04-16 LAB — INR PPP: 2.1 (ref 2–3.5)

## 2018-04-16 PROCEDURE — G0299 HHS/HOSPICE OF RN EA 15 MIN: HCPCS

## 2018-04-16 PROCEDURE — 665998 HH PPS REVENUE CREDIT

## 2018-04-16 PROCEDURE — 665999 HH PPS REVENUE DEBIT

## 2018-04-16 SDOH — ECONOMIC STABILITY: HOUSING INSECURITY: UNSAFE COOKING RANGE AREA: 0

## 2018-04-16 SDOH — ECONOMIC STABILITY: HOUSING INSECURITY: UNSAFE APPLIANCES: 0

## 2018-04-17 PROCEDURE — 665999 HH PPS REVENUE DEBIT

## 2018-04-17 PROCEDURE — 665998 HH PPS REVENUE CREDIT

## 2018-04-18 PROCEDURE — 665998 HH PPS REVENUE CREDIT

## 2018-04-18 PROCEDURE — 665999 HH PPS REVENUE DEBIT

## 2018-04-19 ENCOUNTER — HOME CARE VISIT (OUTPATIENT)
Dept: HOME HEALTH SERVICES | Facility: HOME HEALTHCARE | Age: 78
End: 2018-04-19
Payer: MEDICARE

## 2018-04-19 PROCEDURE — 665999 HH PPS REVENUE DEBIT

## 2018-04-19 PROCEDURE — 665998 HH PPS REVENUE CREDIT

## 2018-04-20 PROCEDURE — 665999 HH PPS REVENUE DEBIT

## 2018-04-20 PROCEDURE — 665998 HH PPS REVENUE CREDIT

## 2018-04-21 PROCEDURE — 665998 HH PPS REVENUE CREDIT

## 2018-04-21 PROCEDURE — 665999 HH PPS REVENUE DEBIT

## 2018-04-22 PROCEDURE — 665999 HH PPS REVENUE DEBIT

## 2018-04-22 PROCEDURE — 665998 HH PPS REVENUE CREDIT

## 2018-04-23 ENCOUNTER — HOME CARE VISIT (OUTPATIENT)
Dept: HOME HEALTH SERVICES | Facility: HOME HEALTHCARE | Age: 78
End: 2018-04-23
Payer: MEDICARE

## 2018-04-23 ENCOUNTER — ANTICOAGULATION MONITORING (OUTPATIENT)
Dept: VASCULAR LAB | Facility: MEDICAL CENTER | Age: 78
End: 2018-04-23

## 2018-04-23 VITALS
DIASTOLIC BLOOD PRESSURE: 58 MMHG | RESPIRATION RATE: 18 BRPM | TEMPERATURE: 98.3 F | HEART RATE: 88 BPM | SYSTOLIC BLOOD PRESSURE: 98 MMHG | OXYGEN SATURATION: 95 %

## 2018-04-23 DIAGNOSIS — Z86.711 HX PULMONARY EMBOLISM: ICD-10-CM

## 2018-04-23 LAB — INR PPP: 3 (ref 2–3.5)

## 2018-04-23 PROCEDURE — 665999 HH PPS REVENUE DEBIT

## 2018-04-23 PROCEDURE — G0162 HHC RN E&M PLAN SVS, 15 MIN: HCPCS

## 2018-04-23 PROCEDURE — 665998 HH PPS REVENUE CREDIT

## 2018-04-23 ASSESSMENT — ENCOUNTER SYMPTOMS
VOMITING: DENIES
NAUSEA: DENIES

## 2018-04-23 NOTE — PROGRESS NOTES
Anticoagulation Summary  As of 4/23/2018    INR goal:   2.0-3.0   TTR:   75.2 % (1 y)   Today's INR:   3.0   Maintenance plan:   7.5 mg (5 mg x 1.5) on Wed, Sat; 5 mg (5 mg x 1) all other days   Weekly total:   40 mg   Plan last modified:   Ezequiel Coombs, PharmD (4/16/2018)   Next INR check:   4/30/2018   Target end date:   Indefinite    Indications    DVT (deep venous thrombosis)  left (Resolved) [I82.402]  Hx pulmonary embolism [Z86.711]             Anticoagulation Episode Summary     INR check location:       Preferred lab:       Send INR reminders to:       Comments:   [facility]      Anticoagulation Care Providers     Provider Role Specialty Phone number    WENDY Baker Referring Family Medicine [phone]    [facility] Anticoagulation Services Responsible  [phone]        Anticoagulation Patient Findings        Spoke to patient on the phone.   INR  therapeutic.   Denies signs/symptoms of bleeding and/or thrombosis.   Denies changes to diet or medications.   Follow up appointment in 1 week(s).    Continue weekly warfarin dose as noted      Ezequiel Coombs, PharmD

## 2018-04-24 ENCOUNTER — OFFICE VISIT (OUTPATIENT)
Dept: CARDIOLOGY | Facility: MEDICAL CENTER | Age: 78
End: 2018-04-24
Payer: MEDICARE

## 2018-04-24 VITALS
SYSTOLIC BLOOD PRESSURE: 110 MMHG | OXYGEN SATURATION: 92 % | DIASTOLIC BLOOD PRESSURE: 60 MMHG | BODY MASS INDEX: 32.95 KG/M2 | WEIGHT: 186 LBS | HEART RATE: 108 BPM

## 2018-04-24 DIAGNOSIS — E78.5 DYSLIPIDEMIA: ICD-10-CM

## 2018-04-24 DIAGNOSIS — I65.29 STENOSIS OF CAROTID ARTERY, UNSPECIFIED LATERALITY: ICD-10-CM

## 2018-04-24 DIAGNOSIS — I10 ESSENTIAL HYPERTENSION: ICD-10-CM

## 2018-04-24 DIAGNOSIS — Z88.8 ASPIRIN ALLERGY: Chronic | ICD-10-CM

## 2018-04-24 DIAGNOSIS — Z79.01 CHRONIC ANTICOAGULATION: ICD-10-CM

## 2018-04-24 DIAGNOSIS — I35.0 MODERATE AORTIC STENOSIS: Chronic | ICD-10-CM

## 2018-04-24 LAB — EKG IMPRESSION: NORMAL

## 2018-04-24 PROCEDURE — 665999 HH PPS REVENUE DEBIT

## 2018-04-24 PROCEDURE — 93000 ELECTROCARDIOGRAM COMPLETE: CPT | Performed by: INTERNAL MEDICINE

## 2018-04-24 PROCEDURE — 99204 OFFICE O/P NEW MOD 45 MIN: CPT | Performed by: INTERNAL MEDICINE

## 2018-04-24 PROCEDURE — 665998 HH PPS REVENUE CREDIT

## 2018-04-24 ASSESSMENT — ENCOUNTER SYMPTOMS
SHORTNESS OF BREATH: 0
FALLS: 0
CLAUDICATION: 0
PALPITATIONS: 0
NECK PAIN: 1
COUGH: 0
BRUISES/BLEEDS EASILY: 1
BACK PAIN: 1
SORE THROAT: 0
NAUSEA: 0
FOCAL WEAKNESS: 0
DIARRHEA: 1
DIZZINESS: 0
PND: 0
BLURRED VISION: 0
FEVER: 0
WEAKNESS: 0
ABDOMINAL PAIN: 0
HEADACHES: 1
CHILLS: 0

## 2018-04-24 NOTE — LETTER
[facility]   [address]  [place] [address]  Phone: [phone]  Fax: [phone]              Sanjuanita Sosa  1940    Encounter Date: 4/24/2018    Carlos Guillen M.D.          PROGRESS NOTE:  Chief Complaint   Patient presents with   • HTN (Controlled)     new patient       Subjective:   Sanjuanita Sosa is a 78 y.o. female who presents today in rotation from Mr. Blount for evaluation of mild aortic stenosis found on recent echocardiogram which was a progression from prior mild aortic stenosis    She has had a history of pulmonary embolism as well as AAA in the setting of diabetes and follows closely with her providers she also reports significant aspirin allergy as a young person and has not taken that all    Denies any heart failure or angina symptoms does have mild edema on occasion but nothing significant    Past Medical History:   Diagnosis Date   • AAA (abdominal aortic aneurysm) (CMS-[facility])    • Anticoagulation monitoring, special range    • Arrhythmia    • Arthritis    • Aspirin allergy 4/24/2018   • Autoimmune hepatitis (CMS-HCC) 3/19/2012   • Blood clotting disorder (CMS-[facility]) 2015    clot in leg and lung   • Breath shortness    • Cancer (CMS-[facility])     thyroid   • Cataract     left eye needs surgery   • Diabetes     pre-diabetic   • Disorder of thyroid 2016    thyroid cancer   • Gallstones    • H/O: HTN (hypertension) 3/19/2012   • Heart valve disease    • Hepatitis B    • Hiatus hernia syndrome    • Hyperlipidemia    • Hypertension    • Kidney disease    • Mixed hyperlipidemia 3/19/2012   • Murmur 3/19/2012   • Nonspecific abnormal electrocardiogram (ECG) (EKG) 3/19/2012   • Overweight(278.02) 3/19/2012   • Pain    • Palpitations    • Preoperative cardiovascular examination 3/19/2012   • Unspecified urinary incontinence      Past Surgical History:   Procedure Laterality Date   • DARREN BY LAPAROSCOPY  1/2/2017    Procedure: DARREN BY LAPAROSCOPY;  Surgeon: Chele Valles M.D.;  Location: SURGERY [facility];  Service:    • ERCP IN OR  12/30/2016    Procedure: ERCP IN OR;  Surgeon: Shekhar Rosado M.D.;  Location: SURGERY SAME DAY [facility];  Service:    • THYROIDECTOMY TOTAL N/A 10/12/2016    Procedure: THYROIDECTOMY TOTAL;  Surgeon: Nuno Duncan M.D.;  Location: SURGERY SAME DAY [facility];  Service:    • EXPLORATORY LAPAROTOMY  2/27/2013    Performed by Edson Craft M.D. at SURGERY [facility]   • SPLENECTOMY  2/27/2013    Performed by Edson Craft M.D. at SURGERY [facility]   • NODE DISSECTION  2/27/2013    Performed by Edson Craft M.D. at SURGERY [facility]   • OOPHORECTOMY  2/27/2013    Performed by Levi Nolan M.D. at SURGERY [facility]   • BLADDER SUSPENSION  5/27/2009    Performed by RINA HART at SURGERY SAME DAY [facility]   • CYSTOSCOPY  5/27/2009    Performed by RINA HART at SURGERY SAME DAY [facility]   • RECTOCELE REPAIR  5/27/2009    Performed by RINA HART at SURGERY SAME DAY [facility]   • APPENDECTOMY     • HYSTERECTOMY LAPAROSCOPY     • KNEE REPLACEMENT, TOTAL     • TONSILLECTOMY       Family History   Problem Relation Age of Onset   • Hyperlipidemia Mother    • Stroke Mother    • Hyperlipidemia Father    • Stroke Father    • Heart Disease Brother      CABG     Social History     Social History   • Marital status:      Spouse name: N/A   • Number of children: N/A   • Years of education: N/A     Occupational History   • Not on file.     Social History Main Topics   • Smoking status: Current Every Day Smoker     Packs/day: 0.25     Years: 40.00     Types: Cigarettes   • Smokeless tobacco: Never Used   • Alcohol use No   • Drug use: No   • Sexual activity: Not Currently     Other Topics Concern   • Not on file     Social History Narrative   • No narrative on file     Allergies   Allergen Reactions      • Aspirin Anaphylaxis   • Penicillins Anaphylaxis     As a child  Tolerated Rocephin 2/2018     Outpatient Encounter Prescriptions as of 4/24/2018   Medication Sig Dispense Refill   • levothyroxine (SYNTHROID) 150 MCG Tab Take 1 Tab by mouth Every morning on an empty stomach. 30 Tab 0   • warfarin (COUMADIN) 5 MG Tab Take 5-7.5 mg by mouth every evening. 5 mg Monday Wednesday Friday  7.5 Tuesday Thursday Saturday Sunday      • omeprazole (PRILOSEC) 20 MG delayed-release capsule Take 1 Cap by mouth every day. 30 Cap 11   • amlodipine (NORVASC) 10 MG Tab TAKE ONE TABLET BY MOUTH ONCE A DAY 90 Tab 2   • calcium carbonate (OS-MARKELL 500) 500 MG Tab TAKE ONE TABLET BY MOUTH TWICE DAILY WITH MEALS 60 Tab 11   • trazodone (DESYREL) 100 MG Tab Take 1.5 Tabs by mouth 1 time daily as needed for Sleep. 45 Tab 11   • glimepiride (AMARYL) 2 MG Tab TAKE  ONE TABLET BY MOUTH EVERY MORNING 30 Tab 11   • metformin (GLUCOPHAGE) 500 MG Tab TAKE 2 TABLETS BY MOUTH EACH MORNING WITH  BREAKFAST AND TAKE 1 TABLET EACH EVENING   WITH DINNER 90 Tab 11   • vitamin D, Ergocalciferol, (DRISDOL) 77921 UNITS Cap capsule Take 1 Cap by mouth every 7 days. 4 Cap 11   • losartan (COZAAR) 50 MG Tab TAKE ONE TABLET BY MOUTH EVERY DAY 30 Tab 10   • atorvastatin (LIPITOR) 80 MG tablet Take 0.5 Tabs by mouth every evening. 30 Tab 11   • MICROLET LANCETS Misc Use one lancet Twice daily 100 Each 5   • HYDROcodone-acetaminophen (NORCO) 7.5-325 MG per tablet Take 1 Tab by mouth every 8 hours as needed for Severe Pain.     • nystatin (MYCOSTATIN) powder Apply to affected area BID for 10 days. (Patient not taking: Reported on 4/24/2018) 15 g 0   • ferrous sulfate 325 (65 Fe) MG tablet Take 1 Tab by mouth every day. (Patient not taking: Reported on 4/23/2018) 90 Tab 0   • albuterol 108 (90 Base) MCG/ACT Aero Soln inhalation aerosol Inhale 2 Puffs by mouth every 6 hours as needed for Shortness of Breath. (Patient not taking: Reported on 4/24/2018) 8.5 g 3      Facility-Administered Encounter Medications as of 4/24/2018   Medication Dose Route Frequency Provider Last Rate Last Dose   • iodixanol (VISIPAQUE) SOLN    Intra-Op Once PRN Shekhar Rosado M.D.   10 mL at 01/18/17 0619     Review of Systems   Constitutional: Negative for chills and fever.   HENT: Negative for sore throat.    Eyes: Negative for blurred vision.   Respiratory: Negative for cough and shortness of breath.    Cardiovascular: Positive for leg swelling. Negative for chest pain, palpitations, claudication and PND.   Gastrointestinal: Positive for diarrhea. Negative for abdominal pain and nausea.   Musculoskeletal: Positive for back pain and neck pain. Negative for falls and joint pain.   Skin: Positive for itching. Negative for rash.   Neurological: Positive for headaches. Negative for dizziness, focal weakness and weakness.   Endo/Heme/Allergies: Bruises/bleeds easily.        Objective:   /60   Pulse (!) 108   Wt 84.4 kg (186 lb)   LMP 10/12/1970   SpO2 92%   BMI 32.95 kg/m²      Physical Exam   Constitutional: No distress.   HENT:   Mouth/Throat: Oropharynx is clear and moist. No oropharyngeal exudate.   Eyes: No scleral icterus.   Neck: No JVD present.   Cardiovascular: Normal rate and normal heart sounds.  Exam reveals no gallop and no friction rub.    No murmur heard.  Pulmonary/Chest: No respiratory distress. She has no wheezes. She has no rales.   Abdominal: Soft. Bowel sounds are normal.   Musculoskeletal: She exhibits no edema.   Neurological: She is alert.   Skin: No rash noted. She is not diaphoretic.   Psychiatric: She has a normal mood and affect.     Results for orders placed or performed during the hospital encounter of 12/28/16   EKG   Result Value Ref Range    Report       [facility]    Test Date:  2017-01-02  Pt Name:    NAKUL LAMAS                Department: 141  MRN:        [id number]                      Room:       T426  Gender:     F                             Technician: CECE  :        1940                   Requested By:THIERRY PRATT  Order #:    [id number]                    Reading MD: Osmin Mayer MD    Measurements  Intervals                                Axis  Rate:       59                           P:          55  LA:         184                          QRS:        31  QRSD:       68                           T:          54  QT:         364  QTc:        361    Interpretive Statements  SINUS BRADYCARDIA  LOW VOLTAGE THROUGHOUT  BORDERLINE T ABNORMALITIES, ANT-LAT LEADS  Compared to ECG 2016 08:14:36  Ventricular premature complex(es) no longer present      Electronically Signed On 2017 22:18:43 PST by Osmin Mayer MD         Results for orders placed or performed in visit on 18   EKG   Result Value Ref Range    Report       [facility]    Test Date:  2018  Pt Name:    NAKUL LAMAS                Department: [facility]  MRN:        [id number]                      Room:  Gender:     Female                       Technician: KEMAL  :        1940                   Requested By:CLAYTON MONTANO  Order #:    [id number]                    Reading MD:    Measurements  Intervals                                Axis  Rate:       92                           P:          21  LA:         127                          QRS:        13  QRSD:       68                           T:          44  QT:         344  QTc:        426    Interpretive Statements  SINUS RHYTHM  VENTRICULAR BIGEMINY  EARLY PRECORDIAL R/S TRANSITION  Compared to ECG 2017 02:08:06  Ventricular premature complex(es) now present  Sinus bradycardia no longer present  T-wave abnormality no longer present       We reviewed her echocardiogram    Reviewed her labs over the years  Assessment:     1. Stenosis of carotid artery, unspecified laterality  EKG   2. Essential hypertension  EKG   3. Aspirin allergy         Medical Decision Making:  Today's Assessment / Status /  Plan:     It was my pleasure to meet with Ms. Sosa.    For moderate aortic stenosis will continue to follow-up on exam, we started a conversation that she may eventually need a TAVR    I encouraged her to Talk with your primary care doctor about Jardiance (preferred) or Invokana, there are cardiovascular benefits but there are also risks. Probably from her the cost of this may not allow her to take it    Blood pressure is well controlled    She is on proper statin    I will see Ms. Sosa back in 1 year time and encouraged her to follow up with us over the phone or e-mail using my MyChart as issues arise.    It is my pleasure to participate in the care of Ms. Sosa.  Please do not hesitate to contact me with questions or concerns.    Carlos Guillen MD PhD [facility]  Cardiologist [facility]            Efrain Armendariz, RAUL.PMaryN.  [address]  [place] [address]  VIA In Basket

## 2018-04-24 NOTE — PATIENT INSTRUCTIONS
Talk with your primary care doctor about Jardiance (preferred) or Invokana, there are cardiovascular benefits but there are also risks.

## 2018-04-24 NOTE — PROGRESS NOTES
Chief Complaint   Patient presents with   • HTN (Controlled)     new patient       Subjective:   Sanjuanita Sosa is a 78 y.o. female who presents today in rotation from Mr. Blount for evaluation of mild aortic stenosis found on recent echocardiogram which was a progression from prior mild aortic stenosis    She has had a history of pulmonary embolism as well as AAA in the setting of diabetes and follows closely with her providers she also reports significant aspirin allergy as a young person and has not taken that all    Denies any heart failure or angina symptoms does have mild edema on occasion but nothing significant    Past Medical History:   Diagnosis Date   • AAA (abdominal aortic aneurysm) (CMS-HCC)    • Anticoagulation monitoring, special range    • Arrhythmia    • Arthritis    • Aspirin allergy 4/24/2018   • Autoimmune hepatitis (CMS-HCC) 3/19/2012   • Blood clotting disorder (CMS-HCC) 2015    clot in leg and lung   • Breath shortness    • Cancer (CMS-[facility])     thyroid   • Cataract     left eye needs surgery   • Diabetes     pre-diabetic   • Disorder of thyroid 2016    thyroid cancer   • Gallstones    • H/O: HTN (hypertension) 3/19/2012   • Heart valve disease    • Hepatitis B    • Hiatus hernia syndrome    • Hyperlipidemia    • Hypertension    • Kidney disease    • Mixed hyperlipidemia 3/19/2012   • Murmur 3/19/2012   • Nonspecific abnormal electrocardiogram (ECG) (EKG) 3/19/2012   • Overweight(278.02) 3/19/2012   • Pain    • Palpitations    • Preoperative cardiovascular examination 3/19/2012   • Unspecified urinary incontinence      Past Surgical History:   Procedure Laterality Date   • DARREN BY LAPAROSCOPY  1/2/2017    Procedure: DARREN BY LAPAROSCOPY;  Surgeon: Chele Valles M.D.;  Location: SURGERY [facility];  Service:    • ERCP IN OR  12/30/2016    Procedure: ERCP IN OR;  Surgeon: Shekhar Rosado M.D.;  Location: SURGERY SAME DAY [facility];  Service:    • THYROIDECTOMY TOTAL N/A  10/12/2016    Procedure: THYROIDECTOMY TOTAL;  Surgeon: Nuno Duncan M.D.;  Location: SURGERY SAME DAY [facility];  Service:    • EXPLORATORY LAPAROTOMY  2/27/2013    Performed by Edson Craft M.D. at SURGERY [facility]   • SPLENECTOMY  2/27/2013    Performed by Edson Craft M.D. at SURGERY [facility]   • NODE DISSECTION  2/27/2013    Performed by Edson Craft M.D. at SURGERY [facility]   • OOPHORECTOMY  2/27/2013    Performed by Levi Nolan M.D. at SURGERY [facility]   • BLADDER SUSPENSION  5/27/2009    Performed by RINA HART at SURGERY SAME DAY [facility]   • CYSTOSCOPY  5/27/2009    Performed by RINA HART at SURGERY SAME DAY [facility]   • RECTOCELE REPAIR  5/27/2009    Performed by RINA HART at SURGERY SAME DAY [facility]   • APPENDECTOMY     • HYSTERECTOMY LAPAROSCOPY     • KNEE REPLACEMENT, TOTAL     • TONSILLECTOMY       Family History   Problem Relation Age of Onset   • Hyperlipidemia Mother    • Stroke Mother    • Hyperlipidemia Father    • Stroke Father    • Heart Disease Brother      CABG     Social History     Social History   • Marital status:      Spouse name: N/A   • Number of children: N/A   • Years of education: N/A     Occupational History   • Not on file.     Social History Main Topics   • Smoking status: Current Every Day Smoker     Packs/day: 0.25     Years: 40.00     Types: Cigarettes   • Smokeless tobacco: Never Used   • Alcohol use No   • Drug use: No   • Sexual activity: Not Currently     Other Topics Concern   • Not on file     Social History Narrative   • No narrative on file     Allergies   Allergen Reactions   • Aspirin Anaphylaxis   • Penicillins Anaphylaxis     As a child  Tolerated Rocephin 2/2018     Outpatient Encounter Prescriptions as of 4/24/2018   Medication Sig Dispense Refill   • levothyroxine (SYNTHROID) 150 MCG Tab Take 1 Tab by mouth Every morning on an empty stomach. 30  Tab 0   • warfarin (COUMADIN) 5 MG Tab Take 5-7.5 mg by mouth every evening. 5 mg Monday Wednesday Friday  7.5 Tuesday Thursday Saturday Sunday      • omeprazole (PRILOSEC) 20 MG delayed-release capsule Take 1 Cap by mouth every day. 30 Cap 11   • amlodipine (NORVASC) 10 MG Tab TAKE ONE TABLET BY MOUTH ONCE A DAY 90 Tab 2   • calcium carbonate (OS-MARKELL 500) 500 MG Tab TAKE ONE TABLET BY MOUTH TWICE DAILY WITH MEALS 60 Tab 11   • trazodone (DESYREL) 100 MG Tab Take 1.5 Tabs by mouth 1 time daily as needed for Sleep. 45 Tab 11   • glimepiride (AMARYL) 2 MG Tab TAKE  ONE TABLET BY MOUTH EVERY MORNING 30 Tab 11   • metformin (GLUCOPHAGE) 500 MG Tab TAKE 2 TABLETS BY MOUTH EACH MORNING WITH  BREAKFAST AND TAKE 1 TABLET EACH EVENING   WITH DINNER 90 Tab 11   • vitamin D, Ergocalciferol, (DRISDOL) 72961 UNITS Cap capsule Take 1 Cap by mouth every 7 days. 4 Cap 11   • losartan (COZAAR) 50 MG Tab TAKE ONE TABLET BY MOUTH EVERY DAY 30 Tab 10   • atorvastatin (LIPITOR) 80 MG tablet Take 0.5 Tabs by mouth every evening. 30 Tab 11   • MICROLET LANCETS Misc Use one lancet Twice daily 100 Each 5   • HYDROcodone-acetaminophen (NORCO) 7.5-325 MG per tablet Take 1 Tab by mouth every 8 hours as needed for Severe Pain.     • nystatin (MYCOSTATIN) powder Apply to affected area BID for 10 days. (Patient not taking: Reported on 4/24/2018) 15 g 0   • ferrous sulfate 325 (65 Fe) MG tablet Take 1 Tab by mouth every day. (Patient not taking: Reported on 4/23/2018) 90 Tab 0   • albuterol 108 (90 Base) MCG/ACT Aero Soln inhalation aerosol Inhale 2 Puffs by mouth every 6 hours as needed for Shortness of Breath. (Patient not taking: Reported on 4/24/2018) 8.5 g 3     Facility-Administered Encounter Medications as of 4/24/2018   Medication Dose Route Frequency Provider Last Rate Last Dose   • iodixanol (VISIPAQUE) SOLN    Intra-Op Once PRN Shekhar Rosado M.D.   10 mL at 01/18/17 0619     Review of Systems   Constitutional: Negative for chills  and fever.   HENT: Negative for sore throat.    Eyes: Negative for blurred vision.   Respiratory: Negative for cough and shortness of breath.    Cardiovascular: Positive for leg swelling. Negative for chest pain, palpitations, claudication and PND.   Gastrointestinal: Positive for diarrhea. Negative for abdominal pain and nausea.   Musculoskeletal: Positive for back pain and neck pain. Negative for falls and joint pain.   Skin: Positive for itching. Negative for rash.   Neurological: Positive for headaches. Negative for dizziness, focal weakness and weakness.   Endo/Heme/Allergies: Bruises/bleeds easily.        Objective:   /60   Pulse (!) 108   Wt 84.4 kg (186 lb)   LMP 10/12/1970   SpO2 92%   BMI 32.95 kg/m²     Physical Exam   Constitutional: No distress.   HENT:   Mouth/Throat: Oropharynx is clear and moist. No oropharyngeal exudate.   Eyes: No scleral icterus.   Neck: No JVD present.   Cardiovascular: Normal rate and normal heart sounds.  Exam reveals no gallop and no friction rub.    No murmur heard.  Pulmonary/Chest: No respiratory distress. She has no wheezes. She has no rales.   Abdominal: Soft. Bowel sounds are normal.   Musculoskeletal: She exhibits no edema.   Neurological: She is alert.   Skin: No rash noted. She is not diaphoretic.   Psychiatric: She has a normal mood and affect.     Results for orders placed or performed during the hospital encounter of 16   EKG   Result Value Ref Range    Report       [facility]    Test Date:  2017  Pt Name:    NAKUL LAMAS                Department: 141  MRN:        [id number]                      Room:       T426  Gender:     F                            Technician: CECE  :        1940                   Requested By:THIERRY PRATT  Order #:    [id number]                    Reading MD: Osmin Mayer MD    Measurements  Intervals                                Axis  Rate:       59                           P:          55  VA:          184                          QRS:        31  QRSD:       68                           T:          54  QT:         364  QTc:        361    Interpretive Statements  SINUS BRADYCARDIA  LOW VOLTAGE THROUGHOUT  BORDERLINE T ABNORMALITIES, ANT-LAT LEADS  Compared to ECG 2016 08:14:36  Ventricular premature complex(es) no longer present      Electronically Signed On 2017 22:18:43 PST by Osmin Mayer MD         Results for orders placed or performed in visit on 18   EKG   Result Value Ref Range    Report       [facility]    Test Date:  2018  Pt Name:    NAKUL LAMAS                Department: [facility]  MRN:        [id number]                      Room:  Gender:     Female                       Technician:   :        1940                   Requested By:CLAYTON MONTANO  Order #:    [id number]                    Reading MD:    Measurements  Intervals                                Axis  Rate:       92                           P:          21  TN:         127                          QRS:        13  QRSD:       68                           T:          44  QT:         344  QTc:        426    Interpretive Statements  SINUS RHYTHM  VENTRICULAR BIGEMINY  EARLY PRECORDIAL R/S TRANSITION  Compared to ECG 2017 02:08:06  Ventricular premature complex(es) now present  Sinus bradycardia no longer present  T-wave abnormality no longer present       We reviewed her echocardiogram    Reviewed her labs over the years  Assessment:     1. Stenosis of carotid artery, unspecified laterality  EKG   2. Essential hypertension  EKG   3. Aspirin allergy         Medical Decision Making:  Today's Assessment / Status / Plan:     It was my pleasure to meet with Ms. Lamas.    For moderate aortic stenosis will continue to follow-up on exam, we started a conversation that she may eventually need a TAVR    I encouraged her to Talk with your primary care doctor about Jardiance (preferred) or Invokana, there  are cardiovascular benefits but there are also risks. Probably from her the cost of this may not allow her to take it    Blood pressure is well controlled    She is on proper statin    I will see Ms. Sosa back in 1 year time and encouraged her to follow up with us over the phone or e-mail using my MyChart as issues arise.    It is my pleasure to participate in the care of Ms. Sosa.  Please do not hesitate to contact me with questions or concerns.    Carlos Guillen MD PhD FACC  Cardiologist [facility] Vascular Health

## 2018-04-25 PROCEDURE — 665998 HH PPS REVENUE CREDIT

## 2018-04-25 PROCEDURE — 665999 HH PPS REVENUE DEBIT

## 2018-04-26 ENCOUNTER — PATIENT OUTREACH (OUTPATIENT)
Dept: HEALTH INFORMATION MANAGEMENT | Facility: OTHER | Age: 78
End: 2018-04-26

## 2018-04-26 PROCEDURE — 665999 HH PPS REVENUE DEBIT

## 2018-04-26 PROCEDURE — 665998 HH PPS REVENUE CREDIT

## 2018-04-26 NOTE — PROGRESS NOTES
Patient Sanjuanita Sosa discharged 3/2 and kept appointments with Vascular RN and PCP. The patient received oxygen through [facility]. She readmitted and discharged home 3/26. The patient completed several anticoagulation checks and home health services through [facility]; start date 3/28. IHD identified patient was referred to Cardiology and scheduled an apt 4/24 to establish care with Cardiology. The patient kept the Cardiology appointment and 2 appointments with her PCP. She has an upcoming appointment scheduled 9/12 with Vascular RN.   PPS Score 70%

## 2018-04-27 PROCEDURE — 665998 HH PPS REVENUE CREDIT

## 2018-04-27 PROCEDURE — 665999 HH PPS REVENUE DEBIT

## 2018-04-28 PROCEDURE — 665999 HH PPS REVENUE DEBIT

## 2018-04-28 PROCEDURE — 665998 HH PPS REVENUE CREDIT

## 2018-04-29 PROCEDURE — 665998 HH PPS REVENUE CREDIT

## 2018-04-29 PROCEDURE — 665999 HH PPS REVENUE DEBIT

## 2018-04-30 ENCOUNTER — HOME CARE VISIT (OUTPATIENT)
Dept: HOME HEALTH SERVICES | Facility: HOME HEALTHCARE | Age: 78
End: 2018-04-30
Payer: MEDICARE

## 2018-04-30 ENCOUNTER — ANTICOAGULATION MONITORING (OUTPATIENT)
Dept: VASCULAR LAB | Facility: MEDICAL CENTER | Age: 78
End: 2018-04-30

## 2018-04-30 VITALS
DIASTOLIC BLOOD PRESSURE: 52 MMHG | TEMPERATURE: 97.8 F | HEART RATE: 75 BPM | RESPIRATION RATE: 18 BRPM | SYSTOLIC BLOOD PRESSURE: 122 MMHG | OXYGEN SATURATION: 93 %

## 2018-04-30 DIAGNOSIS — Z86.711 HX PULMONARY EMBOLISM: ICD-10-CM

## 2018-04-30 LAB — INR PPP: 2.1 (ref 2–3.5)

## 2018-04-30 PROCEDURE — 665999 HH PPS REVENUE DEBIT

## 2018-04-30 PROCEDURE — 665998 HH PPS REVENUE CREDIT

## 2018-04-30 PROCEDURE — G0299 HHS/HOSPICE OF RN EA 15 MIN: HCPCS

## 2018-04-30 RX ORDER — LEVOTHYROXINE SODIUM 0.15 MG/1
150 TABLET ORAL
Qty: 30 TAB | Refills: 0 | Status: SHIPPED | OUTPATIENT
Start: 2018-04-30 | End: 2018-05-29 | Stop reason: SDUPTHER

## 2018-04-30 ASSESSMENT — ENCOUNTER SYMPTOMS
VOMITING: DENIES
NAUSEA: DENIES
RESPIRATORY SYMPTOMS COMMENTS: NO CONCERNS AT THE TIME OF THIS ASSESSMENT.

## 2018-04-30 NOTE — PROGRESS NOTES
Anticoagulation Summary  As of 4/30/2018    INR goal:   2.0-3.0   TTR:   75.6 % (1 y)   Today's INR:   2.1   Warfarin maintenance plan:   7.5 mg (5 mg x 1.5) on Wed, Sat; 5 mg (5 mg x 1) all other days   Weekly warfarin total:   40 mg   Plan last modified:   Ezequiel Coombs, PharmD (4/16/2018)   Next INR check:   5/7/2018   Target end date:   Indefinite    Indications    DVT (deep venous thrombosis)  left (Resolved) [I82.402]  Hx pulmonary embolism [Z86.711]             Anticoagulation Episode Summary     INR check location:       Preferred lab:       Send INR reminders to:       Comments:   [facility]      Anticoagulation Care Providers     Provider Role Specialty Phone number    WENDY Baker Referring Family Medicine [phone]    [facility] Responsible  [phone]        Anticoagulation Patient Findings   HPI:  Sanjuanita Sosa, on anticoagulation therapy with warfarin for DVT.  Changes to current medical/health status since last appt: none  Denies signs/symptoms of bleeding and/or thrombosis since the last appt.    Denies any interval changes to diet  Denies any interval changes to medications since last appt.   Denies any complications or cost restrictions with current therapy.     A/P   INR  therapeutic.   Pt is to continue with current warfarin dosing regimen.     Next INR in 1 week(s).    Burke Matthew, PharmD

## 2018-05-01 PROCEDURE — 665998 HH PPS REVENUE CREDIT

## 2018-05-01 PROCEDURE — 665999 HH PPS REVENUE DEBIT

## 2018-05-02 PROCEDURE — 665998 HH PPS REVENUE CREDIT

## 2018-05-02 PROCEDURE — 665999 HH PPS REVENUE DEBIT

## 2018-05-03 PROCEDURE — 665999 HH PPS REVENUE DEBIT

## 2018-05-03 PROCEDURE — 665998 HH PPS REVENUE CREDIT

## 2018-05-04 PROCEDURE — 665999 HH PPS REVENUE DEBIT

## 2018-05-04 PROCEDURE — 665998 HH PPS REVENUE CREDIT

## 2018-05-05 PROCEDURE — 665999 HH PPS REVENUE DEBIT

## 2018-05-05 PROCEDURE — 665998 HH PPS REVENUE CREDIT

## 2018-05-06 PROCEDURE — 665998 HH PPS REVENUE CREDIT

## 2018-05-06 PROCEDURE — 665999 HH PPS REVENUE DEBIT

## 2018-05-07 ENCOUNTER — HOME CARE VISIT (OUTPATIENT)
Dept: HOME HEALTH SERVICES | Facility: HOME HEALTHCARE | Age: 78
End: 2018-05-07
Payer: MEDICARE

## 2018-05-07 ENCOUNTER — ANTICOAGULATION MONITORING (OUTPATIENT)
Dept: VASCULAR LAB | Facility: MEDICAL CENTER | Age: 78
End: 2018-05-07

## 2018-05-07 VITALS
DIASTOLIC BLOOD PRESSURE: 80 MMHG | TEMPERATURE: 97.5 F | SYSTOLIC BLOOD PRESSURE: 118 MMHG | RESPIRATION RATE: 18 BRPM | OXYGEN SATURATION: 94 % | HEART RATE: 74 BPM

## 2018-05-07 DIAGNOSIS — Z86.711 HX PULMONARY EMBOLISM: ICD-10-CM

## 2018-05-07 LAB — INR PPP: 2.5 (ref 2–3.5)

## 2018-05-07 PROCEDURE — 665997 HH PPS REVENUE ADJ

## 2018-05-07 PROCEDURE — 665998 HH PPS REVENUE CREDIT

## 2018-05-07 PROCEDURE — 665999 HH PPS REVENUE DEBIT

## 2018-05-07 PROCEDURE — G0162 HHC RN E&M PLAN SVS, 15 MIN: HCPCS

## 2018-05-07 RX ORDER — GLIMEPIRIDE 2 MG/1
TABLET ORAL
Qty: 30 TAB | Refills: 11 | Status: SHIPPED | OUTPATIENT
Start: 2018-05-07 | End: 2018-09-11

## 2018-05-07 SDOH — ECONOMIC STABILITY: HOUSING INSECURITY: UNSAFE APPLIANCES: 0

## 2018-05-07 SDOH — ECONOMIC STABILITY: HOUSING INSECURITY: UNSAFE COOKING RANGE AREA: 0

## 2018-05-07 ASSESSMENT — ACTIVITIES OF DAILY LIVING (ADL)
HOME_HEALTH_OASIS: 01
OASIS_M1830: 00
HOME_HEALTH_OASIS: 00

## 2018-05-08 PROCEDURE — 665998 HH PPS REVENUE CREDIT

## 2018-05-08 PROCEDURE — 665999 HH PPS REVENUE DEBIT

## 2018-05-09 PROCEDURE — 665998 HH PPS REVENUE CREDIT

## 2018-05-09 PROCEDURE — 665999 HH PPS REVENUE DEBIT

## 2018-05-10 PROCEDURE — 665998 HH PPS REVENUE CREDIT

## 2018-05-10 PROCEDURE — 665999 HH PPS REVENUE DEBIT

## 2018-05-11 PROCEDURE — 665998 HH PPS REVENUE CREDIT

## 2018-05-11 PROCEDURE — 665999 HH PPS REVENUE DEBIT

## 2018-05-12 PROCEDURE — 665999 HH PPS REVENUE DEBIT

## 2018-05-12 PROCEDURE — 665998 HH PPS REVENUE CREDIT

## 2018-05-13 PROCEDURE — 665999 HH PPS REVENUE DEBIT

## 2018-05-13 PROCEDURE — 665998 HH PPS REVENUE CREDIT

## 2018-05-14 PROCEDURE — 665998 HH PPS REVENUE CREDIT

## 2018-05-14 PROCEDURE — 665999 HH PPS REVENUE DEBIT

## 2018-05-15 PROCEDURE — 665999 HH PPS REVENUE DEBIT

## 2018-05-15 PROCEDURE — 665998 HH PPS REVENUE CREDIT

## 2018-05-22 ENCOUNTER — ANTICOAGULATION VISIT (OUTPATIENT)
Dept: VASCULAR LAB | Facility: MEDICAL CENTER | Age: 78
End: 2018-05-22
Attending: INTERNAL MEDICINE
Payer: MEDICARE

## 2018-05-22 VITALS — HEART RATE: 77 BPM | DIASTOLIC BLOOD PRESSURE: 44 MMHG | SYSTOLIC BLOOD PRESSURE: 111 MMHG

## 2018-05-22 DIAGNOSIS — Z86.711 HX PULMONARY EMBOLISM: ICD-10-CM

## 2018-05-22 LAB — INR PPP: 2 (ref 2–3.5)

## 2018-05-22 PROCEDURE — 85610 PROTHROMBIN TIME: CPT

## 2018-05-22 PROCEDURE — 99211 OFF/OP EST MAY X REQ PHY/QHP: CPT

## 2018-05-22 NOTE — PROGRESS NOTES
Anticoagulation Summary  As of 5/22/2018    INR goal:   2.0-3.0   TTR:   77.0 % (1.1 y)   Today's INR:   2.0   Warfarin maintenance plan:   7.5 mg (5 mg x 1.5) on Wed, Sat; 5 mg (5 mg x 1) all other days   Weekly warfarin total:   40 mg   Plan last modified:   Ezequiel Coombs, PharmD (4/16/2018)   Next INR check:   6/12/2018   Target end date:   Indefinite    Indications    DVT (deep venous thrombosis)  left (Resolved) [I82.402]  Hx pulmonary embolism [Z86.711]             Anticoagulation Episode Summary     INR check location:       Preferred lab:       Send INR reminders to:       Comments:         Anticoagulation Care Providers     Provider Role Specialty Phone number    WENDY Baker Referring Family Medicine [phone]    [facility]ticoagulation [facility] Responsible  [phone]        Anticoagulation Patient Findings      HPI:  Sanjuanita Sosa seen in clinic today, on anticoagulation therapy with warfarin for DVT  Changes to current medical/health status since last appt: none  Denies signs/symptoms of bleeding and/or thrombosis since the last appt.    Denies any interval changes to diet  Denies any interval changes to medications since last appt.   Denies any complications or cost restrictions with current therapy.   BP recorded in vitals.      A/P   INR  therapeutic.   No changes    Follow up appointment in 3 week(s).    Burke Matthew, PharmD

## 2018-05-23 LAB — INR BLD: 2 (ref 0.9–1.2)

## 2018-05-29 RX ORDER — LEVOTHYROXINE SODIUM 0.15 MG/1
TABLET ORAL
Qty: 30 TAB | Refills: 0 | Status: SHIPPED | OUTPATIENT
Start: 2018-05-29 | End: 2018-07-12 | Stop reason: SDUPTHER

## 2018-05-29 RX ORDER — LOSARTAN POTASSIUM 50 MG/1
TABLET ORAL
Qty: 30 TAB | Refills: 10 | Status: SHIPPED | OUTPATIENT
Start: 2018-05-29 | End: 2019-05-08 | Stop reason: SDUPTHER

## 2018-06-12 ENCOUNTER — ANTICOAGULATION VISIT (OUTPATIENT)
Dept: VASCULAR LAB | Facility: MEDICAL CENTER | Age: 78
End: 2018-06-12
Attending: INTERNAL MEDICINE
Payer: MEDICARE

## 2018-06-12 VITALS — SYSTOLIC BLOOD PRESSURE: 76 MMHG | DIASTOLIC BLOOD PRESSURE: 28 MMHG | HEART RATE: 45 BPM

## 2018-06-12 DIAGNOSIS — Z86.711 HX PULMONARY EMBOLISM: ICD-10-CM

## 2018-06-12 LAB
INR BLD: 2.2 (ref 0.9–1.2)
INR PPP: 2.2 (ref 2–3.5)

## 2018-06-12 PROCEDURE — 85610 PROTHROMBIN TIME: CPT

## 2018-06-12 PROCEDURE — 99211 OFF/OP EST MAY X REQ PHY/QHP: CPT

## 2018-06-12 NOTE — PROGRESS NOTES
Anticoagulation Summary  As of 6/12/2018    INR goal:   2.0-3.0   TTR:   78.2 % (1.1 y)   Today's INR:   2.2   Warfarin maintenance plan:   7.5 mg (5 mg x 1.5) on Wed, Sat; 5 mg (5 mg x 1) all other days   Weekly warfarin total:   40 mg   Plan last modified:   Giovanny CintronD (4/16/2018)   Next INR check:   7/24/2018   Target end date:   Indefinite    Indications    DVT (deep venous thrombosis)  left (Resolved) [I82.402]  Hx pulmonary embolism [Z86.711]             Anticoagulation Episode Summary     INR check location:       Preferred lab:       Send INR reminders to:       Comments:         Anticoagulation Care Providers     Provider Role Specialty Phone number    WENDY Baker Referring Internal Medicine [phone]    [facility] Responsible  [phone]        Anticoagulation Patient Findings  Patient Findings     Negatives:   Signs/symptoms of thrombosis, Signs/symptoms of bleeding, Laboratory test error suspected, Change in health, Change in alcohol use, Change in activity, Upcoming invasive procedure, Emergency department visit, Upcoming dental procedure, Missed doses, Extra doses, Change in medications, Change in diet/appetite, Hospital admission, Bruising, Other complaints        History of Present Illness: follow up appointment for chronic anticoagulation with the high risk medication, warfarin for DVT/PE.    Last INR was out of range, dosage adjusted: pt remains therapeutic. Will increase the interval to recheck INR  Follow up in 6 weeks, to reduce risk of adverse events related to this high risk medication,  Warfarin.    Jennifer Patten, PharmD

## 2018-06-17 DIAGNOSIS — E11.9 CONTROLLED TYPE 2 DIABETES MELLITUS WITHOUT COMPLICATION, WITHOUT LONG-TERM CURRENT USE OF INSULIN (HCC): ICD-10-CM

## 2018-07-12 RX ORDER — LEVOTHYROXINE SODIUM 0.15 MG/1
TABLET ORAL
Qty: 30 TAB | Refills: 1 | Status: SHIPPED | OUTPATIENT
Start: 2018-07-12 | End: 2018-08-01 | Stop reason: SDUPTHER

## 2018-07-18 DIAGNOSIS — E11.9 CONTROLLED TYPE 2 DIABETES MELLITUS WITHOUT COMPLICATION, WITHOUT LONG-TERM CURRENT USE OF INSULIN (HCC): ICD-10-CM

## 2018-07-24 ENCOUNTER — ANTICOAGULATION VISIT (OUTPATIENT)
Dept: VASCULAR LAB | Facility: MEDICAL CENTER | Age: 78
End: 2018-07-24
Attending: INTERNAL MEDICINE
Payer: MEDICARE

## 2018-07-24 VITALS — SYSTOLIC BLOOD PRESSURE: 95 MMHG | HEART RATE: 82 BPM | DIASTOLIC BLOOD PRESSURE: 52 MMHG

## 2018-07-24 DIAGNOSIS — Z86.711 HX PULMONARY EMBOLISM: ICD-10-CM

## 2018-07-24 LAB — INR PPP: 1.8 (ref 2–3.5)

## 2018-07-24 PROCEDURE — 85610 PROTHROMBIN TIME: CPT

## 2018-07-24 PROCEDURE — 99212 OFFICE O/P EST SF 10 MIN: CPT

## 2018-07-24 NOTE — PROGRESS NOTES
Anticoagulation Summary  As of 7/24/2018    INR goal:   2.0-3.0   TTR:   75.6 % (1.2 y)   Today's INR:   1.8!   Warfarin maintenance plan:   7.5 mg (5 mg x 1.5) on Tue, Thu, Sat; 5 mg (5 mg x 1) all other days   Weekly warfarin total:   42.5 mg   Plan last modified:   Jennifer Patten, PharmD (7/24/2018)   Next INR check:   8/21/2018   Target end date:   Indefinite    Indications    DVT (deep venous thrombosis)  left (Resolved) [I82.402]  Hx pulmonary embolism [Z86.711]             Anticoagulation Episode Summary     INR check location:       Preferred lab:       Send INR reminders to:       Comments:         Anticoagulation Care Providers     Provider Role Specialty Phone number    WENDY Baker Referring Internal Medicine [phone]    [facility] Responsible  [phone]        Anticoagulation Patient Findings  Patient Findings     Negatives:   Signs/symptoms of thrombosis, Signs/symptoms of bleeding, Laboratory test error suspected, Change in health, Change in alcohol use, Change in activity, Upcoming invasive procedure, Emergency department visit, Upcoming dental procedure, Missed doses, Extra doses, Change in medications, Change in diet/appetite, Hospital admission, Bruising, Other complaints        History of Present Illness: follow up appointment for chronic anticoagulation with the high risk medication, warfarin for DVT  Pt is now sub therapeutic today.  Will increase weekly dose by 6%. .Follow up in 4 weeks, to reduce risk of adverse events related to this high risk medication,  Warfarin.  Jennifer Patten, GiovannyD

## 2018-07-28 DIAGNOSIS — E55.9 VITAMIN D DEFICIENCY DISEASE: ICD-10-CM

## 2018-07-30 RX ORDER — ERGOCALCIFEROL 1.25 MG/1
CAPSULE ORAL
Qty: 12 CAP | Refills: 3 | Status: SHIPPED | OUTPATIENT
Start: 2018-07-30

## 2018-08-01 RX ORDER — LEVOTHYROXINE SODIUM 0.15 MG/1
150 TABLET ORAL
Qty: 90 TAB | Refills: 1 | Status: SHIPPED | OUTPATIENT
Start: 2018-08-01 | End: 2019-02-05 | Stop reason: SDUPTHER

## 2018-08-18 DIAGNOSIS — F51.01 PRIMARY INSOMNIA: ICD-10-CM

## 2018-08-20 RX ORDER — TRAZODONE HYDROCHLORIDE 100 MG/1
TABLET ORAL
Qty: 45 TAB | Refills: 10 | Status: SHIPPED | OUTPATIENT
Start: 2018-08-20 | End: 2018-08-24 | Stop reason: SDUPTHER

## 2018-08-21 ENCOUNTER — ANTICOAGULATION VISIT (OUTPATIENT)
Dept: VASCULAR LAB | Facility: MEDICAL CENTER | Age: 78
End: 2018-08-21
Attending: INTERNAL MEDICINE
Payer: MEDICARE

## 2018-08-21 DIAGNOSIS — Z86.711 HX PULMONARY EMBOLISM: ICD-10-CM

## 2018-08-21 LAB
INR BLD: 1.9 (ref 0.9–1.2)
INR PPP: 1.9 (ref 2–3.5)

## 2018-08-21 PROCEDURE — 85610 PROTHROMBIN TIME: CPT

## 2018-08-21 PROCEDURE — 99212 OFFICE O/P EST SF 10 MIN: CPT

## 2018-08-21 NOTE — PROGRESS NOTES
Anticoagulation Summary  As of 8/21/2018    INR goal:   2.0-3.0   TTR:   71.2 % (1.3 y)   Today's INR:   1.9!   Warfarin maintenance plan:   5 mg (5 mg x 1) on Mon, Wed, Fri; 7.5 mg (5 mg x 1.5) all other days   Weekly warfarin total:   45 mg   Plan last modified:   Burke Matthew, PharmD (8/21/2018)   Next INR check:   9/18/2018   Target end date:   Indefinite    Indications    DVT (deep venous thrombosis)  left (Resolved) [I82.402]  Hx pulmonary embolism [Z86.711]             Anticoagulation Episode Summary     INR check location:       Preferred lab:       Send INR reminders to:       Comments:         Anticoagulation Care Providers     Provider Role Specialty Phone number    WENDY Baker Referring Internal Medicine [phone]    [facility] Anticoagulation Services Responsible  [phone]        Anticoagulation Patient Findings      HPI:  Sanjuanita Sosa seen in clinic today, on anticoagulation therapy with warfarin for DVT  Changes to current medical/health status since last appt: none.  Denies signs/symptoms of bleeding and/or thrombosis since the last appt.    Denies any interval changes to diet  Denies any interval changes to medications since last appt.   Denies any complications or cost restrictions with current therapy.   Declines Vitals.   Confirmed dosing regimen.     A/P   INR  SUB-therapeutic.   Begin 6% increased regimen.     Follow up appointment in 3 week(s).    Burke Matthew, GiovannyD

## 2018-08-23 ENCOUNTER — PATIENT OUTREACH (OUTPATIENT)
Dept: HEALTH INFORMATION MANAGEMENT | Facility: OTHER | Age: 78
End: 2018-08-23

## 2018-08-23 NOTE — PROGRESS NOTES
1 Attempt #: Final    2. WebIZ Checked & Epic Updated: Yes  3. HealthConnect Verified: no  4. Verify PCP: yes    5. Communication Preference Obtained: yes    6. Diabetes Visit Scheduling  Scheduling Status:Scheduled      7. Care Gap Scheduling (Attempt to Schedule EACH Overdue Care Gap!)    Health Maintenance Due   Topic Date Due   • IMM HEP B VACCINE (1 of 3 - Risk 3-dose series) 03/16/1959   • IMM ZOSTER VACCINES (1 of 2) 03/16/1992 / Declined   • DIABETES MONOFILAMENT / LE EXAM  07/11/2018 / Scheduled   • Annual Wellness Visit  07/12/2018 / Scheduled         8. Patient was directed to Health and Wellness Website: yes / Sent info by mail   9. Screened for Food [employer] Prescription? yes  10. [employer] Activation: declined  11. [employer] Nighat: no  12. Virtual Visits: no  13. Opt In to Text Messages: no  Are you Tobacco smoker? Yes/ declined class

## 2018-08-24 DIAGNOSIS — F51.01 PRIMARY INSOMNIA: ICD-10-CM

## 2018-08-27 RX ORDER — TRAZODONE HYDROCHLORIDE 100 MG/1
TABLET ORAL
Qty: 45 TAB | Refills: 10 | Status: SHIPPED | OUTPATIENT
Start: 2018-08-27

## 2018-09-11 ENCOUNTER — OFFICE VISIT (OUTPATIENT)
Dept: MEDICAL GROUP | Facility: MEDICAL CENTER | Age: 78
End: 2018-09-11
Payer: MEDICARE

## 2018-09-11 VITALS
HEIGHT: 63 IN | HEART RATE: 80 BPM | SYSTOLIC BLOOD PRESSURE: 126 MMHG | TEMPERATURE: 98.2 F | OXYGEN SATURATION: 93 % | BODY MASS INDEX: 33.49 KG/M2 | WEIGHT: 189 LBS | DIASTOLIC BLOOD PRESSURE: 74 MMHG

## 2018-09-11 DIAGNOSIS — G89.29 CHRONIC MIDLINE LOW BACK PAIN WITHOUT SCIATICA: ICD-10-CM

## 2018-09-11 DIAGNOSIS — M54.50 CHRONIC MIDLINE LOW BACK PAIN WITHOUT SCIATICA: ICD-10-CM

## 2018-09-11 DIAGNOSIS — Z86.711 HX PULMONARY EMBOLISM: ICD-10-CM

## 2018-09-11 DIAGNOSIS — I10 ESSENTIAL HYPERTENSION: ICD-10-CM

## 2018-09-11 DIAGNOSIS — E11.9 CONTROLLED TYPE 2 DIABETES MELLITUS WITHOUT COMPLICATION, WITHOUT LONG-TERM CURRENT USE OF INSULIN (HCC): ICD-10-CM

## 2018-09-11 DIAGNOSIS — N18.30 CKD (CHRONIC KIDNEY DISEASE) STAGE 3, GFR 30-59 ML/MIN (HCC): ICD-10-CM

## 2018-09-11 DIAGNOSIS — E89.0 POSTOPERATIVE HYPOTHYROIDISM: ICD-10-CM

## 2018-09-11 DIAGNOSIS — E78.5 DYSLIPIDEMIA: ICD-10-CM

## 2018-09-11 LAB
HBA1C MFR BLD: 6.1 % (ref ?–5.8)
INT CON NEG: NEGATIVE
INT CON POS: POSITIVE

## 2018-09-11 PROCEDURE — 99214 OFFICE O/P EST MOD 30 MIN: CPT | Performed by: NURSE PRACTITIONER

## 2018-09-11 PROCEDURE — 83036 HEMOGLOBIN GLYCOSYLATED A1C: CPT | Performed by: NURSE PRACTITIONER

## 2018-09-11 RX ORDER — HYDROCODONE BITARTRATE AND ACETAMINOPHEN 7.5; 325 MG/1; MG/1
1 TABLET ORAL EVERY 8 HOURS PRN
Qty: 20 TAB | Refills: 0 | Status: SHIPPED | OUTPATIENT
Start: 2018-09-11 | End: 2018-09-18

## 2018-09-11 ASSESSMENT — ENCOUNTER SYMPTOMS: BACK PAIN: 1

## 2018-09-11 NOTE — PROGRESS NOTES
Subjective:      Sanjuanita Sosa is a 78 y.o. female who presents with Follow-Up (diabetes)        CC: Patient is here today for follow-up on diabetes, back pain and hypertension.    HPI Sanjuanita Sosa      1. Controlled type 2 diabetes mellitus without complication, without long-term current use of insulin (HCC)  Patient is currently metformin and Amaryl and her hemoglobin A1c today is 6.1 on the medications. She has been reluctant to try any of the newer medicines because of potential co-pays.    2. Essential hypertension  Patient on losartan and blood pressure appears controlled.    3. CKD (chronic kidney disease) stage 3, GFR 30-59 ml/min  Most recent GFR was stable at 44. She is not on anti-inflammatories.    4. Dyslipidemia  Patient states she is taking her statin daily and reports no myalgias.    5. Hx pulmonary embolism  Patient follows with the Coumadin clinic which is providing her Coumadin and testing INR. She also follows with cardiology every 6 months and has an appointment coming up.    6. Postoperative hypothyroidism  Last TSH was therapeutic on her current dose of levothyroxine which she is due for lab work.    7. Chronic midline low back pain without sciatica  Patient is requesting a few Norco to have available for her back pain. She does not use it very often but has a low opiate risk assessment. She has signed previous drug contract.  Social History   Substance Use Topics   • Smoking status: Current Every Day Smoker     Packs/day: 0.25     Years: 40.00     Types: Cigarettes   • Smokeless tobacco: Never Used   • Alcohol use No     Current Outpatient Prescriptions   Medication Sig Dispense Refill   • Canagliflozin 300 MG Tab Take 1 Each by mouth every day. 30 Tab 11   • HYDROcodone-acetaminophen (NORCO) 7.5-325 MG per tablet Take 1 Tab by mouth every 8 hours as needed for Severe Pain for up to 7 days. 20 Tab 0   • MICROLET LANCETS Misc Use one lancet Twice daily 100 Each 5   •  nystatin (MYCOSTATIN) powder Apply to affected area BID for 10 days. 15 g 0   • ferrous sulfate 325 (65 Fe) MG tablet Take 1 Tab by mouth every day. 90 Tab 0   • warfarin (COUMADIN) 5 MG Tab Take 5-7.5 mg by mouth every evening. 5 mg Monday Wednesday Friday  7.5 Tuesday Thursday Saturday Sunday      • omeprazole (PRILOSEC) 20 MG delayed-release capsule Take 1 Cap by mouth every day. 30 Cap 11   • amlodipine (NORVASC) 10 MG Tab TAKE ONE TABLET BY MOUTH ONCE A DAY 90 Tab 2   • calcium carbonate (OS-MARKELL 500) 500 MG Tab TAKE ONE TABLET BY MOUTH TWICE DAILY WITH MEALS 60 Tab 11   • traZODone (DESYREL) 100 MG Tab TAKE ONE AND ONE-HALF TABLET BY MOUTH ONCE DAILY AS NEEDED FOR SLEEP. 45 Tab 10   • levothyroxine (SYNTHROID) 150 MCG Tab Take 1 Tab by mouth every morning before breakfast. ON EMPTY STOMACH 90 Tab 1   • vitamin D, Ergocalciferol, (DRISDOL) 24876 units Cap capsule TAKE ONE CAPSULE BY MOUTH EVERY 7 DAYS 12 Cap 3   • metFORMIN (GLUCOPHAGE) 500 MG Tab TAKE TWO TABLETS BY MOUTH EACH MORNING WITH BREAKFAST AND TAKE ONE TABLET EACH EVENING WITH DINNER 90 Tab 2   • atorvastatin (LIPITOR) 80 MG tablet TAKE ONE-HALF TABLET BY MOUTH EVERY EVENING 30 Tab 5   • losartan (COZAAR) 50 MG Tab TAKE ONE TABLET BY MOUTH ONCE A DAY 30 Tab 10   • albuterol 108 (90 Base) MCG/ACT Aero Soln inhalation aerosol Inhale 2 Puffs by mouth every 6 hours as needed for Shortness of Breath. (Patient not taking: Reported on 4/24/2018) 8.5 g 3     No current facility-administered medications for this visit.      Facility-Administered Medications Ordered in Other Visits   Medication Dose Route Frequency Provider Last Rate Last Dose   • iodixanol (VISIPAQUE) SOLN    Intra-Op Once PRN Shekhar Rosado M.D.   10 mL at 01/18/17 0619     Past Medical History:   Diagnosis Date   • AAA (abdominal aortic aneurysm) (HCC)    • Anticoagulation monitoring, special range    • Arrhythmia    • Arthritis    • Aspirin allergy 4/24/2018   • Autoimmune hepatitis  "(HCC) 3/19/2012   • Blood clotting disorder ([facility]) 2015    clot in leg and lung   • Breath shortness    • Cancer (HCC)     thyroid   • Cataract     left eye needs surgery   • Diabetes     pre-diabetic   • Disorder of thyroid 2016    thyroid cancer   • Gallstones    • H/O: HTN (hypertension) 3/19/2012   • Heart valve disease    • Hepatitis B    • Hiatus hernia syndrome    • Hyperlipidemia    • Hypertension    • Kidney disease    • Mixed hyperlipidemia 3/19/2012   • Moderate aortic stenosis 3/2018    • Murmur 3/19/2012   • Nonspecific abnormal electrocardiogram (ECG) (EKG) 3/19/2012   • Overweight(278.02) 3/19/2012   • Pain    • Palpitations    • Preoperative cardiovascular examination 3/19/2012   • Unspecified urinary incontinence      Family History   Problem Relation Age of Onset   • Hyperlipidemia Mother    • Stroke Mother    • Hyperlipidemia Father    • Stroke Father    • Heart Disease Brother         CABG       Review of Systems   Musculoskeletal: Positive for back pain.   All other systems reviewed and are negative.         Objective:     /74   Pulse 80   Temp 36.8 °C (98.2 °F)   Ht 1.6 m (5' 3\")   Wt 85.7 kg (189 lb)   LMP 10/12/1970   SpO2 93%   PF (!) 16 L/min   BMI 33.48 kg/m²      Physical Exam   Constitutional: She is oriented to person, place, and time. She appears well-developed and well-nourished. No distress.   HENT:   Head: Normocephalic and atraumatic.   Right Ear: External ear normal.   Left Ear: External ear normal.   Nose: Nose normal.   Eyes: Right eye exhibits no discharge. Left eye exhibits no discharge.   Neck: Normal range of motion. Neck supple. No thyromegaly present.   Cardiovascular: Normal rate, regular rhythm and normal heart sounds.  Exam reveals no gallop and no friction rub.    No murmur heard.  Pulmonary/Chest: Effort normal and breath sounds normal. She has no wheezes. She has no rales.   Musculoskeletal: She exhibits no edema or tenderness.        Lumbar back: She " exhibits pain.   Neurological: She is alert and oriented to person, place, and time. She displays normal reflexes.   Skin: Skin is warm and dry. No rash noted. She is not diaphoretic.   Psychiatric: She has a normal mood and affect. Her behavior is normal. Judgment and thought content normal.   Nursing note and vitals reviewed.              Assessment/Plan:     1. Controlled type 2 diabetes mellitus without complication, without long-term current use of insulin (HCC)  I reviewed with patient, as did her cardiologist, that medicines such as Invokana would be beneficial for its cardioprotective effect and it would be less chance for hypoglycemia than taking glimepiride. I will have her stop the glimepiride and switched to Invokana which appears to be covered on her insurance. Her concern is the higher co-pay and there is not much I can do about that. If she decides not to go with this medication I will have her go with the lower dose of glimepiride because of her hemoglobin A1c so well.  - POCT  A1C  - COMP METABOLIC PANEL; Future  - Canagliflozin 300 MG Tab; Take 1 Each by mouth every day.  Dispense: 30 Tab; Refill: 11    2. Essential hypertension  Patient will continue with her low dose ARB.    3. CKD (chronic kidney disease) stage 3, GFR 30-59 ml/min  I will check GFR to see if it is worsening.  - COMP METABOLIC PANEL; Future    4. Dyslipidemia  Patient to continue with her statin.    5. Hx pulmonary embolism  Patient will continue to follow with the Coumadin clinic and cardiology.    6. Postoperative hypothyroidism  Patient needs retesting to see if she is getting adequate replacement.  - TSH; Future    7. Chronic midline low back pain without sciatica  I again recommended pain management but patient adamantly refuses. I explained they do not provide long-term pain management but will give her enough pills for a week or so since she is not getting medicines from other offices and is not on any other controlled  substances. I am unable to check PNP because the website is down but her previous opiate risk assessment tool showed low risk.  - HYDROcodone-acetaminophen (NORCO) 7.5-325 MG per tablet; Take 1 Tab by mouth every 8 hours as needed for Severe Pain for up to 7 days.  Dispense: 20 Tab; Refill: 0

## 2018-09-12 ENCOUNTER — OFFICE VISIT (OUTPATIENT)
Dept: VASCULAR LAB | Facility: MEDICAL CENTER | Age: 78
End: 2018-09-12
Attending: NURSE PRACTITIONER
Payer: MEDICARE

## 2018-09-12 ENCOUNTER — ANTICOAGULATION VISIT (OUTPATIENT)
Dept: VASCULAR LAB | Facility: MEDICAL CENTER | Age: 78
End: 2018-09-12
Attending: INTERNAL MEDICINE
Payer: MEDICARE

## 2018-09-12 VITALS
DIASTOLIC BLOOD PRESSURE: 42 MMHG | WEIGHT: 192 LBS | HEIGHT: 63 IN | BODY MASS INDEX: 34.02 KG/M2 | HEART RATE: 83 BPM | SYSTOLIC BLOOD PRESSURE: 100 MMHG

## 2018-09-12 DIAGNOSIS — I71.40 ABDOMINAL AORTIC ANEURYSM WITHOUT RUPTURE (HCC): ICD-10-CM

## 2018-09-12 DIAGNOSIS — I35.0 MODERATE AORTIC STENOSIS: Chronic | ICD-10-CM

## 2018-09-12 DIAGNOSIS — N18.30 CKD (CHRONIC KIDNEY DISEASE) STAGE 3, GFR 30-59 ML/MIN (HCC): ICD-10-CM

## 2018-09-12 DIAGNOSIS — E78.5 DYSLIPIDEMIA: ICD-10-CM

## 2018-09-12 DIAGNOSIS — Z86.711 HX PULMONARY EMBOLISM: ICD-10-CM

## 2018-09-12 DIAGNOSIS — E11.9 CONTROLLED TYPE 2 DIABETES MELLITUS WITHOUT COMPLICATION, WITHOUT LONG-TERM CURRENT USE OF INSULIN (HCC): ICD-10-CM

## 2018-09-12 DIAGNOSIS — Z79.01 CHRONIC ANTICOAGULATION: ICD-10-CM

## 2018-09-12 DIAGNOSIS — I10 ESSENTIAL HYPERTENSION: ICD-10-CM

## 2018-09-12 DIAGNOSIS — E07.9 THYROID DISEASE: ICD-10-CM

## 2018-09-12 LAB — INR PPP: 2.2 (ref 2–3.5)

## 2018-09-12 PROCEDURE — 85610 PROTHROMBIN TIME: CPT

## 2018-09-12 PROCEDURE — 99214 OFFICE O/P EST MOD 30 MIN: CPT | Performed by: NURSE PRACTITIONER

## 2018-09-12 PROCEDURE — 99212 OFFICE O/P EST SF 10 MIN: CPT | Performed by: NURSE PRACTITIONER

## 2018-09-12 PROCEDURE — 99211 OFF/OP EST MAY X REQ PHY/QHP: CPT | Performed by: PHARMACIST

## 2018-09-12 PROCEDURE — 99213 OFFICE O/P EST LOW 20 MIN: CPT | Performed by: NURSE PRACTITIONER

## 2018-09-12 PROCEDURE — 99211 OFF/OP EST MAY X REQ PHY/QHP: CPT

## 2018-09-12 ASSESSMENT — ENCOUNTER SYMPTOMS
DIZZINESS: 0
SHORTNESS OF BREATH: 0
BRUISES/BLEEDS EASILY: 1
SEIZURES: 0
DEPRESSION: 1
PALPITATIONS: 0
FALLS: 0
CONSTIPATION: 1
CLAUDICATION: 0
BLOOD IN STOOL: 0
WEIGHT LOSS: 0
WHEEZING: 0
BACK PAIN: 1
HEADACHES: 0

## 2018-09-12 NOTE — PROGRESS NOTES
Anticoagulation Summary  As of 9/12/2018    INR goal:   2.0-3.0   TTR:   71.0 % (1.4 y)   Today's INR:   2.2   Warfarin maintenance plan:   5 mg (5 mg x 1) on Mon, Wed, Fri; 7.5 mg (5 mg x 1.5) all other days   Weekly warfarin total:   45 mg   Plan last modified:   Burke Matthew, PharmD (8/21/2018)   Next INR check:   10/10/2018   Target end date:   Indefinite    Indications    DVT (deep venous thrombosis)  left (Resolved) [I82.402]  Hx pulmonary embolism [Z86.711]             Anticoagulation Episode Summary     INR check location:       Preferred lab:       Send INR reminders to:       Comments:         Anticoagulation Care Providers     Provider Role Specialty Phone number    WENDY Baker Referring Internal Medicine [phone]    [facility] Anticoagulation [facility] Responsible  [phone]        Anticoagulation Patient Findings      HPI:  Sanjuanitasaira Jallohbeth Ian seen in clinic today, on anticoagulation therapy with warfarin for history of DVT and PE.  Changes to current medical/health status since last appt: None  Denies signs/symptoms of bleeding and/or thrombosis since the last appt.    Denies any interval changes to diet  Denies any interval changes to medications since last appt.   Denies any complications or cost restrictions with current therapy.   BP declined today.      A/P   INR therapeutic.   Patient to continue current dosing regimen.    Follow up appointment in 4 week(s).    Marli Aguilera, PharmD

## 2018-09-12 NOTE — PROGRESS NOTES
"  FOLLOW-UP VASCULAR VISIT  Subjective:   Sanjuanita Sosa is a 78 y.o. female who presents today 09/12/2018 for   Chief Complaint   Patient presents with   • Follow-Up     HPI:     Patient here for f/u of AAA, PE, anticoagulation, htn, and dyslipidemia  Not taking Home BP- running 100-120/40-60's    On Metformin and Glimepiride [id number]  Has had Hypoglycemia 3 times with FS 56 after took glucose pills glimepiride stopped yesterday by PCP  Leg swelling lt leg when sitting for a few hours  Denies CP, SOB, palpitations  On Warfarin- following up with [facility] today  On Atorvastatin- no myalgias  Smoking still 10 per day  No lab work done  On Amlodipine and Losartan denies dizziness except with hypoglycemia episodes.  Denies TIA or stroke type symptoms  Has had no falls          Social History   Substance Use Topics   • Smoking status: Current Every Day Smoker     Packs/day: 0.25     Years: 40.00     Types: Cigarettes   • Smokeless tobacco: Never Used   • Alcohol use No     DIET AND EXERCISE:  Weight Change: stable  Diet: reasonable Diabetic diet  Exercise: minimal exercise due to back pain    Review of Systems   Constitutional: Negative for weight loss.   Respiratory: Negative for shortness of breath and wheezing.    Cardiovascular: Positive for leg swelling. Negative for chest pain, palpitations and claudication.   Gastrointestinal: Positive for constipation. Negative for blood in stool.   Genitourinary: Negative for hematuria.   Musculoskeletal: Positive for back pain and joint pain. Negative for falls.   Neurological: Negative for dizziness, seizures and headaches.   Endo/Heme/Allergies: Bruises/bleeds easily.   Psychiatric/Behavioral: Positive for depression. Negative for suicidal ideas.      Objective:     Vitals:    09/12/18 0931   BP: 100/42   Pulse: 83   Weight: 87.1 kg (192 lb)   Height: 1.6 m (5' 3\")      Body mass index is 34.01 kg/m².  Physical Exam   Constitutional: She is oriented to person, place, " and time. She appears well-developed and well-nourished. No distress.   Cardiovascular: Normal rate.  Exam reveals no gallop and no friction rub.    Murmur heard.  Pulmonary/Chest: Effort normal and breath sounds normal. No respiratory distress. She has no wheezes. She has no rales.   Musculoskeletal: She exhibits edema.   Neurological: She is alert and oriented to person, place, and time.   Skin: Skin is warm and dry. She is not diaphoretic.   Psychiatric: She has a normal mood and affect. Her behavior is normal.     Lab Results   Component Value Date    CHOLSTRLTOT 108 03/22/2018    LDL 58 03/22/2018    HDL 28 (A) 03/22/2018    TRIGLYCERIDE 112 03/22/2018      Lab Results   Component Value Date    PROTHROMBTM 17.5 (H) 03/25/2018    INR 2.2 09/12/2018       Lab Results   Component Value Date    HBA1C 6.1 09/11/2018      Lab Results   Component Value Date    SODIUM 138 03/26/2018    POTASSIUM 4.6 03/26/2018    CHLORIDE 105 03/26/2018    CO2 28 03/26/2018    GLUCOSE 160 (H) 03/26/2018    BUN 26 (H) 03/26/2018    CREATININE 1.20 03/26/2018    IFAFRICA 53 (A) 03/26/2018    IFNOTAFR 44 (A) 03/26/2018        Lab Results   Component Value Date    WBC 12.0 (H) 04/05/2018    RBC 4.00 (L) 04/05/2018    HEMOGLOBIN 11.4 (L) 04/05/2018    HEMATOCRIT 36.1 (L) 04/05/2018    MCV 90.3 04/05/2018    MCH 28.5 04/05/2018    MCHC 31.6 (L) 04/05/2018    MPV 10.4 04/05/2018     CT chest 4/27/2015  There are pulmonary emboli extending into the right upper lobe, right middle lobe, right lower lobe, and left lower lobe. The main pulmonary artery is of normal caliber. No shift of the intraventricular septum or reflux of contrast into the hepatic veins. There are scattered arterial calcifications. No significant pericardial effusion.  There is mild left basilar atelectasis. No significant pleural fluid. The central airways are clear.  No adenopathy is identified.  Limited visualization of the upper abdomen demonstrates severe atherosclerotic  disease.    echo 4/28/2015  Technically difficult study.   Normal left ventricular size and function.  Left ventricular ejection fraction is 60% to 65%.  Grade I diastolic dysfunction - mitral inflow E/A is <1.0.  Mild mitral regurgitation.  Mild aortic stenosis.  Mild to moderate aortic insufficiency.  Moderate tricuspid regurgitation.  Right ventricular systolic pressure is estimated to be 43-48 mmHg.  No prior study is available for comparison.      CT scan chest abdomen and pelvis 10/2015   Thoracic abdominal aortic penetrating atheromatous ulcer without evidence for intramural hematoma or dissection.   Exclusion of early dissection or intramural hematoma (although no evidence for this entity) is not possible on this contrast only study.  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  Ventral hernia containing small bowel and without evidence for obstruction or inflammation    CTA 5/17/2016  1. Considerable partially ulcerated plaque within the aorta similar to previous findings. No dissection. No mediastinal hematoma.  2. The abdominal aorta measures up to 2.6 cm in diameter. No change compared with previous. No retroperitoneal hematoma.  3. High-grade stenosis celiac artery.  4. Atelectasis within both lungs and tiny right apical pleural-based nodule unchanged.  5. Surgical absent spleen. Small splenules and chronic loculated fluid collection left upper quadrant again demonstrated.  6. Diverticula colon without evidence of diverticulitis.  7. Ventral abdominal wall hernia containing nonobstructed small bowel loops.  8. Large and small bowel incompletely imaged. No free fluid within the abdomen identified.  9. Partial resection pancreas.  10. Tiny gallstones.  11.  Coronary artery calcifications.  12. 1.3 cm left lobe thyroid nodule. Ultrasound followup is consideration.    U/S Aorta Dec 2016:  1.  Fusiform infrarenal abdominal aortic aneurysm measures 2.9 x 2.9 cm in maximum axial dimensions and is located at the mid aortic level. Measurement on prior CT was 2.6 cm.  2.  Extensive aortic atherosclerosis.    CT abd and Pelvis Oct 24, 2017  1.  Postoperative changes as described.  2.  Minimal pneumobilia, likely postoperative.  3.  Probable chronic fluid collection or mass lateral to the greater curvature stomach, overall decreased in size from prior exam.  4.  Low-attenuation lesion in the nito hepatis, unchanged and of uncertain significance.  5.  Nonobstructing ventral abdominal hernia.  6.  No focal mesenteric inflammatory process.  7.  Abdominal aortic ectasia with chronic dissection, unchanged from prior exam.  No periaortic fluid collection  .  Echo March 2018  Technically difficult study - adequate information is obtained.   Left ventricular ejection fraction is visually estimated to be 60%.  Mild concentric left ventricular hypertrophy.  Mildly dilated left   atrium.  Moderate aortic stenosis. Mild-moderate aortic insufficiency.  Right ventricular systolic pressure is estimated to be 65 mmHg.    LE Venous duplex- March 2018   Normal bilateral superficial and deep venous examination of the lower    extremities.    Limited evaluation of the posterior tibial and peroneal veins.     Medical Decision Making:  Today's Assessment / Status / Plan:     Patient Type: Secondary Prevention    Etiology of Established CVD if Present:   1.  Ulceration thoracic aorta - not necessarily penetrating - stable on serial imaging.  2.  AAA, small  3.  DVT and PE      Lipid Management: Qualifies for Statin Therapy Based on 2013 ACC/AHA Guidelines: yes  Calculated 10-Year Risk of ASCVD: N/A  Currently on Statin: Yes  Excellent control on most recent blood work (March, 2018)  -continue  atorvastatin to 40 mg daily (1/2 tab)  -Report any myalgias immediately  -recheck fasting lipids prior to next visit    Blood Pressure Management:Goal: ACC/DEMETRIA Office BP Goal:< 130/80's; Under Control: yes  Under excellent control at least in office-may even be over treating but pt does not want to change dosing-denies dizziness  Can't afford home monitor at present  Has CKD but stable or improved  Does not appear to be on hctz at present  Mild leg swelling but only in LT leg post DVT-Lynnette  Amlodipine at current dose  -Continue Amlodipine 5 mg daily-Consider decreasing if any dizziness-would also decrease if starting invokana as below will call pt in a week to determine what she is going to do.   -Continue losartan 50 mg daily  - if any degree of increased albuminuria would consider increase in losartan to 100 mg daily    Glycemic Status: Diabetic-  last A1c under excellent control  -Continue metformin for now, but may need to consider d/c if GFR falls further. Current GFR > 60  -Start Invokana per PCP- Pt not sure if she can afford the co-pay-check lytes and GFR per PCP.If she decides to continue with the Glimepiride would decreasing dose due to hypoglycemic events but would defer to PCP.     -Continue to follow fasting blood sugars at home - call if hypoglycemic episodes  -Eat a high protein snack around 2pm due to the stretch in meal times.   - Continue TLC  -Yearly flu shot, eye exam - reminded patient  - recheck A1C prior to next visit    Anti-Platelet/Anti-Coagulant Tx:   Unable to afford pradaxa or xarelto  Patient in agreement that long term benefit of anticoag likely outweighs risk  - Continue indefinite anticoagulation with warfarin  - Follow up in coag clinic    Smoking:  Has had significant recidivism. Discussed cutting amount in half and consider Nicoderm patch. Pt is not wanting to quit at present, she is satisfied with smoking despite the risks. Will continue to address at each visit    Physical  Activity: encouraged exercise class 2-3 x per week. Pt limited due to back and joint pain    Weight Management and Nutrition: Dietary plan was discussed with patient at this visit including low fats and carb, diabetic diet    Other:     1. Chronic renal disease stage G3B in past with modest improvement to G3A on most recent blood work. No albuminuria (A0) in past. No longer seeing nephrology., Continue ARB and BP control. Check GFR, urine for ma,  and CBC prior to next visit.     2.  Hypothyroidism with h/o papillary thyroid cancer. On levothyroxine - dose adjusted by PCP. Recheck TSH.  Otherwise Will defer all management to pcp    3.  Ulceration, thoracic aorta - stable on surveillance imaging.  Continue medical management    4.  AAA, small and stable on serial imaging - continue medical management and surveillance - will repeat u/s every 2-3 years - next end of 2018 or early 2019    5. Aortic stenosis and insufficiency- Per last echo mod stenosis and mild to mod aortic insufficient- Pt appears asymptomatic-Check Echo one year out due March 2019- if stable can likely decrease interval to 2 years.     Instructed to follow-up with PCP for remainder of adult medical needs: yes  We will partner with other providers in the management of established vascular disease and cardiometabolic risk factors.    Studies to Be Obtained: Abdominal aortic ultrasound Nov 2018 and echo March 2019 both ordered today  Labs to Be Obtained: A1c, CBC, TSH, Lipids, MA. Prior to next visit    Follow up in: 6 months    BETHANY Alvarado.       CC:   Osmin Armendariz

## 2018-09-18 ENCOUNTER — TELEPHONE (OUTPATIENT)
Dept: MEDICAL GROUP | Facility: MEDICAL CENTER | Age: 78
End: 2018-09-18

## 2018-09-18 ENCOUNTER — TELEPHONE (OUTPATIENT)
Dept: VASCULAR LAB | Facility: MEDICAL CENTER | Age: 78
End: 2018-09-18

## 2018-09-18 LAB — INR BLD: 2.2 (ref 0.9–1.2)

## 2018-09-18 NOTE — TELEPHONE ENCOUNTER
1. Caller Name: Sanjuanita Sosa                         Call Back Number:[phone] (home)       2. Message: pt called she states she doesn't want to take invokana because it lowers her blood pressure she said she would like to to send a rx for glimepiride to the pharm instead    3. Patient approves office to leave a detailed voicemail/MyChart message: yes

## 2018-09-18 NOTE — TELEPHONE ENCOUNTER
Pt  Called me back stating that she called her PCP and informed him that she will not take the Invokana due to cost. She requested being put back on her usual medications. She will monitor her BP to make sure her BP id not to low and will call me back if it is. BETHANY Alvarado.

## 2018-09-18 NOTE — TELEPHONE ENCOUNTER
She has had some hypoglycemia so I want her off glimepiride and she is to stay just on her metformin for diabetes.

## 2018-09-19 ENCOUNTER — TELEPHONE (OUTPATIENT)
Dept: MEDICAL GROUP | Facility: MEDICAL CENTER | Age: 78
End: 2018-09-19

## 2018-09-19 DIAGNOSIS — E11.9 CONTROLLED TYPE 2 DIABETES MELLITUS WITHOUT COMPLICATION, WITHOUT LONG-TERM CURRENT USE OF INSULIN (HCC): ICD-10-CM

## 2018-09-19 RX ORDER — CALCIUM CARBONATE 500(1250)
TABLET ORAL
Qty: 60 TAB | Refills: 4 | Status: SHIPPED | OUTPATIENT
Start: 2018-09-19

## 2018-09-19 NOTE — TELEPHONE ENCOUNTER
Patient was notified of stopping the glimepiride. She was wondering if she should be taking another medication  with the metformin.

## 2018-09-19 NOTE — TELEPHONE ENCOUNTER
Patient does not need any other medicine besides metformin because she was having some hypoglycemia.

## 2018-09-21 RX ORDER — LANCETS
EACH MISCELLANEOUS
Qty: 100 EACH | Refills: 5 | Status: SHIPPED | OUTPATIENT
Start: 2018-09-21 | End: 2018-11-25 | Stop reason: CLARIF

## 2018-09-26 ENCOUNTER — TELEPHONE (OUTPATIENT)
Dept: MEDICAL GROUP | Facility: MEDICAL CENTER | Age: 78
End: 2018-09-26

## 2018-09-26 DIAGNOSIS — E11.9 CONTROLLED TYPE 2 DIABETES MELLITUS WITHOUT COMPLICATION, WITHOUT LONG-TERM CURRENT USE OF INSULIN (HCC): ICD-10-CM

## 2018-10-10 ENCOUNTER — ANTICOAGULATION VISIT (OUTPATIENT)
Dept: VASCULAR LAB | Facility: MEDICAL CENTER | Age: 78
End: 2018-10-10
Attending: INTERNAL MEDICINE
Payer: MEDICARE

## 2018-10-10 VITALS — HEART RATE: 69 BPM | DIASTOLIC BLOOD PRESSURE: 42 MMHG | SYSTOLIC BLOOD PRESSURE: 94 MMHG

## 2018-10-10 DIAGNOSIS — Z86.711 HX PULMONARY EMBOLISM: ICD-10-CM

## 2018-10-10 LAB
INR BLD: 2.6 (ref 0.9–1.2)
INR PPP: 2.6 (ref 2–3.5)

## 2018-10-10 PROCEDURE — 99211 OFF/OP EST MAY X REQ PHY/QHP: CPT | Performed by: NURSE PRACTITIONER

## 2018-10-10 PROCEDURE — 85610 PROTHROMBIN TIME: CPT

## 2018-10-10 NOTE — PROGRESS NOTES
Anticoagulation Summary  As of 10/10/2018    INR goal:   2.0-3.0   TTR:   72.5 % (1.5 y)   Today's INR:   2.6   Warfarin maintenance plan:   5 mg (5 mg x 1) on Mon, Wed, Fri; 7.5 mg (5 mg x 1.5) all other days   Weekly warfarin total:   45 mg   Plan last modified:   Burke Matthew, PharmD (8/21/2018)   Next INR check:   11/14/2018   Target end date:   Indefinite    Indications    DVT (deep venous thrombosis)  left (Resolved) [I82.402]  Hx pulmonary embolism [Z86.711]             Anticoagulation Episode Summary     INR check location:       Preferred lab:       Send INR reminders to:       Comments:         Anticoagulation Care Providers     Provider Role Specialty Phone number    WENDY Baker Referring Internal Medicine [phone]    [facility] Anticoagulation Services Responsible  [phone]        Anticoagulation Patient Findings      HPI:  Sanjuanita Sosa seen in clinic today for follow up on anticoagulation therapy in the presence of DVT, PE hx. Denies any changes to current medical/health status since last appointment. Denies any medication or diet changes. No current symptoms of bleeding or thrombosis reported.    A/P:   INR therapeutic. Continue current regimen. BP recorded in vitals.    Follow up appointment in 5 week(s).    Next Appointment: Wednesday, November 14, 2018 at 10:30 am.    Farheen CARPENTER

## 2018-10-16 ENCOUNTER — OFFICE VISIT (OUTPATIENT)
Dept: MEDICAL GROUP | Facility: MEDICAL CENTER | Age: 78
End: 2018-10-16
Payer: MEDICARE

## 2018-10-16 VITALS
DIASTOLIC BLOOD PRESSURE: 52 MMHG | HEART RATE: 83 BPM | HEIGHT: 63 IN | BODY MASS INDEX: 34.38 KG/M2 | RESPIRATION RATE: 16 BRPM | TEMPERATURE: 98.3 F | OXYGEN SATURATION: 93 % | SYSTOLIC BLOOD PRESSURE: 100 MMHG | WEIGHT: 194 LBS

## 2018-10-16 DIAGNOSIS — Z86.711 HX PULMONARY EMBOLISM: ICD-10-CM

## 2018-10-16 DIAGNOSIS — M54.50 CHRONIC MIDLINE LOW BACK PAIN WITHOUT SCIATICA: ICD-10-CM

## 2018-10-16 DIAGNOSIS — E78.5 DYSLIPIDEMIA: ICD-10-CM

## 2018-10-16 DIAGNOSIS — G89.29 CHRONIC MIDLINE LOW BACK PAIN WITHOUT SCIATICA: ICD-10-CM

## 2018-10-16 DIAGNOSIS — I10 ESSENTIAL HYPERTENSION: ICD-10-CM

## 2018-10-16 DIAGNOSIS — I65.21 STENOSIS OF RIGHT CAROTID ARTERY: ICD-10-CM

## 2018-10-16 DIAGNOSIS — K21.9 GASTROESOPHAGEAL REFLUX DISEASE WITHOUT ESOPHAGITIS: ICD-10-CM

## 2018-10-16 DIAGNOSIS — K04.7 DENTAL ABSCESS: ICD-10-CM

## 2018-10-16 DIAGNOSIS — C73 PAPILLARY THYROID CARCINOMA (HCC): ICD-10-CM

## 2018-10-16 DIAGNOSIS — E55.9 VITAMIN D DEFICIENCY DISEASE: ICD-10-CM

## 2018-10-16 DIAGNOSIS — I71.40 ABDOMINAL AORTIC ANEURYSM WITHOUT RUPTURE (HCC): ICD-10-CM

## 2018-10-16 DIAGNOSIS — J96.01 ACUTE RESPIRATORY FAILURE WITH HYPOXIA (HCC): ICD-10-CM

## 2018-10-16 DIAGNOSIS — Z00.00 MEDICARE ANNUAL WELLNESS VISIT, SUBSEQUENT: ICD-10-CM

## 2018-10-16 DIAGNOSIS — I35.0 MODERATE AORTIC STENOSIS: Chronic | ICD-10-CM

## 2018-10-16 DIAGNOSIS — E11.9 CONTROLLED TYPE 2 DIABETES MELLITUS WITHOUT COMPLICATION, WITHOUT LONG-TERM CURRENT USE OF INSULIN (HCC): ICD-10-CM

## 2018-10-16 DIAGNOSIS — E89.0 POSTOPERATIVE HYPOTHYROIDISM: ICD-10-CM

## 2018-10-16 DIAGNOSIS — F51.01 PRIMARY INSOMNIA: ICD-10-CM

## 2018-10-16 DIAGNOSIS — Z79.01 CHRONIC ANTICOAGULATION: ICD-10-CM

## 2018-10-16 DIAGNOSIS — N18.30 CKD (CHRONIC KIDNEY DISEASE) STAGE 3, GFR 30-59 ML/MIN (HCC): ICD-10-CM

## 2018-10-16 DIAGNOSIS — Z91.81 RISK FOR FALLS: ICD-10-CM

## 2018-10-16 DIAGNOSIS — I35.0 AORTIC STENOSIS, MODERATE: ICD-10-CM

## 2018-10-16 PROCEDURE — 99213 OFFICE O/P EST LOW 20 MIN: CPT | Mod: 25 | Performed by: NURSE PRACTITIONER

## 2018-10-16 PROCEDURE — G0439 PPPS, SUBSEQ VISIT: HCPCS | Performed by: NURSE PRACTITIONER

## 2018-10-16 RX ORDER — CLINDAMYCIN HYDROCHLORIDE 300 MG/1
300 CAPSULE ORAL 3 TIMES DAILY
Qty: 21 CAP | Refills: 0 | Status: SHIPPED | OUTPATIENT
Start: 2018-10-16 | End: 2018-10-23

## 2018-10-16 ASSESSMENT — ACTIVITIES OF DAILY LIVING (ADL): BATHING_REQUIRES_ASSISTANCE: 0

## 2018-10-16 ASSESSMENT — PATIENT HEALTH QUESTIONNAIRE - PHQ9: CLINICAL INTERPRETATION OF PHQ2 SCORE: 0

## 2018-10-16 ASSESSMENT — ENCOUNTER SYMPTOMS: GENERAL WELL-BEING: FAIR

## 2018-10-16 NOTE — PROGRESS NOTES
Chief Complaint   Patient presents with   • Annual Wellness Visit         HPI:  Sanjuanita is a 78 y.o. here for Medicare Annual Wellness Visit as well as requesting antibiotics for possible dental procedure.          1. Medicare annual wellness visit, subsequent  Screening performed below.    2. Dental abscess  Patient has at least one broken front tooth as well as dental decay and she is going to have a number of teeth removed soon by a dentist.  She states in the past she needed to take antibiotics for prophylaxis because of previous joint replacement.  She is allergic to penicillin.    3. Papillary thyroid carcinoma (HCC)  Patient has been stable and on levothyroxine.      4. Aortic stenosis, moderate  Patient on statin and anticoagulation.     5. Acute respiratory failure with hypoxia ([facility])  Patient has oxygen at home to use at night but she states she does not use it and has not needed it since she was in the hospital and would like to have it removed.    6. Moderate aortic stenosis 3/2018  Patient up-to-date on echocardiogram showing mild to moderate aortic insufficiency.  She is asymptomatic.    7. Stenosis of right carotid artery  Patient on anticoagulation and statin.    8. Dyslipidemia  Patient states she is taking her statin and her last lipid panel was done a year ago when she needs to do lab work now.    9. Hx pulmonary embolism  Patient follows with the anticoagulation clinic which is providing her medicine and checking INR.    10. Essential hypertension  Blood pressure remains stable.    11. Risk for falls  Patient has walker to use if needed.      12. CKD (chronic kidney disease) stage 3, GFR 30-59 ml/min ([facility])  GFR has been stable but she is due for blood work.    13. Vitamin D deficiency disease  Patient taking vitamin D.    14. Controlled type 2 diabetes mellitus without complication, without long-term current use of insulin ([facility])  Patient's last hemoglobin A1c was on the low 6 range and therefore  her Amaryl was stopped.  She is currently only on metformin  Low-dose.  She was unable to afford SGL T2 inhibitors.    15. BMI 33.0-33.9,adult  Weight unchanged.    16. Gastroesophageal reflux disease without esophagitis  Patient uses omeprazole daily.    17. Chronic midline low back pain without sciatica  Patient not requesting pain medication today but uses occasional Norco when needed.    18. Postoperative hypothyroidism  Last TSH was therapeutic on levothyroxine 150 mcg.       19. Chronic anticoagulation  Patient follows at the Coumadin clinic.    20. Abdominal aortic aneurysm without rupture ([facility])  Recently monitored and appears stable.    21. Primary insomnia  Patient uses trazodone nightly.    Patient Active Problem List    Diagnosis Date Noted   • Aspirin allergy 04/24/2018     Priority: High   • Hip pain 03/21/2018     Priority: High   • Moderate aortic stenosis 3/2018    • Obesity (BMI 30-39.9) 03/27/2018   • Pulmonary edema 03/25/2018   • Anemia 03/21/2018   • Aortic stenosis, moderate 03/07/2018   • Papillary thyroid carcinoma (HCC) 03/07/2018   • Acute respiratory failure with hypoxia ([facility]) 02/26/2018   • Primary insomnia 07/11/2017   • Abdominal aortic aneurysm without rupture ([facility]) 04/12/2017   • Chronic midline low back pain without sciatica 01/31/2017   • Postoperative hypothyroidism 01/31/2017   • Chronic anticoagulation 01/31/2017   • Gastroesophageal reflux disease without esophagitis 12/03/2015   • Vitamin D deficiency disease 11/24/2015   • Controlled type 2 diabetes mellitus without complication, without long-term current use of insulin ([facility]) 11/24/2015   • CKD (chronic kidney disease) stage 3, GFR 30-59 ml/min ([facility]) 11/19/2015   • BMI 33.0-33.9,adult 09/08/2015   • Risk for falls 09/08/2015   • Thyroid disease 08/05/2015   • Essential hypertension 08/05/2015   • Hx pulmonary embolism 05/12/2015   • Dyslipidemia 04/28/2015   • Carotid artery stenosis 10/07/2014       Current Outpatient  Prescriptions   Medication Sig Dispense Refill   • Misc. Devices Misc Contour test strips to use daily to twice a day. 50 Each 11   • MICROLET LANCETS Misc Use one lancet Twice daily 100 Each 5   • metFORMIN (GLUCOPHAGE) 500 MG Tab TAKE TWO TABLETS BY MOUTH EACH MORNING WITH BREAKFAST AND TAKE ONE TABLET EACH EVENING WITH DINNER 180 Tab 3   • calcium carbonate (OS-MARKELL 500) 500 MG Tab TAKE ONE TABLET BY MOUTH TWICE DAILY WITH MEALS 60 Tab 4   • traZODone (DESYREL) 100 MG Tab TAKE ONE AND ONE-HALF TABLET BY MOUTH ONCE DAILY AS NEEDED FOR SLEEP. 45 Tab 10   • levothyroxine (SYNTHROID) 150 MCG Tab Take 1 Tab by mouth every morning before breakfast. ON EMPTY STOMACH 90 Tab 1   • vitamin D, Ergocalciferol, (DRISDOL) 45519 units Cap capsule TAKE ONE CAPSULE BY MOUTH EVERY 7 DAYS 12 Cap 3   • atorvastatin (LIPITOR) 80 MG tablet TAKE ONE-HALF TABLET BY MOUTH EVERY EVENING 30 Tab 5   • losartan (COZAAR) 50 MG Tab TAKE ONE TABLET BY MOUTH ONCE A DAY 30 Tab 10   • nystatin (MYCOSTATIN) powder Apply to affected area BID for 10 days. 15 g 0   • ferrous sulfate 325 (65 Fe) MG tablet Take 1 Tab by mouth every day. 90 Tab 0   • warfarin (COUMADIN) 5 MG Tab Take 5-7.5 mg by mouth every evening. 5 mg Monday Wednesday Friday  7.5 Tuesday Thursday Saturday Sunday      • albuterol 108 (90 Base) MCG/ACT Aero Soln inhalation aerosol Inhale 2 Puffs by mouth every 6 hours as needed for Shortness of Breath. 8.5 g 3   • omeprazole (PRILOSEC) 20 MG delayed-release capsule Take 1 Cap by mouth every day. 30 Cap 11   • amlodipine (NORVASC) 10 MG Tab TAKE ONE TABLET BY MOUTH ONCE A DAY 90 Tab 2     No current facility-administered medications for this visit.      Facility-Administered Medications Ordered in Other Visits   Medication Dose Route Frequency Provider Last Rate Last Dose   • iodixanol (VISIPAQUE) SOLN    Intra-Op Once PRN Shekhar Rosado M.D.   10 mL at 01/18/17 0619        Patient is taking medications as noted in medication  list.  Current supplements as per medication list.     Allergies: Aspirin and Penicillins    Current social contact/activities: Visits with friends and family, part takes in activities at the Assisting home     Is patient current with immunizations? No, due for FLU. Patient is interested in receiving FLU today.    She  reports that she has been smoking Cigarettes.  She has a 10.00 pack-year smoking history. She has never used smokeless tobacco. She reports that she does not drink alcohol or use drugs.  Ready to quit: Not Answered  Counseling given: Not Answered        DPA/Advanced directive: Patient does not have an Advanced Directive.  A packet and workshop information was given on Advanced Directives.    ROS:    Gait: Uses no assistive device   Ostomy: No   Other tubes: No   Amputations: No   Chronic oxygen use No   Last eye exam 2 weeks ago   Wears hearing aids: No   : Reports urinary leakage during the last 6 months that has somewhat interfered with their daily activities or sleep.      Depression Screening    Little interest or pleasure in doing things?  0 - not at all  Feeling down, depressed, or hopeless? 0 - not at all  Patient Health Questionnaire Score: 0    If depressive symptoms identified deferred to follow up visit unless specifically addressed in assessment and plan.    Interpretation of PHQ-9 Total Score   Score Severity   1-4 No Depression   5-9 Mild Depression   10-14 Moderate Depression   15-19 Moderately Severe Depression   20-27 Severe Depression    Screening for Cognitive Impairment    Three Minute Recall (leader, season, table)  0/3 Leader season table  Justyn clock face with all 12 numbers and set the hands to show 10 past 11.  Yes 2/2 clock 11:10  If cognitive concerns identified, deferred for follow up unless specifically addressed in assessment and plan.    Fall Risk Assessment    Has the patient had two or more falls in the last year or any fall with injury in the last year?  No  If fall  risk identified, deferred for follow up unless specifically addressed in assessment and plan.    Safety Assessment    Throw rugs on floor.  No  Handrails on all stairs.  Yes  Good lighting in all hallways.  Yes  Difficulty hearing.  No  Patient counseled about all safety risks that were identified.    Functional Assessment ADLs    Are there any barriers preventing you from cooking for yourself or meeting nutritional needs?  Yes. Pt reports meals being delivered too room  Are there any barriers preventing you from driving safely or obtaining transportation?  Yes. Pt receives rides from her living assistant home  Are there any barriers preventing you from using a telephone or calling for help?  No.    Are there any barriers preventing you from shopping?  No.    Are there any barriers preventing you from taking care of your own finances?  No.    Are there any barriers preventing you from managing your medications?  No.    Are there any barriers preventing you from showering, bathing or dressing yourself?  No.    Are you currently engaging in any exercise or physical activity?  No.     What is your perception of your health?  Fair.    Health Maintenance Summary                Annual Wellness Visit Overdue 7/12/2018      Done 7/11/2017 Visit Dx: Medicare annual wellness visit, subsequent     Patient has more history with this topic...    URINE ACR / MICROALBUMIN Overdue 9/13/2018      Done 9/13/2017 MICROALBUMIN CREAT RATIO URINE     Patient has more history with this topic...    IMM INFLUENZA Postponed 3/5/2019 Originally 9/1/2018. Patient Refused     Done 10/12/2017 Imm Admin: Influenza Vaccine Adult HD     Patient has more history with this topic...    BONE DENSITY Postponed 10/9/2020 Originally 10/7/2016. Insurance/Financial     Previously completed 10/7/2011     IMM ZOSTER VACCINES Postponed 10/26/2021 Originally 3/16/1992. Patient Refused    IMM HEP B VACCINE Postponed 10/4/2022 Originally 3/16/1959.  Insurance/Financial    A1C SCREENING Next Due 3/11/2019      Done 9/11/2018 POCT A1C     Patient has more history with this topic...    FASTING LIPID PROFILE Next Due 3/22/2019      Done 3/22/2018 LIPID PROFILE      Patient has more history with this topic...    SERUM CREATININE Next Due 3/26/2019      Done 3/26/2018 COMP METABOLIC PANEL      Patient has more history with this topic...    RETINAL SCREENING Next Due 7/11/2019      Done 7/11/2018 REFERRAL FOR RETINAL SCREENING EXAM     Patient has more history with this topic...    DIABETES MONOFILAMENT / LE EXAM Next Due 9/11/2019      Done 9/11/2018      Patient has more history with this topic...    COLONOSCOPY Next Due 10/7/2019      Previously completed 10/7/2009     IMM DTaP/Tdap/Td Vaccine Next Due 3/6/2023      Done 3/6/2013 Imm Admin: Tdap Vaccine          Patient Care Team:  WENDY Baker as PCP - General (Family Medicine)  Romero Peña M.D. as Consulting Physician (Gastroenterology)  Fadi Najjar, M.D. (Nephrology)  Sundar Albert M.D. as Consulting Physician (Endocrinology)  [facility] as [facility] Health Provider    Social History   Substance Use Topics   • Smoking status: Current Every Day Smoker     Packs/day: 0.25     Years: 40.00     Types: Cigarettes   • Smokeless tobacco: Never Used   • Alcohol use No     Family History   Problem Relation Age of Onset   • Hyperlipidemia Mother    • Stroke Mother    • Hyperlipidemia Father    • Stroke Father    • Heart Disease Brother         CABG     She  has a past medical history of AAA (abdominal aortic aneurysm) (HCC); Anticoagulation monitoring, special range; Arrhythmia; Arthritis; Aspirin allergy (4/24/2018); Autoimmune hepatitis (HCC) (3/19/2012); Blood clotting disorder (HCC) (2015); Breath shortness; Cancer (HCC); Cataract; Diabetes; Disorder of thyroid (2016); Gallstones; H/O: HTN (hypertension) (3/19/2012); Heart valve disease; Hepatitis B; Hiatus hernia syndrome; Hyperlipidemia;  "Hypertension; Kidney disease; Mixed hyperlipidemia (3/19/2012); Moderate aortic stenosis 3/2018; Murmur (3/19/2012); Nonspecific abnormal electrocardiogram (ECG) (EKG) (3/19/2012); Overweight(278.02) (3/19/2012); Pain; Palpitations; Preoperative cardiovascular examination (3/19/2012); and Unspecified urinary incontinence. She also has no past medical history of Heart attack (HCC).   Past Surgical History:   Procedure Laterality Date   • DARREN BY LAPAROSCOPY  1/2/2017    Procedure: DARREN BY LAPAROSCOPY;  Surgeon: Chele Valles M.D.;  Location: SURGERY [facility];  Service:    • ERCP IN OR  12/30/2016    Procedure: ERCP IN OR;  Surgeon: Shekhar Rosado M.D.;  Location: SURGERY SAME DAY [facility];  Service:    • THYROIDECTOMY TOTAL N/A 10/12/2016    Procedure: THYROIDECTOMY TOTAL;  Surgeon: Nuno Duncan M.D.;  Location: SURGERY SAME DAY [facility];  Service:    • EXPLORATORY LAPAROTOMY  2/27/2013    Performed by Edson Craft M.D. at [facility]   • SPLENECTOMY  2/27/2013    Performed by Edson Craft M.D. at [facility]   • NODE DISSECTION  2/27/2013    Performed by Edson Craft M.D. at [facility]   • OOPHORECTOMY  2/27/2013    Performed by Levi Nolan M.D. at [facility]   • BLADDER SUSPENSION  5/27/2009    Performed by RINA HART at SURGERY SAME DAY [facility]   • CYSTOSCOPY  5/27/2009    Performed by RINA HART at SURGERY SAME DAY [facility]   • RECTOCELE REPAIR  5/27/2009    Performed by RINA HART at SURGERY SAME DAY [facility]   • APPENDECTOMY     • HYSTERECTOMY LAPAROSCOPY     • KNEE REPLACEMENT, TOTAL     • TONSILLECTOMY             Exam:     Pulse 83, temperature 36.8 °C (98.3 °F), resp. rate 16, height 1.6 m (5' 2.99\"), weight 88 kg (194 lb), last menstrual period 10/12/1970, SpO2 93 %, not currently breastfeeding. Body mass index is 34.37 kg/m².    HEENT: TMs show no erythema " neck supple negative lymphadenopathy.  She has many broken and decayed teeth.  Chest: Clear bilaterally to A&P without wheeze or rhonchi.  Heart: Regular rate and positive murmur  General: Patient is alert, talkative and joking in the office.  Extremities: Warm and without cyanosis or edema.    Assessment and Plan. The following treatment and monitoring plan is recommended:      1. Medicare annual wellness visit, subsequent  Annual wellness topics discussed, review of chronic medical problems completed      2. Dental abscess  I am not sure that patient needs antibiotics but she feels she does so I will give her a prescription for clindamycin but only to start if the dentist requested.  She is allergic to penicillin.    3. Papillary thyroid carcinoma (HCC)  Stable and seen by ENT in 2016.      4. Aortic stenosis, moderate  Appears stable and had echocardiogram.    5. Acute respiratory failure with hypoxia ([facility])  Patient is not using oxygen and does not feel she needs it so we will have this removed from her apartment.    6. Moderate aortic stenosis 3/2018  Patient will continue to be monitored by the Coumadin clinic.    7. Stenosis of right carotid artery  Patient denies CVA symptoms.    8. Dyslipidemia  Patient due for lipid panel    9. Hx pulmonary embolism  Patient on anticoagulation.    10. Essential hypertension  Blood pressure controlled on current medicines    11. Risk for falls  Patient will continue to use walker if needed.    12. CKD (chronic kidney disease) stage 3, GFR 30-59 ml/min ([facility])  Patient to do lab work to check kidney function and to be sure she can continue to use metformin.    13. Vitamin D deficiency disease  Stable on last evaluation.    14. Controlled type 2 diabetes mellitus without complication, without long-term current use of insulin ([facility])  Patient will continue for now with just metformin.  I would like her on Jardiance but she states she cannot afford it.  She is off glimepiride.  No  reports of hypoglycemia.  She has lab work to complete.    15. BMI 33.0-33.9,adult  Weight remains stable    16. Gastroesophageal reflux disease without esophagitis  Patient will continue with her PPI.    17. Chronic midline low back pain without sciatica  Patient does not want to go to pain management and is not requesting refills on pain medicines today.    18. Postoperative hypothyroidism  Patient will continue on levothyroxine and do regular TSH testing.    19. Chronic anticoagulation  Patient follows with Coumadin clinic.    20. Abdominal aortic aneurysm without rupture (HCC)  Appears stable and every 2-3-year checkup needed.    21. Primary insomnia  Patient will continue on trazodone as needed.    Services suggested: No services needed at this time  Health Care Screening recommendations as per orders if indicated.  Referrals offered: PT/OT/Nutrition counseling/Behavioral Health/Smoking cessation as per orders if indicated.    Discussion today about general wellness and lifestyle habits:    · Prevent falls and reduce trip hazards; Cautioned about securing or removing rugs.  · Have a working fire alarm and carbon monoxide detector;   · Engage in regular physical activity and social activities       Follow-up: No Follow-up on file.

## 2018-10-30 NOTE — ADDENDUM NOTE
Encounter addended by: Rupali Velazquez on: 10/30/2018 11:08 AM<BR>    Actions taken: Charge Capture section accepted

## 2018-11-06 ENCOUNTER — TELEPHONE (OUTPATIENT)
Dept: VASCULAR LAB | Facility: MEDICAL CENTER | Age: 78
End: 2018-11-06

## 2018-11-06 NOTE — TELEPHONE ENCOUNTER
Called pt to remind that the Aorta US  is due for surveillance. Scheduling office and our office phone numbers provided. BETHANY Alvarado.

## 2018-11-12 ENCOUNTER — TELEPHONE (OUTPATIENT)
Dept: VASCULAR LAB | Facility: MEDICAL CENTER | Age: 78
End: 2018-11-12

## 2018-11-12 ENCOUNTER — TELEPHONE (OUTPATIENT)
Dept: MEDICAL GROUP | Facility: MEDICAL CENTER | Age: 78
End: 2018-11-12

## 2018-11-12 ENCOUNTER — ANTICOAGULATION MONITORING (OUTPATIENT)
Dept: VASCULAR LAB | Facility: MEDICAL CENTER | Age: 78
End: 2018-11-12

## 2018-11-12 DIAGNOSIS — Z86.711 HX PULMONARY EMBOLISM: ICD-10-CM

## 2018-11-12 NOTE — PROGRESS NOTES
"Pt is having 7 teeth extracted, spoke with office of [facility], [phone]. They are wanting pt off warfarin, pt does not know hx of VTE well.     Per Dr. Blochs initial note 2-3-2016     \"Records from Dr. Melendrez's office reviewed and case discussed with PCP  Looks like DVT was diagnosed in april 2015, but difficult  to tell from documentation whether or not this was first episoide and whether or not provoked.  Aortic disease has been evaluated by Dr. Farris who recommends continued medical management and surveillance     Will schedule patient to be seen a vascular care to determine whether or not can safely stop anticoagulation make sure has appropriate aortic surveillance and medical management arranged\"       Given unclear hx of VTE would not recommend bridging for upcoming procedure. Discussed with Dr. Bloch who agrees.     Ciarra Dupree, Pharm D    "

## 2018-11-12 NOTE — TELEPHONE ENCOUNTER
1. Caller Name: Yumi from Dr. [facility] office                                          Call Back Number: [phone]    Called to let us know patient will be having a tooth extractions and patient states she is taking coumadin and they would like to know what the protocol is? Pt will be having the extractions on November 20th         Patient approves a detailed voicemail message: N\A

## 2018-11-12 NOTE — Clinical Note
Dr. Bloch,  Vascular pt and coumadin pt. She needs to stop warfarin for upcoming tooth extraction (7 teeth), looks like unclear hx of if pt has only had one VTE or multiple VTEs. Pt does not know either. I was not going stop warfarin x 5 days and not bridge pt, do you agree?   Thank you,  Ciarra

## 2018-11-12 NOTE — TELEPHONE ENCOUNTER
Patient needs to contact the Coumadin clinic who is prescribing her Coumadin and they will go through the protocol for bridging during surgery.

## 2018-11-13 ENCOUNTER — ANTICOAGULATION MONITORING (OUTPATIENT)
Dept: VASCULAR LAB | Facility: MEDICAL CENTER | Age: 78
End: 2018-11-13

## 2018-11-13 DIAGNOSIS — Z86.711 HX PULMONARY EMBOLISM: ICD-10-CM

## 2018-11-14 ENCOUNTER — ANTICOAGULATION VISIT (OUTPATIENT)
Dept: VASCULAR LAB | Facility: MEDICAL CENTER | Age: 78
End: 2018-11-14
Attending: INTERNAL MEDICINE
Payer: MEDICARE

## 2018-11-14 VITALS — SYSTOLIC BLOOD PRESSURE: 107 MMHG | HEART RATE: 85 BPM | DIASTOLIC BLOOD PRESSURE: 47 MMHG

## 2018-11-14 DIAGNOSIS — Z86.711 HX PULMONARY EMBOLISM: ICD-10-CM

## 2018-11-14 LAB — INR PPP: 2.6 (ref 2–3.5)

## 2018-11-14 PROCEDURE — 85610 PROTHROMBIN TIME: CPT

## 2018-11-14 PROCEDURE — 99211 OFF/OP EST MAY X REQ PHY/QHP: CPT

## 2018-11-14 NOTE — PROGRESS NOTES
Anticoagulation Summary  As of 11/14/2018    INR goal:   2.0-3.0   TTR:   74.2 % (1.6 y)   Today's INR:   2.6   Warfarin maintenance plan:   5 mg (5 mg x 1) on Mon, Wed, Fri; 7.5 mg (5 mg x 1.5) all other days   Weekly warfarin total:   45 mg   Plan last modified:   Burke Matthew PharmD (8/21/2018)   Next INR check:   12/26/2018   Target end date:   Indefinite    Indications    DVT (deep venous thrombosis)  left (Resolved) [I82.402]  Hx pulmonary embolism [Z86.711]             Anticoagulation Episode Summary     INR check location:       Preferred lab:       Send INR reminders to:       Comments:         Anticoagulation Care Providers     Provider Role Specialty Phone number    WENDY Baker Referring Internal Medicine [phone]    [facility]  [phone]        Anticoagulation Patient Findings      HPI:  Sanjuanitasaira Lamb Ian seen in clinic today, on anticoagulation therapy with warfarin for PE.  Changes to current medical/health status since last appt: none  Denies signs/symptoms of bleeding and/or thrombosis since the last appt.    Denies any interval changes to diet  Denies any interval changes to medications since last appt.   Denies any complications or cost restrictions with current therapy.   BP recorded in vitals.  Pt has upcoming 7 tooth extraction.     A/P   INR  therapeutic.   Refuses enoxaparin, despite recurrent VTE.   Hold 5 days prior to procedure then Pt is to continue with current warfarin dosing regimen.     Follow up appointment in 6 weeks per pt.     Burke Matthew PharmD

## 2018-11-25 ENCOUNTER — HOSPITAL ENCOUNTER (INPATIENT)
Facility: MEDICAL CENTER | Age: 78
LOS: 4 days | DRG: 301 | End: 2018-11-29
Attending: EMERGENCY MEDICINE | Admitting: HOSPITALIST
Payer: MEDICARE

## 2018-11-25 ENCOUNTER — APPOINTMENT (OUTPATIENT)
Dept: RADIOLOGY | Facility: MEDICAL CENTER | Age: 78
DRG: 301 | End: 2018-11-25
Attending: EMERGENCY MEDICINE
Payer: MEDICARE

## 2018-11-25 DIAGNOSIS — I82.402 ACUTE DEEP VEIN THROMBOSIS (DVT) OF LEFT LOWER EXTREMITY, UNSPECIFIED VEIN (HCC): ICD-10-CM

## 2018-11-25 PROBLEM — I48.91 ATRIAL FIBRILLATION (HCC): Status: ACTIVE | Noted: 2018-11-25

## 2018-11-25 PROBLEM — I82.412 ACUTE DEEP VEIN THROMBOSIS (DVT) OF FEMORAL VEIN OF LEFT LOWER EXTREMITY (HCC): Status: ACTIVE | Noted: 2018-11-25

## 2018-11-25 LAB
ALBUMIN SERPL BCP-MCNC: 4.2 G/DL (ref 3.2–4.9)
ALBUMIN/GLOB SERPL: 1.4 G/DL
ALP SERPL-CCNC: 74 U/L (ref 30–99)
ALT SERPL-CCNC: 13 U/L (ref 2–50)
ANION GAP SERPL CALC-SCNC: 11 MMOL/L (ref 0–11.9)
APTT PPP: 27.3 SEC (ref 24.7–36)
AST SERPL-CCNC: 16 U/L (ref 12–45)
BASOPHILS # BLD AUTO: 0.5 % (ref 0–1.8)
BASOPHILS # BLD: 0.06 K/UL (ref 0–0.12)
BILIRUB SERPL-MCNC: 0.6 MG/DL (ref 0.1–1.5)
BUN SERPL-MCNC: 21 MG/DL (ref 8–22)
CALCIUM SERPL-MCNC: 9.2 MG/DL (ref 8.5–10.5)
CHLORIDE SERPL-SCNC: 99 MMOL/L (ref 96–112)
CO2 SERPL-SCNC: 23 MMOL/L (ref 20–33)
CREAT SERPL-MCNC: 1.55 MG/DL (ref 0.5–1.4)
EOSINOPHIL # BLD AUTO: 0.82 K/UL (ref 0–0.51)
EOSINOPHIL NFR BLD: 6.7 % (ref 0–6.9)
ERYTHROCYTE [DISTWIDTH] IN BLOOD BY AUTOMATED COUNT: 48.7 FL (ref 35.9–50)
GLOBULIN SER CALC-MCNC: 3 G/DL (ref 1.9–3.5)
GLUCOSE BLD-MCNC: 131 MG/DL (ref 65–99)
GLUCOSE BLD-MCNC: 98 MG/DL (ref 65–99)
GLUCOSE SERPL-MCNC: 87 MG/DL (ref 65–99)
HCT VFR BLD AUTO: 40.6 % (ref 37–47)
HGB BLD-MCNC: 13.6 G/DL (ref 12–16)
IMM GRANULOCYTES # BLD AUTO: 0.03 K/UL (ref 0–0.11)
IMM GRANULOCYTES NFR BLD AUTO: 0.2 % (ref 0–0.9)
INR PPP: 1.09 (ref 0.87–1.13)
LYMPHOCYTES # BLD AUTO: 2.32 K/UL (ref 1–4.8)
LYMPHOCYTES NFR BLD: 18.8 % (ref 22–41)
MCH RBC QN AUTO: 30 PG (ref 27–33)
MCHC RBC AUTO-ENTMCNC: 33.5 G/DL (ref 33.6–35)
MCV RBC AUTO: 89.4 FL (ref 81.4–97.8)
MONOCYTES # BLD AUTO: 1.06 K/UL (ref 0–0.85)
MONOCYTES NFR BLD AUTO: 8.6 % (ref 0–13.4)
NEUTROPHILS # BLD AUTO: 8.03 K/UL (ref 2–7.15)
NEUTROPHILS NFR BLD: 65.2 % (ref 44–72)
NRBC # BLD AUTO: 0 K/UL
NRBC BLD-RTO: 0 /100 WBC
PLATELET # BLD AUTO: 360 K/UL (ref 164–446)
PMV BLD AUTO: 9.8 FL (ref 9–12.9)
POTASSIUM SERPL-SCNC: 5 MMOL/L (ref 3.6–5.5)
PROT SERPL-MCNC: 7.2 G/DL (ref 6–8.2)
PROTHROMBIN TIME: 14.2 SEC (ref 12–14.6)
RBC # BLD AUTO: 4.54 M/UL (ref 4.2–5.4)
SODIUM SERPL-SCNC: 133 MMOL/L (ref 135–145)
WBC # BLD AUTO: 12.3 K/UL (ref 4.8–10.8)

## 2018-11-25 PROCEDURE — 85610 PROTHROMBIN TIME: CPT

## 2018-11-25 PROCEDURE — 85025 COMPLETE CBC W/AUTO DIFF WBC: CPT

## 2018-11-25 PROCEDURE — 99223 1ST HOSP IP/OBS HIGH 75: CPT | Mod: AI | Performed by: HOSPITALIST

## 2018-11-25 PROCEDURE — 770006 HCHG ROOM/CARE - MED/SURG/GYN SEMI*

## 2018-11-25 PROCEDURE — 93971 EXTREMITY STUDY: CPT | Mod: LT

## 2018-11-25 PROCEDURE — 80053 COMPREHEN METABOLIC PANEL: CPT

## 2018-11-25 PROCEDURE — 85730 THROMBOPLASTIN TIME PARTIAL: CPT

## 2018-11-25 PROCEDURE — 700102 HCHG RX REV CODE 250 W/ 637 OVERRIDE(OP): Performed by: HOSPITALIST

## 2018-11-25 PROCEDURE — 99285 EMERGENCY DEPT VISIT HI MDM: CPT

## 2018-11-25 PROCEDURE — 700111 HCHG RX REV CODE 636 W/ 250 OVERRIDE (IP): Performed by: HOSPITALIST

## 2018-11-25 PROCEDURE — A9270 NON-COVERED ITEM OR SERVICE: HCPCS | Performed by: HOSPITALIST

## 2018-11-25 PROCEDURE — 700105 HCHG RX REV CODE 258: Performed by: HOSPITALIST

## 2018-11-25 PROCEDURE — 82962 GLUCOSE BLOOD TEST: CPT | Mod: 91

## 2018-11-25 RX ORDER — HEPARIN SODIUM 1000 [USP'U]/ML
2600 INJECTION, SOLUTION INTRAVENOUS; SUBCUTANEOUS PRN
Status: DISCONTINUED | OUTPATIENT
Start: 2018-11-25 | End: 2018-11-29 | Stop reason: HOSPADM

## 2018-11-25 RX ORDER — ALBUTEROL SULFATE 90 UG/1
2 AEROSOL, METERED RESPIRATORY (INHALATION) EVERY 6 HOURS PRN
Status: DISCONTINUED | OUTPATIENT
Start: 2018-11-25 | End: 2018-11-25

## 2018-11-25 RX ORDER — CALCIUM CARBONATE 500(1250)
500 TABLET ORAL 2 TIMES DAILY WITH MEALS
Status: DISCONTINUED | OUTPATIENT
Start: 2018-11-25 | End: 2018-11-29 | Stop reason: HOSPADM

## 2018-11-25 RX ORDER — MORPHINE SULFATE 10 MG/ML
2 INJECTION, SOLUTION INTRAMUSCULAR; INTRAVENOUS
Status: DISCONTINUED | OUTPATIENT
Start: 2018-11-25 | End: 2018-11-29 | Stop reason: HOSPADM

## 2018-11-25 RX ORDER — FERROUS SULFATE 325(65) MG
325 TABLET ORAL DAILY
Status: DISCONTINUED | OUTPATIENT
Start: 2018-11-25 | End: 2018-11-25

## 2018-11-25 RX ORDER — AMLODIPINE BESYLATE 5 MG/1
10 TABLET ORAL
Status: DISCONTINUED | OUTPATIENT
Start: 2018-11-26 | End: 2018-11-29 | Stop reason: HOSPADM

## 2018-11-25 RX ORDER — OMEPRAZOLE 20 MG/1
20 CAPSULE, DELAYED RELEASE ORAL DAILY
Status: DISCONTINUED | OUTPATIENT
Start: 2018-11-26 | End: 2018-11-29 | Stop reason: HOSPADM

## 2018-11-25 RX ORDER — AMOXICILLIN 250 MG
2 CAPSULE ORAL 2 TIMES DAILY
Status: DISCONTINUED | OUTPATIENT
Start: 2018-11-25 | End: 2018-11-29 | Stop reason: HOSPADM

## 2018-11-25 RX ORDER — DEXTROSE MONOHYDRATE 25 G/50ML
25 INJECTION, SOLUTION INTRAVENOUS
Status: DISCONTINUED | OUTPATIENT
Start: 2018-11-25 | End: 2018-11-29 | Stop reason: HOSPADM

## 2018-11-25 RX ORDER — HEPARIN SODIUM 1000 [USP'U]/ML
5000 INJECTION, SOLUTION INTRAVENOUS; SUBCUTANEOUS ONCE
Status: COMPLETED | OUTPATIENT
Start: 2018-11-25 | End: 2018-11-25

## 2018-11-25 RX ORDER — TRAZODONE HYDROCHLORIDE 100 MG/1
50 TABLET ORAL NIGHTLY PRN
Status: DISCONTINUED | OUTPATIENT
Start: 2018-11-25 | End: 2018-11-29 | Stop reason: HOSPADM

## 2018-11-25 RX ORDER — SODIUM CHLORIDE 9 MG/ML
INJECTION, SOLUTION INTRAVENOUS CONTINUOUS
Status: DISCONTINUED | OUTPATIENT
Start: 2018-11-25 | End: 2018-11-29 | Stop reason: HOSPADM

## 2018-11-25 RX ORDER — WARFARIN SODIUM 5 MG/1
5 TABLET ORAL
Status: DISCONTINUED | OUTPATIENT
Start: 2018-11-26 | End: 2018-11-29 | Stop reason: HOSPADM

## 2018-11-25 RX ORDER — WARFARIN SODIUM 7.5 MG/1
7.5 TABLET ORAL
Status: DISCONTINUED | OUTPATIENT
Start: 2018-11-25 | End: 2018-11-29 | Stop reason: HOSPADM

## 2018-11-25 RX ORDER — ATORVASTATIN CALCIUM 80 MG/1
80 TABLET, FILM COATED ORAL
Status: DISCONTINUED | OUTPATIENT
Start: 2018-11-25 | End: 2018-11-29 | Stop reason: HOSPADM

## 2018-11-25 RX ORDER — ONDANSETRON 2 MG/ML
4 INJECTION INTRAMUSCULAR; INTRAVENOUS EVERY 4 HOURS PRN
Status: DISCONTINUED | OUTPATIENT
Start: 2018-11-25 | End: 2018-11-29 | Stop reason: HOSPADM

## 2018-11-25 RX ORDER — LEVOTHYROXINE SODIUM 0.15 MG/1
150 TABLET ORAL
Status: DISCONTINUED | OUTPATIENT
Start: 2018-11-26 | End: 2018-11-29 | Stop reason: HOSPADM

## 2018-11-25 RX ORDER — BISACODYL 10 MG
10 SUPPOSITORY, RECTAL RECTAL
Status: DISCONTINUED | OUTPATIENT
Start: 2018-11-25 | End: 2018-11-29 | Stop reason: HOSPADM

## 2018-11-25 RX ORDER — TRAMADOL HYDROCHLORIDE 50 MG/1
50 TABLET ORAL EVERY 6 HOURS PRN
Status: DISCONTINUED | OUTPATIENT
Start: 2018-11-25 | End: 2018-11-29 | Stop reason: HOSPADM

## 2018-11-25 RX ORDER — ACETAMINOPHEN 325 MG/1
650 TABLET ORAL EVERY 6 HOURS PRN
Status: DISCONTINUED | OUTPATIENT
Start: 2018-11-25 | End: 2018-11-29 | Stop reason: HOSPADM

## 2018-11-25 RX ORDER — POLYETHYLENE GLYCOL 3350 17 G/17G
1 POWDER, FOR SOLUTION ORAL
Status: DISCONTINUED | OUTPATIENT
Start: 2018-11-25 | End: 2018-11-29 | Stop reason: HOSPADM

## 2018-11-25 RX ORDER — ONDANSETRON 4 MG/1
4 TABLET, ORALLY DISINTEGRATING ORAL EVERY 4 HOURS PRN
Status: DISCONTINUED | OUTPATIENT
Start: 2018-11-25 | End: 2018-11-29 | Stop reason: HOSPADM

## 2018-11-25 RX ADMIN — WARFARIN SODIUM 7.5 MG: 7.5 TABLET ORAL at 18:08

## 2018-11-25 RX ADMIN — Medication 500 MG: at 20:27

## 2018-11-25 RX ADMIN — SODIUM CHLORIDE: 9 INJECTION, SOLUTION INTRAVENOUS at 17:07

## 2018-11-25 RX ADMIN — HEPARIN SODIUM 25000 UNITS: 5000 INJECTION, SOLUTION INTRAVENOUS at 17:10

## 2018-11-25 RX ADMIN — STANDARDIZED SENNA CONCENTRATE AND DOCUSATE SODIUM 2 TABLET: 8.6; 5 TABLET, FILM COATED ORAL at 18:08

## 2018-11-25 RX ADMIN — MORPHINE SULFATE 2 MG: 10 INJECTION INTRAVENOUS at 20:44

## 2018-11-25 RX ADMIN — HEPARIN SODIUM 5000 UNITS: 1000 INJECTION, SOLUTION INTRAVENOUS; SUBCUTANEOUS at 18:09

## 2018-11-25 RX ADMIN — ATORVASTATIN CALCIUM 80 MG: 80 TABLET, FILM COATED ORAL at 20:27

## 2018-11-25 RX ADMIN — TRAZODONE HYDROCHLORIDE 50 MG: 100 TABLET ORAL at 20:27

## 2018-11-25 ASSESSMENT — PATIENT HEALTH QUESTIONNAIRE - PHQ9
2. FEELING DOWN, DEPRESSED, IRRITABLE, OR HOPELESS: NOT AT ALL
SUM OF ALL RESPONSES TO PHQ9 QUESTIONS 1 AND 2: 0
1. LITTLE INTEREST OR PLEASURE IN DOING THINGS: NOT AT ALL

## 2018-11-25 ASSESSMENT — COGNITIVE AND FUNCTIONAL STATUS - GENERAL
DAILY ACTIVITIY SCORE: 24
MOBILITY SCORE: 24
SUGGESTED CMS G CODE MODIFIER DAILY ACTIVITY: CH
SUGGESTED CMS G CODE MODIFIER MOBILITY: CH

## 2018-11-25 ASSESSMENT — COPD QUESTIONNAIRES
DURING THE PAST 4 WEEKS HOW MUCH DID YOU FEEL SHORT OF BREATH: SOME OF THE TIME
DO YOU EVER COUGH UP ANY MUCUS OR PHLEGM?: NO/ONLY WITH OCCASIONAL COLDS OR INFECTIONS
IN THE PAST 12 MONTHS DO YOU DO LESS THAN YOU USED TO BECAUSE OF YOUR BREATHING PROBLEMS: AGREE
COPD SCREENING SCORE: 6
HAVE YOU SMOKED AT LEAST 100 CIGARETTES IN YOUR ENTIRE LIFE: YES

## 2018-11-25 ASSESSMENT — LIFESTYLE VARIABLES
DO YOU DRINK ALCOHOL: NO
ALCOHOL_USE: NO
EVER_SMOKED: YES

## 2018-11-25 ASSESSMENT — ENCOUNTER SYMPTOMS
ABDOMINAL PAIN: 0
MYALGIAS: 1
HEADACHES: 0
COUGH: 0
BACK PAIN: 0
NECK PAIN: 0
DIZZINESS: 0
NERVOUS/ANXIOUS: 0
CHILLS: 0
FEVER: 0
SORE THROAT: 0
PALPITATIONS: 0
DEPRESSION: 0
SHORTNESS OF BREATH: 0
NAUSEA: 0

## 2018-11-25 ASSESSMENT — PAIN SCALES - GENERAL
PAINLEVEL_OUTOF10: 6
PAINLEVEL_OUTOF10: 2
PAINLEVEL_OUTOF10: 7
PAINLEVEL_OUTOF10: 6

## 2018-11-25 NOTE — ED TRIAGE NOTES
"Pt c/o left lower leg pain and swelling x 2 days. Patient has a hx of DVT and PE. Complaint with her blood thinners per patient, but had to be taken off them from the 14th-22nd for teeth removal. Also reports \"a little\" SOB with activity only.   "

## 2018-11-25 NOTE — ED PROVIDER NOTES
ED Provider Note    CHIEF COMPLAINT  Chief Complaint   Patient presents with   • Leg Pain     Left leg/swelling x 2 days       HPI  Sanjuanita Sosa is a 78 y.o. female who presents for leg pain and swelling.  The patient has a history of DVT and is routinely on Coumadin.  She was taken off her Coumadin from the 14th through the 22nd because of dental work.  She started back on her Coumadin 2 days ago.  It was also 2 days ago that she started having pain and swelling to her left lower leg.  She denies any trauma.  She said no fevers.  She has had no chest pain or shortness of breath.    REVIEW OF SYSTEMS  See HPI for further details. All other systems negative.    PAST MEDICAL HISTORY  Past Medical History:   Diagnosis Date   • AAA (abdominal aortic aneurysm) (HCC)    • Anticoagulation monitoring, special range    • Arrhythmia    • Arthritis    • Aspirin allergy 4/24/2018   • Autoimmune hepatitis (HCC) 3/19/2012   • Blood clotting disorder (HCC) 2015    clot in leg and lung   • Breath shortness    • Cancer (HCC)     thyroid   • Cataract     left eye needs surgery   • Diabetes     pre-diabetic   • Disorder of thyroid 2016    thyroid cancer   • Gallstones    • H/O: HTN (hypertension) 3/19/2012   • Heart valve disease    • Hepatitis B    • Hiatus hernia syndrome    • Hyperlipidemia    • Hypertension    • Kidney disease    • Mixed hyperlipidemia 3/19/2012   • Moderate aortic stenosis 3/2018    • Murmur 3/19/2012   • Nonspecific abnormal electrocardiogram (ECG) (EKG) 3/19/2012   • Overweight(278.02) 3/19/2012   • Pain    • Palpitations    • Preoperative cardiovascular examination 3/19/2012   • Unspecified urinary incontinence        FAMILY HISTORY  Family History   Problem Relation Age of Onset   • Hyperlipidemia Mother    • Stroke Mother    • Hyperlipidemia Father    • Stroke Father    • Heart Disease Brother         CABG       SOCIAL HISTORY  Social History     Social History   • Marital status:       Spouse name: N/A   • Number of children: N/A   • Years of education: N/A     Social History Main Topics   • Smoking status: Current Every Day Smoker     Packs/day: 0.25     Years: 40.00     Types: Cigarettes   • Smokeless tobacco: Never Used   • Alcohol use No   • Drug use: No   • Sexual activity: Not Currently     Other Topics Concern   • Not on file     Social History Narrative   • No narrative on file       SURGICAL HISTORY  Past Surgical History:   Procedure Laterality Date   • DARREN BY LAPAROSCOPY  1/2/2017    Procedure: DARREN BY LAPAROSCOPY;  Surgeon: Chele Valles M.D.;  Location: SURGERY [facility];  Service:    • ERCP IN OR  12/30/2016    Procedure: ERCP IN OR;  Surgeon: Shekhar Rosado M.D.;  Location: SURGERY SAME DAY [facility];  Service:    • THYROIDECTOMY TOTAL N/A 10/12/2016    Procedure: THYROIDECTOMY TOTAL;  Surgeon: Nuno Duncan M.D.;  Location: SURGERY SAME DAY [facility];  Service:    • EXPLORATORY LAPAROTOMY  2/27/2013    Performed by Edson Craft M.D. at SURGERY [facility]   • SPLENECTOMY  2/27/2013    Performed by Edson Craft M.D. at SURGERY [facility]   • NODE DISSECTION  2/27/2013    Performed by Edson Crfat M.D. at SURGERY [facility]   • OOPHORECTOMY  2/27/2013    Performed by Levi Nolan M.D. at [facility]   • BLADDER SUSPENSION  5/27/2009    Performed by RINA HART at SURGERY SAME DAY [facility]   • CYSTOSCOPY  5/27/2009    Performed by RINA HART at SURGERY SAME DAY [facility]   • RECTOCELE REPAIR  5/27/2009    Performed by RINA HART at SURGERY SAME DAY [facility]   • APPENDECTOMY     • HYSTERECTOMY LAPAROSCOPY     • KNEE REPLACEMENT, TOTAL     • TONSILLECTOMY         CURRENT MEDICATIONS  Home Medications    **Home medications have not yet been reviewed for this encounter**         ALLERGIES  Allergies   Allergen Reactions   • Aspirin Anaphylaxis   • Penicillins Anaphylaxis      As a child  Tolerated Rocephin 2/2018       PHYSICAL EXAM  VITAL SIGNS: /46   Pulse 70   Temp 36.4 °C (97.5 °F) (Temporal)   Resp 18   Wt 82 kg (180 lb 12.4 oz)   LMP 10/12/1970   SpO2 95%   BMI 32.03 kg/m²   Constitutional: Well developed, Well nourished, No acute distress, Non-toxic appearance.   HENT: Normocephalic, Atraumatic.  Eyes:  EOMI, Conjunctiva normal, No discharge.   Cardiovascular: Normal heart rate, Normal rhythm, No murmurs, No rubs, No gallops.   Thorax & Lungs: Clear to auscultation without wheezes, rales, or rhonchi.    Skin: Warm, Dry.  Extremities: Left lower extremity shows 2+ edema to the knee.  She has calf tenderness.  There is no erythema.  Distally she is neurovascularly intact.  Musculoskeletal: Good range of motion in all major joints.    Neurologic: Awake and alert.    RADIOLOGY/PROCEDURES  US-EXTREMITY VENOUS LOWER UNILAT LEFT    (Results Pending)         COURSE & MEDICAL DECISION MAKING  Pertinent Labs & Imaging studies reviewed. (See chart for details)  This is a 78-year-old here for evaluation of leg pain and swelling.  She has a history of DVT and PE and has been on Coumadin.  She was taken off of her Coumadin between the 14th and the 22nd because she had 8 teeth extracted.  She started back on her Coumadin 2 days ago which was the same time she started having pain and swelling in her left leg.  Laboratories today include chemistries which are normal with the exception of a creatinine being elevated at 1.55.  Her INR is normal.  CBC shows a white count of 12 with a differential of 65 polys and 18 lymphocytes.  A duplex ultrasound study of the left lower extremity demonstrates extensive DVT.  I discussed the results of the studies with the patient.  I am concerned about this patient that needs to be on anticoagulants and recently underwent an 8 tooth dental extraction.  For that reason she will be admitted to the hospital for anticoagulation and observation.  I  discussed the case with Dr. Cox of the hospitalist service and he will be the primary admitting physician.    FINAL IMPRESSION  1.  Left lower extremity DVT  2.   3.         Electronically signed by: Prieto Alicea, 11/25/2018 12:16 PM

## 2018-11-25 NOTE — ED NOTES
Med Rec complete per Pt at bedside  Allergies Reviewed    Pt takes WARFARIN 5 - 7.5 mg daily  5 mg every Mon, Wed and Fri  7.5 mg all other days

## 2018-11-26 ENCOUNTER — PATIENT OUTREACH (OUTPATIENT)
Dept: HEALTH INFORMATION MANAGEMENT | Facility: OTHER | Age: 78
End: 2018-11-26

## 2018-11-26 LAB
ANION GAP SERPL CALC-SCNC: 9 MMOL/L (ref 0–11.9)
APTT PPP: 112.1 SEC (ref 24.7–36)
APTT PPP: 115 SEC (ref 24.7–36)
APTT PPP: 190.9 SEC (ref 24.7–36)
APTT PPP: 67 SEC (ref 24.7–36)
BUN SERPL-MCNC: 21 MG/DL (ref 8–22)
CALCIUM SERPL-MCNC: 8 MG/DL (ref 8.5–10.5)
CHLORIDE SERPL-SCNC: 103 MMOL/L (ref 96–112)
CO2 SERPL-SCNC: 23 MMOL/L (ref 20–33)
CREAT SERPL-MCNC: 1.21 MG/DL (ref 0.5–1.4)
ERYTHROCYTE [DISTWIDTH] IN BLOOD BY AUTOMATED COUNT: 49.8 FL (ref 35.9–50)
GLUCOSE BLD-MCNC: 106 MG/DL (ref 65–99)
GLUCOSE BLD-MCNC: 126 MG/DL (ref 65–99)
GLUCOSE BLD-MCNC: 143 MG/DL (ref 65–99)
GLUCOSE SERPL-MCNC: 123 MG/DL (ref 65–99)
HCT VFR BLD AUTO: 35.9 % (ref 37–47)
HGB BLD-MCNC: 11.6 G/DL (ref 12–16)
INR PPP: 1.33 (ref 0.87–1.13)
MCH RBC QN AUTO: 29.4 PG (ref 27–33)
MCHC RBC AUTO-ENTMCNC: 32.3 G/DL (ref 33.6–35)
MCV RBC AUTO: 90.9 FL (ref 81.4–97.8)
PLATELET # BLD AUTO: 304 K/UL (ref 164–446)
PMV BLD AUTO: 9.4 FL (ref 9–12.9)
POTASSIUM SERPL-SCNC: 4.4 MMOL/L (ref 3.6–5.5)
PROTHROMBIN TIME: 16.6 SEC (ref 12–14.6)
RBC # BLD AUTO: 3.95 M/UL (ref 4.2–5.4)
SODIUM SERPL-SCNC: 135 MMOL/L (ref 135–145)
T4 FREE SERPL-MCNC: 1.28 NG/DL (ref 0.53–1.43)
TSH SERPL DL<=0.005 MIU/L-ACNC: 10.34 UIU/ML (ref 0.38–5.33)
WBC # BLD AUTO: 10.5 K/UL (ref 4.8–10.8)

## 2018-11-26 PROCEDURE — 84439 ASSAY OF FREE THYROXINE: CPT

## 2018-11-26 PROCEDURE — 36415 COLL VENOUS BLD VENIPUNCTURE: CPT

## 2018-11-26 PROCEDURE — 85730 THROMBOPLASTIN TIME PARTIAL: CPT

## 2018-11-26 PROCEDURE — 770006 HCHG ROOM/CARE - MED/SURG/GYN SEMI*

## 2018-11-26 PROCEDURE — 85027 COMPLETE CBC AUTOMATED: CPT

## 2018-11-26 PROCEDURE — 700111 HCHG RX REV CODE 636 W/ 250 OVERRIDE (IP): Performed by: HOSPITALIST

## 2018-11-26 PROCEDURE — 700102 HCHG RX REV CODE 250 W/ 637 OVERRIDE(OP): Performed by: HOSPITALIST

## 2018-11-26 PROCEDURE — 80048 BASIC METABOLIC PNL TOTAL CA: CPT

## 2018-11-26 PROCEDURE — 84443 ASSAY THYROID STIM HORMONE: CPT

## 2018-11-26 PROCEDURE — 94760 N-INVAS EAR/PLS OXIMETRY 1: CPT

## 2018-11-26 PROCEDURE — 85610 PROTHROMBIN TIME: CPT

## 2018-11-26 PROCEDURE — 700105 HCHG RX REV CODE 258: Performed by: HOSPITALIST

## 2018-11-26 PROCEDURE — 99232 SBSQ HOSP IP/OBS MODERATE 35: CPT | Performed by: INTERNAL MEDICINE

## 2018-11-26 PROCEDURE — A9270 NON-COVERED ITEM OR SERVICE: HCPCS | Performed by: HOSPITALIST

## 2018-11-26 PROCEDURE — 82962 GLUCOSE BLOOD TEST: CPT | Mod: 91

## 2018-11-26 RX ADMIN — TRAMADOL HYDROCHLORIDE 50 MG: 50 TABLET, FILM COATED ORAL at 01:56

## 2018-11-26 RX ADMIN — TRAZODONE HYDROCHLORIDE 50 MG: 100 TABLET ORAL at 21:10

## 2018-11-26 RX ADMIN — MORPHINE SULFATE 2 MG: 10 INJECTION INTRAVENOUS at 05:41

## 2018-11-26 RX ADMIN — LEVOTHYROXINE SODIUM 150 MCG: 150 TABLET ORAL at 05:40

## 2018-11-26 RX ADMIN — HEPARIN SODIUM 750 UNITS/HR: 5000 INJECTION, SOLUTION INTRAVENOUS at 21:14

## 2018-11-26 RX ADMIN — STANDARDIZED SENNA CONCENTRATE AND DOCUSATE SODIUM 2 TABLET: 8.6; 5 TABLET, FILM COATED ORAL at 17:29

## 2018-11-26 RX ADMIN — MORPHINE SULFATE 2 MG: 10 INJECTION INTRAVENOUS at 21:08

## 2018-11-26 RX ADMIN — Medication 500 MG: at 17:36

## 2018-11-26 RX ADMIN — SODIUM CHLORIDE: 9 INJECTION, SOLUTION INTRAVENOUS at 05:41

## 2018-11-26 RX ADMIN — OMEPRAZOLE 20 MG: 20 CAPSULE, DELAYED RELEASE ORAL at 05:40

## 2018-11-26 RX ADMIN — MORPHINE SULFATE 2 MG: 10 INJECTION INTRAVENOUS at 17:36

## 2018-11-26 RX ADMIN — MORPHINE SULFATE 2 MG: 10 INJECTION INTRAVENOUS at 11:01

## 2018-11-26 RX ADMIN — SODIUM CHLORIDE: 9 INJECTION, SOLUTION INTRAVENOUS at 19:27

## 2018-11-26 RX ADMIN — ATORVASTATIN CALCIUM 80 MG: 80 TABLET, FILM COATED ORAL at 21:10

## 2018-11-26 RX ADMIN — WARFARIN SODIUM 5 MG: 5 TABLET ORAL at 17:30

## 2018-11-26 RX ADMIN — AMLODIPINE BESYLATE 10 MG: 5 TABLET ORAL at 05:40

## 2018-11-26 ASSESSMENT — ENCOUNTER SYMPTOMS
MYALGIAS: 1
DEPRESSION: 0
DOUBLE VISION: 0
CHILLS: 0
DIZZINESS: 0
COUGH: 0
BRUISES/BLEEDS EASILY: 1
WEAKNESS: 1
FEVER: 0
VOMITING: 0
HEADACHES: 0
MEMORY LOSS: 0
SORE THROAT: 0
BLURRED VISION: 0
SHORTNESS OF BREATH: 0
NAUSEA: 0
ABDOMINAL PAIN: 0

## 2018-11-26 ASSESSMENT — PAIN SCALES - GENERAL
PAINLEVEL_OUTOF10: 4
PAINLEVEL_OUTOF10: 7
PAINLEVEL_OUTOF10: 3
PAINLEVEL_OUTOF10: 4
PAINLEVEL_OUTOF10: 3
PAINLEVEL_OUTOF10: 3
PAINLEVEL_OUTOF10: 7
PAINLEVEL_OUTOF10: 5

## 2018-11-26 ASSESSMENT — COPD QUESTIONNAIRES
HAVE YOU SMOKED AT LEAST 100 CIGARETTES IN YOUR ENTIRE LIFE: YES
COPD SCREENING SCORE: 5
DURING THE PAST 4 WEEKS HOW MUCH DID YOU FEEL SHORT OF BREATH: SOME OF THE TIME
DO YOU EVER COUGH UP ANY MUCUS OR PHLEGM?: NO/ONLY WITH OCCASIONAL COLDS OR INFECTIONS

## 2018-11-26 ASSESSMENT — LIFESTYLE VARIABLES: EVER_SMOKED: YES

## 2018-11-26 NOTE — PROGRESS NOTES
Inpatient Anticoagulation Service Note    Date: 11/25/2018  Reason for Anticoagulation: Deep Vein Thrombosis        Hemoglobin Value: 13.6  Hematocrit Value: 40.6  Lab Platelet Value: 360  Target INR: 2.0 to 3.0    INR from last 7 days     Date/Time INR Value    11/25/18 1210  1.09        Dose from last 7 days     Date/Time Dose (mg)    11/25/18 1608  7.5        Average Dose (mg):  (5 mg M/W/F and 7.5 mg AOD)  Significant Interactions: Proton Pump Inhibitor, Thyroid Medications (trazodone)  Bridge Therapy: Yes (heparin weight based protocol)  Date of Last VTE Event: 11/25/18  Bridge Therapy Start Date: 11/25/18  Days of Overlap Therapy: 0  INR Value Greater than 2 Prior to Discontinuation of Parenteral Anticoagulation: Not Applicable    Assessment: The patient presented with increased swelling to her LLE.  She had a dental procedure last week and had been holding her warfarin since 11/11 and resumed taking it on 11/23.  She had been offered enoxaparin by the Anticoagulation Clinic to bridge, however, she refused.  She is followed by the [facility].  No new DDI from baseline.  She has been started on heparin bridge therapy.      Plan:  Will resume her home warfarin regimen.  She will be scheduled to receive warfarin 7.5 mg tonight.  Follow up INR in the AM.  Education Material Provided?: No (chronic warfarin patient)  Pharmacist suggested discharge dosing: consider warfarin 5 mg every M/W/F followed by 7.5 mg all other days.  Recommend a follow up INR in 2-3 days.       Adenike Hutchison, Pharm.D., BCPS

## 2018-11-26 NOTE — PROGRESS NOTES
[facility] Hospitalist Progress Note    Date of Service: 2018    Chief Complaint  78 y.o. female admitted 2018 with left leg pain and swelling.     Patient had discontinued her coumadin therapy at home due to needing dental procedure, unfortunately when she restarted it she noticed she had already had left lower leg swelling and pain.     Interval Problem Update  - Patient has confirmed left lower extremity DVT. Now on Heparin drip with coumadin bridging. Patient states some pain to left lower leg that is being controlled with pain medication. Noted swelling. Will continue until patient is therapeutic with INR. Explained plan to patient and she agrees.     Consultants/Specialty  None     Disposition  Likely home with continued coumadin needs and follow up in coumadin clinic.         Review of Systems   Constitutional: Negative for chills and fever.   HENT: Negative for congestion and sore throat.    Eyes: Negative for blurred vision and double vision.   Respiratory: Negative for cough and shortness of breath.    Cardiovascular: Positive for leg swelling. Negative for chest pain.   Gastrointestinal: Negative for abdominal pain, nausea and vomiting.   Genitourinary: Negative for dysuria, frequency and urgency.   Musculoskeletal: Positive for myalgias.   Skin:        Edema to left leg    Neurological: Positive for weakness. Negative for dizziness and headaches.   Endo/Heme/Allergies: Bruises/bleeds easily (on chronic OAC).   Psychiatric/Behavioral: Negative for depression and memory loss.      Physical Exam  Laboratory/Imaging   Hemodynamics  Temp (24hrs), Av.7 °C (98 °F), Min:36.4 °C (97.5 °F), Max:36.9 °C (98.4 °F)   Temperature: 36.8 °C (98.2 °F)  Pulse  Av.4  Min: 58  Max: 85 Heart Rate (Monitored): 67  Blood Pressure : 100/50, NIBP: (!) 99/41      Respiratory      Respiration: 20, Pulse Oximetry: 93 %             Fluids    Intake/Output Summary (Last 24 hours) at 18 1117  Last data  filed at 11/26/18 0600   Gross per 24 hour   Intake             1772 ml   Output                0 ml   Net             1772 ml       Nutrition  Orders Placed This Encounter   Procedures   • Diet Order Diabetic     Standing Status:   Standing     Number of Occurrences:   1     Order Specific Question:   Diet:     Answer:   Diabetic [3]     Physical Exam   Constitutional: She is oriented to person, place, and time. Vital signs are normal. She appears well-developed and well-nourished. She is cooperative. No distress.   Pleasant older female laying in bed.    HENT:   Head: Normocephalic.   Nose: Nose normal.   Mouth/Throat: Oropharynx is clear and moist. Abnormal dentition. Dental caries present.   Eyes: Conjunctivae and lids are normal.   Neck: Neck supple. No tracheal tenderness present.   Cardiovascular: Normal rate and intact distal pulses.  An irregularly irregular rhythm present. Exam reveals no gallop.    Pulmonary/Chest: Effort normal and breath sounds normal. No respiratory distress. She has no decreased breath sounds. She has no wheezes.   Abdominal: Normal appearance and bowel sounds are normal. She exhibits no distension. There is no tenderness.   Musculoskeletal: She exhibits edema (left lower leg ).   Equal strength through out    Neurological: She is alert and oriented to person, place, and time. She has normal strength.   Skin: Skin is warm and dry. She is not diaphoretic.   Edema to left lower extremity   Psychiatric: She has a normal mood and affect. Her speech is normal and behavior is normal.   Nursing note and vitals reviewed.      Recent Labs      11/25/18   1210  11/26/18   0036   WBC  12.3*  10.5   RBC  4.54  3.95*   HEMOGLOBIN  13.6  11.6*   HEMATOCRIT  40.6  35.9*   MCV  89.4  90.9   MCH  30.0  29.4   MCHC  33.5*  32.3*   RDW  48.7  49.8   PLATELETCT  360  304   MPV  9.8  9.4     Recent Labs      11/25/18   1210  11/26/18   0036   SODIUM  133*  135   POTASSIUM  5.0  4.4   CHLORIDE  99  103    CO2  23  23   GLUCOSE  87  123*   BUN  21  21   CREATININE  1.55*  1.21   CALCIUM  9.2  8.0*     Recent Labs      11/25/18   1210  11/26/18   0036  11/26/18   0615   APTT  27.3  115.0*  190.9*   INR  1.09  1.33*   --                   Assessment/Plan     * Acute deep vein thrombosis (DVT) of femoral vein of left lower extremity (HCC)   Assessment & Plan    Affecting femoral-popliteal regions per 11/25 ultrasound  Initiated on heparin for immediate anticoagulation with need for bridging for Coumadin  Prior history of DVT over 5 years ago per patient  Pain management with tramadol and morphine for severe pain     Abdominal aortic aneurysm without rupture ([facility])- (present on admission)   Assessment & Plan    Follow-up with outpatient monitoring     Atrial fibrillation ([facility])   Assessment & Plan    Rate controlled  Continue heparin and warfarin     Postoperative hypothyroidism- (present on admission)   Assessment & Plan    Synthroid 150 mcg daily  Noted to have elevated TSH but normal Free T4     Controlled type 2 diabetes mellitus without complication, without long-term current use of insulin ([facility])- (present on admission)   Assessment & Plan    Monitor Accu-Cheks and cover with sliding scale insulin  Diabetic diet  Hemoglobin A1c 6.1 in the past 2 months     CKD (chronic kidney disease) stage 3, GFR 30-59 ml/min ([facility])- (present on admission)   Assessment & Plan    Monitor daily BMP given gentle fluids given history of aortic stenosis.  Monitor BMP     Essential hypertension- (present on admission)   Assessment & Plan    Continue with amlodipine 10 mg daily with parameters to hold if blood pressures low  Monitoring vitals     Dyslipidemia- (present on admission)   Assessment & Plan    Healthy balanced diet  Continue statin for ongoing active treatment of dyslipidemia       Quality-Core Measures   Reviewed items::  Labs reviewed and Medications reviewed  Espinoza catheter::  No Espinoza  DVT prophylaxis pharmacological::   Heparin and Warfarin (Coumadin)  Ulcer Prophylaxis::  Not indicated

## 2018-11-26 NOTE — H&P
[facility] Medicine History & Physical Note    Date of Service  11/25/2018    Primary Care Physician  WENDY Baker    Consultants  None    Code Status  Full    Chief Complaint  Leg swelling over the last few days    History of Presenting Illness  78 y.o. female with a history of atrial fibrillation, prior pulmonary embolism and DVT over 5 years ago who is on chronic anticoagulation, history of papillary thyroid cancer, chronic kidney disease, abdominal aortic aneurysm who presented 11/25/2018 with left leg swelling over the last few days.  She had recently stopped Coumadin 5 days prior to having dental extraction of 8 teeth on 11/14.  Her dental procedure was on 11/19.  The patient restarted Coumadin on the 11/22 without any bridging.  Approximately on 1123 the patient noted left leg swelling.  With increasing pain and swelling she decided come to the emergency room.  She denies any shortness of breath chest pain palpitations.  She has not had any coughing of blood.  She was diagnosed on ultrasound of the left leg to have clot in femoral vein.    Review of Systems  Review of Systems   Constitutional: Negative for chills and fever.   HENT: Negative for congestion and sore throat.    Respiratory: Negative for cough and shortness of breath.    Cardiovascular: Positive for leg swelling. Negative for chest pain and palpitations.   Gastrointestinal: Negative for abdominal pain and nausea.   Genitourinary: Negative for dysuria and hematuria.   Musculoskeletal: Positive for myalgias (left leg). Negative for back pain and neck pain.   Neurological: Negative for dizziness and headaches.   Psychiatric/Behavioral: Negative for depression. The patient is not nervous/anxious.        Past Medical History   has a past medical history of AAA (abdominal aortic aneurysm) (HCC); Anticoagulation monitoring, special range; Arrhythmia; Arthritis; Aspirin allergy (4/24/2018); Autoimmune hepatitis (HCC) (3/19/2012); Blood clotting  disorder (HCC) (2015); Breath shortness; Cancer (HCC); Cataract; Diabetes; Disorder of thyroid (2016); Gallstones; H/O: HTN (hypertension) (3/19/2012); Heart valve disease; Hepatitis B; Hiatus hernia syndrome; Hyperlipidemia; Hypertension; Kidney disease; Mixed hyperlipidemia (3/19/2012); Moderate aortic stenosis 3/2018; Murmur (3/19/2012); Nonspecific abnormal electrocardiogram (ECG) (EKG) (3/19/2012); Overweight(278.02) (3/19/2012); Pain; Palpitations; Preoperative cardiovascular examination (3/19/2012); and Unspecified urinary incontinence. She also has no past medical history of Heart attack ([facility]).    Surgical History   has a past surgical history that includes bladder suspension (5/27/2009); cystoscopy (5/27/2009); rectocele repair (5/27/2009); appendectomy; hysterectomy laparoscopy; knee replacement, total; tonsillectomy; exploratory laparotomy (2/27/2013); splenectomy (2/27/2013); node dissection (2/27/2013); oophorectomy (2/27/2013); thyroidectomy total (N/A, 10/12/2016); feroz by laparoscopy (1/2/2017); and ercp in or (12/30/2016).     Family History  family history includes Heart Disease in her brother; Hyperlipidemia in her father and mother; Stroke in her father and mother.     Social History   reports that she has been smoking Cigarettes.  She has a 10.00 pack-year smoking history. She has never used smokeless tobacco. She reports that she does not drink alcohol or use drugs.    Allergies  Allergies   Allergen Reactions   • Aspirin Anaphylaxis   • Penicillins Anaphylaxis     As a child  Tolerated Rocephin 2/2018       Medications  Prior to Admission Medications   Prescriptions Last Dose Informant Patient Reported? Taking?   amlodipine (NORVASC) 10 MG Tab 11/25/2018 at 0600 Patient No No   Sig: TAKE ONE TABLET BY MOUTH ONCE A DAY   atorvastatin (LIPITOR) 80 MG tablet 11/24/2018 at 2100 Patient No No   Sig: TAKE ONE-HALF TABLET BY MOUTH EVERY EVENING   calcium carbonate (OS-MARKELL 500) 500 MG Tab 11/25/2018  at 0600 Patient No No   Sig: TAKE ONE TABLET BY MOUTH TWICE DAILY WITH MEALS   levothyroxine (SYNTHROID) 150 MCG Tab 11/25/2018 at 0600 Patient No No   Sig: Take 1 Tab by mouth every morning before breakfast. ON EMPTY STOMACH   losartan (COZAAR) 50 MG Tab 11/25/2018 at 0600 Patient No No   Sig: TAKE ONE TABLET BY MOUTH ONCE A DAY   metFORMIN (GLUCOPHAGE) 500 MG Tab 11/25/2018 at 0600 Patient Yes Yes   Sig: Take 500-1,000 mg by mouth 2 times a day, with meals. 1000 mg every morning   500 mg every night   omeprazole (PRILOSEC) 20 MG delayed-release capsule 11/25/2018 at 0600 Patient No No   Sig: Take 1 Cap by mouth every day.   traZODone (DESYREL) 100 MG Tab 11/24/2018 at 2100 Patient No No   Sig: TAKE ONE AND ONE-HALF TABLET BY MOUTH ONCE DAILY AS NEEDED FOR SLEEP.   vitamin D, Ergocalciferol, (DRISDOL) 60331 units Cap capsule 11/25/2018 at 0600 Patient No No   Sig: TAKE ONE CAPSULE BY MOUTH EVERY 7 DAYS   warfarin (COUMADIN) 5 MG Tab 11/24/2018 at 2100 Patient Yes No   Sig: Take 5-7.5 mg by mouth every evening. 5 mg Monday Wednesday Friday  7.5 Tuesday Thursday Saturday Sunday       Facility-Administered Medications: None       Physical Exam  Temp:  [36.4 °C (97.5 °F)-36.9 °C (98.4 °F)] 36.9 °C (98.4 °F)  Pulse:  [58-70] 66  Resp:  [14-20] 18  BP: (105-135)/(46-58) 135/58    Physical Exam   Constitutional: She appears well-developed. No distress.   HENT:   Head: Normocephalic and atraumatic.   Nose: Nose normal.   Mouth/Throat: No oropharyngeal exudate.   Eyes: Conjunctivae and EOM are normal. Right eye exhibits no discharge. Left eye exhibits no discharge.   Neck: No tracheal deviation present.   Cardiovascular: Normal rate and normal heart sounds.  An irregularly irregular rhythm present.   No murmur heard.  Pulmonary/Chest: Effort normal. No stridor. No respiratory distress. She has no wheezes. She has no rales.   Abdominal: Soft. Bowel sounds are normal. She exhibits no distension. There is no tenderness.    Musculoskeletal:   Left leg swollen compared to right leg   Neurological: She is alert. No cranial nerve deficit.   Skin: Skin is warm. She is not diaphoretic.   Psychiatric: She has a normal mood and affect. Her behavior is normal. Thought content normal.   Vitals reviewed.      Laboratory:  Recent Labs      11/25/18   1210   WBC  12.3*   RBC  4.54   HEMOGLOBIN  13.6   HEMATOCRIT  40.6   MCV  89.4   MCH  30.0   MCHC  33.5*   RDW  48.7   PLATELETCT  360   MPV  9.8     Recent Labs      11/25/18   1210   SODIUM  133*   POTASSIUM  5.0   CHLORIDE  99   CO2  23   GLUCOSE  87   BUN  21   CREATININE  1.55*   CALCIUM  9.2     Recent Labs      11/25/18   1210   ALTSGPT  13   ASTSGOT  16   ALKPHOSPHAT  74   TBILIRUBIN  0.6   GLUCOSE  87     Recent Labs      11/25/18   1210   APTT  27.3   INR  1.09             No results for input(s): TROPONINI in the last 72 hours.    Urinalysis:    No results found     Imaging:  US-EXTREMITY VENOUS LOWER UNILAT LEFT   Final Result            Assessment/Plan:  I anticipate this patient will require at least two midnights for appropriate medical management, necessitating inpatient admission.    Abdominal aortic aneurysm without rupture (HCC)- (present on admission)   Assessment & Plan    Follow-up with outpatient monitoring     Acute deep vein thrombosis (DVT) of femoral vein of left lower extremity (HCC)   Assessment & Plan    Affecting femoral-popliteal regions per 11/25 ultrasound  Initiated on heparin for immediate anticoagulation with need for bridging for Coumadin  Prior history of DVT over 5 years ago per patient  Pain management with tramadol and morphine for severe pain     Atrial fibrillation (HCC)   Assessment & Plan    Rate controlled  Continue heparin and warfarin     Postoperative hypothyroidism- (present on admission)   Assessment & Plan    Synthroid 150 mcg daily  We will check a TSH and free thyroxine as last free thyroxine 1 year ago was elevated     Controlled type 2  diabetes mellitus without complication, without long-term current use of insulin ([facility])- (present on admission)   Assessment & Plan    Monitor Accu-Cheks and cover with sliding scale insulin  Diabetic diet  Hemoglobin A1c 6.1 in the past 2 months     CKD (chronic kidney disease) stage 3, GFR 30-59 ml/min ([facility])- (present on admission)   Assessment & Plan    Monitor daily BMP given gentle fluids given history of aortic stenosis.  Monitor BMP     Essential hypertension- (present on admission)   Assessment & Plan    Continue with amlodipine 10 mg daily with parameters to hold if blood pressures low  Monitoring vitals     Dyslipidemia- (present on admission)   Assessment & Plan    Healthy balanced diet  Continue statin for ongoing active treatment of dyslipidemia         VTE prophylaxis: heparin and coumadin

## 2018-11-26 NOTE — PROGRESS NOTES
Bedside shift report taken from ROSE Smith. Patient is resting in bed. Plan of care reviewed; communication board updated; all questions answered. Patient requesting pain medicine and sleeping pill at bedtime; request noted. Heparin drip verified with AM RN as per EMARs. Bed is locked and in lowest position. Side rails up x 2. Call light, phone, and personal belongings within reach. Will continue to monitor. EMAR revised; patient only has tylenol for pain. Pain scale 6/10. Notified Dr. Cox at 1940 for need for pain medicine. MD aware, will put in order for pain medicine.

## 2018-11-26 NOTE — CARE PLAN
Problem: Safety  Goal: Will remain free from falls    Intervention: Implement fall precautions   11/25/18 4027   OTHER   Environmental Precautions Treaded Slipper Socks on Patient;Personal Belongings, Wastebasket, Call Bell etc. in Easy Reach;Transferred to Stronger Side;Communication Sign for Patients & Families;Bed in Low Position;Report Given to Other Health Care Providers Regarding Fall Risk;Mobility Assessed & Appropriate Sign Placed   IV Pole on Same Side of Bed as Bathroom Yes   Bedrails Bedrails Closest to Bathroom Down         Problem: Knowledge Deficit  Goal: Knowledge of disease process/condition, treatment plan, diagnostic tests, and medications will improve    Intervention: Explain information regarding disease process/condition, treatment plan, diagnostic tests, and medications and document in education  Plan of care, tests, medications, safety, fall prevention, pain management, and use of call light/phone discussed; all questions answered.

## 2018-11-26 NOTE — ASSESSMENT & PLAN NOTE
Continue with amlodipine 10 mg daily with parameters to hold if blood pressures low  - stable; monitoring

## 2018-11-26 NOTE — PROGRESS NOTES
tech from Lab called with critical result of ptt - 115 at 0157. Critical lab result read back to tech.   This critical lab result is within parameters established by  for this patient, patient is on heparin drip protocol.

## 2018-11-26 NOTE — PROGRESS NOTES
Inpatient Anticoagulation Service Note    Date: 11/26/2018  Reason for Anticoagulation: Deep Vein Thrombosis   Hemoglobin Value: (!) 11.6  Hematocrit Value: (!) 35.9  Lab Platelet Value: 304  Target INR: 2.0 to 3.0    INR from last 7 days     Date/Time INR Value    11/26/18 0036 (!)  1.33    11/25/18 1210  1.09        Dose from last 7 days     Date/Time Dose (mg)    11/26/18 1500  5    11/25/18 1608  7.5        Average Dose (mg):  (Home dose: 5 mg MWF and 7.5 mg AOD)  Significant Interactions: Thyroid Medications, Statin    Bridge Therapy: Yes (HWBP)  Date of Last VTE Event: 11/25/18  Bridge Therapy Start Date: 11/25/18  Days of Overlap Therapy: 1   INR Value Greater than 2 Prior to Discontinuation of Parenteral Anticoagulation: Not Applicable     Comments: Heparin bridge to warfarin started last night. Will continue home dose and trend INR. No new DDI. INR in AM.    Education Material Provided?: No (chronic warfarin patient)  Pharmacist suggested discharge dosing: Would resume warfarin 5 mg every MWF and 7.5 mg all other days of week  Recommend a follow up INR in 2-3 days.     Amado Soto, PharmD, BCPS

## 2018-11-26 NOTE — PROGRESS NOTES
Brianne from Lab called with critical result of ptt - 190.9 at 0647. Critical lab result read back to Brianne.   This critical lab result is within parameters established by  for this patient; followed heparin drip protocol; held drip.

## 2018-11-26 NOTE — ASSESSMENT & PLAN NOTE
Monitor Accu-Cheks and cover with sliding scale insulin  Diabetic diet  Hemoglobin A1c 6.1 in the past 2 months

## 2018-11-26 NOTE — PROGRESS NOTES
2 RN skin assessment completed. Left leg swollen, red, tender to touch to.     No other skin ulcer, open wound noted at this time.

## 2018-11-26 NOTE — ASSESSMENT & PLAN NOTE
Affecting femoral-popliteal regions per 11/25 ultrasound  Initiated on heparin for immediate anticoagulation with need for bridging for Coumadin  Prior history of DVT over 5 years ago per patient  Pain management with tramadol and morphine for severe pain  - cont monitoring INRs

## 2018-11-27 LAB
ANION GAP SERPL CALC-SCNC: 10 MMOL/L (ref 0–11.9)
APTT PPP: 58.9 SEC (ref 24.7–36)
BUN SERPL-MCNC: 13 MG/DL (ref 8–22)
CALCIUM SERPL-MCNC: 8 MG/DL (ref 8.5–10.5)
CHLORIDE SERPL-SCNC: 109 MMOL/L (ref 96–112)
CO2 SERPL-SCNC: 21 MMOL/L (ref 20–33)
CREAT SERPL-MCNC: 0.93 MG/DL (ref 0.5–1.4)
ERYTHROCYTE [DISTWIDTH] IN BLOOD BY AUTOMATED COUNT: 49.1 FL (ref 35.9–50)
GLUCOSE BLD-MCNC: 116 MG/DL (ref 65–99)
GLUCOSE BLD-MCNC: 119 MG/DL (ref 65–99)
GLUCOSE BLD-MCNC: 124 MG/DL (ref 65–99)
GLUCOSE BLD-MCNC: 138 MG/DL (ref 65–99)
GLUCOSE SERPL-MCNC: 104 MG/DL (ref 65–99)
HCT VFR BLD AUTO: 35.7 % (ref 37–47)
HGB BLD-MCNC: 12 G/DL (ref 12–16)
INR PPP: 1.63 (ref 0.87–1.13)
MCH RBC QN AUTO: 30.4 PG (ref 27–33)
MCHC RBC AUTO-ENTMCNC: 33.6 G/DL (ref 33.6–35)
MCV RBC AUTO: 90.4 FL (ref 81.4–97.8)
PLATELET # BLD AUTO: 333 K/UL (ref 164–446)
PMV BLD AUTO: 9.5 FL (ref 9–12.9)
POTASSIUM SERPL-SCNC: 4.4 MMOL/L (ref 3.6–5.5)
PROTHROMBIN TIME: 19.4 SEC (ref 12–14.6)
RBC # BLD AUTO: 3.95 M/UL (ref 4.2–5.4)
SODIUM SERPL-SCNC: 140 MMOL/L (ref 135–145)
WBC # BLD AUTO: 10.4 K/UL (ref 4.8–10.8)

## 2018-11-27 PROCEDURE — 85730 THROMBOPLASTIN TIME PARTIAL: CPT

## 2018-11-27 PROCEDURE — 99232 SBSQ HOSP IP/OBS MODERATE 35: CPT | Performed by: INTERNAL MEDICINE

## 2018-11-27 PROCEDURE — 36415 COLL VENOUS BLD VENIPUNCTURE: CPT

## 2018-11-27 PROCEDURE — 80048 BASIC METABOLIC PNL TOTAL CA: CPT

## 2018-11-27 PROCEDURE — 770006 HCHG ROOM/CARE - MED/SURG/GYN SEMI*

## 2018-11-27 PROCEDURE — A9270 NON-COVERED ITEM OR SERVICE: HCPCS | Performed by: HOSPITALIST

## 2018-11-27 PROCEDURE — 700102 HCHG RX REV CODE 250 W/ 637 OVERRIDE(OP): Performed by: HOSPITALIST

## 2018-11-27 PROCEDURE — 700105 HCHG RX REV CODE 258: Performed by: HOSPITALIST

## 2018-11-27 PROCEDURE — 85610 PROTHROMBIN TIME: CPT

## 2018-11-27 PROCEDURE — 82962 GLUCOSE BLOOD TEST: CPT | Mod: 91

## 2018-11-27 PROCEDURE — 700111 HCHG RX REV CODE 636 W/ 250 OVERRIDE (IP): Performed by: HOSPITALIST

## 2018-11-27 PROCEDURE — 85027 COMPLETE CBC AUTOMATED: CPT

## 2018-11-27 RX ADMIN — Medication 500 MG: at 07:56

## 2018-11-27 RX ADMIN — STANDARDIZED SENNA CONCENTRATE AND DOCUSATE SODIUM 2 TABLET: 8.6; 5 TABLET, FILM COATED ORAL at 16:55

## 2018-11-27 RX ADMIN — ATORVASTATIN CALCIUM 80 MG: 80 TABLET, FILM COATED ORAL at 20:42

## 2018-11-27 RX ADMIN — MORPHINE SULFATE 2 MG: 10 INJECTION INTRAVENOUS at 20:42

## 2018-11-27 RX ADMIN — AMLODIPINE BESYLATE 10 MG: 5 TABLET ORAL at 05:30

## 2018-11-27 RX ADMIN — WARFARIN SODIUM 7.5 MG: 7.5 TABLET ORAL at 16:55

## 2018-11-27 RX ADMIN — MORPHINE SULFATE 2 MG: 10 INJECTION INTRAVENOUS at 05:30

## 2018-11-27 RX ADMIN — OMEPRAZOLE 20 MG: 20 CAPSULE, DELAYED RELEASE ORAL at 05:30

## 2018-11-27 RX ADMIN — LEVOTHYROXINE SODIUM 150 MCG: 150 TABLET ORAL at 05:30

## 2018-11-27 RX ADMIN — SODIUM CHLORIDE: 9 INJECTION, SOLUTION INTRAVENOUS at 05:33

## 2018-11-27 RX ADMIN — TRAMADOL HYDROCHLORIDE 50 MG: 50 TABLET, FILM COATED ORAL at 15:56

## 2018-11-27 RX ADMIN — MORPHINE SULFATE 2 MG: 10 INJECTION INTRAVENOUS at 02:11

## 2018-11-27 RX ADMIN — POLYETHYLENE GLYCOL 3350 1 PACKET: 17 POWDER, FOR SOLUTION ORAL at 05:30

## 2018-11-27 RX ADMIN — Medication 500 MG: at 16:55

## 2018-11-27 ASSESSMENT — PAIN SCALES - GENERAL
PAINLEVEL_OUTOF10: 4
PAINLEVEL_OUTOF10: 7
PAINLEVEL_OUTOF10: 3
PAINLEVEL_OUTOF10: 4
PAINLEVEL_OUTOF10: 6
PAINLEVEL_OUTOF10: 4

## 2018-11-27 ASSESSMENT — ENCOUNTER SYMPTOMS
CHILLS: 0
MEMORY LOSS: 0
DEPRESSION: 0
ABDOMINAL PAIN: 0
NAUSEA: 0
COUGH: 0
BRUISES/BLEEDS EASILY: 1
HEADACHES: 0
DIZZINESS: 0
WEAKNESS: 1
MYALGIAS: 1
DOUBLE VISION: 0
SORE THROAT: 0
VOMITING: 0
SHORTNESS OF BREATH: 0
BLURRED VISION: 0
FEVER: 0

## 2018-11-27 NOTE — PROGRESS NOTES
[facility]ist Progress Note    Date of Service: 2018    Chief Complaint  78 y.o. female admitted 2018 with left leg pain and swelling.     Patient had discontinued her coumadin therapy at home due to needing dental procedure, unfortunately when she restarted it she noticed she had already had left lower leg swelling and pain.     Interval Problem Update  LLE swelling and pain improving but still present.  Denies any acute gross bleeding.     Consultants/Specialty  None     Disposition  Likely home with continued coumadin needs and follow up in coumadin clinic.         Review of Systems   Constitutional: Negative for chills and fever.   HENT: Negative for congestion and sore throat.    Eyes: Negative for blurred vision and double vision.   Respiratory: Negative for cough and shortness of breath.    Cardiovascular: Positive for leg swelling. Negative for chest pain.   Gastrointestinal: Negative for abdominal pain, nausea and vomiting.   Genitourinary: Negative for dysuria, frequency and urgency.   Musculoskeletal: Positive for myalgias.   Skin:        Edema to left leg    Neurological: Positive for weakness. Negative for dizziness and headaches.   Endo/Heme/Allergies: Bruises/bleeds easily (on chronic OAC).   Psychiatric/Behavioral: Negative for depression and memory loss.      Physical Exam  Laboratory/Imaging   Hemodynamics  Temp (24hrs), Av.6 °C (97.9 °F), Min:36.2 °C (97.1 °F), Max:37.3 °C (99.2 °F)   Temperature: 36.2 °C (97.1 °F)  Pulse  Av.5  Min: 58  Max: 85    Blood Pressure : 120/52      Respiratory      Respiration: 16, Pulse Oximetry: 90 %     Work Of Breathing / Effort: Mild  RUL Breath Sounds: Clear, RML Breath Sounds: Diminished, RLL Breath Sounds: Diminished, LUDA Breath Sounds: Clear, LLL Breath Sounds: Diminished    Fluids    Intake/Output Summary (Last 24 hours) at 18 1337  Last data filed at 18 0830   Gross per 24 hour   Intake              480 ml   Output                 0 ml   Net              480 ml       Nutrition  Orders Placed This Encounter   Procedures   • Diet Order Diabetic     Standing Status:   Standing     Number of Occurrences:   1     Order Specific Question:   Diet:     Answer:   Diabetic [3]     Physical Exam   Constitutional: She is oriented to person, place, and time. Vital signs are normal. She appears well-developed and well-nourished. She is cooperative. No distress.   Pleasant older female laying in bed.    HENT:   Head: Normocephalic.   Nose: Nose normal.   Mouth/Throat: Oropharynx is clear and moist. Abnormal dentition. Dental caries present.   Eyes: Conjunctivae and lids are normal.   Neck: Neck supple. No tracheal tenderness present.   Cardiovascular: Normal rate and intact distal pulses.  An irregularly irregular rhythm present. Exam reveals no gallop.    Pulmonary/Chest: Effort normal and breath sounds normal. No respiratory distress. She has no decreased breath sounds. She has no wheezes.   Abdominal: Normal appearance and bowel sounds are normal. She exhibits no distension. There is no tenderness.   Musculoskeletal: She exhibits edema (left lower leg ).   Equal strength through out    Neurological: She is alert and oriented to person, place, and time. She has normal strength.   Skin: Skin is warm and dry. She is not diaphoretic.   Edema to left lower extremity   Psychiatric: She has a normal mood and affect. Her speech is normal and behavior is normal.   Nursing note and vitals reviewed.      Recent Labs      11/25/18   1210  11/26/18   0036  11/27/18   0242   WBC  12.3*  10.5  10.4   RBC  4.54  3.95*  3.95*   HEMOGLOBIN  13.6  11.6*  12.0   HEMATOCRIT  40.6  35.9*  35.7*   MCV  89.4  90.9  90.4   MCH  30.0  29.4  30.4   MCHC  33.5*  32.3*  33.6   RDW  48.7  49.8  49.1   PLATELETCT  360  304  333   MPV  9.8  9.4  9.5     Recent Labs      11/25/18   1210  11/26/18   0036  11/27/18   0242   SODIUM  133*  135  140   POTASSIUM  5.0  4.4  4.4    CHLORIDE  99  103  109   CO2  23  23  21   GLUCOSE  87  123*  104*   BUN  21  21  13   CREATININE  1.55*  1.21  0.93   CALCIUM  9.2  8.0*  8.0*     Recent Labs      11/25/18   1210  11/26/18   0036   11/26/18   1351  11/26/18 2024 11/27/18   0242   APTT  27.3  115.0*   < >  112.1*  67.0*  58.9*   INR  1.09  1.33*   --    --    --   1.63*    < > = values in this interval not displayed.                  Assessment/Plan     * Acute deep vein thrombosis (DVT) of femoral vein of left lower extremity (HCC)- (present on admission)   Assessment & Plan    Affecting femoral-popliteal regions per 11/25 ultrasound  Initiated on heparin for immediate anticoagulation with need for bridging for Coumadin  Prior history of DVT over 5 years ago per patient  Pain management with tramadol and morphine for severe pain  - cont monitoring INRs     Abdominal aortic aneurysm without rupture ([facility])- (present on admission)   Assessment & Plan    Follow-up with outpatient monitoring     Atrial fibrillation (HCC)- (present on admission)   Assessment & Plan    Rate controlled  Continue heparin and warfarin     Postoperative hypothyroidism- (present on admission)   Assessment & Plan    Synthroid 150 mcg daily  Noted to have elevated TSH but normal Free T4     Controlled type 2 diabetes mellitus without complication, without long-term current use of insulin ([facility])- (present on admission)   Assessment & Plan    Monitor Accu-Cheks and cover with sliding scale insulin  Diabetic diet  Hemoglobin A1c 6.1 in the past 2 months     CKD (chronic kidney disease) stage 3, GFR 30-59 ml/min ([facility])- (present on admission)   Assessment & Plan    Monitor daily BMP given gentle fluids given history of aortic stenosis.     Essential hypertension- (present on admission)   Assessment & Plan    Continue with amlodipine 10 mg daily with parameters to hold if blood pressures low  - stable; monitoring     Dyslipidemia- (present on admission)   Assessment & Plan     Healthy balanced diet  Continue statin for ongoing active treatment of dyslipidemia       Quality-Core Measures   Reviewed items::  Labs reviewed and Medications reviewed  Espinoza catheter::  No Espinoza  DVT prophylaxis pharmacological::  Heparin and Warfarin (Coumadin)  Ulcer Prophylaxis::  Not indicated

## 2018-11-27 NOTE — PROGRESS NOTES
Bedside shift report taken from ROSE Alvarenga. Patient is resting in bed. No complaints of any distress; no needs at this time. Heparin drip verified. Bed is locked and in lowest position. Side rails up x 2. Call light, phone, and personal belongings within reach. Will continue to monitor.

## 2018-11-27 NOTE — CARE PLAN
Problem: Safety  Goal: Will remain free from falls    Intervention: Implement fall precautions   11/26/18 2000 11/26/18 2052   OTHER   Environmental Precautions Treaded Slipper Socks on Patient;Personal Belongings, Wastebasket, Call Bell etc. in Easy Reach;Transferred to Stronger Side;Report Given to Other Health Care Providers Regarding Fall Risk;Bed in Low Position;Communication Sign for Patients & Families;Mobility Assessed & Appropriate Sign Placed --    IV Pole on Same Side of Bed as Bathroom --  Yes   Bedrails --  Bedrails Closest to Bathroom Down         Problem: Knowledge Deficit  Goal: Knowledge of disease process/condition, treatment plan, diagnostic tests, and medications will improve    Intervention: Explain information regarding disease process/condition, treatment plan, diagnostic tests, and medications and document in education  Plan of care, tests, pain management, heparin drip and management, activity, safety, fall prevention, and use of call light and phone discussed; all questions answered.

## 2018-11-27 NOTE — RESPIRATORY CARE
COPD EDUCATION by COPD CLINICAL EDUCATOR  11/27/2018 at 6:55 AM by Isamar Prince     Patient reviewed by COPD education team. Patient does not qualify for COPD program at this time

## 2018-11-27 NOTE — PROGRESS NOTES
Patient is A&O x4, Full Code, able to communicate needs. VSS. Complaint of generalized and LLE pain; offered pain meds; declined at this time.     On Heparin gtt; educated about side effect and sign to look for complication. Patient verbalized understanding of the information proved. Will continue to monitor.

## 2018-11-27 NOTE — CARE PLAN
Problem: Safety  Goal: Will remain free from injury  Outcome: PROGRESSING AS EXPECTED  Patient is A&O x4, Full code, able to communicate needs,

## 2018-11-28 LAB
APTT PPP: 108.5 SEC (ref 24.7–36)
APTT PPP: 52.1 SEC (ref 24.7–36)
APTT PPP: 78.7 SEC (ref 24.7–36)
GLUCOSE BLD-MCNC: 106 MG/DL (ref 65–99)
GLUCOSE BLD-MCNC: 135 MG/DL (ref 65–99)
GLUCOSE BLD-MCNC: 139 MG/DL (ref 65–99)
GLUCOSE BLD-MCNC: 145 MG/DL (ref 65–99)
INR PPP: 2.1 (ref 0.87–1.13)
PROTHROMBIN TIME: 23.6 SEC (ref 12–14.6)

## 2018-11-28 PROCEDURE — A9270 NON-COVERED ITEM OR SERVICE: HCPCS | Performed by: HOSPITALIST

## 2018-11-28 PROCEDURE — G8979 MOBILITY GOAL STATUS: HCPCS | Mod: CI

## 2018-11-28 PROCEDURE — 82962 GLUCOSE BLOOD TEST: CPT | Mod: 91

## 2018-11-28 PROCEDURE — 700111 HCHG RX REV CODE 636 W/ 250 OVERRIDE (IP): Performed by: HOSPITALIST

## 2018-11-28 PROCEDURE — 700105 HCHG RX REV CODE 258: Performed by: HOSPITALIST

## 2018-11-28 PROCEDURE — 700102 HCHG RX REV CODE 250 W/ 637 OVERRIDE(OP): Performed by: HOSPITALIST

## 2018-11-28 PROCEDURE — 99232 SBSQ HOSP IP/OBS MODERATE 35: CPT | Performed by: INTERNAL MEDICINE

## 2018-11-28 PROCEDURE — 770006 HCHG ROOM/CARE - MED/SURG/GYN SEMI*

## 2018-11-28 PROCEDURE — 85730 THROMBOPLASTIN TIME PARTIAL: CPT | Mod: 91

## 2018-11-28 PROCEDURE — 85610 PROTHROMBIN TIME: CPT

## 2018-11-28 PROCEDURE — 36415 COLL VENOUS BLD VENIPUNCTURE: CPT

## 2018-11-28 PROCEDURE — G8978 MOBILITY CURRENT STATUS: HCPCS | Mod: CI

## 2018-11-28 PROCEDURE — 97161 PT EVAL LOW COMPLEX 20 MIN: CPT

## 2018-11-28 RX ADMIN — TRAMADOL HYDROCHLORIDE 50 MG: 50 TABLET, FILM COATED ORAL at 05:25

## 2018-11-28 RX ADMIN — HEPARIN SODIUM 650 UNITS/HR: 5000 INJECTION, SOLUTION INTRAVENOUS at 07:13

## 2018-11-28 RX ADMIN — OMEPRAZOLE 20 MG: 20 CAPSULE, DELAYED RELEASE ORAL at 05:25

## 2018-11-28 RX ADMIN — MORPHINE SULFATE 2 MG: 10 INJECTION INTRAVENOUS at 00:02

## 2018-11-28 RX ADMIN — TRAZODONE HYDROCHLORIDE 50 MG: 100 TABLET ORAL at 20:38

## 2018-11-28 RX ADMIN — TRAZODONE HYDROCHLORIDE 50 MG: 100 TABLET ORAL at 01:07

## 2018-11-28 RX ADMIN — MORPHINE SULFATE 2 MG: 10 INJECTION INTRAVENOUS at 20:39

## 2018-11-28 RX ADMIN — LEVOTHYROXINE SODIUM 150 MCG: 150 TABLET ORAL at 05:25

## 2018-11-28 RX ADMIN — STANDARDIZED SENNA CONCENTRATE AND DOCUSATE SODIUM 2 TABLET: 8.6; 5 TABLET, FILM COATED ORAL at 05:25

## 2018-11-28 RX ADMIN — ATORVASTATIN CALCIUM 80 MG: 80 TABLET, FILM COATED ORAL at 20:38

## 2018-11-28 RX ADMIN — MORPHINE SULFATE 2 MG: 10 INJECTION INTRAVENOUS at 10:39

## 2018-11-28 RX ADMIN — Medication 500 MG: at 17:58

## 2018-11-28 RX ADMIN — Medication 500 MG: at 10:33

## 2018-11-28 RX ADMIN — STANDARDIZED SENNA CONCENTRATE AND DOCUSATE SODIUM 2 TABLET: 8.6; 5 TABLET, FILM COATED ORAL at 17:57

## 2018-11-28 RX ADMIN — WARFARIN SODIUM 5 MG: 5 TABLET ORAL at 17:58

## 2018-11-28 RX ADMIN — SODIUM CHLORIDE: 9 INJECTION, SOLUTION INTRAVENOUS at 01:00

## 2018-11-28 RX ADMIN — AMLODIPINE BESYLATE 10 MG: 5 TABLET ORAL at 05:24

## 2018-11-28 RX ADMIN — HEPARIN SODIUM 2600 UNITS: 1000 INJECTION, SOLUTION INTRAVENOUS; SUBCUTANEOUS at 18:02

## 2018-11-28 ASSESSMENT — PAIN SCALES - GENERAL
PAINLEVEL_OUTOF10: 4
PAINLEVEL_OUTOF10: 0
PAINLEVEL_OUTOF10: 4
PAINLEVEL_OUTOF10: 7
PAINLEVEL_OUTOF10: 6
PAINLEVEL_OUTOF10: 9
PAINLEVEL_OUTOF10: 2
PAINLEVEL_OUTOF10: 8

## 2018-11-28 ASSESSMENT — GAIT ASSESSMENTS
DISTANCE (FEET): 115
ASSISTIVE DEVICE: FRONT WHEEL WALKER
GAIT LEVEL OF ASSIST: STAND BY ASSIST
DEVIATION: ANTALGIC

## 2018-11-28 ASSESSMENT — ENCOUNTER SYMPTOMS
DIZZINESS: 0
WEAKNESS: 1
ABDOMINAL PAIN: 0
DOUBLE VISION: 0
FEVER: 0
MEMORY LOSS: 0
CHILLS: 0
HEADACHES: 0
SHORTNESS OF BREATH: 0
SORE THROAT: 0
COUGH: 0
NAUSEA: 0
MYALGIAS: 1
DEPRESSION: 0
VOMITING: 0
BLURRED VISION: 0
BRUISES/BLEEDS EASILY: 1

## 2018-11-28 ASSESSMENT — COGNITIVE AND FUNCTIONAL STATUS - GENERAL
SUGGESTED CMS G CODE MODIFIER MOBILITY: CI
CLIMB 3 TO 5 STEPS WITH RAILING: A LITTLE
MOBILITY SCORE: 23

## 2018-11-28 NOTE — PROGRESS NOTES
[facility] Hospitalist Progress Note    Date of Service: 2018    Chief Complaint  78 y.o. female admitted 2018 with left leg pain and swelling.     Patient had discontinued her coumadin therapy at home due to needing dental procedure, unfortunately when she restarted it she noticed she had already had left lower leg swelling and pain.     Interval Problem Update  LLE swelling and pain improving.  Encouraged to ambulate.  Pt feels unsafe to be discharged home and requesting PT evaluation.     Consultants/Specialty  None     Disposition  Likely home with continued coumadin needs and follow up in coumadin clinic.         Review of Systems   Constitutional: Negative for chills and fever.   HENT: Negative for congestion and sore throat.    Eyes: Negative for blurred vision and double vision.   Respiratory: Negative for cough and shortness of breath.    Cardiovascular: Positive for leg swelling. Negative for chest pain.   Gastrointestinal: Negative for abdominal pain, nausea and vomiting.   Genitourinary: Negative for dysuria, frequency and urgency.   Musculoskeletal: Positive for myalgias.   Skin:        Edema to left leg    Neurological: Positive for weakness. Negative for dizziness and headaches.   Endo/Heme/Allergies: Bruises/bleeds easily (on chronic OAC).   Psychiatric/Behavioral: Negative for depression and memory loss.      Physical Exam  Laboratory/Imaging   Hemodynamics  Temp (24hrs), Av.7 °C (98 °F), Min:36.2 °C (97.2 °F), Max:36.8 °C (98.3 °F)   Temperature: 36.8 °C (98.3 °F)  Pulse  Av  Min: 58  Max: 85    Blood Pressure : 112/46      Respiratory      Respiration: 18, Pulse Oximetry: 94 %     Work Of Breathing / Effort: Mild  RUL Breath Sounds: Clear, RML Breath Sounds: Clear;Diminished, RLL Breath Sounds: Diminished, LUDA Breath Sounds: Clear, LLL Breath Sounds: Diminished    Fluids    Intake/Output Summary (Last 24 hours) at 18 1347  Last data filed at 18 0713   Gross per 24 hour    Intake          2136.62 ml   Output                0 ml   Net          2136.62 ml       Nutrition  Orders Placed This Encounter   Procedures   • Diet Order Diabetic     Standing Status:   Standing     Number of Occurrences:   1     Order Specific Question:   Diet:     Answer:   Diabetic [3]     Physical Exam   Constitutional: She is oriented to person, place, and time. Vital signs are normal. She appears well-developed and well-nourished. She is cooperative. No distress.   Pleasant older female laying in bed.    HENT:   Head: Normocephalic.   Nose: Nose normal.   Mouth/Throat: Oropharynx is clear and moist. Abnormal dentition. Dental caries present.   Eyes: Conjunctivae and lids are normal.   Neck: Neck supple. No tracheal tenderness present.   Cardiovascular: Normal rate and intact distal pulses.  An irregularly irregular rhythm present. Exam reveals no gallop.    Pulmonary/Chest: Effort normal and breath sounds normal. No respiratory distress. She has no decreased breath sounds. She has no wheezes.   Abdominal: Normal appearance and bowel sounds are normal. She exhibits no distension. There is no tenderness.   Musculoskeletal: She exhibits edema (left lower leg ).   Equal strength through out    Neurological: She is alert and oriented to person, place, and time. She has normal strength.   Skin: Skin is warm and dry. She is not diaphoretic.   Edema to left lower extremity   Psychiatric: She has a normal mood and affect. Her speech is normal and behavior is normal.   Nursing note and vitals reviewed.      Recent Labs      11/26/18 0036 11/27/18   0242   WBC  10.5  10.4   RBC  3.95*  3.95*   HEMOGLOBIN  11.6*  12.0   HEMATOCRIT  35.9*  35.7*   MCV  90.9  90.4   MCH  29.4  30.4   MCHC  32.3*  33.6   RDW  49.8  49.1   PLATELETCT  304  333   MPV  9.4  9.5     Recent Labs      11/26/18 0036  11/27/18   0242   SODIUM  135  140   POTASSIUM  4.4  4.4   CHLORIDE  103  109   CO2  23  21   GLUCOSE  123*  104*   BUN  21   13   CREATININE  1.21  0.93   CALCIUM  8.0*  8.0*     Recent Labs      11/26/18   0036   11/27/18   0242  11/28/18   0348  11/28/18   1057   APTT  115.0*   < >  58.9*  108.5*  78.7*   INR  1.33*   --   1.63*  2.10*   --     < > = values in this interval not displayed.                  Assessment/Plan     * Acute deep vein thrombosis (DVT) of femoral vein of left lower extremity (HCC)- (present on admission)   Assessment & Plan    Affecting femoral-popliteal regions per 11/25 ultrasound  Initiated on heparin for immediate anticoagulation with need for bridging for Coumadin  Prior history of DVT over 5 years ago per patient  Pain management with tramadol and morphine for severe pain  - cont monitoring INRs     Abdominal aortic aneurysm without rupture (HCC)- (present on admission)   Assessment & Plan    Follow-up with outpatient monitoring     Atrial fibrillation (HCC)- (present on admission)   Assessment & Plan    Rate controlled  Continue heparin and warfarin     Postoperative hypothyroidism- (present on admission)   Assessment & Plan    Synthroid 150 mcg daily  Noted to have elevated TSH but normal Free T4     Controlled type 2 diabetes mellitus without complication, without long-term current use of insulin ([facility])- (present on admission)   Assessment & Plan    Monitor Accu-Cheks and cover with sliding scale insulin  Diabetic diet  Hemoglobin A1c 6.1 in the past 2 months     CKD (chronic kidney disease) stage 3, GFR 30-59 ml/min ([facility])- (present on admission)   Assessment & Plan    Monitor daily BMP given gentle fluids given history of aortic stenosis.     Essential hypertension- (present on admission)   Assessment & Plan    Continue with amlodipine 10 mg daily with parameters to hold if blood pressures low  - stable; monitoring     Dyslipidemia- (present on admission)   Assessment & Plan    Healthy balanced diet  Continue statin for ongoing active treatment of dyslipidemia       Quality-Core Measures   Reviewed  items::  Labs reviewed and Medications reviewed  Espinoza catheter::  No Espinoza  DVT prophylaxis pharmacological::  Heparin and Warfarin (Coumadin)  Ulcer Prophylaxis::  Not indicated

## 2018-11-28 NOTE — PROGRESS NOTES
Bedside report received per RN, assumed patient care. Patient sitting up in bed with eyes open. Patient is A&Ox4. Respirations regular and non-labored on room air. All needs addressed at this time. Reviewed POC with verbalized understanding. Heparin drip verified and infusing at 15mL/hr.  Appropriate fall precautions in place. Bed in low position, side rails up x2, call light within reach. Hourly rounding initiated. Will continue to monitor.

## 2018-11-28 NOTE — PROGRESS NOTES
Inpatient Anticoagulation Service Note    Date: 11/28/2018  Reason for Anticoagulation: Deep Vein Thrombosis        Hemoglobin Value: 12  Hematocrit Value: (!) 35.7  Lab Platelet Value: 333  Target INR: 2.0 to 3.0    INR from last 7 days     Date/Time INR Value    11/28/18 0348 (!)  2.1    11/27/18 0242 (!)  1.63    11/26/18 0036 (!)  1.33    11/25/18 1210  1.09        Dose from last 7 days     Date/Time Dose (mg)    11/28/18 1400  5    11/27/18 1700  7.5    11/26/18 1500  5    11/25/18 1608  7.5        Average Dose (mg):  (Home dose: 5 mg MWF and 7.5 mg AOD)  Significant Interactions: Proton Pump Inhibitor, Statin, Thyroid Medications  Bridge Therapy: Yes (HWBP)  Date of Last VTE Event: 11/25/18  Bridge Therapy Start Date: 11/25/18  Days of Overlap Therapy: 3  INR Value Greater than 2 Prior to Discontinuation of Parenteral Anticoagulation: Not Applicable    Comments: INR now therapeutic with HWBP continued until 2 INR values > 2.  No new noted DDI.  No new labs for H/H assessment, however no noted s/sx of bleeding.  Will continue home regimen with HWBP for total of 5 days of overlap.    Plan:  Warfarin 5mg.  HWBP continued. INR in AM  Education Material Provided?: No (chronic warfarin patient)  Pharmacist suggested discharge dosing: Continuation of home regimen of  5 mg MWF and 7.5 mg AOD with prompt follow up within 72hrs.     Nahid Walters

## 2018-11-28 NOTE — CARE PLAN
Problem: Safety  Goal: Will remain free from injury  Outcome: PROGRESSING AS EXPECTED  Appropriate fall precautions in place. Patient educated to call for help before getting out of bed, verbalized understanding.

## 2018-11-28 NOTE — PROGRESS NOTES
Inpatient Anticoagulation Service Note    Date: 11/27/2018  Reason for Anticoagulation: Deep Vein Thrombosis   Hemoglobin Value: 12  Hematocrit Value: (!) 35.7  Lab Platelet Value: 333  Target INR: 2.0 to 3.0    INR from last 7 days     Date/Time INR Value    11/27/18 0242 (!)  1.63    11/26/18 0036 (!)  1.33    11/25/18 1210  1.09        Dose from last 7 days     Date/Time Dose (mg)    11/27/18 1700  7.5    11/26/18 1500  5    11/25/18 1608  7.5        Average Dose (mg):  (Home dose: 5 mg MWF and 7.5 mg AOD)  Significant Interactions: Thyroid Medications, Statin    Bridge Therapy: Yes (HWBP)  Date of Last VTE Event: 11/25/18  Bridge Therapy Start Date: 11/25/18  Days of Overlap Therapy: 2   INR Value Greater than 2 Prior to Discontinuation of Parenteral Anticoagulation: Not Applicable     Comments: INR up today, hopefully therapeutic tomorrow or next day. Needs 5 complete days of overlap with 2 consecutive days of therapeutic INR prior to stopping HWBP. No bleeding noted. No new DDI. Continue same. Trend INR daily.    Education Material Provided?: No (chronic warfarin patient)  Pharmacist suggested discharge dosing: Would resume warfarin 5 mg every MWF and 7.5 mg all other days of week  Recommend a follow up INR in 2-3 days.     Amado Soto, PharmD, BCPS

## 2018-11-29 VITALS
WEIGHT: 190.48 LBS | TEMPERATURE: 98.2 F | OXYGEN SATURATION: 91 % | SYSTOLIC BLOOD PRESSURE: 132 MMHG | DIASTOLIC BLOOD PRESSURE: 73 MMHG | HEART RATE: 91 BPM | HEIGHT: 64 IN | RESPIRATION RATE: 16 BRPM | BODY MASS INDEX: 32.52 KG/M2

## 2018-11-29 LAB
APTT PPP: 59.1 SEC (ref 24.7–36)
APTT PPP: 88.3 SEC (ref 24.7–36)
GLUCOSE BLD-MCNC: 96 MG/DL (ref 65–99)
INR PPP: 2.18 (ref 0.87–1.13)
PROTHROMBIN TIME: 24.4 SEC (ref 12–14.6)

## 2018-11-29 PROCEDURE — 700105 HCHG RX REV CODE 258: Performed by: HOSPITALIST

## 2018-11-29 PROCEDURE — 97165 OT EVAL LOW COMPLEX 30 MIN: CPT

## 2018-11-29 PROCEDURE — G8989 SELF CARE D/C STATUS: HCPCS | Mod: CI

## 2018-11-29 PROCEDURE — 85730 THROMBOPLASTIN TIME PARTIAL: CPT | Mod: 91

## 2018-11-29 PROCEDURE — 85610 PROTHROMBIN TIME: CPT

## 2018-11-29 PROCEDURE — 82962 GLUCOSE BLOOD TEST: CPT

## 2018-11-29 PROCEDURE — A9270 NON-COVERED ITEM OR SERVICE: HCPCS | Performed by: HOSPITALIST

## 2018-11-29 PROCEDURE — 36415 COLL VENOUS BLD VENIPUNCTURE: CPT

## 2018-11-29 PROCEDURE — 700102 HCHG RX REV CODE 250 W/ 637 OVERRIDE(OP): Performed by: HOSPITALIST

## 2018-11-29 PROCEDURE — G8988 SELF CARE GOAL STATUS: HCPCS | Mod: CI

## 2018-11-29 PROCEDURE — 99239 HOSP IP/OBS DSCHRG MGMT >30: CPT | Performed by: INTERNAL MEDICINE

## 2018-11-29 PROCEDURE — G8987 SELF CARE CURRENT STATUS: HCPCS | Mod: CI

## 2018-11-29 PROCEDURE — 700111 HCHG RX REV CODE 636 W/ 250 OVERRIDE (IP): Performed by: HOSPITALIST

## 2018-11-29 RX ORDER — OXYCODONE HYDROCHLORIDE 5 MG/1
5-10 TABLET ORAL EVERY 4 HOURS PRN
Qty: 30 TAB | Refills: 0 | Status: ON HOLD | OUTPATIENT
Start: 2018-11-29 | End: 2018-12-14

## 2018-11-29 RX ADMIN — MORPHINE SULFATE 2 MG: 10 INJECTION INTRAVENOUS at 02:16

## 2018-11-29 RX ADMIN — SODIUM CHLORIDE: 9 INJECTION, SOLUTION INTRAVENOUS at 02:17

## 2018-11-29 RX ADMIN — STANDARDIZED SENNA CONCENTRATE AND DOCUSATE SODIUM 2 TABLET: 8.6; 5 TABLET, FILM COATED ORAL at 04:42

## 2018-11-29 RX ADMIN — TRAMADOL HYDROCHLORIDE 50 MG: 50 TABLET, FILM COATED ORAL at 04:42

## 2018-11-29 RX ADMIN — LEVOTHYROXINE SODIUM 150 MCG: 150 TABLET ORAL at 04:41

## 2018-11-29 RX ADMIN — TRAMADOL HYDROCHLORIDE 50 MG: 50 TABLET, FILM COATED ORAL at 11:11

## 2018-11-29 RX ADMIN — AMLODIPINE BESYLATE 10 MG: 5 TABLET ORAL at 04:40

## 2018-11-29 ASSESSMENT — ACTIVITIES OF DAILY LIVING (ADL): TOILETING: INDEPENDENT

## 2018-11-29 ASSESSMENT — COGNITIVE AND FUNCTIONAL STATUS - GENERAL
DAILY ACTIVITIY SCORE: 23
HELP NEEDED FOR BATHING: A LITTLE
SUGGESTED CMS G CODE MODIFIER DAILY ACTIVITY: CI

## 2018-11-29 ASSESSMENT — PAIN SCALES - GENERAL
PAINLEVEL_OUTOF10: 3
PAINLEVEL_OUTOF10: 6
PAINLEVEL_OUTOF10: 7
PAINLEVEL_OUTOF10: 4

## 2018-11-29 NOTE — CARE PLAN
Problem: Communication  Goal: The ability to communicate needs accurately and effectively will improve  Outcome: PROGRESSING AS EXPECTED  Patient is able to communicate effectively. Patient uses call light to ask for assistance before getting out of bed. POC reviewed with verbalized understanding.

## 2018-11-29 NOTE — DISCHARGE SUMMARY
Discharge Summary    CHIEF COMPLAINT ON ADMISSION  Chief Complaint   Patient presents with   • Leg Pain     Left leg/swelling x 2 days       Reason for Admission  Left leg pain and swelling    Admission Date  11/25/2018    CODE STATUS  Full Code    HPI & HOSPITAL COURSE  This is a 78 y.o. female here with past medical history of DVT, PE, atrial fibrillation who was on chronic anticoagulation presented to the hospital with acute left leg pain and swelling.  Patient discontinued chronic anticoagulation for planned procedure on 11/14 (dental extraction of 8 teeth).  She had reinitiated warfarin on 11/22.  Lower extremity Doppler was performed on the left lower extremity revealed DVT involving the femoral popliteal regions.  Patient was started on bridging therapy with IV heparin drip and warfarin.  Patient is currently therapeutic and will be discharged home on oral warfarin and follow-up with anticoagulation clinic as referred.  All other chronic medical conditions remained stable.  Patient was recommended to follow-up with PCP in 1-2 weeks.  Patient was evaluated by PT/OT and recommended for patient to be discharged with home health care for PT and OT services.  Patient declined and therefore will be discharged home.     Therefore, she is discharged in fair and stable condition to home with close outpatient follow-up.    The patient met 2-midnight criteria for an inpatient stay at the time of discharge.    Discharge Date  11-    FOLLOW UP ITEMS POST DISCHARGE  Follow-up with PCP 1-2 weeks  Follow with anticoagulation clinic as scheduled    DISCHARGE DIAGNOSES  Principal Problem:    Acute deep vein thrombosis (DVT) of femoral vein of left lower extremity (HCC) POA: Yes  Active Problems:    Abdominal aortic aneurysm without rupture ([facility]) POA: Yes    Dyslipidemia POA: Yes    Essential hypertension POA: Yes    CKD (chronic kidney disease) stage 3, GFR 30-59 ml/min ([facility]) POA: Yes    Controlled type 2 diabetes  mellitus without complication, without long-term current use of insulin (HCC) POA: Yes    Postoperative hypothyroidism POA: Yes    Moderate aortic stenosis 3/2018 (Chronic) POA: Yes    Atrial fibrillation (HCC) POA: Yes  Resolved Problems:    * No resolved hospital problems. *      FOLLOW UP  Future Appointments  Date Time Provider Department Center   12/11/2018 9:45 AM [facility] EXAM 8 NONINV NINV [address]   12/26/2018 10:00 AM [facility] EXAM 4 VMED None   1/16/2019 1:00 PM WENDY Baker 77M[facility] B[facility]U [facility]   3/13/2019 10:00 AM VASCULAR NURSE PRACTITIONER VMED None     No follow-up provider specified.    MEDICATIONS ON DISCHARGE     Medication List      START taking these medications      Instructions   oxyCODONE immediate-release 5 MG Tabs  Commonly known as:  ROXICODONE   Take 1-2 Tabs by mouth every four hours as needed for Severe Pain for up to 7 days.  Dose:  5-10 mg        CONTINUE taking these medications      Instructions   amLODIPine 10 MG Tabs  Commonly known as:  NORVASC   TAKE ONE TABLET BY MOUTH ONCE A DAY     atorvastatin 80 MG tablet  Commonly known as:  LIPITOR   TAKE ONE-HALF TABLET BY MOUTH EVERY EVENING     calcium carbonate 500 MG Tabs  Commonly known as:  OS-MARKELL 500   TAKE ONE TABLET BY MOUTH TWICE DAILY WITH MEALS     levothyroxine 150 MCG Tabs  Commonly known as:  SYNTHROID   Take 1 Tab by mouth every morning before breakfast. ON EMPTY STOMACH  Dose:  150 mcg     losartan 50 MG Tabs  Commonly known as:  COZAAR   TAKE ONE TABLET BY MOUTH ONCE A DAY     metFORMIN 500 MG Tabs  Commonly known as:  GLUCOPHAGE   Take 500-1,000 mg by mouth 2 times a day, with meals. 1000 mg every morning  500 mg every night  Dose:  500-1000 mg     omeprazole 20 MG delayed-release capsule  Commonly known as:  PRILOSEC   Take 1 Cap by mouth every day.  Dose:  20 mg     traZODone 100 MG Tabs  Commonly known as:  DESYREL   TAKE ONE AND ONE-HALF TABLET BY MOUTH ONCE DAILY AS NEEDED FOR SLEEP.     vitamin D  (Ergocalciferol) 95675 units Caps capsule  Commonly known as:  DRISDOL   TAKE ONE CAPSULE BY MOUTH EVERY 7 DAYS     warfarin 5 MG Tabs  Commonly known as:  COUMADIN   Take 5-7.5 mg by mouth every evening. 5 mg Monday Wednesday Friday 7.5 Tuesday Thursday Saturday Sunday  Dose:  5-7.5 mg            Allergies  Allergies   Allergen Reactions   • Aspirin Anaphylaxis   • Penicillins Anaphylaxis     As a child  Tolerated Rocephin 2/2018       DIET  Orders Placed This Encounter   Procedures   • Diet Order Diabetic     Standing Status:   Standing     Number of Occurrences:   1     Order Specific Question:   Diet:     Answer:   Diabetic [3]       ACTIVITY  As tolerated.  Weight bearing as tolerated    CONSULTATIONS  None    PROCEDURES  None    LABORATORY  Lab Results   Component Value Date    SODIUM 140 11/27/2018    POTASSIUM 4.4 11/27/2018    CHLORIDE 109 11/27/2018    CO2 21 11/27/2018    GLUCOSE 104 (H) 11/27/2018    BUN 13 11/27/2018    CREATININE 0.93 11/27/2018    CREATININE 0.8 05/19/2009        Lab Results   Component Value Date    WBC 10.4 11/27/2018    HEMOGLOBIN 12.0 11/27/2018    HEMATOCRIT 35.7 (L) 11/27/2018    PLATELETCT 333 11/27/2018        Total time of the discharge process exceeds 40 minutes.

## 2018-11-29 NOTE — THERAPY
"Occupational Therapy Evaluation completed.   Functional Status:  Pt req SPV to EOB. Completed sit>stand with SBA and reported severe pain in LLE. Reported she does not wear socks, only slippers. Ambulated to toilet with SBA, refused FWW. Completed toilet txf with SBA using GBs. Cont. To report pain. Reports completed ADLs this AM I'ly and declined to demonstrate d/t pain. Returned to EOB and req SPV sit>supine.  Plan of Care: Patient with no further skilled OT needs in the acute care setting at this time  Discharge Recommendations:  Equipment: Front-Wheel Walker. Post-acute therapy: Recommend home health or outpatient transitional care services for continued occupational therapy services    See \"Rehab Therapy-Acute\" Patient Summary Report for complete documentation.    Pt is a 79 yo female admitted with LLE femoral DVT. PMHx of afib, prior DVTs, cancer, CKD, and AAA. Pt continues to be limited by decreased balance, but reports this is d/t pain/edema. Completed txfs with SBA, and reported she completed ADLs this AM I'ly and declined to repeat. Currently recommend no further acute OT, but recommend home health or outpatient transitional care services for continued occupational therapy services.    "

## 2018-11-29 NOTE — PROGRESS NOTES
Bedside report received per RN, assumed patient care. Patient sitting up in bed with eyes open. Patient is A&Ox4. Respirations regular and non-labored on 1.5L NC. All needs addressed at this time. Reviewed POC with verbalized understanding. Heparin drip verified and infusing at 15mL/hr.  Appropriate fall precautions in place. Bed in low position, side rails up x2, bed alarm on, and call light within reach. Hourly rounding initiated. Will continue to monitor.

## 2018-11-29 NOTE — DISCHARGE INSTRUCTIONS
Discharge Instructions    Discharged to home by taxi with escort. Discharged via wheelchair, hospital escort: Yes.  Special equipment needed: Wheelchair    Be sure to schedule a follow-up appointment with your primary care doctor or any specialists as instructed.     Discharge Plan:   Diet Plan: Discussed  Activity Level: Discussed  Smoking Cessation Offered: Patient Refused  Confirmed Follow up Appointment: Patient to Call and Schedule Appointment  Confirmed Symptoms Management: Discussed  Medication Reconciliation Updated: Yes  Pneumococcal Vaccine Administered/Refused: Not given - Patient refused pneumococcal vaccine  Influenza Vaccine Indication: Not indicated: Previously immunized this influenza season and > 8 years of age    I understand that a diet low in cholesterol, fat, and sodium is recommended for good health. Unless I have been given specific instructions below for another diet, I accept this instruction as my diet prescription.   Other diet: diabetic    Special Instructions:   Deep Vein Thrombosis Discharge Instructions    A deep vein thrombosis (DVT) is a blood clot (thrombus) that develops in a deep vein. A DVT is a clot in the deep, larger veins of the leg, arm, or pelvis. These are more dangerous than clots that might form in veins on the surface of the body. Deep vein thrombosis can lead to complications if the clot breaks off and travels in the bloodstream to the lungs.     CAUSES  Blood clots form in a vein for different reasons. Usually several things cause blood clots. They include:   · The flow of blood slows down.   · The inside of the vein is damaged in some way.   · The person has a condition that makes blood clot more easily. These conditions may include:  · Older age (especially over 75 years old).  · Having a history of blood clots.  · Having major or lengthy surgery. Hip surgery is particularly high-risk.   · Breaking a hip or leg.  · Sitting or lying still for a long time.  · Cancer  or cancer treatment.  · Having a long, thin tube (catheter) placed inside a vein during a medical procedure.   · Being overweight (obese).  · Pregnancy and childbirth.  · Medicines with estrogen.  · Smoking.  · Other circulation or heart problems.     SYMPTOMS  When a clot forms, it can either partially or totally block the blood flow in that vein. Symptoms of a DVT can include:  · Swelling of the leg or arm, especially if one side is much worse.  · Warmth and redness of the leg or arm, especially if one side is much worse.   · Pain in an arm or leg. If the clot is in the leg, symptoms may be more noticeable or worse when standing or walking.  If the blood clot travels to the lung, it may cause:  · Shortness of breath.  · Chest pain. The pain may be worsened by deep breaths.   · Coughing up thick mucus (phlegm), possibly flecked with blood.   Anyone with these symptoms should get emergency medical treatment right away. Call your local emergency  Services (911 in [place] if you have these symptoms.     DIAGNOSIS  If a DVT is suspected, your caregiver will take a full medical history. He or she will also perform a physical exam. Tests that also may be required include:   · Studies of the clotting properties of the blood.   · An ultrasound scan.   · X-rays to show the flow of blood when special dye is injected into the veins (venography).   · Studies of your lungs if you have any chest symptoms.     PREVENTION  · Exercise the legs regularly. Take a brisk 30 minute walk every day.   · Maintain a weight that is appropriate for your height.  · Avoid sitting or lying in bed for long periods of time without moving your legs.   · Women, particularly those over the age of 35, should consider the risks and benefits of taking estrogen medicine, including birth control pills.   · Do not smoke, especially if you take estrogen medicines.   · Long-distance travel can increase your risk. You should exercise your legs by walking or  pumping the muscles every hour.   · In hospital prevention: Prevention may include medical and non medical measures.     TREATMENT  · The most common treatment for DVT is blood thinning (anticoagulant) medicine, which reduces the blood's tendency to clot. Anticoagulants can stop new blood clots from forming and old ones from growing. They cannot dissolve existing clots. Your body does this by itself over time. Anticoagulants can be given by mouth, by intravenous (IV) access, or by injection. Your caregiver will determine the best program for you.   · Less commonly, clot-dissolving drugs (thrombolytics) are used to dissolve a DVT. They carry a high risk of bleeding, so they are used mainly in severe cases.   · Very rarely, a blood clot in the leg needs to be removed surgically.   · If you are unable to take anticoagulants, your caregiver may arrange for you to have a filter placed in a main vein in your belly (abdomen). This filter prevents clots from traveling to your lungs.     HOME CARE INSTRUCTIONS  Take all medicines prescribed by your caregiver. Follow the directions carefully.   · You will most likely continue taking anticoagulants after you leave the hospital. Your caregiver will advise you on the length of treatment (usually 3 to 5 months, sometimes for life).   · Taking too much or too little of an anticoagulant is dangerous. While taking this type of medicine, you will need to have regular blood tests to be sure the dose is correct. The dose can change for many reasons. It is critically important that you take this medicine exactly as prescribed, and that you have blood tests exactly as directed.   · Many foods can interfere with anticoagulants. These include foods high in vitamin K, such as spinach, kale, broccoli, cabbage, ramona and turnip greens, [place] sprouts, peas, cauliflower, seaweed, parsley, beef and pork liver, green tea, and soybean oil. Your caregiver should discuss limits on these foods  with you or you should arrange a visit with a dietician to answer your questions.   · Many medicines can interfere with anticoagulants. You must tell your caregiver about any and all medicines you take. This includes all vitamins and supplements. Be especially cautious with aspirin and anti-inflammatory medicines. Ask your caregiver before taking these.   · Anticoagulants can have side effects, mostly excessive bruising or bleeding. You will need to hold pressure over cuts for longer than usual. Avoid alcoholic drinks or consume only very small amounts while taking this medicine.    If you are taking an anticoagulant:  · Wear a medical alert bracelet.  · Notify your dentist or other caregivers before procedures.  · Avoid contact sports.    · Ask your caregiver how soon you can go back to normal activities. Not being active can lead to new clots. Ask for a list of what you should and should not do.   · Exercise your lower leg muscles. This is important while traveling.   · You may need to wear compression stockings. These are tight elastic stockings that apply pressure to the lower legs. This can help keep the blood in the legs from clotting.   · If you are a smoker, you should quit.   · Learn as much as you can about DVT.     SEEK MEDICAL CARE IF:  · You have unusual bruising or any bleeding problems.  · The swelling or pain in your affected arm or leg is not gradually improving.   · You anticipate surgery or long-distance travel. You should get specific advice on DVT prevention.   · You discover other family members with blood clots. This may require further testing for inherited diseases or conditions.     SEEK IMMEDIATE MEDICAL CARE IF:  · You develop chest pain.  · You develop severe shortness of breath.  · You begin to cough up bloody mucus or phlegm (sputum).  · You feel dizzy or faint.   · You develop swelling or pain in the leg.  · You have breathing problems after traveling.    MAKE SURE YOU:  · Understand  these instructions.  · Will watch your condition.  · Will get help right away if you are not doing well or get worse.       · Is patient discharged on Warfarin / Coumadin?   Yes    You are receiving the drug warfarin. Please understand the importance of monitoring warfarin with scheduled PT/INR blood draws.  Follow-up with the [facility] in one week for INR lab..    IMPORTANT: HOW TO USE THIS INFORMATION:  This is a summary and does NOT have all possible information about this product. This information does not assure that this product is safe, effective, or appropriate for you. This information is not individual medical advice and does not substitute for the advice of your health care professional. Always ask your health care professional for complete information about this product and your specific health needs.      WARFARIN - ORAL (WARF-uh-rin)      COMMON BRAND NAME(S): Coumadin      WARNING:  Warfarin can cause very serious (possibly fatal) bleeding. This is more likely to occur when you first start taking this medication or if you take too much warfarin. To decrease your risk for bleeding, your doctor or other health care provider will monitor you closely and check your lab results (INR test) to make sure you are not taking too much warfarin. Keep all medical and laboratory appointments. Tell your doctor right away if you notice any signs of serious bleeding. See also Side Effects section.      USES:  This medication is used to treat blood clots (such as in deep vein thrombosis-DVT or pulmonary embolus-PE) and/or to prevent new clots from forming in your body. Preventing harmful blood clots helps to reduce the risk of a stroke or heart attack. Conditions that increase your risk of developing blood clots include a certain type of irregular heart rhythm (atrial fibrillation), heart valve replacement, recent heart attack, and certain surgeries (such as hip/knee replacement). Warfarin is commonly called a  "\"blood thinner,\" but the more correct term is \"anticoagulant.\" It helps to keep blood flowing smoothly in your body by decreasing the amount of certain substances (clotting proteins) in your blood.      HOW TO USE:  Read the Medication Guide provided by your pharmacist before you start taking warfarin and each time you get a refill. If you have any questions, ask your doctor or pharmacist. Take this medication by mouth with or without food as directed by your doctor or other health care professional, usually once a day. It is very important to take it exactly as directed. Do not increase the dose, take it more frequently, or stop using it unless directed by your doctor. Dosage is based on your medical condition, laboratory tests (such as INR), and response to treatment. Your doctor or other health care provider will monitor you closely while you are taking this medication to determine the right dose for you. Use this medication regularly to get the most benefit from it. To help you remember, take it at the same time each day. It is important to eat a balanced, consistent diet while taking warfarin. Some foods can affect how warfarin works in your body and may affect your treatment and dose. Avoid sudden large increases or decreases in your intake of foods high in vitamin K (such as broccoli, cauliflower, cabbage, brussels sprouts, kale, spinach, and other green leafy vegetables, liver, green tea, certain vitamin supplements). If you are trying to lose weight, check with your doctor before you try to go on a diet. Cranberry products may also affect how your warfarin works. Limit the amount of cranberry juice (16 ounces/480 milliliters a day) or other cranberry products you may drink or eat.      SIDE EFFECTS:  Nausea, loss of appetite, or stomach/abdominal pain may occur. If any of these effects persist or worsen, tell your doctor or pharmacist promptly. Remember that your doctor has prescribed this medication " because he or she has judged that the benefit to you is greater than the risk of side effects. Many people using this medication do not have serious side effects. This medication can cause serious bleeding if it affects your blood clotting proteins too much (shown by unusually high INR lab results). Even if your doctor stops your medication, this risk of bleeding can continue for up to a week. Tell your doctor right away if you have any signs of serious bleeding, including: unusual pain/swelling/discomfort, unusual/easy bruising, prolonged bleeding from cuts or gums, persistent/frequent nosebleeds, unusually heavy/prolonged menstrual flow, pink/dark urine, coughing up blood, vomit that is bloody or looks like coffee grounds, severe headache, dizziness/fainting, unusual or persistent tiredness/weakness, bloody/black/tarry stools, chest pain, shortness of breath, difficulty swallowing. Tell your doctor right away if any of these unlikely but serious side effects occur: persistent nausea/vomiting, severe stomach/abdominal pain, yellowing eyes/skin. This drug rarely has caused very serious (possibly fatal) problems if its effects lead to small blood clots (usually at the beginning of treatment). This can lead to severe skin/tissue damage that may require surgery or amputation if left untreated. Patients with certain blood conditions (protein C or S deficiency) may be at greater risk. Get medical help right away if any of these rare but serious side effects occur: painful/red/purplish patches on the skin (such as on the toe, breast, abdomen), change in the amount of urine, vision changes, confusion, slurred speech, weakness on one side of the body. A very serious allergic reaction to this drug is rare. However, get medical help right away if you notice any symptoms of a serious allergic reaction, including: rash, itching/swelling (especially of the face/tongue/throat), severe dizziness, trouble breathing. This is not a  complete list of possible side effects. If you notice other effects not listed above, contact your doctor or pharmacist. In the [place] - Call your doctor for medical advice about side effects. You may report side effects to FDA at 3-368-QCJ-1128. In [place] - Call your doctor for medical advice about side effects. You may report side effects to [facility] [place] at 1-839.723.9188.      PRECAUTIONS:  Before taking warfarin, tell your doctor or pharmacist if you are allergic to it; or if you have any other allergies. This product may contain inactive ingredients, which can cause allergic reactions or other problems. Talk to your pharmacist for more details. Before using this medication, tell your doctor or pharmacist your medical history, especially of: blood disorders (such as anemia, hemophilia), bleeding problems (such as bleeding of the stomach/intestines, bleeding in the brain), blood vessel disorders (such as aneurysms), recent major injury/surgery, liver disease, alcohol use, mental/mood disorders (including memory problems), frequent falls/injuries. It is important that all your doctors and dentists know that you take warfarin. Before having surgery or any medical/dental procedures, tell your doctor or dentist that you are taking this medication and about all the products you use (including prescription drugs, nonprescription drugs, and herbal products). Avoid getting injections into the muscles. If you must have an injection into a muscle (for example, a flu shot), it should be given in the arm. This way, it will be easier to check for bleeding and/or apply pressure bandages. This medication may cause stomach bleeding. Daily use of alcohol while using this medicine will increase your risk for stomach bleeding and may also affect how this medication works. Limit or avoid alcoholic beverages. If you have not been eating well, if you have an illness or infection that causes fever, vomiting, or diarrhea for more than 2  days, or if you start using any antibiotic medications, contact your doctor or pharmacist immediately because these conditions can affect how warfarin works. This medication can cause heavy bleeding. To lower the chance of getting cut, bruised, or injured, use great caution with sharp objects like safety razors and nail cutters. Use an electric razor when shaving and a soft toothbrush when brushing your teeth. Avoid activities such as contact sports. If you fall or injure yourself, especially if you hit your head, call your doctor immediately. Your doctor may need to check you. The Food & Drug Administration has stated that generic warfarin products are interchangeable. However, consult your doctor or pharmacist before switching warfarin products. Be careful not to take more than one medication that contains warfarin unless specifically directed by the doctor or health care provider who is monitoring your warfarin treatment. Older adults may be at greater risk for bleeding while using this drug. This medication is not recommended for use during pregnancy because of serious (possibly fatal) harm to an unborn baby. Discuss the use of reliable forms of birth control with your doctor. If you become pregnant or think you may be pregnant, tell your doctor immediately. If you are planning pregnancy, discuss a plan for managing your condition with your doctor before you become pregnant. Your doctor may switch the type of medication you use during pregnancy. Very small amounts of this medication may pass into breast milk but is unlikely to harm a nursing infant. Consult your doctor before breast-feeding.      DRUG INTERACTIONS:  Drug interactions may change how your medications work or increase your risk for serious side effects. This document does not contain all possible drug interactions. Keep a list of all the products you use (including prescription/nonprescription drugs and herbal products) and share it with your  "doctor and pharmacist. Do not start, stop, or change the dosage of any medicines without your doctor's approval. Warfarin interacts with many prescription, nonprescription, vitamin, and herbal products. This includes medications that are applied to the skin or inside the vagina or rectum. The interactions with warfarin usually result in an increase or decrease in the \"blood-thinning\" (anticoagulant) effect. Your doctor or other health care professional should closely monitor you to prevent serious bleeding or clotting problems. While taking warfarin, it is very important to tell your doctor or pharmacist of any changes in medications, vitamins, or herbal products that you are taking. Some products that may interact with this drug include: capecitabine, imatinib, mifepristone. Aspirin, aspirin-like drugs (salicylates), and nonsteroidal anti-inflammatory drugs (NSAIDs such as ibuprofen, naproxen, celecoxib) may have effects similar to warfarin. These drugs may increase the risk of bleeding problems if taken during treatment with warfarin. Carefully check all prescription/nonprescription product labels (including drugs applied to the skin such as pain-relieving creams) since the products may contain NSAIDs or salicylates. Talk to your doctor about using a different medication (such as acetaminophen) to treat pain/fever. Low-dose aspirin and related drugs (such as clopidogrel, ticlopidine) should be continued if prescribed by your doctor for specific medical reasons such as heart attack or stroke prevention. Consult your doctor or pharmacist for more details. Many herbal products interact with warfarin. Tell your doctor before taking any herbal products, especially bromelains, coenzyme Q10, cranberry, danshen, dong quai, fenugreek, garlic, ginkgo biloba, ginseng, and Zoey's wort, among others. This medication may interfere with a certain laboratory test to measure theophylline levels, possibly causing false test " results. Make sure laboratory personnel and all your doctors know you use this drug.      OVERDOSE:  If overdose is suspected, contact a poison control center or emergency room immediately. [place] residents can call the [place] National Poison Hotline at 1-533.297.5501. [place] residents can call a provincial poison control center. Symptoms of overdose may include: bloody/black/tarry stools, pink/dark urine, unusual/prolonged bleeding.      NOTES:  Do not share this medication with others. Laboratory and/or medical tests (such as INR, complete blood count) must be performed periodically to monitor your progress or check for side effects. Consult your doctor for more details.      MISSED DOSE:  For the best possible benefit, do not miss any doses. If you do miss a dose and remember on the same day, take it as soon as you remember. If you remember on the next day, skip the missed dose and resume your usual dosing schedule. Do not double the dose to catch up because this could increase your risk for bleeding. Keep a record of missed doses to give to your doctor or pharmacist. Contact your doctor or pharmacist if you miss 2 or more doses in a row.      STORAGE:  Store at room temperature away from light and moisture. Do not store in the bathroom. Keep all medications away from children and pets. Do not flush medications down the toilet or pour them into a drain unless instructed to do so. Properly discard this product when it is  or no longer needed. Consult your pharmacist or local waste disposal company for more details about how to safely discard your product.      MEDICAL ALERT:  Your condition and medication can cause complications in a medical emergency. For information about enrolling in MedicAlert, call 1-985.153.2661 ([place]) or 1-884.165.7190 ([place]).      Information last revised 2010 Copyright(c) 2010 [employer]             Depression / Suicide Risk    As you are discharged from this [facility] facility, it is important to learn how to keep safe from harming yourself.    Recognize the warning signs:  · Abrupt changes in personality, positive or negative- including increase in energy   · Giving away possessions  · Change in eating patterns- significant weight changes-  positive or negative  · Change in sleeping patterns- unable to sleep or sleeping all the time   · Unwillingness or inability to communicate  · Depression  · Unusual sadness, discouragement and loneliness  · Talk of wanting to die  · Neglect of personal appearance   · Rebelliousness- reckless behavior  · Withdrawal from people/activities they love  · Confusion- inability to concentrate     If you or a loved one observes any of these behaviors or has concerns about self-harm, here's what you can do:  · Talk about it- your feelings and reasons for harming yourself  · Remove any means that you might use to hurt yourself (examples: pills, rope, extension cords, firearm)  · Get professional help from the community (Mental Health, Substance Abuse, psychological counseling)  · Do not be alone:Call your Safe Contact- someone whom you trust who will be there for you.  · Call your local CRISIS HOTLINE 435-4054 or 345-544-0080  · Call your local Children's Mobile Crisis Response Team [place] (858) 053-3461 or www.Ketto  · Call the toll free National Suicide Prevention Hotlines   · National Suicide Prevention Lifeline 667-550-GCPN (5669)  · National Hope Line Network 800-SUICIDE (304-8032)

## 2018-11-29 NOTE — THERAPY
"Physical Therapy Evaluation completed.   Bed Mobility:  Supine to Sit: Supervised  Transfers: Sit to Stand: Stand by Assist  Gait: Level Of Assist: Stand by Assist with Front-Wheel Walker       Plan of Care: Will benefit from Physical Therapy 3 times per week  Discharge Recommendations: Equipment: No Equipment Needed.     Pt admitted for L LE DVT after being off her Coumadin for an oral surgical procedure. She presents with painful B LE and back (back pain baseline per pt) which limits her overall gait distance. Today, pt performed x110' with FWW and reports she has a 4WW at home. Pt appears to be most limited by pain. Anticipate pt to be functionally capable of return home if pain under control. After discussion, pt reporting that she is not interested in HHPT services at this time.     See \"Rehab Therapy-Acute\" Patient Summary Report for complete documentation.     "

## 2018-11-30 ENCOUNTER — PATIENT OUTREACH (OUTPATIENT)
Dept: HEALTH INFORMATION MANAGEMENT | Facility: OTHER | Age: 78
End: 2018-11-30

## 2018-11-30 NOTE — PROGRESS NOTES
11/30/18 9:10am:  CM post discharge outreach call first attempt.  VM left requesting return call to  at [phone].    11/30/18 12:00pm:  CM post discharge outreach call second attempt.  ADRIENNE left requesting return call to  at [phone].

## 2018-12-01 ENCOUNTER — APPOINTMENT (OUTPATIENT)
Dept: RADIOLOGY | Facility: MEDICAL CENTER | Age: 78
DRG: 270 | End: 2018-12-01
Attending: EMERGENCY MEDICINE
Payer: MEDICARE

## 2018-12-01 ENCOUNTER — HOSPITAL ENCOUNTER (INPATIENT)
Facility: MEDICAL CENTER | Age: 78
LOS: 13 days | DRG: 270 | End: 2018-12-14
Attending: EMERGENCY MEDICINE | Admitting: HOSPITALIST
Payer: MEDICARE

## 2018-12-01 DIAGNOSIS — I82.4Y2 ACUTE DEEP VEIN THROMBOSIS (DVT) OF PROXIMAL VEIN OF LEFT LOWER EXTREMITY (HCC): ICD-10-CM

## 2018-12-01 DIAGNOSIS — E66.9 OBESITY (BMI 30-39.9): ICD-10-CM

## 2018-12-01 DIAGNOSIS — I82.412 ACUTE DEEP VEIN THROMBOSIS (DVT) OF FEMORAL VEIN OF LEFT LOWER EXTREMITY (HCC): ICD-10-CM

## 2018-12-01 DIAGNOSIS — Z79.01 CHRONIC ANTICOAGULATION: ICD-10-CM

## 2018-12-01 DIAGNOSIS — G89.29 CHRONIC MIDLINE LOW BACK PAIN WITHOUT SCIATICA: ICD-10-CM

## 2018-12-01 DIAGNOSIS — M54.50 CHRONIC MIDLINE LOW BACK PAIN WITHOUT SCIATICA: ICD-10-CM

## 2018-12-01 DIAGNOSIS — I82.402 ACUTE DEEP VEIN THROMBOSIS (DVT) OF LEFT LOWER EXTREMITY, UNSPECIFIED VEIN (HCC): ICD-10-CM

## 2018-12-01 DIAGNOSIS — Z91.81 RISK FOR FALLS: ICD-10-CM

## 2018-12-01 DIAGNOSIS — I48.0 PAROXYSMAL ATRIAL FIBRILLATION (HCC): ICD-10-CM

## 2018-12-01 DIAGNOSIS — R53.81 DEBILITY: ICD-10-CM

## 2018-12-01 LAB
ALBUMIN SERPL BCP-MCNC: 3.6 G/DL (ref 3.2–4.9)
ALBUMIN/GLOB SERPL: 1.2 G/DL
ALP SERPL-CCNC: 67 U/L (ref 30–99)
ALT SERPL-CCNC: 13 U/L (ref 2–50)
ANION GAP SERPL CALC-SCNC: 10 MMOL/L (ref 0–11.9)
APTT PPP: 43.9 SEC (ref 24.7–36)
AST SERPL-CCNC: 15 U/L (ref 12–45)
BASOPHILS # BLD AUTO: 0.6 % (ref 0–1.8)
BASOPHILS # BLD: 0.07 K/UL (ref 0–0.12)
BILIRUB SERPL-MCNC: 0.6 MG/DL (ref 0.1–1.5)
BUN SERPL-MCNC: 12 MG/DL (ref 8–22)
CALCIUM SERPL-MCNC: 9 MG/DL (ref 8.5–10.5)
CHLORIDE SERPL-SCNC: 105 MMOL/L (ref 96–112)
CO2 SERPL-SCNC: 23 MMOL/L (ref 20–33)
CREAT SERPL-MCNC: 1.13 MG/DL (ref 0.5–1.4)
EOSINOPHIL # BLD AUTO: 0.47 K/UL (ref 0–0.51)
EOSINOPHIL NFR BLD: 3.9 % (ref 0–6.9)
ERYTHROCYTE [DISTWIDTH] IN BLOOD BY AUTOMATED COUNT: 50.8 FL (ref 35.9–50)
GLOBULIN SER CALC-MCNC: 3 G/DL (ref 1.9–3.5)
GLUCOSE BLD-MCNC: 116 MG/DL (ref 65–99)
GLUCOSE BLD-MCNC: 133 MG/DL (ref 65–99)
GLUCOSE SERPL-MCNC: 111 MG/DL (ref 65–99)
HCT VFR BLD AUTO: 34.7 % (ref 37–47)
HGB BLD-MCNC: 11.3 G/DL (ref 12–16)
IMM GRANULOCYTES # BLD AUTO: 0.05 K/UL (ref 0–0.11)
IMM GRANULOCYTES NFR BLD AUTO: 0.4 % (ref 0–0.9)
INR PPP: 2.68 (ref 0.87–1.13)
LYMPHOCYTES # BLD AUTO: 1.52 K/UL (ref 1–4.8)
LYMPHOCYTES NFR BLD: 12.6 % (ref 22–41)
MCH RBC QN AUTO: 29.4 PG (ref 27–33)
MCHC RBC AUTO-ENTMCNC: 32.6 G/DL (ref 33.6–35)
MCV RBC AUTO: 90.1 FL (ref 81.4–97.8)
MONOCYTES # BLD AUTO: 1.2 K/UL (ref 0–0.85)
MONOCYTES NFR BLD AUTO: 10 % (ref 0–13.4)
NEUTROPHILS # BLD AUTO: 8.73 K/UL (ref 2–7.15)
NEUTROPHILS NFR BLD: 72.5 % (ref 44–72)
NRBC # BLD AUTO: 0 K/UL
NRBC BLD-RTO: 0 /100 WBC
PLATELET # BLD AUTO: 416 K/UL (ref 164–446)
PMV BLD AUTO: 9.7 FL (ref 9–12.9)
POTASSIUM SERPL-SCNC: 4.1 MMOL/L (ref 3.6–5.5)
PROT SERPL-MCNC: 6.6 G/DL (ref 6–8.2)
PROTHROMBIN TIME: 28.6 SEC (ref 12–14.6)
RBC # BLD AUTO: 3.85 M/UL (ref 4.2–5.4)
SODIUM SERPL-SCNC: 138 MMOL/L (ref 135–145)
WBC # BLD AUTO: 12 K/UL (ref 4.8–10.8)

## 2018-12-01 PROCEDURE — 770006 HCHG ROOM/CARE - MED/SURG/GYN SEMI*

## 2018-12-01 PROCEDURE — 80053 COMPREHEN METABOLIC PANEL: CPT

## 2018-12-01 PROCEDURE — 36415 COLL VENOUS BLD VENIPUNCTURE: CPT

## 2018-12-01 PROCEDURE — 700102 HCHG RX REV CODE 250 W/ 637 OVERRIDE(OP): Performed by: HOSPITALIST

## 2018-12-01 PROCEDURE — 93971 EXTREMITY STUDY: CPT | Mod: LT

## 2018-12-01 PROCEDURE — 96374 THER/PROPH/DIAG INJ IV PUSH: CPT

## 2018-12-01 PROCEDURE — 85025 COMPLETE CBC W/AUTO DIFF WBC: CPT

## 2018-12-01 PROCEDURE — 700111 HCHG RX REV CODE 636 W/ 250 OVERRIDE (IP): Performed by: HOSPITALIST

## 2018-12-01 PROCEDURE — 96375 TX/PRO/DX INJ NEW DRUG ADDON: CPT

## 2018-12-01 PROCEDURE — 85730 THROMBOPLASTIN TIME PARTIAL: CPT

## 2018-12-01 PROCEDURE — 85610 PROTHROMBIN TIME: CPT

## 2018-12-01 PROCEDURE — 99223 1ST HOSP IP/OBS HIGH 75: CPT | Mod: AI | Performed by: HOSPITALIST

## 2018-12-01 PROCEDURE — 304561 HCHG STAT O2

## 2018-12-01 PROCEDURE — 82962 GLUCOSE BLOOD TEST: CPT

## 2018-12-01 PROCEDURE — 700111 HCHG RX REV CODE 636 W/ 250 OVERRIDE (IP): Performed by: EMERGENCY MEDICINE

## 2018-12-01 PROCEDURE — A9270 NON-COVERED ITEM OR SERVICE: HCPCS | Performed by: HOSPITALIST

## 2018-12-01 PROCEDURE — 99285 EMERGENCY DEPT VISIT HI MDM: CPT

## 2018-12-01 RX ORDER — TRAZODONE HYDROCHLORIDE 50 MG/1
100 TABLET ORAL
Status: DISCONTINUED | OUTPATIENT
Start: 2018-12-01 | End: 2018-12-14 | Stop reason: HOSPADM

## 2018-12-01 RX ORDER — AMLODIPINE BESYLATE 10 MG/1
10 TABLET ORAL
Status: DISCONTINUED | OUTPATIENT
Start: 2018-12-02 | End: 2018-12-13

## 2018-12-01 RX ORDER — LEVOTHYROXINE SODIUM 0.07 MG/1
150 TABLET ORAL
Status: DISCONTINUED | OUTPATIENT
Start: 2018-12-02 | End: 2018-12-14 | Stop reason: HOSPADM

## 2018-12-01 RX ORDER — AMOXICILLIN 250 MG
2 CAPSULE ORAL 2 TIMES DAILY
Status: DISCONTINUED | OUTPATIENT
Start: 2018-12-01 | End: 2018-12-14 | Stop reason: HOSPADM

## 2018-12-01 RX ORDER — INSULIN GLARGINE 100 [IU]/ML
0.2 INJECTION, SOLUTION SUBCUTANEOUS EVERY EVENING
Status: DISCONTINUED | OUTPATIENT
Start: 2018-12-01 | End: 2018-12-01

## 2018-12-01 RX ORDER — CALCIUM CARBONATE 500(1250)
500 TABLET ORAL 2 TIMES DAILY WITH MEALS
Status: DISCONTINUED | OUTPATIENT
Start: 2018-12-01 | End: 2018-12-01

## 2018-12-01 RX ORDER — ATORVASTATIN CALCIUM 40 MG/1
80 TABLET, FILM COATED ORAL EVERY EVENING
Status: DISCONTINUED | OUTPATIENT
Start: 2018-12-01 | End: 2018-12-14 | Stop reason: HOSPADM

## 2018-12-01 RX ORDER — IBUPROFEN 200 MG
500 CAPSULE ORAL 2 TIMES DAILY WITH MEALS
Status: DISCONTINUED | OUTPATIENT
Start: 2018-12-01 | End: 2018-12-14 | Stop reason: HOSPADM

## 2018-12-01 RX ORDER — MORPHINE SULFATE 10 MG/ML
4 INJECTION, SOLUTION INTRAMUSCULAR; INTRAVENOUS ONCE
Status: COMPLETED | OUTPATIENT
Start: 2018-12-01 | End: 2018-12-01

## 2018-12-01 RX ORDER — POLYETHYLENE GLYCOL 3350 17 G/17G
1 POWDER, FOR SOLUTION ORAL
Status: DISCONTINUED | OUTPATIENT
Start: 2018-12-01 | End: 2018-12-14 | Stop reason: HOSPADM

## 2018-12-01 RX ORDER — ACETAMINOPHEN 325 MG/1
650 TABLET ORAL EVERY 6 HOURS PRN
Status: DISCONTINUED | OUTPATIENT
Start: 2018-12-01 | End: 2018-12-14 | Stop reason: HOSPADM

## 2018-12-01 RX ORDER — ONDANSETRON 2 MG/ML
4 INJECTION INTRAMUSCULAR; INTRAVENOUS ONCE
Status: COMPLETED | OUTPATIENT
Start: 2018-12-01 | End: 2018-12-01

## 2018-12-01 RX ORDER — HEPARIN SODIUM 1000 [USP'U]/ML
3200 INJECTION, SOLUTION INTRAVENOUS; SUBCUTANEOUS PRN
Status: DISCONTINUED | OUTPATIENT
Start: 2018-12-01 | End: 2018-12-06

## 2018-12-01 RX ORDER — OXYCODONE HYDROCHLORIDE 5 MG/1
5-10 TABLET ORAL EVERY 4 HOURS PRN
Status: DISCONTINUED | OUTPATIENT
Start: 2018-12-01 | End: 2018-12-09

## 2018-12-01 RX ORDER — LOSARTAN POTASSIUM 50 MG/1
50 TABLET ORAL
Status: DISCONTINUED | OUTPATIENT
Start: 2018-12-02 | End: 2018-12-02

## 2018-12-01 RX ORDER — BISACODYL 10 MG
10 SUPPOSITORY, RECTAL RECTAL
Status: DISCONTINUED | OUTPATIENT
Start: 2018-12-01 | End: 2018-12-14 | Stop reason: HOSPADM

## 2018-12-01 RX ORDER — DEXTROSE MONOHYDRATE 25 G/50ML
25 INJECTION, SOLUTION INTRAVENOUS
Status: DISCONTINUED | OUTPATIENT
Start: 2018-12-01 | End: 2018-12-14 | Stop reason: HOSPADM

## 2018-12-01 RX ADMIN — ATORVASTATIN CALCIUM 80 MG: 40 TABLET, FILM COATED ORAL at 18:47

## 2018-12-01 RX ADMIN — Medication 500 MG: at 21:41

## 2018-12-01 RX ADMIN — TRAZODONE HYDROCHLORIDE 100 MG: 100 TABLET ORAL at 21:41

## 2018-12-01 RX ADMIN — HEPARIN SODIUM 1200 UNITS/HR: 5000 INJECTION, SOLUTION INTRAVENOUS at 19:39

## 2018-12-01 RX ADMIN — ONDANSETRON HYDROCHLORIDE 4 MG: 2 INJECTION, SOLUTION INTRAMUSCULAR; INTRAVENOUS at 13:30

## 2018-12-01 RX ADMIN — OXYCODONE HYDROCHLORIDE 10 MG: 5 TABLET ORAL at 23:42

## 2018-12-01 RX ADMIN — MORPHINE SULFATE 4 MG: 10 INJECTION INTRAVENOUS at 13:30

## 2018-12-01 RX ADMIN — OXYCODONE HYDROCHLORIDE 5 MG: 5 TABLET ORAL at 19:04

## 2018-12-01 ASSESSMENT — ENCOUNTER SYMPTOMS
VOMITING: 0
PALPITATIONS: 0
HEMOPTYSIS: 0
NAUSEA: 0
COUGH: 0
ABDOMINAL PAIN: 0
ORTHOPNEA: 0
CHILLS: 0
DIZZINESS: 0
SHORTNESS OF BREATH: 0
SPUTUM PRODUCTION: 0

## 2018-12-01 ASSESSMENT — COGNITIVE AND FUNCTIONAL STATUS - GENERAL
TOILETING: A LITTLE
CLIMB 3 TO 5 STEPS WITH RAILING: A LITTLE
DAILY ACTIVITIY SCORE: 23
WALKING IN HOSPITAL ROOM: A LITTLE
SUGGESTED CMS G CODE MODIFIER MOBILITY: CJ
MOVING TO AND FROM BED TO CHAIR: A LITTLE
STANDING UP FROM CHAIR USING ARMS: A LITTLE
MOBILITY SCORE: 20
SUGGESTED CMS G CODE MODIFIER DAILY ACTIVITY: CI

## 2018-12-01 ASSESSMENT — PAIN SCALES - GENERAL
PAINLEVEL_OUTOF10: 10
PAINLEVEL_OUTOF10: 10
PAINLEVEL_OUTOF10: 3
PAINLEVEL_OUTOF10: 10

## 2018-12-01 ASSESSMENT — PATIENT HEALTH QUESTIONNAIRE - PHQ9
1. LITTLE INTEREST OR PLEASURE IN DOING THINGS: NOT AT ALL
SUM OF ALL RESPONSES TO PHQ9 QUESTIONS 1 AND 2: 0
2. FEELING DOWN, DEPRESSED, IRRITABLE, OR HOPELESS: NOT AT ALL

## 2018-12-01 ASSESSMENT — LIFESTYLE VARIABLES: EVER_SMOKED: YES

## 2018-12-01 NOTE — H&P
Hospital Medicine History & Physical Note    Date of Service  12/1/2018    Primary Care Physician  BETHANY Baker.    Consultants  Interventional radiology    Code Status  Full code    Chief Complaint  Left leg pain    History of Presenting Illness  78 y.o. female who presented 12/1/2018 with left leg pain.    I have reviewed some of the recent provider documentation available to me in the patient's medical chart.  Records are briefly summarized: Ms Sosa has a history of atrial fibrillation and DVT/PE more than 5 years ago.  She has been treated with Coumadin since that time without recurrence of thrombosis.  On 11/14/2018 she stopped her Coumadin in anticipation of a dental procedure which was performed on 11/19/2018.  Coumadin was restarted on 11/22/2018.  Few days later she noticed some swelling in her left leg, she came to the emergency room for evaluation on 11/25/2018 and was diagnosed with an acute thrombus in her left lower extremity.  She was admitted to the hospital, treated with heparin drip, she was discharged home with Coumadin when her INR became therapeutic.    On interview today the patient complains of painful swelling of her left lower extremity, pain is 7 out of 10 in intensity, there is no radiation, swelling associated she cannot put on her shoe, she has been unable to work due to the swelling.  Feels better when she keeps her leg up.  Patient denies chest pain, no shortness of breath, no palpitations.    Review of Systems  Review of Systems   Constitutional: Negative for chills and malaise/fatigue.   Respiratory: Negative for cough, hemoptysis and sputum production.    Cardiovascular: Positive for leg swelling. Negative for chest pain, palpitations and orthopnea.   Gastrointestinal: Negative for nausea and vomiting.   Skin: Negative for itching and rash.   Neurological: Negative for dizziness.   All other systems reviewed and are negative.      Past Medical History   has a past medical  history of AAA (abdominal aortic aneurysm) ([facility]); Anticoagulation monitoring, special range; Arrhythmia; Arthritis; Aspirin allergy (4/24/2018); Autoimmune hepatitis (HCC) (3/19/2012); Blood clotting disorder (HCC) (2015); Breath shortness; Cancer ([facility]); Cataract; Diabetes; Disorder of thyroid (2016); Gallstones; H/O: HTN (hypertension) (3/19/2012); Heart valve disease; Hepatitis B; Hiatus hernia syndrome; Hyperlipidemia; Hypertension; Kidney disease; Mixed hyperlipidemia (3/19/2012); Moderate aortic stenosis 3/2018; Murmur (3/19/2012); Nonspecific abnormal electrocardiogram (ECG) (EKG) (3/19/2012); Overweight(278.02) (3/19/2012); Pain; Palpitations; Preoperative cardiovascular examination (3/19/2012); and Unspecified urinary incontinence. She also has no past medical history of Heart attack ([facility]).    Surgical History   has a past surgical history that includes bladder suspension (5/27/2009); cystoscopy (5/27/2009); rectocele repair (5/27/2009); appendectomy; hysterectomy laparoscopy; knee replacement, total; tonsillectomy; exploratory laparotomy (2/27/2013); splenectomy (2/27/2013); node dissection (2/27/2013); oophorectomy (2/27/2013); thyroidectomy total (N/A, 10/12/2016); feroz by laparoscopy (1/2/2017); and ercp in or (12/30/2016).     Family History  family history includes Heart Disease in her brother; Hyperlipidemia in her father and mother; Stroke in her father and mother.     Social History   reports that she has been smoking Cigarettes.  She has a 10.00 pack-year smoking history. She has never used smokeless tobacco. She reports that she does not drink alcohol or use drugs.    Allergies  Allergies   Allergen Reactions   • Aspirin Anaphylaxis   • Penicillins Anaphylaxis     As a child  Tolerated Rocephin 2/2018       Medications  Prior to Admission Medications   Prescriptions Last Dose Informant Patient Reported? Taking?   amlodipine (NORVASC) 10 MG Tab 12/1/2018 at 0600 Patient No No   Sig: TAKE ONE  TABLET BY MOUTH ONCE A DAY   atorvastatin (LIPITOR) 80 MG tablet 11/30/2018 at 2100 Patient No No   Sig: TAKE ONE-HALF TABLET BY MOUTH EVERY EVENING   calcium carbonate (OS-MARKELL 500) 500 MG Tab 12/1/2018 at 0600 Patient No No   Sig: TAKE ONE TABLET BY MOUTH TWICE DAILY WITH MEALS   levothyroxine (SYNTHROID) 150 MCG Tab 12/1/2018 at 0600 Patient No No   Sig: Take 1 Tab by mouth every morning before breakfast. ON EMPTY STOMACH   losartan (COZAAR) 50 MG Tab 12/1/2018 at 0600 Patient No No   Sig: TAKE ONE TABLET BY MOUTH ONCE A DAY   metFORMIN (GLUCOPHAGE) 500 MG Tab 12/1/2018 at 0600 Patient Yes No   Sig: Take 500-1,000 mg by mouth 2 times a day, with meals. 1000 mg every morning   500 mg every night   oxyCODONE immediate-release (ROXICODONE) 5 MG Tab 12/1/2018 at 1200 Patient No No   Sig: Take 1-2 Tabs by mouth every four hours as needed for Severe Pain for up to 7 days.   traZODone (DESYREL) 100 MG Tab 11/30/2018 at 2100 Patient No No   Sig: TAKE ONE AND ONE-HALF TABLET BY MOUTH ONCE DAILY AS NEEDED FOR SLEEP.   vitamin D, Ergocalciferol, (DRISDOL) 92704 units Cap capsule 11/24/2018 at 0600 Patient No No   Sig: TAKE ONE CAPSULE BY MOUTH EVERY 7 DAYS   warfarin (COUMADIN) 5 MG Tab 11/30/2018 at 2100 Patient Yes No   Sig: Take 5-7.5 mg by mouth every evening. 5 mg Monday Wednesday Friday  7.5 Tuesday Thursday Saturday Sunday       Facility-Administered Medications: None       Physical Exam  Temp:  [36.3 °C (97.3 °F)] 36.3 °C (97.3 °F)  Pulse:  [64-67] 64  Resp:  [15-24] 24  BP: (95)/(41) 95/41    Physical Exam   Constitutional: She is oriented to person, place, and time. She appears well-developed and well-nourished.   HENT:   Head: Normocephalic and atraumatic.   Eyes: Conjunctivae and EOM are normal. Right eye exhibits no discharge. Left eye exhibits no discharge.   Cardiovascular: Normal rate, regular rhythm and intact distal pulses.    No murmur heard.  2+ Radial Pulses  Brisk Capillary Refill   Pulmonary/Chest:  Effort normal and breath sounds normal. No respiratory distress. She has no wheezes.   Abdominal: Soft. Bowel sounds are normal. She exhibits no distension. There is no tenderness. There is no rebound.   Musculoskeletal: Normal range of motion. She exhibits edema.   Left lower extremity with tense edema from ankle to her hip.  Extremity is cool but she has normal distal pulses.  Sensation is intact   Neurological: She is alert and oriented to person, place, and time. No cranial nerve deficit.   Skin: Skin is warm and dry. She is not diaphoretic. No erythema.   Skin is warm and well perfused   Psychiatric: She has a normal mood and affect.       Laboratory:  Recent Labs      12/01/18   1254   WBC  12.0*   RBC  3.85*   HEMOGLOBIN  11.3*   HEMATOCRIT  34.7*   MCV  90.1   MCH  29.4   MCHC  32.6*   RDW  50.8*   PLATELETCT  416   MPV  9.7     Recent Labs      12/01/18   1254   SODIUM  138   POTASSIUM  4.1   CHLORIDE  105   CO2  23   GLUCOSE  111*   BUN  12   CREATININE  1.13   CALCIUM  9.0     Recent Labs      12/01/18   1254   ALTSGPT  13   ASTSGOT  15   ALKPHOSPHAT  67   TBILIRUBIN  0.6   GLUCOSE  111*     Recent Labs      11/28/18   1707  11/29/18   0024  11/29/18   0555  12/01/18   1254   APTT  52.1*  59.1*  88.3*   --    INR   --   2.18*   --   2.68*             No results for input(s): TROPONINI in the last 72 hours.    Urinalysis:    No results found     Imaging:  US-EXTREMITY VENOUS LOWER UNILAT LEFT   Final Result            Assessment/Plan:  I anticipate this patient will require at least two midnights for appropriate medical management, necessitating inpatient admission.    Acute deep vein thrombosis (DVT) of femoral vein of left lower extremity (HCC)- (present on admission)   Assessment & Plan    Patient has extensive DVT in her left lower extremity  Despite being therapeutic on Coumadin, her symptoms are worse and DVT appears more extensive on ultrasound  Patient has failed outpatient therapy, she will be  admitted to inpatient status and is expected to remain in the hospital for greater than 2 midnight  I spoke with Dr. Florian of interventional radiology, ordered a left upper extremity venogram with plans for thrombectomy and/or thrombolysis if the clot is amenable to the procedure  Start heparin drip  Rate of heparin drip will be adjusted based on serial pTT measurements to ensure adequate anticoagulation and to avoid potential complications of bleeding     DM (diabetes mellitus) (HCC)- (present on admission)   Assessment & Plan    Hold outpatient oral hypoglycemics  Insulin sliding scale     Obesity (BMI 30-39.9)- (present on admission)   Assessment & Plan    Body mass index is 36.33 kg/m².     Essential hypertension- (present on admission)   Assessment & Plan    Continue amlodipine and losartan     Atrial fibrillation (HCC)- (present on admission)   Assessment & Plan    She is on anticoagulation         VTE prophylaxis: Heparin drip

## 2018-12-01 NOTE — ED TRIAGE NOTES
Patient presents to ED via EMS with c/o left leg swelling and pain. Patient states she was recently discharged from hospital after being diagnsed with DVT, but the swelling and pain has only worsened. A&Ox4 upon arrival.

## 2018-12-01 NOTE — ED NOTES
Med Rec completed per patient  Allergies reviewed  No ORAL antibiotics in last 30 days    Coumadin dose is: 5 mg Monday Wednesday Friday  7.5 Tuesday Thursday Saturday Sunday

## 2018-12-01 NOTE — ED PROVIDER NOTES
ED Provider Note    Scribed for Bunny Schmitz M.D. by Burke Burnett. 12/1/2018, 1:04 PM.    Primary care provider: WENDY Baker  Means of arrival: EMS  History obtained from: Patient  History limited by: None    CHIEF COMPLAINT  Chief Complaint   Patient presents with   • Leg Pain   • Leg Swelling       HPI  Sanjuanita Sosa is a 78 y.o. female who presents to the Emergency Department via EMS for evaluation of left leg swelling and associated pain. Patient states she was recently discharged from the hospital after being diagnosed with DVT. She is on Warfarin. Patient states she noticed worsened swelling of the leg yesterday. She otherwise does not report any other new associated symptoms at this time. She denies any associated chest pain, new shortness of breath, abdominal pain, nausea, or vomiting. Patient notes her oxygen is low at baseline. She notes her blood pressure is also low at baseline.       REVIEW OF SYSTEMS  Review of Systems   Respiratory: Negative for shortness of breath (no new SOB).    Cardiovascular: Negative for chest pain.   Gastrointestinal: Negative for abdominal pain, nausea and vomiting.   Musculoskeletal:        Positive left leg pain and swelling   All other systems reviewed and are negative.      PAST MEDICAL HISTORY   has a past medical history of AAA (abdominal aortic aneurysm) (HCC); Anticoagulation monitoring, special range; Arrhythmia; Arthritis; Aspirin allergy (4/24/2018); Autoimmune hepatitis (HCC) (3/19/2012); Blood clotting disorder (HCC) (2015); Breath shortness; Cancer (HCC); Cataract; Diabetes; Disorder of thyroid (2016); Gallstones; H/O: HTN (hypertension) (3/19/2012); Heart valve disease; Hepatitis B; Hiatus hernia syndrome; Hyperlipidemia; Hypertension; Kidney disease; Mixed hyperlipidemia (3/19/2012); Moderate aortic stenosis 3/2018; Murmur (3/19/2012); Nonspecific abnormal electrocardiogram (ECG) (EKG) (3/19/2012); Overweight(278.02)  (3/19/2012); Pain; Palpitations; Preoperative cardiovascular examination (3/19/2012); and Unspecified urinary incontinence.    SURGICAL HISTORY   has a past surgical history that includes bladder suspension (5/27/2009); cystoscopy (5/27/2009); rectocele repair (5/27/2009); appendectomy; hysterectomy laparoscopy; knee replacement, total; tonsillectomy; exploratory laparotomy (2/27/2013); splenectomy (2/27/2013); node dissection (2/27/2013); oophorectomy (2/27/2013); thyroidectomy total (N/A, 10/12/2016); feroz by laparoscopy (1/2/2017); and ercp in or (12/30/2016).    SOCIAL HISTORY  Social History   Substance Use Topics   • Smoking status: Current Every Day Smoker     Packs/day: 0.25     Years: 40.00     Types: Cigarettes   • Smokeless tobacco: Never Used   • Alcohol use No      History   Drug Use No       FAMILY HISTORY  Family History   Problem Relation Age of Onset   • Hyperlipidemia Mother    • Stroke Mother    • Hyperlipidemia Father    • Stroke Father    • Heart Disease Brother         CABG       CURRENT MEDICATIONS  Home Medications     Reviewed by Kathy Rosario (Pharmacy Tech) on 12/01/18 at 1401  Med List Status: Complete   Medication Last Dose Status   amlodipine (NORVASC) 10 MG Tab 12/1/2018 Active   atorvastatin (LIPITOR) 80 MG tablet 11/30/2018 Active   calcium carbonate (OS-MARKELL 500) 500 MG Tab 12/1/2018 Active   levothyroxine (SYNTHROID) 150 MCG Tab 12/1/2018 Active   losartan (COZAAR) 50 MG Tab 12/1/2018 Active   metFORMIN (GLUCOPHAGE) 500 MG Tab 12/1/2018 Active   oxyCODONE immediate-release (ROXICODONE) 5 MG Tab 12/1/2018 Active   traZODone (DESYREL) 100 MG Tab 11/30/2018 Active   vitamin D, Ergocalciferol, (DRISDOL) 79093 units Cap capsule 11/24/2018 Active   warfarin (COUMADIN) 5 MG Tab 11/30/2018 Active                ALLERGIES  Allergies   Allergen Reactions   • Aspirin Anaphylaxis   • Penicillins Anaphylaxis     As a child  Tolerated Rocephin 2/2018       PHYSICAL EXAM  VITAL SIGNS:  "BP (!) 95/41   Pulse 67   Temp 36.3 °C (97.3 °F) (Tympanic)   Resp 18   Ht 1.626 m (5' 4\")   Wt 96 kg (211 lb 10.3 oz)   LMP 10/12/1970   BMI 36.33 kg/m²   Constitutional:  Mild distress  HENT: Poor dentition. Moist mucous membranes  Eyes:  No conjunctivitis or icterus  Neck:  trachea is midline, no palpable thyroid  Lymphatic:  No cervical lymphadenopathy  Cardiovascular:  Regular rate and rhythm, no murmurs  Thorax & Lungs: Occasional wheezes. No rhonchi  Abdomen: Soft, Non-tender  Skin:.  Some erythema to right panis area.  Back:  Non-tender, no CVA tenderness  Extremities:  Left leg is markedly swollen and slightly cyanotic, strong DP pulse.  Vascular:  symmetric radial pulse  Neurologic:  Normal gross motor      LABS  Labs Reviewed   CBC WITH DIFFERENTIAL - Abnormal; Notable for the following:        Result Value    WBC 12.0 (*)     RBC 3.85 (*)     Hemoglobin 11.3 (*)     Hematocrit 34.7 (*)     MCHC 32.6 (*)     RDW 50.8 (*)     Neutrophils-Polys 72.50 (*)     Lymphocytes 12.60 (*)     Neutrophils (Absolute) 8.73 (*)     Monos (Absolute) 1.20 (*)     All other components within normal limits    Narrative:     Indicate which anticoagulants the patient is on:->UNKNOWN   COMP METABOLIC PANEL - Abnormal; Notable for the following:     Glucose 111 (*)     All other components within normal limits    Narrative:     Indicate which anticoagulants the patient is on:->UNKNOWN   PROTHROMBIN TIME - Abnormal; Notable for the following:     PT 28.6 (*)     INR 2.68 (*)     All other components within normal limits    Narrative:     Indicate which anticoagulants the patient is on:->UNKNOWN   ESTIMATED GFR - Abnormal; Notable for the following:     GFR If  57 (*)     GFR If Non  47 (*)     All other components within normal limits    Narrative:     Indicate which anticoagulants the patient is on:->UNKNOWN     All labs reviewed by me.    RADIOLOGY  US-EXTREMITY VENOUS LOWER UNILAT LEFT "           The radiologist's interpretation of all radiological studies have been reviewed by me.    COURSE & MEDICAL DECISION MAKING  Pertinent Labs & Imaging studies reviewed. (See chart for details)    1:04 PM - Patient seen and examined at bedside. Will reassess for DVT or increased thrombosis. Patient understands and agrees to plan. Patient will be treated with morphine 4 mg, zofran 4 mg. Ordered US extremity venous lower left, CBC, CMP, INR to evaluate her symptoms.     2:00 PM - I discussed the patient's case and the above findings with Dr. Quigley (hospitalist) who will admit the patient.     Medical Decision Making:   Patient has worsening DVT both clinically and on ultrasound despite normal range blood thinning with Coumadin.  I have contacted the hospitalist for admission    DISPOSITION:  Patient will be admitted to Dr. Quigley (hospitalist) in guarded condition.     FINAL IMPRESSION  1. Acute deep vein thrombosis (DVT) of proximal vein of left lower extremity (HCC)          Burke TOLBERT (Scribe), am scribing for, and in the presence of, Bunny Schmitz M.D..    Electronically signed by: Burke Burnett (Augustinibeyl), 12/1/2018    Bunny TOLBERT M.D. personally performed the services described in this documentation, as scribed by Burke Burnett in my presence, and it is both accurate and complete. C    The note accurately reflects work and decisions made by me.  Bunny Schmitz  12/1/2018  2:23 PM

## 2018-12-02 ENCOUNTER — APPOINTMENT (OUTPATIENT)
Dept: RADIOLOGY | Facility: MEDICAL CENTER | Age: 78
DRG: 270 | End: 2018-12-02
Attending: HOSPITALIST
Payer: MEDICARE

## 2018-12-02 LAB
ABO GROUP BLD: NORMAL
ANION GAP SERPL CALC-SCNC: 7 MMOL/L (ref 0–11.9)
ANION GAP SERPL CALC-SCNC: 9 MMOL/L (ref 0–11.9)
APTT PPP: 144 SEC (ref 24.7–36)
APTT PPP: 218.1 SEC (ref 24.7–36)
APTT PPP: 52.5 SEC (ref 24.7–36)
BASOPHILS # BLD AUTO: 0.3 % (ref 0–1.8)
BASOPHILS # BLD AUTO: 0.3 % (ref 0–1.8)
BASOPHILS # BLD: 0.03 K/UL (ref 0–0.12)
BASOPHILS # BLD: 0.03 K/UL (ref 0–0.12)
BLD GP AB SCN SERPL QL: NORMAL
BUN SERPL-MCNC: 13 MG/DL (ref 8–22)
BUN SERPL-MCNC: 14 MG/DL (ref 8–22)
CALCIUM SERPL-MCNC: 8.3 MG/DL (ref 8.5–10.5)
CALCIUM SERPL-MCNC: 8.5 MG/DL (ref 8.5–10.5)
CHLORIDE SERPL-SCNC: 107 MMOL/L (ref 96–112)
CHLORIDE SERPL-SCNC: 107 MMOL/L (ref 96–112)
CO2 SERPL-SCNC: 23 MMOL/L (ref 20–33)
CO2 SERPL-SCNC: 25 MMOL/L (ref 20–33)
CREAT SERPL-MCNC: 1.08 MG/DL (ref 0.5–1.4)
CREAT SERPL-MCNC: 1.09 MG/DL (ref 0.5–1.4)
EOSINOPHIL # BLD AUTO: 0.91 K/UL (ref 0–0.51)
EOSINOPHIL # BLD AUTO: 0.99 K/UL (ref 0–0.51)
EOSINOPHIL NFR BLD: 8.1 % (ref 0–6.9)
EOSINOPHIL NFR BLD: 9 % (ref 0–6.9)
ERYTHROCYTE [DISTWIDTH] IN BLOOD BY AUTOMATED COUNT: 51.1 FL (ref 35.9–50)
ERYTHROCYTE [DISTWIDTH] IN BLOOD BY AUTOMATED COUNT: 53.4 FL (ref 35.9–50)
FIBRINOGEN PPP-MCNC: 450 MG/DL (ref 215–460)
GLUCOSE BLD-MCNC: 109 MG/DL (ref 65–99)
GLUCOSE BLD-MCNC: 116 MG/DL (ref 65–99)
GLUCOSE BLD-MCNC: 127 MG/DL (ref 65–99)
GLUCOSE BLD-MCNC: 92 MG/DL (ref 65–99)
GLUCOSE SERPL-MCNC: 101 MG/DL (ref 65–99)
GLUCOSE SERPL-MCNC: 132 MG/DL (ref 65–99)
HCT VFR BLD AUTO: 34.6 % (ref 37–47)
HCT VFR BLD AUTO: 35.7 % (ref 37–47)
HGB BLD-MCNC: 11.1 G/DL (ref 12–16)
HGB BLD-MCNC: 11.2 G/DL (ref 12–16)
IMM GRANULOCYTES # BLD AUTO: 0.03 K/UL (ref 0–0.11)
IMM GRANULOCYTES # BLD AUTO: 0.04 K/UL (ref 0–0.11)
IMM GRANULOCYTES NFR BLD AUTO: 0.3 % (ref 0–0.9)
IMM GRANULOCYTES NFR BLD AUTO: 0.4 % (ref 0–0.9)
INR PPP: 2.37 (ref 0.87–1.13)
LYMPHOCYTES # BLD AUTO: 1.35 K/UL (ref 1–4.8)
LYMPHOCYTES # BLD AUTO: 2.29 K/UL (ref 1–4.8)
LYMPHOCYTES NFR BLD: 12 % (ref 22–41)
LYMPHOCYTES NFR BLD: 20.8 % (ref 22–41)
MCH RBC QN AUTO: 29.2 PG (ref 27–33)
MCH RBC QN AUTO: 29.3 PG (ref 27–33)
MCHC RBC AUTO-ENTMCNC: 31.4 G/DL (ref 33.6–35)
MCHC RBC AUTO-ENTMCNC: 32.1 G/DL (ref 33.6–35)
MCV RBC AUTO: 91.1 FL (ref 81.4–97.8)
MCV RBC AUTO: 93.5 FL (ref 81.4–97.8)
MONOCYTES # BLD AUTO: 1.15 K/UL (ref 0–0.85)
MONOCYTES # BLD AUTO: 1.42 K/UL (ref 0–0.85)
MONOCYTES NFR BLD AUTO: 10.3 % (ref 0–13.4)
MONOCYTES NFR BLD AUTO: 12.9 % (ref 0–13.4)
NEUTROPHILS # BLD AUTO: 6.22 K/UL (ref 2–7.15)
NEUTROPHILS # BLD AUTO: 7.74 K/UL (ref 2–7.15)
NEUTROPHILS NFR BLD: 56.6 % (ref 44–72)
NEUTROPHILS NFR BLD: 69 % (ref 44–72)
NRBC # BLD AUTO: 0 K/UL
NRBC # BLD AUTO: 0 K/UL
NRBC BLD-RTO: 0 /100 WBC
NRBC BLD-RTO: 0 /100 WBC
PLATELET # BLD AUTO: 378 K/UL (ref 164–446)
PLATELET # BLD AUTO: 392 K/UL (ref 164–446)
PMV BLD AUTO: 9.2 FL (ref 9–12.9)
PMV BLD AUTO: 9.7 FL (ref 9–12.9)
POTASSIUM SERPL-SCNC: 4.1 MMOL/L (ref 3.6–5.5)
POTASSIUM SERPL-SCNC: 4.5 MMOL/L (ref 3.6–5.5)
PROTHROMBIN TIME: 25.9 SEC (ref 12–14.6)
RBC # BLD AUTO: 3.8 M/UL (ref 4.2–5.4)
RBC # BLD AUTO: 3.82 M/UL (ref 4.2–5.4)
RH BLD: NORMAL
SODIUM SERPL-SCNC: 139 MMOL/L (ref 135–145)
SODIUM SERPL-SCNC: 139 MMOL/L (ref 135–145)
WBC # BLD AUTO: 11 K/UL (ref 4.8–10.8)
WBC # BLD AUTO: 11.2 K/UL (ref 4.8–10.8)

## 2018-12-02 PROCEDURE — 99232 SBSQ HOSP IP/OBS MODERATE 35: CPT | Performed by: FAMILY MEDICINE

## 2018-12-02 PROCEDURE — B51C1ZZ FLUOROSCOPY OF LEFT LOWER EXTREMITY VEINS USING LOW OSMOLAR CONTRAST: ICD-10-PCS | Performed by: RADIOLOGY

## 2018-12-02 PROCEDURE — 700102 HCHG RX REV CODE 250 W/ 637 OVERRIDE(OP): Performed by: HOSPITALIST

## 2018-12-02 PROCEDURE — 85025 COMPLETE CBC W/AUTO DIFF WBC: CPT

## 2018-12-02 PROCEDURE — 86850 RBC ANTIBODY SCREEN: CPT

## 2018-12-02 PROCEDURE — 06HN33Z INSERTION OF INFUSION DEVICE INTO LEFT FEMORAL VEIN, PERCUTANEOUS APPROACH: ICD-10-PCS | Performed by: RADIOLOGY

## 2018-12-02 PROCEDURE — 80048 BASIC METABOLIC PNL TOTAL CA: CPT

## 2018-12-02 PROCEDURE — 86901 BLOOD TYPING SEROLOGIC RH(D): CPT

## 2018-12-02 PROCEDURE — 82962 GLUCOSE BLOOD TEST: CPT

## 2018-12-02 PROCEDURE — 700111 HCHG RX REV CODE 636 W/ 250 OVERRIDE (IP): Mod: JG | Performed by: RADIOLOGY

## 2018-12-02 PROCEDURE — 700111 HCHG RX REV CODE 636 W/ 250 OVERRIDE (IP): Performed by: HOSPITALIST

## 2018-12-02 PROCEDURE — 700111 HCHG RX REV CODE 636 W/ 250 OVERRIDE (IP)

## 2018-12-02 PROCEDURE — 99291 CRITICAL CARE FIRST HOUR: CPT | Performed by: INTERNAL MEDICINE

## 2018-12-02 PROCEDURE — 85610 PROTHROMBIN TIME: CPT

## 2018-12-02 PROCEDURE — A9270 NON-COVERED ITEM OR SERVICE: HCPCS | Performed by: HOSPITALIST

## 2018-12-02 PROCEDURE — 770022 HCHG ROOM/CARE - ICU (200)

## 2018-12-02 PROCEDURE — 85384 FIBRINOGEN ACTIVITY: CPT

## 2018-12-02 PROCEDURE — 86900 BLOOD TYPING SEROLOGIC ABO: CPT

## 2018-12-02 PROCEDURE — 85730 THROMBOPLASTIN TIME PARTIAL: CPT

## 2018-12-02 PROCEDURE — 99153 MOD SED SAME PHYS/QHP EA: CPT

## 2018-12-02 PROCEDURE — B54CZZA ULTRASONOGRAPHY OF LEFT LOWER EXTREMITY VEINS, GUIDANCE: ICD-10-PCS | Performed by: RADIOLOGY

## 2018-12-02 PROCEDURE — 75820 VEIN X-RAY ARM/LEG: CPT

## 2018-12-02 PROCEDURE — 700105 HCHG RX REV CODE 258: Performed by: RADIOLOGY

## 2018-12-02 PROCEDURE — B5191ZZ FLUOROSCOPY OF INFERIOR VENA CAVA USING LOW OSMOLAR CONTRAST: ICD-10-PCS | Performed by: RADIOLOGY

## 2018-12-02 RX ORDER — ONDANSETRON 2 MG/ML
4 INJECTION INTRAMUSCULAR; INTRAVENOUS PRN
Status: DISCONTINUED | OUTPATIENT
Start: 2018-12-02 | End: 2018-12-02 | Stop reason: HOSPADM

## 2018-12-02 RX ORDER — LOSARTAN POTASSIUM 25 MG/1
25 TABLET ORAL
Status: DISCONTINUED | OUTPATIENT
Start: 2018-12-03 | End: 2018-12-14 | Stop reason: HOSPADM

## 2018-12-02 RX ORDER — MIDAZOLAM HYDROCHLORIDE 1 MG/ML
.5-2 INJECTION INTRAMUSCULAR; INTRAVENOUS PRN
Status: DISCONTINUED | OUTPATIENT
Start: 2018-12-02 | End: 2018-12-02 | Stop reason: HOSPADM

## 2018-12-02 RX ORDER — MIDAZOLAM HYDROCHLORIDE 1 MG/ML
INJECTION INTRAMUSCULAR; INTRAVENOUS
Status: COMPLETED
Start: 2018-12-02 | End: 2018-12-02

## 2018-12-02 RX ORDER — SODIUM CHLORIDE 9 MG/ML
500 INJECTION, SOLUTION INTRAVENOUS
Status: DISCONTINUED | OUTPATIENT
Start: 2018-12-02 | End: 2018-12-02 | Stop reason: HOSPADM

## 2018-12-02 RX ADMIN — Medication 500 MG: at 17:49

## 2018-12-02 RX ADMIN — MIDAZOLAM HYDROCHLORIDE 1 MG: 1 INJECTION, SOLUTION INTRAMUSCULAR; INTRAVENOUS at 14:47

## 2018-12-02 RX ADMIN — ATORVASTATIN CALCIUM 80 MG: 40 TABLET, FILM COATED ORAL at 17:16

## 2018-12-02 RX ADMIN — MIDAZOLAM 1 MG: 1 INJECTION INTRAMUSCULAR; INTRAVENOUS at 14:47

## 2018-12-02 RX ADMIN — MIDAZOLAM 1 MG: 1 INJECTION INTRAMUSCULAR; INTRAVENOUS at 15:40

## 2018-12-02 RX ADMIN — FENTANYL CITRATE 25 MCG: 50 INJECTION, SOLUTION INTRAMUSCULAR; INTRAVENOUS at 14:47

## 2018-12-02 RX ADMIN — STANDARDIZED SENNA CONCENTRATE AND DOCUSATE SODIUM 2 TABLET: 8.6; 5 TABLET, FILM COATED ORAL at 17:25

## 2018-12-02 RX ADMIN — LOSARTAN POTASSIUM 50 MG: 50 TABLET ORAL at 04:46

## 2018-12-02 RX ADMIN — OXYCODONE HYDROCHLORIDE 10 MG: 5 TABLET ORAL at 04:45

## 2018-12-02 RX ADMIN — OXYCODONE HYDROCHLORIDE 10 MG: 5 TABLET ORAL at 17:16

## 2018-12-02 RX ADMIN — ALTEPLASE 1 MG/HR: KIT at 16:25

## 2018-12-02 RX ADMIN — AMLODIPINE BESYLATE 10 MG: 10 TABLET ORAL at 04:45

## 2018-12-02 RX ADMIN — TRAZODONE HYDROCHLORIDE 100 MG: 100 TABLET ORAL at 20:29

## 2018-12-02 RX ADMIN — Medication 500 MG: at 08:54

## 2018-12-02 RX ADMIN — HEPARIN SODIUM 25000 UNITS: 5000 INJECTION, SOLUTION INTRAVENOUS at 16:22

## 2018-12-02 RX ADMIN — LEVOTHYROXINE SODIUM 150 MCG: 150 TABLET ORAL at 07:00

## 2018-12-02 ASSESSMENT — ENCOUNTER SYMPTOMS
BACK PAIN: 0
HEARTBURN: 0
SHORTNESS OF BREATH: 0
ABDOMINAL PAIN: 0
MYALGIAS: 0
FEVER: 0
BLURRED VISION: 0
PALPITATIONS: 0
WEAKNESS: 0
CHILLS: 0
CHEST TIGHTNESS: 0
HEMOPTYSIS: 0
VOMITING: 0
COUGH: 0
BLOOD IN STOOL: 0
SPUTUM PRODUCTION: 0
DIAPHORESIS: 0
NECK PAIN: 0
DIZZINESS: 0
HEADACHES: 0
NAUSEA: 0
PHOTOPHOBIA: 0
AGITATION: 0
CONFUSION: 0
BRUISES/BLEEDS EASILY: 1
NUMBNESS: 0
TINGLING: 0
DOUBLE VISION: 0

## 2018-12-02 ASSESSMENT — PAIN SCALES - GENERAL
PAINLEVEL_OUTOF10: 0
PAINLEVEL_OUTOF10: 0
PAINLEVEL_OUTOF10: 9
PAINLEVEL_OUTOF10: 8
PAINLEVEL_OUTOF10: 3
PAINLEVEL_OUTOF10: 9
PAINLEVEL_OUTOF10: 4
PAINLEVEL_OUTOF10: 0
PAINLEVEL_OUTOF10: 10

## 2018-12-02 NOTE — PROGRESS NOTES
IR Procedure Note:    Site Marked and Confirmed with MD, patient and RN pre procedure. Dr. Florian performed a left lower extremity venogram, heparin and alteplase catheter left in left popliteal vein.  The pt tolerated the procedure well.  Gauze and tegaderm applied to posterior left knee, CDI and soft.  Pt alert and verbally appropriate post procedure, vital signs stable during procedure and transport, see flow sheet for vital signs.  Report given to ICURN.  RN transported pt to S117.      Procedure End Time: 1551

## 2018-12-02 NOTE — ASSESSMENT & PLAN NOTE
Post localized tPA, mechanical thrombolysis,  thrombectomy w left iliac stent placement Dec 4 & Dec 6 and venograms.  Decrease pain and swelling of left leg  Continue with Plavix post stent.   Warfarin & Plavix for stent

## 2018-12-02 NOTE — PROGRESS NOTES
[facility]    Date of Service: 2018    Chief Complaint  78 y.o. female admitted 2018 with DVT with theraputic  INR    Interval Problem Update  NONE    Consultants/Specialty  I.R    Disposition  pending        Review of Systems   Constitutional: Negative for chills, diaphoresis and fever.   HENT: Negative for ear discharge, ear pain and tinnitus.    Eyes: Negative for blurred vision, double vision and photophobia.   Respiratory: Negative for cough, hemoptysis and sputum production.    Cardiovascular: Positive for leg swelling. Negative for chest pain and palpitations.   Gastrointestinal: Negative for heartburn, nausea and vomiting.   Genitourinary: Negative for dysuria, frequency and urgency.   Musculoskeletal: Negative for back pain, myalgias and neck pain.   Skin: Negative for itching and rash.   Neurological: Negative for dizziness, tingling and headaches.      Physical Exam  Laboratory/Imaging   Hemodynamics  Temp (24hrs), Av.9 °C (98.5 °F), Min:36.3 °C (97.3 °F), Max:37.3 °C (99.2 °F)   Temperature: 37.3 °C (99.2 °F)  Pulse  Av.3  Min: 60  Max: 76 Heart Rate (Monitored): 66  Blood Pressure : (!) 93/47, NIBP: 108/49      Respiratory      Respiration: 16, Pulse Oximetry: 91 %             Fluids    Intake/Output Summary (Last 24 hours) at 18 0905  Last data filed at 18 2200   Gross per 24 hour   Intake              420 ml   Output                0 ml   Net              420 ml       Nutrition  Orders Placed This Encounter   Procedures   • Diet Order Regular, Diabetic     Standing Status:   Standing     Number of Occurrences:   1     Order Specific Question:   Diet:     Answer:   Regular [1]     Order Specific Question:   Diet:     Answer:   Diabetic [3]     Physical Exam   Constitutional: She is oriented to person, place, and time. No distress.   HENT:   Head: Normocephalic and atraumatic.   Eyes: Pupils are equal, round, and reactive to light. Conjunctivae are  normal.   Neck: Normal range of motion. Neck supple.   Cardiovascular: Normal rate and regular rhythm.    Pulmonary/Chest: Effort normal and breath sounds normal.   Abdominal: Soft. Bowel sounds are normal.   Morbidly obese   Musculoskeletal: She exhibits edema (the left leg).   Neurological: She is alert and oriented to person, place, and time.   Skin: Skin is warm and dry. She is not diaphoretic.       Recent Labs      12/01/18   1254  12/02/18   0212   WBC  12.0*  11.2*   RBC  3.85*  3.80*   HEMOGLOBIN  11.3*  11.1*   HEMATOCRIT  34.7*  34.6*   MCV  90.1  91.1   MCH  29.4  29.2   MCHC  32.6*  32.1*   RDW  50.8*  51.1*   PLATELETCT  416  378   MPV  9.7  9.2     Recent Labs      12/01/18   1254  12/02/18   0212   SODIUM  138  139   POTASSIUM  4.1  4.1   CHLORIDE  105  107   CO2  23  23   GLUCOSE  111*  132*   BUN  12  13   CREATININE  1.13  1.09   CALCIUM  9.0  8.3*     Recent Labs      12/01/18   1254  12/01/18   1817  12/02/18   0212   APTT   --   43.9*  144.0*   INR  2.68*   --    --                   Assessment/Plan     Acute deep vein thrombosis (DVT) of femoral vein of left lower extremity (HCC)- (present on admission)   Assessment & Plan    Patient has extensive DVT in her left lower extremity  Despite being therapeutic on Coumadin, her symptoms are worse and DVT appears more extensive on ultrasound  Patient has failed outpatient therapy, she will be admitted to inpatient status and is expected to remain in the hospital for greater than 2 midnight  Admitting physician spoke with Dr. Florian of interventional radiology, ordered a left upper extremity venogram with plans for thrombectomy and/or thrombolysis if the clot is amenable to the procedure  On heparin drip as per protocol     DM (diabetes mellitus) (HCC)- (present on admission)   Assessment & Plan    Hold outpatient oral hypoglycemics  Insulin sliding scale     Obesity (BMI 30-39.9)- (present on admission)   Assessment & Plan    Body mass index is 36.33  kg/m².     Essential hypertension- (present on admission)   Assessment & Plan    Continue amlodipine and losartan     Atrial fibrillation (HCC)- (present on admission)   Assessment & Plan    Rate is controlled  On heparin drip       Quality-Core Measures   Espinoza catheter::  No Espinoza  DVT prophylaxis pharmacological::  Heparin

## 2018-12-02 NOTE — ASSESSMENT & PLAN NOTE
Fair control . HgbA1c:6.1 in past 3 months.   BS has been controlled    Continue monitor accuchecks and cover with [employer]SI

## 2018-12-02 NOTE — PROGRESS NOTES
Pt is A&Ox4. Out of bed to commode with hand held assist. Oriented to room and surroundings. Pt educated on call light, belongings within reach. Bed alarm on, non-skid socks on. Waiting on lab results to start heparin drip for DVT. Pt resting in bed.

## 2018-12-02 NOTE — PROGRESS NOTES
2 RN skin check done with ROSE Whitley swollen, hot  -Sacrum skin intact, blanching  -Blackheads on upper back    Interventions: pt able to turn and reposition self, pillows used for positioning

## 2018-12-02 NOTE — CARE PLAN
Problem: Safety  Goal: Will remain free from injury  Outcome: PROGRESSING AS EXPECTED  Fall precautions in place. Treaded socks on pt. Appropriate signs on doorway. IV pole on same side as bathroom. Bedrails up. Bed in lowest position and locked.  Call light and phone within reach. Patient educated on importance of calling nurses before getting out of bed, verbalizes understanding. Bed alarm on.    Problem: Venous Thromboembolism (VTW)/Deep Vein Thrombosis (DVT) Prevention:  Goal: Patient will participate in Venous Thrombosis (VTE)/Deep Vein Thrombosis (DVT)Prevention Measures  Outcome: PROGRESSING AS EXPECTED  Heparin gtt currently infusing. Rate verified with charge ROSE Lockhart.

## 2018-12-02 NOTE — RESPIRATORY CARE
COPD EDUCATION by COPD CLINICAL EDUCATOR  12/2/2018 at 6:57 AM by Bella Ramirez     Patient reviewed by COPD education team. Patient does not qualify for COPD program.

## 2018-12-02 NOTE — PROGRESS NOTES
Report received by ROSE Lamb. Assumed care of pt. Assessment complete. VSS. Baseline APTT= 43.9. Heparin gtt set at 1200 units/hr at 24mL/hr and rate verified with Charge ROSE Lockhart. Pt reports pain in L leg 10/10 pain, but pain med already given 1 hr before change of shift. Will reassess pain. Plan of care discussed. Call light within reach, bed in lowest position, and pt has no further questions at this time.

## 2018-12-02 NOTE — PROGRESS NOTES
Natacha from Lab called with critical result for APTT of 144.0 at 0246. Critical lab result read back to Natacha.   Dr. Morrison notified of critical lab result at 0306.  Critical lab result read back by Dr. Morrison. No orders at this time, and to follow heparin protocol.

## 2018-12-02 NOTE — PROGRESS NOTES
Prashant from Lab called with critical result of APTT 218.1 at 0930. Critical lab result read back to Prashant.   Dr. Blele notified of critical lab result at 0937.  Critical lab result read back by Dr. Belle. Heparin protocol followed.

## 2018-12-02 NOTE — PROGRESS NOTES
Received call from IR pt to go down this afternoon for procedure. Heparin drip stopped at 11:55am, informed IR that drip would not be stopped for the total 6 hours, per IR this is okay. Pt has been NPO since midnight, except for sips with meds. IR also inoformed that pt was on coumadin prior to admission.

## 2018-12-03 ENCOUNTER — APPOINTMENT (OUTPATIENT)
Dept: RADIOLOGY | Facility: MEDICAL CENTER | Age: 78
DRG: 270 | End: 2018-12-03
Attending: RADIOLOGY
Payer: MEDICARE

## 2018-12-03 ENCOUNTER — APPOINTMENT (OUTPATIENT)
Dept: RADIOLOGY | Facility: MEDICAL CENTER | Age: 78
DRG: 270 | End: 2018-12-03
Attending: HOSPITALIST
Payer: MEDICARE

## 2018-12-03 PROBLEM — J96.01 ACUTE RESPIRATORY FAILURE WITH HYPOXIA (HCC): Status: ACTIVE | Noted: 2018-12-03

## 2018-12-03 PROBLEM — R31.9 HEMATURIA: Status: ACTIVE | Noted: 2018-12-03

## 2018-12-03 LAB
ANION GAP SERPL CALC-SCNC: 6 MMOL/L (ref 0–11.9)
APTT PPP: 39 SEC (ref 24.7–36)
APTT PPP: 45.1 SEC (ref 24.7–36)
APTT PPP: 47.9 SEC (ref 24.7–36)
APTT PPP: 49 SEC (ref 24.7–36)
BASOPHILS # BLD AUTO: 0.2 % (ref 0–1.8)
BASOPHILS # BLD AUTO: 0.4 % (ref 0–1.8)
BASOPHILS # BLD AUTO: 0.4 % (ref 0–1.8)
BASOPHILS # BLD: 0.03 K/UL (ref 0–0.12)
BASOPHILS # BLD: 0.04 K/UL (ref 0–0.12)
BASOPHILS # BLD: 0.05 K/UL (ref 0–0.12)
BUN SERPL-MCNC: 15 MG/DL (ref 8–22)
BUN SERPL-MCNC: 15 MG/DL (ref 8–22)
BUN SERPL-MCNC: 16 MG/DL (ref 8–22)
CALCIUM SERPL-MCNC: 8.1 MG/DL (ref 8.5–10.5)
CALCIUM SERPL-MCNC: 8.2 MG/DL (ref 8.5–10.5)
CALCIUM SERPL-MCNC: 8.6 MG/DL (ref 8.5–10.5)
CHLORIDE SERPL-SCNC: 106 MMOL/L (ref 96–112)
CHLORIDE SERPL-SCNC: 108 MMOL/L (ref 96–112)
CHLORIDE SERPL-SCNC: 108 MMOL/L (ref 96–112)
CO2 SERPL-SCNC: 25 MMOL/L (ref 20–33)
CO2 SERPL-SCNC: 25 MMOL/L (ref 20–33)
CO2 SERPL-SCNC: 26 MMOL/L (ref 20–33)
CREAT SERPL-MCNC: 0.88 MG/DL (ref 0.5–1.4)
CREAT SERPL-MCNC: 0.93 MG/DL (ref 0.5–1.4)
CREAT SERPL-MCNC: 0.96 MG/DL (ref 0.5–1.4)
EOSINOPHIL # BLD AUTO: 0.72 K/UL (ref 0–0.51)
EOSINOPHIL # BLD AUTO: 0.91 K/UL (ref 0–0.51)
EOSINOPHIL # BLD AUTO: 1.07 K/UL (ref 0–0.51)
EOSINOPHIL NFR BLD: 5.6 % (ref 0–6.9)
EOSINOPHIL NFR BLD: 8.8 % (ref 0–6.9)
EOSINOPHIL NFR BLD: 8.9 % (ref 0–6.9)
ERYTHROCYTE [DISTWIDTH] IN BLOOD BY AUTOMATED COUNT: 51 FL (ref 35.9–50)
ERYTHROCYTE [DISTWIDTH] IN BLOOD BY AUTOMATED COUNT: 51.3 FL (ref 35.9–50)
ERYTHROCYTE [DISTWIDTH] IN BLOOD BY AUTOMATED COUNT: 53.7 FL (ref 35.9–50)
FIBRINOGEN PPP-MCNC: 413 MG/DL (ref 215–460)
FIBRINOGEN PPP-MCNC: 418 MG/DL (ref 215–460)
FIBRINOGEN PPP-MCNC: 424 MG/DL (ref 215–460)
FIBRINOGEN PPP-MCNC: 436 MG/DL (ref 215–460)
GLUCOSE BLD-MCNC: 120 MG/DL (ref 65–99)
GLUCOSE BLD-MCNC: 143 MG/DL (ref 65–99)
GLUCOSE BLD-MCNC: 154 MG/DL (ref 65–99)
GLUCOSE BLD-MCNC: 98 MG/DL (ref 65–99)
GLUCOSE SERPL-MCNC: 108 MG/DL (ref 65–99)
GLUCOSE SERPL-MCNC: 115 MG/DL (ref 65–99)
GLUCOSE SERPL-MCNC: 117 MG/DL (ref 65–99)
HCT VFR BLD AUTO: 31.8 % (ref 37–47)
HCT VFR BLD AUTO: 32.9 % (ref 37–47)
HCT VFR BLD AUTO: 34.9 % (ref 37–47)
HGB BLD-MCNC: 10.3 G/DL (ref 12–16)
HGB BLD-MCNC: 10.6 G/DL (ref 12–16)
HGB BLD-MCNC: 10.8 G/DL (ref 12–16)
IMM GRANULOCYTES # BLD AUTO: 0.03 K/UL (ref 0–0.11)
IMM GRANULOCYTES # BLD AUTO: 0.04 K/UL (ref 0–0.11)
IMM GRANULOCYTES # BLD AUTO: 0.06 K/UL (ref 0–0.11)
IMM GRANULOCYTES NFR BLD AUTO: 0.3 % (ref 0–0.9)
IMM GRANULOCYTES NFR BLD AUTO: 0.3 % (ref 0–0.9)
IMM GRANULOCYTES NFR BLD AUTO: 0.5 % (ref 0–0.9)
INR PPP: 2.13 (ref 0.87–1.13)
INR PPP: 2.42 (ref 0.87–1.13)
LYMPHOCYTES # BLD AUTO: 1.68 K/UL (ref 1–4.8)
LYMPHOCYTES # BLD AUTO: 1.98 K/UL (ref 1–4.8)
LYMPHOCYTES # BLD AUTO: 2.08 K/UL (ref 1–4.8)
LYMPHOCYTES NFR BLD: 13.1 % (ref 22–41)
LYMPHOCYTES NFR BLD: 17.3 % (ref 22–41)
LYMPHOCYTES NFR BLD: 19.1 % (ref 22–41)
MCH RBC QN AUTO: 29.2 PG (ref 27–33)
MCH RBC QN AUTO: 29.4 PG (ref 27–33)
MCH RBC QN AUTO: 29.9 PG (ref 27–33)
MCHC RBC AUTO-ENTMCNC: 30.9 G/DL (ref 33.6–35)
MCHC RBC AUTO-ENTMCNC: 32.2 G/DL (ref 33.6–35)
MCHC RBC AUTO-ENTMCNC: 32.4 G/DL (ref 33.6–35)
MCV RBC AUTO: 91.4 FL (ref 81.4–97.8)
MCV RBC AUTO: 92.2 FL (ref 81.4–97.8)
MCV RBC AUTO: 94.3 FL (ref 81.4–97.8)
MONOCYTES # BLD AUTO: 1.56 K/UL (ref 0–0.85)
MONOCYTES # BLD AUTO: 1.9 K/UL (ref 0–0.85)
MONOCYTES # BLD AUTO: 2.05 K/UL (ref 0–0.85)
MONOCYTES NFR BLD AUTO: 15.1 % (ref 0–13.4)
MONOCYTES NFR BLD AUTO: 15.8 % (ref 0–13.4)
MONOCYTES NFR BLD AUTO: 16 % (ref 0–13.4)
NEUTROPHILS # BLD AUTO: 5.83 K/UL (ref 2–7.15)
NEUTROPHILS # BLD AUTO: 6.89 K/UL (ref 2–7.15)
NEUTROPHILS # BLD AUTO: 8.3 K/UL (ref 2–7.15)
NEUTROPHILS NFR BLD: 56.3 % (ref 44–72)
NEUTROPHILS NFR BLD: 57.3 % (ref 44–72)
NEUTROPHILS NFR BLD: 64.6 % (ref 44–72)
NRBC # BLD AUTO: 0 K/UL
NRBC BLD-RTO: 0 /100 WBC
PLATELET # BLD AUTO: 410 K/UL (ref 164–446)
PMV BLD AUTO: 10.1 FL (ref 9–12.9)
POTASSIUM SERPL-SCNC: 4.1 MMOL/L (ref 3.6–5.5)
POTASSIUM SERPL-SCNC: 4.2 MMOL/L (ref 3.6–5.5)
POTASSIUM SERPL-SCNC: 4.3 MMOL/L (ref 3.6–5.5)
PROTHROMBIN TIME: 23.9 SEC (ref 12–14.6)
PROTHROMBIN TIME: 26.4 SEC (ref 12–14.6)
RBC # BLD AUTO: 3.45 M/UL (ref 4.2–5.4)
RBC # BLD AUTO: 3.6 M/UL (ref 4.2–5.4)
RBC # BLD AUTO: 3.7 M/UL (ref 4.2–5.4)
SODIUM SERPL-SCNC: 137 MMOL/L (ref 135–145)
SODIUM SERPL-SCNC: 139 MMOL/L (ref 135–145)
SODIUM SERPL-SCNC: 140 MMOL/L (ref 135–145)
WBC # BLD AUTO: 10.4 K/UL (ref 4.8–10.8)
WBC # BLD AUTO: 12 K/UL (ref 4.8–10.8)
WBC # BLD AUTO: 12.8 K/UL (ref 4.8–10.8)

## 2018-12-03 PROCEDURE — 05HY33Z INSERTION OF INFUSION DEVICE INTO UPPER VEIN, PERCUTANEOUS APPROACH: ICD-10-PCS | Performed by: HOSPITALIST

## 2018-12-03 PROCEDURE — 85041 AUTOMATED RBC COUNT: CPT

## 2018-12-03 PROCEDURE — 700111 HCHG RX REV CODE 636 W/ 250 OVERRIDE (IP): Mod: JG | Performed by: RADIOLOGY

## 2018-12-03 PROCEDURE — 85014 HEMATOCRIT: CPT

## 2018-12-03 PROCEDURE — 770022 HCHG ROOM/CARE - ICU (200)

## 2018-12-03 PROCEDURE — 99233 SBSQ HOSP IP/OBS HIGH 50: CPT | Performed by: HOSPITALIST

## 2018-12-03 PROCEDURE — 06CD3ZZ EXTIRPATION OF MATTER FROM LEFT COMMON ILIAC VEIN, PERCUTANEOUS APPROACH: ICD-10-PCS | Performed by: RADIOLOGY

## 2018-12-03 PROCEDURE — 36569 INSJ PICC 5 YR+ W/O IMAGING: CPT

## 2018-12-03 PROCEDURE — 85730 THROMBOPLASTIN TIME PARTIAL: CPT | Mod: 91

## 2018-12-03 PROCEDURE — 99233 SBSQ HOSP IP/OBS HIGH 50: CPT | Performed by: INTERNAL MEDICINE

## 2018-12-03 PROCEDURE — B51G1ZZ FLUOROSCOPY OF LEFT PELVIC (ILIAC) VEINS USING LOW OSMOLAR CONTRAST: ICD-10-PCS | Performed by: RADIOLOGY

## 2018-12-03 PROCEDURE — 700105 HCHG RX REV CODE 258: Performed by: RADIOLOGY

## 2018-12-03 PROCEDURE — 85018 HEMOGLOBIN: CPT | Mod: 91

## 2018-12-03 PROCEDURE — A9270 NON-COVERED ITEM OR SERVICE: HCPCS | Performed by: HOSPITALIST

## 2018-12-03 PROCEDURE — A9270 NON-COVERED ITEM OR SERVICE: HCPCS | Performed by: FAMILY MEDICINE

## 2018-12-03 PROCEDURE — 82962 GLUCOSE BLOOD TEST: CPT

## 2018-12-03 PROCEDURE — 700117 HCHG RX CONTRAST REV CODE 255: Performed by: RADIOLOGY

## 2018-12-03 PROCEDURE — 700105 HCHG RX REV CODE 258: Performed by: HOSPITALIST

## 2018-12-03 PROCEDURE — 85384 FIBRINOGEN ACTIVITY: CPT | Mod: 91

## 2018-12-03 PROCEDURE — 700102 HCHG RX REV CODE 250 W/ 637 OVERRIDE(OP): Performed by: FAMILY MEDICINE

## 2018-12-03 PROCEDURE — 99153 MOD SED SAME PHYS/QHP EA: CPT

## 2018-12-03 PROCEDURE — 700102 HCHG RX REV CODE 250 W/ 637 OVERRIDE(OP): Performed by: HOSPITALIST

## 2018-12-03 PROCEDURE — 80048 BASIC METABOLIC PNL TOTAL CA: CPT

## 2018-12-03 PROCEDURE — 85048 AUTOMATED LEUKOCYTE COUNT: CPT

## 2018-12-03 PROCEDURE — 85025 COMPLETE CBC W/AUTO DIFF WBC: CPT

## 2018-12-03 PROCEDURE — B54MZZA ULTRASONOGRAPHY OF RIGHT UPPER EXTREMITY VEINS, GUIDANCE: ICD-10-PCS | Performed by: HOSPITALIST

## 2018-12-03 PROCEDURE — 85610 PROTHROMBIN TIME: CPT

## 2018-12-03 PROCEDURE — 51798 US URINE CAPACITY MEASURE: CPT

## 2018-12-03 PROCEDURE — 700111 HCHG RX REV CODE 636 W/ 250 OVERRIDE (IP)

## 2018-12-03 RX ORDER — SODIUM CHLORIDE 9 MG/ML
500 INJECTION, SOLUTION INTRAVENOUS
Status: ACTIVE | OUTPATIENT
Start: 2018-12-03 | End: 2018-12-03

## 2018-12-03 RX ORDER — SODIUM CHLORIDE 9 MG/ML
500 INJECTION, SOLUTION INTRAVENOUS ONCE
Status: COMPLETED | OUTPATIENT
Start: 2018-12-03 | End: 2018-12-03

## 2018-12-03 RX ORDER — ONDANSETRON 2 MG/ML
4 INJECTION INTRAMUSCULAR; INTRAVENOUS PRN
Status: ACTIVE | OUTPATIENT
Start: 2018-12-03 | End: 2018-12-03

## 2018-12-03 RX ADMIN — ALTEPLASE 1 MG/HR: KIT at 15:21

## 2018-12-03 RX ADMIN — OXYCODONE HYDROCHLORIDE 5 MG: 5 TABLET ORAL at 22:10

## 2018-12-03 RX ADMIN — LEVOTHYROXINE SODIUM 150 MCG: 150 TABLET ORAL at 06:00

## 2018-12-03 RX ADMIN — LOSARTAN POTASSIUM 25 MG: 25 TABLET, FILM COATED ORAL at 05:55

## 2018-12-03 RX ADMIN — SODIUM CHLORIDE 500 ML: 9 INJECTION, SOLUTION INTRAVENOUS at 12:15

## 2018-12-03 RX ADMIN — ALTEPLASE 1 MG/HR: KIT at 10:54

## 2018-12-03 RX ADMIN — OXYCODONE HYDROCHLORIDE 10 MG: 5 TABLET ORAL at 03:01

## 2018-12-03 RX ADMIN — OXYCODONE HYDROCHLORIDE 5 MG: 5 TABLET ORAL at 12:14

## 2018-12-03 RX ADMIN — FENTANYL CITRATE 50 MCG: 50 INJECTION, SOLUTION INTRAMUSCULAR; INTRAVENOUS at 10:29

## 2018-12-03 RX ADMIN — STANDARDIZED SENNA CONCENTRATE AND DOCUSATE SODIUM 2 TABLET: 8.6; 5 TABLET, FILM COATED ORAL at 05:55

## 2018-12-03 RX ADMIN — Medication 500 MG: at 06:41

## 2018-12-03 RX ADMIN — FENTANYL CITRATE 25 MCG: 50 INJECTION, SOLUTION INTRAMUSCULAR; INTRAVENOUS at 11:15

## 2018-12-03 RX ADMIN — AMLODIPINE BESYLATE 10 MG: 10 TABLET ORAL at 05:55

## 2018-12-03 RX ADMIN — TRAZODONE HYDROCHLORIDE 100 MG: 100 TABLET ORAL at 22:10

## 2018-12-03 RX ADMIN — IOHEXOL 40 ML: 300 INJECTION, SOLUTION INTRAVENOUS at 11:27

## 2018-12-03 RX ADMIN — OXYCODONE HYDROCHLORIDE 5 MG: 5 TABLET ORAL at 12:47

## 2018-12-03 RX ADMIN — ATORVASTATIN CALCIUM 80 MG: 40 TABLET, FILM COATED ORAL at 17:18

## 2018-12-03 RX ADMIN — FENTANYL CITRATE 25 MCG: 50 INJECTION, SOLUTION INTRAMUSCULAR; INTRAVENOUS at 11:01

## 2018-12-03 RX ADMIN — ALTEPLASE 1 MG/HR: KIT at 02:20

## 2018-12-03 RX ADMIN — Medication 500 MG: at 17:18

## 2018-12-03 RX ADMIN — FENTANYL CITRATE 50 MCG: 50 INJECTION, SOLUTION INTRAMUSCULAR; INTRAVENOUS at 10:34

## 2018-12-03 RX ADMIN — FENTANYL CITRATE 25 MCG: 50 INJECTION, SOLUTION INTRAMUSCULAR; INTRAVENOUS at 11:07

## 2018-12-03 RX ADMIN — FENTANYL CITRATE 25 MCG: 50 INJECTION, SOLUTION INTRAMUSCULAR; INTRAVENOUS at 10:50

## 2018-12-03 RX ADMIN — OXYCODONE HYDROCHLORIDE 10 MG: 5 TABLET ORAL at 17:18

## 2018-12-03 ASSESSMENT — ENCOUNTER SYMPTOMS
HEMOPTYSIS: 0
SHORTNESS OF BREATH: 0
ABDOMINAL PAIN: 0
DIARRHEA: 0
EYE DISCHARGE: 0
FALLS: 0
RESPIRATORY NEGATIVE: 1
EYE REDNESS: 0
VOMITING: 0
NECK PAIN: 0
STRIDOR: 0
WEAKNESS: 0
MEMORY LOSS: 0
NEUROLOGICAL NEGATIVE: 1
GASTROINTESTINAL NEGATIVE: 1
PALPITATIONS: 0
ORTHOPNEA: 0
FEVER: 0
CARDIOVASCULAR NEGATIVE: 1
SPUTUM PRODUCTION: 0
EYES NEGATIVE: 1
SPEECH CHANGE: 0
BACK PAIN: 0
LOSS OF CONSCIOUSNESS: 0
CONSTITUTIONAL NEGATIVE: 1
NERVOUS/ANXIOUS: 0
SEIZURES: 0
HEADACHES: 0
BLOOD IN STOOL: 0
PSYCHIATRIC NEGATIVE: 1
EYE PAIN: 0
COUGH: 0
FOCAL WEAKNESS: 0
NAUSEA: 0
BLURRED VISION: 0
HALLUCINATIONS: 0
FLANK PAIN: 0

## 2018-12-03 ASSESSMENT — PAIN SCALES - GENERAL
PAINLEVEL_OUTOF10: ASSUMED PAIN PRESENT
PAINLEVEL_OUTOF10: 10
PAINLEVEL_OUTOF10: 5
PAINLEVEL_OUTOF10: 0
PAINLEVEL_OUTOF10: 0
PAINLEVEL_OUTOF10: 6
PAINLEVEL_OUTOF10: 8
PAINLEVEL_OUTOF10: 4
PAINLEVEL_OUTOF10: 8
PAINLEVEL_OUTOF10: 0
PAINLEVEL_OUTOF10: 5
PAINLEVEL_OUTOF10: 6
PAINLEVEL_OUTOF10: 5
PAINLEVEL_OUTOF10: 0
PAINLEVEL_OUTOF10: 8
PAINLEVEL_OUTOF10: 3
PAINLEVEL_OUTOF10: 9
PAINLEVEL_OUTOF10: 9
PAINLEVEL_OUTOF10: 10

## 2018-12-03 ASSESSMENT — LIFESTYLE VARIABLES: SUBSTANCE_ABUSE: 0

## 2018-12-03 NOTE — PROGRESS NOTES
Critical Care Progress Note    Date of admission  12/1/2018    Chief Complaint  78 y.o. female admitted 12/1/2018 with ischemic LLE    Hospital Course    Interval Problem Update  Reviewed last 24 hour events:  TPA infusion  Leg appears more swollen than yesterday  Pulses remain present  Return planned to IR again    Review of Systems  Review of Systems   Constitutional: Negative.    HENT: Negative.    Eyes: Negative.    Respiratory: Negative.    Cardiovascular: Negative.    Gastrointestinal: Negative.    Musculoskeletal:        Swollen leg   Skin: Negative.    Neurological: Negative.    Endo/Heme/Allergies: Negative.    Psychiatric/Behavioral: Negative.         Vital Signs for last 24 hours   Temp:  [37 °C (98.6 °F)-37.3 °C (99.2 °F)] 37.1 °C (98.8 °F)  Pulse:  [66-88] 78  Resp:  [10-27] 20    Hemodynamic parameters for last 24 hours       Respiratory       Physical Exam   Physical Exam   Constitutional: She is oriented to person, place, and time. She appears well-developed and well-nourished.   HENT:   Head: Normocephalic and atraumatic.   Neck: Normal range of motion. Neck supple.   Cardiovascular: Normal rate and regular rhythm.    Pulmonary/Chest: Breath sounds normal.   Abdominal: Soft. Bowel sounds are normal.   Musculoskeletal: She exhibits edema.   Neurological: She is alert and oriented to person, place, and time.   Skin: Skin is warm and dry.   Psychiatric: She has a normal mood and affect. Her behavior is normal. Judgment and thought content normal.       Medications  Current Facility-Administered Medications   Medication Dose Route Frequency Provider Last Rate Last Dose   • alteplase (ACTIVASE) 10 mg in  mL Infusion  1 mg/hr Intravenous Continuous Andrey Ellis M.D. 10 mL/hr at 12/03/18 1130 1 mg/hr at 12/03/18 1130   • losartan (COZAAR) tablet 25 mg  25 mg Oral Q DAY Joan Belle M.D.   25 mg at 12/03/18 6175   • alteplase (ACTIVASE) 10 mg in  mL Infusion  1 mg/hr Intravenous  Continuous Mushtaq Florian M.D. 50 mL/hr at 12/03/18 1114 1 mg/hr at 12/03/18 1114   • amLODIPine (NORVASC) tablet 10 mg  10 mg Oral Q DAY Deondre Quigley M.D.   10 mg at 12/03/18 0555   • atorvastatin (LIPITOR) tablet 80 mg  80 mg Oral Q EVENING Deondre Quigley M.D.   80 mg at 12/02/18 1716   • levothyroxine (SYNTHROID) tablet 150 mcg  150 mcg Oral QAM AC Deondre Quigley M.D.   150 mcg at 12/03/18 0600   • oxyCODONE immediate-release (ROXICODONE) tablet 5-10 mg  5-10 mg Oral Q4HRS PRN Deondre Quigley M.D.   5 mg at 12/03/18 1247   • traZODone (DESYREL) tablet 100 mg  100 mg Oral QHS Deondre Quigley M.D.   100 mg at 12/02/18 2029   • senna-docusate (PERICOLACE or SENOKOT S) 8.6-50 MG per tablet 2 Tab  2 Tab Oral BID Deondre Quigley M.D.   2 Tab at 12/03/18 0555    And   • polyethylene glycol/lytes (MIRALAX) PACKET 1 Packet  1 Packet Oral QDAY PRN Deondre Quigley M.D.        And   • magnesium hydroxide (MILK OF MAGNESIA) suspension 30 mL  30 mL Oral QDAY PRN Deondre Quigley M.D.        And   • bisacodyl (DULCOLAX) suppository 10 mg  10 mg Rectal QDAY PRN Deondre Quigley M.D.       • acetaminophen (TYLENOL) tablet 650 mg  650 mg Oral Q6HRS PRALEXEY Quigley M.D.       • insulin lispro (HUMALOG) injection 1-6 Units  1-6 Units Subcutaneous 4X/DAY ANTOINETTE Quigley M.D.   Stopped at 12/01/18 1700    And   • glucose 4 g chewable tablet 16 g  16 g Oral Q15 MIN PRALEXEY Quigley M.D.        And   • dextrose 50% (D50W) injection 25 mL  25 mL Intravenous Q15 MIN PRALEXEY Quigley M.D.       • heparin injection 3,200 Units  3,200 Units Intravenous PRN Deondre Quigley M.D.        And   • heparin infusion 25,000 units in 500 ml 0.45% nacl   Intravenous Continuous Deondre Quigley M.D. 10 mL/hr at 12/03/18 1130 500 Units/hr at 12/03/18 1130   • calcium carbonate (OS-MARKELL 500) tablet 500 mg  500 mg Oral BID WITH MEALS Deondre Quigley M.D.   500 mg at 12/03/18 0641     Facility-Administered Medications Ordered in Other Encounters   Medication Dose Route  Frequency Provider Last Rate Last Dose   • iodixanol (VISIPAQUE) SOLN    Intra-Op Once PRN Shekhar Rosado M.D.   10 mL at 01/18/17 0619       Fluids    Intake/Output Summary (Last 24 hours) at 12/03/18 1336  Last data filed at 12/03/18 1300   Gross per 24 hour   Intake          2284.17 ml   Output              370 ml   Net          1914.17 ml       Laboratory          Recent Labs      12/03/18 0007 12/03/18 0428 12/03/18   0901   SODIUM  140  139  137   POTASSIUM  4.2  4.3  4.1   CHLORIDE  108  108  106   CO2  26  25  25   BUN  15  15  16   CREATININE  0.96  0.93  0.88   CALCIUM  8.1*  8.2*  8.6     Recent Labs      12/01/18   1254   12/03/18 0007 12/03/18 0428 12/03/18   0901   ALTSGPT  13   --    --    --    --    ASTSGOT  15   --    --    --    --    ALKPHOSPHAT  67   --    --    --    --    TBILIRUBIN  0.6   --    --    --    --    GLUCOSE  111*   < >  117*  115*  108*    < > = values in this interval not displayed.     Recent Labs      12/01/18   1254   12/02/18 1953 12/03/18 0007 12/03/18   0901   WBC  12.0*   < >  11.0*  10.4  12.0*   NEUTSPOLYS  72.50*   < >  56.60  56.30  57.30   LYMPHOCYTES  12.60*   < >  20.80*  19.10*  17.30*   MONOCYTES  10.00   < >  12.90  15.10*  15.80*   EOSINOPHILS  3.90   < >  9.00*  8.80*  8.90*   BASOPHILS  0.60   < >  0.30  0.40  0.40   ASTSGOT  15   --    --    --    --    ALTSGPT  13   --    --    --    --    ALKPHOSPHAT  67   --    --    --    --    TBILIRUBIN  0.6   --    --    --    --     < > = values in this interval not displayed.     Recent Labs      12/01/18   1254   12/02/18   0212   12/02/18   1735  12/02/18 1953 12/03/18 0007  12/03/18   0008  12/03/18   0901   RBC  3.85*   --   3.80*   --    --   3.82*  3.60*   --   3.70*   HEMOGLOBIN  11.3*   --   11.1*   --    --   11.2*  10.6*   --   10.8*   HEMATOCRIT  34.7*   --   34.6*   --    --   35.7*  32.9*   --   34.9*   PLATELETCT  416   --   378   --    --   392  410   --    --    PROTHROMBTM   28.6*   --    --    --   26.4*  25.9*   --    --    --    APTT   --    < >  144.0*   < >  39.0*  52.5*   --   49.0*  45.1*   INR  2.68*   --    --    --   2.42*  2.37*   --    --    --     < > = values in this interval not displayed.       Imaging  X-Ray:  I have personally reviewed the images and compared with prior images.    Assessment/Plan  Acute deep vein thrombosis (DVT) of femoral vein of left lower extremity (HCC)- (present on admission)   Assessment & Plan    Plan for q. one hour vascular checks status post TPA catheter per protocol  Continue to monitor for vascular insufficiency  Post TPA start heparin per protocol     DM (diabetes mellitus) (HCC)- (present on admission)   Assessment & Plan    Sliding scale  Glucose goal 120-180     Essential hypertension- (present on admission)   Assessment & Plan    Continue home medications when appropriate     Atrial fibrillation (HCC)- (present on admission)   Assessment & Plan    Rate controlled  Heparin per protocol post TPA  Transition to oral anticoagulant when appropriate          VTE:  Heparin  Ulcer: Not Indicated  Lines: None    I have performed a physical exam and reviewed and updated ROS and Plan today (12/3/2018). In review of yesterday's note (12/2/2018), there are no changes except as documented above.     Discussed patient condition and risk of morbidity and/or mortality with Hospitalist, RN, RT, , [facility] resident and Patient  The patient remains ill.

## 2018-12-03 NOTE — PROGRESS NOTES
Lab called, blood clotted, they will come back to re-collect specimen.   1920- Message left for  regarding recollection and pt is a difficult draw. 1950-  at the bedside for redraw

## 2018-12-03 NOTE — PROGRESS NOTES
Left leg venogram with pharmacomechanical thrombolysis and thrombectomy performed by Dr Ellis via existing left popliteal sheath. Procedure reviewed with patient and current consent signed. Patient was continuously assessed and monitored (not NPO so sedation not planned). Patient assisted to prone position, alteplase and heparin infusions stopped, and cath insertion site prepped and draped. Patient c/o extreme LLE tenderness; fentanyl given with fairly good relief. Venogram performed. Angiojet used with improved result and infusion system replaced in for continued treatment. Sheath secured with suture and reinforced dressing applied.  Alteplase and heparin restarted and patient returned to ICU in good condition. Patient awake, appropriate, and appears comfortable (asking about food).  Mild new onset hematuria noted in cm tubing and Dr Ellis informed.

## 2018-12-03 NOTE — PROGRESS NOTES
"Pt LLE appears more swollen. Took circumference measurements.   LLE- upper calf 15.5\", mid calf-18.5\", ankle 11.5\"  "

## 2018-12-03 NOTE — CARE PLAN
Problem: Knowledge Deficit  Goal: Knowledge of disease process/condition, treatment plan, diagnostic tests, and medications will improve    Intervention: Assess knowledge level of disease process/condition, treatment plan, diagnostic tests, and medications  Pt oriented to the ICU and unit routine, plan of care was discussed. Qs addressed.       Problem: Pain Management  Goal: Pain level will decrease to patient's comfort goal    Intervention: Educate and implement non-pharmacologic comfort measures. Examples: relaxation, distration, play therapy, activity therapy, massage, etc.  PRN pain medication given with relief, LLE elevated to help with pain/swelling.

## 2018-12-03 NOTE — CARE PLAN
Problem: Pain Management  Goal: Pain level will decrease to patient's comfort goal    Intervention: Educate and implement non-pharmacologic comfort measures. Examples: relaxation, distration, play therapy, activity therapy, massage, etc.  Pt medicated as needed for pain with some effect.      Problem: Positive DVT/VTE  Intervention: Monitor circulation, sensation and/or motion of extremity  Hourly vascular checks, ext temperature, and pulses assessed. Pt to keep LLE extended without bending. Ext elevated on pillows.       Problem: Urinary Elimination:  Goal: Ability to reestablish a normal urinary elimination pattern will improve    Intervention: Evaluate need to continue indwelling urinary catheter  Pt is on bedrest with LLE DVT infusion. Espinoza in place, hematuria noted post IR procedure with decreased urine output.No clots observed. MD notified, 500 cc NS bolus given as ordered with improved urine output and color noted.

## 2018-12-03 NOTE — PROGRESS NOTES
2 RN skin check completed. Sacrum red, blanching. Right side first finger and third finger redness noted after spo2 probes removed.

## 2018-12-03 NOTE — PROCEDURES
Vascular Access Team    Date of Insertion: 12/3/18  Arm Circumference: n/a  Line Length: 8cm  Line Size: 18g  Vein Occupancy %: 34  Reason for Midline: Lack of access, serial lab draws  Labs: WBC 12.0, , INR 2.37, BUN 16, Cr 0.88, GFR >60    Orders confirmed, vessel patency confirmed with ultrasound. Risks and benefits of procedure explained to patient and education regarding line associated bloodstream infections provided. Questions answered.     PowerGlide Midline placed in RUE per MD order with ultrasound guidance. 18g, 8 cm line placed in basilic vein after 1 attempt(s).  Catheter inserted with good blood return. Secured with 0cm external from insertion site.  Flushed without resistance with 10 mL 0.9% normal saline. Midline secured with Biopatch and Tegaderm.     Midline placement is confirmed by nurse using ultrasound and ability to flush and draw blood. Midline is appropriate for use at this time.  No X-ray is needed for placement confirmation. Pt tolerated procedure well.  Patient condition relayed to unit RN or ordering physician via this post procedure note in the EMR.    Ultrasound images uploaded to PACS and viewable in the EMR - yes  Ultrasound imaged printed and placed in paper chart - no      BARD PowerGlide Midline ref # [id number], Lot # [id number]

## 2018-12-03 NOTE — PROGRESS NOTES
Thigh and Calf Circumferential measurements:  Right thigh 24 in Right calf 13.5 in . Left thigh 28 in. Left calf 17.5 in

## 2018-12-03 NOTE — OR SURGEON
Immediate Post- Operative Note        PostOp Diagnosis: DVT LLE. HX CHRONIC LLE DVT. OFF COUMADIN 2 WEEKS AGO FOR MULTIPLE DENTAL EXTRACTIONS. NOW WITH LLE DVT, MARKED LLE SWELLING.      Procedure(s): US GUIDED VASCULAR PUNCTURE LEFT POPLITEAL VEIN.    LLE VENOGRAM.    TRAVERSAL OF EXTENSIVE ACUTE ON CHRONIC ILIO-FEMORAL THROMBOSIS. EXTENSIVE CLOT FILLING DEFECTS IN LEFT FEMORAL VEINS, LEFT CFV.  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     LOWER IVC WIDELY PATENT.     5F 100 CM INFUSION CATHETER WITH 50 CM INFUSION LENGTH PLACED FROM LOWER LEFT FEMORAL VEIN (1-2 CM ABOVE LEFT POPLITEAL VEIN 6F SHEATH) SPANNING LEFT ILIO-FEMORAL SYSTEM WITH CATHETER TIP IN IVC.     BEGIN OVERNIGHT TPA INFUSION AT 1MG/HOUR (50 ML PER HOUR FOR VENOUS INFUSION).      Plan: Overnight TPA infusion. F/U venogram tomorrow 12/3/18 afternoon. Anticipate LEFT iliac system venous stenting for chronic occlusion unlikely to lyse. May need additional infusion or mechanical thrombectomy.      Estimated Blood Loss: Less than 10 ml (FLUSHES)        Complications: None            12/2/2018     4:00 PM     Mushtaq Florian

## 2018-12-03 NOTE — ASSESSMENT & PLAN NOTE
Plan for q. one hour vascular checks status post TPA catheter per protocol  Continue to monitor for vascular insufficiency  Post TPA start heparin per protocol    Stent placed by IR yesterday  Leg unchanged  Sheath to be removed today  Continue heparin as bridge to oral agent        Back to IR again  (12/7), sheath removed by radiology, heparin infusion restarted.    Sheath removed , now on heparin and coumadin  Transferred to [facility] 12/8

## 2018-12-03 NOTE — ASSESSMENT & PLAN NOTE
Rate controlled  Heparin per protocol post TPA  Transition to oral anticoagulant when appropriate    Ok to be on oral agent- timing will depend on interventions done by IR

## 2018-12-03 NOTE — PROGRESS NOTES
Sent CCT down to IR to find patient chart, unable to locate. Placed call to [place] [phone] charge, they are attempting to locate as well.

## 2018-12-03 NOTE — CONSULTS
Critical Care Consultation    Date of consult: 12/2/2018    Referring Physician  Deondre Quigley M.D.    Reason for Consultation  TPA for iliofemoral thrombosis    History of Presenting Illness  78 y.o. female who presented 12/1/2018 with left leg pain found to have iliofemoral thrombosis.  Coumadin had to be held for dental procedure and she developed acute swelling pain for emergency room on 11/25 diagnosed with acute DVT.  She returned on 12/1 with worsening pain and swelling was found to have iliofemoral thrombosis went to IR for left lower extremity venogram found to have extensive clot burden TPA catheter placed came to ICU for vascular checks and monitoring.  She describes a prior blood clot about 8 years ago after a hospitalization.  She denies any chest pain or shortness of breath.     Code Status  Full Code    Review of Systems  Review of Systems   Constitutional: Negative for chills and fever.   Respiratory: Negative for chest tightness and shortness of breath.    Cardiovascular: Positive for leg swelling. Negative for chest pain.   Gastrointestinal: Negative for abdominal pain and blood in stool.   Neurological: Negative for weakness and numbness.   Hematological: Bruises/bleeds easily.   Psychiatric/Behavioral: Negative for agitation and confusion.       Past Medical History   has a past medical history of AAA (abdominal aortic aneurysm) (HCC); Anticoagulation monitoring, special range; Arrhythmia; Arthritis; Aspirin allergy (4/24/2018); Autoimmune hepatitis (HCC) (3/19/2012); Blood clotting disorder (HCC) (2015); Breath shortness; Cancer (HCC); Cataract; Diabetes; Disorder of thyroid (2016); Gallstones; H/O: HTN (hypertension) (3/19/2012); Heart valve disease; Hepatitis B; Hiatus hernia syndrome; Hyperlipidemia; Hypertension; Kidney disease; Mixed hyperlipidemia (3/19/2012); Moderate aortic stenosis 3/2018; Murmur (3/19/2012); Nonspecific abnormal electrocardiogram (ECG) (EKG) (3/19/2012);  Overweight(278.02) (3/19/2012); Pain; Palpitations; Preoperative cardiovascular examination (3/19/2012); and Unspecified urinary incontinence. She also has no past medical history of Heart attack (HCC).    Surgical History   has a past surgical history that includes bladder suspension (5/27/2009); cystoscopy (5/27/2009); rectocele repair (5/27/2009); appendectomy; hysterectomy laparoscopy; knee replacement, total; tonsillectomy; exploratory laparotomy (2/27/2013); splenectomy (2/27/2013); node dissection (2/27/2013); oophorectomy (2/27/2013); thyroidectomy total (N/A, 10/12/2016); feroz by laparoscopy (1/2/2017); and ercp in or (12/30/2016).    Family History  family history includes Heart Disease in her brother; Hyperlipidemia in her father and mother; Stroke in her father and mother.    Social History   reports that she has been smoking Cigarettes.  She has a 10.00 pack-year smoking history. She has never used smokeless tobacco. She reports that she does not drink alcohol or use drugs.    Medications  Home Medications     Reviewed by Kelly Saul R.N. (Registered Nurse) on 12/01/18 at 1732  Med List Status: Complete   Medication Last Dose Status   amlodipine (NORVASC) 10 MG Tab 12/1/2018 Active   atorvastatin (LIPITOR) 80 MG tablet 11/30/2018 Active   calcium carbonate (OS-MARKELL 500) 500 MG Tab 12/1/2018 Active   levothyroxine (SYNTHROID) 150 MCG Tab 12/1/2018 Active   losartan (COZAAR) 50 MG Tab 12/1/2018 Active   metFORMIN (GLUCOPHAGE) 500 MG Tab 12/1/2018 Active   oxyCODONE immediate-release (ROXICODONE) 5 MG Tab 12/1/2018 Active   traZODone (DESYREL) 100 MG Tab 11/30/2018 Active   vitamin D, Ergocalciferol, (DRISDOL) 96472 units Cap capsule 11/24/2018 Active   warfarin (COUMADIN) 5 MG Tab 11/30/2018 Active              Current Facility-Administered Medications   Medication Dose Route Frequency Provider Last Rate Last Dose   • [START ON 12/3/2018] losartan (COZAAR) tablet 25 mg  25 mg Oral Q DAY Joan  MEGAN Belle M.D.       • alteplase (ACTIVASE) 10 mg in  mL Infusion  1 mg/hr Intravenous Continuous Mushtaq Florian M.D. 50 mL/hr at 12/02/18 1630 1 mg/hr at 12/02/18 1630   • amLODIPine (NORVASC) tablet 10 mg  10 mg Oral Q DAY Deondre Quigley M.D.   10 mg at 12/02/18 0445   • atorvastatin (LIPITOR) tablet 80 mg  80 mg Oral Q EVENING Deondre Quigley M.D.   80 mg at 12/02/18 1716   • levothyroxine (SYNTHROID) tablet 150 mcg  150 mcg Oral QAM AC Deondre Quigley M.D.   150 mcg at 12/02/18 0700   • oxyCODONE immediate-release (ROXICODONE) tablet 5-10 mg  5-10 mg Oral Q4HRS PRN Deondre Quigley M.D.   10 mg at 12/02/18 1716   • traZODone (DESYREL) tablet 100 mg  100 mg Oral QHS Deondre Quigley M.D.   100 mg at 12/01/18 2141   • senna-docusate (PERICOLACE or SENOKOT S) 8.6-50 MG per tablet 2 Tab  2 Tab Oral BID Deondre Quigley M.D.   2 Tab at 12/02/18 1725    And   • polyethylene glycol/lytes (MIRALAX) PACKET 1 Packet  1 Packet Oral QDAY PRN Deondre Quigley M.D.        And   • magnesium hydroxide (MILK OF MAGNESIA) suspension 30 mL  30 mL Oral QDAY PRALEXEY Quigley M.D.        And   • bisacodyl (DULCOLAX) suppository 10 mg  10 mg Rectal QDAY PRN Deondre Quigley M.D.       • acetaminophen (TYLENOL) tablet 650 mg  650 mg Oral Q6HRS PRALEXEY Quigley M.D.       • insulin lispro (HUMALOG) injection 1-6 Units  1-6 Units Subcutaneous 4X/DAY ACHMARQUIS Quigley M.D.   Stopped at 12/01/18 1700    And   • glucose 4 g chewable tablet 16 g  16 g Oral Q15 MIN PRALEXEY Quigley M.D.        And   • dextrose 50% (D50W) injection 25 mL  25 mL Intravenous Q15 MIN PRN Deondre Quigley M.D.       • heparin injection 3,200 Units  3,200 Units Intravenous PRN Deondre Quigley M.D.        And   • heparin infusion 25,000 units in 500 ml 0.45% nacl   Intravenous Continuous Deondre Quigley M.D. 10 mL/hr at 12/02/18 1630 500 Units/hr at 12/02/18 1630   • calcium carbonate (OS-MARKELL 500) tablet 500 mg  500 mg Oral BID WITH MEALS Deondre Quigley M.D.   Stopped at 12/02/18  1736     Facility-Administered Medications Ordered in Other Encounters   Medication Dose Route Frequency Provider Last Rate Last Dose   • iodixanol (VISIPAQUE) SOLN    Intra-Op Once PRN Shekhar Rosado M.D.   10 mL at 01/18/17 0619       Allergies  Allergies   Allergen Reactions   • Aspirin Anaphylaxis   • Penicillins Anaphylaxis     As a child  Tolerated Rocephin 2/2018       Vital Signs last 24 hours  Temp:  [37 °C (98.6 °F)-37.3 °C (99.2 °F)] 37.1 °C (98.7 °F)  Pulse:  [66-79] 71  Resp:  [15-19] 19  BP: ()/(40-47) 93/47    Physical Exam  Physical Exam   Constitutional: She appears well-developed and well-nourished. No distress.   HENT:   Head: Normocephalic and atraumatic.   Mouth/Throat: Oropharynx is clear and moist.   Multiple missing teeth  Poor dentition   Eyes: Pupils are equal, round, and reactive to light. EOM are normal. Right eye exhibits no discharge.   Neck: Normal range of motion. Neck supple. No JVD present.   Cardiovascular: Normal rate and regular rhythm.    No murmur heard.  Pulmonary/Chest: No respiratory distress. She has no wheezes. She has no rales.   Abdominal: She exhibits no distension. There is no tenderness. There is no rebound.   Musculoskeletal:   Marked asymmetry left leg greater than right with edema diffusely mild pallor congested Refill to great toe on left   Neurological:   Alert and oriented moves all extremities without any difficulty has 5 out of 5 upper and lower extremity strength   Skin: Skin is warm. She is not diaphoretic.   Psychiatric: She has a normal mood and affect.       Fluids    Intake/Output Summary (Last 24 hours) at 12/02/18 1738  Last data filed at 12/02/18 1700   Gross per 24 hour   Intake           504.17 ml   Output                0 ml   Net           504.17 ml       Laboratory  Recent Results (from the past 48 hour(s))   CBC WITH DIFFERENTIAL    Collection Time: 12/01/18 12:54 PM   Result Value Ref Range    WBC 12.0 (H) 4.8 - 10.8 K/uL    RBC 3.85  (L) 4.20 - 5.40 M/uL    Hemoglobin 11.3 (L) 12.0 - 16.0 g/dL    Hematocrit 34.7 (L) 37.0 - 47.0 %    MCV 90.1 81.4 - 97.8 fL    MCH 29.4 27.0 - 33.0 pg    MCHC 32.6 (L) 33.6 - 35.0 g/dL    RDW 50.8 (H) 35.9 - 50.0 fL    Platelet Count 416 164 - 446 K/uL    MPV 9.7 9.0 - 12.9 fL    Neutrophils-Polys 72.50 (H) 44.00 - 72.00 %    Lymphocytes 12.60 (L) 22.00 - 41.00 %    Monocytes 10.00 0.00 - 13.40 %    Eosinophils 3.90 0.00 - 6.90 %    Basophils 0.60 0.00 - 1.80 %    Immature Granulocytes 0.40 0.00 - 0.90 %    Nucleated RBC 0.00 /100 WBC    Neutrophils (Absolute) 8.73 (H) 2.00 - 7.15 K/uL    Lymphs (Absolute) 1.52 1.00 - 4.80 K/uL    Monos (Absolute) 1.20 (H) 0.00 - 0.85 K/uL    Eos (Absolute) 0.47 0.00 - 0.51 K/uL    Baso (Absolute) 0.07 0.00 - 0.12 K/uL    Immature Granulocytes (abs) 0.05 0.00 - 0.11 K/uL    NRBC (Absolute) 0.00 K/uL   COMP METABOLIC PANEL    Collection Time: 12/01/18 12:54 PM   Result Value Ref Range    Sodium 138 135 - 145 mmol/L    Potassium 4.1 3.6 - 5.5 mmol/L    Chloride 105 96 - 112 mmol/L    Co2 23 20 - 33 mmol/L    Anion Gap 10.0 0.0 - 11.9    Glucose 111 (H) 65 - 99 mg/dL    Bun 12 8 - 22 mg/dL    Creatinine 1.13 0.50 - 1.40 mg/dL    Calcium 9.0 8.5 - 10.5 mg/dL    AST(SGOT) 15 12 - 45 U/L    ALT(SGPT) 13 2 - 50 U/L    Alkaline Phosphatase 67 30 - 99 U/L    Total Bilirubin 0.6 0.1 - 1.5 mg/dL    Albumin 3.6 3.2 - 4.9 g/dL    Total Protein 6.6 6.0 - 8.2 g/dL    Globulin 3.0 1.9 - 3.5 g/dL    A-G Ratio 1.2 g/dL   PROTHROMBIN TIME (INR)    Collection Time: 12/01/18 12:54 PM   Result Value Ref Range    PT 28.6 (H) 12.0 - 14.6 sec    INR 2.68 (H) 0.87 - 1.13   ESTIMATED GFR    Collection Time: 12/01/18 12:54 PM   Result Value Ref Range    GFR If  57 (A) >60 mL/min/1.73 m 2    GFR If Non  47 (A) >60 mL/min/1.73 m 2   ACCU-CHEK GLUCOSE    Collection Time: 12/01/18  5:28 PM   Result Value Ref Range    Glucose - Accu-Ck 116 (H) 65 - 99 mg/dL   APTT    Collection  Time: 12/01/18  6:17 PM   Result Value Ref Range    APTT 43.9 (H) 24.7 - 36.0 sec   ACCU-CHEK GLUCOSE    Collection Time: 12/01/18  9:44 PM   Result Value Ref Range    Glucose - Accu-Ck 133 (H) 65 - 99 mg/dL   COD (ADULT)    Collection Time: 12/02/18  2:10 AM   Result Value Ref Range    ABO Grouping Only A     Rh Grouping Only NEG     Antibody Screen-Cod NEG    APTT    Collection Time: 12/02/18  2:12 AM   Result Value Ref Range    APTT 144.0 (HH) 24.7 - 36.0 sec   Basic Metabolic Panel (BMP)    Collection Time: 12/02/18  2:12 AM   Result Value Ref Range    Sodium 139 135 - 145 mmol/L    Potassium 4.1 3.6 - 5.5 mmol/L    Chloride 107 96 - 112 mmol/L    Co2 23 20 - 33 mmol/L    Glucose 132 (H) 65 - 99 mg/dL    Bun 13 8 - 22 mg/dL    Creatinine 1.09 0.50 - 1.40 mg/dL    Calcium 8.3 (L) 8.5 - 10.5 mg/dL    Anion Gap 9.0 0.0 - 11.9   CBC with Differential    Collection Time: 12/02/18  2:12 AM   Result Value Ref Range    WBC 11.2 (H) 4.8 - 10.8 K/uL    RBC 3.80 (L) 4.20 - 5.40 M/uL    Hemoglobin 11.1 (L) 12.0 - 16.0 g/dL    Hematocrit 34.6 (L) 37.0 - 47.0 %    MCV 91.1 81.4 - 97.8 fL    MCH 29.2 27.0 - 33.0 pg    MCHC 32.1 (L) 33.6 - 35.0 g/dL    RDW 51.1 (H) 35.9 - 50.0 fL    Platelet Count 378 164 - 446 K/uL    MPV 9.2 9.0 - 12.9 fL    Neutrophils-Polys 69.00 44.00 - 72.00 %    Lymphocytes 12.00 (L) 22.00 - 41.00 %    Monocytes 10.30 0.00 - 13.40 %    Eosinophils 8.10 (H) 0.00 - 6.90 %    Basophils 0.30 0.00 - 1.80 %    Immature Granulocytes 0.30 0.00 - 0.90 %    Nucleated RBC 0.00 /100 WBC    Neutrophils (Absolute) 7.74 (H) 2.00 - 7.15 K/uL    Lymphs (Absolute) 1.35 1.00 - 4.80 K/uL    Monos (Absolute) 1.15 (H) 0.00 - 0.85 K/uL    Eos (Absolute) 0.91 (H) 0.00 - 0.51 K/uL    Baso (Absolute) 0.03 0.00 - 0.12 K/uL    Immature Granulocytes (abs) 0.03 0.00 - 0.11 K/uL    NRBC (Absolute) 0.00 K/uL   ESTIMATED GFR    Collection Time: 12/02/18  2:12 AM   Result Value Ref Range    GFR If  59 (A) >60 mL/min/1.73  m 2    GFR If Non African American 49 (A) >60 mL/min/1.73 m 2   ACCU-CHEK GLUCOSE    Collection Time: 12/02/18  8:27 AM   Result Value Ref Range    Glucose - Accu-Ck 116 (H) 65 - 99 mg/dL   APTT    Collection Time: 12/02/18  9:01 AM   Result Value Ref Range    APTT 218.1 (HH) 24.7 - 36.0 sec   ACCU-CHEK GLUCOSE    Collection Time: 12/02/18 12:32 PM   Result Value Ref Range    Glucose - Accu-Ck 109 (H) 65 - 99 mg/dL       Imaging  IR-EXTREMITY VENOGRAM-UNILATERAL LEFT   Final Result      1.  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    Lower IVC is widely patent.      2.  Initiation of continuous overnight venous TPA infusion via multi-sidehole infusion catheter spanning from the left popliteal vein-femoral vein junction across the entire iliofemoral system with the catheter tip in the IVC.      3.  Plan is for overnight TPA infusion. Follow-up venogram tomorrow 12/3/2018, afternoon. Anticipate left iliac system and venous stenting for chronic occlusion. May need additional TPA infusion and/or mechanical thrombectomy with the goal of achieving    in-line flow in the left iliofemoral system.                     INTERPRETING LOCATION: [address] [place] [address] [place], [id number]      [place]-EXTREMITY VENOUS LOWER UNILAT LEFT   Final Result      IR-ANGIO THROUGH EXISTING CATHETER    (Results Pending)       Assessment/Plan  Acute deep vein thrombosis (DVT) of femoral vein of left lower extremity (HCC)- (present on admission)   Assessment & Plan    Plan for q. one hour vascular checks status post TPA catheter per protocol  Continue to monitor for vascular insufficiency  Post TPA start heparin per protocol     DM (diabetes mellitus) (HCC)- (present on admission)   Assessment & Plan    Sliding scale  Glucose goal 120-180     Essential  hypertension- (present on admission)   Assessment & Plan    Continue home medications when appropriate     Atrial fibrillation (HCC)- (present on admission)   Assessment & Plan    Rate controlled  Heparin per protocol post TPA  Transition to oral anticoagulant when appropriate         Discussed patient condition and risk of morbidity and/or mortality with Hospitalist, RN and Patient.    The patient remains critically ill from iliofemoral thrombus requiring TPA infusion vascular checks and close monitoring.  Critical care time = 35 minutes in directly providing and coordinating critical care and extensive data review.  No time overlap and excludes procedures.

## 2018-12-03 NOTE — PROGRESS NOTES
[facility] Hospitalist Progress Note    Date of Service: 12/3/2018    Chief Complaint  78 y.o. female admitted 2018 with DVT  after holding warfarin fordental procedure  Interval Problem Update     underwent venogram and lytic therapy by IR   AOx4  SR 60-80  SBP   5L NC     Consultants/Specialty  I.R    Disposition  pending        Review of Systems   Constitutional: Negative for fever and malaise/fatigue.   HENT: Negative for congestion, ear discharge and nosebleeds.    Eyes: Negative for blurred vision, pain, discharge and redness.   Respiratory: Negative for cough, hemoptysis, sputum production, shortness of breath and stridor.    Cardiovascular: Positive for leg swelling. Negative for chest pain, palpitations and orthopnea.   Gastrointestinal: Negative for abdominal pain, blood in stool, diarrhea, melena, nausea and vomiting.   Genitourinary: Positive for hematuria (Mild). Negative for dysuria and flank pain.   Musculoskeletal: Positive for joint pain. Negative for back pain, falls and neck pain.   Skin: Negative.    Neurological: Negative for speech change, focal weakness, seizures, loss of consciousness, weakness and headaches.   Psychiatric/Behavioral: Negative for hallucinations, memory loss and substance abuse. The patient is not nervous/anxious.    All other systems reviewed and are negative.     Physical Exam  Laboratory/Imaging   Hemodynamics  Temp (24hrs), Av.2 °C (99 °F), Min:37.1 °C (98.7 °F), Max:37.3 °C (99.2 °F)   Temperature: 37.2 °C (99 °F), Monitored Temp: 37.6 °C (99.7 °F)  Pulse  Av.5  Min: 60  Max: 81 Heart Rate (Monitored): 68  Blood Pressure : (!) 93/47, NIBP: 114/53      Respiratory      Respiration: (!) 22, Pulse Oximetry: 92 %        RUL Breath Sounds: Clear, RML Breath Sounds: Clear, RLL Breath Sounds: Diminished, LUDA Breath Sounds: Clear, LLL Breath Sounds: Diminished    Fluids    Intake/Output Summary (Last 24 hours) at 18 3217  Last data filed at 18 0600    Gross per 24 hour   Intake          1184.17 ml   Output              310 ml   Net           874.17 ml       Nutrition  Orders Placed This Encounter   Procedures   • Diet Order Regular, Diabetic     Standing Status:   Standing     Number of Occurrences:   1     Order Specific Question:   Diet:     Answer:   Regular [1]     Order Specific Question:   Diet:     Answer:   Diabetic [3]   • Diet NPO     Standing Status:   Standing     Number of Occurrences:   1     Order Specific Question:   Restrict to:     Answer:   Sips with Medications [3]     Physical Exam   Constitutional: She is oriented to person, place, and time. She appears well-developed and well-nourished.   HENT:   Head: Normocephalic and atraumatic.   Right Ear: External ear normal.   Left Ear: External ear normal.   Mouth/Throat: No oropharyngeal exudate.   Eyes: Pupils are equal, round, and reactive to light. Right eye exhibits no discharge. Left eye exhibits no discharge.   Neck: Neck supple. No JVD present. No tracheal deviation present.   Cardiovascular: Normal rate and regular rhythm.  Exam reveals no gallop and no friction rub.    No murmur heard.  Pulmonary/Chest: Effort normal and breath sounds normal. No respiratory distress. She has no wheezes. She exhibits no tenderness.   Abdominal: Soft. Bowel sounds are normal. She exhibits no distension. There is no tenderness. There is no rebound.   Musculoskeletal: She exhibits edema and tenderness.   Left popliteal sheath in place   Neurological: She is alert and oriented to person, place, and time. No cranial nerve deficit. She exhibits normal muscle tone.   Skin: Skin is warm and dry. She is not diaphoretic. No cyanosis. Nails show no clubbing.   Psychiatric: She has a normal mood and affect. Her behavior is normal. Thought content normal.   Nursing note and vitals reviewed.      Recent Labs      12/02/18   0212  12/02/18 1953 12/03/18   0007   WBC  11.2*  11.0*  10.4   RBC  3.80*  3.82*  3.60*    HEMOGLOBIN  11.1*  11.2*  10.6*   HEMATOCRIT  34.6*  35.7*  32.9*   MCV  91.1  93.5  91.4   MCH  29.2  29.3  29.4   MCHC  32.1*  31.4*  32.2*   RDW  51.1*  53.4*  51.3*   PLATELETCT  378  392  410   MPV  9.2  9.7  10.1     Recent Labs      12/02/18   1735  12/03/18   0007  12/03/18   0428   SODIUM  139  140  139   POTASSIUM  4.5  4.2  4.3   CHLORIDE  107  108  108   CO2  25  26  25   GLUCOSE  101*  117*  115*   BUN  14  15  15   CREATININE  1.08  0.96  0.93   CALCIUM  8.5  8.1*  8.2*     Recent Labs      12/01/18   1254   12/02/18   0901  12/02/18   1953  12/03/18   0008   APTT   --    < >  218.1*  52.5*  49.0*   INR  2.68*   --    --   2.37*   --     < > = values in this interval not displayed.                  Assessment/Plan     Acute deep vein thrombosis (DVT) of femoral vein of left lower extremity (HCC)- (present on admission)   Assessment & Plan    Patient has extensive DVT in her left lower extremity    Currently receiving thrombolysis per IR with plans for repeat venogram tomorrow and possible stenting  Continue close monitoring in the ICU  Continue pain management     DM (diabetes mellitus) (HCC)- (present on admission)   Assessment & Plan    Stable on sliding scale insulin continue to monitor  Continue to hold metformin     Acute respiratory failure with hypoxia ([facility])   Assessment & Plan    Continue oxygen and RT protocol  Check chest x-ray     Hematuria   Assessment & Plan    Mild  Monitor hemoglobin       Obesity (BMI 30-39.9)- (present on admission)   Assessment & Plan    Body mass index is 36.33 kg/m².     Essential hypertension- (present on admission)   Assessment & Plan    Stable on amlodipine and losartan  Monitor blood pressure and adjust accordingly     Atrial fibrillation (HCC)- (present on admission)   Assessment & Plan    Currently in sinus rhythm  Resume oral anticoagulation post lytic therapy       Quality-Core Measures   Reviewed items::  Labs reviewed, Medications reviewed and Radiology  images reviewed  Espinoza catheter::  Critically Ill - Requiring Accurate Measurement of Urinary Output  DVT prophylaxis pharmacological::  Heparin

## 2018-12-03 NOTE — CARE PLAN
Problem: Respiratory:  Goal: Respiratory status will improve    Intervention: Educate and encourage coughing and deep breathing  Patient is requiring increased oxygen needs than at home. Encouraging patient to take deep breaths with strong coughs.       Problem: Positive DVT/VTE  Intervention: Monitor circulation, sensation and/or motion of extremity  Q1H pulse checks, color checks, and capillary refill checks. Will monitor closely. Alteplase and heparin infusing as ordered into left calf popliteal sheath.

## 2018-12-03 NOTE — ASSESSMENT & PLAN NOTE
Chest x-ray with edema  Continue RT protocol, Deep inspiratory efforts/encourage IS   Continue IV Lasix diuresis.    O2 nasal cannula support and wean FiO2 as tolerated.     Checking Echo

## 2018-12-03 NOTE — PROGRESS NOTES
2 RN skin check:     Skin is intact under oxygen tubing.   Pt has no pressure concerns.   Bilateral bruising UE's.   LLE- is dusky, pale, red from DVT.    Q2H turns, pillows used for support and elevation.

## 2018-12-03 NOTE — OR SURGEON
Immediate Post- Operative Note        PostOp Diagnosis: LLE DVT with probable chronic stenosis of LEFT CIV, EIC and CFV      Procedure(s): venogram, pharmacopmechanical thrombolysis and thrombectomy, replacement of lysis catheter, plan re-check and probable stent tomorrow      Estimated Blood Loss: Less than 5 ml        Complications: None            12/3/2018     11:13 AM     Andrey Ellis

## 2018-12-04 ENCOUNTER — APPOINTMENT (OUTPATIENT)
Dept: RADIOLOGY | Facility: MEDICAL CENTER | Age: 78
DRG: 270 | End: 2018-12-04
Attending: STUDENT IN AN ORGANIZED HEALTH CARE EDUCATION/TRAINING PROGRAM
Payer: MEDICARE

## 2018-12-04 ENCOUNTER — APPOINTMENT (OUTPATIENT)
Dept: RADIOLOGY | Facility: MEDICAL CENTER | Age: 78
DRG: 270 | End: 2018-12-04
Attending: RADIOLOGY
Payer: MEDICARE

## 2018-12-04 ENCOUNTER — APPOINTMENT (OUTPATIENT)
Dept: RADIOLOGY | Facility: MEDICAL CENTER | Age: 78
DRG: 270 | End: 2018-12-04
Attending: INTERNAL MEDICINE
Payer: MEDICARE

## 2018-12-04 ENCOUNTER — APPOINTMENT (OUTPATIENT)
Dept: RADIOLOGY | Facility: MEDICAL CENTER | Age: 78
DRG: 270 | End: 2018-12-04
Attending: HOSPITALIST
Payer: MEDICARE

## 2018-12-04 LAB
ANION GAP SERPL CALC-SCNC: 12 MMOL/L (ref 0–11.9)
ANION GAP SERPL CALC-SCNC: 8 MMOL/L (ref 0–11.9)
APTT PPP: 44.9 SEC (ref 24.7–36)
APTT PPP: 48.4 SEC (ref 24.7–36)
APTT PPP: 57.7 SEC (ref 24.7–36)
BASOPHILS # BLD AUTO: 0.3 % (ref 0–1.8)
BASOPHILS # BLD AUTO: 0.4 % (ref 0–1.8)
BASOPHILS # BLD AUTO: 0.4 % (ref 0–1.8)
BASOPHILS # BLD AUTO: 0.5 % (ref 0–1.8)
BASOPHILS # BLD: 0.04 K/UL (ref 0–0.12)
BASOPHILS # BLD: 0.05 K/UL (ref 0–0.12)
BASOPHILS # BLD: 0.05 K/UL (ref 0–0.12)
BASOPHILS # BLD: 0.06 K/UL (ref 0–0.12)
BUN SERPL-MCNC: 13 MG/DL (ref 8–22)
BUN SERPL-MCNC: 14 MG/DL (ref 8–22)
CALCIUM SERPL-MCNC: 7.6 MG/DL (ref 8.5–10.5)
CALCIUM SERPL-MCNC: 8.1 MG/DL (ref 8.5–10.5)
CHLORIDE SERPL-SCNC: 108 MMOL/L (ref 96–112)
CHLORIDE SERPL-SCNC: 108 MMOL/L (ref 96–112)
CO2 SERPL-SCNC: 19 MMOL/L (ref 20–33)
CO2 SERPL-SCNC: 21 MMOL/L (ref 20–33)
CREAT SERPL-MCNC: 0.72 MG/DL (ref 0.5–1.4)
CREAT SERPL-MCNC: 0.76 MG/DL (ref 0.5–1.4)
EOSINOPHIL # BLD AUTO: 1.11 K/UL (ref 0–0.51)
EOSINOPHIL # BLD AUTO: 1.24 K/UL (ref 0–0.51)
EOSINOPHIL # BLD AUTO: 1.46 K/UL (ref 0–0.51)
EOSINOPHIL # BLD AUTO: 1.55 K/UL (ref 0–0.51)
EOSINOPHIL NFR BLD: 12.3 % (ref 0–6.9)
EOSINOPHIL NFR BLD: 12.9 % (ref 0–6.9)
EOSINOPHIL NFR BLD: 9.2 % (ref 0–6.9)
EOSINOPHIL NFR BLD: 9.5 % (ref 0–6.9)
ERYTHROCYTE [DISTWIDTH] IN BLOOD BY AUTOMATED COUNT: 50.2 FL (ref 35.9–50)
ERYTHROCYTE [DISTWIDTH] IN BLOOD BY AUTOMATED COUNT: 50.9 FL (ref 35.9–50)
ERYTHROCYTE [DISTWIDTH] IN BLOOD BY AUTOMATED COUNT: 51.8 FL (ref 35.9–50)
ERYTHROCYTE [DISTWIDTH] IN BLOOD BY AUTOMATED COUNT: 52.8 FL (ref 35.9–50)
FIBRINOGEN PPP-MCNC: 359 MG/DL (ref 215–460)
FIBRINOGEN PPP-MCNC: 385 MG/DL (ref 215–460)
FIBRINOGEN PPP-MCNC: 421 MG/DL (ref 215–460)
GLUCOSE BLD-MCNC: 89 MG/DL (ref 65–99)
GLUCOSE BLD-MCNC: 90 MG/DL (ref 65–99)
GLUCOSE BLD-MCNC: 96 MG/DL (ref 65–99)
GLUCOSE SERPL-MCNC: 104 MG/DL (ref 65–99)
GLUCOSE SERPL-MCNC: 109 MG/DL (ref 65–99)
HCT VFR BLD AUTO: 29.8 % (ref 37–47)
HCT VFR BLD AUTO: 30.5 % (ref 37–47)
HCT VFR BLD AUTO: 31 % (ref 37–47)
HCT VFR BLD AUTO: 33.4 % (ref 37–47)
HGB BLD-MCNC: 10.7 G/DL (ref 12–16)
HGB BLD-MCNC: 9.7 G/DL (ref 12–16)
HGB BLD-MCNC: 9.7 G/DL (ref 12–16)
HGB BLD-MCNC: 9.8 G/DL (ref 12–16)
IMM GRANULOCYTES # BLD AUTO: 0.05 K/UL (ref 0–0.11)
IMM GRANULOCYTES # BLD AUTO: 0.05 K/UL (ref 0–0.11)
IMM GRANULOCYTES # BLD AUTO: 0.06 K/UL (ref 0–0.11)
IMM GRANULOCYTES # BLD AUTO: 0.06 K/UL (ref 0–0.11)
IMM GRANULOCYTES NFR BLD AUTO: 0.4 % (ref 0–0.9)
IMM GRANULOCYTES NFR BLD AUTO: 0.4 % (ref 0–0.9)
IMM GRANULOCYTES NFR BLD AUTO: 0.5 % (ref 0–0.9)
IMM GRANULOCYTES NFR BLD AUTO: 0.5 % (ref 0–0.9)
INR PPP: 1.86 (ref 0.87–1.13)
LYMPHOCYTES # BLD AUTO: 1.45 K/UL (ref 1–4.8)
LYMPHOCYTES # BLD AUTO: 2.01 K/UL (ref 1–4.8)
LYMPHOCYTES # BLD AUTO: 2.62 K/UL (ref 1–4.8)
LYMPHOCYTES # BLD AUTO: 3 K/UL (ref 1–4.8)
LYMPHOCYTES NFR BLD: 12.1 % (ref 22–41)
LYMPHOCYTES NFR BLD: 16.7 % (ref 22–41)
LYMPHOCYTES NFR BLD: 22.1 % (ref 22–41)
LYMPHOCYTES NFR BLD: 23 % (ref 22–41)
MAGNESIUM SERPL-MCNC: 1.3 MG/DL (ref 1.5–2.5)
MCH RBC QN AUTO: 29.5 PG (ref 27–33)
MCH RBC QN AUTO: 29.6 PG (ref 27–33)
MCH RBC QN AUTO: 29.6 PG (ref 27–33)
MCH RBC QN AUTO: 29.8 PG (ref 27–33)
MCHC RBC AUTO-ENTMCNC: 31.3 G/DL (ref 33.6–35)
MCHC RBC AUTO-ENTMCNC: 32 G/DL (ref 33.6–35)
MCHC RBC AUTO-ENTMCNC: 32.1 G/DL (ref 33.6–35)
MCHC RBC AUTO-ENTMCNC: 32.6 G/DL (ref 33.6–35)
MCV RBC AUTO: 91.7 FL (ref 81.4–97.8)
MCV RBC AUTO: 91.9 FL (ref 81.4–97.8)
MCV RBC AUTO: 92.5 FL (ref 81.4–97.8)
MCV RBC AUTO: 94.5 FL (ref 81.4–97.8)
MONOCYTES # BLD AUTO: 1.66 K/UL (ref 0–0.85)
MONOCYTES # BLD AUTO: 1.96 K/UL (ref 0–0.85)
MONOCYTES # BLD AUTO: 1.98 K/UL (ref 0–0.85)
MONOCYTES # BLD AUTO: 2.2 K/UL (ref 0–0.85)
MONOCYTES NFR BLD AUTO: 13.8 % (ref 0–13.4)
MONOCYTES NFR BLD AUTO: 16.4 % (ref 0–13.4)
MONOCYTES NFR BLD AUTO: 16.5 % (ref 0–13.4)
MONOCYTES NFR BLD AUTO: 16.9 % (ref 0–13.4)
NEUTROPHILS # BLD AUTO: 5.72 K/UL (ref 2–7.15)
NEUTROPHILS # BLD AUTO: 6.38 K/UL (ref 2–7.15)
NEUTROPHILS # BLD AUTO: 6.49 K/UL (ref 2–7.15)
NEUTROPHILS # BLD AUTO: 7.72 K/UL (ref 2–7.15)
NEUTROPHILS NFR BLD: 48.3 % (ref 44–72)
NEUTROPHILS NFR BLD: 49.7 % (ref 44–72)
NEUTROPHILS NFR BLD: 53 % (ref 44–72)
NEUTROPHILS NFR BLD: 64.2 % (ref 44–72)
NRBC # BLD AUTO: 0 K/UL
NRBC BLD-RTO: 0 /100 WBC
PLATELET # BLD AUTO: 288 K/UL (ref 164–446)
PMV BLD AUTO: 9.6 FL (ref 9–12.9)
POTASSIUM SERPL-SCNC: 3.9 MMOL/L (ref 3.6–5.5)
POTASSIUM SERPL-SCNC: 3.9 MMOL/L (ref 3.6–5.5)
PROTHROMBIN TIME: 21.5 SEC (ref 12–14.6)
RBC # BLD AUTO: 3.25 M/UL (ref 4.2–5.4)
RBC # BLD AUTO: 3.28 M/UL (ref 4.2–5.4)
RBC # BLD AUTO: 3.32 M/UL (ref 4.2–5.4)
RBC # BLD AUTO: 3.61 M/UL (ref 4.2–5.4)
SODIUM SERPL-SCNC: 137 MMOL/L (ref 135–145)
SODIUM SERPL-SCNC: 139 MMOL/L (ref 135–145)
WBC # BLD AUTO: 11.9 K/UL (ref 4.8–10.8)
WBC # BLD AUTO: 12 K/UL (ref 4.8–10.8)
WBC # BLD AUTO: 12 K/UL (ref 4.8–10.8)
WBC # BLD AUTO: 13 K/UL (ref 4.8–10.8)

## 2018-12-04 PROCEDURE — B548ZZA ULTRASONOGRAPHY OF SUPERIOR VENA CAVA, GUIDANCE: ICD-10-PCS | Performed by: INTERNAL MEDICINE

## 2018-12-04 PROCEDURE — 067D3DZ DILATION OF LEFT COMMON ILIAC VEIN WITH INTRALUMINAL DEVICE, PERCUTANEOUS APPROACH: ICD-10-PCS | Performed by: RADIOLOGY

## 2018-12-04 PROCEDURE — 99232 SBSQ HOSP IP/OBS MODERATE 35: CPT | Performed by: HOSPITALIST

## 2018-12-04 PROCEDURE — 36556 INSERT NON-TUNNEL CV CATH: CPT | Mod: RT | Performed by: INTERNAL MEDICINE

## 2018-12-04 PROCEDURE — B51G1ZZ FLUOROSCOPY OF LEFT PELVIC (ILIAC) VEINS USING LOW OSMOLAR CONTRAST: ICD-10-PCS | Performed by: RADIOLOGY

## 2018-12-04 PROCEDURE — A9270 NON-COVERED ITEM OR SERVICE: HCPCS | Performed by: INTERNAL MEDICINE

## 2018-12-04 PROCEDURE — C1751 CATH, INF, PER/CENT/MIDLINE: HCPCS

## 2018-12-04 PROCEDURE — 37239 OPEN/PERQ PLACE STENT EA ADD: CPT

## 2018-12-04 PROCEDURE — 85048 AUTOMATED LEUKOCYTE COUNT: CPT

## 2018-12-04 PROCEDURE — A9270 NON-COVERED ITEM OR SERVICE: HCPCS

## 2018-12-04 PROCEDURE — 85730 THROMBOPLASTIN TIME PARTIAL: CPT | Mod: 91

## 2018-12-04 PROCEDURE — 82962 GLUCOSE BLOOD TEST: CPT | Mod: 91

## 2018-12-04 PROCEDURE — 83735 ASSAY OF MAGNESIUM: CPT

## 2018-12-04 PROCEDURE — 85014 HEMATOCRIT: CPT | Mod: 91

## 2018-12-04 PROCEDURE — 85041 AUTOMATED RBC COUNT: CPT | Mod: 91

## 2018-12-04 PROCEDURE — 85610 PROTHROMBIN TIME: CPT

## 2018-12-04 PROCEDURE — 80048 BASIC METABOLIC PNL TOTAL CA: CPT

## 2018-12-04 PROCEDURE — 71045 X-RAY EXAM CHEST 1 VIEW: CPT

## 2018-12-04 PROCEDURE — 700111 HCHG RX REV CODE 636 W/ 250 OVERRIDE (IP)

## 2018-12-04 PROCEDURE — 700105 HCHG RX REV CODE 258: Performed by: INTERNAL MEDICINE

## 2018-12-04 PROCEDURE — 700105 HCHG RX REV CODE 258: Performed by: RADIOLOGY

## 2018-12-04 PROCEDURE — 36556 INSERT NON-TUNNEL CV CATH: CPT

## 2018-12-04 PROCEDURE — 700111 HCHG RX REV CODE 636 W/ 250 OVERRIDE (IP): Performed by: INTERNAL MEDICINE

## 2018-12-04 PROCEDURE — A9270 NON-COVERED ITEM OR SERVICE: HCPCS | Performed by: HOSPITALIST

## 2018-12-04 PROCEDURE — 85384 FIBRINOGEN ACTIVITY: CPT | Mod: 91

## 2018-12-04 PROCEDURE — 700102 HCHG RX REV CODE 250 W/ 637 OVERRIDE(OP): Performed by: HOSPITALIST

## 2018-12-04 PROCEDURE — 85025 COMPLETE CBC W/AUTO DIFF WBC: CPT

## 2018-12-04 PROCEDURE — 700101 HCHG RX REV CODE 250

## 2018-12-04 PROCEDURE — 700102 HCHG RX REV CODE 250 W/ 637 OVERRIDE(OP)

## 2018-12-04 PROCEDURE — 700111 HCHG RX REV CODE 636 W/ 250 OVERRIDE (IP): Performed by: HOSPITALIST

## 2018-12-04 PROCEDURE — 85018 HEMOGLOBIN: CPT | Mod: 91

## 2018-12-04 PROCEDURE — 700111 HCHG RX REV CODE 636 W/ 250 OVERRIDE (IP): Performed by: RADIOLOGY

## 2018-12-04 PROCEDURE — 067G3DZ DILATION OF LEFT EXTERNAL ILIAC VEIN WITH INTRALUMINAL DEVICE, PERCUTANEOUS APPROACH: ICD-10-PCS | Performed by: RADIOLOGY

## 2018-12-04 PROCEDURE — 770022 HCHG ROOM/CARE - ICU (200)

## 2018-12-04 PROCEDURE — 067N3DZ DILATION OF LEFT FEMORAL VEIN WITH INTRALUMINAL DEVICE, PERCUTANEOUS APPROACH: ICD-10-PCS | Performed by: RADIOLOGY

## 2018-12-04 PROCEDURE — 700111 HCHG RX REV CODE 636 W/ 250 OVERRIDE (IP): Mod: JG | Performed by: RADIOLOGY

## 2018-12-04 PROCEDURE — 700101 HCHG RX REV CODE 250: Performed by: INTERNAL MEDICINE

## 2018-12-04 PROCEDURE — 700102 HCHG RX REV CODE 250 W/ 637 OVERRIDE(OP): Performed by: INTERNAL MEDICINE

## 2018-12-04 PROCEDURE — 02HV33Z INSERTION OF INFUSION DEVICE INTO SUPERIOR VENA CAVA, PERCUTANEOUS APPROACH: ICD-10-PCS | Performed by: INTERNAL MEDICINE

## 2018-12-04 PROCEDURE — 99153 MOD SED SAME PHYS/QHP EA: CPT

## 2018-12-04 PROCEDURE — 99232 SBSQ HOSP IP/OBS MODERATE 35: CPT | Mod: 25 | Performed by: INTERNAL MEDICINE

## 2018-12-04 RX ORDER — CLOPIDOGREL 300 MG/1
300 TABLET, FILM COATED ORAL ONCE
Status: DISCONTINUED | OUTPATIENT
Start: 2018-12-04 | End: 2018-12-04

## 2018-12-04 RX ORDER — SODIUM CHLORIDE 9 MG/ML
500 INJECTION, SOLUTION INTRAVENOUS
Status: ACTIVE | OUTPATIENT
Start: 2018-12-04 | End: 2018-12-04

## 2018-12-04 RX ORDER — POTASSIUM CHLORIDE 20 MEQ/1
40 TABLET, EXTENDED RELEASE ORAL ONCE
Status: COMPLETED | OUTPATIENT
Start: 2018-12-04 | End: 2018-12-04

## 2018-12-04 RX ORDER — SODIUM CHLORIDE 9 MG/ML
500 INJECTION, SOLUTION INTRAVENOUS ONCE
Status: COMPLETED | OUTPATIENT
Start: 2018-12-04 | End: 2018-12-04

## 2018-12-04 RX ORDER — MIDAZOLAM HYDROCHLORIDE 1 MG/ML
INJECTION INTRAMUSCULAR; INTRAVENOUS
Status: COMPLETED
Start: 2018-12-04 | End: 2018-12-04

## 2018-12-04 RX ORDER — CLINDAMYCIN PHOSPHATE 600 MG/50ML
600 INJECTION, SOLUTION INTRAVENOUS ONCE
Status: COMPLETED | OUTPATIENT
Start: 2018-12-04 | End: 2018-12-04

## 2018-12-04 RX ORDER — SODIUM CHLORIDE, SODIUM LACTATE, POTASSIUM CHLORIDE, CALCIUM CHLORIDE 600; 310; 30; 20 MG/100ML; MG/100ML; MG/100ML; MG/100ML
1000 INJECTION, SOLUTION INTRAVENOUS ONCE
Status: COMPLETED | OUTPATIENT
Start: 2018-12-04 | End: 2018-12-04

## 2018-12-04 RX ORDER — MAGNESIUM SULFATE HEPTAHYDRATE 40 MG/ML
4 INJECTION, SOLUTION INTRAVENOUS ONCE
Status: COMPLETED | OUTPATIENT
Start: 2018-12-04 | End: 2018-12-04

## 2018-12-04 RX ORDER — CLOPIDOGREL 300 MG/1
TABLET, FILM COATED ORAL
Status: COMPLETED
Start: 2018-12-04 | End: 2018-12-04

## 2018-12-04 RX ORDER — ONDANSETRON 2 MG/ML
4 INJECTION INTRAMUSCULAR; INTRAVENOUS PRN
Status: ACTIVE | OUTPATIENT
Start: 2018-12-04 | End: 2018-12-04

## 2018-12-04 RX ORDER — CLOPIDOGREL BISULFATE 75 MG/1
75 TABLET ORAL DAILY
Status: DISCONTINUED | OUTPATIENT
Start: 2018-12-05 | End: 2018-12-14 | Stop reason: HOSPADM

## 2018-12-04 RX ORDER — NALOXONE HYDROCHLORIDE 0.4 MG/ML
INJECTION, SOLUTION INTRAMUSCULAR; INTRAVENOUS; SUBCUTANEOUS
Status: COMPLETED
Start: 2018-12-04 | End: 2018-12-04

## 2018-12-04 RX ORDER — LIDOCAINE HYDROCHLORIDE 10 MG/ML
INJECTION, SOLUTION INFILTRATION; PERINEURAL
Status: COMPLETED
Start: 2018-12-04 | End: 2018-12-04

## 2018-12-04 RX ORDER — MIDAZOLAM HYDROCHLORIDE 1 MG/ML
.5-2 INJECTION INTRAMUSCULAR; INTRAVENOUS PRN
Status: ACTIVE | OUTPATIENT
Start: 2018-12-04 | End: 2018-12-04

## 2018-12-04 RX ORDER — CLINDAMYCIN PHOSPHATE 150 MG/ML
INJECTION, SOLUTION INTRAVENOUS
Status: COMPLETED
Start: 2018-12-04 | End: 2018-12-04

## 2018-12-04 RX ORDER — GABAPENTIN 300 MG/1
300 CAPSULE ORAL EVERY 8 HOURS
Status: DISCONTINUED | OUTPATIENT
Start: 2018-12-04 | End: 2018-12-14 | Stop reason: HOSPADM

## 2018-12-04 RX ORDER — CLOPIDOGREL 300 MG/1
300 TABLET, FILM COATED ORAL ONCE
Status: COMPLETED | OUTPATIENT
Start: 2018-12-04 | End: 2018-12-04

## 2018-12-04 RX ADMIN — GABAPENTIN 300 MG: 300 CAPSULE ORAL at 22:05

## 2018-12-04 RX ADMIN — ALTEPLASE 1 MG/HR: KIT at 01:51

## 2018-12-04 RX ADMIN — CLINDAMYCIN PHOSPHATE 600 MG: 150 INJECTION, SOLUTION INTRAMUSCULAR; INTRAVENOUS at 13:40

## 2018-12-04 RX ADMIN — LEVOTHYROXINE SODIUM 150 MCG: 150 TABLET ORAL at 06:12

## 2018-12-04 RX ADMIN — MAGNESIUM SULFATE IN WATER 4 G: 40 INJECTION, SOLUTION INTRAVENOUS at 09:38

## 2018-12-04 RX ADMIN — STANDARDIZED SENNA CONCENTRATE AND DOCUSATE SODIUM 2 TABLET: 8.6; 5 TABLET, FILM COATED ORAL at 18:00

## 2018-12-04 RX ADMIN — OXYCODONE HYDROCHLORIDE 5 MG: 5 TABLET ORAL at 17:01

## 2018-12-04 RX ADMIN — ATORVASTATIN CALCIUM 80 MG: 40 TABLET, FILM COATED ORAL at 17:01

## 2018-12-04 RX ADMIN — MIDAZOLAM 1 MG: 1 INJECTION INTRAMUSCULAR; INTRAVENOUS at 13:02

## 2018-12-04 RX ADMIN — Medication 500 MG: at 17:01

## 2018-12-04 RX ADMIN — SODIUM CHLORIDE 500 ML: 9 INJECTION, SOLUTION INTRAVENOUS at 12:45

## 2018-12-04 RX ADMIN — OXYCODONE HYDROCHLORIDE 10 MG: 5 TABLET ORAL at 04:24

## 2018-12-04 RX ADMIN — HEPARIN SODIUM 500 UNITS/HR: 5000 INJECTION, SOLUTION INTRAVENOUS at 15:47

## 2018-12-04 RX ADMIN — SODIUM CHLORIDE, POTASSIUM CHLORIDE, SODIUM LACTATE AND CALCIUM CHLORIDE 1000 ML: 600; 310; 30; 20 INJECTION, SOLUTION INTRAVENOUS at 18:24

## 2018-12-04 RX ADMIN — NOREPINEPHRINE BITARTRATE 5 MCG/MIN: 1 INJECTION INTRAVENOUS at 22:43

## 2018-12-04 RX ADMIN — MIDAZOLAM 1 MG: 1 INJECTION INTRAMUSCULAR; INTRAVENOUS at 12:50

## 2018-12-04 RX ADMIN — STANDARDIZED SENNA CONCENTRATE AND DOCUSATE SODIUM 2 TABLET: 8.6; 5 TABLET, FILM COATED ORAL at 06:12

## 2018-12-04 RX ADMIN — SODIUM CHLORIDE, POTASSIUM CHLORIDE, SODIUM LACTATE AND CALCIUM CHLORIDE 1000 ML: 600; 310; 30; 20 INJECTION, SOLUTION INTRAVENOUS at 16:00

## 2018-12-04 RX ADMIN — FENTANYL CITRATE 50 MCG: 50 INJECTION, SOLUTION INTRAMUSCULAR; INTRAVENOUS at 12:50

## 2018-12-04 RX ADMIN — CLOPIDOGREL 300 MG: 300 TABLET, FILM COATED ORAL at 14:58

## 2018-12-04 RX ADMIN — POTASSIUM CHLORIDE 40 MEQ: 1500 TABLET, EXTENDED RELEASE ORAL at 08:46

## 2018-12-04 RX ADMIN — TRAZODONE HYDROCHLORIDE 100 MG: 100 TABLET ORAL at 22:05

## 2018-12-04 RX ADMIN — GABAPENTIN 300 MG: 300 CAPSULE ORAL at 08:46

## 2018-12-04 RX ADMIN — Medication 500 MG: at 06:12

## 2018-12-04 RX ADMIN — CLOPIDOGREL BISULFATE 300 MG: 300 TABLET, FILM COATED ORAL at 14:58

## 2018-12-04 ASSESSMENT — ENCOUNTER SYMPTOMS
CARDIOVASCULAR NEGATIVE: 1
GASTROINTESTINAL NEGATIVE: 1
CONSTITUTIONAL NEGATIVE: 1
RESPIRATORY NEGATIVE: 1
NEUROLOGICAL NEGATIVE: 1
PSYCHIATRIC NEGATIVE: 1
EYES NEGATIVE: 1

## 2018-12-04 ASSESSMENT — PAIN SCALES - GENERAL
PAINLEVEL_OUTOF10: 3
PAINLEVEL_OUTOF10: 4
PAINLEVEL_OUTOF10: 3
PAINLEVEL_OUTOF10: 8
PAINLEVEL_OUTOF10: 4
PAINLEVEL_OUTOF10: 4
PAINLEVEL_OUTOF10: 2
PAINLEVEL_OUTOF10: 4
PAINLEVEL_OUTOF10: 4
PAINLEVEL_OUTOF10: 0
PAINLEVEL_OUTOF10: 9
PAINLEVEL_OUTOF10: 4
PAINLEVEL_OUTOF10: 2
PAINLEVEL_OUTOF10: 5
PAINLEVEL_OUTOF10: 4
PAINLEVEL_OUTOF10: 5

## 2018-12-04 NOTE — PROGRESS NOTES
Received report from IR RN. Patient transported back to [facility], on 12L NRB. 2 RN skin check completed. Left popliteal sheath site in place CDI. Mepliex on sacrum, no areas of concern.

## 2018-12-04 NOTE — PROGRESS NOTES
Patients SBP 70-80s and patient on 10L oxy mask. Updated Dr. Hamlin. MD to place central line and ordered 1 L LR bolus. Consent obtained from patient.

## 2018-12-04 NOTE — PROGRESS NOTES
Hospital Medicine Daily Progress Note    Date of Service  12/4/2018    Chief Complaint  Left leg pain      Hospital Course    78 y.o. Female w/ afib and hx of DVT/PE admitted 12/1/2018 with recent discontinuation of her coumadin for a dental procedure then restarted on warfarin and noted increase in left leg swelling.  Now here with acute thrombus in left lower extremity requiring catheter directed fibrinolysis.      Interval Problem Update  S/p left femoral vein stent placement.  On heparin and plavix  Groggy but appears in no distress.  Care discussed with ICU RN    Consultants/Specialty  Intensivist    Code Status  FULL    Disposition  Monitor in ICU s/p stent placement    Review of Systems  Review of Systems   Unable to perform ROS: Mental acuity        Physical Exam  Temp:  [36.4 °C (97.6 °F)-37.7 °C (99.8 °F)] 36.9 °C (98.5 °F)  Pulse:  [59-98] 82  Resp:  [11-62] 23    Physical Exam   Constitutional: She appears well-developed. She appears lethargic. No distress.   HENT:   Head: Normocephalic and atraumatic.   Nose: Nose normal.   Mouth/Throat: No oropharyngeal exudate.   Eyes: Conjunctivae and EOM are normal. Right eye exhibits no discharge. Left eye exhibits no discharge.   Neck: No tracheal deviation present.   Cardiovascular: Normal rate, regular rhythm and normal heart sounds.    Pulses:       Radial pulses are 2+ on the right side, and 2+ on the left side.        Dorsalis pedis pulses are 2+ on the right side, and 2+ on the left side.   Pulmonary/Chest: Effort normal. No stridor. No respiratory distress. She has no wheezes.   Abdominal: Soft. She exhibits no distension. There is no tenderness.   Musculoskeletal: She exhibits no edema.   Neurological: She appears lethargic. No cranial nerve deficit.   Skin: Skin is warm. She is not diaphoretic.   Psychiatric: She has a normal mood and affect. Her behavior is normal.   Vitals reviewed.      Fluids    Intake/Output Summary (Last 24 hours) at 12/04/18  2107  Last data filed at 12/04/18 1800   Gross per 24 hour   Intake             3978 ml   Output             1145 ml   Net             2833 ml       Laboratory  Recent Labs      12/02/18 1953 12/03/18   0007   12/04/18   0420  12/04/18   0859  12/04/18   1649   WBC  11.0*  10.4   < >  11.9*  12.0*  12.0*   RBC  3.82*  3.60*   < >  3.28*  3.61*  3.32*   HEMOGLOBIN  11.2*  10.6*   < >  9.7*  10.7*  9.8*   HEMATOCRIT  35.7*  32.9*   < >  31.0*  33.4*  30.5*   MCV  93.5  91.4   < >  94.5  92.5  91.9   MCH  29.3  29.4   < >  29.6  29.6  29.5   MCHC  31.4*  32.2*   < >  31.3*  32.0*  32.1*   RDW  53.4*  51.3*   < >  52.8*  51.8*  50.9*   PLATELETCT  392  410   --   288   --    --    MPV  9.7  10.1   --   9.6   --    --     < > = values in this interval not displayed.     Recent Labs      12/03/18   0901  12/04/18   0420  12/04/18   0859   SODIUM  137  137  139   POTASSIUM  4.1  3.9  3.9   CHLORIDE  106  108  108   CO2  25  21  19*   GLUCOSE  108*  104*  109*   BUN  16  14  13   CREATININE  0.88  0.76  0.72   CALCIUM  8.6  7.6*  8.1*     Recent Labs      12/02/18 1953 12/03/18   1600  12/04/18   0020  12/04/18   0859  12/04/18   1649   APTT  52.5*   < >  47.9*  44.9*  48.4*  57.7*   INR  2.37*   --   2.13*   --    --   1.86*    < > = values in this interval not displayed.               Imaging  DX-CHEST-PORTABLE (1 VIEW)   Final Result      Right central line projects over the SVC. No pneumothorax.      Interstitial edema.      Bibasilar opacities likely represent atelectasis.      Atherosclerotic plaque.         DX-CHEST-PORTABLE (1 VIEW)   Final Result      Mild pulmonary edema with dependent atelectasis or edema and probable small effusions.      IR-EXTREMITY VENOGRAM-UNILATERAL LEFT   Final Result      1.  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    Lower IVC is widely patent.      2.  Initiation of continuous overnight venous TPA infusion via multi-sidehole infusion catheter spanning from the left popliteal vein-femoral vein junction across the entire iliofemoral system with the catheter tip in the IVC.      3.  Plan is for overnight TPA infusion. Follow-up venogram tomorrow 12/3/2018, afternoon. Anticipate left iliac system and venous stenting for chronic occlusion. May need additional TPA infusion and/or mechanical thrombectomy with the goal of achieving    in-line flow in the left iliofemoral system.                     INTERPRETING LOCATION: [facility], [place], [address]      US-EXTREMITY VENOUS LOWER UNILAT LEFT   Final Result      IR-ANGIO THROUGH EXISTING CATHETER    (Results Pending)   IR-MIDLINE CATHETER INSERTION >5 YRS poor veins, serial lab draws    (Results Pending)   IR-EXTREMITY ANGIOGRAM-UNILATERAL LEFT    (Results Pending)        Assessment/Plan  Acute deep vein thrombosis (DVT) of femoral vein of left lower extremity (HCC)- (present on admission)   Assessment & Plan    Stent placed left thigh/pelvic region per Dr Ellis 12/4.  Plavix initiated  S/p Thromolysis   anticoagulation     DM (diabetes mellitus) (HCC)- (present on admission)   Assessment & Plan    Monitor accuchecks and cover with SSI  HgbA1c:6.1 in past 3 months     Acute respiratory failure with hypoxia (HCC)   Assessment & Plan    RT protocol  Deep inspiratory efforts/encourage IS  Supplemental oxygen     Hematuria   Assessment & Plan    Monitoring CBC       Obesity (BMI 30-39.9)- (present on admission)   Assessment & Plan    Body mass index is 35.34 kg/m².       Essential hypertension- (present on admission)   Assessment & Plan    Monitor vitals.  On amlodipine and losartan with hold parameters for lower BPs     Atrial fibrillation (HCC)- (present on admission)   Assessment & Plan    Heparin drip and restart warfarin 12/5  Rate  controlled  Has been in NSR          VTE prophylaxis: heparin

## 2018-12-04 NOTE — PROGRESS NOTES
2 RN skin assessment completed, sheath site noted to posterior Left calf, site clean dry and intact with two infusions running. Blanching redness noted to chest. Posterior side intact no areas of concerns identified.

## 2018-12-04 NOTE — PROGRESS NOTES
Called IR regarding plan for stent placement. No time yet, but pt will be stented today. Make patient NPO now for IR.

## 2018-12-04 NOTE — PROGRESS NOTES
IR Procedure Note:    Patient arrived to IR2.  Patient A&Ox4,  on 5L nasal cannula and in no apparent disress.  Patient consented by Dr Ellis; all questions answered.  Dr. Ellis completed left lower extremity venogram with stent placement to left iliac vein x 4.  The patient tolerated the procedure well; ETCo2 baseline 34-37, with consistent waveform during the procedure.  Patient tachycardic up to 160's and hypotensive (systolic 80) for up to one minute; patient asymptomatic at this time.  Instructed patient to vagal, patient converted back to normal sinus rhythm.  Patient again tachycardic, patient spontaneously converted.  Vital signs stable at this time. Sheath left in place to left popliteal vein, CDI.  Patient alert and oriented and verbally appropriate post procedure.  Patient taken to SICU 117 in stable condition and bedside report given to ROSE Blanco.     Left iliac stents:  [facility] Vascular Pro Vascular Stent [id number], 80cm  REF [id number]  LOT [id number]  [employer]o Absolute Pro Vascular Stent 8.0x80mm, 80cm  REF [id number]  LOT [id number]   [employer]o Absolute Pro Vascular Stent 8.0x40mm, 80cm  REF [id number]  LOT [id number]  Roblero Absolute Pro Vascular Stent 8.0x30mm, 80cm  REF [id number]  LOT [id number]

## 2018-12-04 NOTE — PROGRESS NOTES
Presented patient in rounds with Dr. Hamlin. Updated MD on patient complaining of R leg pain, but no s/sx of DVT, meds ordered. Updated MD on plan for STENT placement today. Updated MD regarding patients midline not drawing back, per MD okay to poke patient for labs. Updated MD regarding patient refusing insulin because she wants metformin to be restarted. Metformin cannot be restarted at this time.

## 2018-12-04 NOTE — CARE PLAN
Problem: Positive DVT/VTE  Intervention: Monitor circulation, sensation and/or motion of extremity  q1 vascular checks complete. Heparin and alteplase infusing through left popliteal sheath site.       Problem: Skin Integrity  Goal: Risk for impaired skin integrity will decrease  Outcome: PROGRESSING AS EXPECTED  2 RN skin check completed. Patient turned q2 hrs alternating pillows. Appropriate pressure ulcer prevention interventions in place.

## 2018-12-04 NOTE — OR SURGEON
Immediate Post- Operative Note        PostOp Diagnosis: chronic LEFT iliac - CFV stenosis      Procedure(s): venogram with stents, started on plavix, matthew DHILLON      Estimated Blood Loss: Less than 5 ml        Complications: None            12/4/2018     1:46 PM     Andrey Ellis

## 2018-12-04 NOTE — PROGRESS NOTES
2 RN skin assessment. Mepilex in place, sacrum pink-blanching, bruising and swelling to B/L UE's.     1130- Pt back from IR, orders to keep flat with LLE extended, unable to assess back at this time. Pt prone for procedure, redness/indentions to chest and abdomen from  EKG wire sites- blanching.

## 2018-12-04 NOTE — PROGRESS NOTES
Updated Dr. Hamlin regarding patients SBP consistently 85-90s with MAP 55-60. Orders received for 500ml NS bolus.

## 2018-12-04 NOTE — PROGRESS NOTES
Critical Care Progress Note    Date of admission  12/1/2018    Chief Complaint  78 y.o. female admitted 12/1/2018 with ischemic LLE    Hospital Course    Interval Problem Update  Reviewed last 24 hour events:  TPA infusion  Leg appears more swollen than yesterday  Pulses remain present  Return planned to IR again    Review of Systems  Review of Systems   Constitutional: Negative.    HENT: Negative.    Eyes: Negative.    Respiratory: Negative.    Cardiovascular: Negative.    Gastrointestinal: Negative.    Musculoskeletal:        Swollen leg- same as yesterday   Skin: Negative.    Neurological: Negative.    Endo/Heme/Allergies: Negative.    Psychiatric/Behavioral: Negative.         Vital Signs for last 24 hours   Temp:  [36.4 °C (97.6 °F)-37.7 °C (99.8 °F)] 36.6 °C (97.8 °F)  Pulse:  [59-88] 66  Resp:  [11-62] 12    Hemodynamic parameters for last 24 hours       Respiratory       Physical Exam   Physical Exam   Constitutional: She is oriented to person, place, and time. She appears well-developed and well-nourished.   HENT:   Head: Normocephalic and atraumatic.   Neck: Normal range of motion. Neck supple.   Cardiovascular: Normal rate and regular rhythm.    Pulmonary/Chest: Breath sounds normal.   Abdominal: Soft. Bowel sounds are normal.   Musculoskeletal: She exhibits edema.   Large swollen right leg- no change   Neurological: She is alert and oriented to person, place, and time.   Skin: Skin is warm and dry.   Psychiatric: She has a normal mood and affect. Her behavior is normal. Judgment and thought content normal.       Medications  Current Facility-Administered Medications   Medication Dose Route Frequency Provider Last Rate Last Dose   • magnesium sulfate IVPB premix 4 g  4 g Intravenous Once Zaki Hamlin M.D. 25 mL/hr at 12/04/18 0938 4 g at 12/04/18 0938   • gabapentin (NEURONTIN) capsule 300 mg  300 mg Oral Q8HRS Zaki Hamlin M.D.   300 mg at 12/04/18 0846   • losartan (COZAAR) tablet 25 mg  25 mg  Oral Q DAY Joan Belle M.D.   Stopped at 12/04/18 0600   • alteplase (ACTIVASE) 10 mg in  mL Infusion  1 mg/hr Intravenous Continuous Mushtaq Florian M.D. 50 mL/hr at 12/04/18 0704 1 mg/hr at 12/04/18 0704   • amLODIPine (NORVASC) tablet 10 mg  10 mg Oral Q DAY Deondre Quigley M.D.   Stopped at 12/04/18 0600   • atorvastatin (LIPITOR) tablet 80 mg  80 mg Oral Q EVENING Deondre Quigley M.D.   80 mg at 12/03/18 1718   • levothyroxine (SYNTHROID) tablet 150 mcg  150 mcg Oral QAM AC Deondre Quigley M.D.   150 mcg at 12/04/18 0612   • oxyCODONE immediate-release (ROXICODONE) tablet 5-10 mg  5-10 mg Oral Q4HRS PRN Deondre Quigley M.D.   10 mg at 12/04/18 0424   • traZODone (DESYREL) tablet 100 mg  100 mg Oral QHS Deondre Quigley M.D.   100 mg at 12/03/18 2210   • senna-docusate (PERICOLACE or SENOKOT S) 8.6-50 MG per tablet 2 Tab  2 Tab Oral BID Deondre Quigley M.D.   2 Tab at 12/04/18 0612    And   • polyethylene glycol/lytes (MIRALAX) PACKET 1 Packet  1 Packet Oral QDAY PRN Deondre Quigley M.D.        And   • magnesium hydroxide (MILK OF MAGNESIA) suspension 30 mL  30 mL Oral QDAY PRALEXEY Quigley M.D.        And   • bisacodyl (DULCOLAX) suppository 10 mg  10 mg Rectal QDAY PRN Deondre Quigley M.D.       • acetaminophen (TYLENOL) tablet 650 mg  650 mg Oral Q6HRS PRALEXEY Quigley M.D.       • insulin lispro (HUMALOG) injection 1-6 Units  1-6 Units Subcutaneous 4X/DAY ANTOINETTE Quigley M.D.   Stopped at 12/01/18 1700    And   • glucose 4 g chewable tablet 16 g  16 g Oral Q15 MIN PRN Deondre Quigley M.D.        And   • dextrose 50% (D50W) injection 25 mL  25 mL Intravenous Q15 MIN PRN Deondre Quigley M.D.       • heparin injection 3,200 Units  3,200 Units Intravenous PRN Deondre Quigley M.D.        And   • heparin infusion 25,000 units in 500 ml 0.45% nacl   Intravenous Continuous Deondre Quigley M.D. 10 mL/hr at 12/04/18 0704 500 Units/hr at 12/04/18 0704   • calcium carbonate (OS-MARKELL 500) tablet 500 mg  500 mg Oral BID WITH  MEALS Deondre Quigley M.D.   500 mg at 12/04/18 0612     Facility-Administered Medications Ordered in Other Encounters   Medication Dose Route Frequency Provider Last Rate Last Dose   • iodixanol (VISIPAQUE) SOLN    Intra-Op Once PRN Shekhar Rosado M.D.   10 mL at 01/18/17 0619       Fluids    Intake/Output Summary (Last 24 hours) at 12/04/18 1044  Last data filed at 12/04/18 1000   Gross per 24 hour   Intake             2660 ml   Output             1200 ml   Net             1460 ml       Laboratory          Recent Labs      12/03/18   0428  12/03/18   0901  12/04/18   0420  12/04/18   0859   SODIUM  139  137  137  139   POTASSIUM  4.3  4.1  3.9  3.9   CHLORIDE  108  106  108  108   CO2  25  25  21   --    BUN  15  16  14  13   CREATININE  0.93  0.88  0.76  0.72   MAGNESIUM   --    --   1.3*   --    CALCIUM  8.2*  8.6  7.6*  8.1*     Recent Labs      12/01/18   1254   12/03/18   0901  12/04/18   0420  12/04/18   0859   ALTSGPT  13   --    --    --    --    ASTSGOT  15   --    --    --    --    ALKPHOSPHAT  67   --    --    --    --    TBILIRUBIN  0.6   --    --    --    --    GLUCOSE  111*   < >  108*  104*  109*    < > = values in this interval not displayed.     Recent Labs      12/01/18   1254   12/04/18   0020  12/04/18   0420  12/04/18   0859   WBC  12.0*   < >  13.0*  11.9*  12.0*   NEUTSPOLYS  72.50*   < >  49.70  48.30  53.00   LYMPHOCYTES  12.60*   < >  23.00  22.10  16.70*   MONOCYTES  10.00   < >  16.90*  16.50*  16.40*   EOSINOPHILS  3.90   < >  9.50*  12.30*  12.90*   BASOPHILS  0.60   < >  0.40  0.40  0.50   ASTSGOT  15   --    --    --    --    ALTSGPT  13   --    --    --    --    ALKPHOSPHAT  67   --    --    --    --    TBILIRUBIN  0.6   --    --    --    --     < > = values in this interval not displayed.     Recent Labs      12/02/18   1735  12/02/18   1953  12/03/18   0007   12/03/18   1600  12/04/18   0020  12/04/18   0420  12/04/18   0859   RBC   --   3.82*  3.60*   < >  3.45*  3.25*  3.28*   3.61*   HEMOGLOBIN   --   11.2*  10.6*   < >  10.3*  9.7*  9.7*  10.7*   HEMATOCRIT   --   35.7*  32.9*   < >  31.8*  29.8*  31.0*  33.4*   PLATELETCT   --   392  410   --    --    --   288   --    PROTHROMBTM  26.4*  25.9*   --    --   23.9*   --    --    --    APTT  39.0*  52.5*   --    < >  47.9*  44.9*   --   48.4*   INR  2.42*  2.37*   --    --   2.13*   --    --    --     < > = values in this interval not displayed.       Imaging  X-Ray:  I have personally reviewed the images and compared with prior images.    Assessment/Plan  Acute deep vein thrombosis (DVT) of femoral vein of left lower extremity (HCC)- (present on admission)   Assessment & Plan    Plan for q. one hour vascular checks status post TPA catheter per protocol  Continue to monitor for vascular insufficiency  Post TPA start heparin per protocol     DM (diabetes mellitus) (HCC)- (present on admission)   Assessment & Plan    Sliding scale  Glucose goal 120-180     Essential hypertension- (present on admission)   Assessment & Plan    Continue home medications when appropriate     Atrial fibrillation (HCC)- (present on admission)   Assessment & Plan    Rate controlled  Heparin per protocol post TPA  Transition to oral anticoagulant when appropriate          VTE:  Heparin  Ulcer: Not Indicated  Lines: None    I have performed a physical exam and reviewed and updated ROS and Plan today (12/4/2018). In review of yesterday's note (12/3/2018), there are no changes except as documented above.     Discussed patient condition and risk of morbidity and/or mortality with Hospitalist, RN, RT, , [facility] resident and Patient  The patient remains ill.

## 2018-12-04 NOTE — DOCUMENTATION QUERY
DOCUMENTATION QUERY    PROVIDERS: Please select “Cosign w/ note”to reply to query.    To better represent the severity of illness of your patient, please review the following information and exercise your independent professional judgment in responding to this query.     Atrial fibrillation is documented in the History and Physical and Progress Notes. Based upon the clinical findings, risk factors, and treatment, can this diagnosis be further specified?    • Paroxysmal atrial fibrillation  • Persistent atrial fibrillation  • Chronic atrial fibrillation  • Other explanation of clinical findings  • Unable to determine (no explanation for clinical findings)      The medical record reflects the following:   Clinical Findings Atrial fibrillation-rate controlled   Treatment Coumadin  Heparin   Risk Factors Atrial fibrillation   Location within medical record History and Physical and Progress Notes     Thank you,   Yue Forman RN BSN  Clinical   [phone] [facility] office  [phone] Cell phone

## 2018-12-04 NOTE — CARE PLAN
Problem: Safety  Goal: Will remain free from injury  Bed in low locked position call light and personal belongings within reach.

## 2018-12-05 DIAGNOSIS — I87.1 ILIAC VEIN STENOSIS, LEFT: ICD-10-CM

## 2018-12-05 PROBLEM — R41.0 DELIRIUM: Status: ACTIVE | Noted: 2018-12-05

## 2018-12-05 LAB
ANION GAP SERPL CALC-SCNC: 5 MMOL/L (ref 0–11.9)
ANION GAP SERPL CALC-SCNC: 9 MMOL/L (ref 0–11.9)
APTT PPP: 37 SEC (ref 24.7–36)
APTT PPP: 37.9 SEC (ref 24.7–36)
APTT PPP: 52.9 SEC (ref 24.7–36)
BASOPHILS # BLD AUTO: 0.4 % (ref 0–1.8)
BASOPHILS # BLD AUTO: 0.4 % (ref 0–1.8)
BASOPHILS # BLD AUTO: 0.6 % (ref 0–1.8)
BASOPHILS # BLD: 0.04 K/UL (ref 0–0.12)
BASOPHILS # BLD: 0.05 K/UL (ref 0–0.12)
BASOPHILS # BLD: 0.06 K/UL (ref 0–0.12)
BUN SERPL-MCNC: 11 MG/DL (ref 8–22)
BUN SERPL-MCNC: 11 MG/DL (ref 8–22)
CALCIUM SERPL-MCNC: 7.4 MG/DL (ref 8.5–10.5)
CALCIUM SERPL-MCNC: 7.4 MG/DL (ref 8.5–10.5)
CHLORIDE SERPL-SCNC: 106 MMOL/L (ref 96–112)
CHLORIDE SERPL-SCNC: 107 MMOL/L (ref 96–112)
CO2 SERPL-SCNC: 22 MMOL/L (ref 20–33)
CO2 SERPL-SCNC: 26 MMOL/L (ref 20–33)
CREAT SERPL-MCNC: 0.73 MG/DL (ref 0.5–1.4)
CREAT SERPL-MCNC: 0.8 MG/DL (ref 0.5–1.4)
EOSINOPHIL # BLD AUTO: 0.88 K/UL (ref 0–0.51)
EOSINOPHIL # BLD AUTO: 1.06 K/UL (ref 0–0.51)
EOSINOPHIL # BLD AUTO: 1.06 K/UL (ref 0–0.51)
EOSINOPHIL NFR BLD: 10.2 % (ref 0–6.9)
EOSINOPHIL NFR BLD: 8.8 % (ref 0–6.9)
EOSINOPHIL NFR BLD: 9.5 % (ref 0–6.9)
ERYTHROCYTE [DISTWIDTH] IN BLOOD BY AUTOMATED COUNT: 49.6 FL (ref 35.9–50)
ERYTHROCYTE [DISTWIDTH] IN BLOOD BY AUTOMATED COUNT: 50.3 FL (ref 35.9–50)
ERYTHROCYTE [DISTWIDTH] IN BLOOD BY AUTOMATED COUNT: 52.4 FL (ref 35.9–50)
FIBRINOGEN PPP-MCNC: 438 MG/DL (ref 215–460)
FIBRINOGEN PPP-MCNC: 451 MG/DL (ref 215–460)
FIBRINOGEN PPP-MCNC: 462 MG/DL (ref 215–460)
GLUCOSE BLD-MCNC: 132 MG/DL (ref 65–99)
GLUCOSE BLD-MCNC: 141 MG/DL (ref 65–99)
GLUCOSE BLD-MCNC: 152 MG/DL (ref 65–99)
GLUCOSE BLD-MCNC: 246 MG/DL (ref 65–99)
GLUCOSE SERPL-MCNC: 162 MG/DL (ref 65–99)
GLUCOSE SERPL-MCNC: 248 MG/DL (ref 65–99)
HCT VFR BLD AUTO: 29.1 % (ref 37–47)
HCT VFR BLD AUTO: 29.9 % (ref 37–47)
HCT VFR BLD AUTO: 30.3 % (ref 37–47)
HGB BLD-MCNC: 9.4 G/DL (ref 12–16)
HGB BLD-MCNC: 9.7 G/DL (ref 12–16)
HGB BLD-MCNC: 9.8 G/DL (ref 12–16)
IMM GRANULOCYTES # BLD AUTO: 0.03 K/UL (ref 0–0.11)
IMM GRANULOCYTES # BLD AUTO: 0.04 K/UL (ref 0–0.11)
IMM GRANULOCYTES # BLD AUTO: 0.06 K/UL (ref 0–0.11)
IMM GRANULOCYTES NFR BLD AUTO: 0.3 % (ref 0–0.9)
IMM GRANULOCYTES NFR BLD AUTO: 0.4 % (ref 0–0.9)
IMM GRANULOCYTES NFR BLD AUTO: 0.5 % (ref 0–0.9)
INR PPP: 1.44 (ref 0.87–1.13)
LYMPHOCYTES # BLD AUTO: 1.1 K/UL (ref 1–4.8)
LYMPHOCYTES # BLD AUTO: 1.66 K/UL (ref 1–4.8)
LYMPHOCYTES # BLD AUTO: 1.7 K/UL (ref 1–4.8)
LYMPHOCYTES NFR BLD: 11 % (ref 22–41)
LYMPHOCYTES NFR BLD: 15.3 % (ref 22–41)
LYMPHOCYTES NFR BLD: 15.9 % (ref 22–41)
MCH RBC QN AUTO: 28.9 PG (ref 27–33)
MCH RBC QN AUTO: 29.8 PG (ref 27–33)
MCH RBC QN AUTO: 29.9 PG (ref 27–33)
MCHC RBC AUTO-ENTMCNC: 31 G/DL (ref 33.6–35)
MCHC RBC AUTO-ENTMCNC: 32.8 G/DL (ref 33.6–35)
MCHC RBC AUTO-ENTMCNC: 33.3 G/DL (ref 33.6–35)
MCV RBC AUTO: 89.5 FL (ref 81.4–97.8)
MCV RBC AUTO: 91.2 FL (ref 81.4–97.8)
MCV RBC AUTO: 93.2 FL (ref 81.4–97.8)
MONOCYTES # BLD AUTO: 1.48 K/UL (ref 0–0.85)
MONOCYTES # BLD AUTO: 1.63 K/UL (ref 0–0.85)
MONOCYTES # BLD AUTO: 1.91 K/UL (ref 0–0.85)
MONOCYTES NFR BLD AUTO: 14.8 % (ref 0–13.4)
MONOCYTES NFR BLD AUTO: 15.7 % (ref 0–13.4)
MONOCYTES NFR BLD AUTO: 17.2 % (ref 0–13.4)
NEUTROPHILS # BLD AUTO: 5.96 K/UL (ref 2–7.15)
NEUTROPHILS # BLD AUTO: 6.35 K/UL (ref 2–7.15)
NEUTROPHILS # BLD AUTO: 6.48 K/UL (ref 2–7.15)
NEUTROPHILS NFR BLD: 57.1 % (ref 44–72)
NEUTROPHILS NFR BLD: 57.2 % (ref 44–72)
NEUTROPHILS NFR BLD: 64.7 % (ref 44–72)
NRBC # BLD AUTO: 0 K/UL
NRBC BLD-RTO: 0 /100 WBC
PLATELET # BLD AUTO: 334 K/UL (ref 164–446)
PLATELET # BLD AUTO: 374 K/UL (ref 164–446)
PMV BLD AUTO: 10.2 FL (ref 9–12.9)
PMV BLD AUTO: 9.5 FL (ref 9–12.9)
POTASSIUM SERPL-SCNC: 3.9 MMOL/L (ref 3.6–5.5)
POTASSIUM SERPL-SCNC: 4.1 MMOL/L (ref 3.6–5.5)
PROTHROMBIN TIME: 17.7 SEC (ref 12–14.6)
RBC # BLD AUTO: 3.25 M/UL (ref 4.2–5.4)
RBC # BLD AUTO: 3.25 M/UL (ref 4.2–5.4)
RBC # BLD AUTO: 3.28 M/UL (ref 4.2–5.4)
SODIUM SERPL-SCNC: 137 MMOL/L (ref 135–145)
SODIUM SERPL-SCNC: 138 MMOL/L (ref 135–145)
WBC # BLD AUTO: 10 K/UL (ref 4.8–10.8)
WBC # BLD AUTO: 10.4 K/UL (ref 4.8–10.8)
WBC # BLD AUTO: 11.1 K/UL (ref 4.8–10.8)

## 2018-12-05 PROCEDURE — 85384 FIBRINOGEN ACTIVITY: CPT

## 2018-12-05 PROCEDURE — 85610 PROTHROMBIN TIME: CPT

## 2018-12-05 PROCEDURE — 85018 HEMOGLOBIN: CPT

## 2018-12-05 PROCEDURE — 85041 AUTOMATED RBC COUNT: CPT

## 2018-12-05 PROCEDURE — 99233 SBSQ HOSP IP/OBS HIGH 50: CPT | Performed by: HOSPITALIST

## 2018-12-05 PROCEDURE — A9270 NON-COVERED ITEM OR SERVICE: HCPCS | Performed by: INTERNAL MEDICINE

## 2018-12-05 PROCEDURE — 82962 GLUCOSE BLOOD TEST: CPT | Mod: 91

## 2018-12-05 PROCEDURE — A9270 NON-COVERED ITEM OR SERVICE: HCPCS | Performed by: RADIOLOGY

## 2018-12-05 PROCEDURE — 700102 HCHG RX REV CODE 250 W/ 637 OVERRIDE(OP): Performed by: HOSPITALIST

## 2018-12-05 PROCEDURE — 99233 SBSQ HOSP IP/OBS HIGH 50: CPT | Performed by: INTERNAL MEDICINE

## 2018-12-05 PROCEDURE — 85730 THROMBOPLASTIN TIME PARTIAL: CPT | Mod: 91

## 2018-12-05 PROCEDURE — 85048 AUTOMATED LEUKOCYTE COUNT: CPT

## 2018-12-05 PROCEDURE — 700102 HCHG RX REV CODE 250 W/ 637 OVERRIDE(OP): Performed by: RADIOLOGY

## 2018-12-05 PROCEDURE — 700102 HCHG RX REV CODE 250 W/ 637 OVERRIDE(OP): Performed by: INTERNAL MEDICINE

## 2018-12-05 PROCEDURE — 85014 HEMATOCRIT: CPT

## 2018-12-05 PROCEDURE — A9270 NON-COVERED ITEM OR SERVICE: HCPCS | Performed by: HOSPITALIST

## 2018-12-05 PROCEDURE — 85025 COMPLETE CBC W/AUTO DIFF WBC: CPT

## 2018-12-05 PROCEDURE — 770022 HCHG ROOM/CARE - ICU (200)

## 2018-12-05 PROCEDURE — 80048 BASIC METABOLIC PNL TOTAL CA: CPT

## 2018-12-05 RX ADMIN — ACETAMINOPHEN 650 MG: 325 TABLET, FILM COATED ORAL at 16:24

## 2018-12-05 RX ADMIN — ATORVASTATIN CALCIUM 80 MG: 40 TABLET, FILM COATED ORAL at 17:01

## 2018-12-05 RX ADMIN — OXYCODONE HYDROCHLORIDE 5 MG: 5 TABLET ORAL at 10:14

## 2018-12-05 RX ADMIN — GABAPENTIN 300 MG: 300 CAPSULE ORAL at 13:50

## 2018-12-05 RX ADMIN — GABAPENTIN 300 MG: 300 CAPSULE ORAL at 06:00

## 2018-12-05 RX ADMIN — STANDARDIZED SENNA CONCENTRATE AND DOCUSATE SODIUM 2 TABLET: 8.6; 5 TABLET, FILM COATED ORAL at 05:39

## 2018-12-05 RX ADMIN — CLOPIDOGREL 75 MG: 75 TABLET, FILM COATED ORAL at 05:39

## 2018-12-05 RX ADMIN — GABAPENTIN 300 MG: 300 CAPSULE ORAL at 22:05

## 2018-12-05 RX ADMIN — STANDARDIZED SENNA CONCENTRATE AND DOCUSATE SODIUM 2 TABLET: 8.6; 5 TABLET, FILM COATED ORAL at 17:01

## 2018-12-05 RX ADMIN — Medication 500 MG: at 17:01

## 2018-12-05 RX ADMIN — TRAZODONE HYDROCHLORIDE 100 MG: 100 TABLET ORAL at 22:06

## 2018-12-05 RX ADMIN — INSULIN LISPRO 1 UNITS: 100 INJECTION, SOLUTION INTRAVENOUS; SUBCUTANEOUS at 22:06

## 2018-12-05 RX ADMIN — OXYCODONE HYDROCHLORIDE 10 MG: 5 TABLET ORAL at 00:00

## 2018-12-05 RX ADMIN — INSULIN LISPRO 2 UNITS: 100 INJECTION, SOLUTION INTRAVENOUS; SUBCUTANEOUS at 10:47

## 2018-12-05 RX ADMIN — Medication 500 MG: at 05:39

## 2018-12-05 RX ADMIN — LEVOTHYROXINE SODIUM 150 MCG: 150 TABLET ORAL at 05:39

## 2018-12-05 ASSESSMENT — ENCOUNTER SYMPTOMS
CARDIOVASCULAR NEGATIVE: 1
BACK PAIN: 0
VOMITING: 0
EYES NEGATIVE: 1
HEADACHES: 0
SHORTNESS OF BREATH: 0
DIZZINESS: 0
NEUROLOGICAL NEGATIVE: 1
RESPIRATORY NEGATIVE: 1
PSYCHIATRIC NEGATIVE: 1
MYALGIAS: 1
PALPITATIONS: 0
CONSTITUTIONAL NEGATIVE: 1
FEVER: 0
SORE THROAT: 0
NERVOUS/ANXIOUS: 0
GASTROINTESTINAL NEGATIVE: 1
NAUSEA: 0
BLURRED VISION: 0

## 2018-12-05 ASSESSMENT — PAIN SCALES - GENERAL
PAINLEVEL_OUTOF10: 4
PAINLEVEL_OUTOF10: 6
PAINLEVEL_OUTOF10: 4
PAINLEVEL_OUTOF10: 9
PAINLEVEL_OUTOF10: 5
PAINLEVEL_OUTOF10: 4
PAINLEVEL_OUTOF10: 5
PAINLEVEL_OUTOF10: 4
PAINLEVEL_OUTOF10: 8
PAINLEVEL_OUTOF10: 7
PAINLEVEL_OUTOF10: 0
PAINLEVEL_OUTOF10: 0

## 2018-12-05 ASSESSMENT — PATIENT HEALTH QUESTIONNAIRE - PHQ9
2. FEELING DOWN, DEPRESSED, IRRITABLE, OR HOPELESS: NOT AT ALL
1. LITTLE INTEREST OR PLEASURE IN DOING THINGS: NOT AT ALL
SUM OF ALL RESPONSES TO PHQ9 QUESTIONS 1 AND 2: 0

## 2018-12-05 NOTE — PROGRESS NOTES
Dr Hamlin at bedside spoke to him about need for levophed, new orders received to titrate to maintain MAP > 60. Order read back to and confirmed by Dr Hamlin. Order updated in EPIC.

## 2018-12-05 NOTE — DISCHARGE PLANNING
Care Transition Team Assessment    In the case of an emergency, pt's legal NOK is Emelia Hernández (daughter) [phone] or Tyler Sosa (son) [phone]    LSW met with pt at bedside and obtained the information used in this assessment. Pt verified accuracy of facesheet. Pt lives in [facility] and has for a couple years now. Pt uses [facility] on [address] Prior to current hospitalization, pt was completely independent in ADLS/IADLS. Pt does not drive but her snf home provides assistance with transportation. Pt is retired and has no financial concerns. Pt has a good support system at her snf home but according to pt she does not speak to her children. Pt denies any hx of substance use and denies any dx of mh.         Information Source  Orientation : Oriented x 4  Information Given By: Patient  Informant's Name:  (Sanjuanita)  Who is responsible for making decisions for patient? : Patient    Readmission Evaluation  Is this a readmission?: No    Elopement Risk  Legal Hold: No  Ambulatory or Self Mobile in Wheelchair: No-Not an Elopement Risk  Disoriented: No  Psychiatric Symptoms: None  History of Wandering: No  Elopement this Admit: No  Vocalizing Wanting to Leave: No  Displays Behaviors, Body Language Wanting to Leave: No-Not at Risk for Elopement  Elopement Risk: Not at Risk for Elopement    Interdisciplinary Discharge Planning  Primary Care Physician: Efrain Armendariz  Lives with - Patient's Self Care Capacity: Alone and Able to Care For Self  Patient or legal guardian wants to designate a caregiver (see row info): No  Support Systems: Children  Housing / Facility: 3 Story Apartment / Condo ([facility])  [facility] Facility: [facility]  Do You Take your Prescribed Medications Regularly: Yes  Mobility Issues: Yes    Discharge Preparedness  What is your plan after discharge?: Uncertain - pending medical team collaboration  What are your discharge supports?: Other  (comment)  Prior Functional Level: Ambulatory, Drives Self, Independent with Activities of Daily Living, Independent with Medication Management  Difficulity with ADLs: Bathing, Dressing, Toileting, Walking  Difficulity with IADLs: Cooking, Driving, Laundry, Shopping    Functional Assesment  Prior Functional Level: Ambulatory, Drives Self, Independent with Activities of Daily Living, Independent with Medication Management    Finances  Financial Barriers to Discharge: No  Prescription Coverage: Yes    Vision / Hearing Impairment  Vision Impairment : Yes  Right Eye Vision: Impaired, Wears Glasses  Left Eye Vision: Impaired, Wears Glasses  Hearing Impairment : No    Values / Beliefs / Concerns  Values / Beliefs Concerns : No    Advance Directive  Advance Directive?: None    Domestic Abuse  Have you ever been the victim of abuse or violence?: No  Physical Abuse or Sexual Abuse: No  Verbal Abuse or Emotional Abuse: No    Psychological Assessment  History of Substance Abuse: None  History of Psychiatric Problems: No  Non-compliant with Treatment: No  Newly Diagnosed Illness: Yes    Discharge Risks or Barriers  Discharge risks or barriers?: No  Patient risk factors: Complex medical needs    Anticipated Discharge Information  Anticipated discharge disposition: Assist with transportation, Discharge needs currently unknown, Group home, Home

## 2018-12-05 NOTE — PROGRESS NOTES
2 RN skin check completed. Mepilex in place, sacrum is red and blanching, repositioned patient, popliteal sheath in place clean dry and intact. No new areas of pressure concern.

## 2018-12-05 NOTE — PROGRESS NOTES
Updated Dr. Cox on heparin drip being stopped and pulling popliteal venous sheath. Dr. Cox to place orders for anticoagulation .

## 2018-12-05 NOTE — PROGRESS NOTES
LISA Arriaza for Dr. Ellis at bedside .Orders received to remove venous sheath, stop q8 labs, and stop heparin drip that was infusing through sheath.

## 2018-12-05 NOTE — PROGRESS NOTES
Hospital Medicine Daily Progress Note    Date of Service  12/5/2018    Chief Complaint  Left leg pain      Hospital Course    78 y.o. Female w/ afib and hx of DVT/PE admitted 12/1/2018 with recent discontinuation of her coumadin for a dental procedure then restarted on warfarin and noted increase in left leg swelling.  Now here with acute thrombus in left lower extremity requiring catheter directed fibrinolysis.      Interval Problem Update  Restarted on levophed briefly overnight but off pressor this am.  Awake.    Nurse states patient is having some delirium at times  Discussed with interventional radiology Dr. Ellis to take back to do venogram 12/6      Consultants/Specialty  Intensivist    Code Status  FULL    Disposition   monitor in ICU. Transfer out once not receiving thrombolytics    Review of Systems  Review of Systems   Constitutional: Negative for fever.   HENT: Negative for nosebleeds and sore throat.    Eyes: Negative for blurred vision.   Respiratory: Negative for shortness of breath.    Cardiovascular: Positive for leg swelling (left leg). Negative for chest pain and palpitations.   Gastrointestinal: Negative for nausea and vomiting.   Musculoskeletal: Positive for myalgias (left lower leg). Negative for back pain.   Neurological: Negative for dizziness and headaches.   Psychiatric/Behavioral: The patient is not nervous/anxious.         Physical Exam  Temp:  [37.2 °C (98.9 °F)-37.8 °C (100 °F)] 37.4 °C (99.4 °F)  Pulse:  [73-91] 75  Resp:  [11-26] 18    Physical Exam   Constitutional: She appears well-developed. No distress.   HENT:   Head: Normocephalic and atraumatic.   Nose: Nose normal.   Mouth/Throat: No oropharyngeal exudate.   Eyes: Conjunctivae and EOM are normal. Right eye exhibits no discharge. Left eye exhibits no discharge.   Neck: No tracheal deviation present.   Cardiovascular: Normal rate, regular rhythm and normal heart sounds.    No murmur heard.  Pulses:       Radial pulses are 2+ on  the right side, and 2+ on the left side.        Dorsalis pedis pulses are 2+ on the right side, and 2+ on the left side.   Pulmonary/Chest: Effort normal. No stridor. No respiratory distress. She has no wheezes.   Abdominal: Soft. She exhibits no distension. There is no tenderness.   Musculoskeletal: She exhibits no edema.   Left leg swelling/edema   Left popliteal area venous access tubing w/o sign of bleeding   Lymphadenopathy:     She has no cervical adenopathy.   Neurological: She is alert. She is disoriented. No cranial nerve deficit.   Skin: Skin is warm. She is not diaphoretic.   Psychiatric: She has a normal mood and affect. Her behavior is normal.   Vitals reviewed.      Fluids    Intake/Output Summary (Last 24 hours) at 12/05/18 1821  Last data filed at 12/05/18 1600   Gross per 24 hour   Intake           1119.6 ml   Output             1480 ml   Net           -360.4 ml       Laboratory  Recent Labs      12/03/18   0007   12/04/18   0420   12/04/18   1649  12/05/18   0230  12/05/18   1043   WBC  10.4   < >  11.9*   < >  12.0*  10.4  10.0   RBC  3.60*   < >  3.28*   < >  3.32*  3.25*  3.28*   HEMOGLOBIN  10.6*   < >  9.7*   < >  9.8*  9.4*  9.8*   HEMATOCRIT  32.9*   < >  31.0*   < >  30.5*  30.3*  29.9*   MCV  91.4   < >  94.5   < >  91.9  93.2  91.2   MCH  29.4   < >  29.6   < >  29.5  28.9  29.9   MCHC  32.2*   < >  31.3*   < >  32.1*  31.0*  32.8*   RDW  51.3*   < >  52.8*   < >  50.9*  52.4*  50.3*   PLATELETCT  410   --   288   --    --   334   --    MPV  10.1   --   9.6   --    --   9.5   --     < > = values in this interval not displayed.     Recent Labs      12/04/18   0859  12/05/18   0230  12/05/18   1043   SODIUM  139  138  137   POTASSIUM  3.9  4.1  3.9   CHLORIDE  108  107  106   CO2  19*  22  26   GLUCOSE  109*  162*  248*   BUN  13  11  11   CREATININE  0.72  0.80  0.73   CALCIUM  8.1*  7.4*  7.4*     Recent Labs      12/02/18   1953   12/03/18   1600   12/04/18   1649  12/05/18   0231   12/05/18   1043   APTT  52.5*   < >  47.9*   < >  57.7*  52.9*  37.9*   INR  2.37*   --   2.13*   --   1.86*   --    --     < > = values in this interval not displayed.               Imaging  DX-CHEST-PORTABLE (1 VIEW)   Final Result      Right central line projects over the SVC. No pneumothorax.      Interstitial edema.      Bibasilar opacities likely represent atelectasis.      Atherosclerotic plaque.         DX-CHEST-PORTABLE (1 VIEW)   Final Result      Mild pulmonary edema with dependent atelectasis or edema and probable small effusions.      IR-EXTREMITY VENOGRAM-UNILATERAL LEFT   Final Result      1.  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    Lower IVC is widely patent.      2.  Initiation of continuous overnight venous TPA infusion via multi-sidehole infusion catheter spanning from the left popliteal vein-femoral vein junction across the entire iliofemoral system with the catheter tip in the IVC.      3.  Plan is for overnight TPA infusion. Follow-up venogram tomorrow 12/3/2018, afternoon. Anticipate left iliac system and venous stenting for chronic occlusion. May need additional TPA infusion and/or mechanical thrombectomy with the goal of achieving    in-line flow in the left iliofemoral system.                     INTERPRETING LOCATION: [address] [place] [address] [place], [id number]      US-EXTREMITY VENOUS LOWER UNILAT LEFT   Final Result      IR-ANGIO THROUGH EXISTING CATHETER    (Results Pending)   IR-MIDLINE CATHETER INSERTION >5 YRS poor veins, serial lab draws    (Results Pending)   IR-EXTREMITY ANGIOGRAM-UNILATERAL LEFT    (Results Pending)        Assessment/Plan  Delirium   Assessment & Plan    Reorient frequently while in ICU.  Normal FT4 despite elevated TSH  UA if worsens  Watch meds  12/5 WBC: 10  Monitor  neurostatus and vitals.     Acute deep vein thrombosis (DVT) of femoral vein of left lower extremity (HCC)- (present on admission)   Assessment & Plan    Stent placed left thigh/pelvic region per Dr Ellis 12/4.  Plavix initiated  S/p Thromolysis   Anticoagulation w/ heparin for now.  Venogram per Dr Ellis of left leg 12/6     DM (diabetes mellitus) (HCC)- (present on admission)   Assessment & Plan    Monitor accuchecks and cover with SSI  HgbA1c:6.1 in past 3 months     Acute respiratory failure with hypoxia ([facility])   Assessment & Plan    RT protocol  Deep inspiratory efforts/encourage IS  Supplemental oxygen     Hematuria   Assessment & Plan    Monitoring CBC       Obesity (BMI 30-39.9)- (present on admission)   Assessment & Plan    Body mass index is 35.34 kg/m².       Hypomagnesemia- (present on admission)   Assessment & Plan    Replete and recheck     Essential hypertension- (present on admission)   Assessment & Plan    Monitor vitals.  On amlodipine and losartan with hold parameters for lower BPs     Atrial fibrillation (HCC)- (present on admission)   Assessment & Plan    Heparin drip and restart warfarin 12/6  Rate controlled  Has been in NSR          VTE prophylaxis: heparin

## 2018-12-05 NOTE — PROGRESS NOTES
Critical Care Progress Note    Date of admission  12/1/2018    Chief Complaint  78 y.o. female admitted 12/1/2018 with ischemic LLE    Hospital Course    Interval Problem Update  Reviewed last 24 hour events:  Hypotension/shock yesterday  Additional IV fluids given  Central line placed  Levophed give for MAP 65  Urine outpt fair  No change with leg  Heparin infusion  5 liters NC  Oriented X 2   Waxing and waning mental status (delirium)  Sheath to be removed today    Prior 24 hours  TPA infusion  Leg appears more swollen than yesterday  Pulses remain present  Return planned to IR again    Review of Systems  Review of Systems   Unable to perform ROS: Mental acuity   Constitutional: Negative.    HENT: Negative.    Eyes: Negative.    Respiratory: Negative.    Cardiovascular: Negative.    Gastrointestinal: Negative.    Musculoskeletal:        Swollen leg- same as yesterday   Skin: Negative.    Neurological: Negative.    Endo/Heme/Allergies: Negative.    Psychiatric/Behavioral: Negative.         Vital Signs for last 24 hours   Temp:  [36.9 °C (98.4 °F)-37.8 °C (100 °F)] 37.4 °C (99.4 °F)  Pulse:  [73-98] 81  Resp:  [11-34] 18    Hemodynamic parameters for last 24 hours       Respiratory       Physical Exam   Physical Exam   Constitutional: She is oriented to person, place, and time. She appears well-developed and well-nourished.   HENT:   Head: Normocephalic and atraumatic.   Neck: Normal range of motion. Neck supple.   Cardiovascular: Normal rate and regular rhythm.    Pulmonary/Chest: Breath sounds normal.   Abdominal: Soft. Bowel sounds are normal.   Musculoskeletal: She exhibits edema.   Large swollen right leg- no change   Neurological: She is alert and oriented to person, place, and time.   Skin: Skin is warm and dry.   Psychiatric: Her behavior is normal.   delirious       Medications  Current Facility-Administered Medications   Medication Dose Route Frequency Provider Last Rate Last Dose   • gabapentin  (NEURONTIN) capsule 300 mg  300 mg Oral Q8HRS Zaki Hamlin M.D.   300 mg at 12/05/18 0600   • Pharmacy Consult Request ...Pain Management Review 1 Each  1 Each Other PRN Andrey Ellis M.D.       • clopidogrel (PLAVIX) tablet 75 mg  75 mg Oral DAILY Andrey Ellis M.D.   75 mg at 12/05/18 0539   • norepinephrine (LEVOPHED) 8 mg in  mL Infusion  0-30 mcg/min Intravenous Continuous Zaki Hamlin M.D. 3.8 mL/hr at 12/05/18 0922 2 mcg/min at 12/05/18 0922   • losartan (COZAAR) tablet 25 mg  25 mg Oral Q DAY Joan Belle M.D.   Stopped at 12/04/18 0600   • amLODIPine (NORVASC) tablet 10 mg  10 mg Oral Q DAY Deondre Quigley M.D.   Stopped at 12/04/18 0600   • atorvastatin (LIPITOR) tablet 80 mg  80 mg Oral Q EVENING Deondre Quigley M.D.   80 mg at 12/04/18 1701   • levothyroxine (SYNTHROID) tablet 150 mcg  150 mcg Oral QAM AC Deondre Quigley M.D.   150 mcg at 12/05/18 0539   • oxyCODONE immediate-release (ROXICODONE) tablet 5-10 mg  5-10 mg Oral Q4HRS PRN Deondre Quigley M.D.   5 mg at 12/05/18 1014   • traZODone (DESYREL) tablet 100 mg  100 mg Oral QHS Deondre Quigley M.D.   100 mg at 12/04/18 2205   • senna-docusate (PERICOLACE or SENOKOT S) 8.6-50 MG per tablet 2 Tab  2 Tab Oral BID Deondre Quigley M.D.   2 Tab at 12/05/18 0539    And   • polyethylene glycol/lytes (MIRALAX) PACKET 1 Packet  1 Packet Oral QDAY PRN Deondre Quigley M.D.        And   • magnesium hydroxide (MILK OF MAGNESIA) suspension 30 mL  30 mL Oral QDAY PRN Deondre Quigley M.D.        And   • bisacodyl (DULCOLAX) suppository 10 mg  10 mg Rectal QDAY PRN Deondre Quigley M.D.       • acetaminophen (TYLENOL) tablet 650 mg  650 mg Oral Q6HRS PRN Deondre Quigley M.D.       • insulin lispro (HUMALOG) injection 1-6 Units  1-6 Units Subcutaneous 4X/DAY ANTOINETTE Quigley M.D.   2 Units at 12/05/18 1047    And   • glucose 4 g chewable tablet 16 g  16 g Oral Q15 MIN PRN Deondre Quigley M.D.        And   • dextrose 50% (D50W) injection 25 mL  25 mL  Intravenous Q15 MIN PRN Deondre Quigley M.D.       • heparin injection 3,200 Units  3,200 Units Intravenous PRN Deondre Quigley M.D.        And   • heparin infusion 25,000 units in 500 ml 0.45% nacl   Intravenous Continuous Deondre Quigley M.D. 10 mL/hr at 12/05/18 1141 500 Units/hr at 12/05/18 1141   • calcium carbonate (OS-MARKELL 500) tablet 500 mg  500 mg Oral BID WITH MEALS Deondre Quigley M.D.   500 mg at 12/05/18 0539     Facility-Administered Medications Ordered in Other Encounters   Medication Dose Route Frequency Provider Last Rate Last Dose   • iodixanol (VISIPAQUE) SOLN    Intra-Op Once PRN Shekhar Rosado M.D.   10 mL at 01/18/17 0619       Fluids    Intake/Output Summary (Last 24 hours) at 12/05/18 1324  Last data filed at 12/05/18 1200   Gross per 24 hour   Intake          3495.13 ml   Output             1720 ml   Net          1775.13 ml       Laboratory          Recent Labs      12/04/18   0420  12/04/18   0859  12/05/18   0230  12/05/18   1043   SODIUM  137  139  138  137   POTASSIUM  3.9  3.9  4.1  3.9   CHLORIDE  108  108  107  106   CO2  21  19*  22  26   BUN  14  13  11  11   CREATININE  0.76  0.72  0.80  0.73   MAGNESIUM  1.3*   --    --    --    CALCIUM  7.6*  8.1*  7.4*  7.4*     Recent Labs      12/04/18   0859  12/05/18   0230  12/05/18   1043   GLUCOSE  109*  162*  248*     Recent Labs      12/04/18   1649  12/05/18   0230  12/05/18   1043   WBC  12.0*  10.4  10.0   NEUTSPOLYS  64.20  57.20  64.70   LYMPHOCYTES  12.10*  15.90*  11.00*   MONOCYTES  13.80*  15.70*  14.80*   EOSINOPHILS  9.20*  10.20*  8.80*   BASOPHILS  0.30  0.60  0.40     Recent Labs      12/02/18   1953  12/03/18   0007   12/03/18   1600   12/04/18   0420   12/04/18   1649  12/05/18   0230  12/05/18   1043   RBC  3.82*  3.60*   < >  3.45*   < >  3.28*   < >  3.32*  3.25*  3.28*   HEMOGLOBIN  11.2*  10.6*   < >  10.3*   < >  9.7*   < >  9.8*  9.4*  9.8*   HEMATOCRIT  35.7*  32.9*   < >  31.8*   < >  31.0*   < >  30.5*  30.3*  29.9*    PLATELETCT  392  410   --    --    --   288   --    --   334   --    PROTHROMBTM  25.9*   --    --   23.9*   --    --    --   21.5*   --    --    APTT  52.5*   --    < >  47.9*   < >   --    < >  57.7*  52.9*  37.9*   INR  2.37*   --    --   2.13*   --    --    --   1.86*   --    --     < > = values in this interval not displayed.       Imaging  X-Ray:  I have personally reviewed the images and compared with prior images.    Assessment/Plan  Acute deep vein thrombosis (DVT) of femoral vein of left lower extremity (HCC)- (present on admission)   Assessment & Plan    Plan for q. one hour vascular checks status post TPA catheter per protocol  Continue to monitor for vascular insufficiency  Post TPA start heparin per protocol    Stent placed by IR yesterday  Leg unchanged  Sheath to be removed today  Continue heparin as bridge to oral agent     DM (diabetes mellitus) ([facility])- (present on admission)   Assessment & Plan    Sliding scale  Glucose goal 120-180     Acute respiratory failure with hypoxia ([facility])   Assessment & Plan    REsloved     Essential hypertension- (present on admission)   Assessment & Plan    Continue home medications now     Atrial fibrillation (HCC)- (present on admission)   Assessment & Plan    Rate controlled  Heparin per protocol post TPA  Transition to oral anticoagulant when appropriate    Ok to be on oral agent          VTE:  Heparin  Ulcer: Not Indicated  Lines: None    I have performed a physical exam and reviewed and updated ROS and Plan today (12/5/2018). In review of yesterday's note (12/4/2018), there are no changes except as documented above.     Discussed patient condition and risk of morbidity and/or mortality with Hospitalist, RN, RT, , [facility] resident and Patient  The patient remains ill.

## 2018-12-05 NOTE — PROCEDURES
BEDSIDE VENOUS CATHETER INSERTION NOTE    PROCEDURE DATE: 12/04/18   PROCEDURE START TIME: 4.20 pm    PRIMARY PROCEDURALIST: Dr. Lizzie Can  SUPERVISING PHYSICIAN : Dr. Zaki Hamlin    INFORMED CONSENT: Informed Consent obtained and on the chart    UNIVERSAL PROTOCOL / SAFETY CHECKLIST  Sign in Communication: Completed    Time Out:  Team Confirms the Correct Patient, Correct Procedure, Correct Site and Site Marking, Correct Position (if applicable), Prep and Dry Time (if applicable).  Time:  20 mins    Affirmation of Time Out: YES      Sign Out Discussion: Completed    PROCEDURE: VENOUS CATHETER INSERTION  Line Type/Indication: Central Venous  20 cm triple lumen, 7 Fr, non-tunneled, pressure injectable catheter for access and pressors if needes.  Antimicrobial Catheter: Yes  Anesthesia/Sedation: Local; lidocaine 1%  Insertion Site: Right Internal Jugular VeinArea Prep: Chlorhexidine gluconate prep applied.The usual aseptic technique & maximal barrier precautions were used.    Technique Used to Place Line: Modified Seldinger  Ultrasound Used for Insertion: Yes. Vessel easily compressible.  Internal Length: 16 cm    Procedure:  The vessel was cannulated under direct ultrasound visualization with a 16 gauge needle on the first attempt.  A 60cm J-tipped spring wire was passed into the vein through the indwelling needle and left in situ. The needle was then removed and the Croatian catheter was then introduced over the guidewire. The catheter was left in situ while the guidewire was removed.    Blood return was positive through all lumens of the catheter. The catheter was secured in place with sterile sutures. A sterile dressing was applied and dated. Sterile caps were placed on all ports prior to leaving the procedure area. All ports flushed.   All catheters, needles and/or wires were accounted for and intact: Yes.   Biopatch used: Yes.    Verified Placement: Blood return all ports, Transduced, Ultrasound and Chest  X-ray ordered and pending.      Patient tolerated procedure well.    Complications: None    No Specimens Collected Unless Noted Here    Estimated Blood Loss: None    Dr. Zaki Hamlin present at bedside for the entire procedure    Lizzie Can  12/04/18  4:44 PM

## 2018-12-05 NOTE — CARE PLAN
Problem: Positive DVT/VTE  Intervention: Monitor circulation, sensation and/or motion of extremity  q1 vascular checks complete. Heparin infusing through left popliteal sheath site. Plan for IR tomorrow for venogram.         Problem: Skin Integrity  Goal: Risk for impaired skin integrity will decrease  Outcome: PROGRESSING AS EXPECTED  2 RN skin check completed. Patient turned q2 hrs alternating pillows. Appropriate pressure ulcer prevention interventions in place

## 2018-12-05 NOTE — CARE PLAN
Problem: Safety  Goal: Will remain free from injury  Bed in low locked position, room near nurses station, call light and personal belongings within reach.     Problem: Pain Management  Goal: Pain level will decrease to patient's comfort goal  Pt medicated per MAR and active MD order. Pain assessment documented in flowsheets,

## 2018-12-05 NOTE — PROGRESS NOTES
Spoke with IR LISA Arriaza, do not pull sheath,restart heparin 500 units/hr, plans for IR tomorrow in AM, updated .

## 2018-12-05 NOTE — PROGRESS NOTES
Updated Dr. Hamlin regarding patients SBP 75-80s. Orders received for 1L Bolus LR and levophed to maintain MAP >65

## 2018-12-05 NOTE — PROGRESS NOTES
2 RN skin assessment completed. Bruising to bilateral arms. Posterior calf dressing in place CDI infusing heparin. Difficult to assess back and sacrum due to pt pain level and need to keep left leg straight. Q2 hour turning in place.

## 2018-12-06 ENCOUNTER — APPOINTMENT (OUTPATIENT)
Dept: RADIOLOGY | Facility: MEDICAL CENTER | Age: 78
DRG: 270 | End: 2018-12-06
Attending: NURSE PRACTITIONER
Payer: MEDICARE

## 2018-12-06 LAB
ABO GROUP BLD: NORMAL
ANION GAP SERPL CALC-SCNC: 7 MMOL/L (ref 0–11.9)
APTT PPP: 32.1 SEC (ref 24.7–36)
APTT PPP: 39 SEC (ref 24.7–36)
APTT PPP: 42.5 SEC (ref 24.7–36)
BASOPHILS # BLD AUTO: 0.5 % (ref 0–1.8)
BASOPHILS # BLD AUTO: 0.6 % (ref 0–1.8)
BASOPHILS # BLD AUTO: 0.6 % (ref 0–1.8)
BASOPHILS # BLD: 0.05 K/UL (ref 0–0.12)
BASOPHILS # BLD: 0.06 K/UL (ref 0–0.12)
BASOPHILS # BLD: 0.06 K/UL (ref 0–0.12)
BLD GP AB SCN SERPL QL: NORMAL
BUN SERPL-MCNC: 12 MG/DL (ref 8–22)
CALCIUM SERPL-MCNC: 7.7 MG/DL (ref 8.5–10.5)
CHLORIDE SERPL-SCNC: 107 MMOL/L (ref 96–112)
CO2 SERPL-SCNC: 25 MMOL/L (ref 20–33)
CREAT SERPL-MCNC: 0.8 MG/DL (ref 0.5–1.4)
EOSINOPHIL # BLD AUTO: 0.86 K/UL (ref 0–0.51)
EOSINOPHIL # BLD AUTO: 1.17 K/UL (ref 0–0.51)
EOSINOPHIL # BLD AUTO: 1.44 K/UL (ref 0–0.51)
EOSINOPHIL NFR BLD: 10.8 % (ref 0–6.9)
EOSINOPHIL NFR BLD: 13 % (ref 0–6.9)
EOSINOPHIL NFR BLD: 8.3 % (ref 0–6.9)
ERYTHROCYTE [DISTWIDTH] IN BLOOD BY AUTOMATED COUNT: 49.2 FL (ref 35.9–50)
ERYTHROCYTE [DISTWIDTH] IN BLOOD BY AUTOMATED COUNT: 49.2 FL (ref 35.9–50)
ERYTHROCYTE [DISTWIDTH] IN BLOOD BY AUTOMATED COUNT: 50.5 FL (ref 35.9–50)
FIBRINOGEN PPP-MCNC: 388 MG/DL (ref 215–460)
FIBRINOGEN PPP-MCNC: 446 MG/DL (ref 215–460)
FIBRINOGEN PPP-MCNC: 458 MG/DL (ref 215–460)
GLUCOSE BLD-MCNC: 110 MG/DL (ref 65–99)
GLUCOSE BLD-MCNC: 115 MG/DL (ref 65–99)
GLUCOSE BLD-MCNC: 134 MG/DL (ref 65–99)
GLUCOSE BLD-MCNC: 162 MG/DL (ref 65–99)
GLUCOSE SERPL-MCNC: 129 MG/DL (ref 65–99)
HCT VFR BLD AUTO: 27.9 % (ref 37–47)
HCT VFR BLD AUTO: 28 % (ref 37–47)
HCT VFR BLD AUTO: 29.1 % (ref 37–47)
HGB BLD-MCNC: 9.3 G/DL (ref 12–16)
HGB BLD-MCNC: 9.3 G/DL (ref 12–16)
HGB BLD-MCNC: 9.5 G/DL (ref 12–16)
IMM GRANULOCYTES # BLD AUTO: 0.04 K/UL (ref 0–0.11)
IMM GRANULOCYTES # BLD AUTO: 0.05 K/UL (ref 0–0.11)
IMM GRANULOCYTES # BLD AUTO: 0.05 K/UL (ref 0–0.11)
IMM GRANULOCYTES NFR BLD AUTO: 0.4 % (ref 0–0.9)
IMM GRANULOCYTES NFR BLD AUTO: 0.5 % (ref 0–0.9)
IMM GRANULOCYTES NFR BLD AUTO: 0.5 % (ref 0–0.9)
INR PPP: 1.32 (ref 0.87–1.13)
INR PPP: 1.36 (ref 0.87–1.13)
LYMPHOCYTES # BLD AUTO: 1.4 K/UL (ref 1–4.8)
LYMPHOCYTES # BLD AUTO: 1.6 K/UL (ref 1–4.8)
LYMPHOCYTES # BLD AUTO: 2.05 K/UL (ref 1–4.8)
LYMPHOCYTES NFR BLD: 13.5 % (ref 22–41)
LYMPHOCYTES NFR BLD: 14.8 % (ref 22–41)
LYMPHOCYTES NFR BLD: 18.5 % (ref 22–41)
MCH RBC QN AUTO: 29 PG (ref 27–33)
MCH RBC QN AUTO: 29.9 PG (ref 27–33)
MCH RBC QN AUTO: 30.3 PG (ref 27–33)
MCHC RBC AUTO-ENTMCNC: 32 G/DL (ref 33.6–35)
MCHC RBC AUTO-ENTMCNC: 33.3 G/DL (ref 33.6–35)
MCHC RBC AUTO-ENTMCNC: 33.9 G/DL (ref 33.6–35)
MCV RBC AUTO: 89.2 FL (ref 81.4–97.8)
MCV RBC AUTO: 89.7 FL (ref 81.4–97.8)
MCV RBC AUTO: 90.7 FL (ref 81.4–97.8)
MONOCYTES # BLD AUTO: 1.48 K/UL (ref 0–0.85)
MONOCYTES # BLD AUTO: 1.68 K/UL (ref 0–0.85)
MONOCYTES # BLD AUTO: 1.94 K/UL (ref 0–0.85)
MONOCYTES NFR BLD AUTO: 14.3 % (ref 0–13.4)
MONOCYTES NFR BLD AUTO: 15.6 % (ref 0–13.4)
MONOCYTES NFR BLD AUTO: 17.5 % (ref 0–13.4)
NEUTROPHILS # BLD AUTO: 5.55 K/UL (ref 2–7.15)
NEUTROPHILS # BLD AUTO: 6.23 K/UL (ref 2–7.15)
NEUTROPHILS # BLD AUTO: 6.5 K/UL (ref 2–7.15)
NEUTROPHILS NFR BLD: 50.1 % (ref 44–72)
NEUTROPHILS NFR BLD: 57.7 % (ref 44–72)
NEUTROPHILS NFR BLD: 62.8 % (ref 44–72)
NRBC # BLD AUTO: 0 K/UL
NRBC BLD-RTO: 0 /100 WBC
PLATELET # BLD AUTO: 338 K/UL (ref 164–446)
PLATELET # BLD AUTO: 347 K/UL (ref 164–446)
PMV BLD AUTO: 9.4 FL (ref 9–12.9)
PMV BLD AUTO: 9.6 FL (ref 9–12.9)
POTASSIUM SERPL-SCNC: 4 MMOL/L (ref 3.6–5.5)
PROTHROMBIN TIME: 16.5 SEC (ref 12–14.6)
PROTHROMBIN TIME: 16.9 SEC (ref 12–14.6)
RBC # BLD AUTO: 3.11 M/UL (ref 4.2–5.4)
RBC # BLD AUTO: 3.14 M/UL (ref 4.2–5.4)
RBC # BLD AUTO: 3.21 M/UL (ref 4.2–5.4)
RH BLD: NORMAL
SODIUM SERPL-SCNC: 139 MMOL/L (ref 135–145)
WBC # BLD AUTO: 10.4 K/UL (ref 4.8–10.8)
WBC # BLD AUTO: 10.8 K/UL (ref 4.8–10.8)
WBC # BLD AUTO: 11.1 K/UL (ref 4.8–10.8)

## 2018-12-06 PROCEDURE — 99233 SBSQ HOSP IP/OBS HIGH 50: CPT | Performed by: INTERNAL MEDICINE

## 2018-12-06 PROCEDURE — A9270 NON-COVERED ITEM OR SERVICE: HCPCS | Performed by: RADIOLOGY

## 2018-12-06 PROCEDURE — B51G1ZZ FLUOROSCOPY OF LEFT PELVIC (ILIAC) VEINS USING LOW OSMOLAR CONTRAST: ICD-10-PCS | Performed by: RADIOLOGY

## 2018-12-06 PROCEDURE — 700102 HCHG RX REV CODE 250 W/ 637 OVERRIDE(OP): Performed by: HOSPITALIST

## 2018-12-06 PROCEDURE — 86850 RBC ANTIBODY SCREEN: CPT

## 2018-12-06 PROCEDURE — 80048 BASIC METABOLIC PNL TOTAL CA: CPT

## 2018-12-06 PROCEDURE — 85025 COMPLETE CBC W/AUTO DIFF WBC: CPT

## 2018-12-06 PROCEDURE — 85730 THROMBOPLASTIN TIME PARTIAL: CPT

## 2018-12-06 PROCEDURE — 700105 HCHG RX REV CODE 258: Performed by: RADIOLOGY

## 2018-12-06 PROCEDURE — 85048 AUTOMATED LEUKOCYTE COUNT: CPT

## 2018-12-06 PROCEDURE — 37187 VENOUS MECH THROMBECTOMY: CPT

## 2018-12-06 PROCEDURE — 85384 FIBRINOGEN ACTIVITY: CPT

## 2018-12-06 PROCEDURE — 82962 GLUCOSE BLOOD TEST: CPT | Mod: 91

## 2018-12-06 PROCEDURE — 700102 HCHG RX REV CODE 250 W/ 637 OVERRIDE(OP): Performed by: RADIOLOGY

## 2018-12-06 PROCEDURE — 85610 PROTHROMBIN TIME: CPT

## 2018-12-06 PROCEDURE — 85041 AUTOMATED RBC COUNT: CPT

## 2018-12-06 PROCEDURE — 06CD3ZZ EXTIRPATION OF MATTER FROM LEFT COMMON ILIAC VEIN, PERCUTANEOUS APPROACH: ICD-10-PCS | Performed by: RADIOLOGY

## 2018-12-06 PROCEDURE — A9270 NON-COVERED ITEM OR SERVICE: HCPCS | Performed by: FAMILY MEDICINE

## 2018-12-06 PROCEDURE — 770022 HCHG ROOM/CARE - ICU (200)

## 2018-12-06 PROCEDURE — 700102 HCHG RX REV CODE 250 W/ 637 OVERRIDE(OP): Performed by: FAMILY MEDICINE

## 2018-12-06 PROCEDURE — 700111 HCHG RX REV CODE 636 W/ 250 OVERRIDE (IP): Performed by: RADIOLOGY

## 2018-12-06 PROCEDURE — 86900 BLOOD TYPING SEROLOGIC ABO: CPT

## 2018-12-06 PROCEDURE — 700102 HCHG RX REV CODE 250 W/ 637 OVERRIDE(OP): Performed by: INTERNAL MEDICINE

## 2018-12-06 PROCEDURE — 75820 VEIN X-RAY ARM/LEG: CPT

## 2018-12-06 PROCEDURE — 99232 SBSQ HOSP IP/OBS MODERATE 35: CPT | Performed by: HOSPITALIST

## 2018-12-06 PROCEDURE — 85018 HEMOGLOBIN: CPT

## 2018-12-06 PROCEDURE — 700117 HCHG RX CONTRAST REV CODE 255: Performed by: RADIOLOGY

## 2018-12-06 PROCEDURE — C1757 CATH, THROMBECTOMY/EMBOLECT: HCPCS

## 2018-12-06 PROCEDURE — A9270 NON-COVERED ITEM OR SERVICE: HCPCS | Performed by: HOSPITALIST

## 2018-12-06 PROCEDURE — 86901 BLOOD TYPING SEROLOGIC RH(D): CPT

## 2018-12-06 PROCEDURE — 06CY3ZZ EXTIRPATION OF MATTER FROM LOWER VEIN, PERCUTANEOUS APPROACH: ICD-10-PCS | Performed by: RADIOLOGY

## 2018-12-06 PROCEDURE — A9270 NON-COVERED ITEM OR SERVICE: HCPCS | Performed by: INTERNAL MEDICINE

## 2018-12-06 PROCEDURE — 700111 HCHG RX REV CODE 636 W/ 250 OVERRIDE (IP)

## 2018-12-06 PROCEDURE — 85014 HEMATOCRIT: CPT

## 2018-12-06 RX ORDER — SODIUM CHLORIDE 9 MG/ML
500 INJECTION, SOLUTION INTRAVENOUS
Status: ACTIVE | OUTPATIENT
Start: 2018-12-06 | End: 2018-12-06

## 2018-12-06 RX ORDER — HEPARIN SODIUM 1000 [USP'U]/ML
2000 INJECTION, SOLUTION INTRAVENOUS; SUBCUTANEOUS ONCE
Status: COMPLETED | OUTPATIENT
Start: 2018-12-06 | End: 2018-12-06

## 2018-12-06 RX ORDER — MIDAZOLAM HYDROCHLORIDE 1 MG/ML
INJECTION INTRAMUSCULAR; INTRAVENOUS
Status: COMPLETED
Start: 2018-12-06 | End: 2018-12-06

## 2018-12-06 RX ORDER — MIDAZOLAM HYDROCHLORIDE 1 MG/ML
.5-2 INJECTION INTRAMUSCULAR; INTRAVENOUS PRN
Status: ACTIVE | OUTPATIENT
Start: 2018-12-06 | End: 2018-12-06

## 2018-12-06 RX ORDER — HEPARIN SODIUM,PORCINE 1000/ML
VIAL (ML) INJECTION
Status: COMPLETED
Start: 2018-12-06 | End: 2018-12-06

## 2018-12-06 RX ORDER — ONDANSETRON 2 MG/ML
4 INJECTION INTRAMUSCULAR; INTRAVENOUS PRN
Status: ACTIVE | OUTPATIENT
Start: 2018-12-06 | End: 2018-12-06

## 2018-12-06 RX ADMIN — INSULIN LISPRO 1 UNITS: 100 INJECTION, SOLUTION INTRAVENOUS; SUBCUTANEOUS at 21:34

## 2018-12-06 RX ADMIN — OXYCODONE HYDROCHLORIDE 5 MG: 5 TABLET ORAL at 19:49

## 2018-12-06 RX ADMIN — HEPARIN SODIUM 500 UNITS/HR: 5000 INJECTION, SOLUTION INTRAVENOUS at 17:10

## 2018-12-06 RX ADMIN — ALTEPLASE 1 MG/HR: KIT at 08:40

## 2018-12-06 RX ADMIN — OXYCODONE HYDROCHLORIDE 5 MG: 5 TABLET ORAL at 14:07

## 2018-12-06 RX ADMIN — GABAPENTIN 300 MG: 300 CAPSULE ORAL at 14:07

## 2018-12-06 RX ADMIN — FENTANYL CITRATE 25 MCG: 50 INJECTION, SOLUTION INTRAMUSCULAR; INTRAVENOUS at 08:28

## 2018-12-06 RX ADMIN — ALTEPLASE 0.5 MG/HR: KIT at 10:02

## 2018-12-06 RX ADMIN — LEVOTHYROXINE SODIUM 150 MCG: 150 TABLET ORAL at 05:54

## 2018-12-06 RX ADMIN — FENTANYL CITRATE 25 MCG: 50 INJECTION, SOLUTION INTRAMUSCULAR; INTRAVENOUS at 08:12

## 2018-12-06 RX ADMIN — CLOPIDOGREL 75 MG: 75 TABLET, FILM COATED ORAL at 05:37

## 2018-12-06 RX ADMIN — MIDAZOLAM 1 MG: 1 INJECTION INTRAMUSCULAR; INTRAVENOUS at 08:28

## 2018-12-06 RX ADMIN — STANDARDIZED SENNA CONCENTRATE AND DOCUSATE SODIUM 2 TABLET: 8.6; 5 TABLET, FILM COATED ORAL at 05:35

## 2018-12-06 RX ADMIN — TRAZODONE HYDROCHLORIDE 100 MG: 100 TABLET ORAL at 19:49

## 2018-12-06 RX ADMIN — AMLODIPINE BESYLATE 10 MG: 10 TABLET ORAL at 05:35

## 2018-12-06 RX ADMIN — HEPARIN SODIUM 2000 UNITS: 1000 INJECTION, SOLUTION INTRAVENOUS; SUBCUTANEOUS at 08:31

## 2018-12-06 RX ADMIN — GABAPENTIN 300 MG: 300 CAPSULE ORAL at 21:37

## 2018-12-06 RX ADMIN — ALTEPLASE 0.5 MG/HR: KIT at 10:10

## 2018-12-06 RX ADMIN — STANDARDIZED SENNA CONCENTRATE AND DOCUSATE SODIUM 2 TABLET: 8.6; 5 TABLET, FILM COATED ORAL at 17:03

## 2018-12-06 RX ADMIN — MIDAZOLAM 1 MG: 1 INJECTION INTRAMUSCULAR; INTRAVENOUS at 08:12

## 2018-12-06 RX ADMIN — GABAPENTIN 300 MG: 300 CAPSULE ORAL at 05:34

## 2018-12-06 RX ADMIN — ATORVASTATIN CALCIUM 80 MG: 40 TABLET, FILM COATED ORAL at 17:03

## 2018-12-06 RX ADMIN — ACETAMINOPHEN 650 MG: 325 TABLET, FILM COATED ORAL at 10:10

## 2018-12-06 RX ADMIN — Medication 500 MG: at 17:03

## 2018-12-06 RX ADMIN — LOSARTAN POTASSIUM 25 MG: 25 TABLET, FILM COATED ORAL at 05:35

## 2018-12-06 RX ADMIN — IOHEXOL 82 ML: 300 INJECTION, SOLUTION INTRAVENOUS at 09:25

## 2018-12-06 ASSESSMENT — ENCOUNTER SYMPTOMS
GASTROINTESTINAL NEGATIVE: 1
SORE THROAT: 0
VOMITING: 0
NEUROLOGICAL NEGATIVE: 1
NAUSEA: 0
BACK PAIN: 0
FEVER: 0
SHORTNESS OF BREATH: 0
CARDIOVASCULAR NEGATIVE: 1
EYES NEGATIVE: 1
RESPIRATORY NEGATIVE: 1
BLURRED VISION: 0
HEADACHES: 0
MYALGIAS: 1
DIZZINESS: 0
PALPITATIONS: 0
NERVOUS/ANXIOUS: 0
CONSTITUTIONAL NEGATIVE: 1

## 2018-12-06 ASSESSMENT — PAIN SCALES - GENERAL
PAINLEVEL_OUTOF10: 0
PAINLEVEL_OUTOF10: 0
PAINLEVEL_OUTOF10: 2
PAINLEVEL_OUTOF10: 2
PAINLEVEL_OUTOF10: 5
PAINLEVEL_OUTOF10: 2
PAINLEVEL_OUTOF10: 0
PAINLEVEL_OUTOF10: 4
PAINLEVEL_OUTOF10: 6
PAINLEVEL_OUTOF10: 0
PAINLEVEL_OUTOF10: 0

## 2018-12-06 NOTE — CARE PLAN
Problem: Communication  Goal: The ability to communicate needs accurately and effectively will improve    Intervention: [place] patient and significant other/support system to call light to alert staff of needs  Pt just is a little delusional, very fixated on making her bed and remaking her bed, and experiencing some ICU delirium. Continuing to reorient the patient to being in the hospital, reasoning, and time. Will continue to help the patient feel in control of her surroundings.       Problem: Safety  Goal: Will remain free from injury    Intervention: Educate patient and significant other/support system about adaptive mobility strategies and safe transfers  Patient attempting to get out of bed. Explained the importance for remaining in her bed. Explained the risks associated with getting up being on a heparin drip, and possible chance of hemorrhage. Also educated on the risks of mobilizing with the LLE and clot and catheter sheath site.

## 2018-12-06 NOTE — PROGRESS NOTES
2 RN skin check:    Pt has blanchable redness noted on the ears. Silicone oxygen tubing in place.   Redness noted on sacrum, wound team already following, mepilex in place.   Generalized bruising on bilateral extremities.   Redness noted on lower extremities.       Q2H turns, pillows used for repositioning and support.

## 2018-12-06 NOTE — PROGRESS NOTES
IR Procedure Note:    Site Marked and Confirmed with MD, patient and RN pre procedure. Dr. Ellis performed a left leg venogram with thrombolysis.  The pt tolerated the procedure well; ETCo2 baseline 26, with consistent waveform during the procedure.  Gauze and tegaderm applied to left leg, CDI and soft.  Pt alert and verbally appropriate post procedure, vital signs stable during procedure and transport, see flow sheet for vital signs.  Report given to Rita ROSE.  RN transported pt to [facility] with vs monitor.      Procedure End Time: 0904

## 2018-12-06 NOTE — OR SURGEON
Immediate Post- Operative Note        PostOp Diagnosis: recurrent LLE DVT       Procedure(s): LLE venogram, pharmacomechanical thrombolysis and lysis catheter placement    Dr. Cox has been paged      Estimated Blood Loss: Less than 5 ml        Complications: None            12/6/2018     9:16 AM     Andrey Ellis

## 2018-12-06 NOTE — PROGRESS NOTES
Critical Care Progress Note    Date of admission  12/1/2018    Chief Complaint  78 y.o. female admitted 12/1/2018 with ischemic LLE    Hospital Course    Interval Problem Update  Reviewed last 24 hour events:  Delirium  Leg looks slightly less swollen  IR asked to have sheath left in, returning to IR today  No other major events  3 liters o2    Prior 24 hours  Hypotension/shock yesterday  Additional IV fluids given  Central line placed  Levophed give for MAP 65  Urine outpt fair  No change with leg  Heparin infusion  5 liters NC  Oriented X 2   Waxing and waning mental status (delirium)  Sheath to be removed today        Review of Systems  Review of Systems   Unable to perform ROS: Mental acuity   Constitutional: Negative.    HENT: Negative.    Eyes: Negative.    Respiratory: Negative.    Cardiovascular: Negative.    Gastrointestinal: Negative.    Musculoskeletal:        Swollen leg- same as yesterday   Skin: Negative.    Neurological: Negative.    Endo/Heme/Allergies: Negative.    Psychiatric/Behavioral:        Delirium        Vital Signs for last 24 hours   Pulse:  [65-92] 80  Resp:  [15-61] 24    Hemodynamic parameters for last 24 hours       Respiratory       Physical Exam   Physical Exam   Constitutional: She is oriented to person, place, and time. She appears well-developed and well-nourished.   HENT:   Head: Normocephalic and atraumatic.   Neck: Normal range of motion. Neck supple.   Cardiovascular: Normal rate and regular rhythm.    Pulmonary/Chest: Breath sounds normal.   Abdominal: Soft. Bowel sounds are normal.   Musculoskeletal: She exhibits edema.   Large swollen right leg- no change   Neurological: She is alert and oriented to person, place, and time.   Skin: Skin is warm and dry.   Psychiatric:   delirious       Medications  Current Facility-Administered Medications   Medication Dose Route Frequency Provider Last Rate Last Dose   • NS (BOLUS) infusion 500 mL  500 mL Intravenous Once PRN Andrey PEREIRA  EDUARD Ellis       • fentaNYL (SUBLIMAZE) injection 12.5-50 mcg  12.5-50 mcg Intravenous PRN Andrey Ellis M.D.   25 mcg at 12/06/18 0828   • midazolam (VERSED) injection 0.5-2 mg  0.5-2 mg Intravenous PRN Andrey Ellis M.D.   1 mg at 12/06/18 0828   • ondansetron (ZOFRAN) syringe/vial injection 4 mg  4 mg Intravenous PRN Andrey Ellis M.D.       • heparin infusion 25,000 units in 500 ml 0.45% nacl  500 Units/hr Intravenous Continuous Andrey Ellis M.D. 10 mL/hr at 12/06/18 0950 500 Units/hr at 12/06/18 0950   • alteplase (ACTIVASE) 10 mg in  mL Infusion  0.5 mg/hr Intravenous Continuous Andrey Ellis M.D.       • gabapentin (NEURONTIN) capsule 300 mg  300 mg Oral Q8HRS Zaki Hamlin M.D.   300 mg at 12/06/18 0534   • Pharmacy Consult Request ...Pain Management Review 1 Each  1 Each Other PRN Andrey Ellis M.D.       • clopidogrel (PLAVIX) tablet 75 mg  75 mg Oral DAILY Andrey Ellis M.D.   75 mg at 12/06/18 0537   • norepinephrine (LEVOPHED) 8 mg in  mL Infusion  0-30 mcg/min Intravenous Continuous Zaki Hamlin M.D.   Stopped at 12/05/18 1530   • losartan (COZAAR) tablet 25 mg  25 mg Oral Q DAY Joan Belle M.D.   25 mg at 12/06/18 0535   • amLODIPine (NORVASC) tablet 10 mg  10 mg Oral Q DAY Deondre Quigley M.D.   10 mg at 12/06/18 0535   • atorvastatin (LIPITOR) tablet 80 mg  80 mg Oral Q EVENING Deondre Quigley M.D.   80 mg at 12/05/18 1701   • levothyroxine (SYNTHROID) tablet 150 mcg  150 mcg Oral QAM AC Deondre Quigley M.D.   150 mcg at 12/06/18 0554   • oxyCODONE immediate-release (ROXICODONE) tablet 5-10 mg  5-10 mg Oral Q4HRS PRN Deondre Quigley M.D.   5 mg at 12/05/18 1014   • traZODone (DESYREL) tablet 100 mg  100 mg Oral QHS Deondre Quigley M.D.   100 mg at 12/05/18 2206   • senna-docusate (PERICOLACE or SENOKOT S) 8.6-50 MG per tablet 2 Tab  2 Tab Oral BID Deondre Quigley M.D.   2 Tab at 12/06/18 0535    And   • polyethylene glycol/lytes (MIRALAX)  PACKET 1 Packet  1 Packet Oral QDAY PRN Deondre Quigley M.D.        And   • magnesium hydroxide (MILK OF MAGNESIA) suspension 30 mL  30 mL Oral QDAY PRN Deondre Quigley M.D.        And   • bisacodyl (DULCOLAX) suppository 10 mg  10 mg Rectal QDAY PRN Deondre Quigley M.D.       • acetaminophen (TYLENOL) tablet 650 mg  650 mg Oral Q6HRS PRN Deondre Quigley M.D.   650 mg at 12/06/18 1010   • insulin lispro (HUMALOG) injection 1-6 Units  1-6 Units Subcutaneous 4X/DAY ACHS Deondre Quigley M.D.   Stopped at 12/06/18 0700    And   • glucose 4 g chewable tablet 16 g  16 g Oral Q15 MIN PRN Deondre Quigley M.D.        And   • dextrose 50% (D50W) injection 25 mL  25 mL Intravenous Q15 MIN PRN Deondre Quigley M.D.       • calcium carbonate (OS-MARKELL 500) tablet 500 mg  500 mg Oral BID WITH MEALS Deondre Quigley M.D.   Stopped at 12/06/18 0730     Facility-Administered Medications Ordered in Other Encounters   Medication Dose Route Frequency Provider Last Rate Last Dose   • iodixanol (VISIPAQUE) SOLN    Intra-Op Once PRN Shekhar Rosado M.D.   10 mL at 01/18/17 0619       Fluids    Intake/Output Summary (Last 24 hours) at 12/06/18 1050  Last data filed at 12/06/18 1000   Gross per 24 hour   Intake           787.56 ml   Output             1940 ml   Net         -1152.44 ml       Laboratory          Recent Labs      12/04/18   0420   12/05/18   0230  12/05/18   1043  12/06/18   0322   SODIUM  137   < >  138  137  139   POTASSIUM  3.9   < >  4.1  3.9  4.0   CHLORIDE  108   < >  107  106  107   CO2  21   < >  22  26  25   BUN  14   < >  11  11  12   CREATININE  0.76   < >  0.80  0.73  0.80   MAGNESIUM  1.3*   --    --    --    --    CALCIUM  7.6*   < >  7.4*  7.4*  7.7*    < > = values in this interval not displayed.     Recent Labs      12/05/18   0230  12/05/18   1043  12/06/18   0322   GLUCOSE  162*  248*  129*     Recent Labs      12/05/18   1043  12/05/18   1826  12/06/18   0322   WBC  10.0  11.1*  11.1*   NEUTSPOLYS  64.70  57.10  50.10    LYMPHOCYTES  11.00*  15.30*  18.50*   MONOCYTES  14.80*  17.20*  17.50*   EOSINOPHILS  8.80*  9.50*  13.00*   BASOPHILS  0.40  0.40  0.50     Recent Labs      12/04/18   1649  12/05/18   0230  12/05/18   1043  12/05/18   1826  12/06/18   0322   RBC  3.32*  3.25*  3.28*  3.25*  3.11*   HEMOGLOBIN  9.8*  9.4*  9.8*  9.7*  9.3*   HEMATOCRIT  30.5*  30.3*  29.9*  29.1*  27.9*   PLATELETCT   --   334   --   374  347   PROTHROMBTM  21.5*   --    --   17.7*  16.9*   APTT  57.7*  52.9*  37.9*  37.0*  39.0*   INR  1.86*   --    --   1.44*  1.36*       Imaging  X-Ray:  I have personally reviewed the images and compared with prior images.    Assessment/Plan  Delirium   Assessment & Plan    Delirium persists with immobility, pain, poor sleep     Acute deep vein thrombosis (DVT) of femoral vein of left lower extremity (HCC)- (present on admission)   Assessment & Plan    Plan for q. one hour vascular checks status post TPA catheter per protocol  Continue to monitor for vascular insufficiency  Post TPA start heparin per protocol    Stent placed by IR yesterday  Leg unchanged  Sheath to be removed today  Continue heparin as bridge to oral agent    IR decided to have sheath left in and is taking pt back to IR today     DM (diabetes mellitus) (HCC)- (present on admission)   Assessment & Plan    Sliding scale  Glucose goal 120-180     Acute respiratory failure with hypoxia (HCC)   Assessment & Plan    REsloved     Essential hypertension- (present on admission)   Assessment & Plan    Continue home medications now     Atrial fibrillation (HCC)- (present on admission)   Assessment & Plan    Rate controlled  Heparin per protocol post TPA  Transition to oral anticoagulant when appropriate    Ok to be on oral agent- timing will depend on interventions done by IR          VTE:  Heparin  Ulcer: Not Indicated  Lines: None    I have performed a physical exam and reviewed and updated ROS and Plan today (12/6/2018). In review of yesterday's  note (12/5/2018), there are no changes except as documented above.     Discussed patient condition and risk of morbidity and/or mortality with Hospitalist, RN, RT, , [facility] resident and Patient  The patient remains ill.

## 2018-12-06 NOTE — ASSESSMENT & PLAN NOTE
Delirium persists with immobility, pain, poor sleep    Able to transfer her out of the ICU and do standard measures to decrease delirium    Needs mobility  Good candidate for acute rehab  OT/PT consults

## 2018-12-06 NOTE — CARE PLAN
Problem: Positive DVT/VTE  Intervention: Monitor circulation, sensation and/or motion of extremity  q1 vascular checks complete. Patient went to IR today for venogram. Heparin and tpa infusing through left popliteal sheath site. Plan for IR tomorrow for venogram.         Problem: Skin Integrity  Goal: Risk for impaired skin integrity will decrease  Outcome: PROGRESSING AS EXPECTED  2 RN skin check completed. Patient turned q2 hrs alternating pillows. Appropriate pressure ulcer prevention interventions in place

## 2018-12-06 NOTE — PROGRESS NOTES
Pt refusing to wear blood pressure cuff. Notified MD. MD said to just monitor the other vital signs.

## 2018-12-06 NOTE — DOCUMENTATION QUERY
DOCUMENTATION QUERY    PROVIDERS: Please select “Cosign w/ note”to reply to query.    To better represent the severity of illness of your patient, please review the following information and exercise your independent professional judgment in responding to this query.     Shock is documented in the Progress Notes. Based upon the clinical findings, risk factors, and treatment, can this diagnosis be further specified?    • Cardiogenic shock  • Hypovolemic shock  • Other type of shock  • Hypotension without shock  • Unable to determine  • Other explanation of clinical findings      The medical record reflects the following:   Clinical Findings 12/5 Pulm MD Note:  -Hypotension/ shock  12/5 Hospitalist Note:  -Levophed briefly overnight   Treatment Levophed, Central line, ICU monitoring, MAP >65   Risk Factors DVT s/p TPA, Heparin drip   Location within medical record Progress Notes     Thank you,   Yue Forman RN BSN  Clinical   [phone] [facility] office  [phone] Cell phone

## 2018-12-06 NOTE — ASSESSMENT & PLAN NOTE
Initially developed in ICU and observed on medical floor. Improved.   Normal FT4 despite elevated TSH-suggest subclinical hypothyroidism-recommend follow-up thyroid function 6 weeks.    Maintain circadian rhythm try to keep awake during the day  Continue trazodone evening to improve sleep.    Minimize narcotics as much as possible.

## 2018-12-06 NOTE — PROGRESS NOTES
Hospital Medicine Daily Progress Note    Date of Service  12/6/2018    Chief Complaint  Left leg pain      Hospital Course    78 y.o. Female w/ afib and hx of DVT/PE admitted 12/1/2018 with recent discontinuation of her coumadin for a dental procedure then restarted on warfarin and noted increase in left leg swelling.  Now here with acute thrombus in left lower extremity requiring catheter directed fibrinolysis.      Interval Problem Update  Ongoing confusion/delirium  Awake and follows commands with clear speech  Patient was taken back to interventional radiology and found to have occlusion again of left leg vein and given additional thrombolytic catheter directed  Patient with ongoing left leg pain  Care discussed with gaurang ARGUETA in ICU RN      Consultants/Specialty  Intensivist    Code Status  FULL    Disposition   monitor in ICU. Transfer out once not receiving thrombolytics    Review of Systems  Review of Systems   Constitutional: Negative for fever.   HENT: Negative for nosebleeds and sore throat.    Eyes: Negative for blurred vision.   Respiratory: Negative for shortness of breath.    Cardiovascular: Positive for leg swelling (left leg). Negative for chest pain and palpitations.   Gastrointestinal: Negative for nausea and vomiting.   Musculoskeletal: Positive for myalgias (left lower leg). Negative for back pain.   Neurological: Negative for dizziness and headaches.   Psychiatric/Behavioral: The patient is not nervous/anxious.         Physical Exam  Pulse:  [63-82] 68  Resp:  [17-61] 21    Physical Exam   Constitutional: She appears well-developed. No distress.   HENT:   Head: Normocephalic and atraumatic.   Nose: Nose normal.   Mouth/Throat: No oropharyngeal exudate.   Eyes: Conjunctivae and EOM are normal. Right eye exhibits no discharge. Left eye exhibits no discharge.   Neck: No tracheal deviation present.   Cardiovascular: Normal rate, regular rhythm and normal heart sounds.    No murmur heard.  Pulses:        Radial pulses are 2+ on the right side, and 2+ on the left side.        Dorsalis pedis pulses are 2+ on the right side, and 2+ on the left side.   Pulmonary/Chest: Effort normal. No stridor. No respiratory distress. She has no wheezes.   Abdominal: Soft. She exhibits no distension. There is no tenderness.   Musculoskeletal: She exhibits no edema.   Left leg swelling/edema   Left popliteal area venous access tubing w/o sign of bleeding   Lymphadenopathy:     She has no cervical adenopathy.   Neurological: She is alert. She is disoriented. No cranial nerve deficit.   Skin: Skin is warm. She is not diaphoretic.   Psychiatric: She has a normal mood and affect. Her behavior is normal.   Vitals reviewed.      Fluids    Intake/Output Summary (Last 24 hours) at 12/06/18 1801  Last data filed at 12/06/18 1800   Gross per 24 hour   Intake           822.08 ml   Output             2435 ml   Net         -1612.92 ml       Laboratory  Recent Labs      12/05/18   1826  12/06/18   0322  12/06/18   1138   WBC  11.1*  11.1*  10.4   RBC  3.25*  3.11*  3.21*   HEMOGLOBIN  9.7*  9.3*  9.3*   HEMATOCRIT  29.1*  27.9*  29.1*   MCV  89.5  89.7  90.7   MCH  29.8  29.9  29.0   MCHC  33.3*  33.3*  32.0*   RDW  49.6  49.2  50.5*   PLATELETCT  374  347  338   MPV  10.2  9.4  9.6     Recent Labs      12/05/18   0230  12/05/18   1043  12/06/18   0322   SODIUM  138  137  139   POTASSIUM  4.1  3.9  4.0   CHLORIDE  107  106  107   CO2  22  26  25   GLUCOSE  162*  248*  129*   BUN  11  11  12   CREATININE  0.80  0.73  0.80   CALCIUM  7.4*  7.4*  7.7*     Recent Labs      12/05/18   1826  12/06/18   0322  12/06/18   1138   APTT  37.0*  39.0*  32.1   INR  1.44*  1.36*  1.32*               Imaging  IR-ANGIO THROUGH EXISTING CATHETER   Final Result      1.  Reocclusion of the LEFT lower extremity and LEFT pelvic veins with acute clot   2.  Partial resolution following from pharmacomechanical mechanical thrombolysis   3.  Successful placement of a lysis  catheter      Plan:  TPA at 0.5 mg an hour through the lysis catheter. Heparin solution at 500 units an hour through the popliteal sheath. Reevaluation tomorrow.               IR-EXTREMITY VENOGRAM-UNILATERAL LEFT   Final Result      1.  Reocclusion of the LEFT lower extremity and LEFT pelvic veins with acute clot   2.  Partial resolution following from occult mechanical thrombolysis   3.  Successful placement of a lysis catheter      Plan:  TPA at 0.5 mg an hour through the lysis catheter. Heparin solution at 500 units an hour through the popliteal sheath. Reevaluation tomorrow. Findings were discussed with Dr. Efrain Cox on 12/6/2018 at the conclusion of the procedure.            IR-EXTREMITY ANGIOGRAM-UNILATERAL LEFT   Final Result      1.  Slight interval improvement in lower extremity clot burden following second 24 hours of lysis   2.  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  Successful deployment of LEFT common iliac, external iliac and common femoral venous stents with restoration of in-line flow      Plan:  The patient is allergic to aspirin. A loading dose of Plavix as well as a daily dose of Plavix has been ordered. She should remain on therapeutic levels of anticoagulation for treatment of her DVT. This was discussed with Dr. Cox following the    conclusion of the procedure.         DX-CHEST-PORTABLE (1 VIEW)   Final Result      Right central line projects over the SVC. No pneumothorax.      Interstitial edema.      Bibasilar opacities likely represent atelectasis.      Atherosclerotic plaque.         DX-CHEST-PORTABLE (1 VIEW)   Final Result      Mild pulmonary edema with dependent atelectasis or edema and probable small effusions.      IR-EXTREMITY VENOGRAM-UNILATERAL LEFT   Final Result      1.  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    Lower IVC is widely patent.      2.  Initiation of continuous overnight venous TPA infusion via multi-sidehole infusion catheter spanning from the left popliteal vein-femoral vein junction across the entire iliofemoral system with the catheter tip in the IVC.      3.  Plan is for overnight TPA infusion. Follow-up venogram tomorrow 12/3/2018, afternoon. Anticipate left iliac system and venous stenting for chronic occlusion. May need additional TPA infusion and/or mechanical thrombectomy with the goal of achieving    in-line flow in the left iliofemoral system.                     INTERPRETING LOCATION: [address] [facility], [place], [id number]      US-EXTREMITY VENOUS LOWER UNILAT LEFT   Final Result      IR-MIDLINE CATHETER INSERTION >5 YRS poor veins, serial lab draws    (Results Pending)   IR-ANGIO THROUGH EXISTING CATHETER    (Results Pending)        Assessment/Plan  Delirium   Assessment & Plan    Reorient frequently while in ICU.  Normal FT4 despite elevated TSH  UA if worsens  Watch meds  12/5 WBC: 10  Monitor neurostatus and vitals.  Some ongoing insomnia per RN     Acute deep vein thrombosis (DVT) of femoral vein of left lower extremity (HCC)- (present on admission)   Assessment & Plan    Stent placed left thigh/pelvic region per Dr Ellis 12/4 and 12/6.    Will remain on Plavix   S/p Thromolysis   heparin for now.  Venogram per Dr Ellis of left leg 12/6     DM (diabetes mellitus) (HCC)- (present on admission)   Assessment & Plan    Monitor accuchecks and cover with SSI  HgbA1c:6.1 in past 3 months     Acute respiratory failure with hypoxia (HCC)   Assessment & Plan    RT protocol  Deep inspiratory efforts/encourage IS  Supplemental oxygen     Hematuria   Assessment & Plan    Monitoring CBC       Obesity (BMI 30-39.9)- (present on admission)   Assessment & Plan    Body mass index is 35.34 kg/m².       Hypomagnesemia- (present on admission)    Assessment & Plan    Replete and recheck     Essential hypertension- (present on admission)   Assessment & Plan    Monitor vitals.  On amlodipine and losartan with hold parameters for lower BPs     Atrial fibrillation (HCC)- (present on admission)   Assessment & Plan    Heparin drip and restart warfarin once left leg sheath has been removed  Rate controlled  Has been in NSR          VTE prophylaxis: heparin

## 2018-12-07 ENCOUNTER — APPOINTMENT (OUTPATIENT)
Dept: RADIOLOGY | Facility: MEDICAL CENTER | Age: 78
DRG: 270 | End: 2018-12-07
Attending: RADIOLOGY
Payer: MEDICARE

## 2018-12-07 LAB
ANION GAP SERPL CALC-SCNC: 6 MMOL/L (ref 0–11.9)
APTT PPP: 44.6 SEC (ref 24.7–36)
APTT PPP: 50.2 SEC (ref 24.7–36)
APTT PPP: 74.4 SEC (ref 24.7–36)
APTT PPP: 77.2 SEC (ref 24.7–36)
BASOPHILS # BLD AUTO: 0.3 % (ref 0–1.8)
BASOPHILS # BLD AUTO: 0.6 % (ref 0–1.8)
BASOPHILS # BLD AUTO: 0.7 % (ref 0–1.8)
BASOPHILS # BLD: 0.04 K/UL (ref 0–0.12)
BASOPHILS # BLD: 0.06 K/UL (ref 0–0.12)
BASOPHILS # BLD: 0.09 K/UL (ref 0–0.12)
BUN SERPL-MCNC: 13 MG/DL (ref 8–22)
CALCIUM SERPL-MCNC: 7.6 MG/DL (ref 8.5–10.5)
CHLORIDE SERPL-SCNC: 108 MMOL/L (ref 96–112)
CO2 SERPL-SCNC: 25 MMOL/L (ref 20–33)
CREAT SERPL-MCNC: 0.66 MG/DL (ref 0.5–1.4)
EOSINOPHIL # BLD AUTO: 1.03 K/UL (ref 0–0.51)
EOSINOPHIL # BLD AUTO: 1.27 K/UL (ref 0–0.51)
EOSINOPHIL # BLD AUTO: 1.81 K/UL (ref 0–0.51)
EOSINOPHIL NFR BLD: 12.2 % (ref 0–6.9)
EOSINOPHIL NFR BLD: 14.2 % (ref 0–6.9)
EOSINOPHIL NFR BLD: 8.6 % (ref 0–6.9)
ERYTHROCYTE [DISTWIDTH] IN BLOOD BY AUTOMATED COUNT: 51.1 FL (ref 35.9–50)
ERYTHROCYTE [DISTWIDTH] IN BLOOD BY AUTOMATED COUNT: 51.2 FL (ref 35.9–50)
ERYTHROCYTE [DISTWIDTH] IN BLOOD BY AUTOMATED COUNT: 53.4 FL (ref 35.9–50)
FIBRINOGEN PPP-MCNC: 233 MG/DL (ref 215–460)
FIBRINOGEN PPP-MCNC: 275 MG/DL (ref 215–460)
GLUCOSE BLD-MCNC: 107 MG/DL (ref 65–99)
GLUCOSE BLD-MCNC: 108 MG/DL (ref 65–99)
GLUCOSE BLD-MCNC: 110 MG/DL (ref 65–99)
GLUCOSE BLD-MCNC: 173 MG/DL (ref 65–99)
GLUCOSE SERPL-MCNC: 116 MG/DL (ref 65–99)
HCT VFR BLD AUTO: 25.5 % (ref 37–47)
HCT VFR BLD AUTO: 26.1 % (ref 37–47)
HCT VFR BLD AUTO: 27.1 % (ref 37–47)
HGB BLD-MCNC: 8.2 G/DL (ref 12–16)
HGB BLD-MCNC: 8.7 G/DL (ref 12–16)
HGB BLD-MCNC: 8.8 G/DL (ref 12–16)
IMM GRANULOCYTES # BLD AUTO: 0.05 K/UL (ref 0–0.11)
IMM GRANULOCYTES # BLD AUTO: 0.07 K/UL (ref 0–0.11)
IMM GRANULOCYTES # BLD AUTO: 0.1 K/UL (ref 0–0.11)
IMM GRANULOCYTES NFR BLD AUTO: 0.5 % (ref 0–0.9)
IMM GRANULOCYTES NFR BLD AUTO: 0.6 % (ref 0–0.9)
IMM GRANULOCYTES NFR BLD AUTO: 0.8 % (ref 0–0.9)
INR PPP: 1.35 (ref 0.87–1.13)
LYMPHOCYTES # BLD AUTO: 1.56 K/UL (ref 1–4.8)
LYMPHOCYTES # BLD AUTO: 2.28 K/UL (ref 1–4.8)
LYMPHOCYTES # BLD AUTO: 2.62 K/UL (ref 1–4.8)
LYMPHOCYTES NFR BLD: 13 % (ref 22–41)
LYMPHOCYTES NFR BLD: 20.5 % (ref 22–41)
LYMPHOCYTES NFR BLD: 21.9 % (ref 22–41)
MCH RBC QN AUTO: 29.7 PG (ref 27–33)
MCH RBC QN AUTO: 29.8 PG (ref 27–33)
MCH RBC QN AUTO: 30.3 PG (ref 27–33)
MCHC RBC AUTO-ENTMCNC: 32.2 G/DL (ref 33.6–35)
MCHC RBC AUTO-ENTMCNC: 32.5 G/DL (ref 33.6–35)
MCHC RBC AUTO-ENTMCNC: 33.3 G/DL (ref 33.6–35)
MCV RBC AUTO: 90.9 FL (ref 81.4–97.8)
MCV RBC AUTO: 91.6 FL (ref 81.4–97.8)
MCV RBC AUTO: 92.7 FL (ref 81.4–97.8)
MONOCYTES # BLD AUTO: 1.57 K/UL (ref 0–0.85)
MONOCYTES # BLD AUTO: 1.61 K/UL (ref 0–0.85)
MONOCYTES # BLD AUTO: 1.66 K/UL (ref 0–0.85)
MONOCYTES NFR BLD AUTO: 12.6 % (ref 0–13.4)
MONOCYTES NFR BLD AUTO: 13.1 % (ref 0–13.4)
MONOCYTES NFR BLD AUTO: 15.9 % (ref 0–13.4)
NEUTROPHILS # BLD AUTO: 5.1 K/UL (ref 2–7.15)
NEUTROPHILS # BLD AUTO: 6.56 K/UL (ref 2–7.15)
NEUTROPHILS # BLD AUTO: 7.76 K/UL (ref 2–7.15)
NEUTROPHILS NFR BLD: 48.9 % (ref 44–72)
NEUTROPHILS NFR BLD: 51.2 % (ref 44–72)
NEUTROPHILS NFR BLD: 64.4 % (ref 44–72)
NRBC # BLD AUTO: 0 K/UL
NRBC BLD-RTO: 0 /100 WBC
PLATELET # BLD AUTO: 285 K/UL (ref 164–446)
PMV BLD AUTO: 10 FL (ref 9–12.9)
POTASSIUM SERPL-SCNC: 3.8 MMOL/L (ref 3.6–5.5)
PROTHROMBIN TIME: 16.8 SEC (ref 12–14.6)
RBC # BLD AUTO: 2.75 M/UL (ref 4.2–5.4)
RBC # BLD AUTO: 2.87 M/UL (ref 4.2–5.4)
RBC # BLD AUTO: 2.96 M/UL (ref 4.2–5.4)
SODIUM SERPL-SCNC: 139 MMOL/L (ref 135–145)
WBC # BLD AUTO: 10.4 K/UL (ref 4.8–10.8)
WBC # BLD AUTO: 12 K/UL (ref 4.8–10.8)
WBC # BLD AUTO: 12.8 K/UL (ref 4.8–10.8)

## 2018-12-07 PROCEDURE — 700102 HCHG RX REV CODE 250 W/ 637 OVERRIDE(OP): Performed by: HOSPITALIST

## 2018-12-07 PROCEDURE — 85025 COMPLETE CBC W/AUTO DIFF WBC: CPT

## 2018-12-07 PROCEDURE — 85014 HEMATOCRIT: CPT | Mod: 91

## 2018-12-07 PROCEDURE — 99233 SBSQ HOSP IP/OBS HIGH 50: CPT | Performed by: INTERNAL MEDICINE

## 2018-12-07 PROCEDURE — A6250 SKIN SEAL PROTECT MOISTURIZR: HCPCS | Performed by: INTERNAL MEDICINE

## 2018-12-07 PROCEDURE — 85610 PROTHROMBIN TIME: CPT | Mod: 91

## 2018-12-07 PROCEDURE — 85041 AUTOMATED RBC COUNT: CPT

## 2018-12-07 PROCEDURE — 85730 THROMBOPLASTIN TIME PARTIAL: CPT

## 2018-12-07 PROCEDURE — 700102 HCHG RX REV CODE 250 W/ 637 OVERRIDE(OP): Performed by: RADIOLOGY

## 2018-12-07 PROCEDURE — A6250 SKIN SEAL PROTECT MOISTURIZR: HCPCS | Performed by: HOSPITALIST

## 2018-12-07 PROCEDURE — 99233 SBSQ HOSP IP/OBS HIGH 50: CPT | Performed by: HOSPITALIST

## 2018-12-07 PROCEDURE — 06CD3ZZ EXTIRPATION OF MATTER FROM LEFT COMMON ILIAC VEIN, PERCUTANEOUS APPROACH: ICD-10-PCS | Performed by: RADIOLOGY

## 2018-12-07 PROCEDURE — A9270 NON-COVERED ITEM OR SERVICE: HCPCS | Performed by: HOSPITALIST

## 2018-12-07 PROCEDURE — A9270 NON-COVERED ITEM OR SERVICE: HCPCS | Performed by: INTERNAL MEDICINE

## 2018-12-07 PROCEDURE — 80048 BASIC METABOLIC PNL TOTAL CA: CPT

## 2018-12-07 PROCEDURE — 85384 FIBRINOGEN ACTIVITY: CPT | Mod: 91

## 2018-12-07 PROCEDURE — 700111 HCHG RX REV CODE 636 W/ 250 OVERRIDE (IP): Mod: JG | Performed by: RADIOLOGY

## 2018-12-07 PROCEDURE — 82962 GLUCOSE BLOOD TEST: CPT

## 2018-12-07 PROCEDURE — A9270 NON-COVERED ITEM OR SERVICE: HCPCS | Performed by: FAMILY MEDICINE

## 2018-12-07 PROCEDURE — 700111 HCHG RX REV CODE 636 W/ 250 OVERRIDE (IP)

## 2018-12-07 PROCEDURE — 700105 HCHG RX REV CODE 258: Performed by: RADIOLOGY

## 2018-12-07 PROCEDURE — 37188 VEN MECHNL THRMBC REPEAT TX: CPT

## 2018-12-07 PROCEDURE — 700102 HCHG RX REV CODE 250 W/ 637 OVERRIDE(OP): Performed by: FAMILY MEDICINE

## 2018-12-07 PROCEDURE — 700117 HCHG RX CONTRAST REV CODE 255: Performed by: RADIOLOGY

## 2018-12-07 PROCEDURE — A9270 NON-COVERED ITEM OR SERVICE: HCPCS | Performed by: RADIOLOGY

## 2018-12-07 PROCEDURE — 75820 VEIN X-RAY ARM/LEG: CPT

## 2018-12-07 PROCEDURE — 85018 HEMOGLOBIN: CPT

## 2018-12-07 PROCEDURE — 85048 AUTOMATED LEUKOCYTE COUNT: CPT

## 2018-12-07 PROCEDURE — 700111 HCHG RX REV CODE 636 W/ 250 OVERRIDE (IP): Performed by: INTERNAL MEDICINE

## 2018-12-07 PROCEDURE — 770022 HCHG ROOM/CARE - ICU (200)

## 2018-12-07 PROCEDURE — 700102 HCHG RX REV CODE 250 W/ 637 OVERRIDE(OP): Performed by: INTERNAL MEDICINE

## 2018-12-07 PROCEDURE — 700105 HCHG RX REV CODE 258: Performed by: INTERNAL MEDICINE

## 2018-12-07 RX ORDER — POTASSIUM CHLORIDE 20 MEQ/1
40 TABLET, EXTENDED RELEASE ORAL ONCE
Status: COMPLETED | OUTPATIENT
Start: 2018-12-07 | End: 2018-12-07

## 2018-12-07 RX ORDER — HEPARIN SODIUM 1000 [USP'U]/ML
1000 INJECTION, SOLUTION INTRAVENOUS; SUBCUTANEOUS ONCE
Status: COMPLETED | OUTPATIENT
Start: 2018-12-07 | End: 2018-12-07

## 2018-12-07 RX ORDER — HEPARIN SODIUM 1000 [USP'U]/ML
3200 INJECTION, SOLUTION INTRAVENOUS; SUBCUTANEOUS PRN
Status: DISCONTINUED | OUTPATIENT
Start: 2018-12-07 | End: 2018-12-13 | Stop reason: ALTCHOICE

## 2018-12-07 RX ORDER — ONDANSETRON 2 MG/ML
4 INJECTION INTRAMUSCULAR; INTRAVENOUS PRN
Status: DISCONTINUED | OUTPATIENT
Start: 2018-12-07 | End: 2018-12-07 | Stop reason: HOSPADM

## 2018-12-07 RX ORDER — MIDAZOLAM HYDROCHLORIDE 1 MG/ML
.5-2 INJECTION INTRAMUSCULAR; INTRAVENOUS PRN
Status: DISCONTINUED | OUTPATIENT
Start: 2018-12-07 | End: 2018-12-07 | Stop reason: HOSPADM

## 2018-12-07 RX ORDER — SODIUM CHLORIDE, SODIUM LACTATE, POTASSIUM CHLORIDE, CALCIUM CHLORIDE 600; 310; 30; 20 MG/100ML; MG/100ML; MG/100ML; MG/100ML
INJECTION, SOLUTION INTRAVENOUS CONTINUOUS
Status: DISCONTINUED | OUTPATIENT
Start: 2018-12-07 | End: 2018-12-10

## 2018-12-07 RX ORDER — SODIUM CHLORIDE, SODIUM LACTATE, POTASSIUM CHLORIDE, CALCIUM CHLORIDE 600; 310; 30; 20 MG/100ML; MG/100ML; MG/100ML; MG/100ML
1000 INJECTION, SOLUTION INTRAVENOUS ONCE
Status: COMPLETED | OUTPATIENT
Start: 2018-12-07 | End: 2018-12-07

## 2018-12-07 RX ORDER — WARFARIN SODIUM 7.5 MG/1
7.5 TABLET ORAL
Status: COMPLETED | OUTPATIENT
Start: 2018-12-07 | End: 2018-12-07

## 2018-12-07 RX ORDER — SODIUM CHLORIDE 9 MG/ML
500 INJECTION, SOLUTION INTRAVENOUS
Status: DISCONTINUED | OUTPATIENT
Start: 2018-12-07 | End: 2018-12-07 | Stop reason: HOSPADM

## 2018-12-07 RX ORDER — HEPARIN SODIUM,PORCINE 1000/ML
VIAL (ML) INJECTION
Status: COMPLETED
Start: 2018-12-07 | End: 2018-12-07

## 2018-12-07 RX ADMIN — GABAPENTIN 300 MG: 300 CAPSULE ORAL at 21:05

## 2018-12-07 RX ADMIN — HEPARIN SODIUM 1000 UNITS: 1000 INJECTION, SOLUTION INTRAVENOUS; SUBCUTANEOUS at 09:58

## 2018-12-07 RX ADMIN — Medication 500 MG: at 17:01

## 2018-12-07 RX ADMIN — CLOPIDOGREL 75 MG: 75 TABLET, FILM COATED ORAL at 06:07

## 2018-12-07 RX ADMIN — SODIUM CHLORIDE, POTASSIUM CHLORIDE, SODIUM LACTATE AND CALCIUM CHLORIDE 1000 ML: 600; 310; 30; 20 INJECTION, SOLUTION INTRAVENOUS at 02:54

## 2018-12-07 RX ADMIN — WARFARIN SODIUM 7.5 MG: 7.5 TABLET ORAL at 17:01

## 2018-12-07 RX ADMIN — INSULIN LISPRO 1 UNITS: 100 INJECTION, SOLUTION INTRAVENOUS; SUBCUTANEOUS at 21:00

## 2018-12-07 RX ADMIN — GABAPENTIN 300 MG: 300 CAPSULE ORAL at 06:06

## 2018-12-07 RX ADMIN — GABAPENTIN 300 MG: 300 CAPSULE ORAL at 13:35

## 2018-12-07 RX ADMIN — AMLODIPINE BESYLATE 10 MG: 10 TABLET ORAL at 06:06

## 2018-12-07 RX ADMIN — LEVOTHYROXINE SODIUM 150 MCG: 150 TABLET ORAL at 06:07

## 2018-12-07 RX ADMIN — ATORVASTATIN CALCIUM 80 MG: 40 TABLET, FILM COATED ORAL at 17:01

## 2018-12-07 RX ADMIN — POTASSIUM CHLORIDE 40 MEQ: 1500 TABLET, EXTENDED RELEASE ORAL at 13:36

## 2018-12-07 RX ADMIN — OXYCODONE HYDROCHLORIDE 5 MG: 5 TABLET ORAL at 17:03

## 2018-12-07 RX ADMIN — ALTEPLASE 0.5 MG/HR: KIT at 06:08

## 2018-12-07 RX ADMIN — SODIUM CHLORIDE, POTASSIUM CHLORIDE, SODIUM LACTATE AND CALCIUM CHLORIDE: 600; 310; 30; 20 INJECTION, SOLUTION INTRAVENOUS at 02:54

## 2018-12-07 RX ADMIN — IOHEXOL 56 ML: 300 INJECTION, SOLUTION INTRAVENOUS at 10:00

## 2018-12-07 RX ADMIN — LOSARTAN POTASSIUM 25 MG: 25 TABLET, FILM COATED ORAL at 06:07

## 2018-12-07 RX ADMIN — HEPARIN SODIUM 1200 UNITS/HR: 5000 INJECTION, SOLUTION INTRAVENOUS at 11:08

## 2018-12-07 ASSESSMENT — ENCOUNTER SYMPTOMS
NEUROLOGICAL NEGATIVE: 1
FEVER: 0
RESPIRATORY NEGATIVE: 1
SORE THROAT: 0
HEADACHES: 0
PALPITATIONS: 0
CARDIOVASCULAR NEGATIVE: 1
BACK PAIN: 0
NAUSEA: 0
CHILLS: 0
CONSTITUTIONAL NEGATIVE: 1
SHORTNESS OF BREATH: 0
NERVOUS/ANXIOUS: 0
MYALGIAS: 1
GASTROINTESTINAL NEGATIVE: 1
DIZZINESS: 0
VOMITING: 0
EYES NEGATIVE: 1

## 2018-12-07 ASSESSMENT — PATIENT HEALTH QUESTIONNAIRE - PHQ9
SUM OF ALL RESPONSES TO PHQ9 QUESTIONS 1 AND 2: 0
2. FEELING DOWN, DEPRESSED, IRRITABLE, OR HOPELESS: NOT AT ALL
1. LITTLE INTEREST OR PLEASURE IN DOING THINGS: NOT AT ALL

## 2018-12-07 ASSESSMENT — PAIN SCALES - GENERAL
PAINLEVEL_OUTOF10: 3
PAINLEVEL_OUTOF10: 4
PAINLEVEL_OUTOF10: 3
PAINLEVEL_OUTOF10: 1
PAINLEVEL_OUTOF10: 0

## 2018-12-07 NOTE — PROGRESS NOTES
Critical Care Progress Note    Date of admission  12/1/2018    Chief Complaint  78 y.o. female admitted 12/1/2018 with ischemic LLE    Hospital Course    Interval Problem Update  Reviewed last 24 hour events:  Delirium  Leg looks slightly less swollen  IR asked to have sheath left in, returning to IR today  No other major events  3 liters o2      Review of Systems  Review of Systems   Unable to perform ROS: Mental acuity   Constitutional: Negative.    HENT: Negative.    Eyes: Negative.    Respiratory: Negative.    Cardiovascular: Negative.    Gastrointestinal: Negative.    Musculoskeletal:        Swollen leg- same as yesterday   Skin: Negative.    Neurological: Negative.    Endo/Heme/Allergies: Negative.    Psychiatric/Behavioral:        Delirium        Vital Signs for last 24 hours   Pulse:  [59-87] 65  Resp:  [15-88] 20    Hemodynamic parameters for last 24 hours       Respiratory       Physical Exam   Physical Exam   Constitutional: She is oriented to person, place, and time. She appears well-developed and well-nourished.   HENT:   Head: Normocephalic and atraumatic.   Neck: Normal range of motion. Neck supple.   Cardiovascular: Normal rate and regular rhythm.    Pulmonary/Chest: Breath sounds normal.   Abdominal: Soft. Bowel sounds are normal.   Musculoskeletal: She exhibits edema.   Large swollen right leg- no change   Neurological: She is alert and oriented to person, place, and time.   Skin: Skin is warm and dry.   Psychiatric:   delirious       Medications  Current Facility-Administered Medications   Medication Dose Route Frequency Provider Last Rate Last Dose   • lactated ringers infusion   Intravenous Continuous Zaki White M.D. 100 mL/hr at 12/07/18 0254     • potassium chloride SA (Kdur) tablet 40 mEq  40 mEq Oral Once Zaki Hamlin M.D.       • heparin injection 3,200 Units  3,200 Units Intravenous PRN Zaki Hamlin M.D.        And   • heparin infusion 25,000 units in 500 ml 0.45% nacl    Intravenous Continuous Zaki Hamlin M.D. 24 mL/hr at 12/07/18 1108 1,200 Units/hr at 12/07/18 1108   • gabapentin (NEURONTIN) capsule 300 mg  300 mg Oral Q8HRS Zaki Hamlin M.D.   300 mg at 12/07/18 0606   • Pharmacy Consult Request ...Pain Management Review 1 Each  1 Each Other PRN Andrey Ellis M.D.       • clopidogrel (PLAVIX) tablet 75 mg  75 mg Oral DAILY Andrey Ellis M.D.   75 mg at 12/07/18 0607   • losartan (COZAAR) tablet 25 mg  25 mg Oral Q DAY Joan Belle M.D.   25 mg at 12/07/18 0607   • amLODIPine (NORVASC) tablet 10 mg  10 mg Oral Q DAY Deondre Quigley M.D.   10 mg at 12/07/18 0606   • atorvastatin (LIPITOR) tablet 80 mg  80 mg Oral Q EVENING Deondre Quigley M.D.   80 mg at 12/06/18 1703   • levothyroxine (SYNTHROID) tablet 150 mcg  150 mcg Oral QAM AC Deondre Quigley M.D.   150 mcg at 12/07/18 0607   • oxyCODONE immediate-release (ROXICODONE) tablet 5-10 mg  5-10 mg Oral Q4HRS PRN Deondre Quigley M.D.   5 mg at 12/06/18 1949   • traZODone (DESYREL) tablet 100 mg  100 mg Oral QHS Deondre Quigley M.D.   100 mg at 12/06/18 1949   • senna-docusate (PERICOLACE or SENOKOT S) 8.6-50 MG per tablet 2 Tab  2 Tab Oral BID Deondre Quigley M.D.   2 Tab at 12/06/18 1703    And   • polyethylene glycol/lytes (MIRALAX) PACKET 1 Packet  1 Packet Oral QDAY PRN Deondre Quigley M.D.        And   • magnesium hydroxide (MILK OF MAGNESIA) suspension 30 mL  30 mL Oral QDAY PRN Deondre Quigley M.D.        And   • bisacodyl (DULCOLAX) suppository 10 mg  10 mg Rectal QDAY PRN Deondre Quigley M.D.       • acetaminophen (TYLENOL) tablet 650 mg  650 mg Oral Q6HRS OPAL Quigley M.D.   650 mg at 12/06/18 1010   • insulin lispro (HUMALOG) injection 1-6 Units  1-6 Units Subcutaneous 4X/DAY ANTOINETTE Quigley M.D.   Stopped at 12/07/18 0700    And   • glucose 4 g chewable tablet 16 g  16 g Oral Q15 MIN PRALEXEY Quigley M.D.        And   • dextrose 50% (D50W) injection 25 mL  25 mL Intravenous Q15 MIN PRALEXEY Quigley,  M.D.       • calcium carbonate (OS-MARKELL 500) tablet 500 mg  500 mg Oral BID WITH MEALS Deondre Quigley M.D.   Stopped at 12/07/18 0730     Facility-Administered Medications Ordered in Other Encounters   Medication Dose Route Frequency Provider Last Rate Last Dose   • iodixanol (VISIPAQUE) SOLN    Intra-Op Once PRN Shekhar Rosado M.D.   10 mL at 01/18/17 0619       Fluids    Intake/Output Summary (Last 24 hours) at 12/07/18 1112  Last data filed at 12/07/18 0800   Gross per 24 hour   Intake          2594.32 ml   Output             1275 ml   Net          1319.32 ml       Laboratory          Recent Labs      12/05/18   1043  12/06/18   0322  12/07/18   0351   SODIUM  137  139  139   POTASSIUM  3.9  4.0  3.8   CHLORIDE  106  107  108   CO2  26  25  25   BUN  11  12  13   CREATININE  0.73  0.80  0.66   CALCIUM  7.4*  7.7*  7.6*     Recent Labs      12/05/18   1043  12/06/18   0322  12/07/18   0351   GLUCOSE  248*  129*  116*     Recent Labs      12/06/18   1955  12/07/18   0351  12/07/18   0800   WBC  10.8  10.4  12.0*   NEUTSPOLYS  57.70  48.90  64.40   LYMPHOCYTES  14.80*  21.90*  13.00*   MONOCYTES  15.60*  15.90*  13.10   EOSINOPHILS  10.80*  12.20*  8.60*   BASOPHILS  0.60  0.60  0.30     Recent Labs      12/05/18   1826  12/06/18   0322  12/06/18   1138  12/06/18 1955 12/07/18   0351  12/07/18   0800   RBC  3.25*  3.11*  3.21*  3.14*  2.96*  2.87*   HEMOGLOBIN  9.7*  9.3*  9.3*  9.5*  8.8*  8.7*   HEMATOCRIT  29.1*  27.9*  29.1*  28.0*  27.1*  26.1*   PLATELETCT  374  347  338   --    --    --    PROTHROMBTM  17.7*  16.9*  16.5*   --    --    --    APTT  37.0*  39.0*  32.1  42.5*  44.6*  50.2*   INR  1.44*  1.36*  1.32*   --    --    --        Imaging  X-Ray:  I have personally reviewed the images and compared with prior images.    Assessment/Plan  Delirium   Assessment & Plan    Delirium persists with immobility, pain, poor sleep     Acute deep vein thrombosis (DVT) of femoral vein of left lower extremity  (HCC)- (present on admission)   Assessment & Plan    Plan for q. one hour vascular checks status post TPA catheter per protocol  Continue to monitor for vascular insufficiency  Post TPA start heparin per protocol    Stent placed by IR yesterday  Leg unchanged  Sheath to be removed today  Continue heparin as bridge to oral agent    IR decided to have sheath left in and is taking pt back to IR today     DM (diabetes mellitus) (HCC)- (present on admission)   Assessment & Plan    Sliding scale  Glucose goal 120-180     Acute respiratory failure with hypoxia ([facility])   Assessment & Plan    REsloved     Essential hypertension- (present on admission)   Assessment & Plan    Continue home medications now     Atrial fibrillation (HCC)- (present on admission)   Assessment & Plan    Rate controlled  Heparin per protocol post TPA  Transition to oral anticoagulant when appropriate    Ok to be on oral agent- timing will depend on interventions done by IR          VTE:  Heparin  Ulcer: Not Indicated  Lines: None    I have performed a physical exam and reviewed and updated ROS and Plan today (12/7/2018). In review of yesterday's note (12/6/2018), there are no changes except as documented above.     Discussed patient condition and risk of morbidity and/or mortality with Hospitalist, RN, RT, , [employer] resident and Patient  The patient remains ill.

## 2018-12-07 NOTE — OR SURGEON
Immediate Post- Operative Note        PostOp Diagnosis:  Left DVT      Procedure(s): Venogram and Angiojet thombolysis    Findings:patent Left Illiac stent and Femoral and iliac veins      Estimated Blood Loss: Less than 5 ml        Complications: None            12/7/2018     9:59 AM     Jarrett Tinajero

## 2018-12-07 NOTE — PROGRESS NOTES
Patient wanting to leave the hospital, and trying to get out of bed. Notified MD. MD came to patient bedside and educated patient of the importance of remaining in the hospital. Patient decided to stay. Will continue to monitor.

## 2018-12-07 NOTE — PROGRESS NOTES
Pt had inadequate urine for 0200. Notified MD. MD gave new orders to give stat LR bolus and to start continuous fluids at 100 mL/hr.

## 2018-12-07 NOTE — PROGRESS NOTES
Inpatient Anticoagulation Service Note    Date: 12/7/2018  Reason for Anticoagulation: Deep Vein Thrombosis        Hemoglobin Value: (!) 8.7  Hematocrit Value: (!) 26.1  Lab Platelet Value: 338  Target INR: 2.0 to 3.0    INR from last 7 days     Date/Time INR Value    12/07/18 0800 (!)  1.35    12/06/18 1138 (!)  1.32    12/06/18 0322 (!)  1.36    12/05/18 1826 (!)  1.44    12/04/18 1649 (!)  1.86    12/03/18 1600 (!)  2.13    12/02/18 1953 (!)  2.37    12/02/18 1735 (!)  2.42    12/01/18 1254 (!)  2.68        Dose from last 7 days     Date/Time Dose (mg)    12/07/18 1200  7.5        Average Dose (mg):  (home dose: 5 mg MWF, 7.5 mg AOD)  Significant Interactions: Statin, Clopidogrel, Thyroid Medications  Bridge Therapy: Yes  Date of Last VTE Event: 11/25/18  Bridge Therapy Start Date: 12/01/18  Days of Overlap Therapy: 0   INR Value Greater than 2 Prior to Discontinuation of Parenteral Anticoagulation: Not Applicable     Comments: Patient has a history of DVT and a fib and was previously anticoagulated with warfarin. She stopped her warfarin in anticipation of a dental procedure and subsequently developed substantial DVT and was admitted on 11/25 and discharged again once her INR was therapeutic. She was readmitted with worsening pain in her leg. She is now s/p stent placement and treatment with heparin and alteplase therapy. Warfarin safe to resume this evening. Her homes dose of warfarin is 5 mg on Mondays, Wednesdays and Fridays with 7.5 mg on all other days. INR likely high due to recent alteplase infusion. Will give home dose of 7.5 mg tonight and trend INR over the next upcoming days. Remains on heparin gtt. Will follow.    Plan:  7.5 mg tonight, likely resume home dosing tomorrow  Education Material Provided?: No (chronic warfarin patient )  Pharmacist suggested discharge dosing: likely 5 mg Mondays, Wednesdays and Fridays, 7.5 mg all other days     Paige Abreu, PHARMD

## 2018-12-07 NOTE — PROGRESS NOTES
Hospital Medicine Daily Progress Note    Date of Service  12/7/2018    Chief Complaint  Left leg pain      Hospital Course    78 y.o. Female w/ afib and hx of DVT/PE admitted 12/1/2018 with recent discontinuation of her coumadin for a dental procedure then restarted on warfarin and noted increase in left leg swelling.  Now here with acute thrombus in left lower extremity requiring catheter directed fibrinolysis.      Interval Problem Update  A+Ox3  Remains with delerium per RN  Trazodone for sleep overnight  NSR with occassional afib  Given 1L bolus and started on maintenance fluid overnight for lower bp.  Discussed with  today he did remove the left popliteal sheath and did alert that patient will be on full weight-based heparin drip.    Consultants/Specialty  Intensivist    Code Status  FULL    Disposition   monitor in ICU. Transfer out once not receiving thrombolytics    Review of Systems  Review of Systems   Constitutional: Negative for chills and fever.   HENT: Negative for nosebleeds and sore throat.    Respiratory: Negative for shortness of breath.    Cardiovascular: Positive for leg swelling (left leg). Negative for chest pain and palpitations.   Gastrointestinal: Negative for nausea and vomiting.   Musculoskeletal: Positive for myalgias (left lower leg). Negative for back pain.   Neurological: Negative for dizziness and headaches.   Psychiatric/Behavioral: The patient is not nervous/anxious.         Physical Exam  Pulse:  [58-87] 58  Resp:  [15-73] 15    Physical Exam   Constitutional: She appears well-developed. No distress.   HENT:   Head: Normocephalic and atraumatic.   Nose: Nose normal.   Mouth/Throat: No oropharyngeal exudate.   Eyes: Conjunctivae and EOM are normal. Right eye exhibits no discharge. Left eye exhibits no discharge.   Neck: No tracheal deviation present.   Cardiovascular: Normal rate, regular rhythm and normal heart sounds.    No murmur heard.  Pulses:       Radial pulses  are 2+ on the right side, and 2+ on the left side.        Dorsalis pedis pulses are 2+ on the right side, and 2+ on the left side.   Pulmonary/Chest: Effort normal. No stridor. No respiratory distress. She has no wheezes.   Abdominal: Soft. She exhibits no distension. There is no tenderness.   Musculoskeletal: She exhibits no edema.   Left leg swelling/edema   Left popliteal area venous access tubing w/o sign of bleeding   Lymphadenopathy:     She has no cervical adenopathy.   Neurological: She is alert. She is disoriented. No cranial nerve deficit.   Skin: Skin is warm. She is not diaphoretic.   Psychiatric: She has a normal mood and affect. Her behavior is normal.   Vitals reviewed.      Fluids    Intake/Output Summary (Last 24 hours) at 12/07/18 1834  Last data filed at 12/07/18 1600   Gross per 24 hour   Intake          3100.54 ml   Output              635 ml   Net          2465.54 ml       Laboratory  Recent Labs      12/05/18   1826  12/06/18   0322  12/06/18   1138  12/06/18   1955  12/07/18   0351  12/07/18   0800   WBC  11.1*  11.1*  10.4  10.8  10.4  12.0*   RBC  3.25*  3.11*  3.21*  3.14*  2.96*  2.87*   HEMOGLOBIN  9.7*  9.3*  9.3*  9.5*  8.8*  8.7*   HEMATOCRIT  29.1*  27.9*  29.1*  28.0*  27.1*  26.1*   MCV  89.5  89.7  90.7  89.2  91.6  90.9   MCH  29.8  29.9  29.0  30.3  29.7  30.3   MCHC  33.3*  33.3*  32.0*  33.9  32.5*  33.3*   RDW  49.6  49.2  50.5*  49.2  51.2*  51.1*   PLATELETCT  374  347  338   --    --    --    MPV  10.2  9.4  9.6   --    --    --      Recent Labs      12/05/18   1043  12/06/18   0322  12/07/18   0351   SODIUM  137  139  139   POTASSIUM  3.9  4.0  3.8   CHLORIDE  106  107  108   CO2  26  25  25   GLUCOSE  248*  129*  116*   BUN  11  12  13   CREATININE  0.73  0.80  0.66   CALCIUM  7.4*  7.7*  7.6*     Recent Labs      12/06/18   0322  12/06/18   1138  12/06/18   1955  12/07/18   0351  12/07/18   0800   APTT  39.0*  32.1  42.5*  44.6*  50.2*   INR  1.36*  1.32*   --    --    1.35*               Imaging  IR-ANGIO THROUGH EXISTING CATHETER   Final Result      1.  Reocclusion of the LEFT lower extremity and LEFT pelvic veins with acute clot   2.  Partial resolution following from pharmacomechanical mechanical thrombolysis   3.  Successful placement of a lysis catheter      Plan:  TPA at 0.5 mg an hour through the lysis catheter. Heparin solution at 500 units an hour through the popliteal sheath. Reevaluation tomorrow.               IR-EXTREMITY VENOGRAM-UNILATERAL LEFT   Final Result      1.  Reocclusion of the LEFT lower extremity and LEFT pelvic veins with acute clot   2.  Partial resolution following from occult mechanical thrombolysis   3.  Successful placement of a lysis catheter      Plan:  TPA at 0.5 mg an hour through the lysis catheter. Heparin solution at 500 units an hour through the popliteal sheath. Reevaluation tomorrow. Findings were discussed with Dr. Efrain Cox on 12/6/2018 at the conclusion of the procedure.            IR-EXTREMITY ANGIOGRAM-UNILATERAL LEFT   Final Result      1.  Slight interval improvement in lower extremity clot burden following second 24 hours of lysis   2.  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  Successful deployment of LEFT common iliac, external iliac and common femoral venous stents with restoration of in-line flow      Plan:  The patient is allergic to aspirin. A loading dose of Plavix as well as a daily dose of Plavix has been ordered. She should remain on therapeutic levels of anticoagulation for treatment of her DVT. This was discussed with Dr. Cox following the    conclusion of the procedure.         DX-CHEST-PORTABLE (1 VIEW)   Final Result      Right central line projects over the SVC. No pneumothorax.      Interstitial edema.      Bibasilar opacities likely represent atelectasis.      Atherosclerotic plaque.         DX-CHEST-PORTABLE (1 VIEW)   Final Result      Mild pulmonary edema with dependent  atelectasis or edema and probable small effusions.      IR-EXTREMITY VENOGRAM-UNILATERAL LEFT   Final Result      1.  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    Lower IVC is widely patent.      2.  Initiation of continuous overnight venous TPA infusion via multi-sidehole infusion catheter spanning from the left popliteal vein-femoral vein junction across the entire iliofemoral system with the catheter tip in the IVC.      3.  Plan is for overnight TPA infusion. Follow-up venogram tomorrow 12/3/2018, afternoon. Anticipate left iliac system and venous stenting for chronic occlusion. May need additional TPA infusion and/or mechanical thrombectomy with the goal of achieving    in-line flow in the left iliofemoral system.                     INTERPRETING LOCATION: [facility], [place], [address]      US-EXTREMITY VENOUS LOWER UNILAT LEFT   Final Result      IR-MIDLINE CATHETER INSERTION >5 YRS poor veins, serial lab draws    (Results Pending)   IR-ANGIO THROUGH EXISTING CATHETER    (Results Pending)        Assessment/Plan  Delirium   Assessment & Plan    Reorient frequently while in ICU.  Normal FT4 despite elevated TSH  UA if worsens  Watch meds  12/5 WBC: 10  Monitor neurostatus and vitals.  Some ongoing insomnia per RN had improved sleep given trazodone 12/6     Acute deep vein thrombosis (DVT) of femoral vein of left lower extremity (HCC)- (present on admission)   Assessment & Plan    Stent placed left thigh/pelvic region per Dr Ellis 12/4 and 12/6.    Loss 7 had left popliteal sheath removed.  Will remain on Plavix due to venous stent  on weight-based heparin and warfarin  S/p Thromolysis   Venogram per Dr Ellis of left leg 12/6  Venogram by Dr. Tinajero of left leg 12/7     Acute respiratory failure with hypoxia  ([facility])   Assessment & Plan    RT protocol  Deep inspiratory efforts/encourage IS  Supplemental oxygen     Atrial fibrillation (HCC)- (present on admission)   Assessment & Plan    Heparin drip and restart warfarin once left leg sheath has been removed  Rate controlled  Has been in NSR     DM (diabetes mellitus) ([facility])- (present on admission)   Assessment & Plan    Monitor accuchecks and cover with SSI  HgbA1c:6.1 in past 3 months     Hematuria   Assessment & Plan    Monitoring CBC  Worse hematuria in cm, if not clearing will need CBI     Obesity (BMI 30-39.9)- (present on admission)   Assessment & Plan    Body mass index is 36.86 kg/m².         Hypomagnesemia- (present on admission)   Assessment & Plan    Replete and recheck     Essential hypertension- (present on admission)   Assessment & Plan    Monitor vitals.  On amlodipine and losartan with hold parameters for lower BPs          VTE prophylaxis: heparin

## 2018-12-07 NOTE — PROGRESS NOTES
2 RN skin check:   .   Blanchable redness noted on sacrum, mepilex in place.   Generalized bruising on bilateral extremities.   Redness noted on left lower extremities.         Q2H turns, pillows used for repositioning and support.

## 2018-12-07 NOTE — CARE PLAN
Problem: Infection  Goal: Will remain free from infection  Outcome: PROGRESSING AS EXPECTED  Hand hygiene in place, pt assessed for removal of potential infection routes, WBC elevated, MD aware.     Problem: Skin Integrity  Goal: Risk for impaired skin integrity will decrease  Outcome: PROGRESSING AS EXPECTED  2RN skin check complete, mepilex in place, Q2h turns, no skin breakdown at this time

## 2018-12-07 NOTE — CARE PLAN
Problem: Respiratory:  Goal: Respiratory status will improve    Intervention: Administer and titrate oxygen therapy  Patient is still requiring 5 L nasal cannula, and is still desaturating occasionally. Patient does not wear oxygen at home. Patient IS is ineffective at 1000, and continues to take short shallow breaths despite education. Continuing to titrate oxygen appropriately.         Problem: Psychosocial Needs:  Goal: Level of anxiety will decrease    Intervention: Identify and develop with patient strategies to cope with anxiety triggers  Patient is very frustrated about being in the hospital and wanted to leave AMA. Notified MD who came and spoke with patient, and helped changed patient mind. Will continue to educate patient and reorient patient.

## 2018-12-07 NOTE — PROGRESS NOTES
IR Procedure Note:    Patient arrived to IR2 with transport and ICU RN on transport monitor.  Patient drowsy, A&Ox4 and on 5L nasal cannula.  Patient consented by Dr Tinajero; all questions answered.  Dr. Tinajero completed left lower extremity venogram.  The patient tolerated the procedure well; ETCo2 range 26-29, with consistent waveform during the procedure.  Sheath access removed from left popliteal;  Gauze and tegaderm applied to access site, CDI; pressure held x 10 minutes.  Patient alert and oriented and verbally appropriate post procedure, vital signs stable, see flow sheet.  Bedside report given to ROSE Oneal.  Bedside RN aware patient to be started on heparin weight based protocol STAT to prevent re-occlusion of stents; all questions answered.  Patient transported to room with ICU RN and transport.

## 2018-12-08 ENCOUNTER — APPOINTMENT (OUTPATIENT)
Dept: RADIOLOGY | Facility: MEDICAL CENTER | Age: 78
DRG: 270 | End: 2018-12-08
Attending: HOSPITALIST
Payer: MEDICARE

## 2018-12-08 PROBLEM — M25.532 LEFT WRIST PAIN: Status: ACTIVE | Noted: 2018-12-08

## 2018-12-08 LAB
APTT PPP: 73.4 SEC (ref 24.7–36)
BASOPHILS # BLD AUTO: 0.7 % (ref 0–1.8)
BASOPHILS # BLD AUTO: 0.8 % (ref 0–1.8)
BASOPHILS # BLD: 0.09 K/UL (ref 0–0.12)
BASOPHILS # BLD: 0.11 K/UL (ref 0–0.12)
EOSINOPHIL # BLD AUTO: 1.85 K/UL (ref 0–0.51)
EOSINOPHIL # BLD AUTO: 1.93 K/UL (ref 0–0.51)
EOSINOPHIL NFR BLD: 14.6 % (ref 0–6.9)
EOSINOPHIL NFR BLD: 14.7 % (ref 0–6.9)
ERYTHROCYTE [DISTWIDTH] IN BLOOD BY AUTOMATED COUNT: 53.7 FL (ref 35.9–50)
ERYTHROCYTE [DISTWIDTH] IN BLOOD BY AUTOMATED COUNT: 54.5 FL (ref 35.9–50)
FIBRINOGEN PPP-MCNC: 313 MG/DL (ref 215–460)
GLUCOSE BLD-MCNC: 111 MG/DL (ref 65–99)
GLUCOSE BLD-MCNC: 117 MG/DL (ref 65–99)
GLUCOSE BLD-MCNC: 136 MG/DL (ref 65–99)
GLUCOSE BLD-MCNC: 194 MG/DL (ref 65–99)
HCT VFR BLD AUTO: 25.8 % (ref 37–47)
HCT VFR BLD AUTO: 25.9 % (ref 37–47)
HGB BLD-MCNC: 8.1 G/DL (ref 12–16)
HGB BLD-MCNC: 8.3 G/DL (ref 12–16)
IMM GRANULOCYTES # BLD AUTO: 0.1 K/UL (ref 0–0.11)
IMM GRANULOCYTES # BLD AUTO: 0.12 K/UL (ref 0–0.11)
IMM GRANULOCYTES NFR BLD AUTO: 0.8 % (ref 0–0.9)
IMM GRANULOCYTES NFR BLD AUTO: 0.9 % (ref 0–0.9)
INR PPP: 1.24 (ref 0.87–1.13)
INR PPP: 1.26 (ref 0.87–1.13)
LYMPHOCYTES # BLD AUTO: 2.43 K/UL (ref 1–4.8)
LYMPHOCYTES # BLD AUTO: 2.81 K/UL (ref 1–4.8)
LYMPHOCYTES NFR BLD: 19.3 % (ref 22–41)
LYMPHOCYTES NFR BLD: 21.3 % (ref 22–41)
MCH RBC QN AUTO: 29 PG (ref 27–33)
MCH RBC QN AUTO: 30.1 PG (ref 27–33)
MCHC RBC AUTO-ENTMCNC: 31.4 G/DL (ref 33.6–35)
MCHC RBC AUTO-ENTMCNC: 32 G/DL (ref 33.6–35)
MCV RBC AUTO: 92.5 FL (ref 81.4–97.8)
MCV RBC AUTO: 93.8 FL (ref 81.4–97.8)
MONOCYTES # BLD AUTO: 1.63 K/UL (ref 0–0.85)
MONOCYTES # BLD AUTO: 1.79 K/UL (ref 0–0.85)
MONOCYTES NFR BLD AUTO: 12.9 % (ref 0–13.4)
MONOCYTES NFR BLD AUTO: 13.6 % (ref 0–13.4)
NEUTROPHILS # BLD AUTO: 6.45 K/UL (ref 2–7.15)
NEUTROPHILS # BLD AUTO: 6.49 K/UL (ref 2–7.15)
NEUTROPHILS NFR BLD: 48.8 % (ref 44–72)
NEUTROPHILS NFR BLD: 51.6 % (ref 44–72)
NRBC # BLD AUTO: 0 K/UL
NRBC # BLD AUTO: 0 K/UL
NRBC BLD-RTO: 0 /100 WBC
NRBC BLD-RTO: 0 /100 WBC
PROTHROMBIN TIME: 15.7 SEC (ref 12–14.6)
PROTHROMBIN TIME: 15.9 SEC (ref 12–14.6)
RBC # BLD AUTO: 2.76 M/UL (ref 4.2–5.4)
RBC # BLD AUTO: 2.79 M/UL (ref 4.2–5.4)
WBC # BLD AUTO: 12.6 K/UL (ref 4.8–10.8)
WBC # BLD AUTO: 13.2 K/UL (ref 4.8–10.8)

## 2018-12-08 PROCEDURE — 770006 HCHG ROOM/CARE - MED/SURG/GYN SEMI*

## 2018-12-08 PROCEDURE — 700102 HCHG RX REV CODE 250 W/ 637 OVERRIDE(OP): Performed by: INTERNAL MEDICINE

## 2018-12-08 PROCEDURE — 85048 AUTOMATED LEUKOCYTE COUNT: CPT | Mod: 91

## 2018-12-08 PROCEDURE — 73100 X-RAY EXAM OF WRIST: CPT | Mod: LT

## 2018-12-08 PROCEDURE — 85610 PROTHROMBIN TIME: CPT

## 2018-12-08 PROCEDURE — 700102 HCHG RX REV CODE 250 W/ 637 OVERRIDE(OP): Performed by: HOSPITALIST

## 2018-12-08 PROCEDURE — 700105 HCHG RX REV CODE 258: Performed by: INTERNAL MEDICINE

## 2018-12-08 PROCEDURE — A9270 NON-COVERED ITEM OR SERVICE: HCPCS | Performed by: HOSPITALIST

## 2018-12-08 PROCEDURE — 700102 HCHG RX REV CODE 250 W/ 637 OVERRIDE(OP): Performed by: RADIOLOGY

## 2018-12-08 PROCEDURE — A9270 NON-COVERED ITEM OR SERVICE: HCPCS | Performed by: RADIOLOGY

## 2018-12-08 PROCEDURE — 99233 SBSQ HOSP IP/OBS HIGH 50: CPT | Performed by: HOSPITALIST

## 2018-12-08 PROCEDURE — 99233 SBSQ HOSP IP/OBS HIGH 50: CPT | Performed by: INTERNAL MEDICINE

## 2018-12-08 PROCEDURE — 85014 HEMATOCRIT: CPT

## 2018-12-08 PROCEDURE — 85018 HEMOGLOBIN: CPT | Mod: 91

## 2018-12-08 PROCEDURE — 700111 HCHG RX REV CODE 636 W/ 250 OVERRIDE (IP): Performed by: INTERNAL MEDICINE

## 2018-12-08 PROCEDURE — A9270 NON-COVERED ITEM OR SERVICE: HCPCS | Performed by: INTERNAL MEDICINE

## 2018-12-08 PROCEDURE — 85730 THROMBOPLASTIN TIME PARTIAL: CPT

## 2018-12-08 PROCEDURE — 85384 FIBRINOGEN ACTIVITY: CPT

## 2018-12-08 PROCEDURE — 85041 AUTOMATED RBC COUNT: CPT

## 2018-12-08 PROCEDURE — 82962 GLUCOSE BLOOD TEST: CPT

## 2018-12-08 PROCEDURE — A9270 NON-COVERED ITEM OR SERVICE: HCPCS | Performed by: FAMILY MEDICINE

## 2018-12-08 PROCEDURE — 700102 HCHG RX REV CODE 250 W/ 637 OVERRIDE(OP): Performed by: FAMILY MEDICINE

## 2018-12-08 RX ORDER — WARFARIN SODIUM 5 MG/1
5 TABLET ORAL
Status: DISCONTINUED | OUTPATIENT
Start: 2018-12-10 | End: 2018-12-14 | Stop reason: HOSPADM

## 2018-12-08 RX ORDER — WARFARIN SODIUM 7.5 MG/1
7.5 TABLET ORAL
Status: DISCONTINUED | OUTPATIENT
Start: 2018-12-08 | End: 2018-12-14 | Stop reason: HOSPADM

## 2018-12-08 RX ADMIN — OXYCODONE HYDROCHLORIDE 5 MG: 5 TABLET ORAL at 21:36

## 2018-12-08 RX ADMIN — LEVOTHYROXINE SODIUM 150 MCG: 150 TABLET ORAL at 08:31

## 2018-12-08 RX ADMIN — ACETAMINOPHEN 650 MG: 325 TABLET, FILM COATED ORAL at 08:39

## 2018-12-08 RX ADMIN — LOSARTAN POTASSIUM 25 MG: 25 TABLET, FILM COATED ORAL at 05:09

## 2018-12-08 RX ADMIN — STANDARDIZED SENNA CONCENTRATE AND DOCUSATE SODIUM 2 TABLET: 8.6; 5 TABLET, FILM COATED ORAL at 05:09

## 2018-12-08 RX ADMIN — HEPARIN SODIUM 1200 UNITS/HR: 5000 INJECTION, SOLUTION INTRAVENOUS at 08:31

## 2018-12-08 RX ADMIN — GABAPENTIN 300 MG: 300 CAPSULE ORAL at 21:36

## 2018-12-08 RX ADMIN — OXYCODONE HYDROCHLORIDE 5 MG: 5 TABLET ORAL at 06:07

## 2018-12-08 RX ADMIN — OXYCODONE HYDROCHLORIDE 10 MG: 5 TABLET ORAL at 12:04

## 2018-12-08 RX ADMIN — AMLODIPINE BESYLATE 10 MG: 10 TABLET ORAL at 06:00

## 2018-12-08 RX ADMIN — GABAPENTIN 300 MG: 300 CAPSULE ORAL at 13:06

## 2018-12-08 RX ADMIN — SODIUM CHLORIDE, POTASSIUM CHLORIDE, SODIUM LACTATE AND CALCIUM CHLORIDE: 600; 310; 30; 20 INJECTION, SOLUTION INTRAVENOUS at 21:38

## 2018-12-08 RX ADMIN — INSULIN LISPRO 1 UNITS: 100 INJECTION, SOLUTION INTRAVENOUS; SUBCUTANEOUS at 12:04

## 2018-12-08 RX ADMIN — SODIUM CHLORIDE, POTASSIUM CHLORIDE, SODIUM LACTATE AND CALCIUM CHLORIDE: 600; 310; 30; 20 INJECTION, SOLUTION INTRAVENOUS at 00:02

## 2018-12-08 RX ADMIN — STANDARDIZED SENNA CONCENTRATE AND DOCUSATE SODIUM 2 TABLET: 8.6; 5 TABLET, FILM COATED ORAL at 17:10

## 2018-12-08 RX ADMIN — ATORVASTATIN CALCIUM 80 MG: 40 TABLET, FILM COATED ORAL at 17:11

## 2018-12-08 RX ADMIN — TRAZODONE HYDROCHLORIDE 100 MG: 100 TABLET ORAL at 21:36

## 2018-12-08 RX ADMIN — WARFARIN SODIUM 7.5 MG: 7.5 TABLET ORAL at 18:19

## 2018-12-08 RX ADMIN — Medication 500 MG: at 17:11

## 2018-12-08 RX ADMIN — GABAPENTIN 300 MG: 300 CAPSULE ORAL at 05:09

## 2018-12-08 RX ADMIN — CLOPIDOGREL 75 MG: 75 TABLET, FILM COATED ORAL at 05:10

## 2018-12-08 RX ADMIN — Medication 500 MG: at 08:31

## 2018-12-08 ASSESSMENT — PAIN SCALES - GENERAL
PAINLEVEL_OUTOF10: 7
PAINLEVEL_OUTOF10: 3
PAINLEVEL_OUTOF10: 4
PAINLEVEL_OUTOF10: 3
PAINLEVEL_OUTOF10: 5
PAINLEVEL_OUTOF10: 4
PAINLEVEL_OUTOF10: 2
PAINLEVEL_OUTOF10: 8
PAINLEVEL_OUTOF10: 3
PAINLEVEL_OUTOF10: 5
PAINLEVEL_OUTOF10: 5
PAINLEVEL_OUTOF10: 4

## 2018-12-08 ASSESSMENT — ENCOUNTER SYMPTOMS
NERVOUS/ANXIOUS: 0
HEADACHES: 0
NEUROLOGICAL NEGATIVE: 1
SHORTNESS OF BREATH: 0
BACK PAIN: 0
CHILLS: 0
BACK PAIN: 1
CARDIOVASCULAR NEGATIVE: 1
PSYCHIATRIC NEGATIVE: 1
RESPIRATORY NEGATIVE: 1
MYALGIAS: 1
EYES NEGATIVE: 1
FEVER: 0
GASTROINTESTINAL NEGATIVE: 1
ABDOMINAL PAIN: 0
NAUSEA: 0
CONSTITUTIONAL NEGATIVE: 1

## 2018-12-08 NOTE — PROGRESS NOTES
Called lab to inquire about the APTT drawn at 1656, still in process at 1915. Lab informed order was for PT. Ordered PTT and will add on to specimen sent.

## 2018-12-08 NOTE — PROGRESS NOTES
Inpatient Anticoagulation Service Note    Date: 12/8/2018  Reason for Anticoagulation: Deep Vein Thrombosis        Hemoglobin Value: (!) 8.3  Hematocrit Value: (!) 25.9  Lab Platelet Value: 285  Target INR: 2.0 to 3.0    INR from last 7 days     Date/Time INR Value    12/08/18 0500 (!)  1.26    12/07/18 1656 (!)  1.24    12/07/18 0800 (!)  1.35    12/06/18 1138 (!)  1.32    12/06/18 0322 (!)  1.36    12/05/18 1826 (!)  1.44    12/04/18 1649 (!)  1.86    12/03/18 1600 (!)  2.13    12/02/18 1953 (!)  2.37    12/02/18 1735 (!)  2.42        Dose from last 7 days     Date/Time Dose (mg)    12/08/18 1300  7.5    12/07/18 1200  7.5        Average Dose (mg):  (home dose: 5 mg MWF, 7.5 mg AOD)  Significant Interactions: Statin, Clopidogrel, Thyroid Medications  Bridge Therapy: Yes  Date of Last VTE Event: 11/25/18  Bridge Therapy Start Date: 12/01/18  Days of Overlap Therapy: 1   INR Value Greater than 2 Prior to Discontinuation of Parenteral Anticoagulation: Not Applicable    Comments: INR remains subtherapeutic after resuming warfarin last night. Heparin gtt continues and aptt is within therapeutic range. No change to H/H or platelets. Will resume home dose tonight of 5 mg Mondays, Wednesdays and Fridays and 7.5 mg all other days. Will continue to follow.    Plan:  7.5 mg tonight, INR tomorrow  Education Material Provided?: No (chronic warfarin patient )  Pharmacist suggested discharge dosing: home dose of 5 mg Mondays, Wednesdays and Fridays, 7.5 mg all other days     Paige Abreu, DARYLD

## 2018-12-08 NOTE — PROGRESS NOTES
2 RN skin check complete. Sacrum red, but blanching. Mepilex in place. No changes in sheath removal site. Interdry placed under abdominal pannus. Small scattered bruises on left side of trunk. No other skin integrity concerns at this moment. Will continue to monitor.

## 2018-12-08 NOTE — CARE PLAN
Problem: Venous Thromboembolism (VTW)/Deep Vein Thrombosis (DVT) Prevention:  Goal: Patient will participate in Venous Thrombosis (VTE)/Deep Vein Thrombosis (DVT)Prevention Measures    Intervention: Assess and monitor for anticoagulation complications  Assess effected LLE for changes in circulation status q 1 hour throughout shift. Educate patient on use of heparin drift and necessity of frequent peripheral checks.       Problem: Pain Management  Goal: Pain level will decrease to patient's comfort goal    Intervention: Follow pain managment plan developed in collaboration with patient and Interdisciplinary Team  Educate patient on use of 1-10 pain scale. Administer pain medications as ordered and as needed.

## 2018-12-08 NOTE — PROGRESS NOTES
Critical Care Progress Note    Date of admission  12/1/2018    Chief Complaint  78 y.o. female admitted 12/1/2018 with ischemic LLE    Hospital Course    Interval Problem Update  Reviewed last 24 hour events:  Sheath removed by IR  Much less report of delirium overnight and today  O x 3  Left leg remains swollen  Complains of bilateral hand pain, back pain  Admites that much of pain is chronic, requesting narcotics  Heparin drip  Daily coumadin  Eating, starting mobility  4 liters nasal canula  RIJ in place    Prior 24 hours  Delirium  Leg looks slightly less swollen  IR asked to have sheath left in, returning to IR today  No other major events  3 liters o2      Review of Systems  Review of Systems   Unable to perform ROS: Mental acuity (Mental status OK today to do ROS)   Constitutional: Negative.    HENT: Negative.    Eyes: Negative.    Respiratory: Negative.    Cardiovascular: Negative.    Gastrointestinal: Negative.    Genitourinary: Negative.    Musculoskeletal: Positive for back pain, joint pain and myalgias.        Swollen leg- same as yesterday   Skin: Negative.    Neurological: Negative.    Endo/Heme/Allergies: Negative.    Psychiatric/Behavioral: Negative.         Delirium        Vital Signs for last 24 hours   Pulse:  [58-74] 67  Resp:  [14-73] 14    Hemodynamic parameters for last 24 hours       Respiratory       Physical Exam   Physical Exam   Constitutional: She is oriented to person, place, and time. She appears well-developed and well-nourished.   HENT:   Head: Normocephalic and atraumatic.   Neck: Normal range of motion. Neck supple.   Cardiovascular: Normal rate and regular rhythm.    Pulmonary/Chest: Breath sounds normal.   Abdominal: Soft. Bowel sounds are normal.   Musculoskeletal: She exhibits edema.   Large swollen right leg- no change   Neurological: She is alert and oriented to person, place, and time.   Skin: Skin is warm and dry.   Psychiatric:   delirious       Medications  Current  Facility-Administered Medications   Medication Dose Route Frequency Provider Last Rate Last Dose   • lactated ringers infusion   Intravenous Continuous Zaki White M.D. 100 mL/hr at 12/08/18 0002     • heparin injection 3,200 Units  3,200 Units Intravenous PRN Zaki Hamlin M.D.        And   • heparin infusion 25,000 units in 500 ml 0.45% nacl   Intravenous Continuous Zaki Hamlin M.D. 24 mL/hr at 12/08/18 0831 1,200 Units/hr at 12/08/18 0831   • MD Alert...Warfarin per Pharmacy   Other pharmacy to dose Efrain Cox D.O.       • gabapentin (NEURONTIN) capsule 300 mg  300 mg Oral Q8HRS Zaki Hamlin M.D.   300 mg at 12/08/18 0509   • Pharmacy Consult Request ...Pain Management Review 1 Each  1 Each Other PRN Andrey Ellis M.D.       • clopidogrel (PLAVIX) tablet 75 mg  75 mg Oral DAILY Andrey Ellis M.D.   75 mg at 12/08/18 0510   • losartan (COZAAR) tablet 25 mg  25 mg Oral Q DAY Joan Belle M.D.   25 mg at 12/08/18 0509   • amLODIPine (NORVASC) tablet 10 mg  10 mg Oral Q DAY Deondre Quigley M.D.   10 mg at 12/08/18 0600   • atorvastatin (LIPITOR) tablet 80 mg  80 mg Oral Q EVENING Deondre Quigley M.D.   80 mg at 12/07/18 1701   • levothyroxine (SYNTHROID) tablet 150 mcg  150 mcg Oral QAM  Deondre Quigley M.D.   150 mcg at 12/08/18 0831   • oxyCODONE immediate-release (ROXICODONE) tablet 5-10 mg  5-10 mg Oral Q4HRS PRN Deondre Quigley M.D.   5 mg at 12/08/18 0607   • traZODone (DESYREL) tablet 100 mg  100 mg Oral QHS Deondre Quigley M.D.   100 mg at 12/06/18 1949   • senna-docusate (PERICOLACE or SENOKOT S) 8.6-50 MG per tablet 2 Tab  2 Tab Oral BID Deondre Quigley M.D.   2 Tab at 12/08/18 0509    And   • polyethylene glycol/lytes (MIRALAX) PACKET 1 Packet  1 Packet Oral QDAY PRN Deondre Quigley M.D.        And   • magnesium hydroxide (MILK OF MAGNESIA) suspension 30 mL  30 mL Oral QDAY PRN Deondre Quigley M.D.        And   • bisacodyl (DULCOLAX) suppository 10 mg  10 mg Rectal QDAY PRN  Deondre Quigley M.D.       • acetaminophen (TYLENOL) tablet 650 mg  650 mg Oral Q6HRS PRN Deondre Quigley M.D.   650 mg at 12/08/18 0839   • insulin lispro (HUMALOG) injection 1-6 Units  1-6 Units Subcutaneous 4X/DAY ACHS Deondre Quigley M.D.   Stopped at 12/08/18 0700    And   • glucose 4 g chewable tablet 16 g  16 g Oral Q15 MIN PRN Deondre Quigley M.D.        And   • dextrose 50% (D50W) injection 25 mL  25 mL Intravenous Q15 MIN PRN Deondre Quigley M.D.       • calcium carbonate (OS-MARKELL 500) tablet 500 mg  500 mg Oral BID WITH MEALS Deondre Quigley M.D.   500 mg at 12/08/18 0831     Facility-Administered Medications Ordered in Other Encounters   Medication Dose Route Frequency Provider Last Rate Last Dose   • iodixanol (VISIPAQUE) SOLN    Intra-Op Once PRN Shekhar Rosado M.D.   10 mL at 01/18/17 0619       Fluids    Intake/Output Summary (Last 24 hours) at 12/08/18 1104  Last data filed at 12/08/18 1000   Gross per 24 hour   Intake          3133.64 ml   Output             1900 ml   Net          1233.64 ml       Laboratory          Recent Labs      12/06/18   0322  12/07/18   0351   SODIUM  139  139   POTASSIUM  4.0  3.8   CHLORIDE  107  108   CO2  25  25   BUN  12  13   CREATININE  0.80  0.66   CALCIUM  7.7*  7.6*     Recent Labs      12/06/18   0322  12/07/18   0351   GLUCOSE  129*  116*     Recent Labs      12/07/18   0800  12/07/18   2302  12/08/18   0500   WBC  12.0*  12.8*  13.2*   NEUTSPOLYS  64.40  51.20  48.80   LYMPHOCYTES  13.00*  20.50*  21.30*   MONOCYTES  13.10  12.60  13.60*   EOSINOPHILS  8.60*  14.20*  14.60*   BASOPHILS  0.30  0.70  0.80     Recent Labs      12/06/18   0322  12/06/18   1138   12/07/18   0800  12/07/18   1656  12/07/18   2302  12/08/18   0500   RBC  3.11*  3.21*   < >  2.87*   --   2.75*  2.79*   HEMOGLOBIN  9.3*  9.3*   < >  8.7*   --   8.2*  8.1*   HEMATOCRIT  27.9*  29.1*   < >  26.1*   --   25.5*  25.8*   PLATELETCT  347  338   --    --    --   285   --    PROTHROMBTM  16.9*  16.5*    --   16.8*  15.7*   --   15.9*   APTT  39.0*  32.1   < >  50.2*  74.4*  77.2*  73.4*   INR  1.36*  1.32*   --   1.35*  1.24*   --   1.26*    < > = values in this interval not displayed.       Imaging  X-Ray:  I have personally reviewed the images and compared with prior images.    Assessment/Plan  Delirium   Assessment & Plan    Delirium persists with immobility, pain, poor sleep    I think it will be helpful to the patient to transfer her out of the ICU and do standard measures to decrease delirium    Needs mobility  Good candidate for acute rehab  OT/PT consults     Acute deep vein thrombosis (DVT) of femoral vein of left lower extremity (HCC)- (present on admission)   Assessment & Plan    Plan for q. one hour vascular checks status post TPA catheter per protocol  Continue to monitor for vascular insufficiency  Post TPA start heparin per protocol    Stent placed by IR yesterday  Leg unchanged  Sheath to be removed today  Continue heparin as bridge to oral agent    IR decided to have sheath left in and is taking pt back to IR today    Back to IR again today (12/7), sheath removed by radiology, heparin infusion restarted.    Sheath removed yesterday, now on heparin and coumadin     Acute respiratory failure with hypoxia (HCC)   Assessment & Plan    4 liters  Mobility and gentle diuresis may help wean oxygen     Atrial fibrillation (HCC)- (present on admission)   Assessment & Plan    Rate controlled  Heparin per protocol post TPA  Transition to oral anticoagulant when appropriate    Ok to be on oral agent- timing will depend on interventions done by IR     DM (diabetes mellitus) (HCC)- (present on admission)   Assessment & Plan    Sliding scale  Glucose goal 120-180     Hematuria   Assessment & Plan    resolved     Hypomagnesemia- (present on admission)   Assessment & Plan    Replete     Essential hypertension- (present on admission)   Assessment & Plan    Continue home medications now. BP acceptable          VTE:   Heparin  Ulcer: Not Indicated  Lines: None    I have performed a physical exam and reviewed and updated ROS and Plan today (12/8/2018). In review of yesterday's note (12/7/2018), there are no changes except as documented above.     Discussed patient condition and risk of morbidity and/or mortality with Hospitalist, RN, RT, , [facility] resident and Patient  The patient remains ill.

## 2018-12-08 NOTE — PROGRESS NOTES
Hospital Medicine Daily Progress Note    Date of Service  12/8/2018    Chief Complaint  Left leg pain      Hospital Course    78 y.o. Female w/ afib and hx of DVT/PE admitted 12/1/2018 with recent discontinuation of her coumadin for a dental procedure then restarted on warfarin and noted increase in left leg swelling.  Now here with acute thrombus in left lower extremity requiring catheter directed fibrinolysis.      Interval Problem Update  Muscle cramp in hands  C/O left wrist pain  A+Ox3  Some occassional PVC  ADA soft diet and tolerating  Remove IV central line  On 4L NC      Consultants/Specialty  Intensivist    Code Status  FULL    Disposition   monitor in ICU. Transfer out once not receiving thrombolytics    Review of Systems  Review of Systems   Constitutional: Negative for chills and fever.   Respiratory: Negative for shortness of breath.    Cardiovascular: Positive for leg swelling (left leg). Negative for chest pain.   Gastrointestinal: Negative for abdominal pain and nausea.   Musculoskeletal: Positive for joint pain (left wrist) and myalgias (left lower leg). Negative for back pain.   Neurological: Negative for headaches.   Psychiatric/Behavioral: The patient is not nervous/anxious.         Physical Exam  Pulse:  [58-74] 66  Resp:  [14-35] 25    Physical Exam   Constitutional: She is oriented to person, place, and time. She appears well-developed. No distress.   HENT:   Head: Normocephalic and atraumatic.   Nose: Nose normal.   Eyes: Conjunctivae and EOM are normal. Right eye exhibits no discharge. Left eye exhibits no discharge. No scleral icterus.   Neck: No tracheal deviation present.   Cardiovascular: Normal rate, regular rhythm and normal heart sounds.    No murmur heard.  Pulses:       Radial pulses are 2+ on the right side, and 2+ on the left side.        Dorsalis pedis pulses are 2+ on the right side, and 2+ on the left side.   Pulmonary/Chest: Effort normal. No respiratory distress. She has no  wheezes.   Abdominal: Soft. She exhibits no distension. There is no tenderness.   Musculoskeletal: She exhibits no edema.   Left leg swelling/edema   Left popliteal area venous access tubing w/o sign of bleeding  Left wrist not swollen.  nontender to touch of bones but tender with flexion/extension at wrist   Neurological: She is alert and oriented to person, place, and time. She is not disoriented. No cranial nerve deficit.   Skin: Skin is warm. She is not diaphoretic.   Psychiatric: She has a normal mood and affect. Her behavior is normal.   Vitals reviewed.      Fluids    Intake/Output Summary (Last 24 hours) at 12/08/18 1746  Last data filed at 12/08/18 1600   Gross per 24 hour   Intake             2701 ml   Output             2060 ml   Net              641 ml       Laboratory  Recent Labs      12/06/18   0322  12/06/18   1138   12/07/18   2302  12/08/18   0500  12/08/18   1107   WBC  11.1*  10.4   < >  12.8*  13.2*  12.6*   RBC  3.11*  3.21*   < >  2.75*  2.79*  2.76*   HEMOGLOBIN  9.3*  9.3*   < >  8.2*  8.1*  8.3*   HEMATOCRIT  27.9*  29.1*   < >  25.5*  25.8*  25.9*   MCV  89.7  90.7   < >  92.7  92.5  93.8   MCH  29.9  29.0   < >  29.8  29.0  30.1   MCHC  33.3*  32.0*   < >  32.2*  31.4*  32.0*   RDW  49.2  50.5*   < >  53.4*  53.7*  54.5*   PLATELETCT  347  338   --   285   --    --    MPV  9.4  9.6   --   10.0   --    --     < > = values in this interval not displayed.     Recent Labs      12/06/18   0322  12/07/18   0351   SODIUM  139  139   POTASSIUM  4.0  3.8   CHLORIDE  107  108   CO2  25  25   GLUCOSE  129*  116*   BUN  12  13   CREATININE  0.80  0.66   CALCIUM  7.7*  7.6*     Recent Labs      12/07/18   0800  12/07/18   1656  12/07/18   2302  12/08/18   0500   APTT  50.2*  74.4*  77.2*  73.4*   INR  1.35*  1.24*   --   1.26*               Imaging  IR-ANGIO THROUGH EXISTING CATHETER   Final Result      1.  Reocclusion of the LEFT lower extremity and LEFT pelvic veins with acute clot   2.  Partial  resolution following from pharmacomechanical mechanical thrombolysis   3.  Successful placement of a lysis catheter      Plan:  TPA at 0.5 mg an hour through the lysis catheter. Heparin solution at 500 units an hour through the popliteal sheath. Reevaluation tomorrow.               IR-EXTREMITY VENOGRAM-UNILATERAL LEFT   Final Result      1.  Reocclusion of the LEFT lower extremity and LEFT pelvic veins with acute clot   2.  Partial resolution following from occult mechanical thrombolysis   3.  Successful placement of a lysis catheter      Plan:  TPA at 0.5 mg an hour through the lysis catheter. Heparin solution at 500 units an hour through the popliteal sheath. Reevaluation tomorrow. Findings were discussed with Dr. Efrain Cox on 12/6/2018 at the conclusion of the procedure.            IR-EXTREMITY ANGIOGRAM-UNILATERAL LEFT   Final Result      1.  Slight interval improvement in lower extremity clot burden following second 24 hours of lysis   2.  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  Successful deployment of LEFT common iliac, external iliac and common femoral venous stents with restoration of in-line flow      Plan:  The patient is allergic to aspirin. A loading dose of Plavix as well as a daily dose of Plavix has been ordered. She should remain on therapeutic levels of anticoagulation for treatment of her DVT. This was discussed with Dr. Cox following the    conclusion of the procedure.         DX-CHEST-PORTABLE (1 VIEW)   Final Result      Right central line projects over the SVC. No pneumothorax.      Interstitial edema.      Bibasilar opacities likely represent atelectasis.      Atherosclerotic plaque.         DX-CHEST-PORTABLE (1 VIEW)   Final Result      Mild pulmonary edema with dependent atelectasis or edema and probable small effusions.      IR-EXTREMITY VENOGRAM-UNILATERAL LEFT   Final Result      1.  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    Lower IVC is widely patent.      2.  Initiation of continuous overnight venous TPA infusion via multi-sidehole infusion catheter spanning from the left popliteal vein-femoral vein junction across the entire iliofemoral system with the catheter tip in the IVC.      3.  Plan is for overnight TPA infusion. Follow-up venogram tomorrow 12/3/2018, afternoon. Anticipate left iliac system and venous stenting for chronic occlusion. May need additional TPA infusion and/or mechanical thrombectomy with the goal of achieving    in-line flow in the left iliofemoral system.                     INTERPRETING LOCATION: [address], [place], [address]      US-EXTREMITY VENOUS LOWER UNILAT LEFT   Final Result      IR-MIDLINE CATHETER INSERTION >5 YRS poor veins, serial lab draws    (Results Pending)   IR-ANGIO THROUGH EXISTING CATHETER    (Results Pending)        Assessment/Plan  Delirium   Assessment & Plan    Reorient frequently while in ICU.  Normal FT4 despite elevated TSH  UA if worsens  Watch meds  12/5 WBC: 10  Monitor neurostatus and vitals.  Some ongoing insomnia per RN had improved sleep given trazodone 12/6     Acute deep vein thrombosis (DVT) of femoral vein of left lower extremity (HCC)- (present on admission)   Assessment & Plan    Stent placed left thigh/pelvic region per Dr Ellis 12/4 and 12/6.    Loss 7 had left popliteal sheath removed.  Will remain on Plavix due to venous stent  on weight-based heparin and warfarin  S/p Thromolysis   Venogram per Dr Ellis of left leg 12/6  Venogram by Dr. Tinajero of left leg 12/7     Acute respiratory failure with hypoxia (HCC)   Assessment & Plan    RT protocol  Deep inspiratory efforts/encourage IS  Supplemental oxygen     Atrial fibrillation (HCC)- (present on admission)   Assessment & Plan    Heparin  drip and restart warfarin once left leg sheath has been removed  Rate controlled  Has been in NSR     DM (diabetes mellitus) (HCC)- (present on admission)   Assessment & Plan    Monitor accuchecks and cover with SSI  HgbA1c:6.1 in past 3 months     Essential hypertension- (present on admission)   Assessment & Plan    Monitor vitals.  On amlodipine and losartan with hold parameters for lower BPs     Left wrist pain   Assessment & Plan    Check xray, doubt fracture  May be strain.  Doubt gout     Hematuria   Assessment & Plan    Monitoring CBC  Resolved 12/8     Obesity (BMI 30-39.9)- (present on admission)   Assessment & Plan    Body mass index is 36.86 kg/m².         Hypomagnesemia- (present on admission)   Assessment & Plan    Replete and recheck          VTE prophylaxis: heparin

## 2018-12-08 NOTE — PROGRESS NOTES
2 RN skin assessment. No new areas to note. Standard precautions in place to prevent skin breakdown.

## 2018-12-08 NOTE — CARE PLAN
Problem: Respiratory:  Goal: Respiratory status will improve    Intervention: Educate and encourage incentive spirometry usage  Encourage patient to utilize incentive spirometer, patient reached 1000      Problem: Skin Integrity  Goal: Risk for impaired skin integrity will decrease    Intervention: Assess risk factors for impaired skin integrity and/or pressure ulcers  Assessed patient skin, q2 hour turns, pillows in use for support, mepilex in place, and intradry being utilized

## 2018-12-09 LAB
ANION GAP SERPL CALC-SCNC: 7 MMOL/L (ref 0–11.9)
APTT PPP: 54.8 SEC (ref 24.7–36)
BASOPHILS # BLD AUTO: 0.7 % (ref 0–1.8)
BASOPHILS # BLD: 0.1 K/UL (ref 0–0.12)
BUN SERPL-MCNC: 11 MG/DL (ref 8–22)
CALCIUM SERPL-MCNC: 8 MG/DL (ref 8.5–10.5)
CHLORIDE SERPL-SCNC: 106 MMOL/L (ref 96–112)
CO2 SERPL-SCNC: 27 MMOL/L (ref 20–33)
CREAT SERPL-MCNC: 0.72 MG/DL (ref 0.5–1.4)
EOSINOPHIL # BLD AUTO: 1.75 K/UL (ref 0–0.51)
EOSINOPHIL NFR BLD: 13.1 % (ref 0–6.9)
ERYTHROCYTE [DISTWIDTH] IN BLOOD BY AUTOMATED COUNT: 51.5 FL (ref 35.9–50)
FERRITIN SERPL-MCNC: 102.8 NG/ML (ref 10–291)
GLUCOSE BLD-MCNC: 113 MG/DL (ref 65–99)
GLUCOSE BLD-MCNC: 126 MG/DL (ref 65–99)
GLUCOSE BLD-MCNC: 137 MG/DL (ref 65–99)
GLUCOSE BLD-MCNC: 150 MG/DL (ref 65–99)
GLUCOSE SERPL-MCNC: 123 MG/DL (ref 65–99)
HCT VFR BLD AUTO: 25.2 % (ref 37–47)
HGB BLD-MCNC: 8.2 G/DL (ref 12–16)
IMM GRANULOCYTES # BLD AUTO: 0.1 K/UL (ref 0–0.11)
IMM GRANULOCYTES NFR BLD AUTO: 0.7 % (ref 0–0.9)
INR PPP: 1.33 (ref 0.87–1.13)
IRON SATN MFR SERPL: 5 % (ref 15–55)
IRON SERPL-MCNC: 11 UG/DL (ref 40–170)
LYMPHOCYTES # BLD AUTO: 2.62 K/UL (ref 1–4.8)
LYMPHOCYTES NFR BLD: 19.6 % (ref 22–41)
MCH RBC QN AUTO: 29.8 PG (ref 27–33)
MCHC RBC AUTO-ENTMCNC: 32.5 G/DL (ref 33.6–35)
MCV RBC AUTO: 91.6 FL (ref 81.4–97.8)
MONOCYTES # BLD AUTO: 1.66 K/UL (ref 0–0.85)
MONOCYTES NFR BLD AUTO: 12.4 % (ref 0–13.4)
NEUTROPHILS # BLD AUTO: 7.17 K/UL (ref 2–7.15)
NEUTROPHILS NFR BLD: 53.5 % (ref 44–72)
NRBC # BLD AUTO: 0 K/UL
NRBC BLD-RTO: 0 /100 WBC
PLATELET # BLD AUTO: 344 K/UL (ref 164–446)
PMV BLD AUTO: 10.3 FL (ref 9–12.9)
POTASSIUM SERPL-SCNC: 3.9 MMOL/L (ref 3.6–5.5)
PROTHROMBIN TIME: 16.6 SEC (ref 12–14.6)
RBC # BLD AUTO: 2.75 M/UL (ref 4.2–5.4)
SODIUM SERPL-SCNC: 140 MMOL/L (ref 135–145)
TIBC SERPL-MCNC: 242 UG/DL (ref 250–450)
TRANSFERRIN SERPL-MCNC: 171 MG/DL (ref 200–370)
WBC # BLD AUTO: 13.4 K/UL (ref 4.8–10.8)

## 2018-12-09 PROCEDURE — A9270 NON-COVERED ITEM OR SERVICE: HCPCS | Performed by: HOSPITALIST

## 2018-12-09 PROCEDURE — 770006 HCHG ROOM/CARE - MED/SURG/GYN SEMI*

## 2018-12-09 PROCEDURE — A9270 NON-COVERED ITEM OR SERVICE: HCPCS | Performed by: INTERNAL MEDICINE

## 2018-12-09 PROCEDURE — 83540 ASSAY OF IRON: CPT

## 2018-12-09 PROCEDURE — 80048 BASIC METABOLIC PNL TOTAL CA: CPT

## 2018-12-09 PROCEDURE — 85610 PROTHROMBIN TIME: CPT

## 2018-12-09 PROCEDURE — 700102 HCHG RX REV CODE 250 W/ 637 OVERRIDE(OP): Performed by: HOSPITALIST

## 2018-12-09 PROCEDURE — 84466 ASSAY OF TRANSFERRIN: CPT

## 2018-12-09 PROCEDURE — 99233 SBSQ HOSP IP/OBS HIGH 50: CPT | Performed by: INTERNAL MEDICINE

## 2018-12-09 PROCEDURE — 700111 HCHG RX REV CODE 636 W/ 250 OVERRIDE (IP): Performed by: INTERNAL MEDICINE

## 2018-12-09 PROCEDURE — 83550 IRON BINDING TEST: CPT

## 2018-12-09 PROCEDURE — 85730 THROMBOPLASTIN TIME PARTIAL: CPT

## 2018-12-09 PROCEDURE — 700102 HCHG RX REV CODE 250 W/ 637 OVERRIDE(OP): Performed by: FAMILY MEDICINE

## 2018-12-09 PROCEDURE — 82728 ASSAY OF FERRITIN: CPT

## 2018-12-09 PROCEDURE — A9270 NON-COVERED ITEM OR SERVICE: HCPCS | Performed by: RADIOLOGY

## 2018-12-09 PROCEDURE — 700111 HCHG RX REV CODE 636 W/ 250 OVERRIDE (IP): Performed by: HOSPITALIST

## 2018-12-09 PROCEDURE — 700102 HCHG RX REV CODE 250 W/ 637 OVERRIDE(OP): Performed by: INTERNAL MEDICINE

## 2018-12-09 PROCEDURE — 99233 SBSQ HOSP IP/OBS HIGH 50: CPT | Performed by: HOSPITALIST

## 2018-12-09 PROCEDURE — 700102 HCHG RX REV CODE 250 W/ 637 OVERRIDE(OP): Performed by: RADIOLOGY

## 2018-12-09 PROCEDURE — 85025 COMPLETE CBC W/AUTO DIFF WBC: CPT

## 2018-12-09 PROCEDURE — 82962 GLUCOSE BLOOD TEST: CPT

## 2018-12-09 PROCEDURE — A9270 NON-COVERED ITEM OR SERVICE: HCPCS | Performed by: FAMILY MEDICINE

## 2018-12-09 PROCEDURE — 700105 HCHG RX REV CODE 258: Performed by: INTERNAL MEDICINE

## 2018-12-09 RX ORDER — FUROSEMIDE 10 MG/ML
20 INJECTION INTRAMUSCULAR; INTRAVENOUS
Status: DISCONTINUED | OUTPATIENT
Start: 2018-12-09 | End: 2018-12-14

## 2018-12-09 RX ORDER — OXYCODONE HYDROCHLORIDE 5 MG/1
5 TABLET ORAL EVERY 6 HOURS PRN
Status: DISCONTINUED | OUTPATIENT
Start: 2018-12-09 | End: 2018-12-14 | Stop reason: HOSPADM

## 2018-12-09 RX ADMIN — AMLODIPINE BESYLATE 10 MG: 10 TABLET ORAL at 04:46

## 2018-12-09 RX ADMIN — ATORVASTATIN CALCIUM 80 MG: 40 TABLET, FILM COATED ORAL at 18:01

## 2018-12-09 RX ADMIN — CLOPIDOGREL 75 MG: 75 TABLET, FILM COATED ORAL at 04:45

## 2018-12-09 RX ADMIN — LEVOTHYROXINE SODIUM 150 MCG: 150 TABLET ORAL at 04:46

## 2018-12-09 RX ADMIN — HEPARIN SODIUM 25000 UNITS: 5000 INJECTION, SOLUTION INTRAVENOUS at 04:49

## 2018-12-09 RX ADMIN — Medication 500 MG: at 09:34

## 2018-12-09 RX ADMIN — OXYCODONE HYDROCHLORIDE 5 MG: 5 TABLET ORAL at 04:46

## 2018-12-09 RX ADMIN — WARFARIN SODIUM 7.5 MG: 7.5 TABLET ORAL at 17:49

## 2018-12-09 RX ADMIN — STANDARDIZED SENNA CONCENTRATE AND DOCUSATE SODIUM 2 TABLET: 8.6; 5 TABLET, FILM COATED ORAL at 04:46

## 2018-12-09 RX ADMIN — OXYCODONE HYDROCHLORIDE 5 MG: 5 TABLET ORAL at 17:49

## 2018-12-09 RX ADMIN — LOSARTAN POTASSIUM 25 MG: 25 TABLET, FILM COATED ORAL at 04:46

## 2018-12-09 RX ADMIN — GABAPENTIN 300 MG: 300 CAPSULE ORAL at 13:24

## 2018-12-09 RX ADMIN — FUROSEMIDE 20 MG: 10 INJECTION, SOLUTION INTRAVENOUS at 13:24

## 2018-12-09 RX ADMIN — GABAPENTIN 300 MG: 300 CAPSULE ORAL at 20:42

## 2018-12-09 RX ADMIN — TRAZODONE HYDROCHLORIDE 100 MG: 100 TABLET ORAL at 20:42

## 2018-12-09 RX ADMIN — SODIUM CHLORIDE, POTASSIUM CHLORIDE, SODIUM LACTATE AND CALCIUM CHLORIDE: 600; 310; 30; 20 INJECTION, SOLUTION INTRAVENOUS at 16:04

## 2018-12-09 RX ADMIN — GABAPENTIN 300 MG: 300 CAPSULE ORAL at 04:46

## 2018-12-09 RX ADMIN — Medication 500 MG: at 17:49

## 2018-12-09 ASSESSMENT — ENCOUNTER SYMPTOMS
BACK PAIN: 0
NECK PAIN: 0
CARDIOVASCULAR NEGATIVE: 1
SPEECH CHANGE: 0
GASTROINTESTINAL NEGATIVE: 1
STRIDOR: 0
ABDOMINAL PAIN: 0
RESPIRATORY NEGATIVE: 1
CHILLS: 0
PSYCHIATRIC NEGATIVE: 1
COUGH: 0
FEVER: 0
WEAKNESS: 1
FLANK PAIN: 0
NEUROLOGICAL NEGATIVE: 1
MYALGIAS: 1
BACK PAIN: 1
EYES NEGATIVE: 1
DIARRHEA: 0
VOMITING: 0
DIAPHORESIS: 0
SHORTNESS OF BREATH: 1
SENSORY CHANGE: 0
TREMORS: 0
CONSTITUTIONAL NEGATIVE: 1

## 2018-12-09 ASSESSMENT — PAIN SCALES - GENERAL
PAINLEVEL_OUTOF10: 5
PAINLEVEL_OUTOF10: 2

## 2018-12-09 NOTE — ASSESSMENT & PLAN NOTE
Chronic, workup consistent with iron deficiency.    IV iron    Conservative transfusion if hemoglobin < 7 or symptoms

## 2018-12-09 NOTE — CARE PLAN
Problem: Fluid Volume:  Goal: Will maintain balanced intake and output  Outcome: PROGRESSING AS EXPECTED  Pt taking adequate fluids orally and receiving IV fluids. Urinary output is noted to be adequate, even though pt is incontinent.

## 2018-12-09 NOTE — PROGRESS NOTES
Full bedside report given to Oneida ROSE on [facility] floor 6. Patient transferred with ACLS RN Doe and patient transport via gurney on monitor. Patient vitals stable upon transfer and all questions answered.

## 2018-12-09 NOTE — ASSESSMENT & PLAN NOTE
Xray no fx.   May be strain.  Pseudogout,  arthritic.   Supportive treatment.     X-ray left wrist   1.  Demineralization and degenerative change without definite acute osseous abnormality.  2.  Soft tissue calcification at the radial aspect of the third metatarsophalangeal joint which may be related to chondrocalcinosis or chronic posttraumatic change.

## 2018-12-09 NOTE — PROGRESS NOTES
· 2 RN skin check complete with ROSE Lockhart.   · Devices in place:oxygen mask.  · Skin assessed under devices: CDI.  · Scattered bruising to BUE  · Edema and mild redness to LLE  · Gauze dressing to posterior left knee  · Right IJ in place, CDI  · The following interventions in place: pillows for support/positioning, q 2 turns, encourage pt to make independent positional shifts.

## 2018-12-09 NOTE — DIETARY
Nutrition Services Brief Note: Pt seen for weekly screening. Pt reports good intake, eating 100% of meals. Per ADL, pt eating >50% of last five documented meals.  Pt with no questions/concerns. Diet= Diabetic, Dysphagia 3. RD to monitor weekly for adequate intake, RD available PRN.

## 2018-12-09 NOTE — PROGRESS NOTES
Pulmonary Progress Note    Date of admission  12/1/2018    Chief Complaint  78 y.o. female admitted 12/1/2018 with L iliac DVT acute; hypoxemia to ICU, tPa    Interval Problem Update  Transferred to medical floor, on IV heparin, starting Coumadin, controlling pain and maintaining oxygenation      Review of Systems  Review of Systems   Unable to perform ROS: Mental acuity (Mental status OK today to do ROS)   Constitutional: Negative.    HENT: Negative.    Eyes: Negative.    Respiratory: Negative.    Cardiovascular: Negative.    Gastrointestinal: Negative.    Genitourinary: Negative.    Musculoskeletal: Positive for back pain, joint pain and myalgias.        Swollen leg- same as yesterday   Skin: Negative.    Neurological: Negative.    Endo/Heme/Allergies: Negative.    Psychiatric/Behavioral: Negative.         Delirium        Vital Signs for last 24 hours   Temp:  [36.6 °C (97.9 °F)-37.7 °C (99.9 °F)] 37.7 °C (99.9 °F)  Pulse:  [66-95] 80  Resp:  [16-75] 16  BP: (127-137)/() 129/51       Physical Exam   Physical Exam   Constitutional: She is oriented to person, place, and time. She appears well-developed and well-nourished.   HENT:   Head: Normocephalic and atraumatic.   Neck: Normal range of motion. Neck supple.   Cardiovascular: Normal rate and regular rhythm.    Pulmonary/Chest: Breath sounds normal.   Abdominal: Soft. Bowel sounds are normal.   Musculoskeletal: She exhibits edema.   Large swollen left leg   Neurological: She is alert and oriented to person, place, and time.   Skin: Skin is warm and dry.   Psychiatric:   delirious       Medications  Current Facility-Administered Medications   Medication Dose Route Frequency Provider Last Rate Last Dose   • furosemide (LASIX) injection 20 mg  20 mg Intravenous BID DIURETIC Yomi Murphy M.D.   20 mg at 12/09/18 1324   • oxyCODONE immediate-release (ROXICODONE) tablet 5 mg  5 mg Oral Q6HRS PRN Yomi Murphy M.D.       • [START ON 12/10/2018] warfarin (COUMADIN)  tablet 5 mg  5 mg Oral Once per day on Mon Wed Fri Efrain Cox D.O.       • warfarin (COUMADIN) tablet 7.5 mg  7.5 mg Oral Once per day on Sun Tue Thu Sat Efrain Cox D.O.   7.5 mg at 12/08/18 1819   • lactated ringers infusion   Intravenous Continuous Zaki White M.D. 100 mL/hr at 12/08/18 2138     • heparin injection 3,200 Units  3,200 Units Intravenous PRN Zaki Hamlin M.D.        And   • heparin infusion 25,000 units in 500 ml 0.45% nacl   Intravenous Continuous Zaki Hamlin M.D. 24 mL/hr at 12/09/18 0629 1,200 Units/hr at 12/09/18 0629   • MD Alert...Warfarin per Pharmacy   Other pharmacy to dose Efrain Cox D.O.       • gabapentin (NEURONTIN) capsule 300 mg  300 mg Oral Q8HRS Zaki Hamlin M.D.   300 mg at 12/09/18 1324   • Pharmacy Consult Request ...Pain Management Review 1 Each  1 Each Other PRN Andrey Ellis M.D.       • clopidogrel (PLAVIX) tablet 75 mg  75 mg Oral DAILY Andrey Ellis M.D.   75 mg at 12/09/18 0445   • losartan (COZAAR) tablet 25 mg  25 mg Oral Q DAY Joan Belle M.D.   25 mg at 12/09/18 0446   • amLODIPine (NORVASC) tablet 10 mg  10 mg Oral Q DAY Deondre Quigley M.D.   10 mg at 12/09/18 0446   • atorvastatin (LIPITOR) tablet 80 mg  80 mg Oral Q EVENING Deondre Quigley M.D.   80 mg at 12/08/18 1711   • levothyroxine (SYNTHROID) tablet 150 mcg  150 mcg Oral QAM  Deondre Quigley M.D.   150 mcg at 12/09/18 0446   • traZODone (DESYREL) tablet 100 mg  100 mg Oral QHS Deondre Quigley M.D.   100 mg at 12/08/18 2136   • senna-docusate (PERICOLACE or SENOKOT S) 8.6-50 MG per tablet 2 Tab  2 Tab Oral BID Deondre Quigley M.D.   2 Tab at 12/09/18 0446    And   • polyethylene glycol/lytes (MIRALAX) PACKET 1 Packet  1 Packet Oral QDAY PRN Deondre Quigley M.D.        And   • magnesium hydroxide (MILK OF MAGNESIA) suspension 30 mL  30 mL Oral QDAY PRN Deondre Quigley M.D.        And   • bisacodyl (DULCOLAX) suppository 10 mg  10 mg Rectal QDAY PRALEXEY Quigley M.D.        • acetaminophen (TYLENOL) tablet 650 mg  650 mg Oral Q6HRS PRN Deondre Quigley M.D.   650 mg at 12/08/18 0839   • insulin lispro (HUMALOG) injection 1-6 Units  1-6 Units Subcutaneous 4X/DAY ANTOINETTE Quigley M.D.   Stopped at 12/08/18 1700    And   • glucose 4 g chewable tablet 16 g  16 g Oral Q15 MIN PRN Deondre Quilgey M.D.        And   • dextrose 50% (D50W) injection 25 mL  25 mL Intravenous Q15 MIN PRN Deondre Quigley M.D.       • calcium carbonate (OS-MARKELL 500) tablet 500 mg  500 mg Oral BID WITH MEALS Deondre Quigley M.D.   500 mg at 12/09/18 0934     Facility-Administered Medications Ordered in Other Encounters   Medication Dose Route Frequency Provider Last Rate Last Dose   • iodixanol (VISIPAQUE) SOLN    Intra-Op Once PRN Shekhar Rosado M.D.   10 mL at 01/18/17 0619       Fluids    Intake/Output Summary (Last 24 hours) at 12/09/18 1542  Last data filed at 12/09/18 1300   Gross per 24 hour   Intake             1364 ml   Output              250 ml   Net             1114 ml       Laboratory          Recent Labs      12/07/18   0351  12/09/18   0447   SODIUM  139  140   POTASSIUM  3.8  3.9   CHLORIDE  108  106   CO2  25  27   BUN  13  11   CREATININE  0.66  0.72   CALCIUM  7.6*  8.0*     Recent Labs      12/07/18   0351  12/09/18   0447   GLUCOSE  116*  123*     Recent Labs      12/08/18   0500  12/08/18   1107  12/09/18   0447   WBC  13.2*  12.6*  13.4*   NEUTSPOLYS  48.80  51.60  53.50   LYMPHOCYTES  21.30*  19.30*  19.60*   MONOCYTES  13.60*  12.90  12.40   EOSINOPHILS  14.60*  14.70*  13.10*   BASOPHILS  0.80  0.70  0.70     Recent Labs      12/07/18   1656  12/07/18   2302  12/08/18   0500  12/08/18   1107  12/09/18   0447   RBC   --   2.75*  2.79*  2.76*  2.75*   HEMOGLOBIN   --   8.2*  8.1*  8.3*  8.2*   HEMATOCRIT   --   25.5*  25.8*  25.9*  25.2*   PLATELETCT   --   285   --    --   344   PROTHROMBTM  15.7*   --   15.9*   --   16.6*   APTT  74.4*  77.2*  73.4*   --   54.8*   INR  1.24*   --   1.26*    --   1.33*       Imaging  X-Ray:  I have personally reviewed the images and compared with prior images.    Assessment/Plan  * Acute deep vein thrombosis (DVT) of femoral vein of left lower extremity (HCC)- (present on admission)   Assessment & Plan    Plan for q. one hour vascular checks status post TPA catheter per protocol  Continue to monitor for vascular insufficiency  Post TPA start heparin per protocol    Stent placed by IR yesterday  Leg unchanged  Sheath to be removed today  Continue heparin as bridge to oral agent        Back to IR again  (12/7), sheath removed by radiology, heparin infusion restarted.    Sheath removed , now on heparin and coumadin  Transferred to med floor 12/8     Acute respiratory failure with hypoxia (HCC)   Assessment & Plan    O2 RT protocol  Mobility and gentle diuresis may help wean oxygen     Atrial fibrillation (HCC)- (present on admission)   Assessment & Plan    Rate controlled  Heparin per protocol post TPA  Transition to oral anticoagulant when appropriate    Ok to be on oral agent- timing will depend on interventions done by IR     Delirium   Assessment & Plan    Delirium persists with immobility, pain, poor sleep    Able to transfer her out of the ICU and do standard measures to decrease delirium    Needs mobility  Good candidate for acute rehab  OT/PT consults     DM (diabetes mellitus) (HCC)- (present on admission)   Assessment & Plan    Sliding scale  Glucose goal 120-180     Essential hypertension- (present on admission)   Assessment & Plan    Continue home medications now. BP acceptable     Hematuria   Assessment & Plan    resolved     Hypomagnesemia- (present on admission)   Assessment & Plan    Replete          Transferred to medical floor, on heparin IV, Coumadin, INR 1.7.  Oxygenation corrected, post TPA    Lavonne Rush MD , FCCP, Pulmonary Service

## 2018-12-09 NOTE — PROGRESS NOTES
Spoke with pharmacist regarding APTT result this morning of 54.8. Pharmacist advised to round up and consider it a therapeutic result of 55 and follow protocol accordingly. No rebolus or rate change, next APTT in 24 hours.

## 2018-12-09 NOTE — PROGRESS NOTES
Pt transferred from ICU via gurney. Report received from ICU RN. Pt A&Ox4. Questions answered and pt updated on POC. Complaints of pain when repositioning. Oxy given. Otherwise pt resting comfortably with no complaints.

## 2018-12-10 LAB
ANION GAP SERPL CALC-SCNC: 6 MMOL/L (ref 0–11.9)
ANISOCYTOSIS BLD QL SMEAR: ABNORMAL
APTT PPP: 76.9 SEC (ref 24.7–36)
BASOPHILS # BLD AUTO: 0.8 % (ref 0–1.8)
BASOPHILS # BLD: 0.09 K/UL (ref 0–0.12)
BUN SERPL-MCNC: 9 MG/DL (ref 8–22)
BURR CELLS BLD QL SMEAR: NORMAL
CALCIUM SERPL-MCNC: 7.7 MG/DL (ref 8.5–10.5)
CHLORIDE SERPL-SCNC: 103 MMOL/L (ref 96–112)
CO2 SERPL-SCNC: 32 MMOL/L (ref 20–33)
CREAT SERPL-MCNC: 0.8 MG/DL (ref 0.5–1.4)
EOSINOPHIL # BLD AUTO: 1.05 K/UL (ref 0–0.51)
EOSINOPHIL NFR BLD: 9.4 % (ref 0–6.9)
ERYTHROCYTE [DISTWIDTH] IN BLOOD BY AUTOMATED COUNT: 51.2 FL (ref 35.9–50)
GLUCOSE BLD-MCNC: 119 MG/DL (ref 65–99)
GLUCOSE BLD-MCNC: 120 MG/DL (ref 65–99)
GLUCOSE BLD-MCNC: 126 MG/DL (ref 65–99)
GLUCOSE BLD-MCNC: 159 MG/DL (ref 65–99)
GLUCOSE SERPL-MCNC: 125 MG/DL (ref 65–99)
HCT VFR BLD AUTO: 24.4 % (ref 37–47)
HGB BLD-MCNC: 7.8 G/DL (ref 12–16)
INR PPP: 1.59 (ref 0.87–1.13)
LYMPHOCYTES # BLD AUTO: 1.62 K/UL (ref 1–4.8)
LYMPHOCYTES NFR BLD: 14.5 % (ref 22–41)
MANUAL DIFF BLD: NORMAL
MCH RBC QN AUTO: 29.4 PG (ref 27–33)
MCHC RBC AUTO-ENTMCNC: 32 G/DL (ref 33.6–35)
MCV RBC AUTO: 92.1 FL (ref 81.4–97.8)
MICROCYTES BLD QL SMEAR: ABNORMAL
MONOCYTES # BLD AUTO: 1.15 K/UL (ref 0–0.85)
MONOCYTES NFR BLD AUTO: 10.3 % (ref 0–13.4)
MORPHOLOGY BLD-IMP: NORMAL
NEUTROPHILS # BLD AUTO: 7.28 K/UL (ref 2–7.15)
NEUTROPHILS NFR BLD: 65 % (ref 44–72)
NRBC # BLD AUTO: 0 K/UL
NRBC BLD-RTO: 0 /100 WBC
OVALOCYTES BLD QL SMEAR: NORMAL
PLATELET # BLD AUTO: 364 K/UL (ref 164–446)
PLATELET BLD QL SMEAR: NORMAL
PMV BLD AUTO: 10.3 FL (ref 9–12.9)
POIKILOCYTOSIS BLD QL SMEAR: NORMAL
POTASSIUM SERPL-SCNC: 3.7 MMOL/L (ref 3.6–5.5)
PROTHROMBIN TIME: 19 SEC (ref 12–14.6)
RBC # BLD AUTO: 2.65 M/UL (ref 4.2–5.4)
RBC BLD AUTO: PRESENT
SCHISTOCYTES BLD QL SMEAR: NORMAL
SODIUM SERPL-SCNC: 141 MMOL/L (ref 135–145)
WBC # BLD AUTO: 11.2 K/UL (ref 4.8–10.8)

## 2018-12-10 PROCEDURE — 770006 HCHG ROOM/CARE - MED/SURG/GYN SEMI*

## 2018-12-10 PROCEDURE — 99232 SBSQ HOSP IP/OBS MODERATE 35: CPT | Performed by: HOSPITALIST

## 2018-12-10 PROCEDURE — 85730 THROMBOPLASTIN TIME PARTIAL: CPT

## 2018-12-10 PROCEDURE — 700102 HCHG RX REV CODE 250 W/ 637 OVERRIDE(OP): Performed by: HOSPITALIST

## 2018-12-10 PROCEDURE — A9270 NON-COVERED ITEM OR SERVICE: HCPCS | Performed by: FAMILY MEDICINE

## 2018-12-10 PROCEDURE — A9270 NON-COVERED ITEM OR SERVICE: HCPCS | Performed by: RADIOLOGY

## 2018-12-10 PROCEDURE — G8987 SELF CARE CURRENT STATUS: HCPCS | Mod: CK

## 2018-12-10 PROCEDURE — 97166 OT EVAL MOD COMPLEX 45 MIN: CPT

## 2018-12-10 PROCEDURE — 700111 HCHG RX REV CODE 636 W/ 250 OVERRIDE (IP): Performed by: INTERNAL MEDICINE

## 2018-12-10 PROCEDURE — 85610 PROTHROMBIN TIME: CPT

## 2018-12-10 PROCEDURE — 85027 COMPLETE CBC AUTOMATED: CPT

## 2018-12-10 PROCEDURE — G8988 SELF CARE GOAL STATUS: HCPCS | Mod: CI

## 2018-12-10 PROCEDURE — 85007 BL SMEAR W/DIFF WBC COUNT: CPT

## 2018-12-10 PROCEDURE — 82962 GLUCOSE BLOOD TEST: CPT

## 2018-12-10 PROCEDURE — 700102 HCHG RX REV CODE 250 W/ 637 OVERRIDE(OP): Performed by: RADIOLOGY

## 2018-12-10 PROCEDURE — 700102 HCHG RX REV CODE 250 W/ 637 OVERRIDE(OP): Performed by: INTERNAL MEDICINE

## 2018-12-10 PROCEDURE — 700111 HCHG RX REV CODE 636 W/ 250 OVERRIDE (IP): Performed by: HOSPITALIST

## 2018-12-10 PROCEDURE — 700102 HCHG RX REV CODE 250 W/ 637 OVERRIDE(OP): Performed by: FAMILY MEDICINE

## 2018-12-10 PROCEDURE — A9270 NON-COVERED ITEM OR SERVICE: HCPCS | Performed by: HOSPITALIST

## 2018-12-10 PROCEDURE — A9270 NON-COVERED ITEM OR SERVICE: HCPCS | Performed by: INTERNAL MEDICINE

## 2018-12-10 PROCEDURE — 700105 HCHG RX REV CODE 258: Performed by: INTERNAL MEDICINE

## 2018-12-10 PROCEDURE — 99232 SBSQ HOSP IP/OBS MODERATE 35: CPT | Performed by: INTERNAL MEDICINE

## 2018-12-10 PROCEDURE — 80048 BASIC METABOLIC PNL TOTAL CA: CPT

## 2018-12-10 RX ADMIN — GABAPENTIN 300 MG: 300 CAPSULE ORAL at 20:36

## 2018-12-10 RX ADMIN — GABAPENTIN 300 MG: 300 CAPSULE ORAL at 15:13

## 2018-12-10 RX ADMIN — HEPARIN SODIUM 25000 UNITS: 5000 INJECTION, SOLUTION INTRAVENOUS at 23:19

## 2018-12-10 RX ADMIN — CLOPIDOGREL 75 MG: 75 TABLET, FILM COATED ORAL at 05:11

## 2018-12-10 RX ADMIN — LOSARTAN POTASSIUM 25 MG: 25 TABLET, FILM COATED ORAL at 05:11

## 2018-12-10 RX ADMIN — OXYCODONE HYDROCHLORIDE 5 MG: 5 TABLET ORAL at 09:03

## 2018-12-10 RX ADMIN — INSULIN LISPRO 1 UNITS: 100 INJECTION, SOLUTION INTRAVENOUS; SUBCUTANEOUS at 20:36

## 2018-12-10 RX ADMIN — FUROSEMIDE 20 MG: 10 INJECTION, SOLUTION INTRAVENOUS at 05:11

## 2018-12-10 RX ADMIN — AMLODIPINE BESYLATE 10 MG: 10 TABLET ORAL at 05:11

## 2018-12-10 RX ADMIN — Medication 500 MG: at 08:41

## 2018-12-10 RX ADMIN — FUROSEMIDE 20 MG: 10 INJECTION, SOLUTION INTRAVENOUS at 19:24

## 2018-12-10 RX ADMIN — WARFARIN SODIUM 5 MG: 5 TABLET ORAL at 19:25

## 2018-12-10 RX ADMIN — OXYCODONE HYDROCHLORIDE 5 MG: 5 TABLET ORAL at 19:25

## 2018-12-10 RX ADMIN — IRON SUCROSE 200 MG: 20 INJECTION, SOLUTION INTRAVENOUS at 19:34

## 2018-12-10 RX ADMIN — TRAZODONE HYDROCHLORIDE 100 MG: 100 TABLET ORAL at 20:36

## 2018-12-10 RX ADMIN — SODIUM CHLORIDE, POTASSIUM CHLORIDE, SODIUM LACTATE AND CALCIUM CHLORIDE: 600; 310; 30; 20 INJECTION, SOLUTION INTRAVENOUS at 11:16

## 2018-12-10 RX ADMIN — SODIUM CHLORIDE, POTASSIUM CHLORIDE, SODIUM LACTATE AND CALCIUM CHLORIDE: 600; 310; 30; 20 INJECTION, SOLUTION INTRAVENOUS at 01:13

## 2018-12-10 RX ADMIN — GABAPENTIN 300 MG: 300 CAPSULE ORAL at 05:11

## 2018-12-10 RX ADMIN — Medication 500 MG: at 19:25

## 2018-12-10 RX ADMIN — ATORVASTATIN CALCIUM 80 MG: 40 TABLET, FILM COATED ORAL at 19:24

## 2018-12-10 RX ADMIN — HEPARIN SODIUM 25000 UNITS: 5000 INJECTION, SOLUTION INTRAVENOUS at 01:11

## 2018-12-10 RX ADMIN — LEVOTHYROXINE SODIUM 150 MCG: 150 TABLET ORAL at 05:11

## 2018-12-10 ASSESSMENT — ENCOUNTER SYMPTOMS
TREMORS: 0
VOMITING: 0
HEADACHES: 0
WEAKNESS: 1
DIARRHEA: 0
ABDOMINAL PAIN: 0
FLANK PAIN: 0
STRIDOR: 0
BACK PAIN: 0
COUGH: 0
NECK PAIN: 0
MYALGIAS: 1
CHILLS: 0
FEVER: 0
SHORTNESS OF BREATH: 1
SENSORY CHANGE: 0
DIZZINESS: 0
SPEECH CHANGE: 0

## 2018-12-10 ASSESSMENT — PAIN SCALES - GENERAL: PAINLEVEL_OUTOF10: 8

## 2018-12-10 ASSESSMENT — COGNITIVE AND FUNCTIONAL STATUS - GENERAL
PERSONAL GROOMING: A LITTLE
TOILETING: A LOT
SUGGESTED CMS G CODE MODIFIER DAILY ACTIVITY: CK
EATING MEALS: A LITTLE
DRESSING REGULAR UPPER BODY CLOTHING: A LITTLE
DRESSING REGULAR LOWER BODY CLOTHING: A LOT
DAILY ACTIVITIY SCORE: 15
HELP NEEDED FOR BATHING: A LOT

## 2018-12-10 ASSESSMENT — ACTIVITIES OF DAILY LIVING (ADL): TOILETING: INDEPENDENT

## 2018-12-10 NOTE — PROGRESS NOTES
Pt denied pain during shift. Pt requested to urinate multiple times during shift. Pt can be incontinence at times. O2 remains on at 6L via nasal canula. Will continue to monitor

## 2018-12-10 NOTE — PROGRESS NOTES
[facility] Hospitalist Progress Note    Date of Service: 12/10/2018    Chief Complaint  78 y.o. Female history of A. fib, DVT/PE on Coumadin, recently had her Coumadin stopped for dental procedure  through , hypertension, obesity, diabetes admitted 2018 with increasing left leg pain and swelling.  Diagnosed with acute thrombosis left lower extremity with high clot burden.  Post localized TPA, mechanical thrombolysis thrombectomy with left iliac stent placement.     Interval Problem Update    Left leg swelling and pain decreased.  Still requiring O2 6 L nasal cannula.  Diuresing with IV Lasix. Plan PT/OT evaluation.    Consultants/Specialty    Critical care/pulmonary  Interventional radiology    Disposition    To be determined.  Possible skilled nursing facility for continued rehab        Review of Systems   Constitutional: Negative for chills and fever.   HENT: Negative for congestion.    Respiratory: Positive for shortness of breath (Mild, exertional). Negative for cough and stridor.    Cardiovascular: Positive for leg swelling. Negative for chest pain.   Gastrointestinal: Negative for abdominal pain, diarrhea and vomiting.   Genitourinary: Negative for flank pain and hematuria.   Musculoskeletal: Positive for joint pain and myalgias (Left leg pain). Negative for back pain and neck pain.   Neurological: Positive for weakness (Generalized). Negative for dizziness, tremors, sensory change, speech change and headaches.      Physical Exam  Laboratory/Imaging   Hemodynamics  Temp (24hrs), Av.7 °C (99.9 °F), Min:37.4 °C (99.3 °F), Max:37.9 °C (100.3 °F)   Temperature: 37.4 °C (99.3 °F)  Pulse  Av.9  Min: 58  Max: 98    Blood Pressure : 105/51      Respiratory      Respiration: 20, Pulse Oximetry: 90 %             Fluids    Intake/Output Summary (Last 24 hours) at 12/10/18 1502  Last data filed at 12/10/18 0900   Gross per 24 hour   Intake              600 ml   Output                0 ml   Net               600 ml       Nutrition  Orders Placed This Encounter   Procedures   • Diet Order Diabetic     Standing Status:   Standing     Number of Occurrences:   1     Order Specific Question:   Diet:     Answer:   Diabetic [3]     Order Specific Question:   Texture/Fiber modifications:     Answer:   Dysphagia 3(Mechanical Soft)specify fluid consistency(question 6) [3]     Physical Exam   Constitutional: No distress.   HENT:   Head: Normocephalic and atraumatic.   Eyes: EOM are normal. Right eye exhibits no discharge. Left eye exhibits no discharge.   Neck: Normal range of motion.   Cardiovascular: Normal rate and regular rhythm.    No murmur heard.  Pulmonary/Chest: Effort normal. No stridor. She has no wheezes. She has no rales.   Diminished breath sounds bases   Abdominal: Soft. Bowel sounds are normal. She exhibits no distension. There is no tenderness.   Obese   Musculoskeletal: She exhibits edema and tenderness.   3+ lower extremity edema, extremity warm, no cyanosis   Neurological: She is alert. No cranial nerve deficit.   Oriented x2, more appropriate with  Responses.    Skin: Skin is warm and dry. She is not diaphoretic. No pallor.   Vitals reviewed.      Recent Labs      12/07/18   2302   12/08/18   1107  12/09/18   0447  12/10/18   0504   WBC  12.8*   < >  12.6*  13.4*  11.2*   RBC  2.75*   < >  2.76*  2.75*  2.65*   HEMOGLOBIN  8.2*   < >  8.3*  8.2*  7.8*   HEMATOCRIT  25.5*   < >  25.9*  25.2*  24.4*   MCV  92.7   < >  93.8  91.6  92.1   MCH  29.8   < >  30.1  29.8  29.4   MCHC  32.2*   < >  32.0*  32.5*  32.0*   RDW  53.4*   < >  54.5*  51.5*  51.2*   PLATELETCT  285   --    --   344  364   MPV  10.0   --    --   10.3  10.3    < > = values in this interval not displayed.     Recent Labs      12/09/18   0447  12/10/18   0504   SODIUM  140  141   POTASSIUM  3.9  3.7   CHLORIDE  106  103   CO2  27  32   GLUCOSE  123*  125*   BUN  11  9   CREATININE  0.72  0.80   CALCIUM  8.0*  7.7*     Recent Labs       12/08/18   0500  12/09/18   0447  12/10/18   0504   APTT  73.4*  54.8*  76.9*   INR  1.26*  1.33*  1.59*                  Assessment/Plan     * Acute deep vein thrombosis (DVT) of femoral vein of left lower extremity (HCC)- (present on admission)   Assessment & Plan    Post localized TPA, mechanical thrombolysis,  thrombectomy with left iliac stent placement 12-4 and 12-6 and venograms.  Decrease pain and swelling of left leg  Continue with Plavix post stent .   Maintain on weight-based heparin with warfarin-follow-up INR .  Reports she previously was recommended for  Xarelto but unable to afford.  Pain control       Acute respiratory failure with hypoxia (HCC)   Assessment & Plan    Chest x-ray with edema  Continue RT protocol, Deep inspiratory efforts/encourage IS  Continue IV Lasix diuresis, Hep-Lock IV fluids  O2 nasal cannula support and wean FiO2 as tolerated.  Pulmonary input     Atrial fibrillation (HCC)- (present on admission)   Assessment & Plan    Controlled, sinus rhythm  Continued on  anticoagulation -follow-up INR       Delirium   Assessment & Plan    Initially developed in ICU and observed on medical floor-clearing.  Normal FT4 despite elevated TSH-suggest subclinical hypothyroidism-recommend follow-up thyroid function 6 weeks.   Monitor for urinary symptoms, may consider UA  Maintain circadian rhythm try to keep awake during the day, continue trazodone evening to improve sleep.   Oxycodone recently decreased.  Monitor for side effects of  narcotics.        Anemia- (present on admission)   Assessment & Plan    Chronic, workup consistent with iron deficiency.    Start IV iron  Monitor.  Conservative transfusion if hemoglobin < 7 or symptoms     DM (diabetes mellitus) (HCC)- (present on admission)   Assessment & Plan    Fair control . HgbA1c:6.1 in past 3 months.   Continue monitor accuchecks and cover with SSI       Essential hypertension- (present on admission)   Assessment & Plan     Controlled  Continue amlodipine and losartan with hold parameters for lower BPs  Monitor     Left wrist pain   Assessment & Plan    Xray no fx.   May be strain.  Pseudogout,  arthritic.   Supportive treatment.       X-ray left wrist  1.  Demineralization and degenerative change without definite acute osseous abnormality.  2.  Soft tissue calcification at the radial aspect of the third metatarsophalangeal joint which may be related to chondrocalcinosis or chronic posttraumatic change.     Hematuria   Assessment & Plan    Monitoring CBC  Resolved 12/8     Obesity (BMI 30-39.9)- (present on admission)   Assessment & Plan    Body mass index is 36.86 kg/m².         Hypomagnesemia- (present on admission)   Assessment & Plan    Replacement given-- follow-up in a.m.     Debility-PT/OT evaluation.   to assist with discharge planning.    Quality-Core Measures   Reviewed items::  Medications reviewed, Labs reviewed and Radiology images reviewed  Espinoza catheter::  No Espinoza  DVT prophylaxis pharmacological::  Heparin and Warfarin (Coumadin)      Discussed with multidisciplinary team plan of care.

## 2018-12-10 NOTE — PROGRESS NOTES
Inpatient Anticoagulation Service Note    Date: 12/9/2018  Reason for Anticoagulation: Deep Vein Thrombosis        Hemoglobin Value: (!) 8.2  Hematocrit Value: (!) 25.2  Lab Platelet Value: 344  Target INR: 2.0 to 3.0    INR from last 7 days     Date/Time INR Value    12/09/18 0447 (!)  1.33    12/08/18 0500 (!)  1.26    12/07/18 1656 (!)  1.24    12/07/18 0800 (!)  1.35    12/06/18 1138 (!)  1.32    12/06/18 0322 (!)  1.36    12/05/18 1826 (!)  1.44    12/04/18 1649 (!)  1.86    12/03/18 1600 (!)  2.13    12/02/18 1953 (!)  2.37        Dose from last 7 days     Date/Time Dose (mg)    12/09/18 1000  7.5    12/08/18 1300  7.5    12/07/18 1200  7.5        Average Dose (mg):  Reported home dose: 5 mg MWF, 7.5 mg ROW  Significant Interactions: Statin, Clopidogrel, Thyroid Medications  Bridge Therapy: Yes  Date of Last VTE Event: 11/25/18 (Previously admitted and bridged to therapeutic Coumadin)  Bridge Therapy Start Date This Admission: 12/01/18  Days of Overlap Therapy: 2   INR Value Greater than 2 Prior to Discontinuation of Parenteral Anticoagulation: Not Applicable   Reversal Agent Administered: Not Applicable    Comments: See initial consult note regarding chronic Coumadin therapy held for dental procedure with development of new DVT and worsening pain with IR alteplase/heparin and stent placed by IR with initiation of Plavix this admission.  Minimal movement in INR as would be expected following only 2 dose of Coumadin being given.  Elevated INR at initiation likely 2/2 effects of alteplase infusion.  Reported outpatient dose of 7.5 mg daily except 5 mg on M/W/F initiated with 7.5 mg given on 12/7/18 with re-initiation instead of usual 5 mg dose.  Patient has been stable on this reported outpatient dose for months through the [facility].  H/H stable and no reported s/s bleeding.      Plan:  Will continue current plan with 7.5 mg to be given tonight.  Heparin drip continues.  INR daily x 7 ordered.  If  minimal movement in INR tomorrow could consider additional 7.5 mg dose instead of regular home dose ordered as 5 mg.  Heparin drip to continue through 18 to complete at least 5 days of overlap assuming 2 therapeutic INRs.        Education Material Provided?: No (Chronic Coumadin Patient)  Pharmacist suggested discharge dosin.5 mg daily except 5 mg on // will need therapeutic bridge therapy through 18 to complete at least 5 days of overlap assuming 2 therapeutic INRs with recent new DVT.        Melina Love, PharmD, BCPS

## 2018-12-10 NOTE — PROGRESS NOTES
Inpatient Anticoagulation Service Note    Date: 12/10/2018  Reason for Anticoagulation: Deep Vein Thrombosis        Hemoglobin Value: (!) 7.8  Hematocrit Value: (!) 24.4  Lab Platelet Value: 364  Target INR: 2.0 to 3.0    INR from last 7 days     Date/Time INR Value    12/10/18 0504 (!)  1.59    12/09/18 0447 (!)  1.33    12/08/18 0500 (!)  1.26    12/07/18 1656 (!)  1.24    12/07/18 0800 (!)  1.35    12/06/18 1138 (!)  1.32    12/06/18 0322 (!)  1.36    12/05/18 1826 (!)  1.44    12/04/18 1649 (!)  1.86    12/03/18 1600 (!)  2.13        Dose from last 7 days     Date/Time Dose (mg)    12/10/18 0504  5    12/09/18 1000  7.5    12/08/18 1300  7.5    12/07/18 1200  7.5        Average Dose (mg):  (Reported home dose: 5 mg MWF, 7.5 mg ROW)  Significant Interactions: Statin, Clopidogrel, Thyroid Medications  Bridge Therapy: Yes (Heparin weight based protocol)  Date of Last VTE Event: 11/25/18 (Previously admitted and bridged to therapeutic Coumadin)  Bridge Therapy Start Date: 12/01/18  Days of Overlap Therapy: 3  INR Value Greater than 2 Prior to Discontinuation of Parenteral Anticoagulation: Not Applicable     Reversal Agent Administered: Not Applicable  Comments: H/H is low, but stable. No notation of bleeding. All warfarin doses have been charted as administered. Warfarin home dose to resume today.    Plan:  INR with morning labs  Education Material Provided?: No (Chronic Coumadin Patient)  Pharmacist suggested discharge dosing: Likely to resume the home dose     Shayla Byrd, PharmD., BCCCP

## 2018-12-10 NOTE — CARE PLAN
Problem: Communication  Goal: The ability to communicate needs accurately and effectively will improve    Intervention: Reorient patient to environment as needed  During morning assessment the pt stated that the year was 2080 Pt reoriented to current year 2018.       Problem: Pain Management  Goal: Pain level will decrease to patient's comfort goal    Intervention: Follow pain managment plan developed in collaboration with patient and Interdisciplinary Team  Pt reported pain 8/10 this morning in her leg. Pt was given pain medication according to the pain managment plan.

## 2018-12-10 NOTE — PROGRESS NOTES
Pulmonary Progress Note    PATIENT: Sanjuanita Sosa  MRN: [id number]  : 1940    Admission date: 2018    ASSESSMENT/PLAN:  Principal Problem:    Acute deep vein thrombosis (DVT) of femoral vein of left lower extremity (HCC) POA: Yes  Active Problems:    Atrial fibrillation (HCC) POA: Yes    Acute respiratory failure with hypoxia (HCC) POA: Unknown    Essential hypertension POA: Yes    DM (diabetes mellitus) (HCC) POA: Yes    Anemia POA: Yes    Obesity (BMI 30-39.9) POA: Yes    Hematuria POA: Unknown    Left wrist pain POA: Unknown        79 y/o woman who suffered DVT after dc of her coumadin for a dental procedure.  She was started on anticoagulation but returned with recurrent swelling as well as pain and subsequently underwent LLE venogram, pharmacomechanical thrombolysis and lysis catheter placement.  Pt is presently on heparin and warfarin has been resumed.  I don't see new studies to check for PE but her oxygen can be tapered down as tolerated.  Her ECHO should be repeat at some point in the future.    Plan to sign off.      SUBJECTIVE:  Says she has a prior dvt and was on long term medications that she says she had a hard time affording    MEDICATIONS:    Current Facility-Administered Medications:   •  furosemide (LASIX) injection 20 mg, 20 mg, Intravenous, BID DIURETIC, Yomi Murphy M.D., 20 mg at 12/10/18 0511  •  oxyCODONE immediate-release (ROXICODONE) tablet 5 mg, 5 mg, Oral, Q6HRS PRN, Yomi Murphy M.D., 5 mg at 12/10/18 0903  •  warfarin (COUMADIN) tablet 5 mg, 5 mg, Oral, Once per day on , Efrain Cox D.O.  •  warfarin (COUMADIN) tablet 7.5 mg, 7.5 mg, Oral, Once per day on Sun Tue Thu Sat, Efrain Cox D.O., 7.5 mg at 18 1749  •  heparin injection 3,200 Units, 3,200 Units, Intravenous, PRN **AND** heparin infusion 25,000 units in 500 ml 0.45% nacl, , Intravenous, Continuous, Last Rate: 24 mL/hr at 12/10/18 0900, 1,200 Units/hr at 12/10/18 0900 **AND**  Protocol 440 Heparin Weight Based DO NOT GIVE ANY HEPARIN BOLUS TO STROKE PATIENT, , , CONTINUOUS **AND** Protocol 440 Heparin Weight Based Discontinue Enoxaparin (Lovenox), Dabigatran (Pradaxa), Rivaroxaban (Xarelto), Apixaban (Eliquis), Edoxaban (Savaysa, Lixiana), Fondaparinux (Arixtra) and Argatroban prior to heparin administration, , , Once **AND** Protocol 440 Heparin Weight Based Draw baseline aPTT, PT, and platelet count if not already done, , , CONTINUOUS **AND** Protocol 440 Heparin Weight Based Draw aPTT 6 hours after beginning infusion. , , , CONTINUOUS **AND** Protocol 440 Heparin Weight Based Record Patient Data, , , CONTINUOUS **AND** Protocol 440 Heparin Weight Based INSTRUCTIONS, , , CONTINUOUS **AND** Protocol 440 Heparin Weight Based Review aPTT results 6 hours after infusion is begun as detailed, , , CONTINUOUS **AND** Protocol 440 Heparin Weight Based Draw Platelet count every three days. Contact MD if platelet is 50% lower than baseline count., , , CONTINUOUS **AND** Protocol 440 Heparin Weight Based Adjust heparin to maintain aPTT between 55-96 sec, , , CONTINUOUS **AND** Protocol 440 Heparin Weight Based Order aPTT 6 hours after any rate change or hold until aPTT is therapeutic (55-96 seconds), , , CONTINUOUS **AND** Protocol 440 Heparin Weight Based Documentation and verification, , , CONTINUOUS, Zaki Hamlin M.D.  •  MD Alert...Warfarin per Pharmacy, , Other, pharmacy to dose, Efrain Cox D.O.  •  gabapentin (NEURONTIN) capsule 300 mg, 300 mg, Oral, Q8HRS, Zaki Hamlin M.D., 300 mg at 12/10/18 0511  •  Pharmacy Consult Request ...Pain Management Review 1 Each, 1 Each, Other, PRN, Andrey Ellis M.D.  •  clopidogrel (PLAVIX) tablet 75 mg, 75 mg, Oral, DAILY, Andrey Ellis M.D., 75 mg at 12/10/18 0511  •  losartan (COZAAR) tablet 25 mg, 25 mg, Oral, Q DAY, Joan Belle M.D., 25 mg at 12/10/18 0511  •  amLODIPine (NORVASC) tablet 10 mg, 10 mg, Oral, Q DAY, Deondre  "YULIYA Quigley M.D., 10 mg at 12/10/18 0511  •  atorvastatin (LIPITOR) tablet 80 mg, 80 mg, Oral, Q EVENING, Deondre Quigley M.D., 80 mg at 12/09/18 1801  •  levothyroxine (SYNTHROID) tablet 150 mcg, 150 mcg, Oral, QAM AC, Deondre Quigley M.D., 150 mcg at 12/10/18 0511  •  traZODone (DESYREL) tablet 100 mg, 100 mg, Oral, QHS, Deondre Quigley M.D., 100 mg at 12/09/18 2042  •  senna-docusate (PERICOLACE or SENOKOT S) 8.6-50 MG per tablet 2 Tab, 2 Tab, Oral, BID, Stopped at 12/10/18 0511 **AND** polyethylene glycol/lytes (MIRALAX) PACKET 1 Packet, 1 Packet, Oral, QDAY PRN **AND** magnesium hydroxide (MILK OF MAGNESIA) suspension 30 mL, 30 mL, Oral, QDAY PRN **AND** bisacodyl (DULCOLAX) suppository 10 mg, 10 mg, Rectal, QDAY PRN, Deondre Quigley M.D.  •  acetaminophen (TYLENOL) tablet 650 mg, 650 mg, Oral, Q6HRS PRN, Deondre Quigley M.D., 650 mg at 12/08/18 0839  •  [DISCONTINUED] insulin glargine (LANTUS) injection 19 Units, 0.2 Units/kg/day, Subcutaneous, Q EVENING **AND** [DISCONTINUED] insulin lispro (HUMALOG) injection 6 Units, 0.2 Units/kg/day, Subcutaneous, TID AC **AND** insulin lispro (HUMALOG) injection 1-6 Units, 1-6 Units, Subcutaneous, 4X/DAY ACHS, Stopped at 12/08/18 1700 **AND** Accu-Chek ACHS, , , Q AC AND BEDTIME(S) **AND** NOTIFY MD and PharmD, , , Once **AND** glucose 4 g chewable tablet 16 g, 16 g, Oral, Q15 MIN PRN **AND** dextrose 50% (D50W) injection 25 mL, 25 mL, Intravenous, Q15 MIN PRN, Deondre Quigley M.D.  •  calcium carbonate (OS-MARKELL 500) tablet 500 mg, 500 mg, Oral, BID WITH MEALS, Deondre Quigley M.D., 500 mg at 12/10/18 0841    Facility-Administered Medications Ordered in Other Encounters:   •  iodixanol (VISIPAQUE) SOLN, , , Intra-Op Once PRN, Shekhar Rosado M.D., 10 mL at 01/18/17 0619    ROS  Knee pain right  Swelling left hand  Complains that her memory is bad    OBJECTIVE:    /51   Pulse 71   Temp 37.4 °C (99.3 °F) (Temporal)   Resp 20   Ht 1.626 m (5' 4\")   Wt 106.8 kg (235 lb 7.2 oz)   " LMP 10/12/1970   SpO2 90%   Breastfeeding? No   BMI 40.42 kg/m²   General: alert, conversant  Central line right IJ   HEENT: Sclera clear, conjunctiva pink, oropharynx clear, mucus membranes moist  Heart: regular with systolic ejection murmur gr 2/6  Lungs: clear  Abdomen: non tender  Extremities: no clubbing cyanosis; 2+ left edema, mild swelling left hand  Neuro: intact  Skin: intact    DATA REVIEW:  LABORATORY DATA:    Recent Labs      12/07/18   2302   12/08/18   1107  12/09/18   0447  12/10/18   0504   WBC  12.8*   < >  12.6*  13.4*  11.2*   RBC  2.75*   < >  2.76*  2.75*  2.65*   HEMOGLOBIN  8.2*   < >  8.3*  8.2*  7.8*   HEMATOCRIT  25.5*   < >  25.9*  25.2*  24.4*   MCV  92.7   < >  93.8  91.6  92.1   MCH  29.8   < >  30.1  29.8  29.4   RDW  53.4*   < >  54.5*  51.5*  51.2*   PLATELETCT  285   --    --   344  364   MPV  10.0   --    --   10.3  10.3   NEUTSPOLYS  51.20   < >  51.60  53.50  65.00   LYMPHOCYTES  20.50*   < >  19.30*  19.60*  14.50*   MONOCYTES  12.60   < >  12.90  12.40  10.30   EOSINOPHILS  14.20*   < >  14.70*  13.10*  9.40*   BASOPHILS  0.70   < >  0.70  0.70  0.80   RBCMORPHOLO   --    --    --    --   Present    < > = values in this interval not displayed.      Recent Labs      12/09/18   0447  12/10/18   0504   SODIUM  140  141   POTASSIUM  3.9  3.7   CHLORIDE  106  103   CO2  27  32   GLUCOSE  123*  125*   BUN  11  9   CREATININE  0.72  0.80   CALCIUM  8.0*  7.7*     Recent Labs      12/08/18   0500  12/09/18   0447  12/10/18   0504   APTT  73.4*  54.8*  76.9*   INR  1.26*  1.33*  1.59*          IMAGING:   CXR (personally reviewed) - prominent pulm vessels  Echo - pulm HTN      Assessment and plan as above      Robert Lanier M.D.

## 2018-12-10 NOTE — THERAPY
"Occupational Therapy Evaluation completed.   Functional Status: Pt is a 79 y/o female admitted with LLE pain, found to have acute DVT given localized tpa, stent placement, and thrombectomy. She also had L wrist pain, which imaging has been negative for. She needed Celestine for functional mobility and functional transfers with HHA. MaxA for donning socks and pericare after toileting. She is limited by weakness, fatigue, impaired balance, and pain which impacts independence in ADLs and functional mobility.  Plan of Care: Will benefit from Occupational Therapy 3 times per week  Discharge Recommendations:  Equipment: Will Continue to Assess for Equipment Needs. Recommend inpatient transitional care services for continued occupational therapy services. Please consider physiatry (PM&R) consult.       See \"Rehab Therapy-Acute\" Patient Summary Report for complete documentation.    "

## 2018-12-10 NOTE — DISCHARGE PLANNING
Anticipated Discharge Disposition: group home    Action: patient is from [facility] and is living in the Independent Living Program.  Patient would like to go back when medically ready.  Patient is open to receiving H/H services if needed.  Transportation is provided two days a week for medical and Rx.      Barriers to Discharge: medical clearance    Plan: follow up as needed

## 2018-12-10 NOTE — PROGRESS NOTES
· 2 RN skin check complete.   · The following interventions in place Q2 hours turn  · Generalized bruising throughout the extremities. Swelling of the LUE and LLE  · Right IJ in place

## 2018-12-10 NOTE — CARE PLAN
Problem: Communication  Goal: The ability to communicate needs accurately and effectively will improve  Outcome: PROGRESSING AS EXPECTED  Educate the pt on the use of call light to call for assistance    Problem: Skin Integrity  Goal: Risk for impaired skin integrity will decrease  Outcome: PROGRESSING AS EXPECTED  Pt will be turn Q2h during shift

## 2018-12-11 LAB
ANION GAP SERPL CALC-SCNC: 11 MMOL/L (ref 0–11.9)
APTT PPP: 123 SEC (ref 24.7–36)
APTT PPP: 98.6 SEC (ref 24.7–36)
BASOPHILS # BLD AUTO: 0.5 % (ref 0–1.8)
BASOPHILS # BLD: 0.06 K/UL (ref 0–0.12)
BUN SERPL-MCNC: 10 MG/DL (ref 8–22)
CALCIUM SERPL-MCNC: 8.3 MG/DL (ref 8.5–10.5)
CHLORIDE SERPL-SCNC: 99 MMOL/L (ref 96–112)
CO2 SERPL-SCNC: 30 MMOL/L (ref 20–33)
CREAT SERPL-MCNC: 0.86 MG/DL (ref 0.5–1.4)
EOSINOPHIL # BLD AUTO: 1.34 K/UL (ref 0–0.51)
EOSINOPHIL NFR BLD: 12.2 % (ref 0–6.9)
ERYTHROCYTE [DISTWIDTH] IN BLOOD BY AUTOMATED COUNT: 51.4 FL (ref 35.9–50)
GLUCOSE BLD-MCNC: 122 MG/DL (ref 65–99)
GLUCOSE BLD-MCNC: 122 MG/DL (ref 65–99)
GLUCOSE BLD-MCNC: 124 MG/DL (ref 65–99)
GLUCOSE BLD-MCNC: 171 MG/DL (ref 65–99)
GLUCOSE SERPL-MCNC: 118 MG/DL (ref 65–99)
HCT VFR BLD AUTO: 25.4 % (ref 37–47)
HGB BLD-MCNC: 8.3 G/DL (ref 12–16)
IMM GRANULOCYTES # BLD AUTO: 0.06 K/UL (ref 0–0.11)
IMM GRANULOCYTES NFR BLD AUTO: 0.5 % (ref 0–0.9)
INR PPP: 1.73 (ref 0.87–1.13)
LYMPHOCYTES # BLD AUTO: 2.48 K/UL (ref 1–4.8)
LYMPHOCYTES NFR BLD: 22.7 % (ref 22–41)
MAGNESIUM SERPL-MCNC: 1.6 MG/DL (ref 1.5–2.5)
MCH RBC QN AUTO: 29.9 PG (ref 27–33)
MCHC RBC AUTO-ENTMCNC: 32.7 G/DL (ref 33.6–35)
MCV RBC AUTO: 91.4 FL (ref 81.4–97.8)
MONOCYTES # BLD AUTO: 1.59 K/UL (ref 0–0.85)
MONOCYTES NFR BLD AUTO: 14.5 % (ref 0–13.4)
NEUTROPHILS # BLD AUTO: 5.41 K/UL (ref 2–7.15)
NEUTROPHILS NFR BLD: 49.6 % (ref 44–72)
NRBC # BLD AUTO: 0 K/UL
NRBC BLD-RTO: 0 /100 WBC
PLATELET # BLD AUTO: 427 K/UL (ref 164–446)
PMV BLD AUTO: 10.4 FL (ref 9–12.9)
POTASSIUM SERPL-SCNC: 3.4 MMOL/L (ref 3.6–5.5)
PROTHROMBIN TIME: 20.4 SEC (ref 12–14.6)
RBC # BLD AUTO: 2.78 M/UL (ref 4.2–5.4)
SODIUM SERPL-SCNC: 140 MMOL/L (ref 135–145)
WBC # BLD AUTO: 10.9 K/UL (ref 4.8–10.8)

## 2018-12-11 PROCEDURE — 700102 HCHG RX REV CODE 250 W/ 637 OVERRIDE(OP): Performed by: INTERNAL MEDICINE

## 2018-12-11 PROCEDURE — 80048 BASIC METABOLIC PNL TOTAL CA: CPT

## 2018-12-11 PROCEDURE — 700111 HCHG RX REV CODE 636 W/ 250 OVERRIDE (IP): Performed by: HOSPITALIST

## 2018-12-11 PROCEDURE — 85610 PROTHROMBIN TIME: CPT

## 2018-12-11 PROCEDURE — 700102 HCHG RX REV CODE 250 W/ 637 OVERRIDE(OP): Performed by: HOSPITALIST

## 2018-12-11 PROCEDURE — A9270 NON-COVERED ITEM OR SERVICE: HCPCS | Performed by: HOSPITALIST

## 2018-12-11 PROCEDURE — 770006 HCHG ROOM/CARE - MED/SURG/GYN SEMI*

## 2018-12-11 PROCEDURE — 83735 ASSAY OF MAGNESIUM: CPT

## 2018-12-11 PROCEDURE — 700102 HCHG RX REV CODE 250 W/ 637 OVERRIDE(OP): Performed by: RADIOLOGY

## 2018-12-11 PROCEDURE — E0191 PROTECTOR HEEL OR ELBOW: HCPCS | Performed by: HOSPITALIST

## 2018-12-11 PROCEDURE — G8979 MOBILITY GOAL STATUS: HCPCS | Mod: CI

## 2018-12-11 PROCEDURE — 99233 SBSQ HOSP IP/OBS HIGH 50: CPT | Performed by: HOSPITALIST

## 2018-12-11 PROCEDURE — 97162 PT EVAL MOD COMPLEX 30 MIN: CPT

## 2018-12-11 PROCEDURE — 82962 GLUCOSE BLOOD TEST: CPT | Mod: 91

## 2018-12-11 PROCEDURE — A9270 NON-COVERED ITEM OR SERVICE: HCPCS | Performed by: INTERNAL MEDICINE

## 2018-12-11 PROCEDURE — A9270 NON-COVERED ITEM OR SERVICE: HCPCS | Performed by: RADIOLOGY

## 2018-12-11 PROCEDURE — 85025 COMPLETE CBC W/AUTO DIFF WBC: CPT

## 2018-12-11 PROCEDURE — G8978 MOBILITY CURRENT STATUS: HCPCS | Mod: CL

## 2018-12-11 PROCEDURE — 85730 THROMBOPLASTIN TIME PARTIAL: CPT

## 2018-12-11 RX ORDER — MAGNESIUM SULFATE HEPTAHYDRATE 40 MG/ML
2 INJECTION, SOLUTION INTRAVENOUS ONCE
Status: COMPLETED | OUTPATIENT
Start: 2018-12-11 | End: 2018-12-11

## 2018-12-11 RX ORDER — POTASSIUM CHLORIDE 20 MEQ/1
20 TABLET, EXTENDED RELEASE ORAL DAILY
Status: DISCONTINUED | OUTPATIENT
Start: 2018-12-14 | End: 2018-12-12

## 2018-12-11 RX ORDER — POTASSIUM CHLORIDE 20 MEQ/1
40 TABLET, EXTENDED RELEASE ORAL DAILY
Status: DISCONTINUED | OUTPATIENT
Start: 2018-12-11 | End: 2018-12-12

## 2018-12-11 RX ADMIN — OXYCODONE HYDROCHLORIDE 5 MG: 5 TABLET ORAL at 18:27

## 2018-12-11 RX ADMIN — POTASSIUM CHLORIDE 40 MEQ: 1500 TABLET, EXTENDED RELEASE ORAL at 08:14

## 2018-12-11 RX ADMIN — LEVOTHYROXINE SODIUM 150 MCG: 150 TABLET ORAL at 05:06

## 2018-12-11 RX ADMIN — CLOPIDOGREL 75 MG: 75 TABLET, FILM COATED ORAL at 05:06

## 2018-12-11 RX ADMIN — OXYCODONE HYDROCHLORIDE 5 MG: 5 TABLET ORAL at 05:07

## 2018-12-11 RX ADMIN — OXYCODONE HYDROCHLORIDE 5 MG: 5 TABLET ORAL at 12:22

## 2018-12-11 RX ADMIN — Medication 500 MG: at 18:01

## 2018-12-11 RX ADMIN — Medication 500 MG: at 08:14

## 2018-12-11 RX ADMIN — STANDARDIZED SENNA CONCENTRATE AND DOCUSATE SODIUM 2 TABLET: 8.6; 5 TABLET, FILM COATED ORAL at 05:06

## 2018-12-11 RX ADMIN — GABAPENTIN 300 MG: 300 CAPSULE ORAL at 20:53

## 2018-12-11 RX ADMIN — FUROSEMIDE 20 MG: 10 INJECTION, SOLUTION INTRAVENOUS at 15:32

## 2018-12-11 RX ADMIN — ATORVASTATIN CALCIUM 80 MG: 40 TABLET, FILM COATED ORAL at 18:01

## 2018-12-11 RX ADMIN — GABAPENTIN 300 MG: 300 CAPSULE ORAL at 05:07

## 2018-12-11 RX ADMIN — STANDARDIZED SENNA CONCENTRATE AND DOCUSATE SODIUM 2 TABLET: 8.6; 5 TABLET, FILM COATED ORAL at 18:01

## 2018-12-11 RX ADMIN — INSULIN LISPRO 1 UNITS: 100 INJECTION, SOLUTION INTRAVENOUS; SUBCUTANEOUS at 20:59

## 2018-12-11 RX ADMIN — MAGNESIUM SULFATE IN WATER 2 G: 40 INJECTION, SOLUTION INTRAVENOUS at 18:00

## 2018-12-11 RX ADMIN — TRAZODONE HYDROCHLORIDE 100 MG: 100 TABLET ORAL at 20:53

## 2018-12-11 RX ADMIN — IRON SUCROSE 200 MG: 20 INJECTION, SOLUTION INTRAVENOUS at 05:07

## 2018-12-11 RX ADMIN — FUROSEMIDE 20 MG: 10 INJECTION, SOLUTION INTRAVENOUS at 05:07

## 2018-12-11 RX ADMIN — GABAPENTIN 300 MG: 300 CAPSULE ORAL at 15:32

## 2018-12-11 RX ADMIN — WARFARIN SODIUM 7.5 MG: 7.5 TABLET ORAL at 18:01

## 2018-12-11 ASSESSMENT — PAIN SCALES - GENERAL
PAINLEVEL_OUTOF10: 8
PAINLEVEL_OUTOF10: 8
PAINLEVEL_OUTOF10: 9
PAINLEVEL_OUTOF10: 8

## 2018-12-11 ASSESSMENT — ENCOUNTER SYMPTOMS
LOSS OF CONSCIOUSNESS: 0
SPEECH CHANGE: 0
FALLS: 0
DOUBLE VISION: 0
COUGH: 0
SPUTUM PRODUCTION: 0
SHORTNESS OF BREATH: 1
HEMOPTYSIS: 0
PHOTOPHOBIA: 0
PALPITATIONS: 0
FEVER: 0
NERVOUS/ANXIOUS: 0
DIARRHEA: 0
EYE PAIN: 0
CHILLS: 0
FLANK PAIN: 0
ABDOMINAL PAIN: 0
SEIZURES: 0
BRUISES/BLEEDS EASILY: 0
BLOOD IN STOOL: 0
BLURRED VISION: 0
WEAKNESS: 1
SORE THROAT: 0
MYALGIAS: 1
EYE DISCHARGE: 0
VOMITING: 0
HALLUCINATIONS: 0
POLYDIPSIA: 0
STRIDOR: 0

## 2018-12-11 ASSESSMENT — GAIT ASSESSMENTS
DISTANCE (FEET): 50
DEVIATION: BRADYKINETIC;ANTALGIC
ASSISTIVE DEVICE: FRONT WHEEL WALKER
GAIT LEVEL OF ASSIST: STAND BY ASSIST

## 2018-12-11 ASSESSMENT — COGNITIVE AND FUNCTIONAL STATUS - GENERAL
SUGGESTED CMS G CODE MODIFIER MOBILITY: CK
MOBILITY SCORE: 19
MOVING TO AND FROM BED TO CHAIR: A LITTLE
TURNING FROM BACK TO SIDE WHILE IN FLAT BAD: A LITTLE
WALKING IN HOSPITAL ROOM: A LITTLE
CLIMB 3 TO 5 STEPS WITH RAILING: A LOT

## 2018-12-11 NOTE — PROGRESS NOTES
Assumed care of Pt at shift change. Pt is A&Ox3 disoriented to time reorientation unsucessful. Pt reports pain and was given rest. Call light with in reach. Traction socks on pt and bed in lowest position.

## 2018-12-11 NOTE — THERAPY
"Physical Therapy Evaluation completed.   Bed Mobility:  Supine to Sit: Minimal Assist  Transfers: Sit to Stand: Stand by Assist  Gait: Level Of Assist: Stand by Assist with Front-Wheel Walker    50 ft   Plan of Care: Will benefit from Physical Therapy 3 times per week  Discharge Recommendations: Equipment: Will Continue to Assess for Equipment Needs. Post-acute therapy Discharge to home with outpatient or home health for additional skilled therapy services.    See \"Rehab Therapy-Acute\" Patient Summary Report for complete documentation.     "

## 2018-12-11 NOTE — PROGRESS NOTES
Assumed care of patient at change of shift.  During change of shift report, patient (pt) sleeping in bed, on 6L O2 via NC, with bed alarm in place.  During assessment, pt A&Ox3, disoriented to time, stating it is 2017, unknown month and day.  Pt responded well to reorientation.  Additionally, during assessment, pt's left hand and arm weak, pt states new onset of weakness, no facial asymmetry noted and L leg weak but also presently affected by DVT; Dr. Bellamy notified of LUE new weakness and that pt also reports poorly controlled pain with current PRN pain medication.  MD stated will assess bedside.

## 2018-12-11 NOTE — PROGRESS NOTES
Hospital Medicine Daily Progress Note    Date of Service  12/11/2018    Chief Complaint  Left leg pain and swelling     Hospital Course      78 y.o. Female history of A. fib, DVT/PE on Coumadin, recently had her Coumadin stopped for dental procedure 11/14 through 11/22, hypertension, obesity, diabetes admitted Dec 1st 2018 with increasing left leg pain and swelling.  Diagnosed with acute thrombosis left lower extremity with high clot burden.  Post localized tPA, mechanical thrombolysis thrombectomy with left iliac stent placement to continue on Plavix and warfarin .         Interval Problem Update  Asymptomatic hypotension, on 7L O2   INR sub therapeutic   HypoK, replacing, Mg not at goal replacing, CTM   Nursing reported patient had left hand weakness, very minor weakness was found on finger flexion, with good , good power and ROM at wrist, elbow and shoulder. Continue to monitor clinically.   On anticoagulation with heparin, warfarin, Plavix, high risk for bleeding, watch closly  OT recommending SNF, and physiatry consult, placed    Consultants/Specialty  Critical care/pulmonary  Interventional radiology    Code Status  FULL     Disposition  SNF     Review of Systems  Review of Systems   Constitutional: Positive for malaise/fatigue. Negative for chills and fever.   HENT: Negative for congestion, ear discharge, ear pain, sore throat and tinnitus.    Eyes: Negative for blurred vision, double vision, photophobia, pain and discharge.   Respiratory: Positive for shortness of breath. Negative for cough, hemoptysis, sputum production and stridor.    Cardiovascular: Positive for leg swelling. Negative for chest pain and palpitations.   Gastrointestinal: Negative for abdominal pain, blood in stool, diarrhea and vomiting.   Genitourinary: Negative for flank pain, hematuria and urgency.   Musculoskeletal: Positive for joint pain and myalgias. Negative for falls.   Skin: Negative.    Neurological: Positive for weakness.  Negative for speech change, seizures and loss of consciousness.   Endo/Heme/Allergies: Negative for polydipsia. Does not bruise/bleed easily.   Psychiatric/Behavioral: Negative for hallucinations and suicidal ideas. The patient is not nervous/anxious.         Physical Exam  Temp:  [36.1 °C (97 °F)-37.8 °C (100 °F)] 37.3 °C (99.2 °F)  Pulse:  [63-73] 69  Resp:  [16-18] 18  BP: ()/(44-49) 117/44    Physical Exam   Constitutional: She is oriented to person, place, and time. She appears well-developed and well-nourished. No distress.   Morbid obesity    HENT:   Head: Normocephalic and atraumatic.   Right Ear: External ear normal.   Left Ear: External ear normal.   Eyes: Pupils are equal, round, and reactive to light. Conjunctivae are normal. Right eye exhibits no discharge. Left eye exhibits no discharge.   Neck: Normal range of motion. Neck supple. No JVD present. No tracheal deviation present. No thyromegaly present.   Cardiovascular: Exam reveals no gallop and no friction rub.    No murmur heard.  Pulmonary/Chest: Effort normal and breath sounds normal. No stridor. No respiratory distress. She has no wheezes. She has no rales. She exhibits no tenderness.   Abdominal: Soft. Bowel sounds are normal. She exhibits no distension. There is no tenderness. There is no rebound and no guarding.   Musculoskeletal: Normal range of motion. She exhibits edema (marked left LE). She exhibits no tenderness or deformity.   Neurological: She is alert and oriented to person, place, and time. No cranial nerve deficit. Coordination normal.   Skin: Skin is warm and dry. She is not diaphoretic. No erythema. No pallor.   Psychiatric: She has a normal mood and affect. Judgment normal.   Nursing note and vitals reviewed.      Fluids    Intake/Output Summary (Last 24 hours) at 12/11/18 1623  Last data filed at 12/10/18 2000   Gross per 24 hour   Intake              120 ml   Output                0 ml   Net              120 ml        Laboratory  Recent Labs      12/09/18   0447  12/10/18   0504  12/11/18   0502   WBC  13.4*  11.2*  10.9*   RBC  2.75*  2.65*  2.78*   HEMOGLOBIN  8.2*  7.8*  8.3*   HEMATOCRIT  25.2*  24.4*  25.4*   MCV  91.6  92.1  91.4   MCH  29.8  29.4  29.9   MCHC  32.5*  32.0*  32.7*   RDW  51.5*  51.2*  51.4*   PLATELETCT  344  364  427   MPV  10.3  10.3  10.4     Recent Labs      12/09/18   0447  12/10/18   0504  12/11/18   0502   SODIUM  140  141  140   POTASSIUM  3.9  3.7  3.4*   CHLORIDE  106  103  99   CO2  27  32  30   GLUCOSE  123*  125*  118*   BUN  11  9  10   CREATININE  0.72  0.80  0.86   CALCIUM  8.0*  7.7*  8.3*     Recent Labs      12/09/18   0447  12/10/18   0504  12/11/18   0502   APTT  54.8*  76.9*  98.6*   INR  1.33*  1.59*  1.73*               Imaging  DX-WRIST-LIMITED 2- LEFT   Final Result      1.  Demineralization and degenerative change without definite acute osseous abnormality.   2.  Soft tissue calcification at the radial aspect of the third metatarsophalangeal joint which may be related to chondrocalcinosis or chronic posttraumatic change.      IR-ANGIO THROUGH EXISTING CATHETER   Final Result      1.  Reocclusion of the LEFT lower extremity and LEFT pelvic veins with acute clot   2.  Partial resolution following from pharmacomechanical mechanical thrombolysis   3.  Successful placement of a lysis catheter      Plan:  TPA at 0.5 mg an hour through the lysis catheter. Heparin solution at 500 units an hour through the popliteal sheath. Reevaluation tomorrow.               IR-EXTREMITY VENOGRAM-UNILATERAL LEFT   Final Result      1.  Reocclusion of the LEFT lower extremity and LEFT pelvic veins with acute clot   2.  Partial resolution following from occult mechanical thrombolysis   3.  Successful placement of a lysis catheter      Plan:  TPA at 0.5 mg an hour through the lysis catheter. Heparin solution at 500 units an hour through the popliteal sheath. Reevaluation tomorrow. Findings were  discussed with Dr. Efrain Cox on 12/6/2018 at the conclusion of the procedure.            IR-EXTREMITY ANGIOGRAM-UNILATERAL LEFT   Final Result      1.  Slight interval improvement in lower extremity clot burden following second 24 hours of lysis   2.  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  Successful deployment of LEFT common iliac, external iliac and common femoral venous stents with restoration of in-line flow      Plan:  The patient is allergic to aspirin. A loading dose of Plavix as well as a daily dose of Plavix has been ordered. She should remain on therapeutic levels of anticoagulation for treatment of her DVT. This was discussed with Dr. Cox following the    conclusion of the procedure.         DX-CHEST-PORTABLE (1 VIEW)   Final Result      Right central line projects over the SVC. No pneumothorax.      Interstitial edema.      Bibasilar opacities likely represent atelectasis.      Atherosclerotic plaque.         DX-CHEST-PORTABLE (1 VIEW)   Final Result      Mild pulmonary edema with dependent atelectasis or edema and probable small effusions.      IR-EXTREMITY VENOGRAM-UNILATERAL LEFT   Final Result      1.  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    Lower IVC is widely patent.      2.  Initiation of continuous overnight venous TPA infusion via multi-sidehole infusion catheter spanning from the left popliteal vein-femoral vein junction across the entire iliofemoral system with the catheter tip in the IVC.      3.  Plan is for overnight TPA infusion. Follow-up venogram tomorrow 12/3/2018, afternoon. Anticipate left iliac system and venous stenting for chronic occlusion. May need additional TPA infusion and/or mechanical thrombectomy with the goal of achieving     in-line flow in the left iliofemoral system.                     INTERPRETING LOCATION: [address], [place], [address]      US-EXTREMITY VENOUS LOWER UNILAT LEFT   Final Result      IR-MIDLINE CATHETER INSERTION >5 YRS poor veins, serial lab draws    (Results Pending)   IR-ANGIO THROUGH EXISTING CATHETER    (Results Pending)        Assessment/Plan  * Acute deep vein thrombosis (DVT) of femoral vein of left lower extremity (HCC)- (present on admission)   Assessment & Plan    Post localized tPA, mechanical thrombolysis,  thrombectomy w left iliac stent placement Dec 4 & Dec 6 and venograms.  Decrease pain and swelling of left leg  Continue with Plavix post stent.   Maintain on weight-based heparin with warfarin-follow-up INR .  Reports she previously was recommended for Xarelto but unable to afford.  Pain control     Acute respiratory failure with hypoxia (HCC)- (present on admission)   Assessment & Plan    Chest x-ray with edema  Continue RT protocol, Deep inspiratory efforts/encourage IS   Continue IV Lasix diuresis.   O2 nasal cannula support and wean FiO2 as tolerated.       Atrial fibrillation (HCC)- (present on admission)   Assessment & Plan    Controlled, sinus rhythm  Continued on anticoagulation -follow-up INR     Delirium   Assessment & Plan    Initially developed in ICU and observed on medical floor-clearing.  Normal FT4 despite elevated TSH-suggest subclinical hypothyroidism-recommend follow-up thyroid function 6 weeks.   Maintain circadian rhythm try to keep awake during the day  Continue trazodone evening to improve sleep.   Oxycodone recently decreased.  Monitor for side effects of  narcotics.      Anemia- (present on admission)   Assessment & Plan    Chronic, workup consistent with iron deficiency.    Start IV iron   Monitor.  Conservative transfusion if hemoglobin < 7 or symptoms     DM (diabetes mellitus) (HCC)- (present on admission)   Assessment & Plan    Fair control . HgbA1c:6.1 in past 3 months.    Continue monitor accuchecks and cover with SSI     Essential hypertension- (present on admission)   Assessment & Plan    Controlled  Continue amlodipine and losartan with hold parameters for lower BPs  Monitor      Left wrist pain   Assessment & Plan    Xray no fx.   May be strain.  Pseudogout,  arthritic.   Supportive treatment.     X-ray left wrist  1.  Demineralization and degenerative change without definite acute osseous abnormality.  2.  Soft tissue calcification at the radial aspect of the third metatarsophalangeal joint which may be related to chondrocalcinosis or chronic posttraumatic change.      Hematuria   Assessment & Plan    Monitoring CBC  Resolved 12/8     Obesity (BMI 30-39.9)- (present on admission)   Assessment & Plan    Body mass index is 36.86 kg/m².  Counseling provided      Hypomagnesemia- (present on admission)   Assessment & Plan    CTM replace as needed           VTE prophylaxis: Heparin drip, warfarin

## 2018-12-11 NOTE — CARE PLAN
Problem: Communication  Goal: The ability to communicate needs accurately and effectively will improve  Outcome: PROGRESSING AS EXPECTED  Educate the pt on the use of call light to call for assistance    Problem: Respiratory:  Goal: Respiratory status will improve  Outcome: PROGRESSING AS EXPECTED  O2 will be utilized via nasal canula to keep the pt's O2 sat above 92%

## 2018-12-11 NOTE — PROGRESS NOTES
aPTT is 98.6 this AM. Hep rate decreased by small amount per protocol. Currently running at 1050 units/hr. Next aPTT draw at 1200

## 2018-12-11 NOTE — PROGRESS NOTES
Uneventful night. Pt complained of some pain on her left side. Pain medication was administered. Pt tolerated it well. Was able to sleep after. Will continue to monitor

## 2018-12-11 NOTE — PROGRESS NOTES
Inpatient Anticoagulation Service Note    Date: 12/11/2018  Reason for Anticoagulation: Deep Vein Thrombosis        Hemoglobin Value: (!) 8.3  Hematocrit Value: (!) 25.4  Lab Platelet Value: 427  Target INR: 2.0 to 3.0    INR from last 7 days     Date/Time INR Value    12/11/18 0502 (!)  1.73    12/10/18 0504 (!)  1.59    12/09/18 0447 (!)  1.33    12/08/18 0500 (!)  1.26    12/07/18 1656 (!)  1.24    12/07/18 0800 (!)  1.35    12/06/18 1138 (!)  1.32    12/06/18 0322 (!)  1.36    12/05/18 1826 (!)  1.44    12/04/18 1649 (!)  1.86        Dose from last 7 days     Date/Time Dose (mg)    12/11/18 1300  7.5    12/10/18 0504  5    12/09/18 1000  7.5    12/08/18 1300  7.5    12/07/18 1200  7.5        Average Dose (mg):  (Reported home dose: 5 mg MWF, 7.5 mg ROW)  Significant Interactions: Statin, Clopidogrel, Thyroid Medications  Bridge Therapy: Yes (Heparin weight based protocol)  Date of Last VTE Event: 11/25/18 (Previously admitted and bridged to therapeutic Coumadin)  Bridge Therapy Start Date: 12/01/18  Days of Overlap Therapy: 4  INR Value Greater than 2 Prior to Discontinuation of Parenteral Anticoagulation: Not Applicable   Reversal Agent Administered: Not Applicable    Assessment: No interval changes in overall clinical status, diet, or DDI.  Working with physical therapy.  INR moving toward the therapeutic range.  She is on a heparin drip for bridge therapy, aPTTs have been stable in the therapeutic range.      Plan:  Continue her home warfarin regimen, scheduled to receive 7.5 mg tonight.  Follow up INR in the AM.  Education Material Provided?: No (Chronic Coumadin Patient)  Pharmacist suggested discharge dosing: warfarin 5 mg every Monday/Wednesday/Friday and 7.5 mg all other days of the week.  Recommend a follow up INR within 3 days of discharge.       Adenike Hutchison, Pharm.D., BCPS

## 2018-12-12 ENCOUNTER — APPOINTMENT (OUTPATIENT)
Dept: RADIOLOGY | Facility: MEDICAL CENTER | Age: 78
DRG: 270 | End: 2018-12-12
Attending: HOSPITALIST
Payer: MEDICARE

## 2018-12-12 PROBLEM — Z71.6 TOBACCO ABUSE COUNSELING: Status: ACTIVE | Noted: 2018-12-12

## 2018-12-12 LAB
ANION GAP SERPL CALC-SCNC: 5 MMOL/L (ref 0–11.9)
APTT PPP: 65.4 SEC (ref 24.7–36)
APTT PPP: 76 SEC (ref 24.7–36)
BASOPHILS # BLD AUTO: 0.7 % (ref 0–1.8)
BASOPHILS # BLD: 0.09 K/UL (ref 0–0.12)
BUN SERPL-MCNC: 10 MG/DL (ref 8–22)
CALCIUM SERPL-MCNC: 8.4 MG/DL (ref 8.5–10.5)
CHLORIDE SERPL-SCNC: 100 MMOL/L (ref 96–112)
CO2 SERPL-SCNC: 35 MMOL/L (ref 20–33)
CREAT SERPL-MCNC: 1.07 MG/DL (ref 0.5–1.4)
EOSINOPHIL # BLD AUTO: 1.76 K/UL (ref 0–0.51)
EOSINOPHIL NFR BLD: 13.9 % (ref 0–6.9)
ERYTHROCYTE [DISTWIDTH] IN BLOOD BY AUTOMATED COUNT: 51.6 FL (ref 35.9–50)
GLUCOSE BLD-MCNC: 112 MG/DL (ref 65–99)
GLUCOSE BLD-MCNC: 142 MG/DL (ref 65–99)
GLUCOSE BLD-MCNC: 164 MG/DL (ref 65–99)
GLUCOSE BLD-MCNC: 208 MG/DL (ref 65–99)
GLUCOSE SERPL-MCNC: 160 MG/DL (ref 65–99)
HCT VFR BLD AUTO: 24.9 % (ref 37–47)
HGB BLD-MCNC: 8.1 G/DL (ref 12–16)
IMM GRANULOCYTES # BLD AUTO: 0.2 K/UL (ref 0–0.11)
IMM GRANULOCYTES NFR BLD AUTO: 1.6 % (ref 0–0.9)
INR PPP: 2.15 (ref 0.87–1.13)
LYMPHOCYTES # BLD AUTO: 2.7 K/UL (ref 1–4.8)
LYMPHOCYTES NFR BLD: 21.4 % (ref 22–41)
MAGNESIUM SERPL-MCNC: 2.3 MG/DL (ref 1.5–2.5)
MCH RBC QN AUTO: 29.9 PG (ref 27–33)
MCHC RBC AUTO-ENTMCNC: 32.5 G/DL (ref 33.6–35)
MCV RBC AUTO: 91.9 FL (ref 81.4–97.8)
MONOCYTES # BLD AUTO: 1.78 K/UL (ref 0–0.85)
MONOCYTES NFR BLD AUTO: 14.1 % (ref 0–13.4)
NEUTROPHILS # BLD AUTO: 6.11 K/UL (ref 2–7.15)
NEUTROPHILS NFR BLD: 48.3 % (ref 44–72)
NRBC # BLD AUTO: 0.04 K/UL
NRBC BLD-RTO: 0.3 /100 WBC
PLATELET # BLD AUTO: 483 K/UL (ref 164–446)
PMV BLD AUTO: 11 FL (ref 9–12.9)
POTASSIUM SERPL-SCNC: 3.5 MMOL/L (ref 3.6–5.5)
PROTHROMBIN TIME: 24 SEC (ref 12–14.6)
RBC # BLD AUTO: 2.71 M/UL (ref 4.2–5.4)
SODIUM SERPL-SCNC: 140 MMOL/L (ref 135–145)
WBC # BLD AUTO: 12.6 K/UL (ref 4.8–10.8)

## 2018-12-12 PROCEDURE — 700102 HCHG RX REV CODE 250 W/ 637 OVERRIDE(OP): Performed by: HOSPITALIST

## 2018-12-12 PROCEDURE — 700111 HCHG RX REV CODE 636 W/ 250 OVERRIDE (IP): Performed by: INTERNAL MEDICINE

## 2018-12-12 PROCEDURE — A9270 NON-COVERED ITEM OR SERVICE: HCPCS | Performed by: HOSPITALIST

## 2018-12-12 PROCEDURE — 700102 HCHG RX REV CODE 250 W/ 637 OVERRIDE(OP): Performed by: RADIOLOGY

## 2018-12-12 PROCEDURE — A9270 NON-COVERED ITEM OR SERVICE: HCPCS | Performed by: RADIOLOGY

## 2018-12-12 PROCEDURE — 80048 BASIC METABOLIC PNL TOTAL CA: CPT

## 2018-12-12 PROCEDURE — 85610 PROTHROMBIN TIME: CPT

## 2018-12-12 PROCEDURE — 770006 HCHG ROOM/CARE - MED/SURG/GYN SEMI*

## 2018-12-12 PROCEDURE — 71045 X-RAY EXAM CHEST 1 VIEW: CPT

## 2018-12-12 PROCEDURE — 700102 HCHG RX REV CODE 250 W/ 637 OVERRIDE(OP): Performed by: FAMILY MEDICINE

## 2018-12-12 PROCEDURE — 700111 HCHG RX REV CODE 636 W/ 250 OVERRIDE (IP): Performed by: HOSPITALIST

## 2018-12-12 PROCEDURE — 83735 ASSAY OF MAGNESIUM: CPT

## 2018-12-12 PROCEDURE — A9270 NON-COVERED ITEM OR SERVICE: HCPCS | Performed by: FAMILY MEDICINE

## 2018-12-12 PROCEDURE — 82962 GLUCOSE BLOOD TEST: CPT | Mod: 91

## 2018-12-12 PROCEDURE — 99406 BEHAV CHNG SMOKING 3-10 MIN: CPT | Performed by: HOSPITALIST

## 2018-12-12 PROCEDURE — 85025 COMPLETE CBC W/AUTO DIFF WBC: CPT

## 2018-12-12 PROCEDURE — A9270 NON-COVERED ITEM OR SERVICE: HCPCS | Performed by: INTERNAL MEDICINE

## 2018-12-12 PROCEDURE — 99233 SBSQ HOSP IP/OBS HIGH 50: CPT | Mod: 25 | Performed by: HOSPITALIST

## 2018-12-12 PROCEDURE — 85730 THROMBOPLASTIN TIME PARTIAL: CPT | Mod: 91

## 2018-12-12 PROCEDURE — 700102 HCHG RX REV CODE 250 W/ 637 OVERRIDE(OP): Performed by: INTERNAL MEDICINE

## 2018-12-12 RX ORDER — POTASSIUM CHLORIDE 20 MEQ/1
20 TABLET, EXTENDED RELEASE ORAL DAILY
Status: DISCONTINUED | OUTPATIENT
Start: 2018-12-14 | End: 2018-12-12

## 2018-12-12 RX ORDER — POTASSIUM CHLORIDE 20 MEQ/1
40 TABLET, EXTENDED RELEASE ORAL DAILY
Status: COMPLETED | OUTPATIENT
Start: 2018-12-13 | End: 2018-12-13

## 2018-12-12 RX ORDER — POTASSIUM CHLORIDE 20 MEQ/1
60 TABLET, EXTENDED RELEASE ORAL DAILY
Status: DISCONTINUED | OUTPATIENT
Start: 2018-12-13 | End: 2018-12-12

## 2018-12-12 RX ORDER — POTASSIUM CHLORIDE 20 MEQ/1
20 TABLET, EXTENDED RELEASE ORAL DAILY
Status: DISCONTINUED | OUTPATIENT
Start: 2018-12-12 | End: 2018-12-12

## 2018-12-12 RX ORDER — POTASSIUM CHLORIDE 20 MEQ/1
20 TABLET, EXTENDED RELEASE ORAL DAILY
Status: DISCONTINUED | OUTPATIENT
Start: 2018-12-14 | End: 2018-12-14

## 2018-12-12 RX ORDER — POTASSIUM CHLORIDE 20 MEQ/1
20 TABLET, EXTENDED RELEASE ORAL ONCE
Status: COMPLETED | OUTPATIENT
Start: 2018-12-12 | End: 2018-12-12

## 2018-12-12 RX ADMIN — FUROSEMIDE 20 MG: 10 INJECTION, SOLUTION INTRAVENOUS at 17:22

## 2018-12-12 RX ADMIN — GABAPENTIN 300 MG: 300 CAPSULE ORAL at 13:09

## 2018-12-12 RX ADMIN — MAGNESIUM HYDROXIDE 30 ML: 400 SUSPENSION ORAL at 10:23

## 2018-12-12 RX ADMIN — GABAPENTIN 300 MG: 300 CAPSULE ORAL at 06:26

## 2018-12-12 RX ADMIN — OXYCODONE HYDROCHLORIDE 5 MG: 5 TABLET ORAL at 06:29

## 2018-12-12 RX ADMIN — IRON SUCROSE 200 MG: 20 INJECTION, SOLUTION INTRAVENOUS at 09:52

## 2018-12-12 RX ADMIN — LEVOTHYROXINE SODIUM 150 MCG: 150 TABLET ORAL at 06:26

## 2018-12-12 RX ADMIN — GABAPENTIN 300 MG: 300 CAPSULE ORAL at 20:35

## 2018-12-12 RX ADMIN — WARFARIN SODIUM 5 MG: 5 TABLET ORAL at 17:28

## 2018-12-12 RX ADMIN — FUROSEMIDE 20 MG: 10 INJECTION, SOLUTION INTRAVENOUS at 06:26

## 2018-12-12 RX ADMIN — ATORVASTATIN CALCIUM 80 MG: 40 TABLET, FILM COATED ORAL at 17:20

## 2018-12-12 RX ADMIN — POTASSIUM CHLORIDE 20 MEQ: 1500 TABLET, EXTENDED RELEASE ORAL at 11:56

## 2018-12-12 RX ADMIN — STANDARDIZED SENNA CONCENTRATE AND DOCUSATE SODIUM 2 TABLET: 8.6; 5 TABLET, FILM COATED ORAL at 06:26

## 2018-12-12 RX ADMIN — CLOPIDOGREL 75 MG: 75 TABLET, FILM COATED ORAL at 06:26

## 2018-12-12 RX ADMIN — OXYCODONE HYDROCHLORIDE 5 MG: 5 TABLET ORAL at 13:09

## 2018-12-12 RX ADMIN — Medication 500 MG: at 09:51

## 2018-12-12 RX ADMIN — INSULIN LISPRO 1 UNITS: 100 INJECTION, SOLUTION INTRAVENOUS; SUBCUTANEOUS at 13:07

## 2018-12-12 RX ADMIN — TRAZODONE HYDROCHLORIDE 100 MG: 100 TABLET ORAL at 20:36

## 2018-12-12 RX ADMIN — AMLODIPINE BESYLATE 10 MG: 10 TABLET ORAL at 06:26

## 2018-12-12 RX ADMIN — OXYCODONE HYDROCHLORIDE 5 MG: 5 TABLET ORAL at 20:36

## 2018-12-12 RX ADMIN — LOSARTAN POTASSIUM 25 MG: 25 TABLET, FILM COATED ORAL at 06:26

## 2018-12-12 RX ADMIN — STANDARDIZED SENNA CONCENTRATE AND DOCUSATE SODIUM 2 TABLET: 8.6; 5 TABLET, FILM COATED ORAL at 17:20

## 2018-12-12 RX ADMIN — INSULIN LISPRO 2 UNITS: 100 INJECTION, SOLUTION INTRAVENOUS; SUBCUTANEOUS at 09:53

## 2018-12-12 RX ADMIN — Medication 500 MG: at 17:20

## 2018-12-12 RX ADMIN — POTASSIUM CHLORIDE 40 MEQ: 1500 TABLET, EXTENDED RELEASE ORAL at 06:25

## 2018-12-12 RX ADMIN — HEPARIN SODIUM 800 UNITS/HR: 5000 INJECTION, SOLUTION INTRAVENOUS at 01:25

## 2018-12-12 ASSESSMENT — ENCOUNTER SYMPTOMS
ABDOMINAL PAIN: 0
HALLUCINATIONS: 0
SORE THROAT: 0
PHOTOPHOBIA: 0
COUGH: 0
LOSS OF CONSCIOUSNESS: 0
SPUTUM PRODUCTION: 0
SPEECH CHANGE: 0
BLOOD IN STOOL: 0
FLANK PAIN: 0
STRIDOR: 0
DIARRHEA: 0
POLYDIPSIA: 0
WEAKNESS: 1
DOUBLE VISION: 0
SEIZURES: 0
HEMOPTYSIS: 0
EYE PAIN: 0
PALPITATIONS: 0
EYE DISCHARGE: 0
SHORTNESS OF BREATH: 1
BLURRED VISION: 0
FALLS: 0
FEVER: 0
VOMITING: 0
NERVOUS/ANXIOUS: 0
BRUISES/BLEEDS EASILY: 0
MYALGIAS: 1
CHILLS: 0

## 2018-12-12 ASSESSMENT — PAIN SCALES - GENERAL
PAINLEVEL_OUTOF10: 7
PAINLEVEL_OUTOF10: 7
PAINLEVEL_OUTOF10: 6
PAINLEVEL_OUTOF10: 7
PAINLEVEL_OUTOF10: 7

## 2018-12-12 NOTE — PROGRESS NOTES
Patient alert/oriented,on 6L of oxygen per nasal cannula with sob upon exertion,on heparin drip for dvt, right neck tlc intact and patent and left ac midline patent,incontinent at times.

## 2018-12-12 NOTE — PROGRESS NOTES
Hospital Medicine Daily Progress Note    Date of Service  12/12/2018    Chief Complaint  Left leg pain and swelling     Hospital Course      78 y.o. Female history of A. fib, DVT/PE on Coumadin, recently had her Coumadin stopped for dental procedure 11/14 through 11/22, hypertension, obesity, diabetes admitted Dec 1st 2018 with increasing left leg pain and swelling.  Diagnosed with acute thrombosis left lower extremity with high clot burden.  Post localized tPA, mechanical thrombolysis thrombectomy with left iliac stent placement to continue on Plavix and warfarin .         Interval Problem Update  On 5-6L O2, everyday smoker counseling provided, probable undiagnosed COPD or Pul HTN, checking echo and CXR    INR therapeutic 2.15, will need two days of overlap on a therapeutic INR, mostly D/C heparin tomorrow.   K 3.5, replacing, Mg 2.3     On anticoagulation with heparin, warfarin, Plavix, high risk for bleeding, watch closly  OT recommending SNF, pending physiatry assessment.     Consultants/Specialty  Critical care/pulmonary  Interventional radiology    Code Status  FULL     Disposition  SNF     Review of Systems  Review of Systems   Constitutional: Positive for malaise/fatigue. Negative for chills and fever.   HENT: Negative for congestion, ear discharge, ear pain, sore throat and tinnitus.    Eyes: Negative for blurred vision, double vision, photophobia, pain and discharge.   Respiratory: Positive for shortness of breath. Negative for cough, hemoptysis, sputum production and stridor.    Cardiovascular: Positive for leg swelling. Negative for chest pain and palpitations.   Gastrointestinal: Negative for abdominal pain, blood in stool, diarrhea and vomiting.   Genitourinary: Negative for flank pain, hematuria and urgency.   Musculoskeletal: Positive for joint pain and myalgias. Negative for falls.   Skin: Negative.    Neurological: Positive for weakness. Negative for speech change, seizures and loss of  consciousness.   Endo/Heme/Allergies: Negative for polydipsia. Does not bruise/bleed easily.   Psychiatric/Behavioral: Negative for hallucinations and suicidal ideas. The patient is not nervous/anxious.         Physical Exam  Temp:  [36.8 °C (98.2 °F)-37.7 °C (99.8 °F)] 37.5 °C (99.5 °F)  Pulse:  [62-72] 65  Resp:  [18] 18  BP: (100-111)/(46-68) 110/46    Physical Exam   Constitutional: She is oriented to person, place, and time. She appears well-developed and well-nourished. No distress.   Morbid obesity    HENT:   Head: Normocephalic and atraumatic.   Right Ear: External ear normal.   Left Ear: External ear normal.   Eyes: Pupils are equal, round, and reactive to light. Conjunctivae are normal. Right eye exhibits no discharge. Left eye exhibits no discharge.   Neck: Normal range of motion. Neck supple. No JVD present. No tracheal deviation present. No thyromegaly present.   Cardiovascular: Exam reveals no gallop and no friction rub.    No murmur heard.  Pulmonary/Chest: Effort normal and breath sounds normal. No stridor. No respiratory distress. She has no wheezes. She has no rales. She exhibits no tenderness.   Abdominal: Soft. Bowel sounds are normal. She exhibits no distension. There is no tenderness. There is no rebound and no guarding.   Musculoskeletal: Normal range of motion. She exhibits edema (marked left LE). She exhibits no tenderness or deformity.   Neurological: She is alert and oriented to person, place, and time. No cranial nerve deficit. Coordination normal.   Skin: Skin is warm and dry. She is not diaphoretic. No erythema. No pallor.   Psychiatric: She has a normal mood and affect. Judgment normal.   Nursing note and vitals reviewed.      Fluids    Intake/Output Summary (Last 24 hours) at 12/12/18 1604  Last data filed at 12/12/18 1400   Gross per 24 hour   Intake              720 ml   Output                0 ml   Net              720 ml       Laboratory  Recent Labs      12/10/18   0876   12/11/18   0502  12/12/18   0050   WBC  11.2*  10.9*  12.6*   RBC  2.65*  2.78*  2.71*   HEMOGLOBIN  7.8*  8.3*  8.1*   HEMATOCRIT  24.4*  25.4*  24.9*   MCV  92.1  91.4  91.9   MCH  29.4  29.9  29.9   MCHC  32.0*  32.7*  32.5*   RDW  51.2*  51.4*  51.6*   PLATELETCT  364  427  483*   MPV  10.3  10.4  11.0     Recent Labs      12/10/18   0504  12/11/18   0502  12/12/18   0050   SODIUM  141  140  140   POTASSIUM  3.7  3.4*  3.5*   CHLORIDE  103  99  100   CO2  32  30  35*   GLUCOSE  125*  118*  160*   BUN  9  10  10   CREATININE  0.80  0.86  1.07   CALCIUM  7.7*  8.3*  8.4*     Recent Labs      12/10/18   0504  12/11/18   0502  12/11/18   1545  12/12/18   0050  12/12/18   0700   APTT  76.9*  98.6*  123.0*  65.4*  76.0*   INR  1.59*  1.73*   --   2.15*   --                Imaging  DX-WRIST-LIMITED 2- LEFT   Final Result      1.  Demineralization and degenerative change without definite acute osseous abnormality.   2.  Soft tissue calcification at the radial aspect of the third metatarsophalangeal joint which may be related to chondrocalcinosis or chronic posttraumatic change.      IR-ANGIO THROUGH EXISTING CATHETER   Final Result      1.  Reocclusion of the LEFT lower extremity and LEFT pelvic veins with acute clot   2.  Partial resolution following from pharmacomechanical mechanical thrombolysis   3.  Successful placement of a lysis catheter      Plan:  TPA at 0.5 mg an hour through the lysis catheter. Heparin solution at 500 units an hour through the popliteal sheath. Reevaluation tomorrow.               IR-EXTREMITY VENOGRAM-UNILATERAL LEFT   Final Result      1.  Reocclusion of the LEFT lower extremity and LEFT pelvic veins with acute clot   2.  Partial resolution following from occult mechanical thrombolysis   3.  Successful placement of a lysis catheter      Plan:  TPA at 0.5 mg an hour through the lysis catheter. Heparin solution at 500 units an hour through the popliteal sheath. Reevaluation tomorrow. Findings  were discussed with Dr. Efrain Cox on 12/6/2018 at the conclusion of the procedure.            IR-EXTREMITY ANGIOGRAM-UNILATERAL LEFT   Final Result      1.  Slight interval improvement in lower extremity clot burden following second 24 hours of lysis   2.  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  Successful deployment of LEFT common iliac, external iliac and common femoral venous stents with restoration of in-line flow      Plan:  The patient is allergic to aspirin. A loading dose of Plavix as well as a daily dose of Plavix has been ordered. She should remain on therapeutic levels of anticoagulation for treatment of her DVT. This was discussed with Dr. Cox following the    conclusion of the procedure.         DX-CHEST-PORTABLE (1 VIEW)   Final Result      Right central line projects over the SVC. No pneumothorax.      Interstitial edema.      Bibasilar opacities likely represent atelectasis.      Atherosclerotic plaque.         DX-CHEST-PORTABLE (1 VIEW)   Final Result      Mild pulmonary edema with dependent atelectasis or edema and probable small effusions.      IR-EXTREMITY VENOGRAM-UNILATERAL LEFT   Final Result      1.  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    Lower IVC is widely patent.      2.  Initiation of continuous overnight venous TPA infusion via multi-sidehole infusion catheter spanning from the left popliteal vein-femoral vein junction across the entire iliofemoral system with the catheter tip in the IVC.      3.  Plan is for overnight TPA infusion. Follow-up venogram tomorrow 12/3/2018, afternoon. Anticipate left iliac system and venous stenting for chronic occlusion. May need additional TPA infusion and/or mechanical thrombectomy with the goal of achieving     in-line flow in the left iliofemoral system.                     INTERPRETING LOCATION: [address], [place], [address]      US-EXTREMITY VENOUS LOWER UNILAT LEFT   Final Result      IR-MIDLINE CATHETER INSERTION >5 YRS poor veins, serial lab draws    (Results Pending)   IR-ANGIO THROUGH EXISTING CATHETER    (Results Pending)   EC-ECHOCARDIOGRAM COMPLETE W/O CONT    (Results Pending)   DX-CHEST-LIMITED (1 VIEW)    (Results Pending)        Assessment/Plan  * Acute deep vein thrombosis (DVT) of femoral vein of left lower extremity (HCC)- (present on admission)   Assessment & Plan    Post localized tPA, mechanical thrombolysis,  thrombectomy w left iliac stent placement Dec 4 & Dec 6 and venograms.  Decrease pain and swelling of left leg  Continue with Plavix post stent.   Maintain on weight-based heparin with warfarin-follow-up INR .  Reports she previously was recommended for Xarelto but unable to afford. Pain control.      Acute respiratory failure with hypoxia (HCC)- (present on admission)   Assessment & Plan    Chest x-ray with edema  Continue RT protocol, Deep inspiratory efforts/encourage IS   Continue IV Lasix diuresis.   O2 nasal cannula support and wean FiO2 as tolerated.     Checking Echo      Atrial fibrillation (HCC)- (present on admission)   Assessment & Plan    Controlled, sinus rhythm  Continued on anticoagulation -follow-up INR      Tobacco abuse counseling- (present on admission)   Assessment & Plan    Spent approx 4 mins on Tobacco cessation education.   Discussed the benefits of quitting smoking and risks of continued smoking including cardiovascular disease, cancer and COPD.  Discussed options of nicotine patch, medical treatment with wellbutrin and chantix.        Delirium   Assessment & Plan    Initially developed in ICU and observed on medical floor. Improved.   Normal FT4 despite elevated TSH-suggest subclinical hypothyroidism-recommend follow-up thyroid function 6 weeks.   Maintain circadian  rhythm try to keep awake during the day  Continue trazodone evening to improve sleep.    Oxycodone recently decreased.  Monitor for side effects of  narcotics.      Anemia- (present on admission)   Assessment & Plan    Chronic, workup consistent with iron deficiency.    IV iron   Conservative transfusion if hemoglobin < 7 or symptoms      DM (diabetes mellitus) (HCC)- (present on admission)   Assessment & Plan    Fair control . HgbA1c:6.1 in past 3 months.   BS has been controlled   Continue monitor accuchecks and cover with SSI      Essential hypertension- (present on admission)   Assessment & Plan    Controlled  Continue amlodipine and losartan with hold parameters for lower BPs  Monitor         Left wrist pain   Assessment & Plan    Xray no fx.   May be strain.  Pseudogout,  arthritic.   Supportive treatment.     X-ray left wrist  1.  Demineralization and degenerative change without definite acute osseous abnormality.  2.  Soft tissue calcification at the radial aspect of the third metatarsophalangeal joint which may be related to chondrocalcinosis or chronic posttraumatic change.      Hematuria   Assessment & Plan    Monitoring CBC  Resolved 12/8      Obesity (BMI 30-39.9)- (present on admission)   Assessment & Plan    Body mass index is 36.86 kg/m².  Counseling provided       Hypomagnesemia- (present on admission)   Assessment & Plan    CTM replace as needed             VTE prophylaxis: Heparin drip, warfarin

## 2018-12-12 NOTE — DISCHARGE PLANNING
Agency/Facility Name: [facility]  Spoke To: Santiago   Outcome: Patient declined, no open beds at this time.     Agency/Facility Name: [facility]  Spoke To: Pamella  Outcome: Patient accepted.

## 2018-12-12 NOTE — PROGRESS NOTES
Pt education provided on the importance of mobilization.  Earlier in this shift, the pt agreed to use the commode when the need arose.  Since this afternoon, the pt has refused to get up to the commode, and has requested the bedpan despite difficulties in positioning it for her or has used the absorbent pads.  Pt continues to refuse to use the commode despite education.  Heel float boots ordered due to pink/red blanching heels, and the pt has agreed to try them.

## 2018-12-12 NOTE — DISCHARGE PLANNING
PMR referral from Dr. Bellamy    Debility post localized tPA, mechanical thrombolysis, thrombectomy w left iliac stent placement Dec 4 & Dec 6 and venograms. Anticoagulation with Coumadin INR 2.15 target goal 2-3.  Respiratory failure with hypoxemia. Remains on 5-6 liters of supplemental oxygen saturation 90%. Physical therapy note indicate SBA for gait 50 feet. OT note indicate max assist with lower body dressing and toilet hygiene. Anticipate post acute services at a skilled nursing level of care for post acute services. Barrier to IRF level of care limited therapy need and O2 requirement. No physiatry consult ordered per protocol.

## 2018-12-12 NOTE — CARE PLAN
Problem: Pain Management  Goal: Pain level will decrease to patient's comfort goal    Intervention: Follow pain managment plan developed in collaboration with patient and Interdisciplinary Team  Pt medicated per MAR for pain and MD updated      Problem: Mobility  Goal: Risk for activity intolerance will decrease    Intervention: Encourage patient to increase activity level in collaboration with Interdisciplinary Team  Pt encouraged to get up to commode in order to eliminate waste.

## 2018-12-12 NOTE — PROGRESS NOTES
Rachel from Lab called with critical result of aPTT 123 at 1645. Critical lab result read back to Rachel.   Dr. Bellamy notified of critical lab result at 1657.  Critical lab result read back by Dr. Bellamy.

## 2018-12-12 NOTE — PROGRESS NOTES
Inpatient Anticoagulation Service Note    Date: 12/12/2018  Reason for Anticoagulation: Deep Vein Thrombosis        Hemoglobin Value: (!) 8.1  Hematocrit Value: (!) 24.9  Lab Platelet Value: (!) 483  Target INR: 2.0 to 3.0    INR from last 7 days     Date/Time INR Value    12/12/18 0050 (!)  2.15    12/11/18 0502 (!)  1.73    12/10/18 0504 (!)  1.59    12/09/18 0447 (!)  1.33    12/08/18 0500 (!)  1.26    12/07/18 1656 (!)  1.24    12/07/18 0800 (!)  1.35    12/06/18 1138 (!)  1.32    12/06/18 0322 (!)  1.36    12/05/18 1826 (!)  1.44        Dose from last 7 days     Date/Time Dose (mg)    12/12/18 0050  5    12/11/18 1300  7.5    12/10/18 0504  5    12/09/18 1000  7.5    12/08/18 1300  7.5    12/07/18 1200  7.5        Average Dose (mg):  (Reported home dose: 5 mg MWF, 7.5 mg ROW)  Significant Interactions: Statin, Clopidogrel, Thyroid Medications  Bridge Therapy: Yes (Heparin weight based protocol)  Date of Last VTE Event: 11/25/18 (Previously admitted and bridged to therapeutic Coumadin)  Bridge Therapy Start Date: 12/01/18  Days of Overlap Therapy: 5   INR Value Greater than 2 Prior to Discontinuation of Parenteral Anticoagulation: Not Applicable     Reversal Agent Administered: Not Applicable  Comments: INR is therapeutic today. Pharmacy will discontinue the heparin drip tomorrow if the INR is therapeutic. No new DDI's and all doses have been charted as administered. Continue warfarin home dose.    Plan:  INR with morning labs  Education Material Provided?: No (Chronic Coumadin Patient)  Pharmacist suggested discharge dosing: Resume home dose     Shayla Byrd, PharmD., BCCCP

## 2018-12-12 NOTE — CARE PLAN
Problem: Safety  Goal: Will remain free from falls    Intervention: Implement fall precautions  Call light and personal belongings within reach. Pt instructed to call for assistance, pt verbalizes understanding. Non skid socks on. Strip alarm in place. Bed in lowest position.       Problem: Pain Management  Goal: Pain level will decrease to patient's comfort goal    Intervention: Follow pain managment plan developed in collaboration with patient and Interdisciplinary Team  Patient c/o pain. Medications given with good results. Pre and post pain assessment documented,patient is asleep comfortably.

## 2018-12-12 NOTE — DISCHARGE PLANNING
Anticipated Discharge Disposition: SNF    Action: This RN CM spoke with the patient about going to skilled nursing.  She is in agreement with it and chose [facility], [facility] and [facility] and signed the choice form.  I faxed it to the [facility] and she will send out the referrals.    Barriers to Discharge: Acceptance at one of the SNF's and medical clearance.    Plan: Await acceptance at one of the above SNF's.

## 2018-12-12 NOTE — DISCHARGE PLANNING
Received Choice form at 1330.  Agency/Facility Name: [place], [place], and [facility].   Referral sent per Choice form at 1330.

## 2018-12-12 NOTE — ASSESSMENT & PLAN NOTE
Spent approx 4 mins on Tobacco cessation education.    Discussed the benefits of quitting smoking and risks of continued smoking including cardiovascular disease, cancer and COPD.  Discussed options of nicotine patch, medical treatment with wellbutrin and chantix.

## 2018-12-13 ENCOUNTER — APPOINTMENT (OUTPATIENT)
Dept: CARDIOLOGY | Facility: MEDICAL CENTER | Age: 78
DRG: 270 | End: 2018-12-13
Attending: HOSPITALIST
Payer: MEDICARE

## 2018-12-13 LAB
ANION GAP SERPL CALC-SCNC: 3 MMOL/L (ref 0–11.9)
APTT PPP: 102.9 SEC (ref 24.7–36)
BASOPHILS # BLD AUTO: 0.7 % (ref 0–1.8)
BASOPHILS # BLD: 0.08 K/UL (ref 0–0.12)
BUN SERPL-MCNC: 8 MG/DL (ref 8–22)
CALCIUM SERPL-MCNC: 8.1 MG/DL (ref 8.5–10.5)
CHLORIDE SERPL-SCNC: 101 MMOL/L (ref 96–112)
CO2 SERPL-SCNC: 35 MMOL/L (ref 20–33)
CREAT SERPL-MCNC: 0.9 MG/DL (ref 0.5–1.4)
EOSINOPHIL # BLD AUTO: 1.87 K/UL (ref 0–0.51)
EOSINOPHIL NFR BLD: 16 % (ref 0–6.9)
ERYTHROCYTE [DISTWIDTH] IN BLOOD BY AUTOMATED COUNT: 54.6 FL (ref 35.9–50)
GLUCOSE BLD-MCNC: 110 MG/DL (ref 65–99)
GLUCOSE BLD-MCNC: 120 MG/DL (ref 65–99)
GLUCOSE BLD-MCNC: 146 MG/DL (ref 65–99)
GLUCOSE SERPL-MCNC: 112 MG/DL (ref 65–99)
HCT VFR BLD AUTO: 26.8 % (ref 37–47)
HGB BLD-MCNC: 8.4 G/DL (ref 12–16)
IMM GRANULOCYTES # BLD AUTO: 0.16 K/UL (ref 0–0.11)
IMM GRANULOCYTES NFR BLD AUTO: 1.4 % (ref 0–0.9)
INR PPP: 2.23 (ref 0.87–1.13)
LV EJECT FRACT  99904: 65
LV EJECT FRACT MOD 2C 99903: 60.93
LV EJECT FRACT MOD 4C 99902: 69.9
LV EJECT FRACT MOD BP 99901: 65.36
LYMPHOCYTES # BLD AUTO: 2.68 K/UL (ref 1–4.8)
LYMPHOCYTES NFR BLD: 22.9 % (ref 22–41)
MCH RBC QN AUTO: 29.5 PG (ref 27–33)
MCHC RBC AUTO-ENTMCNC: 31.3 G/DL (ref 33.6–35)
MCV RBC AUTO: 94 FL (ref 81.4–97.8)
MONOCYTES # BLD AUTO: 1.66 K/UL (ref 0–0.85)
MONOCYTES NFR BLD AUTO: 14.2 % (ref 0–13.4)
NEUTROPHILS # BLD AUTO: 5.26 K/UL (ref 2–7.15)
NEUTROPHILS NFR BLD: 44.8 % (ref 44–72)
NRBC # BLD AUTO: 0.08 K/UL
NRBC BLD-RTO: 0.7 /100 WBC
PLATELET # BLD AUTO: 551 K/UL (ref 164–446)
PMV BLD AUTO: 10 FL (ref 9–12.9)
POTASSIUM SERPL-SCNC: 4 MMOL/L (ref 3.6–5.5)
PROTHROMBIN TIME: 24.8 SEC (ref 12–14.6)
RBC # BLD AUTO: 2.85 M/UL (ref 4.2–5.4)
SODIUM SERPL-SCNC: 139 MMOL/L (ref 135–145)
WBC # BLD AUTO: 11.7 K/UL (ref 4.8–10.8)

## 2018-12-13 PROCEDURE — A9270 NON-COVERED ITEM OR SERVICE: HCPCS | Performed by: HOSPITALIST

## 2018-12-13 PROCEDURE — 93306 TTE W/DOPPLER COMPLETE: CPT

## 2018-12-13 PROCEDURE — 700102 HCHG RX REV CODE 250 W/ 637 OVERRIDE(OP): Performed by: FAMILY MEDICINE

## 2018-12-13 PROCEDURE — A9270 NON-COVERED ITEM OR SERVICE: HCPCS | Performed by: FAMILY MEDICINE

## 2018-12-13 PROCEDURE — 700102 HCHG RX REV CODE 250 W/ 637 OVERRIDE(OP): Performed by: HOSPITALIST

## 2018-12-13 PROCEDURE — A9270 NON-COVERED ITEM OR SERVICE: HCPCS | Performed by: RADIOLOGY

## 2018-12-13 PROCEDURE — 82962 GLUCOSE BLOOD TEST: CPT

## 2018-12-13 PROCEDURE — 93306 TTE W/DOPPLER COMPLETE: CPT | Mod: 26 | Performed by: INTERNAL MEDICINE

## 2018-12-13 PROCEDURE — 85025 COMPLETE CBC W/AUTO DIFF WBC: CPT

## 2018-12-13 PROCEDURE — 80048 BASIC METABOLIC PNL TOTAL CA: CPT

## 2018-12-13 PROCEDURE — A9270 NON-COVERED ITEM OR SERVICE: HCPCS | Performed by: INTERNAL MEDICINE

## 2018-12-13 PROCEDURE — 700111 HCHG RX REV CODE 636 W/ 250 OVERRIDE (IP): Performed by: INTERNAL MEDICINE

## 2018-12-13 PROCEDURE — 770006 HCHG ROOM/CARE - MED/SURG/GYN SEMI*

## 2018-12-13 PROCEDURE — 700102 HCHG RX REV CODE 250 W/ 637 OVERRIDE(OP): Performed by: RADIOLOGY

## 2018-12-13 PROCEDURE — 85610 PROTHROMBIN TIME: CPT

## 2018-12-13 PROCEDURE — 99497 ADVNCD CARE PLAN 30 MIN: CPT | Mod: 25 | Performed by: HOSPITALIST

## 2018-12-13 PROCEDURE — 700102 HCHG RX REV CODE 250 W/ 637 OVERRIDE(OP): Performed by: INTERNAL MEDICINE

## 2018-12-13 PROCEDURE — 85730 THROMBOPLASTIN TIME PARTIAL: CPT

## 2018-12-13 PROCEDURE — 700111 HCHG RX REV CODE 636 W/ 250 OVERRIDE (IP): Performed by: HOSPITALIST

## 2018-12-13 PROCEDURE — 99232 SBSQ HOSP IP/OBS MODERATE 35: CPT | Performed by: HOSPITALIST

## 2018-12-13 RX ORDER — AMLODIPINE BESYLATE 5 MG/1
5 TABLET ORAL
Status: DISCONTINUED | OUTPATIENT
Start: 2018-12-14 | End: 2018-12-14

## 2018-12-13 RX ADMIN — FUROSEMIDE 20 MG: 10 INJECTION, SOLUTION INTRAVENOUS at 15:58

## 2018-12-13 RX ADMIN — GABAPENTIN 300 MG: 300 CAPSULE ORAL at 06:42

## 2018-12-13 RX ADMIN — FUROSEMIDE 20 MG: 10 INJECTION, SOLUTION INTRAVENOUS at 06:43

## 2018-12-13 RX ADMIN — TRAZODONE HYDROCHLORIDE 100 MG: 100 TABLET ORAL at 21:44

## 2018-12-13 RX ADMIN — POTASSIUM CHLORIDE 40 MEQ: 1500 TABLET, EXTENDED RELEASE ORAL at 06:42

## 2018-12-13 RX ADMIN — AMLODIPINE BESYLATE 10 MG: 10 TABLET ORAL at 06:42

## 2018-12-13 RX ADMIN — WARFARIN SODIUM 7.5 MG: 7.5 TABLET ORAL at 17:12

## 2018-12-13 RX ADMIN — ATORVASTATIN CALCIUM 80 MG: 40 TABLET, FILM COATED ORAL at 17:12

## 2018-12-13 RX ADMIN — ACETAMINOPHEN 650 MG: 325 TABLET, FILM COATED ORAL at 13:05

## 2018-12-13 RX ADMIN — STANDARDIZED SENNA CONCENTRATE AND DOCUSATE SODIUM 2 TABLET: 8.6; 5 TABLET, FILM COATED ORAL at 17:12

## 2018-12-13 RX ADMIN — OXYCODONE HYDROCHLORIDE 5 MG: 5 TABLET ORAL at 15:58

## 2018-12-13 RX ADMIN — LEVOTHYROXINE SODIUM 150 MCG: 150 TABLET ORAL at 06:42

## 2018-12-13 RX ADMIN — CLOPIDOGREL 75 MG: 75 TABLET, FILM COATED ORAL at 06:42

## 2018-12-13 RX ADMIN — Medication 500 MG: at 17:12

## 2018-12-13 RX ADMIN — Medication 500 MG: at 08:38

## 2018-12-13 RX ADMIN — HEPARIN SODIUM 650 UNITS/HR: 5000 INJECTION, SOLUTION INTRAVENOUS at 07:54

## 2018-12-13 RX ADMIN — OXYCODONE HYDROCHLORIDE 5 MG: 5 TABLET ORAL at 08:53

## 2018-12-13 RX ADMIN — LOSARTAN POTASSIUM 25 MG: 25 TABLET, FILM COATED ORAL at 06:42

## 2018-12-13 RX ADMIN — GABAPENTIN 300 MG: 300 CAPSULE ORAL at 21:44

## 2018-12-13 RX ADMIN — GABAPENTIN 300 MG: 300 CAPSULE ORAL at 13:05

## 2018-12-13 RX ADMIN — IRON SUCROSE 200 MG: 20 INJECTION, SOLUTION INTRAVENOUS at 06:42

## 2018-12-13 ASSESSMENT — ENCOUNTER SYMPTOMS
COUGH: 0
FLANK PAIN: 0
POLYDIPSIA: 0
CHILLS: 0
VOMITING: 0
FEVER: 0
EYE DISCHARGE: 0
PHOTOPHOBIA: 0
HALLUCINATIONS: 0
DOUBLE VISION: 0
WEAKNESS: 1
LOSS OF CONSCIOUSNESS: 0
SEIZURES: 0
HEMOPTYSIS: 0
PALPITATIONS: 0
SHORTNESS OF BREATH: 1
MYALGIAS: 1
EYE PAIN: 0
NERVOUS/ANXIOUS: 0
DIARRHEA: 0
SORE THROAT: 0
SPUTUM PRODUCTION: 0
ABDOMINAL PAIN: 0
FALLS: 0
BLOOD IN STOOL: 0
BLURRED VISION: 0
SPEECH CHANGE: 0
STRIDOR: 0
BRUISES/BLEEDS EASILY: 0

## 2018-12-13 ASSESSMENT — PAIN SCALES - GENERAL
PAINLEVEL_OUTOF10: 3
PAINLEVEL_OUTOF10: 0
PAINLEVEL_OUTOF10: 7
PAINLEVEL_OUTOF10: 6
PAINLEVEL_OUTOF10: 3

## 2018-12-13 NOTE — PROGRESS NOTES
Hospital Medicine Daily Progress Note    Date of Service  12/13/2018    Chief Complaint  Left leg pain and swelling     Hospital Course      78 y.o. Female history of A. fib, DVT/PE on Coumadin, recently had her Coumadin stopped for dental procedure 11/14 through 11/22, hypertension, obesity, diabetes admitted Dec 1st 2018 with increasing left leg pain and swelling.  Diagnosed with acute thrombosis left lower extremity with high clot burden.  Post localized tPA, mechanical thrombolysis thrombectomy with left iliac stent placement to continue on Plavix and warfarin .         Interval Problem Update  Hemodynamically stable overnight, still requiring 6L O2  Repeat chest x-ray still shows interstitial edema  Echo done, not read yet, pending report  INR therapeutic 2.23   K 4, replacing  On anticoagulation with warfarin, completed bridging with heparin, also on Plavix   Central line removed.   Had a prolonged discussion with the patient regarding advanced care planning and CODE STATUS we discussed her prognosis and comorbidities. Spent 35 minutes in addition to the time spent managing the other medical problems.  Patient has extensive deep vein thrombosis with a large clot burden, pulmonary embolism, and pulmonary hypertension currently on 6 L of oxygen and has quite severe deconditioning we discussed considering changing her CODE STATUS to DNR, the patient understand her multiple comorbid conditions however would like to discuss making this decision with her daughter Emelia.  OT recommending SNF    Consultants/Specialty  Critical care/pulmonary  Interventional radiology    Code Status  FULL     Disposition  SNF     Review of Systems  Review of Systems   Constitutional: Positive for malaise/fatigue. Negative for chills and fever.   HENT: Negative for congestion, ear discharge, ear pain, sore throat and tinnitus.    Eyes: Negative for blurred vision, double vision, photophobia, pain and discharge.   Respiratory: Positive  for shortness of breath. Negative for cough, hemoptysis, sputum production and stridor.    Cardiovascular: Positive for leg swelling. Negative for chest pain and palpitations.   Gastrointestinal: Negative for abdominal pain, blood in stool, diarrhea and vomiting.   Genitourinary: Negative for flank pain, hematuria and urgency.   Musculoskeletal: Positive for joint pain and myalgias. Negative for falls.   Skin: Negative.    Neurological: Positive for weakness. Negative for speech change, seizures and loss of consciousness.   Endo/Heme/Allergies: Negative for polydipsia. Does not bruise/bleed easily.   Psychiatric/Behavioral: Negative for hallucinations and suicidal ideas. The patient is not nervous/anxious.         Physical Exam  Temp:  [36.3 °C (97.3 °F)-36.7 °C (98 °F)] 36.3 °C (97.3 °F)  Pulse:  [60-71] 63  Resp:  [16-18] 18  BP: ()/(41-61) 92/41    Physical Exam   Constitutional: She is oriented to person, place, and time. She appears well-developed and well-nourished. No distress.   Morbid obesity    HENT:   Head: Normocephalic and atraumatic.   Right Ear: External ear normal.   Left Ear: External ear normal.   Eyes: Pupils are equal, round, and reactive to light. Conjunctivae are normal. Right eye exhibits no discharge. Left eye exhibits no discharge.   Neck: Normal range of motion. Neck supple. No JVD present. No tracheal deviation present. No thyromegaly present.   Cardiovascular: Exam reveals no gallop and no friction rub.    No murmur heard.  Pulmonary/Chest: Effort normal and breath sounds normal. No stridor. No respiratory distress. She has no wheezes. She has no rales. She exhibits no tenderness.   Abdominal: Soft. Bowel sounds are normal. She exhibits no distension. There is no tenderness. There is no rebound and no guarding.   Musculoskeletal: Normal range of motion. She exhibits edema (marked left LE). She exhibits no tenderness or deformity.   Neurological: She is alert and oriented to person,  place, and time. No cranial nerve deficit. Coordination normal.   Skin: Skin is warm and dry. She is not diaphoretic. No erythema. No pallor.   Psychiatric: She has a normal mood and affect. Judgment normal.   Nursing note and vitals reviewed.      Fluids    Intake/Output Summary (Last 24 hours) at 12/13/18 1624  Last data filed at 12/13/18 1440   Gross per 24 hour   Intake              972 ml   Output                0 ml   Net              972 ml       Laboratory  Recent Labs      12/11/18   0502  12/12/18   0050  12/13/18   0640   WBC  10.9*  12.6*  11.7*   RBC  2.78*  2.71*  2.85*   HEMOGLOBIN  8.3*  8.1*  8.4*   HEMATOCRIT  25.4*  24.9*  26.8*   MCV  91.4  91.9  94.0   MCH  29.9  29.9  29.5   MCHC  32.7*  32.5*  31.3*   RDW  51.4*  51.6*  54.6*   PLATELETCT  427  483*  551*   MPV  10.4  11.0  10.0     Recent Labs      12/11/18   0502  12/12/18   0050  12/13/18   0640   SODIUM  140  140  139   POTASSIUM  3.4*  3.5*  4.0   CHLORIDE  99  100  101   CO2  30  35*  35*   GLUCOSE  118*  160*  112*   BUN  10  10  8   CREATININE  0.86  1.07  0.90   CALCIUM  8.3*  8.4*  8.1*     Recent Labs      12/11/18   0502   12/12/18   0050  12/12/18   0700  12/13/18   0640   APTT  98.6*   < >  65.4*  76.0*  102.9*   INR  1.73*   --   2.15*   --   2.23*    < > = values in this interval not displayed.               Imaging  EC-ECHOCARDIOGRAM COMPLETE W/O CONT         IR-ANGIO THROUGH EXISTING CATHETER   Final Result      1.  Nonflow limiting residual thrombus in the left iliac veins.   2.  AngioJet thrombolyzes of residual iliac in-stent thrombus.   3.  The final venogram demonstrates good antegrade flow through the left femoral and iliac veins into the IVC.      DX-CHEST-LIMITED (1 VIEW)   Final Result         Persistent bibasilar airspace disease.      DX-WRIST-LIMITED 2- LEFT   Final Result      1.  Demineralization and degenerative change without definite acute osseous abnormality.   2.  Soft tissue calcification at the radial  aspect of the third metatarsophalangeal joint which may be related to chondrocalcinosis or chronic posttraumatic change.      IR-ANGIO THROUGH EXISTING CATHETER   Final Result      1.  Reocclusion of the LEFT lower extremity and LEFT pelvic veins with acute clot   2.  Partial resolution following from pharmacomechanical mechanical thrombolysis   3.  Successful placement of a lysis catheter      Plan:  TPA at 0.5 mg an hour through the lysis catheter. Heparin solution at 500 units an hour through the popliteal sheath. Reevaluation tomorrow.               IR-EXTREMITY VENOGRAM-UNILATERAL LEFT   Final Result      1.  Reocclusion of the LEFT lower extremity and LEFT pelvic veins with acute clot   2.  Partial resolution following from occult mechanical thrombolysis   3.  Successful placement of a lysis catheter      Plan:  TPA at 0.5 mg an hour through the lysis catheter. Heparin solution at 500 units an hour through the popliteal sheath. Reevaluation tomorrow. Findings were discussed with Dr. Efrain Cox on 12/6/2018 at the conclusion of the procedure.            IR-EXTREMITY ANGIOGRAM-UNILATERAL LEFT   Final Result      1.  Slight interval improvement in lower extremity clot burden following second 24 hours of lysis   2.  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  Successful deployment of LEFT common iliac, external iliac and common femoral venous stents with restoration of in-line flow      Plan:  The patient is allergic to aspirin. A loading dose of Plavix as well as a daily dose of Plavix has been ordered. She should remain on therapeutic levels of anticoagulation for treatment of her DVT. This was discussed with Dr. Cox following the    conclusion of the procedure.         DX-CHEST-PORTABLE (1 VIEW)   Final Result      Right central line projects over the SVC. No pneumothorax.      Interstitial edema.      Bibasilar opacities likely represent atelectasis.      Atherosclerotic  plaque.         DX-CHEST-PORTABLE (1 VIEW)   Final Result      Mild pulmonary edema with dependent atelectasis or edema and probable small effusions.      IR-EXTREMITY VENOGRAM-UNILATERAL LEFT   Final Result      1.  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    Lower IVC is widely patent.      2.  Initiation of continuous overnight venous TPA infusion via multi-sidehole infusion catheter spanning from the left popliteal vein-femoral vein junction across the entire iliofemoral system with the catheter tip in the IVC.      3.  Plan is for overnight TPA infusion. Follow-up venogram tomorrow 12/3/2018, afternoon. Anticipate left iliac system and venous stenting for chronic occlusion. May need additional TPA infusion and/or mechanical thrombectomy with the goal of achieving    in-line flow in the left iliofemoral system.                     INTERPRETING LOCATION: [address] [place] [address], [place], [address]      US-EXTREMITY VENOUS LOWER UNILAT LEFT   Final Result      IR-MIDLINE CATHETER INSERTION >5 YRS poor veins, serial lab draws    (Results Pending)        Assessment/Plan  * Acute deep vein thrombosis (DVT) of femoral vein of left lower extremity (HCC)- (present on admission)   Assessment & Plan    Post localized tPA, mechanical thrombolysis,  thrombectomy w left iliac stent placement Dec 4 & Dec 6 and venograms.  Decrease pain and swelling of left leg  Continue with Plavix post stent.   Warfarin & Plavix for stent      Acute respiratory failure with hypoxia (HCC)- (present on admission)   Assessment & Plan    Chest x-ray with edema  Continue RT protocol, Deep inspiratory efforts/encourage IS   Continue IV Lasix diuresis.    O2 nasal cannula support and wean FiO2 as tolerated.     Checking Echo      Atrial fibrillation (HCC)-  (present on admission)   Assessment & Plan    Controlled, sinus rhythm  Warfarin      Tobacco abuse counseling- (present on admission)   Assessment & Plan    Spent approx 4 mins on Tobacco cessation education.    Discussed the benefits of quitting smoking and risks of continued smoking including cardiovascular disease, cancer and COPD.  Discussed options of nicotine patch, medical treatment with wellbutrin and chantix.        Delirium   Assessment & Plan    Initially developed in ICU and observed on medical floor. Improved.   Normal FT4 despite elevated TSH-suggest subclinical hypothyroidism-recommend follow-up thyroid function 6 weeks.    Maintain circadian rhythm try to keep awake during the day  Continue trazodone evening to improve sleep.    Minimize narcotics as much as possible.       Anemia- (present on admission)   Assessment & Plan    Chronic, workup consistent with iron deficiency.    IV iron    Conservative transfusion if hemoglobin < 7 or symptoms       DM (diabetes mellitus) (HCC)- (present on admission)   Assessment & Plan    Fair control . HgbA1c:6.1 in past 3 months.   BS has been controlled    Continue monitor accuchecks and cover with SSI      Essential hypertension- (present on admission)   Assessment & Plan    Controlled  Continue amlodipine and losartan with hold parameters for lower BPs   Monitor         Left wrist pain   Assessment & Plan    Xray no fx.   May be strain.  Pseudogout,  arthritic.   Supportive treatment.     X-ray left wrist   1.  Demineralization and degenerative change without definite acute osseous abnormality.  2.  Soft tissue calcification at the radial aspect of the third metatarsophalangeal joint which may be related to chondrocalcinosis or chronic posttraumatic change.      Hematuria   Assessment & Plan    Monitoring CBC  Resolved 12/8       Obesity (BMI 30-39.9)- (present on admission)   Assessment & Plan    Body mass index is 36.86 kg/m².  Counseling provided         Hypomagnesemia- (present on admission)   Assessment & Plan    CTM replace as needed              Advanced care planning/counseling discussion  Patient has extensive deep vein thrombosis with a large clot burden, pulmonary embolism, and pulmonary hypertension currently on 6 L of oxygen and has quite severe deconditioning we discussed considering changing her CODE STATUS to DNR, the patient understand her multiple comorbid conditions however would like to discuss making this decision with her daughter Emelia      VTE prophylaxis: Heparin drip, warfarin

## 2018-12-13 NOTE — CARE PLAN
Problem: Communication  Goal: The ability to communicate needs accurately and effectively will improve  Outcome: PROGRESSING AS EXPECTED  Educate the pt on the use of call light to call for assistance    Problem: Respiratory:  Goal: Respiratory status will improve  Outcome: PROGRESSING AS EXPECTED  O2 will be utilized via face mask to keep the pt's O2 sat above 90%

## 2018-12-13 NOTE — PROGRESS NOTES
Pt complained of pain in her LLE, pain medication was given. Pt tolerated it well and verbalized relief. Pt's O2 sat dropped as soon as she took her mask off. Educate the pt on the importance of keeping her oxygen mask on at all time. Will continue to monitor the pt closely

## 2018-12-13 NOTE — PROGRESS NOTES
· 2 RN skin check complete with ROSE Rinaldi.  · Devices in place: none  · Skin assessed under devices: not applicable  · No pressure ulcer noted  · The following interventions in place: waffle cushion, every 2 hours turn-patient needs reminder, barrier paste as needed.

## 2018-12-13 NOTE — PROGRESS NOTES
Late Entry:    Assumed care of pt at 0700. Pt had just been cleaned up w/ full linen change by NOC CNA from an urinary incontinence episode. During bedside report pt stated she had to void again, pt was again soaked. Changed pt's linens and provided perineal care w/ Day TOMMY and Zaki ROSE who also provided us w/ a skin assessment, where pt's perineal area was found to be very red. Q2 turns initiated at start of shift along w/ skin preventative interventions (See two RN skin note for further details).    Also noted at start of shift was pt's increase in oxygen demands. She was still on a nasal cannula at 6L barely saturating up to 89%. Face mask applied and oxygen increased. (See respiratory care plan for further oxygen demand details). Pt needing continual education all day to keep oxygen on, her occasional confusion most likely r/t decrease in oxygen saturation. She can be A&O x3-4, but then very confused at times as well.    Pt's heparin drip is therapeutic. Left leg still very swollen and painful. Medicated w/ PRN oxycodone when MAR allowed. Pt is also stating her left arm has become more painful and numb. Her  strength is indeed less than her right and pt says it was not like this prior to this hospitalization. Pt's friend in to visit confirms this statement. No redness or swelling to arm noted, will f/u w/ hospitalist.     Lastly, although pt very immobile in bed and sliding down frequently. She was able to get OOB to ambulate to BR twice this shift to have a BM. She is unsteady and needs two staff for safety reasons, but it should be noted that she is able to walk. In relation, pt is a high fall risk and all high fall risk precautions are in place.

## 2018-12-13 NOTE — DISCHARGE PLANNING
Agency/Facility Name: [facility]  Spoke To: Kenan  Outcome: Patient accepted, pending bed availability.

## 2018-12-13 NOTE — PROGRESS NOTES
· 2 RN skin check complete.   · Sacral area is red/excoriated from incontinence but still blanchable. Heels are red. LLE is swollen and red.  · The following interventions in place waffle cushion, q2h turns

## 2018-12-13 NOTE — CARE PLAN
"Problem: Bowel/Gastric:  Goal: Normal bowel function is maintained or improved  Outcome: PROGRESSING SLOWER THAN EXPECTED  LBM charted 12/9, pt stated she feels constipated. PRN milk of magnesia given w/ 3 BMs (2 continent and 1 incontinent) produced.    Problem: Respiratory:  Goal: Respiratory status will improve    Intervention: Administer and titrate oxygen therapy  Pt's oxygen demands greatly increasing today. Very difficult to get pt at a specific oxygen liter level. Pt can barely maintain 90% on 6L of oxygen via face mask. At times she will decreased to 83-84% on 6L via face mask, other times she will be 92-93% at 6L. Pt laying in bed for all of these oxygen assessments. When pt desaturates to 83-84% will increase her oxygen demands sometimes up to 8L until she regains therapeutic levels b/w 90-93%. Hospitalist aware, CXR, Echo and IS ordered.    Pt states that she does smoke at home but does has been decreasing her intake to 2-3 cigarettes a day, but does not wish to stop smoking. When told she will most likely need to wear oxygen at all times even after the hospital pt states \"I won't do that.\"        "

## 2018-12-13 NOTE — PROGRESS NOTES
Received report, assumed care at 0700. Patient is alert and oriented to person, place and time, still on Heparin drip and is on 6 L of O2 with a saturation of 91 %. No new complaint at this time. PTT is ordered per protocol.

## 2018-12-13 NOTE — PROGRESS NOTES
Two RN skin check done w/ Zaki.    Pt's sacral region is red/excoriated but blanching. Pt is incontinent therefore mepilex not applied. Waffle overlay mattress applied.    Bilateral heels red as well, float boots applied. Popliteal region of pt's left leg is swollen and has a pale/red 2 cm wound seen underneath a very old dressing. Old brown drainage to dressing, but no new drainage present. Dressing removed, wound left SHRUTHI and leg elevated up on pillow.    Pt does wear oxygen but is using a face mask, no skin breakdown noted underneath any tubing.

## 2018-12-13 NOTE — PROGRESS NOTES
Inpatient Anticoagulation Service Note    Date: 12/13/2018  Reason for Anticoagulation: Deep Vein Thrombosis        Hemoglobin Value: (!) 8.4  Hematocrit Value: (!) 26.8  Lab Platelet Value: (!) 551  Target INR: 2.0 to 3.0    INR from last 7 days     Date/Time INR Value    12/13/18 0640 (!)  2.23    12/12/18 0050 (!)  2.15    12/11/18 0502 (!)  1.73    12/10/18 0504 (!)  1.59    12/09/18 0447 (!)  1.33    12/08/18 0500 (!)  1.26    12/07/18 1656 (!)  1.24    12/07/18 0800 (!)  1.35        Dose from last 7 days     Date/Time Dose (mg)    12/13/18 1300  7.5    12/12/18 0050  5    12/11/18 1300  7.5    12/10/18 0504  5    12/09/18 1000  7.5    12/08/18 1300  7.5    12/07/18 1200  7.5        Average Dose (mg):  (Reported home dose: 5 mg MWF, 7.5 mg ROW)  Significant Interactions: Statin, Clopidogrel, Thyroid Medications  Bridge Therapy: Yes (Heparin weight based protocol; completed 12/13)  Date of Last VTE Event: 11/25/18 (Previously admitted and bridged to therapeutic Coumadin)  Bridge Therapy Start Date: 12/01/18  Days of Overlap Therapy: 6   INR Value Greater than 2 Prior to Discontinuation of Parenteral Anticoagulation: Not Applicable     Reversal Agent Administered: Not Applicable  Comments: INR therapeutic x 2 days on Day 6 of HWBP.  Okay to discontinued bridge.  H/H low but stable and no s/sx of bleeding per chart review.  No new DDI.  Will continue Warfarin 7.5mg today and INR in AM.    Plan:  Warfarin 7.5mg.  INR in AM.  Education Material Provided?: No (chronic warfarin patient)  Pharmacist suggested discharge dosing: Resume home regimen of 5 mg MWF, 7.5 mg ROW with prompt follow up within 72 hours of discharge.     Nahid Walters

## 2018-12-13 NOTE — CARE PLAN
"Problem: Respiratory:  Goal: Respiratory status will improve    Intervention: Educate and encourage incentive spirometry usage  Pt taught and encouraged to use IS this evening, pt stated \"I've done that before, I'm not doing that right now.\" IS left on bedside table will communicate need to encourage to NOC RN/CNA.        "

## 2018-12-14 VITALS
TEMPERATURE: 98 F | HEART RATE: 60 BPM | RESPIRATION RATE: 18 BRPM | OXYGEN SATURATION: 91 % | HEIGHT: 64 IN | WEIGHT: 235.45 LBS | DIASTOLIC BLOOD PRESSURE: 45 MMHG | BODY MASS INDEX: 40.2 KG/M2 | SYSTOLIC BLOOD PRESSURE: 117 MMHG

## 2018-12-14 LAB
BASOPHILS # BLD AUTO: 0.7 % (ref 0–1.8)
BASOPHILS # BLD: 0.08 K/UL (ref 0–0.12)
EOSINOPHIL # BLD AUTO: 1.86 K/UL (ref 0–0.51)
EOSINOPHIL NFR BLD: 17.4 % (ref 0–6.9)
ERYTHROCYTE [DISTWIDTH] IN BLOOD BY AUTOMATED COUNT: 55.8 FL (ref 35.9–50)
GLUCOSE BLD-MCNC: 125 MG/DL (ref 65–99)
GLUCOSE BLD-MCNC: 137 MG/DL (ref 65–99)
HCT VFR BLD AUTO: 28.6 % (ref 37–47)
HGB BLD-MCNC: 8.7 G/DL (ref 12–16)
IMM GRANULOCYTES # BLD AUTO: 0.14 K/UL (ref 0–0.11)
IMM GRANULOCYTES NFR BLD AUTO: 1.3 % (ref 0–0.9)
INR PPP: 2.23 (ref 0.87–1.13)
LYMPHOCYTES # BLD AUTO: 2.3 K/UL (ref 1–4.8)
LYMPHOCYTES NFR BLD: 21.6 % (ref 22–41)
MCH RBC QN AUTO: 29.3 PG (ref 27–33)
MCHC RBC AUTO-ENTMCNC: 30.4 G/DL (ref 33.6–35)
MCV RBC AUTO: 96.3 FL (ref 81.4–97.8)
MONOCYTES # BLD AUTO: 1.51 K/UL (ref 0–0.85)
MONOCYTES NFR BLD AUTO: 14.2 % (ref 0–13.4)
NEUTROPHILS # BLD AUTO: 4.78 K/UL (ref 2–7.15)
NEUTROPHILS NFR BLD: 44.8 % (ref 44–72)
NRBC # BLD AUTO: 0.06 K/UL
NRBC BLD-RTO: 0.6 /100 WBC
PLATELET # BLD AUTO: 620 K/UL (ref 164–446)
PMV BLD AUTO: 10.6 FL (ref 9–12.9)
PROTHROMBIN TIME: 24.8 SEC (ref 12–14.6)
RBC # BLD AUTO: 2.97 M/UL (ref 4.2–5.4)
WBC # BLD AUTO: 10.7 K/UL (ref 4.8–10.8)

## 2018-12-14 PROCEDURE — 700102 HCHG RX REV CODE 250 W/ 637 OVERRIDE(OP): Performed by: FAMILY MEDICINE

## 2018-12-14 PROCEDURE — 36415 COLL VENOUS BLD VENIPUNCTURE: CPT

## 2018-12-14 PROCEDURE — 99239 HOSP IP/OBS DSCHRG MGMT >30: CPT | Performed by: HOSPITALIST

## 2018-12-14 PROCEDURE — A9270 NON-COVERED ITEM OR SERVICE: HCPCS | Performed by: HOSPITALIST

## 2018-12-14 PROCEDURE — 85025 COMPLETE CBC W/AUTO DIFF WBC: CPT

## 2018-12-14 PROCEDURE — 85610 PROTHROMBIN TIME: CPT

## 2018-12-14 PROCEDURE — 82962 GLUCOSE BLOOD TEST: CPT

## 2018-12-14 PROCEDURE — 700102 HCHG RX REV CODE 250 W/ 637 OVERRIDE(OP): Performed by: HOSPITALIST

## 2018-12-14 PROCEDURE — 700102 HCHG RX REV CODE 250 W/ 637 OVERRIDE(OP): Performed by: INTERNAL MEDICINE

## 2018-12-14 PROCEDURE — A9270 NON-COVERED ITEM OR SERVICE: HCPCS | Performed by: RADIOLOGY

## 2018-12-14 PROCEDURE — A9270 NON-COVERED ITEM OR SERVICE: HCPCS | Performed by: FAMILY MEDICINE

## 2018-12-14 PROCEDURE — 700102 HCHG RX REV CODE 250 W/ 637 OVERRIDE(OP): Performed by: RADIOLOGY

## 2018-12-14 PROCEDURE — 700111 HCHG RX REV CODE 636 W/ 250 OVERRIDE (IP): Performed by: HOSPITALIST

## 2018-12-14 PROCEDURE — A9270 NON-COVERED ITEM OR SERVICE: HCPCS | Performed by: INTERNAL MEDICINE

## 2018-12-14 RX ORDER — POLYETHYLENE GLYCOL 3350 17 G/17G
17 POWDER, FOR SOLUTION ORAL
Refills: 3
Start: 2018-12-14 | End: 2019-03-06

## 2018-12-14 RX ORDER — BISACODYL 10 MG
10 SUPPOSITORY, RECTAL RECTAL
Refills: 0
Start: 2018-12-14 | End: 2019-03-06

## 2018-12-14 RX ORDER — CLOPIDOGREL BISULFATE 75 MG/1
75 TABLET ORAL DAILY
Qty: 30 TAB
Start: 2018-12-15

## 2018-12-14 RX ORDER — GABAPENTIN 300 MG/1
300 CAPSULE ORAL EVERY 8 HOURS
Qty: 90 CAP
Start: 2018-12-14 | End: 2019-03-26 | Stop reason: SDUPTHER

## 2018-12-14 RX ORDER — FUROSEMIDE 40 MG/1
40 TABLET ORAL 2 TIMES DAILY
Qty: 30 TAB
Start: 2018-12-14 | End: 2019-03-06 | Stop reason: SDUPTHER

## 2018-12-14 RX ORDER — FUROSEMIDE 40 MG/1
40 TABLET ORAL
Status: DISCONTINUED | OUTPATIENT
Start: 2018-12-14 | End: 2018-12-14 | Stop reason: HOSPADM

## 2018-12-14 RX ORDER — AMOXICILLIN 250 MG
2 CAPSULE ORAL 2 TIMES DAILY
Qty: 30 TAB | Refills: 0
Start: 2018-12-14

## 2018-12-14 RX ORDER — AMLODIPINE BESYLATE 2.5 MG/1
2.5 TABLET ORAL DAILY
Qty: 30 TAB
Start: 2018-12-15 | End: 2019-06-12

## 2018-12-14 RX ORDER — ACETAMINOPHEN 325 MG/1
650 TABLET ORAL EVERY 6 HOURS PRN
Qty: 30 TAB | Refills: 0
Start: 2018-12-14 | End: 2019-06-12

## 2018-12-14 RX ORDER — OXYCODONE HYDROCHLORIDE 5 MG/1
5-10 TABLET ORAL EVERY 4 HOURS PRN
Qty: 30 TAB | Refills: 0
Start: 2018-12-14 | End: 2018-12-21

## 2018-12-14 RX ORDER — AMLODIPINE BESYLATE 5 MG/1
2.5 TABLET ORAL
Status: DISCONTINUED | OUTPATIENT
Start: 2018-12-15 | End: 2018-12-14 | Stop reason: HOSPADM

## 2018-12-14 RX ADMIN — Medication 500 MG: at 08:31

## 2018-12-14 RX ADMIN — CLOPIDOGREL 75 MG: 75 TABLET, FILM COATED ORAL at 05:36

## 2018-12-14 RX ADMIN — LOSARTAN POTASSIUM 25 MG: 25 TABLET, FILM COATED ORAL at 05:36

## 2018-12-14 RX ADMIN — LEVOTHYROXINE SODIUM 150 MCG: 150 TABLET ORAL at 08:31

## 2018-12-14 RX ADMIN — POTASSIUM CHLORIDE 20 MEQ: 1500 TABLET, EXTENDED RELEASE ORAL at 05:36

## 2018-12-14 RX ADMIN — STANDARDIZED SENNA CONCENTRATE AND DOCUSATE SODIUM 2 TABLET: 8.6; 5 TABLET, FILM COATED ORAL at 05:36

## 2018-12-14 RX ADMIN — GABAPENTIN 300 MG: 300 CAPSULE ORAL at 05:36

## 2018-12-14 RX ADMIN — FUROSEMIDE 20 MG: 10 INJECTION, SOLUTION INTRAVENOUS at 05:32

## 2018-12-14 RX ADMIN — IRON SUCROSE 200 MG: 20 INJECTION, SOLUTION INTRAVENOUS at 05:33

## 2018-12-14 RX ADMIN — AMLODIPINE BESYLATE 5 MG: 5 TABLET ORAL at 05:36

## 2018-12-14 ASSESSMENT — PAIN SCALES - GENERAL: PAINLEVEL_OUTOF10: 0

## 2018-12-14 NOTE — DISCHARGE PLANNING
Agency/Facility Name: [facility]  Spoke To: Prema  Outcome: Bed available today. Transport set up for 1330 today.    BON medel.

## 2018-12-14 NOTE — PROGRESS NOTES
Patient placed on 5 L of nasal cannula and O2 saturation is stable at 90-92%. Heparin infusion discontinued. CVC also discontinued this afternoon. No bleeding noted.

## 2018-12-14 NOTE — DISCHARGE PLANNING
Medical Social Work  Faxed transport communication form to [facility] for a tentative transport time of 1:30 to [place]

## 2018-12-14 NOTE — DISCHARGE SUMMARY
Discharge Summary    CHIEF COMPLAINT ON ADMISSION  Chief Complaint   Patient presents with   • Leg Pain   • Leg Swelling       Reason for Admission  Left leg pain     Admission Date  12/1/2018    CODE STATUS  Full Code    HPI & HOSPITAL COURSE  78 years old female  with a past medical history of atrial fibrillation, DVT/PE on Coumadin, recently had her Coumadin stopped for dental procedure 11/14 through 11/22, hypertension, obesity, diabetes admitted Dec 1st 2018 with increasing left leg pain and swelling.  Diagnosed with acute thrombosis left lower extremity with high clot burden.  Post localized tPA, mechanical thrombolysis thrombectomy with left iliac stent placement to continue on Plavix and warfarin.   She was also found to have hypoxemic respiratory failure requiring 5-6 L of oxygen, her echo was consistent with pulmonary hypertension in the range of 55 mm Hg. with an ejection fraction of 65%, Mild concentric left ventricular hypertrophy, moderate aortic stenosis.  Her oxygen requirement improved with diuretics, will need to have follow-up with cardiology and pulmonary as outpatient.  Her blood sugars were initially elevated requiring minimum insulin however she improved with no coverage needed.  Physical and occupational therapy evaluation recommended skilled nursing facility placement. Therefore, she is discharged in guarded and stable condition to skilled nursing facility.  The patient met 2-midnight criteria for an inpatient stay at the time of discharge.    Discharge Date  12/14/2018     FOLLOW UP ITEMS POST DISCHARGE  Follow-up with primary care physician, cardiology and pulmonary as outpatient for pulmonary hypertension.     DISCHARGE DIAGNOSES  Principal Problem:    Acute deep vein thrombosis (DVT) of femoral vein of left lower extremity (HCC) POA: Yes  Active Problems:    Atrial fibrillation (HCC) POA: Yes    Acute respiratory failure with hypoxia (HCC) POA: Yes    Essential hypertension POA: Yes     DM (diabetes mellitus) (HCC) POA: Yes    Anemia POA: Yes    Delirium POA: No      Overview: Waxing and waning mental status from being sick in the ICU, poor sleep, no       mobility.             Treatment is mobility, sleep, transfer out of ICU, avoid sedatives    Tobacco abuse counseling POA: Yes    Hypomagnesemia POA: Yes    Obesity (BMI 30-39.9) POA: Yes    Hematuria POA: Yes    Left wrist pain POA: Yes  Resolved Problems:    * No resolved hospital problems. *      FOLLOW UP  Follow-up with primary care physician, cardiology and pulmonary as outpatient.     Future Appointments  Date Time Provider Department Center   12/26/2018 10:00 AM [facility] EXAM 4 [facility] None   1/16/2019 1:00 PM WENDY Baker [address]   3/13/2019 10:00 AM VASCULAR NURSE PRACTITIONER [facility] None     MEDICATIONS ON DISCHARGE     Medication List      START taking these medications      Instructions   acetaminophen 325 MG Tabs  Commonly known as:  TYLENOL   Take 2 Tabs by mouth every 6 hours as needed (Mild Pain; (Pain scale 1-3); Temp greater than 100.5 F).  Dose:  650 mg     bisacodyl 10 MG Supp  Commonly known as:  DULCOLAX   Insert 1 Suppository in rectum 1 time daily as needed (if magnesium hydroxide ineffective after 24 hours).  Dose:  10 mg     clopidogrel 75 MG Tabs  Start taking on:  12/15/2018  Commonly known as:  PLAVIX   Take 1 Tab by mouth every day.  Dose:  75 mg     furosemide 40 MG Tabs  Commonly known as:  LASIX   Take 1 Tab by mouth 2 Times a Day.  Dose:  40 mg     gabapentin 300 MG Caps  Commonly known as:  NEURONTIN   Take 1 Cap by mouth every 8 hours.  Dose:  300 mg     magnesium hydroxide 400 MG/5ML Susp  Commonly known as:  MILK OF MAGNESIA   Take 30 mL by mouth 1 time daily as needed (if polyethylene glycol ineffective after 24 hours).  Dose:  30 mL     polyethylene glycol/lytes Pack  Commonly known as:  MIRALAX   Take 1 Packet by mouth 1 time daily as needed (if sennosides and docusate ineffective after 24  hours).  Dose:  17 g     senna-docusate 8.6-50 MG Tabs  Commonly known as:  PERICOLACE or SENOKOT S   Take 2 Tabs by mouth 2 Times a Day.  Dose:  2 Tab        CHANGE how you take these medications      Instructions   amLODIPine 2.5 MG Tabs  Start taking on:  12/15/2018  What changed:  · medication strength  · how much to take  · when to take this  Commonly known as:  NORVASC   Take 1 Tab by mouth every day.  Dose:  2.5 mg        CONTINUE taking these medications      Instructions   atorvastatin 80 MG tablet  Commonly known as:  LIPITOR   TAKE ONE-HALF TABLET BY MOUTH EVERY EVENING     calcium carbonate 500 MG Tabs  Commonly known as:  OS-MARKELL 500   TAKE ONE TABLET BY MOUTH TWICE DAILY WITH MEALS     levothyroxine 150 MCG Tabs  Commonly known as:  SYNTHROID   Take 1 Tab by mouth every morning before breakfast. ON EMPTY STOMACH  Dose:  150 mcg     losartan 50 MG Tabs  Commonly known as:  COZAAR   TAKE ONE TABLET BY MOUTH ONCE A DAY     metFORMIN 500 MG Tabs  Commonly known as:  GLUCOPHAGE   Take 500-1,000 mg by mouth 2 times a day, with meals. 1000 mg every morning  500 mg every night  Dose:  500-1000 mg     oxyCODONE immediate-release 5 MG Tabs  Commonly known as:  ROXICODONE   Take 1-2 Tabs by mouth every four hours as needed for Severe Pain for up to 7 days.  Dose:  5-10 mg     traZODone 100 MG Tabs  Commonly known as:  DESYREL   TAKE ONE AND ONE-HALF TABLET BY MOUTH ONCE DAILY AS NEEDED FOR SLEEP.     vitamin D (Ergocalciferol) 47052 units Caps capsule  Commonly known as:  DRISDOL   TAKE ONE CAPSULE BY MOUTH EVERY 7 DAYS     warfarin 5 MG Tabs  Commonly known as:  COUMADIN   Take 5-7.5 mg by mouth every evening. 5 mg Monday Wednesday Friday 7.5 Tuesday Thursday Saturday Sunday  Dose:  5-7.5 mg          Allergies  Allergies   Allergen Reactions   • Aspirin Anaphylaxis   • Penicillins Anaphylaxis     As a child  Tolerated Rocephin 2/2018       DIET  Orders Placed This Encounter   Procedures   • Diet Order Diabetic      Standing Status:   Standing     Number of Occurrences:   1     Order Specific Question:   Diet:     Answer:   Diabetic [3]     Order Specific Question:   Texture/Fiber modifications:     Answer:   Dysphagia 3(Mechanical Soft)specify fluid consistency(question 6) [3]       ACTIVITY  As tolerated.  Weight bearing as tolerated    CONSULTATIONS  Critical care/pulmonary  Interventional radiology    PROCEDURES  US GUIDED VASCULAR PUNCTURE LEFT POPLITEAL VEIN 12/2/2018   Venogram with stents placement 12/4/2018   Venogram and Angiojet thrombolysis 12/07/2018       LABORATORY  Lab Results   Component Value Date    SODIUM 139 12/13/2018    POTASSIUM 4.0 12/13/2018    CHLORIDE 101 12/13/2018    CO2 35 (H) 12/13/2018    GLUCOSE 112 (H) 12/13/2018    BUN 8 12/13/2018    CREATININE 0.90 12/13/2018    CREATININE 0.8 05/19/2009        Lab Results   Component Value Date    WBC 10.7 12/14/2018    HEMOGLOBIN 8.7 (L) 12/14/2018    HEMATOCRIT 28.6 (L) 12/14/2018    PLATELETCT 620 (H) 12/14/2018        Total time of the discharge process exceeds 40 minutes.

## 2018-12-14 NOTE — CARE PLAN
Problem: Safety  Goal: Will remain free from injury  Outcome: PROGRESSING AS EXPECTED  Pt remains free from injury/falls this shift. Continue to assess for risks for injury/falls and implement interventions as needed    Problem: Knowledge Deficit  Goal: Knowledge of disease process/condition, treatment plan, diagnostic tests, and medications will improve  Outcome: PROGRESSING AS EXPECTED  Pt able to express some knowledge of disease process, treatment plan and medications. Continue to educate on these topics and assess for learning

## 2018-12-14 NOTE — CARE PLAN
Problem: Communication  Goal: The ability to communicate needs accurately and effectively will improve    Intervention: Reorient patient to environment as needed  Discussed importance of utilizing the call light and keeping environment clear to prevent injury.  Call light within reach.

## 2018-12-14 NOTE — PROGRESS NOTES
Patient awake, alert, and oriented. Right upper arm Midline removed without difficulty.  No c/o voice.  No acute distress noted.  Site benign. Transported via wheelchair with [employer] to [place].

## 2018-12-14 NOTE — DISCHARGE PLANNING
Agency/Facility Name: [facility]  Spoke To: Prema  Outcome: Pushed transport back to 1500 today.    Discharge summary faxed to [facility] @ [phone]    BON medel.

## 2018-12-14 NOTE — PROGRESS NOTES
Assumed care of pt at 1900. Pt on 5L O2 via nasal cannula, no c/o pain or other discomforts at that time. Meds given per MAR. Pt neighbor very loud and upset this evening, pt requested different room, if roommate unable to calm. Roommate quieted down. Pt resting in bed now, call light in reach, no further needs at this time

## 2018-12-14 NOTE — PROGRESS NOTES
Bedside shift report received from ROSE Garcia.  Patient resting quietly with eyes closed.  Easily aroused.  No c/o voiced.  No acute distress noted.

## 2018-12-26 ENCOUNTER — ANTICOAGULATION VISIT (OUTPATIENT)
Dept: VASCULAR LAB | Facility: MEDICAL CENTER | Age: 78
End: 2018-12-26
Attending: INTERNAL MEDICINE
Payer: OTHER GOVERNMENT

## 2018-12-26 DIAGNOSIS — Z86.711 HX PULMONARY EMBOLISM: ICD-10-CM

## 2018-12-26 LAB — INR PPP: 2 (ref 2–3.5)

## 2018-12-26 PROCEDURE — 99212 OFFICE O/P EST SF 10 MIN: CPT | Performed by: NURSE PRACTITIONER

## 2018-12-26 PROCEDURE — 85610 PROTHROMBIN TIME: CPT

## 2018-12-26 NOTE — PROGRESS NOTES
Anticoagulation Summary  As of 12/26/2018    INR goal:   2.0-3.0   TTR:   73.5 % (1.6 y)   Today's INR:   2.0   Warfarin maintenance plan:   5 mg (5 mg x 1) on Mon, Wed, Fri; 7.5 mg (5 mg x 1.5) all other days   Weekly warfarin total:   45 mg   Plan last modified:   Burke Matthew, PharmD (8/21/2018)   Next INR check:   1/4/2019   Target end date:   Indefinite    Indications    DVT (deep venous thrombosis)  left (Resolved) [I82.402]  Hx pulmonary embolism [Z86.711]             Anticoagulation Episode Summary     INR check location:       Preferred lab:       Send INR reminders to:       Comments:         Anticoagulation Care Providers     Provider Role Specialty Phone number    WENDY Baker Referring Internal Medicine [phone]    [facility] Anticoagulation Services Responsible  [phone]        Anticoagulation Patient Findings      HPI:  Sanjuanita Sosa seen in clinic today for follow up on anticoagulation therapy in the presence of DVT, PE hx. She was admitted earlier thsi month for acute LLE DVT, requiring mechanical thrombolysis and stent placement. Now on clopidogrel, as well. Pt was off warfarin from 11/14-11/22 for dental work and was subtherapeutic when she presented to the ER. She is currently at rehab. Denies any diet changes. No current symptoms of bleeding or thrombosis reported.    A/P:   INR therapeutic. Will increase tonight's dose then continue current regimen to help ensure INR stays therapeutic.    Follow up appointment in 1 week(s).    Next Appointment: Friday, January 4, 2019 at 10:30 am.    Farheen CARPENTER

## 2019-01-04 ENCOUNTER — ANTICOAGULATION VISIT (OUTPATIENT)
Dept: VASCULAR LAB | Facility: MEDICAL CENTER | Age: 79
End: 2019-01-04
Attending: INTERNAL MEDICINE
Payer: OTHER GOVERNMENT

## 2019-01-04 DIAGNOSIS — Z86.711 HX PULMONARY EMBOLISM: ICD-10-CM

## 2019-01-04 LAB
INR BLD: 2 (ref 0.9–1.2)
INR PPP: 3 (ref 2–3.5)

## 2019-01-04 PROCEDURE — 85610 PROTHROMBIN TIME: CPT

## 2019-01-04 PROCEDURE — 99211 OFF/OP EST MAY X REQ PHY/QHP: CPT

## 2019-01-04 NOTE — PROGRESS NOTES
Anticoagulation Summary  As of 1/4/2019    INR goal:   2.0-3.0   TTR:   73.9 % (1.6 y)   Today's INR:   3   Warfarin maintenance plan:   6 mg (6 mg x 1) every day   Weekly warfarin total:   42 mg   Plan last modified:   uBrke Matthew, PharmD (1/4/2019)   Next INR check:   1/11/2019   Target end date:   Indefinite    Indications    DVT (deep venous thrombosis)  left (Resolved) [I82.402]  Hx pulmonary embolism [Z86.711]             Anticoagulation Episode Summary     INR check location:       Preferred lab:       Send INR reminders to:       Comments:         Anticoagulation Care Providers     Provider Role Specialty Phone number    WENDY Baker Referring Internal Medicine [phone]    [facility] Anticoagulation Services Responsible  [phone]        Anticoagulation Patient Findings      HPI:  Sanjuanita Sosa seen in clinic today, on anticoagulation therapy with warfarin for DVT.   Changes to current medical/health status since last appt: Pt had a another VTE recently.    Denies signs/symptoms of bleeding and/or thrombosis since the last appt.    Denies any interval changes to diet  Denies any interval changes to medications since last appt.   Denies any complications or cost restrictions with current therapy.   BP recorded in vitals.  Confirmed dosing regimen. Her facility was only giving her 6 mg warfarin daily which is only 6% less than our regimen.      A/P   INR  therapeutic.   Pt is to continue with current warfarin dosing regimen (6mg daily)    Follow up appointment in 1 week(s).    Burke Matthew, PharmD

## 2019-01-07 LAB — INR BLD: 3 (ref 0.9–1.2)

## 2019-01-10 ENCOUNTER — TELEPHONE (OUTPATIENT)
Dept: VASCULAR LAB | Facility: MEDICAL CENTER | Age: 79
End: 2019-01-10

## 2019-01-18 ENCOUNTER — TELEPHONE (OUTPATIENT)
Dept: HEALTH INFORMATION MANAGEMENT | Facility: OTHER | Age: 79
End: 2019-01-18

## 2019-01-18 NOTE — TELEPHONE ENCOUNTER
Patient called to report she was discharged from Rehab today.  Patient states she doesn't want a follow-up appointment until February. Pt was unaware appointment had previously been scheduled by Rehab hospital.  CM assisted pt with rescheduling appointment. Pt has no questions or concerns at this time.

## 2019-01-24 ENCOUNTER — TELEPHONE (OUTPATIENT)
Dept: VASCULAR LAB | Facility: MEDICAL CENTER | Age: 79
End: 2019-01-24

## 2019-01-24 ENCOUNTER — ANTICOAGULATION MONITORING (OUTPATIENT)
Dept: VASCULAR LAB | Facility: MEDICAL CENTER | Age: 79
End: 2019-01-24

## 2019-01-24 DIAGNOSIS — Z86.711 HX PULMONARY EMBOLISM: ICD-10-CM

## 2019-01-24 LAB — INR PPP: 3.8 (ref 2–3.5)

## 2019-01-25 NOTE — PROGRESS NOTES
"  OP Anticoagulation Telephone Note    Date: 1/24/2019       Plan:  Spoke with pt on the phone. Pt is supratherapeutic today. Denies any changes in medications or diet. Denies any s/sx of bleeding or clotting. Will continue warfarin dosing as outlined below and follow-up with pt in 1wk.  She is \"out of warfarin,\" so will skip a dose tonight.  Her meds are ready for pickup at her pharmacy and she will get them tomorrow.      Anticoagulation Summary  As of 1/24/2019    INR goal:   2.0-3.0   TTR:   71.5 % (1.7 y)   Today's INR:   3.8!   Warfarin maintenance plan:   6 mg (6 mg x 1) every day   Weekly warfarin total:   42 mg   Plan last modified:   Burke Matthew, PharmD (1/4/2019)   Next INR check:   1/31/2019   Target end date:   Indefinite    Indications    DVT (deep venous thrombosis)  left (Resolved) [I82.402]  Hx pulmonary embolism [Z86.711]             Anticoagulation Episode Summary     INR check location:       Preferred lab:       Send INR reminders to:       Comments:   BERRY IRVING fax [phone]      Anticoagulation Care Providers     Provider Role Specialty Phone number    WENDY Baker Referring Internal Medicine [phone]    [facility] Anticoagulation Services Responsible  [phone]            PAULINE Grimaldo  [facility]      "

## 2019-01-29 ENCOUNTER — ANTICOAGULATION MONITORING (OUTPATIENT)
Dept: VASCULAR LAB | Facility: MEDICAL CENTER | Age: 79
End: 2019-01-29

## 2019-01-29 DIAGNOSIS — Z86.711 HX PULMONARY EMBOLISM: ICD-10-CM

## 2019-01-29 DIAGNOSIS — Z86.718 HISTORY OF DVT (DEEP VEIN THROMBOSIS): ICD-10-CM

## 2019-01-29 LAB — INR PPP: 1.4 (ref 2–3.5)

## 2019-01-30 ENCOUNTER — TELEPHONE (OUTPATIENT)
Dept: VASCULAR LAB | Facility: MEDICAL CENTER | Age: 79
End: 2019-01-30

## 2019-01-30 ENCOUNTER — TELEPHONE (OUTPATIENT)
Dept: MEDICAL GROUP | Facility: MEDICAL CENTER | Age: 79
End: 2019-01-30

## 2019-01-30 ENCOUNTER — HOSPITAL ENCOUNTER (OUTPATIENT)
Dept: RADIOLOGY | Facility: MEDICAL CENTER | Age: 79
End: 2019-01-30
Attending: NURSE PRACTITIONER
Payer: MEDICARE

## 2019-01-30 DIAGNOSIS — I71.40 ABDOMINAL AORTIC ANEURYSM WITHOUT RUPTURE (HCC): ICD-10-CM

## 2019-01-30 PROCEDURE — 93978 VASCULAR STUDY: CPT

## 2019-01-30 PROCEDURE — 93978 VASCULAR STUDY: CPT | Mod: 26 | Performed by: SURGERY

## 2019-01-30 NOTE — PROGRESS NOTES
Attempted to call patient however she did not answer and her voicemail box isn't set up. Recommended plan is in the Anticoag Track. Will attempt to call again later.     Claudio Washington, Pharmacy Intern

## 2019-01-30 NOTE — TELEPHONE ENCOUNTER
1. Caller Name: Sanjuanita Sosa                      Call Back Number: [phone] (home)     2. Message: pt just got a call her COUMADIN was changer to 9 mg please send in a change in for her RX     3. Patient approves office to leave a detailed voicemail/MyChart message: yes

## 2019-01-30 NOTE — PROGRESS NOTES
Anticoagulation Summary  As of 2019    INR goal:   2.0-3.0   TTR:   71.3 % (1.7 y)   INR used for dosin.4! (2019)   Warfarin maintenance plan:   6 mg (6 mg x 1) every day   Weekly warfarin total:   42 mg   Plan last modified:   Burke Matthew, PharmD (2019)   Next INR check:   2019   Target end date:   Indefinite    Indications    DVT (deep venous thrombosis)  left (Resolved) [I82.402]  Hx pulmonary embolism [Z86.711]             Anticoagulation Episode Summary     INR check location:       Preferred lab:       Send INR reminders to:       Comments:   BERRY IRVING fax [phone]      Anticoagulation Care Providers     Provider Role Specialty Phone number    WENDY Baker Referring Internal Medicine [phone]    [facility] Responsible  [phone]        Anticoagulation Patient Findings      Spoke with patient to report a SUBtherapeutic INR.    Pt reports she only has 5 mg tablets and that she has been taking 5 mg tablet everyday. I had her check her prescription bottle that did read 5 mg per patient.  Pt thinks that her doctor has called in a 5.9 mg tablet to her pharmacy. I informed her that they don't make warfarin in strengths with decimals and that dose is not possible to be given accurately but we would like her to be on 6 mg everyday. I told her to  that 6 mg tablet as soon as she can and to start with that dose everyday instead of the 5 mg.    In the meantime, tonight she will be taking a 10 mg bolus dose (~14% increase for the week based on 5 mg / day)     Pt confirms dosing / plan to  new prescription and have INR checked by  nurse this 19.  Pt denies any s/s of bleeding, bruising, clotting or any changes to diet or medication.    Will follow up in 2 days.    Discussed with Omid BaltazarD     Ron Rush, Pharmacy Intern

## 2019-01-31 ENCOUNTER — TELEPHONE (OUTPATIENT)
Dept: VASCULAR LAB | Facility: MEDICAL CENTER | Age: 79
End: 2019-01-31

## 2019-01-31 NOTE — TELEPHONE ENCOUNTER
Attempted to call pt with results of US. No VM set up. Will call back later today if unable to reach will contact daughter. BETHANY Alvarado.

## 2019-01-31 NOTE — TELEPHONE ENCOUNTER
Spoke with the pt regarding the US of aorta. Let her know that some plaque but no significant aneurysm   She should follow up with us as scheduled   BETHANY Alvarado.

## 2019-01-31 NOTE — TELEPHONE ENCOUNTER
Aoroiliac duplex reviewed.  Aortic and iliac plaque - <50% but No significant AAA  Continue medical management.  Consider repeat aortoiliac duplex in 3-5 years.    Michael Bloch, MD  [facility]

## 2019-02-01 LAB — INR PPP: 2.3 (ref 2–3.5)

## 2019-02-05 ENCOUNTER — ANTICOAGULATION MONITORING (OUTPATIENT)
Dept: VASCULAR LAB | Facility: MEDICAL CENTER | Age: 79
End: 2019-02-05

## 2019-02-05 ENCOUNTER — OFFICE VISIT (OUTPATIENT)
Dept: MEDICAL GROUP | Facility: MEDICAL CENTER | Age: 79
End: 2019-02-05
Payer: MEDICARE

## 2019-02-05 VITALS
TEMPERATURE: 97.4 F | OXYGEN SATURATION: 94 % | WEIGHT: 177 LBS | DIASTOLIC BLOOD PRESSURE: 72 MMHG | BODY MASS INDEX: 30.22 KG/M2 | SYSTOLIC BLOOD PRESSURE: 118 MMHG | HEART RATE: 88 BPM | HEIGHT: 64 IN | RESPIRATION RATE: 16 BRPM

## 2019-02-05 DIAGNOSIS — I82.412 ACUTE DEEP VEIN THROMBOSIS (DVT) OF FEMORAL VEIN OF LEFT LOWER EXTREMITY (HCC): ICD-10-CM

## 2019-02-05 DIAGNOSIS — I48.0 PAROXYSMAL ATRIAL FIBRILLATION (HCC): ICD-10-CM

## 2019-02-05 DIAGNOSIS — I35.0 MODERATE AORTIC STENOSIS: Chronic | ICD-10-CM

## 2019-02-05 DIAGNOSIS — Z86.718 HISTORY OF DVT (DEEP VEIN THROMBOSIS): ICD-10-CM

## 2019-02-05 DIAGNOSIS — Z86.711 HX PULMONARY EMBOLISM: ICD-10-CM

## 2019-02-05 DIAGNOSIS — C73 PAPILLARY THYROID CARCINOMA (HCC): ICD-10-CM

## 2019-02-05 DIAGNOSIS — E11.8 TYPE 2 DIABETES MELLITUS WITH COMPLICATION, WITHOUT LONG-TERM CURRENT USE OF INSULIN (HCC): ICD-10-CM

## 2019-02-05 DIAGNOSIS — I10 ESSENTIAL HYPERTENSION: ICD-10-CM

## 2019-02-05 DIAGNOSIS — J96.01 ACUTE RESPIRATORY FAILURE WITH HYPOXIA (HCC): ICD-10-CM

## 2019-02-05 DIAGNOSIS — I71.40 ABDOMINAL AORTIC ANEURYSM WITHOUT RUPTURE (HCC): ICD-10-CM

## 2019-02-05 PROBLEM — Z71.6 TOBACCO ABUSE COUNSELING: Status: RESOLVED | Noted: 2018-12-12 | Resolved: 2019-02-05

## 2019-02-05 PROBLEM — R31.9 HEMATURIA: Status: RESOLVED | Noted: 2018-12-03 | Resolved: 2019-02-05

## 2019-02-05 PROBLEM — M25.532 LEFT WRIST PAIN: Status: RESOLVED | Noted: 2018-12-08 | Resolved: 2019-02-05

## 2019-02-05 PROBLEM — R41.0 DELIRIUM: Status: RESOLVED | Noted: 2018-12-05 | Resolved: 2019-02-05

## 2019-02-05 LAB
HBA1C MFR BLD: 5.8 % (ref ?–5.8)
INT CON NEG: NEGATIVE
INT CON POS: POSITIVE

## 2019-02-05 PROCEDURE — 99214 OFFICE O/P EST MOD 30 MIN: CPT | Performed by: NURSE PRACTITIONER

## 2019-02-05 PROCEDURE — 83036 HEMOGLOBIN GLYCOSYLATED A1C: CPT | Performed by: NURSE PRACTITIONER

## 2019-02-05 RX ORDER — LEVOTHYROXINE SODIUM 0.15 MG/1
150 TABLET ORAL
Qty: 90 TAB | Refills: 1 | Status: SHIPPED | OUTPATIENT
Start: 2019-02-05 | End: 2019-07-04

## 2019-02-05 ASSESSMENT — PATIENT HEALTH QUESTIONNAIRE - PHQ9: CLINICAL INTERPRETATION OF PHQ2 SCORE: 0

## 2019-02-05 NOTE — PROGRESS NOTES
Home health Nurse called to inform us patients voicemail is set up now and also pt is NON compliant on taking her coumadin.     Spoke with [facility] and ordered INR check for am.  Jennifer Patten, Clinical Pharmacist

## 2019-02-05 NOTE — PROGRESS NOTES
Subjective:      Sanjuanita Sosa is a 78 y.o. female who presents with Hospital Follow-up        CC: Patient here for follow-up on multiple health problems including DVT, diabetes and history of thyroid carcinoma.    HPI Sanjuanita Sosa      1. Paroxysmal atrial fibrillation (HCC)  Patient unable to afford many of the anticoagulants but is on Coumadin through the Coumadin clinic.  She was advised to follow-up with cardiology after her hospital admission December for DVT.  It does not appear an appointment has been made.    2. Acute deep vein thrombosis (DVT) of femoral vein of left lower extremity (HCC)  Patient unfortunately had DVT and high clot burden after having to stop her Coumadin for dental work in November.  She underwent TPA and mechanical thrombolysis thrombectomy and is now on Plavix and Coumadin for which she follows with the Coumadin clinic and the vascular lab.    3. Acute respiratory failure with hypoxia ([facility])  Patient had low oxygen levels in the hospital and is supposed to be using oxygen at home but she states she refuses and her home oxygen and office oxygen levels have been good on room air.  She would like the device removed.    4. Type 2 diabetes mellitus with complication, without long-term current use of insulin ([facility])  Patient previously had been on metformin and glyburide but was having hypoglycemia and currently is only on metformin 500 mg, 3 a day.  She has not been to the office since October and a hemoglobin A1c in the office today is 5.8.    5. Essential hypertension  Blood pressure remained stable at 118/72 on current meds.    6. Abdominal aortic aneurysm without rupture ([facility])  This was recently rechecked through the vascular clinic and there was no significant aneurysm follow-up.    7. Moderate aortic stenosis 3/2018  Has remained stable but has not followed with cardiology.    9. Papillary thyroid carcinoma ([facility])  Patient was following with endocrinology and was last  seen by Dr. Carnes in October 2017.  She was a postsurgical hypothyroid and she was doing well on levothyroxine which she continues.  She was advised to do a repeat thyroid ultrasound but it was not completed.  She has declined going back to endocrinology.  Current Outpatient Prescriptions   Medication Sig Dispense Refill   • Misc. Devices Misc Cancel home oxygen 1 Device 1   • levothyroxine (SYNTHROID) 150 MCG Tab Take 1 Tab by mouth every morning before breakfast. ON EMPTY STOMACH 90 Tab 1   • metFORMIN (GLUCOPHAGE) 500 MG Tab Take 1 Tab by mouth 2 times a day, with meals. 60 Tab 11   • acetaminophen (TYLENOL) 325 MG Tab Take 2 Tabs by mouth every 6 hours as needed (Mild Pain; (Pain scale 1-3); Temp greater than 100.5 F). 30 Tab 0   • gabapentin (NEURONTIN) 300 MG Cap Take 1 Cap by mouth every 8 hours. 90 Cap    • furosemide (LASIX) 40 MG Tab Take 1 Tab by mouth 2 Times a Day. 30 Tab    • senna-docusate (PERICOLACE OR SENOKOT S) 8.6-50 MG Tab Take 2 Tabs by mouth 2 Times a Day. 30 Tab 0   • polyethylene glycol/lytes (MIRALAX) Pack Take 1 Packet by mouth 1 time daily as needed (if sennosides and docusate ineffective after 24 hours).  3   • magnesium hydroxide (MILK OF MAGNESIA) 400 MG/5ML Suspension Take 30 mL by mouth 1 time daily as needed (if polyethylene glycol ineffective after 24 hours). 1 Bottle    • bisacodyl (DULCOLAX) 10 MG Suppos Insert 1 Suppository in rectum 1 time daily as needed (if magnesium hydroxide ineffective after 24 hours).  0   • clopidogrel (PLAVIX) 75 MG Tab Take 1 Tab by mouth every day. 30 Tab    • amLODIPine (NORVASC) 2.5 MG Tab Take 1 Tab by mouth every day. 30 Tab    • calcium carbonate (OS-MARKELL 500) 500 MG Tab TAKE ONE TABLET BY MOUTH TWICE DAILY WITH MEALS 60 Tab 4   • traZODone (DESYREL) 100 MG Tab TAKE ONE AND ONE-HALF TABLET BY MOUTH ONCE DAILY AS NEEDED FOR SLEEP. 45 Tab 10   • vitamin D, Ergocalciferol, (DRISDOL) 98115 units Cap capsule TAKE ONE CAPSULE BY MOUTH EVERY 7 DAYS 12  Cap 3   • atorvastatin (LIPITOR) 80 MG tablet TAKE ONE-HALF TABLET BY MOUTH EVERY EVENING 30 Tab 5   • losartan (COZAAR) 50 MG Tab TAKE ONE TABLET BY MOUTH ONCE A DAY 30 Tab 10   • warfarin (COUMADIN) 5 MG Tab Take 5-7.5 mg by mouth every evening. 5 mg Monday Wednesday Friday  7.5 Tuesday Thursday Saturday Sunday        No current facility-administered medications for this visit.      Facility-Administered Medications Ordered in Other Visits   Medication Dose Route Frequency Provider Last Rate Last Dose   • iodixanol (VISIPAQUE) SOLN    Intra-Op Once PRN Shekhar Rosado M.D.   10 mL at 01/18/17 0619     Past Medical History:   Diagnosis Date   • AAA (abdominal aortic aneurysm) (HCC)    • Anticoagulation monitoring, special range    • Arrhythmia    • Arthritis    • Aspirin allergy 4/24/2018   • Autoimmune hepatitis (HCC) 3/19/2012   • Blood clotting disorder (HCC) 2015    clot in leg and lung   • Breath shortness    • Cancer (HCC)     thyroid   • Cataract     left eye needs surgery   • Diabetes     pre-diabetic   • Disorder of thyroid 2016    thyroid cancer   • Gallstones    • H/O: HTN (hypertension) 3/19/2012   • Heart valve disease    • Hepatitis B    • Hiatus hernia syndrome    • Hyperlipidemia    • Hypertension    • Kidney disease    • Mixed hyperlipidemia 3/19/2012   • Moderate aortic stenosis 3/2018    • Murmur 3/19/2012   • Nonspecific abnormal electrocardiogram (ECG) (EKG) 3/19/2012   • Overweight(278.02) 3/19/2012   • Pain    • Palpitations    • Preoperative cardiovascular examination 3/19/2012   • Unspecified urinary incontinence      Social History   Substance Use Topics   • Smoking status: Current Every Day Smoker     Packs/day: 0.25     Years: 40.00     Types: Cigarettes   • Smokeless tobacco: Never Used   • Alcohol use No     Family History   Problem Relation Age of Onset   • Hyperlipidemia Mother    • Stroke Mother    • Hyperlipidemia Father    • Stroke Father    • Heart Disease Brother         CABG  "      Review of Systems   Constitutional: Positive for malaise/fatigue.   All other systems reviewed and are negative.         Objective:     /72 (BP Location: Right arm, Patient Position: Sitting, BP Cuff Size: Adult)   Pulse 88   Temp 36.3 °C (97.4 °F) (Temporal)   Resp 16   Ht 1.626 m (5' 4\")   Wt 80.3 kg (177 lb)   LMP 10/12/1970   SpO2 94%   BMI 30.38 kg/m²      Physical Exam   Constitutional: She is oriented to person, place, and time. She appears well-developed and well-nourished. No distress.   HENT:   Head: Normocephalic and atraumatic.   Right Ear: External ear normal.   Left Ear: External ear normal.   Nose: Nose normal.   Eyes: Right eye exhibits no discharge. Left eye exhibits no discharge.   Neck: Normal range of motion. Neck supple. No thyromegaly present.   Cardiovascular: Normal rate.  An irregular rhythm present. Exam reveals no gallop and no friction rub.    Murmur heard.  Pulmonary/Chest: Effort normal and breath sounds normal. She has no wheezes. She has no rales.   Musculoskeletal: She exhibits no edema or tenderness.   Neurological: She is alert and oriented to person, place, and time. She displays normal reflexes.   Skin: Skin is warm and dry. No rash noted. She is not diaphoretic.   Psychiatric: She has a normal mood and affect. Her behavior is normal. Judgment and thought content normal.   Patient is pleasant but stubborn as usual during her visits.  She is able to ambulate with her walker through the halls.   Nursing note and vitals reviewed.              Assessment/Plan:     1. Paroxysmal atrial fibrillation (HCC)  Patient was advised to follow with cardiology after her last hospitalization but it does not appear a referral was placed so I will do so today.  She is currently on anticoagulation and appears rate controlled.  - REFERRAL TO CARDIOLOGY  - [facility] [place]. Devices Misc; Cancel home oxygen  Dispense: 1 Device; Refill: 1    2. Acute deep vein thrombosis (DVT) of femoral vein " of left lower extremity (HCC)  Patient now following with the Coumadin clinic and she has been back on Coumadin since her hospitalization and her most recent INR was between 2 and 3.    3. Acute respiratory failure with hypoxia (HCC)  Patient refuses to wear oxygen and has requested that we have it removed.  Her oxygen levels are very good in the office.  She does not feel she needs it at night.    4. Type 2 diabetes mellitus with complication, without long-term current use of insulin ([facility])  Hemoglobin A1c has dropped down to 5.8 so I will decrease her metformin down to twice a day.  I advised her coming in more regularly than every 6 months.  - POCT  A1C  - metFORMIN (GLUCOPHAGE) 500 MG Tab; Take 1 Tab by mouth 2 times a day, with meals.  Dispense: 60 Tab; Refill: 11    5. Essential hypertension  Blood pressure currently well controlled.    6. Abdominal aortic aneurysm without rupture (HCC)  Ultrasound recently done through the vascular clinic and is stable.    7 Moderate aortic stenosis 3/2018    - REFERRAL TO CARDIOLOGY    8. Papillary thyroid carcinoma (HCC)  I have ordered a ultrasound as suggested by endocrinology in 2017 and hopefully patient will do this and if necessary we will have her follow back with endocrinology.  She is currently on levothyroxine.  - US-SOFT TISSUES OF HEAD - NECK; Future

## 2019-02-06 ENCOUNTER — ANTICOAGULATION MONITORING (OUTPATIENT)
Dept: VASCULAR LAB | Facility: MEDICAL CENTER | Age: 79
End: 2019-02-06

## 2019-02-06 DIAGNOSIS — Z86.711 HX PULMONARY EMBOLISM: ICD-10-CM

## 2019-02-06 LAB — INR PPP: 2.3 (ref 2–3.5)

## 2019-02-06 NOTE — PROGRESS NOTES
Anticoagulation Summary  As of 2019    INR goal:   2.0-3.0   TTR:   71.3 % (1.7 y)   INR used for dosin.3 (2019)   Warfarin maintenance plan:   5 mg (5 mg x 1) every day   Weekly warfarin total:   35 mg   Plan last modified:   Alexander Luis, Med Ass't (2019)   Next INR check:   2019   Target end date:   Indefinite    Indications    DVT (deep venous thrombosis)  left (Resolved) [I82.402]  Hx pulmonary embolism [Z86.711]             Anticoagulation Episode Summary     INR check location:       Preferred lab:       Send INR reminders to:       Comments:   BERRY IRVING fax [phone]      Anticoagulation Care Providers     Provider Role Specialty Phone number    WENDY Baker Referring Internal Medicine [phone]    [facility] Responsible  [phone]        Anticoagulation Patient Findings  Patient Findings     Negatives:   Signs/symptoms of thrombosis, Signs/symptoms of bleeding, Laboratory test error suspected, Change in health, Change in alcohol use, Change in activity, Upcoming invasive procedure, Emergency department visit, Upcoming dental procedure, Missed doses, Extra doses, Change in medications, Change in diet/appetite, Hospital admission, Bruising, Other complaints        Spoke with patient to report a therapeutic INR.  Pt instructed to continue with current warfarin dosing regimen. Pt denies any s/s of bleeding, bruising, clotting or any changes to diet or medication.  Will follow up in 1 week. Patient taking 5 mg daily as she is unable to get to the pharmacy to get the 6 mg tablet.     Alexander Luis, Med Ass't    I have reviewed and am in agreement with the above stated plan on 19.  Called back and verified with patient that she has been taking 5mg daily for the past two weeks.  Will continue with this regimen.  Omid Min, PharmD

## 2019-02-12 ENCOUNTER — ANTICOAGULATION MONITORING (OUTPATIENT)
Dept: VASCULAR LAB | Facility: MEDICAL CENTER | Age: 79
End: 2019-02-12

## 2019-02-12 DIAGNOSIS — Z86.711 HX PULMONARY EMBOLISM: ICD-10-CM

## 2019-02-12 LAB — INR PPP: 2.6 (ref 2–3.5)

## 2019-02-12 NOTE — PROGRESS NOTES
OP Telephone Anticoagulation Service Note    Date: 2019      Anticoagulation Summary  As of 2019    INR goal:   2.0-3.0   TTR:   71.6 % (1.7 y)   INR used for dosin.6 (2019)   Warfarin maintenance plan:   5 mg (5 mg x 1) every day   Weekly warfarin total:   35 mg   Plan last modified:   Alexander Luis, Med Ass't (2019)   Next INR check:      Target end date:   Indefinite    Indications    DVT (deep venous thrombosis)  left (Resolved) [I82.402]  Hx pulmonary embolism [Z86.711]             Anticoagulation Episode Summary     INR check location:       Preferred lab:       Send INR reminders to:       Comments:   BERRY IRVING fax [phone]      Anticoagulation Care Providers     Provider Role Specialty Phone number    WENDY Baker Referring Internal Medicine [phone]    [facility] Responsible  [phone]        Anticoagulation Patient Findings        Plan: Spoke with patient's  nurse on the phone. Patient is therapeutic today. Confirmed dosing. No missed tablets in the last week. Patient denies any changes in medications or diet. Patient denies any signs or symptoms of bleeding or clotting. Instructed patient to call clinic if any unusual bleeding or bruising occurs. Will continue dosing as outlined. Will follow-up with patient in 1 week(s).      Ciarra Dupree, GiovannyD

## 2019-02-19 ENCOUNTER — ANTICOAGULATION MONITORING (OUTPATIENT)
Dept: VASCULAR LAB | Facility: MEDICAL CENTER | Age: 79
End: 2019-02-19

## 2019-02-19 DIAGNOSIS — Z86.711 HX PULMONARY EMBOLISM: ICD-10-CM

## 2019-02-19 LAB — INR PPP: 3 (ref 2–3.5)

## 2019-02-19 NOTE — PROGRESS NOTES
Anticoagulation Summary  As of 2/19/2019    INR goal:   2.0-3.0   TTR:   71.9 % (1.7 y)   INR used for dosing:   3.0 (2/19/2019)   Warfarin maintenance plan:   5 mg (5 mg x 1) every day   Weekly warfarin total:   35 mg   Plan last modified:   Alexander Luis, Med Ass't (2/6/2019)   Next INR check:   2/26/2019   Target end date:   Indefinite    Indications    DVT (deep venous thrombosis)  left (Resolved) [I82.402]  Hx pulmonary embolism [Z86.711]             Anticoagulation Episode Summary     INR check location:       Preferred lab:       Send INR reminders to:       Comments:   JOSE LUIS IRVING fax [phone]      Anticoagulation Care Providers     Provider Role Specialty Phone number    WENDY Baker Referring Internal Medicine [phone]    [facility] Responsible  [phone]        Anticoagulation Patient Findings  Patient Findings     Negatives:   Signs/symptoms of thrombosis, Signs/symptoms of bleeding, Laboratory test error suspected, Change in health, Change in alcohol use, Change in activity, Upcoming invasive procedure, Emergency department visit, Upcoming dental procedure, Missed doses, Extra doses, Change in medications, Change in diet/appetite, Hospital admission, Bruising, Other complaints        Spoke with the patient's  nurse on the phone today, reporting a therapeutic INR of 3.0.  Confirmed the current warfarin dosing regimen and patient compliance. Patient denies any interval changes to diet and/or medications. Patient denies any signs/symptoms of bleeding or clotting.  Patient instructed to reduce dose to 2.5mg TONIGHT ONLY, then to resume her current regimen. Patient will follow up again in 1 week. Orders sent to Jose Luis IRVING.     Edelmira BaltazarD

## 2019-02-26 ENCOUNTER — ANTICOAGULATION MONITORING (OUTPATIENT)
Dept: VASCULAR LAB | Facility: MEDICAL CENTER | Age: 79
End: 2019-02-26

## 2019-02-26 DIAGNOSIS — Z86.711 HX PULMONARY EMBOLISM: ICD-10-CM

## 2019-02-26 LAB — INR PPP: 1.6 (ref 2–3.5)

## 2019-02-26 NOTE — PROGRESS NOTES
Anticoagulation Summary  As of 2019    INR goal:   2.0-3.0   TTR:   71.9 % (1.8 y)   INR used for dosin.6! (2019)   Warfarin maintenance plan:   5 mg (5 mg x 1) every day   Weekly warfarin total:   35 mg   Plan last modified:   Alexander Luis, Med Ass't (2019)   Next INR check:   3/5/2019   Target end date:   Indefinite    Indications    DVT (deep venous thrombosis)  left (Resolved) [I82.402]  Hx pulmonary embolism [Z86.711]             Anticoagulation Episode Summary     INR check location:       Preferred lab:       Send INR reminders to:       Comments:   BERRY IRVING fax [phone]      Anticoagulation Care Providers     Provider Role Specialty Phone number    WENDY Baker Referring Internal Medicine [phone]    [facility] Responsible  [phone]        Anticoagulation Patient Findings  Patient Findings     Negatives:   Signs/symptoms of thrombosis, Signs/symptoms of bleeding, Laboratory test error suspected, Change in health, Change in alcohol use, Change in activity, Upcoming invasive procedure, Emergency department visit, Upcoming dental procedure, Missed doses, Extra doses, Change in medications, Change in diet/appetite, Hospital admission, Bruising, Other complaints        Spoke with the patient's  nurse, [place], on the phone today, reporting a SUB-therapeutic INR of 1.6.  Confirmed the current warfarin dosing regimen and patient compliance. Patient denies any missed doses. Patient denies any interval changes to diet and/or medications. Patient denies any signs/symptoms of bleeding or clotting.  Patient instructed to bolus with 7.5mg TONIGHT ONLY, then resume her current regimen on 5mg QD. Patient will follow up again in 1 week.    Edelmira Kaplan  PharmD

## 2019-03-05 ENCOUNTER — ANTICOAGULATION MONITORING (OUTPATIENT)
Dept: VASCULAR LAB | Facility: MEDICAL CENTER | Age: 79
End: 2019-03-05

## 2019-03-05 ENCOUNTER — TELEPHONE (OUTPATIENT)
Dept: CARDIOLOGY | Facility: MEDICAL CENTER | Age: 79
End: 2019-03-05

## 2019-03-05 DIAGNOSIS — Z79.01 CURRENT USE OF LONG TERM ANTICOAGULATION: ICD-10-CM

## 2019-03-05 DIAGNOSIS — Z86.711 HX PULMONARY EMBOLISM: ICD-10-CM

## 2019-03-05 LAB — INR PPP: 1.7 (ref 2–3.5)

## 2019-03-05 NOTE — PROGRESS NOTES
Anticoagulation Summary  As of 3/5/2019    INR goal:   2.0-3.0   TTR:   71.1 % (1.8 y)   INR used for dosin.7! (3/5/2019)   Warfarin maintenance plan:   7.5 mg (5 mg x 1.5) every Tue, Thu; 5 mg (5 mg x 1) all other days   Weekly warfarin total:   40 mg   Plan last modified:   Jennifer Patten (3/5/2019)   Next INR check:   3/12/2019   Target end date:   Indefinite    Indications    DVT (deep venous thrombosis)  left (Resolved) [I82.402]  Hx pulmonary embolism [Z86.711]             Anticoagulation Episode Summary     INR check location:       Preferred lab:       Send INR reminders to:       Comments:   JOSE LUIS IRVING fax [phone]      Anticoagulation Care Providers     Provider Role Specialty Phone number    WENDY Baker Referring Internal Medicine [phone]    [facility] Responsible  [phone]        Anticoagulation Patient Findings    Spoke with Jose Luis IRVING RN to report a sub therapeutic INR of 1.7.  Will increase weekly dose by 15%. Follow up in 1 weeks, to reduce the risk of adverse events related to this high risk medication, warfarin.    Jennifer Patten Clinical Pharmacist    New order faxed to Jose Luis IRVING

## 2019-03-05 NOTE — TELEPHONE ENCOUNTER
Spoke with pt who stated she did not know who prior cardiologist was, through investigation in pts chart she last saw CW on 4/24/18 pt confirmed.

## 2019-03-06 ENCOUNTER — OFFICE VISIT (OUTPATIENT)
Dept: CARDIOLOGY | Facility: MEDICAL CENTER | Age: 79
End: 2019-03-06
Payer: MEDICARE

## 2019-03-06 VITALS
HEIGHT: 64 IN | HEART RATE: 74 BPM | DIASTOLIC BLOOD PRESSURE: 48 MMHG | SYSTOLIC BLOOD PRESSURE: 108 MMHG | WEIGHT: 180 LBS | BODY MASS INDEX: 30.73 KG/M2 | OXYGEN SATURATION: 96 %

## 2019-03-06 DIAGNOSIS — I35.0 MODERATE AORTIC STENOSIS: ICD-10-CM

## 2019-03-06 DIAGNOSIS — I27.20 PULMONARY HYPERTENSION (HCC): ICD-10-CM

## 2019-03-06 DIAGNOSIS — I10 ESSENTIAL HYPERTENSION: ICD-10-CM

## 2019-03-06 DIAGNOSIS — Z79.01 CHRONIC ANTICOAGULATION: ICD-10-CM

## 2019-03-06 PROCEDURE — 99214 OFFICE O/P EST MOD 30 MIN: CPT | Performed by: INTERNAL MEDICINE

## 2019-03-06 RX ORDER — FUROSEMIDE 40 MG/1
20 TABLET ORAL DAILY
Qty: 30 TAB | Refills: 3 | Status: SHIPPED | OUTPATIENT
Start: 2019-03-06 | End: 2019-06-12

## 2019-03-06 ASSESSMENT — ENCOUNTER SYMPTOMS
ORTHOPNEA: 0
PALPITATIONS: 0
DIZZINESS: 0
INSOMNIA: 1
WEIGHT LOSS: 0
SHORTNESS OF BREATH: 0
DOUBLE VISION: 0
SENSORY CHANGE: 0
VOMITING: 0
BLOOD IN STOOL: 0
NERVOUS/ANXIOUS: 0
FOCAL WEAKNESS: 0
NAUSEA: 0
BACK PAIN: 1

## 2019-03-06 NOTE — LETTER
[facility]   [address], [address]  [place] [address]  Phone: [phone]  Fax: [phone]              Sanjuanita Sosa  1940    Encounter Date: 3/6/2019    Chantel Foster M.D.          PROGRESS NOTE:  Chief Complaint   Patient presents with   • Aortic stenosis     follow up/ pp of CW   Chronic anticoagulation for recurrent DVT/PE  ?PAF  History of hypertension    Subjective:   Sanjuanita Sosa is a 78 y.o. female who presents today for f/u above issues    She is a regular patient of Dr. Carlos Guillen.  She has history of aortic stenosis and prior history of DVT as well as pulmonary embolism on anticoagulation.  Last seen April 2018.  She was admitted last December for recurrent DVT after she was off anticoagulation for dental work.  Underwent tPA, mechanical thrombolysis, thrombectomy and left iliac stent placement. Discharged home of clopidogrel in addition to warfarin.    Had another echocardiography:  Left ventricular ejection fraction is visually estimated to be 65%.  Mild concentric left ventricular hypertrophy.  Mildly dilated left atrium.  Probably trileaflet AV with calcified aortic valve leaflets.  Moderate aortic stenosis.  Vmax 3.5 m/s, MG 29 mmHg.  Mild aortic insufficiency.  Mild mitral annular calcification.  Mild mitral regurgitation.  Mild tricuspid regurgitation.  Dilated inferior vena cava without inspiratory collapse.  Estimated right ventricular systolic pressure  is 55 mmHg.    Denies any cardiac symptoms.  No bleeding.  BP has been low lately (<110 mostly, occasionally under 100 mmHg)      Past Medical History:   Diagnosis Date   • AAA (abdominal aortic aneurysm) (HCC)    • Anticoagulation monitoring, special range    • Arrhythmia    • Arthritis    • Aspirin allergy 4/24/2018   • Autoimmune hepatitis (HCC) 3/19/2012   • Blood clotting disorder (HCC) 2015    clot in leg and lung   • Breath shortness    • Cancer  (HCC)     thyroid   • Cataract     left eye needs surgery   • Diabetes     pre-diabetic   • Disorder of thyroid 2016    thyroid cancer   • Gallstones    • H/O: HTN (hypertension) 3/19/2012   • Heart valve disease    • Hepatitis B    • Hiatus hernia syndrome    • Hyperlipidemia    • Hypertension    • Kidney disease    • Mixed hyperlipidemia 3/19/2012   • Moderate aortic stenosis 3/2018    • Murmur 3/19/2012   • Nonspecific abnormal electrocardiogram (ECG) (EKG) 3/19/2012   • Overweight(278.02) 3/19/2012   • Pain    • Palpitations    • Preoperative cardiovascular examination 3/19/2012   • Unspecified urinary incontinence      Past Surgical History:   Procedure Laterality Date   • DARREN BY LAPAROSCOPY  1/2/2017    Procedure: DARREN BY LAPAROSCOPY;  Surgeon: Chele Valles M.D.;  Location: SURGERY [facility];  Service:    • ERCP IN OR  12/30/2016    Procedure: ERCP IN OR;  Surgeon: Shekhar Rosado M.D.;  Location: SURGERY SAME DAY [facility];  Service:    • THYROIDECTOMY TOTAL N/A 10/12/2016    Procedure: THYROIDECTOMY TOTAL;  Surgeon: Nuno Duncan M.D.;  Location: SURGERY SAME DAY [facility];  Service:    • EXPLORATORY LAPAROTOMY  2/27/2013    Performed by Edson Craft M.D. at SURGERY [facility]   • SPLENECTOMY  2/27/2013    Performed by Edson Craft M.D. at [facility]   • NODE DISSECTION  2/27/2013    Performed by Edson Craft M.D. at SURGERY [facility]   • OOPHORECTOMY  2/27/2013    Performed by Levi Nolan M.D. at [facility]   • BLADDER SUSPENSION  5/27/2009    Performed by RINA HART at SURGERY SAME DAY [facility]   • CYSTOSCOPY  5/27/2009    Performed by RINA HART at SURGERY SAME DAY [facility]   • RECTOCELE REPAIR  5/27/2009    Performed by RINA HART at SURGERY SAME DAY [facility]   • APPENDECTOMY     • HYSTERECTOMY LAPAROSCOPY     • KNEE REPLACEMENT, TOTAL     • TONSILLECTOMY       Family  History   Problem Relation Age of Onset   • Hyperlipidemia Mother    • Stroke Mother    • Hyperlipidemia Father    • Stroke Father    • Heart Disease Brother         CABG     Social History     Social History   • Marital status:      Spouse name: N/A   • Number of children: N/A   • Years of education: N/A     Occupational History   • Not on file.     Social History Main Topics   • Smoking status: Current Every Day Smoker     Packs/day: 0.25     Years: 40.00     Types: Cigarettes   • Smokeless tobacco: Never Used   • Alcohol use No   • Drug use: No   • Sexual activity: Not Currently     Other Topics Concern   • Not on file     Social History Narrative   • No narrative on file     Allergies   Allergen Reactions   • Aspirin Anaphylaxis   • Penicillins Anaphylaxis     As a child  Tolerated Rocephin 2/2018     Outpatient Encounter Prescriptions as of 3/6/2019   Medication Sig Dispense Refill   • furosemide (LASIX) 40 MG Tab Take 0.5 Tabs by mouth every day. 30 Tab 3   • levothyroxine (SYNTHROID) 150 MCG Tab Take 1 Tab by mouth every morning before breakfast. ON EMPTY STOMACH 90 Tab 1   • metFORMIN (GLUCOPHAGE) 500 MG Tab Take 1 Tab by mouth 2 times a day, with meals. 60 Tab 11   • gabapentin (NEURONTIN) 300 MG Cap Take 1 Cap by mouth every 8 hours. 90 Cap    • clopidogrel (PLAVIX) 75 MG Tab Take 1 Tab by mouth every day. 30 Tab    • amLODIPine (NORVASC) 2.5 MG Tab Take 1 Tab by mouth every day. 30 Tab    • calcium carbonate (OS-MARKELL 500) 500 MG Tab TAKE ONE TABLET BY MOUTH TWICE DAILY WITH MEALS 60 Tab 4   • traZODone (DESYREL) 100 MG Tab TAKE ONE AND ONE-HALF TABLET BY MOUTH ONCE DAILY AS NEEDED FOR SLEEP. 45 Tab 10   • vitamin D, Ergocalciferol, (DRISDOL) 03596 units Cap capsule TAKE ONE CAPSULE BY MOUTH EVERY 7 DAYS 12 Cap 3   • atorvastatin (LIPITOR) 80 MG tablet TAKE ONE-HALF TABLET BY MOUTH EVERY EVENING 30 Tab 5   • losartan (COZAAR) 50 MG Tab TAKE ONE TABLET BY MOUTH ONCE A DAY 30 Tab 10   • warfarin  (COUMADIN) 5 MG Tab Take 5-7.5 mg by mouth every evening. 5 mg Monday Wednesday Friday  7.5 Tuesday Thursday Saturday Sunday      • Misc. Devices Misc Cancel home oxygen 1 Device 1   • acetaminophen (TYLENOL) 325 MG Tab Take 2 Tabs by mouth every 6 hours as needed (Mild Pain; (Pain scale 1-3); Temp greater than 100.5 F). (Patient not taking: Reported on 3/6/2019) 30 Tab 0   • senna-docusate (PERICOLACE OR SENOKOT S) 8.6-50 MG Tab Take 2 Tabs by mouth 2 Times a Day. (Patient not taking: Reported on 3/6/2019) 30 Tab 0   • [DISCONTINUED] furosemide (LASIX) 40 MG Tab Take 1 Tab by mouth 2 Times a Day. 30 Tab    • [DISCONTINUED] polyethylene glycol/lytes (MIRALAX) Pack Take 1 Packet by mouth 1 time daily as needed (if sennosides and docusate ineffective after 24 hours). (Patient not taking: Reported on 3/6/2019)  3   • [DISCONTINUED] magnesium hydroxide (MILK OF MAGNESIA) 400 MG/5ML Suspension Take 30 mL by mouth 1 time daily as needed (if polyethylene glycol ineffective after 24 hours). (Patient not taking: Reported on 3/6/2019) 1 Bottle    • [DISCONTINUED] bisacodyl (DULCOLAX) 10 MG Suppos Insert 1 Suppository in rectum 1 time daily as needed (if magnesium hydroxide ineffective after 24 hours). (Patient not taking: Reported on 3/6/2019)  0     Facility-Administered Encounter Medications as of 3/6/2019   Medication Dose Route Frequency Provider Last Rate Last Dose   • iodixanol (VISIPAQUE) SOLN    Intra-Op Once PRN Shekhar Rosado M.D.   10 mL at 01/18/17 0619     Review of Systems   Constitutional: Negative for weight loss.   HENT: Negative for nosebleeds.    Eyes: Negative for double vision.   Respiratory: Negative for shortness of breath.    Cardiovascular: Negative for chest pain, palpitations and orthopnea.   Gastrointestinal: Negative for blood in stool, nausea and vomiting.   Genitourinary: Positive for frequency. Negative for hematuria.   Musculoskeletal: Positive for back pain.   Skin: Negative for rash.    "  Neurological: Negative for dizziness, sensory change and focal weakness.   Psychiatric/Behavioral: The patient has insomnia. The patient is not nervous/anxious.         Objective:   /48 (BP Location: Left arm, Patient Position: Sitting)   Pulse 74   Ht 1.626 m (5' 4\")   Wt 81.6 kg (180 lb)   LMP 10/12/1970   SpO2 96%   BMI 30.90 kg/m²      Physical Exam   Constitutional: She is oriented to person, place, and time. No distress.   Uses a walker   HENT:   Head: Atraumatic.   Eyes: EOM are normal.   Neck: No JVD present. No thyromegaly present.   Cardiovascular: Normal rate and regular rhythm.  Exam reveals no gallop.    Murmur heard.   Systolic murmur is present with a grade of 3/6   Pulmonary/Chest: Effort normal. No respiratory distress.   Abdominal: Soft. There is no tenderness.   Neurological: She is alert and oriented to person, place, and time.   Skin: Skin is warm. No erythema.   Psychiatric: She has a normal mood and affect. Her behavior is normal.       Assessment:     1. Moderate aortic stenosis last echo December 2018    2. Essential hypertension     3. Chronic anticoagulation     4. Pulmonary hypertension (HCC)         Medical Decision Making:  Today's Assessment / Status / Plan:     Her blood pressure is somewhat low.  She is currently on furosemide 40 mg twice a day which she was not on in the past.  She has no signs of fluid retention.  She is also has not on any potassium supplementation.  Will reduce furosemide to 20 mg/d and check BMP.  Will continue monitor current cardiac medications.  As above she is on combination of clopidogrel and warfarin.  Complete ago is probably her iliac stent.  We will relieve days up to the anticoagulation clinic.  May need to check with the interventional radiologist who implanted her venous iliac stent in terms of the duration her clopidogrel.  RTC in 6 months with Dr. Guillen.  Thank you again for allowing us to participate in care of this patient.      No " Recipients

## 2019-03-06 NOTE — PROGRESS NOTES
Chief Complaint   Patient presents with   • Aortic stenosis     follow up/ pp of CW   Chronic anticoagulation for recurrent DVT/PE  ?PAF  History of hypertension    Subjective:   Sanjuanita Sosa is a 78 y.o. female who presents today for f/u above issues    She is a regular patient of Dr. Carlos Guillen.  She has history of aortic stenosis and prior history of DVT as well as pulmonary embolism on anticoagulation.  Last seen April 2018.  She was admitted last December for recurrent DVT after she was off anticoagulation for dental work.  Underwent tPA, mechanical thrombolysis, thrombectomy and left iliac stent placement. Discharged home of clopidogrel in addition to warfarin.    Had another echocardiography:  Left ventricular ejection fraction is visually estimated to be 65%.  Mild concentric left ventricular hypertrophy.  Mildly dilated left atrium.  Probably trileaflet AV with calcified aortic valve leaflets.  Moderate aortic stenosis.  Vmax 3.5 m/s, MG 29 mmHg.  Mild aortic insufficiency.  Mild mitral annular calcification.  Mild mitral regurgitation.  Mild tricuspid regurgitation.  Dilated inferior vena cava without inspiratory collapse.  Estimated right ventricular systolic pressure  is 55 mmHg.    Denies any cardiac symptoms.  No bleeding.  BP has been low lately (<110 mostly, occasionally under 100 mmHg)      Past Medical History:   Diagnosis Date   • AAA (abdominal aortic aneurysm) (HCC)    • Anticoagulation monitoring, special range    • Arrhythmia    • Arthritis    • Aspirin allergy 4/24/2018   • Autoimmune hepatitis (HCC) 3/19/2012   • Blood clotting disorder (HCC) 2015    clot in leg and lung   • Breath shortness    • Cancer (HCC)     thyroid   • Cataract     left eye needs surgery   • Diabetes     pre-diabetic   • Disorder of thyroid 2016    thyroid cancer   • Gallstones    • H/O: HTN (hypertension) 3/19/2012   • Heart valve disease    • Hepatitis B    • Hiatus hernia syndrome    •  Hyperlipidemia    • Hypertension    • Kidney disease    • Mixed hyperlipidemia 3/19/2012   • Moderate aortic stenosis 3/2018    • Murmur 3/19/2012   • Nonspecific abnormal electrocardiogram (ECG) (EKG) 3/19/2012   • Overweight(278.02) 3/19/2012   • Pain    • Palpitations    • Preoperative cardiovascular examination 3/19/2012   • Unspecified urinary incontinence      Past Surgical History:   Procedure Laterality Date   • DARREN BY LAPAROSCOPY  1/2/2017    Procedure: DARREN BY LAPAROSCOPY;  Surgeon: Chele Valles M.D.;  Location: SURGERY [facility];  Service:    • ERCP IN OR  12/30/2016    Procedure: ERCP IN OR;  Surgeon: Shekhar Rosado M.D.;  Location: SURGERY SAME DAY [facility];  Service:    • THYROIDECTOMY TOTAL N/A 10/12/2016    Procedure: THYROIDECTOMY TOTAL;  Surgeon: Nuno Duncan M.D.;  Location: SURGERY SAME DAY [facility];  Service:    • EXPLORATORY LAPAROTOMY  2/27/2013    Performed by Edson Craft M.D. at SURGERY [facility]   • SPLENECTOMY  2/27/2013    Performed by Edson Craft M.D. at SURGERY [facility]   • NODE DISSECTION  2/27/2013    Performed by Edson Craft M.D. at SURGERY [facility]   • OOPHORECTOMY  2/27/2013    Performed by Levi Nolan M.D. at SURGERY [facility]   • BLADDER SUSPENSION  5/27/2009    Performed by RINA HART at SURGERY SAME DAY [facility]   • CYSTOSCOPY  5/27/2009    Performed by RINA HART at SURGERY SAME DAY [facility]   • RECTOCELE REPAIR  5/27/2009    Performed by RINA HART at SURGERY SAME DAY [facility]   • APPENDECTOMY     • HYSTERECTOMY LAPAROSCOPY     • KNEE REPLACEMENT, TOTAL     • TONSILLECTOMY       Family History   Problem Relation Age of Onset   • Hyperlipidemia Mother    • Stroke Mother    • Hyperlipidemia Father    • Stroke Father    • Heart Disease Brother         CABG     Social History     Social History   • Marital status:      Spouse name: N/A   •  Number of children: N/A   • Years of education: N/A     Occupational History   • Not on file.     Social History Main Topics   • Smoking status: Current Every Day Smoker     Packs/day: 0.25     Years: 40.00     Types: Cigarettes   • Smokeless tobacco: Never Used   • Alcohol use No   • Drug use: No   • Sexual activity: Not Currently     Other Topics Concern   • Not on file     Social History Narrative   • No narrative on file     Allergies   Allergen Reactions   • Aspirin Anaphylaxis   • Penicillins Anaphylaxis     As a child  Tolerated Rocephin 2/2018     Outpatient Encounter Prescriptions as of 3/6/2019   Medication Sig Dispense Refill   • furosemide (LASIX) 40 MG Tab Take 0.5 Tabs by mouth every day. 30 Tab 3   • levothyroxine (SYNTHROID) 150 MCG Tab Take 1 Tab by mouth every morning before breakfast. ON EMPTY STOMACH 90 Tab 1   • metFORMIN (GLUCOPHAGE) 500 MG Tab Take 1 Tab by mouth 2 times a day, with meals. 60 Tab 11   • gabapentin (NEURONTIN) 300 MG Cap Take 1 Cap by mouth every 8 hours. 90 Cap    • clopidogrel (PLAVIX) 75 MG Tab Take 1 Tab by mouth every day. 30 Tab    • amLODIPine (NORVASC) 2.5 MG Tab Take 1 Tab by mouth every day. 30 Tab    • calcium carbonate (OS-MARKELL 500) 500 MG Tab TAKE ONE TABLET BY MOUTH TWICE DAILY WITH MEALS 60 Tab 4   • traZODone (DESYREL) 100 MG Tab TAKE ONE AND ONE-HALF TABLET BY MOUTH ONCE DAILY AS NEEDED FOR SLEEP. 45 Tab 10   • vitamin D, Ergocalciferol, (DRISDOL) 99149 units Cap capsule TAKE ONE CAPSULE BY MOUTH EVERY 7 DAYS 12 Cap 3   • atorvastatin (LIPITOR) 80 MG tablet TAKE ONE-HALF TABLET BY MOUTH EVERY EVENING 30 Tab 5   • losartan (COZAAR) 50 MG Tab TAKE ONE TABLET BY MOUTH ONCE A DAY 30 Tab 10   • warfarin (COUMADIN) 5 MG Tab Take 5-7.5 mg by mouth every evening. 5 mg Monday Wednesday Friday  7.5 Tuesday Thursday Saturday Sunday      • Misc. Devices Misc Cancel home oxygen 1 Device 1   • acetaminophen (TYLENOL) 325 MG Tab Take 2 Tabs by mouth every 6 hours as needed  (Mild Pain; (Pain scale 1-3); Temp greater than 100.5 F). (Patient not taking: Reported on 3/6/2019) 30 Tab 0   • senna-docusate (PERICOLACE OR SENOKOT S) 8.6-50 MG Tab Take 2 Tabs by mouth 2 Times a Day. (Patient not taking: Reported on 3/6/2019) 30 Tab 0   • [DISCONTINUED] furosemide (LASIX) 40 MG Tab Take 1 Tab by mouth 2 Times a Day. 30 Tab    • [DISCONTINUED] polyethylene glycol/lytes (MIRALAX) Pack Take 1 Packet by mouth 1 time daily as needed (if sennosides and docusate ineffective after 24 hours). (Patient not taking: Reported on 3/6/2019)  3   • [DISCONTINUED] magnesium hydroxide (MILK OF MAGNESIA) 400 MG/5ML Suspension Take 30 mL by mouth 1 time daily as needed (if polyethylene glycol ineffective after 24 hours). (Patient not taking: Reported on 3/6/2019) 1 Bottle    • [DISCONTINUED] bisacodyl (DULCOLAX) 10 MG Suppos Insert 1 Suppository in rectum 1 time daily as needed (if magnesium hydroxide ineffective after 24 hours). (Patient not taking: Reported on 3/6/2019)  0     Facility-Administered Encounter Medications as of 3/6/2019   Medication Dose Route Frequency Provider Last Rate Last Dose   • iodixanol (VISIPAQUE) SOLN    Intra-Op Once PRN Shekhar Rosado M.D.   10 mL at 01/18/17 0619     Review of Systems   Constitutional: Negative for weight loss.   HENT: Negative for nosebleeds.    Eyes: Negative for double vision.   Respiratory: Negative for shortness of breath.    Cardiovascular: Negative for chest pain, palpitations and orthopnea.   Gastrointestinal: Negative for blood in stool, nausea and vomiting.   Genitourinary: Positive for frequency. Negative for hematuria.   Musculoskeletal: Positive for back pain.   Skin: Negative for rash.   Neurological: Negative for dizziness, sensory change and focal weakness.   Psychiatric/Behavioral: The patient has insomnia. The patient is not nervous/anxious.         Objective:   /48 (BP Location: Left arm, Patient Position: Sitting)   Pulse 74   Ht 1.626  "m (5' 4\")   Wt 81.6 kg (180 lb)   LMP 10/12/1970   SpO2 96%   BMI 30.90 kg/m²     Physical Exam   Constitutional: She is oriented to person, place, and time. No distress.   Uses a walker   HENT:   Head: Atraumatic.   Eyes: EOM are normal.   Neck: No JVD present. No thyromegaly present.   Cardiovascular: Normal rate and regular rhythm.  Exam reveals no gallop.    Murmur heard.   Systolic murmur is present with a grade of 3/6   Pulmonary/Chest: Effort normal. No respiratory distress.   Abdominal: Soft. There is no tenderness.   Neurological: She is alert and oriented to person, place, and time.   Skin: Skin is warm. No erythema.   Psychiatric: She has a normal mood and affect. Her behavior is normal.       Assessment:     1. Moderate aortic stenosis last echo December 2018    2. Essential hypertension     3. Chronic anticoagulation     4. Pulmonary hypertension (HCC)         Medical Decision Making:  Today's Assessment / Status / Plan:     Her blood pressure is somewhat low.  She is currently on furosemide 40 mg twice a day which she was not on in the past.  She has no signs of fluid retention.  She is also has not on any potassium supplementation.  Will reduce furosemide to 20 mg/d and check BMP.  Will continue monitor current cardiac medications.  As above she is on combination of clopidogrel and warfarin.  Complete ago is probably her iliac stent.  We will relieve days up to the anticoagulation clinic.  May need to check with the interventional radiologist who implanted her venous iliac stent in terms of the duration her clopidogrel.  RTC in 6 months with Dr. Guillen.  Thank you again for allowing us to participate in care of this patient.  "

## 2019-03-11 ENCOUNTER — TELEPHONE (OUTPATIENT)
Dept: VASCULAR LAB | Facility: MEDICAL CENTER | Age: 79
End: 2019-03-11

## 2019-03-12 ENCOUNTER — ANTICOAGULATION MONITORING (OUTPATIENT)
Dept: VASCULAR LAB | Facility: MEDICAL CENTER | Age: 79
End: 2019-03-12

## 2019-03-12 DIAGNOSIS — Z86.711 HX PULMONARY EMBOLISM: ICD-10-CM

## 2019-03-12 DIAGNOSIS — Z79.01 CURRENT USE OF LONG TERM ANTICOAGULATION: ICD-10-CM

## 2019-03-12 LAB — INR PPP: 1.9 (ref 2–3.5)

## 2019-03-12 NOTE — PROGRESS NOTES
Anticoagulation Summary  As of 3/12/2019    INR goal:   2.0-3.0   TTR:   70.4 % (1.8 y)   INR used for dosin.9! (3/12/2019)   Warfarin maintenance plan:   7.5 mg (5 mg x 1.5) every Tue, Thu, Sat; 5 mg (5 mg x 1) all other days   Weekly warfarin total:   42.5 mg   Plan last modified:   Ailyn Kaplan (3/12/2019)   Next INR check:   3/19/2019   Target end date:   Indefinite    Indications    DVT (deep venous thrombosis)  left (Resolved) [I82.402]  Hx pulmonary embolism [Z86.711]             Anticoagulation Episode Summary     INR check location:       Preferred lab:       Send INR reminders to:       Comments:   JOSE LUIS [facility] fax [phone]      Anticoagulation Care Providers     Provider Role Specialty Phone number    WENDY Baker Referring Internal Medicine [phone]    [facility] Responsible  [phone]        Anticoagulation Patient Findings  Patient Findings     Negatives:   Signs/symptoms of thrombosis, Signs/symptoms of bleeding, Laboratory test error suspected, Change in health, Change in alcohol use, Change in activity, Upcoming invasive procedure, Emergency department visit, Upcoming dental procedure, Missed doses, Extra doses, Change in medications, Change in diet/appetite, Hospital admission, Bruising, Other complaints        Spoke with the patient's  nurse on the phone today, reporting a slightly SUB-therapeutic INR of 1.9.  Confirmed the current warfarin dosing regimen and patient compliance. Patient denies any interval changes to diet and/or medications. Patient denies any signs/symptoms of bleeding or clotting.  Patient instructed to begin increased warfarin dosing regimen of 7.5mg on Tues, Thurs and Sat and 5mg ROW. Patient asked to follow up again in 1 week. Orders sent to Jose Luis [facility].    Edelmira Kaplan  PharmD

## 2019-03-12 NOTE — TELEPHONE ENCOUNTER
Echocardiogram from hospitalization reviewed.  Cardiologist discussed with her.  Will repeat in 1 year unless cardiology has other recommendations    Patient has had an iliac vein stent.  We will address her antiplatelet and anticoagulant use (including LOT) at follow-up later this week.  Would recommend left iliac venous and lower extremity venous duplex in the next month or so for surveillance.  We can order at her follow-up visit     Michael Bloch, MD  Vascular Medicine

## 2019-03-19 ENCOUNTER — ANTICOAGULATION MONITORING (OUTPATIENT)
Dept: VASCULAR LAB | Facility: MEDICAL CENTER | Age: 79
End: 2019-03-19

## 2019-03-19 DIAGNOSIS — Z79.01 CURRENT USE OF LONG TERM ANTICOAGULATION: ICD-10-CM

## 2019-03-19 DIAGNOSIS — Z86.711 HX PULMONARY EMBOLISM: ICD-10-CM

## 2019-03-19 LAB — INR PPP: 2 (ref 2–3.5)

## 2019-03-19 NOTE — PROGRESS NOTES
Anticoagulation Summary  As of 3/19/2019    INR goal:   2.0-3.0   TTR:   69.6 % (1.8 y)   INR used for dosin.0 (3/19/2019)   Warfarin maintenance plan:   7.5 mg (5 mg x 1.5) every Tue, Thu, Sat; 5 mg (5 mg x 1) all other days   Weekly warfarin total:   42.5 mg   Plan last modified:   Ailyn Kaplan (3/12/2019)   Next INR check:   3/26/2019   Target end date:   Indefinite    Indications    DVT (deep venous thrombosis)  left (Resolved) [I82.402]  Hx pulmonary embolism [Z86.711]             Anticoagulation Episode Summary     INR check location:       Preferred lab:       Send INR reminders to:       Comments:   JOSE LUIS [facility] fax [phone]      Anticoagulation Care Providers     Provider Role Specialty Phone number    WENDY Baker Referring Internal Medicine [phone]    [facility] Responsible  [phone]        Anticoagulation Patient Findings    Spoke with Jose Luis IRVING RN to report therapeutic INR of 2.0. Continue current dosing regimen.  Follow up in 1 weeks, to reduce the risk of adverse events related to this high risk medication, warfarin.    Jennifer Patten, Clinical Pharmacist

## 2019-03-21 ENCOUNTER — TELEPHONE (OUTPATIENT)
Dept: VASCULAR LAB | Facility: MEDICAL CENTER | Age: 79
End: 2019-03-21

## 2019-03-21 DIAGNOSIS — I82.412 ACUTE DEEP VEIN THROMBOSIS (DVT) OF FEMORAL VEIN OF LEFT LOWER EXTREMITY (HCC): ICD-10-CM

## 2019-03-25 RX ORDER — OMEPRAZOLE 20 MG/1
20 CAPSULE, DELAYED RELEASE ORAL DAILY
Qty: 30 CAP | Refills: 11 | Status: SHIPPED | OUTPATIENT
Start: 2019-03-25

## 2019-03-25 RX ORDER — AMLODIPINE BESYLATE 10 MG/1
TABLET ORAL
Qty: 90 TAB | Refills: 3 | Status: SHIPPED | OUTPATIENT
Start: 2019-03-25 | End: 2019-06-12

## 2019-03-25 RX ORDER — OMEPRAZOLE 20 MG/1
CAPSULE, DELAYED RELEASE ORAL
COMMUNITY
Start: 2019-02-22 | End: 2019-03-25 | Stop reason: SDUPTHER

## 2019-03-25 RX ORDER — WARFARIN SODIUM 5 MG/1
5-7.5 TABLET ORAL EVERY EVENING
OUTPATIENT
Start: 2019-03-25

## 2019-03-26 ENCOUNTER — TELEPHONE (OUTPATIENT)
Dept: MEDICAL GROUP | Facility: MEDICAL CENTER | Age: 79
End: 2019-03-26

## 2019-03-26 ENCOUNTER — ANTICOAGULATION MONITORING (OUTPATIENT)
Dept: VASCULAR LAB | Facility: MEDICAL CENTER | Age: 79
End: 2019-03-26

## 2019-03-26 DIAGNOSIS — Z86.711 HX PULMONARY EMBOLISM: ICD-10-CM

## 2019-03-26 LAB — INR PPP: 3 (ref 2–3.5)

## 2019-03-26 RX ORDER — GABAPENTIN 300 MG/1
300 CAPSULE ORAL EVERY 8 HOURS
Qty: 90 CAP | Refills: 11 | Status: SHIPPED | OUTPATIENT
Start: 2019-03-26

## 2019-03-26 RX ORDER — WARFARIN SODIUM 5 MG/1
5-7.5 TABLET ORAL EVERY EVENING
Qty: 135 TAB | Refills: 1 | Status: SHIPPED | OUTPATIENT
Start: 2019-03-26

## 2019-03-26 NOTE — TELEPHONE ENCOUNTER
If it appears to be viral or allergic she does not need medication.  If it does look like she is developing a fever, shortness of breath or worsening cough, she would need to be seen at primary care or urgent care to evaluate need for antibiotic.

## 2019-03-26 NOTE — TELEPHONE ENCOUNTER
VOICEMAIL  1. Caller Name: [facility] Nurse                      Call Back Number: [phone]    2. Message: Home Care Nurse called and patient has a moist non-productive cough. She has normal vitals, no sore throat or fever, and clear lungs. Patient over all is not feeling well however. They were wondering about an anti-biotic. Please advise.    3. Patient approves office to leave a detailed voicemail/MyChart message: N\A

## 2019-03-26 NOTE — PROGRESS NOTES
Anticoagulation Summary  As of 3/26/2019    INR goal:   2.0-3.0   TTR:   69.9 % (1.8 y)   INR used for dosing:   3.0 (3/26/2019)   Warfarin maintenance plan:   7.5 mg (5 mg x 1.5) every Tue, Thu, Sat; 5 mg (5 mg x 1) all other days   Weekly warfarin total:   42.5 mg   No change documented:   Gonzalo Sauceda Ass't   Plan last modified:   Ailyn Kaplan (3/12/2019)   Next INR check:   4/2/2019   Target end date:   Indefinite    Indications    DVT (deep venous thrombosis)  left (Resolved) [I82.402]  Hx pulmonary embolism [Z86.711]             Anticoagulation Episode Summary     INR check location:       Preferred lab:       Send INR reminders to:       Comments:   BERRY IRVING fax [phone]      Anticoagulation Care Providers     Provider Role Specialty Phone number    Efrain Armendariz A.P.NMary Referring Internal Medicine [phone]    [facility] Anticoagulation Services Responsible  [phone]        Anticoagulation Patient Findings  Patient Findings     Negatives:   Signs/symptoms of thrombosis, Signs/symptoms of bleeding, Laboratory test error suspected, Change in health, Change in alcohol use, Change in activity, Upcoming invasive procedure, Emergency department visit, Upcoming dental procedure, Missed doses, Extra doses, Change in medications, Change in diet/appetite, Hospital admission, Bruising, Other complaints      Spoke with patient to report a therapeutic INR.  Pt instructed to continue with current warfarin dosing regimen. Pt denies any s/s of bleeding, bruising, clotting or any changes to diet or medication.  Will follow up in 1 weeks.  Gonzalo Sauceda Ass't

## 2019-04-02 ENCOUNTER — TELEPHONE (OUTPATIENT)
Dept: MEDICAL GROUP | Facility: MEDICAL CENTER | Age: 79
End: 2019-04-02

## 2019-04-02 ENCOUNTER — HOSPITAL ENCOUNTER (OUTPATIENT)
Dept: RADIOLOGY | Facility: MEDICAL CENTER | Age: 79
End: 2019-04-02
Attending: NURSE PRACTITIONER
Payer: MEDICARE

## 2019-04-02 ENCOUNTER — ANTICOAGULATION MONITORING (OUTPATIENT)
Dept: VASCULAR LAB | Facility: MEDICAL CENTER | Age: 79
End: 2019-04-02

## 2019-04-02 DIAGNOSIS — Z79.01 CURRENT USE OF LONG TERM ANTICOAGULATION: ICD-10-CM

## 2019-04-02 DIAGNOSIS — R05.9 COUGH: ICD-10-CM

## 2019-04-02 DIAGNOSIS — Z86.711 HX PULMONARY EMBOLISM: ICD-10-CM

## 2019-04-02 LAB — INR PPP: 3.2 (ref 2–3.5)

## 2019-04-02 PROCEDURE — 71046 X-RAY EXAM CHEST 2 VIEWS: CPT

## 2019-04-02 NOTE — TELEPHONE ENCOUNTER
VOICEMAIL  1. Caller Name: Vaelry                      Call Back Number: [phone]    2. Message: Home Care nurse called and is requesting an order for a portable chest x-ray for the patient's moist non-productive cough. Please advise.    3. Patient approves office to leave a detailed voicemail/MyChart message: N\A

## 2019-04-02 NOTE — PROGRESS NOTES
Anticoagulation Summary  As of 4/2/2019    INR goal:   2.0-3.0   TTR:   69.2 % (1.9 y)   INR used for dosing:   3.2! (4/2/2019)   Warfarin maintenance plan:   7.5 mg (5 mg x 1.5) every Wed, Sat; 5 mg (5 mg x 1) all other days   Weekly warfarin total:   40 mg   Plan last modified:   Jennifer Patten (4/2/2019)   Next INR check:   4/9/2019   Target end date:   Indefinite    Indications    DVT (deep venous thrombosis)  left (Resolved) [I82.402]  Hx pulmonary embolism [Z86.711]             Anticoagulation Episode Summary     INR check location:       Preferred lab:       Send INR reminders to:       Comments:   JOSE LUIS IRVING fax [phone]      Anticoagulation Care Providers     Provider Role Specialty Phone number    WENDY Baker Referring Internal Medicine [phone]    [facility] Anticoagulation Services Responsible  [phone]        Anticoagulation Patient Findings    Pt is supra therapeutic today.  Will reduce weekly dose by 6%. Follow up in 1 weeks, to reduce the risk of adverse events related to this high risk medication, warfarin.    Jennifer Patten Clinical Pharmacist    New order faxed to Jose Luis IRVING

## 2019-04-04 ENCOUNTER — TELEPHONE (OUTPATIENT)
Dept: VASCULAR LAB | Facility: MEDICAL CENTER | Age: 79
End: 2019-04-04

## 2019-04-04 NOTE — TELEPHONE ENCOUNTER
Called pt to remind that the  Venous duplex for assessment of stent  is due for surveillance. Scheduling office and our office phone numbers provided. BETHANY Alvarado.

## 2019-04-11 ENCOUNTER — ANTICOAGULATION MONITORING (OUTPATIENT)
Dept: VASCULAR LAB | Facility: MEDICAL CENTER | Age: 79
End: 2019-04-11

## 2019-04-11 DIAGNOSIS — I48.0 PAROXYSMAL ATRIAL FIBRILLATION (HCC): ICD-10-CM

## 2019-04-11 DIAGNOSIS — Z86.711 HX PULMONARY EMBOLISM: ICD-10-CM

## 2019-04-11 LAB — INR PPP: 1.6 (ref 2–3.5)

## 2019-04-11 NOTE — PROGRESS NOTES
Anticoagulation Summary  As of 2019    INR goal:   2.0-3.0   TTR:   69.1 % (1.9 y)   INR used for dosin.6! (2019)   Warfarin maintenance plan:   7.5 mg (5 mg x 1.5) every Thu, Sat; 5 mg (5 mg x 1) all other days   Weekly warfarin total:   40 mg   Plan last modified:   Ailyn Kaplan (2019)   Next INR check:   2019   Target end date:   Indefinite    Indications    DVT (deep venous thrombosis)  left (Resolved) [I82.402]  Hx pulmonary embolism [Z86.711]             Anticoagulation Episode Summary     INR check location:       Preferred lab:       Send INR reminders to:       Comments:   JOSE LUIS IRVING fax [phone]      Anticoagulation Care Providers     Provider Role Specialty Phone number    WENDY Baker Referring Internal Medicine [phone]    [facility] Anticoagulation Services Responsible  [phone]        Anticoagulation Patient Findings  Patient Findings     Positives:   Change in medications    Negatives:   Signs/symptoms of thrombosis, Signs/symptoms of bleeding, Laboratory test error suspected, Change in health, Change in alcohol use, Change in activity, Upcoming invasive procedure, Emergency department visit, Upcoming dental procedure, Missed doses, Extra doses, Change in diet/appetite, Hospital admission, Bruising, Other complaints    Comments:   Patient started taking a new multi vitamin.        Spoke with the patient & the  nurse, on the phone today, reporting a SUB-therapeutic INR of 1.6.  Confirmed the current warfarin dosing regimen and patient compliance. Patient denies any missed doses. Patient denies any interval changes to diet. Patient denies any signs/symptoms of bleeding or clotting.  Patient reports taking a new multivitamin. Will have patient bolus with 10mg TONIGHT, then will begin increased weekly regimen of 7.5mg on Tues, Th, Sat and 5mg ROW. Patient will follow up again in 5 days.   Orders sent to Jose Luis IRVING.     Edelmira BaltazarD

## 2019-04-15 ENCOUNTER — ANTICOAGULATION MONITORING (OUTPATIENT)
Dept: MEDICAL GROUP | Facility: MEDICAL CENTER | Age: 79
End: 2019-04-15

## 2019-04-15 ENCOUNTER — TELEPHONE (OUTPATIENT)
Dept: VASCULAR LAB | Facility: MEDICAL CENTER | Age: 79
End: 2019-04-15

## 2019-04-15 ENCOUNTER — ANTICOAGULATION MONITORING (OUTPATIENT)
Dept: VASCULAR LAB | Facility: MEDICAL CENTER | Age: 79
End: 2019-04-15

## 2019-04-15 DIAGNOSIS — Z86.711 HX PULMONARY EMBOLISM: ICD-10-CM

## 2019-04-15 LAB — INR PPP: 1.6 (ref 2–3.5)

## 2019-04-15 NOTE — PROGRESS NOTES
Anticoagulation Summary  As of 4/15/2019    INR goal:   2.0-3.0   TTR:   68.7 % (1.9 y)   INR used for dosin.6! (4/15/2019)   Warfarin maintenance plan:   10 mg (5 mg x 2) every Mon; 5 mg (5 mg x 1) every Sun, Wed; 7.5 mg (5 mg x 1.5) all other days   Weekly warfarin total:   50 mg   Plan last modified:   WENDY Alvarado (4/15/2019)   Next INR check:   2019   Target end date:   Indefinite    Indications    DVT (deep venous thrombosis)  left (Resolved) [I82.402]  Hx pulmonary embolism [Z86.711]             Anticoagulation Episode Summary     INR check location:       Preferred lab:       Send INR reminders to:       Comments:   JOSE LUIS IRVING fax [phone]      Anticoagulation Care Providers     Provider Role Specialty Phone number    WENDY Baker Referring Internal Medicine [phone]    [facility] Anticoagulation Services Responsible  [phone]        Anticoagulation Patient Findings        Spoke with Jose Luis who was with the pt and will relay the dosing instructions.  Patient is sub-therapeutic. Denies any medication or diet changes. No current symptoms of bleeding or thrombosis reported. Sanjuanita    Changes to current medical/health status since last appt: none.   Denies signs/symptoms of bleeding and/or thrombosis since the last appt.   Denies any interval changes to diet  Denies any interval changes to medications since last appt.   Denies any complications or cost restrictions with current therapy  Follow up appointment in 1 week(s).      Take 10 mg tonight then increase dose by 17% to 5 mg Sun & Tue with 7.5 mg rest of the week.   The patient is on a high risk medication and is sub- therapeutic. This could lead to clot formation or risk of stroke. Therefore this patient requires close monitoring and follow up.      CHEST guidelines recommend frequent INR monitoring at regular intervals (a few days up to a max of 12 weeks) to ensure they are on the proper dose of warfarin and not  having any complications from therapy.  INRs can dramatically change over a short time period due to diet, medications, and medical conditions.   The patient instructed to go to the ER for falls with a head injury,  blood in urine or stool or any bleeding that last longer than 20 min.      BETHANY Alvarado.

## 2019-04-17 ENCOUNTER — HOSPITAL ENCOUNTER (OUTPATIENT)
Dept: RADIOLOGY | Facility: MEDICAL CENTER | Age: 79
End: 2019-04-17
Attending: NURSE PRACTITIONER
Payer: MEDICARE

## 2019-04-17 DIAGNOSIS — I82.412 ACUTE DEEP VEIN THROMBOSIS (DVT) OF FEMORAL VEIN OF LEFT LOWER EXTREMITY (HCC): ICD-10-CM

## 2019-04-17 PROCEDURE — 93971 EXTREMITY STUDY: CPT | Mod: LT

## 2019-04-17 PROCEDURE — 93971 EXTREMITY STUDY: CPT | Mod: 26 | Performed by: SURGERY

## 2019-04-22 ENCOUNTER — TELEPHONE (OUTPATIENT)
Dept: VASCULAR LAB | Facility: MEDICAL CENTER | Age: 79
End: 2019-04-22

## 2019-04-23 ENCOUNTER — TELEPHONE (OUTPATIENT)
Dept: VASCULAR LAB | Facility: MEDICAL CENTER | Age: 79
End: 2019-04-23

## 2019-04-23 ENCOUNTER — ANTICOAGULATION MONITORING (OUTPATIENT)
Dept: VASCULAR LAB | Facility: MEDICAL CENTER | Age: 79
End: 2019-04-23

## 2019-04-23 DIAGNOSIS — Z86.711 HX PULMONARY EMBOLISM: ICD-10-CM

## 2019-04-23 LAB — INR PPP: 1.5 (ref 2–3.5)

## 2019-04-23 NOTE — TELEPHONE ENCOUNTER
LM on  with the results of the duplex. Let her know that the stent is patent and no evidence of any new clot in leg, however there is residual old clot resent. Informed of next scan date with my number in case of questions or concerns.    Put on tien for f/u left iliac VEIN duplex in april 2020   BETHANY Alvarado.

## 2019-04-23 NOTE — TELEPHONE ENCOUNTER
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  Continue medical management.  Repeat iliac vein u/s in one year.    Michael Bloch, MD  [facility]ascular Care

## 2019-04-29 ENCOUNTER — ANTICOAGULATION MONITORING (OUTPATIENT)
Dept: VASCULAR LAB | Facility: MEDICAL CENTER | Age: 79
End: 2019-04-29

## 2019-04-29 DIAGNOSIS — Z86.711 HX PULMONARY EMBOLISM: ICD-10-CM

## 2019-04-29 LAB — INR PPP: 2.5 (ref 2–3.5)

## 2019-04-29 NOTE — PROGRESS NOTES
Anticoagulation Summary  As of 2019    INR goal:   2.0-3.0   TTR:   67.8 % (1.9 y)   INR used for dosin.50 (2019)   Warfarin maintenance plan:   7.5 mg (5 mg x 1.5) every day   Weekly warfarin total:   52.5 mg   Plan last modified:   Ailyn Kaplan (2019)   Next INR check:   2019   Target end date:   Indefinite    Indications    DVT (deep venous thrombosis)  left (Resolved) [I82.402]  Hx pulmonary embolism [Z86.711]             Anticoagulation Episode Summary     INR check location:       Preferred lab:       Send INR reminders to:       Comments:   JOSE LUIS IRVING fax [phone]      Anticoagulation Care Providers     Provider Role Specialty Phone number    WENDY Baker Referring Internal Medicine [phone]    [facility] Anticoagulation Services Responsible  [phone]        Anticoagulation Patient Findings  Patient Findings     Negatives:   Signs/symptoms of thrombosis, Signs/symptoms of bleeding, Laboratory test error suspected, Change in health, Change in alcohol use, Change in activity, Upcoming invasive procedure, Emergency department visit, Upcoming dental procedure, Missed doses, Extra doses, Change in medications, Change in diet/appetite, Hospital admission, Bruising, Other complaints        Spoke with the patient's  nurse, [place], on the phone today, reporting a therapeutic INR of 2.5.  Confirmed the current warfarin dosing regimen and patient compliance.  Patient denies any interval changes to diet and/or medications. Patient denies any signs/symptoms of bleeding or clotting.  Patient instructed to continue with the current warfarin dosing regimen, and asked to follow up again in 1 week. Orders sent to Jose Luis IRVING.     Edelmira BaltazarD

## 2019-04-30 ENCOUNTER — HOSPITAL ENCOUNTER (OUTPATIENT)
Dept: RADIOLOGY | Facility: MEDICAL CENTER | Age: 79
End: 2019-04-30
Attending: NURSE PRACTITIONER
Payer: MEDICARE

## 2019-04-30 DIAGNOSIS — C73 PAPILLARY THYROID CARCINOMA (HCC): ICD-10-CM

## 2019-04-30 PROCEDURE — 76536 US EXAM OF HEAD AND NECK: CPT

## 2019-05-01 DIAGNOSIS — C73 PAPILLARY THYROID CARCINOMA (HCC): ICD-10-CM

## 2019-05-07 ENCOUNTER — ANTICOAGULATION MONITORING (OUTPATIENT)
Dept: VASCULAR LAB | Facility: MEDICAL CENTER | Age: 79
End: 2019-05-07

## 2019-05-07 DIAGNOSIS — Z86.711 HX PULMONARY EMBOLISM: ICD-10-CM

## 2019-05-07 LAB — INR PPP: 2.8 (ref 2–3.5)

## 2019-05-07 NOTE — PROGRESS NOTES
Anticoagulation Summary  As of 2019    INR goal:   2.0-3.0   TTR:   68.1 % (1.9 y)   INR used for dosin.80 (2019)   Warfarin maintenance plan:   7.5 mg (5 mg x 1.5) every day   Weekly warfarin total:   52.5 mg   Plan last modified:   Ailyn Kaplan (2019)   Next INR check:   2019   Target end date:   Indefinite    Indications    DVT (deep venous thrombosis)  left (Resolved) [I82.402]  Hx pulmonary embolism [Z86.711]             Anticoagulation Episode Summary     INR check location:       Preferred lab:       Send INR reminders to:       Comments:   [facility] fax [phone]      Anticoagulation Care Providers     Provider Role Specialty Phone number    WENDY Baker Referring Internal Medicine [phone]    [facility] Anticoagulation Services Responsible  [phone]        Anticoagulation Patient Findings  Patient Findings     Negatives:   Signs/symptoms of thrombosis, Signs/symptoms of bleeding, Laboratory test error suspected, Change in health, Change in alcohol use, Change in activity, Upcoming invasive procedure, Emergency department visit, Upcoming dental procedure, Missed doses, Extra doses, Change in medications, Change in diet/appetite, Hospital admission, Bruising, Other complaints        Spoke with the patient's  nurse on the phone today, reporting a therapeutic INR of 2.8.  Confirmed the current warfarin dosing regimen and patient compliance.  Patient denies any interval changes to diet and/or medications. Patient denies any signs/symptoms of bleeding or clotting.  Patient instructed to continue with the current warfarin dosing regimen, and asked to follow up again in 1 week.  Orders faxed to [facility].     Patient will be d/c from [facility] next week and will need to schedule an appt at clinic or fax a SO to lab of pt choice.     Edelmira Kaplan  PharmD

## 2019-05-09 RX ORDER — LOSARTAN POTASSIUM 50 MG/1
TABLET ORAL
Qty: 90 TAB | Refills: 3 | Status: SHIPPED | OUTPATIENT
Start: 2019-05-09 | End: 2019-06-12 | Stop reason: SDUPTHER

## 2019-05-14 ENCOUNTER — ANTICOAGULATION MONITORING (OUTPATIENT)
Dept: VASCULAR LAB | Facility: MEDICAL CENTER | Age: 79
End: 2019-05-14

## 2019-05-14 DIAGNOSIS — Z86.711 HX PULMONARY EMBOLISM: ICD-10-CM

## 2019-05-14 LAB — INR PPP: 2.6 (ref 2–3.5)

## 2019-05-14 NOTE — PROGRESS NOTES
Anticoagulation Summary  As of 2019    INR goal:   2.0-3.0   TTR:   68.5 % (2 y)   INR used for dosin.60 (2019)   Warfarin maintenance plan:   7.5 mg (5 mg x 1.5) every day   Weekly warfarin total:   52.5 mg   Plan last modified:   Ailyn Kaplan (2019)   Next INR check:   2019   Target end date:   Indefinite    Indications    DVT (deep venous thrombosis)  left (Resolved) [I82.402]  Hx pulmonary embolism [Z86.711]             Anticoagulation Episode Summary     INR check location:       Preferred lab:       Send INR reminders to:       Comments:         Anticoagulation Care Providers     Provider Role Specialty Phone number    WENDY Baker Referring Internal Medicine [phone]    [facility] Anticoagulation Services Responsible  [phone]        Anticoagulation Patient Findings    Spoke with Sanjuanita to report a therapeutic INR of 2.6. Continue current dosing regimen.  Follow up in 3 weeks, to reduce the risk of adverse events related to this high risk medication, warfarin.    Jennifer Patten Clinical Pharmacist    Pt is discharged from [facility] and face to face appointment scheduled at [facility]

## 2019-05-21 NOTE — PROGRESS NOTES
MD notified regarding SBP 82 and Low urine output. Order for NS bolus.    Offered and patient declined

## 2019-05-29 ENCOUNTER — TELEPHONE (OUTPATIENT)
Dept: VASCULAR LAB | Facility: MEDICAL CENTER | Age: 79
End: 2019-05-29

## 2019-05-29 ENCOUNTER — APPOINTMENT (OUTPATIENT)
Dept: VASCULAR LAB | Facility: MEDICAL CENTER | Age: 79
End: 2019-05-29
Payer: MEDICARE

## 2019-05-29 ENCOUNTER — ANTICOAGULATION VISIT (OUTPATIENT)
Dept: VASCULAR LAB | Facility: MEDICAL CENTER | Age: 79
End: 2019-05-29
Attending: INTERNAL MEDICINE
Payer: MEDICARE

## 2019-05-29 VITALS — HEART RATE: 67 BPM | SYSTOLIC BLOOD PRESSURE: 122 MMHG | DIASTOLIC BLOOD PRESSURE: 56 MMHG

## 2019-05-29 DIAGNOSIS — Z86.711 HX PULMONARY EMBOLISM: ICD-10-CM

## 2019-05-29 DIAGNOSIS — I10 ESSENTIAL HYPERTENSION: ICD-10-CM

## 2019-05-29 LAB
INR BLD: 3.1 (ref 0.9–1.2)
INR PPP: 3.1 (ref 2–3.5)

## 2019-05-29 PROCEDURE — 99212 OFFICE O/P EST SF 10 MIN: CPT | Performed by: NURSE PRACTITIONER

## 2019-05-29 PROCEDURE — 85610 PROTHROMBIN TIME: CPT

## 2019-05-29 NOTE — TELEPHONE ENCOUNTER
Spoke with pt regarding recent appt cancellation.  Rescheduled her for next week.    Brianna CARPENTER  [facility]

## 2019-05-29 NOTE — PROGRESS NOTES
Anticoagulation Summary  As of 5/29/2019    INR goal:   2.0-3.0   TTR:   68.7 % (2 y)   INR used for dosing:   3.10! (5/29/2019)   Warfarin maintenance plan:   7.5 mg (5 mg x 1.5) every day   Weekly warfarin total:   52.5 mg   Plan last modified:   Ailyn Kaplan (4/23/2019)   Next INR check:   6/19/2019   Target end date:   Indefinite    Indications    DVT (deep venous thrombosis)  left (Resolved) [I82.402]  Hx pulmonary embolism [Z86.711]             Anticoagulation Episode Summary     INR check location:       Preferred lab:       Send INR reminders to:       Comments:         Anticoagulation Care Providers     Provider Role Specialty Phone number    WENDY Baker Referring Internal Medicine [phone]    [facility] Anticoagulation Services Responsible  [phone]        Anticoagulation Patient Findings      HPI:  Sanjuanita Yanira Ian seen in clinic today for follow up on anticoagulation therapy in the presence of DVT, PE hx. Denies any changes to current medical/health status since last appointment. Denies any medication or diet changes. No current symptoms of bleeding or thrombosis reported.    A/P:   INR supratherapeutic. Will decrease one dose then continue current regimen. She will incorporate more greens into her diet and stay consistent. BP recorded in vitals.    Pt cancelled her vascular appt for later today. She is requesting APRN call her to discuss if she really needs follow up or if she can have follow up via telephone this time.    Follow up appointment in 3 week(s).    Next Appointment: Wednesday, June 19, 2019 at 9:45 am.     Farheen CARPENTER

## 2019-06-12 ENCOUNTER — OFFICE VISIT (OUTPATIENT)
Dept: VASCULAR LAB | Facility: MEDICAL CENTER | Age: 79
End: 2019-06-12
Attending: INTERNAL MEDICINE
Payer: MEDICARE

## 2019-06-12 VITALS
HEART RATE: 65 BPM | DIASTOLIC BLOOD PRESSURE: 43 MMHG | HEIGHT: 64 IN | WEIGHT: 184 LBS | SYSTOLIC BLOOD PRESSURE: 89 MMHG | BODY MASS INDEX: 31.41 KG/M2

## 2019-06-12 DIAGNOSIS — Z79.01 CHRONIC ANTICOAGULATION: ICD-10-CM

## 2019-06-12 DIAGNOSIS — I10 ESSENTIAL HYPERTENSION: ICD-10-CM

## 2019-06-12 DIAGNOSIS — Z86.711 HX PULMONARY EMBOLISM: ICD-10-CM

## 2019-06-12 DIAGNOSIS — I48.0 PAROXYSMAL ATRIAL FIBRILLATION (HCC): ICD-10-CM

## 2019-06-12 DIAGNOSIS — E11.9 CONTROLLED TYPE 2 DIABETES MELLITUS WITHOUT COMPLICATION, WITHOUT LONG-TERM CURRENT USE OF INSULIN (HCC): ICD-10-CM

## 2019-06-12 DIAGNOSIS — E11.8 TYPE 2 DIABETES MELLITUS WITH COMPLICATION, WITHOUT LONG-TERM CURRENT USE OF INSULIN (HCC): ICD-10-CM

## 2019-06-12 DIAGNOSIS — E89.0 POSTOPERATIVE HYPOTHYROIDISM: ICD-10-CM

## 2019-06-12 DIAGNOSIS — E78.5 DYSLIPIDEMIA: ICD-10-CM

## 2019-06-12 LAB — INR PPP: 3.7 (ref 2–3.5)

## 2019-06-12 PROCEDURE — 99214 OFFICE O/P EST MOD 30 MIN: CPT | Performed by: NURSE PRACTITIONER

## 2019-06-12 PROCEDURE — 85610 PROTHROMBIN TIME: CPT | Performed by: NURSE PRACTITIONER

## 2019-06-12 PROCEDURE — 99213 OFFICE O/P EST LOW 20 MIN: CPT | Performed by: NURSE PRACTITIONER

## 2019-06-12 RX ORDER — LOSARTAN POTASSIUM 50 MG/1
TABLET ORAL
Qty: 90 TAB | Refills: 3 | Status: SHIPPED | OUTPATIENT
Start: 2019-06-12

## 2019-06-12 RX ORDER — AMLODIPINE BESYLATE 5 MG/1
2.5 TABLET ORAL DAILY
Qty: 90 TAB | Refills: 3 | Status: SHIPPED | OUTPATIENT
Start: 2019-06-12

## 2019-06-12 ASSESSMENT — ENCOUNTER SYMPTOMS
BLURRED VISION: 0
HEADACHES: 0
BLOOD IN STOOL: 0
WEIGHT LOSS: 0
MYALGIAS: 0
BRUISES/BLEEDS EASILY: 1
FALLS: 0
SPEECH CHANGE: 0
DIZZINESS: 0
WHEEZING: 0
PALPITATIONS: 0
BACK PAIN: 1
NERVOUS/ANXIOUS: 0
SENSORY CHANGE: 0
SHORTNESS OF BREATH: 0
DOUBLE VISION: 0

## 2019-06-12 NOTE — PROGRESS NOTES
FOLLOW-UP VASCULAR VISIT  Subjective:   Sanjuanita Sosa is a 79 y.o. female who presents today 6/12/19 for   Chief Complaint   Patient presents with   • Follow-Up     HPI:     Patient here for f/u of AAA, chronic anticoagulation, HTN, and dyslipidemia  BP is low in office; not regularly taking at home, but does state mostly low 100s at other MD offices  Is occasionally lightheaded/dizzy  She is unsure about meds she is taking and doses; will call later today and review med rec over the phone  AM fingersticks mostly 110-120s; denies hypoglycemia  No c/o chest pain, SOB, LE swelling, or palpitations  Conitnues on warfarin and? Plavix; denies any bleeding concerns  On Atorvastatin- no myalgias  Continues to smoke~ 10 cig/day  No lab work done  Denies TIA or stroke-like symptoms  Has had no falls  Uses walker  Needs INR today    Social History   Substance Use Topics   • Smoking status: Current Every Day Smoker     Packs/day: 0.25     Years: 40.00     Types: Cigarettes   • Smokeless tobacco: Never Used   • Alcohol use No     DIET AND EXERCISE:  Weight Change: up 7lbs  Diet: reasonable diabetic diet; limited Vit K  Exercise: minimal exercise due to chronic back pain    Review of Systems   Constitutional: Negative for malaise/fatigue and weight loss.   HENT: Negative for nosebleeds.    Eyes: Negative for blurred vision and double vision.   Respiratory: Negative for shortness of breath and wheezing.    Cardiovascular: Negative for chest pain, palpitations and leg swelling.   Gastrointestinal: Negative for blood in stool.   Genitourinary: Negative for hematuria.   Musculoskeletal: Positive for back pain and joint pain. Negative for falls and myalgias.   Neurological: Negative for dizziness, sensory change, speech change and headaches.   Endo/Heme/Allergies: Bruises/bleeds easily.   Psychiatric/Behavioral: The patient is not nervous/anxious.       Objective:     Vitals:    06/12/19 1007   BP: (!) 89/43   BP  "Location: Left arm   Patient Position: Sitting   BP Cuff Size: Adult   Pulse: 65   Weight: 83.5 kg (184 lb)   Height: 1.626 m (5' 4\")      Body mass index is 31.58 kg/m².  Physical Exam   Constitutional: She is oriented to person, place, and time. She appears well-developed and well-nourished. No distress.   Eyes: Pupils are equal, round, and reactive to light.   Neck: No JVD present.   Cardiovascular: Normal rate and regular rhythm.    Murmur heard.  Pulmonary/Chest: Effort normal and breath sounds normal. No respiratory distress. She has no wheezes.   Musculoskeletal: She exhibits tenderness. She exhibits no edema.   Uses walker   Neurological: She is alert and oriented to person, place, and time. Coordination normal.   Skin: Skin is warm and dry. She is not diaphoretic.   Psychiatric: She has a normal mood and affect. Her behavior is normal.     Lab Results   Component Value Date    CHOLSTRLTOT 108 03/22/2018    LDL 58 03/22/2018    HDL 28 (A) 03/22/2018    TRIGLYCERIDE 112 03/22/2018      Lab Results   Component Value Date    PROTHROMBTM 24.8 (H) 12/14/2018    INR 3.70 06/12/2019       Lab Results   Component Value Date    HBA1C 5.8 02/05/2019      Lab Results   Component Value Date    SODIUM 139 12/13/2018    POTASSIUM 4.0 12/13/2018    CHLORIDE 101 12/13/2018    CO2 35 (H) 12/13/2018    GLUCOSE 112 (H) 12/13/2018    BUN 8 12/13/2018    CREATININE 0.90 12/13/2018    IFAFRICA >60 12/13/2018    IFNOTAFR >60 12/13/2018        Lab Results   Component Value Date    WBC 10.7 12/14/2018    RBC 2.97 (L) 12/14/2018    HEMOGLOBIN 8.7 (L) 12/14/2018    HEMATOCRIT 28.6 (L) 12/14/2018    MCV 96.3 12/14/2018    MCH 29.3 12/14/2018    MCHC 30.4 (L) 12/14/2018    MPV 10.6 12/14/2018     CT chest 4/27/2015  There are pulmonary emboli extending into the right upper lobe, right middle lobe, right lower lobe, and left lower lobe. The main pulmonary artery is of normal caliber. No shift of the intraventricular septum or reflux of " contrast into the hepatic veins. There are scattered arterial calcifications. No significant pericardial effusion.  There is mild left basilar atelectasis. No significant pleural fluid. The central airways are clear.  No adenopathy is identified.  Limited visualization of the upper abdomen demonstrates severe atherosclerotic disease.    echo 4/28/2015  Technically difficult study.   Normal left ventricular size and function.  Left ventricular ejection fraction is 60% to 65%.  Grade I diastolic dysfunction - mitral inflow E/A is <1.0.  Mild mitral regurgitation.  Mild aortic stenosis.  Mild to moderate aortic insufficiency.  Moderate tricuspid regurgitation.  Right ventricular systolic pressure is estimated to be 43-48 mmHg.  No prior study is available for comparison.      CT scan chest abdomen and pelvis 10/2015   Thoracic abdominal aortic penetrating atheromatous ulcer without evidence for intramural hematoma or dissection.   Exclusion of early dissection or intramural hematoma (although no evidence for this entity) is not possible on this contrast only study.  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  Ventral hernia containing small bowel and without evidence for obstruction or inflammation    CTA 5/17/2016  1. Considerable partially ulcerated plaque within the aorta similar to previous findings. No dissection. No mediastinal hematoma.  2. The abdominal aorta measures up to 2.6 cm in diameter. No change compared with previous. No retroperitoneal hematoma.  3. High-grade stenosis celiac artery.  4. Atelectasis within both lungs and tiny right apical pleural-based nodule unchanged.  5. Surgical absent spleen. Small splenules and chronic loculated fluid collection  left upper quadrant again demonstrated.  6. Diverticula colon without evidence of diverticulitis.  7. Ventral abdominal wall hernia containing nonobstructed small bowel loops.  8. Large and small bowel incompletely imaged. No free fluid within the abdomen identified.  9. Partial resection pancreas.  10. Tiny gallstones.  11. Coronary artery calcifications.  12. 1.3 cm left lobe thyroid nodule. Ultrasound followup is consideration.    U/S Aorta Dec 2016:  1.  Fusiform infrarenal abdominal aortic aneurysm measures 2.9 x 2.9 cm in maximum axial dimensions and is located at the mid aortic level. Measurement on prior CT was 2.6 cm.  2.  Extensive aortic atherosclerosis.    CT abd and Pelvis Oct 24, 2017  1.  Postoperative changes as described.  2.  Minimal pneumobilia, likely postoperative.  3.  Probable chronic fluid collection or mass lateral to the greater curvature stomach, overall decreased in size from prior exam.  4.  Low-attenuation lesion in the nito hepatis, unchanged and of uncertain significance.  5.  Nonobstructing ventral abdominal hernia.  6.  No focal mesenteric inflammatory process.  7.  Abdominal aortic ectasia with chronic dissection, unchanged from prior exam.  No periaortic fluid collection  .  Echo March 2018  Technically difficult study - adequate information is obtained.   Left ventricular ejection fraction is visually estimated to be 60%.  Mild concentric left ventricular hypertrophy.  Mildly dilated left   atrium.  Moderate aortic stenosis. Mild-moderate aortic insufficiency.  Right ventricular systolic pressure is estimated to be 65 mmHg.    LE Venous duplex- March 2018   Normal bilateral superficial and deep venous examination of the lower    extremities.    Limited evaluation of the posterior tibial and peroneal veins.     Echo 12/2018  CONCLUSIONS  Compared to the images of the study done on 03/01/2018, no significant   change.  Left ventricular ejection fraction is visually estimated to be  65%.  Mild concentric left ventricular hypertrophy.  Mildly dilated left atrium.  Probably trileaflet AV with calcified aortic valve leaflets.  Moderate aortic stenosis.  Vmax 3.5 m/s, MG 29 mmHg.  Mild aortic insufficiency.  Mild mitral annular calcification.  Mild mitral regurgitation.  Mild tricuspid regurgitation.  Dilated inferior vena cava without inspiratory collapse.  Estimated right ventricular systolic pressure  is 55 mmHg.    Abdominal aorta u/s 1/2019   CONCLUSIONS   No evidence of infrarenal aortic aneurysm.    Stenosis of the distal aorta. Plaque visualized in the distal aorta with    elevated velocities consistent with <50% stenosis.   Common iliac diameter (cm): Right-1.2, Left- 0.9.    Waveforms and velocities of the right iliac arteries are normal.    Plaque of the left common iliac artery without evidence of hemodynamically    significant stenosis.   Waveforms and velocities of the left external iliac arteries are normal.    LLE venous duplex 4/2019  CONCLUSIONS   1.  Subacute or chronic left lower extremity popliteal venous thrombosis in    one of two paired popliteal veins.    2.  Patent left common femoral and iliac veins.    Medical Decision Making:  Today's Assessment / Status / Plan:     Patient Type: Secondary Prevention    Etiology of Established CVD if Present:   1.  Ulceration thoracic aorta - not necessarily penetrating - stable on serial imaging  2.  AAA, small  3.  DVT and PE      Lipid Management: Qualifies for Statin Therapy Based on 2013 ACC/AHA Guidelines: yes  Calculated 10-Year Risk of ASCVD: N/A  Currently on Statin: Yes  Excellent control on previous blood work (March, 2018)  -continue atorvastatin; call to verify dose  -recheck fasting lipids once fasting    Blood Pressure Management:Goal: ACC/DEMETRIA Office BP Goal:< 130/80's; Under Control: yes  Under excellent control at least in office; most likely overtreated  Can't afford home monitor at present  Has CKD but stable or  improved  - Decrease amlodipine to 2.5 mg daily  - Encouraged getting a home BP cuff, or checking at pharmacies/other MD offices and keeping a log  - Continue losartan 50 mg daily, for now  - Check renal fxn/lytes + urine for MA with next labs    Glycemic Status: Diabetic-  last A1c under excellent control  Good control per fingersticks: AM fasting usually 110s-120s  -Continue current meds and close f/u with PCP  - Continue TLC  -Yearly flu shot and eye exam - reminded patient  - A1c, urine for MA, and renal fxn prior to next visit    Anti-Platelet/Anti-Coagulant Tx:   Unable to afford Xarelto.  Unclear if on Plavix; pt will call with med list  Patient in agreement that long term benefit of anticoag likely outweighs risk  - Continue indefinite anticoagulation with warfarin; decrease dosing per calendar. INR:3.7 today  - Follow up in coag clinic in 3wks, per pt    Smoking:  Has had significant recidivism. Discussed cutting amount in half and consider Nicoderm patch. Pt is not wanting to quit at present, she is satisfied with smoking despite the risks. Will continue to address at each visit    Physical Activity: encouraged exercise class 2-3 x per week. Pt limited due to back and joint pain- she will discuss possible pain management referral with PCP    Weight Management and Nutrition: Dietary plan was discussed with patient at this visit including low fats and carb, diabetic diet    Other:     1. Chronic renal disease stage G3B in past with modest improvement to G3A on previous blood work. No albuminuria (A0) in past. No longer seeing nephrology. Continue ARB and BP control. Check GFR, urine for ma,  and CBC prior to next visit.     2.  Hypothyroidism with h/o papillary thyroid cancer. On levothyroxine - dose adjusted by PCP. Recheck TSH.  Otherwise defer all management to pcp    3.  Ulceration, thoracic aorta - stable on surveillance imaging.  Continue medical management    4.  AAA, small and stable on serial  imaging - continue medical management and surveillance     5. Aortic stenosis and insufficiency-  mod stenosis and mild to mod aortic insufficiency, per echo.  Cont to f/u with cards; echo annually, unless recommended sooner by cardiology    Instructed to follow-up with PCP for remainder of adult medical needs: yes  We will partner with other providers in the management of established vascular disease and cardiometabolic risk factors.    Studies to Be Obtained: aortoiliac duplex 4/2020         Echo 3/2020  Labs to Be Obtained: TSH, CMP, CBC, lipids, A1C, urine for MA when fasting    Follow up in: 6 wks    Sandra Szymanski, A.P.N.       CC:   MD Efrain Calero APN Chanwit Roongsritong, MD

## 2019-06-27 ENCOUNTER — HOSPITAL ENCOUNTER (OUTPATIENT)
Dept: LAB | Facility: MEDICAL CENTER | Age: 79
End: 2019-06-27
Attending: NURSE PRACTITIONER
Payer: MEDICARE

## 2019-06-27 DIAGNOSIS — I10 ESSENTIAL HYPERTENSION: ICD-10-CM

## 2019-06-27 DIAGNOSIS — Z79.01 CHRONIC ANTICOAGULATION: ICD-10-CM

## 2019-06-27 DIAGNOSIS — E78.5 DYSLIPIDEMIA: ICD-10-CM

## 2019-06-27 DIAGNOSIS — E11.9 CONTROLLED TYPE 2 DIABETES MELLITUS WITHOUT COMPLICATION, WITHOUT LONG-TERM CURRENT USE OF INSULIN (HCC): ICD-10-CM

## 2019-06-27 DIAGNOSIS — E89.0 POSTOPERATIVE HYPOTHYROIDISM: ICD-10-CM

## 2019-06-27 LAB
ALBUMIN SERPL BCP-MCNC: 4.4 G/DL (ref 3.2–4.9)
ALBUMIN/GLOB SERPL: 1.5 G/DL
ALP SERPL-CCNC: 59 U/L (ref 30–99)
ALT SERPL-CCNC: 8 U/L (ref 2–50)
ANION GAP SERPL CALC-SCNC: 11 MMOL/L (ref 0–11.9)
ANISOCYTOSIS BLD QL SMEAR: NORMAL
AST SERPL-CCNC: 13 U/L (ref 12–45)
BASOPHILS # BLD AUTO: 0.9 % (ref 0–1.8)
BASOPHILS # BLD: 0.09 K/UL (ref 0–0.12)
BILIRUB SERPL-MCNC: 0.9 MG/DL (ref 0.1–1.5)
BUN SERPL-MCNC: 24 MG/DL (ref 8–22)
BURR CELLS BLD QL SMEAR: NORMAL
CALCIUM SERPL-MCNC: 9.2 MG/DL (ref 8.5–10.5)
CHLORIDE SERPL-SCNC: 104 MMOL/L (ref 96–112)
CHOLEST SERPL-MCNC: 169 MG/DL (ref 100–199)
CO2 SERPL-SCNC: 25 MMOL/L (ref 20–33)
CREAT SERPL-MCNC: 1.27 MG/DL (ref 0.5–1.4)
EOSINOPHIL # BLD AUTO: 2.55 K/UL (ref 0–0.51)
EOSINOPHIL NFR BLD: 25 % (ref 0–6.9)
ERYTHROCYTE [DISTWIDTH] IN BLOOD BY AUTOMATED COUNT: 53.2 FL (ref 35.9–50)
EST. AVERAGE GLUCOSE BLD GHB EST-MCNC: 137 MG/DL
GLOBULIN SER CALC-MCNC: 3 G/DL (ref 1.9–3.5)
GLUCOSE SERPL-MCNC: 104 MG/DL (ref 65–99)
HBA1C MFR BLD: 6.4 % (ref 0–5.6)
HCT VFR BLD AUTO: 46.2 % (ref 37–47)
HDLC SERPL-MCNC: 41 MG/DL
HGB BLD-MCNC: 14.9 G/DL (ref 12–16)
LDLC SERPL CALC-MCNC: 94 MG/DL
LG PLATELETS BLD QL SMEAR: NORMAL
LYMPHOCYTES # BLD AUTO: 2.55 K/UL (ref 1–4.8)
LYMPHOCYTES NFR BLD: 25 % (ref 22–41)
MACROCYTES BLD QL SMEAR: NORMAL
MANUAL DIFF BLD: ABNORMAL
MCH RBC QN AUTO: 31.5 PG (ref 27–33)
MCHC RBC AUTO-ENTMCNC: 32.3 G/DL (ref 33.6–35)
MCV RBC AUTO: 97.7 FL (ref 81.4–97.8)
MONOCYTES # BLD AUTO: 0.35 K/UL (ref 0–0.85)
MONOCYTES NFR BLD AUTO: 3.4 % (ref 0–13.4)
MORPHOLOGY BLD-IMP: NORMAL
NEUTROPHILS # BLD AUTO: 4.66 K/UL (ref 2–7.15)
NEUTROPHILS NFR BLD: 45.7 % (ref 44–72)
PLATELET # BLD AUTO: 373 K/UL (ref 164–446)
PLATELET BLD QL SMEAR: NORMAL
PMV BLD AUTO: 10.3 FL (ref 9–12.9)
POIKILOCYTOSIS BLD QL SMEAR: NORMAL
POTASSIUM SERPL-SCNC: 4.3 MMOL/L (ref 3.6–5.5)
PROT SERPL-MCNC: 7.4 G/DL (ref 6–8.2)
RBC # BLD AUTO: 4.73 M/UL (ref 4.2–5.4)
RBC BLD AUTO: PRESENT
SODIUM SERPL-SCNC: 140 MMOL/L (ref 135–145)
TRIGL SERPL-MCNC: 172 MG/DL (ref 0–149)
TSH SERPL DL<=0.005 MIU/L-ACNC: 45.7 UIU/ML (ref 0.38–5.33)
WBC # BLD AUTO: 10.2 K/UL (ref 4.8–10.8)

## 2019-06-27 PROCEDURE — 36415 COLL VENOUS BLD VENIPUNCTURE: CPT

## 2019-06-27 PROCEDURE — 85007 BL SMEAR W/DIFF WBC COUNT: CPT

## 2019-06-27 PROCEDURE — 85027 COMPLETE CBC AUTOMATED: CPT

## 2019-06-27 PROCEDURE — 84443 ASSAY THYROID STIM HORMONE: CPT

## 2019-06-27 PROCEDURE — 80053 COMPREHEN METABOLIC PANEL: CPT

## 2019-06-27 PROCEDURE — 80061 LIPID PANEL: CPT

## 2019-06-27 PROCEDURE — 83036 HEMOGLOBIN GLYCOSYLATED A1C: CPT

## 2019-06-28 ENCOUNTER — HOSPITAL ENCOUNTER (OUTPATIENT)
Facility: MEDICAL CENTER | Age: 79
End: 2019-06-28
Attending: NURSE PRACTITIONER
Payer: MEDICARE

## 2019-06-28 LAB
CREAT UR-MCNC: 219.5 MG/DL
MICROALBUMIN UR-MCNC: 2.1 MG/DL
MICROALBUMIN/CREAT UR: 10 MG/G (ref 0–30)

## 2019-06-28 PROCEDURE — 82570 ASSAY OF URINE CREATININE: CPT

## 2019-06-28 PROCEDURE — 82043 UR ALBUMIN QUANTITATIVE: CPT

## 2019-07-02 ENCOUNTER — OFFICE VISIT (OUTPATIENT)
Dept: ENDOCRINOLOGY | Facility: MEDICAL CENTER | Age: 79
End: 2019-07-02
Payer: MEDICARE

## 2019-07-02 ENCOUNTER — HOSPITAL ENCOUNTER (OUTPATIENT)
Dept: LAB | Facility: MEDICAL CENTER | Age: 79
End: 2019-07-02
Attending: PHYSICIAN ASSISTANT
Payer: MEDICARE

## 2019-07-02 VITALS
WEIGHT: 185 LBS | DIASTOLIC BLOOD PRESSURE: 70 MMHG | HEIGHT: 64 IN | BODY MASS INDEX: 31.58 KG/M2 | HEART RATE: 88 BPM | SYSTOLIC BLOOD PRESSURE: 100 MMHG | OXYGEN SATURATION: 92 %

## 2019-07-02 DIAGNOSIS — E55.9 VITAMIN D DEFICIENCY DISEASE: ICD-10-CM

## 2019-07-02 DIAGNOSIS — I10 HYPERTENSION, UNSPECIFIED TYPE: ICD-10-CM

## 2019-07-02 DIAGNOSIS — C73 PAPILLARY THYROID CARCINOMA (HCC): ICD-10-CM

## 2019-07-02 DIAGNOSIS — E78.5 DYSLIPIDEMIA: ICD-10-CM

## 2019-07-02 DIAGNOSIS — E89.0 POSTOPERATIVE HYPOTHYROIDISM: ICD-10-CM

## 2019-07-02 DIAGNOSIS — E11.9 CONTROLLED TYPE 2 DIABETES MELLITUS WITHOUT COMPLICATION, WITHOUT LONG-TERM CURRENT USE OF INSULIN (HCC): ICD-10-CM

## 2019-07-02 LAB
T3 SERPL-MCNC: 57.4 NG/DL (ref 60–181)
T3FREE SERPL-MCNC: 2.02 PG/ML (ref 2.4–4.2)
T4 FREE SERPL-MCNC: 0.83 NG/DL (ref 0.53–1.43)
TSH SERPL DL<=0.005 MIU/L-ACNC: 40.63 UIU/ML (ref 0.38–5.33)

## 2019-07-02 PROCEDURE — 86800 THYROGLOBULIN ANTIBODY: CPT

## 2019-07-02 PROCEDURE — 84439 ASSAY OF FREE THYROXINE: CPT

## 2019-07-02 PROCEDURE — 36415 COLL VENOUS BLD VENIPUNCTURE: CPT

## 2019-07-02 PROCEDURE — 84443 ASSAY THYROID STIM HORMONE: CPT

## 2019-07-02 PROCEDURE — 84480 ASSAY TRIIODOTHYRONINE (T3): CPT

## 2019-07-02 PROCEDURE — 99214 OFFICE O/P EST MOD 30 MIN: CPT | Performed by: PHYSICIAN ASSISTANT

## 2019-07-02 PROCEDURE — 84481 FREE ASSAY (FT-3): CPT

## 2019-07-03 ENCOUNTER — ANTICOAGULATION VISIT (OUTPATIENT)
Dept: VASCULAR LAB | Facility: MEDICAL CENTER | Age: 79
End: 2019-07-03
Attending: INTERNAL MEDICINE
Payer: MEDICARE

## 2019-07-03 VITALS — HEART RATE: 56 BPM | SYSTOLIC BLOOD PRESSURE: 116 MMHG | DIASTOLIC BLOOD PRESSURE: 58 MMHG

## 2019-07-03 DIAGNOSIS — Z86.711 HX PULMONARY EMBOLISM: ICD-10-CM

## 2019-07-03 LAB
INR BLD: 2.5 (ref 0.9–1.2)
INR PPP: 2.5 (ref 2–3.5)

## 2019-07-03 PROCEDURE — 85610 PROTHROMBIN TIME: CPT

## 2019-07-03 PROCEDURE — 99211 OFF/OP EST MAY X REQ PHY/QHP: CPT | Performed by: NURSE PRACTITIONER

## 2019-07-03 NOTE — PROGRESS NOTES
Anticoagulation Summary  As of 7/3/2019    INR goal:   2.0-3.0   TTR:   66.7 % (2.1 y)   INR used for dosin.50 (7/3/2019)   Warfarin maintenance plan:   5 mg (5 mg x 1) every Wed, Sat; 7.5 mg (5 mg x 1.5) all other days   Weekly warfarin total:   47.5 mg   Plan last modified:   YUE GarsiaPSTEFAN (2019)   Next INR check:   2019   Target end date:   Indefinite    Indications    DVT (deep venous thrombosis)  left (Resolved) [I82.402]  Hx pulmonary embolism [Z86.711]             Anticoagulation Episode Summary     INR check location:       Preferred lab:       Send INR reminders to:       Comments:         Anticoagulation Care Providers     Provider Role Specialty Phone number    WENDY Baker Referring Internal Medicine [phone]    [facility] Responsible  [phone]        Anticoagulation Patient Findings      HPI:  Sanjuanita Sosa seen in clinic today for follow up on anticoagulation therapy in the presence of DVT, PE hx.   Denies any changes to current medical/health status since last appointment.   Denies any medication or diet changes.   No current symptoms of bleeding or thrombosis reported.    A/P:   INR therapeutic.   Continue current regimen.   BP recorded in vitals.    Follow up appointment in 4 week(s).    Next Appointment: Wednesday, 2019 at 10:00 am.    Farheen CARPENTER

## 2019-07-04 LAB
THYROGLOB AB SERPL-ACNC: <0.9 IU/ML (ref 0–4)
THYROGLOB SERPL-MCNC: 5.1 NG/ML (ref 1.3–31.8)
THYROGLOB SERPL-MCNC: NORMAL NG/ML (ref 1.3–31.8)

## 2019-07-04 RX ORDER — LEVOTHYROXINE SODIUM 175 UG/1
175 TABLET ORAL
Qty: 30 TAB | Refills: 3 | Status: SHIPPED | OUTPATIENT
Start: 2019-07-04

## 2019-07-05 ENCOUNTER — TELEPHONE (OUTPATIENT)
Dept: ENDOCRINOLOGY | Facility: MEDICAL CENTER | Age: 79
End: 2019-07-05

## 2019-07-05 NOTE — TELEPHONE ENCOUNTER
----- Message from Porsha Byrd P.A.-C. sent at 7/4/2019  1:21 PM PDT -----  Increase Synthroid to 175 mcg daily   Recheck labs in 4 weeks

## 2019-07-05 NOTE — TELEPHONE ENCOUNTER
Phone Number Called: [phone] (home)     Call outcome: spoke to patient regarding message below    Message: Informed her of the change. She will go to the pharmacy to get the new medication

## 2019-07-09 DIAGNOSIS — E11.8 TYPE 2 DIABETES MELLITUS WITH COMPLICATION, WITHOUT LONG-TERM CURRENT USE OF INSULIN (HCC): ICD-10-CM

## 2019-07-26 ENCOUNTER — HOSPITAL ENCOUNTER (INPATIENT)
Dept: HOSPITAL 8 - ED | Age: 79
LOS: 1 days | DRG: 85 | End: 2019-07-27
Attending: HOSPITALIST | Admitting: HOSPITALIST
Payer: MEDICARE

## 2019-07-26 VITALS — DIASTOLIC BLOOD PRESSURE: 40 MMHG | SYSTOLIC BLOOD PRESSURE: 141 MMHG

## 2019-07-26 VITALS — HEIGHT: 68 IN | WEIGHT: 205.91 LBS | BODY MASS INDEX: 31.21 KG/M2

## 2019-07-26 DIAGNOSIS — Z72.0: ICD-10-CM

## 2019-07-26 DIAGNOSIS — D75.89: ICD-10-CM
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
## 2019-07-26 DIAGNOSIS — Z95.828: ICD-10-CM

## 2019-07-26 DIAGNOSIS — M19.90: ICD-10-CM

## 2019-07-26 DIAGNOSIS — J44.9: ICD-10-CM

## 2019-07-26 DIAGNOSIS — G93.5: ICD-10-CM

## 2019-07-26 DIAGNOSIS — S06.5X0A: Primary | ICD-10-CM

## 2019-07-26 DIAGNOSIS — K75.4: ICD-10-CM

## 2019-07-26 DIAGNOSIS — D72.829: ICD-10-CM

## 2019-07-26 DIAGNOSIS — Z88.0: ICD-10-CM

## 2019-07-26 DIAGNOSIS — J96.00: ICD-10-CM

## 2019-07-26 DIAGNOSIS — E87.6: ICD-10-CM

## 2019-07-26 DIAGNOSIS — Z51.5: ICD-10-CM

## 2019-07-26 DIAGNOSIS — H57.04: ICD-10-CM

## 2019-07-26 DIAGNOSIS — Z88.8: ICD-10-CM

## 2019-07-26 DIAGNOSIS — Z90.710: ICD-10-CM
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
## 2019-07-26 DIAGNOSIS — Z86.718: ICD-10-CM
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
## 2019-07-26 DIAGNOSIS — I10: ICD-10-CM

## 2019-07-26 DIAGNOSIS — Z90.49: ICD-10-CM

## 2019-07-26 DIAGNOSIS — E11.9: ICD-10-CM

## 2019-07-26 DIAGNOSIS — Z66: ICD-10-CM

## 2019-07-26 DIAGNOSIS — Z79.02: ICD-10-CM

## 2019-07-26 PROCEDURE — 81001 URINALYSIS AUTO W/SCOPE: CPT

## 2019-07-26 PROCEDURE — 96375 TX/PRO/DX INJ NEW DRUG ADDON: CPT

## 2019-07-26 PROCEDURE — 5A1935Z RESPIRATORY VENTILATION, LESS THAN 24 CONSECUTIVE HOURS: ICD-10-PCS | Performed by: HOSPITALIST

## 2019-07-26 PROCEDURE — 71045 X-RAY EXAM CHEST 1 VIEW: CPT

## 2019-07-26 PROCEDURE — 83735 ASSAY OF MAGNESIUM: CPT

## 2019-07-26 PROCEDURE — 80053 COMPREHEN METABOLIC PANEL: CPT

## 2019-07-26 PROCEDURE — 93005 ELECTROCARDIOGRAM TRACING: CPT

## 2019-07-26 PROCEDURE — 94002 VENT MGMT INPAT INIT DAY: CPT

## 2019-07-26 PROCEDURE — 84484 ASSAY OF TROPONIN QUANT: CPT

## 2019-07-26 PROCEDURE — 31500 INSERT EMERGENCY AIRWAY: CPT

## 2019-07-26 PROCEDURE — 84100 ASSAY OF PHOSPHORUS: CPT

## 2019-07-26 PROCEDURE — 85730 THROMBOPLASTIN TIME PARTIAL: CPT

## 2019-07-26 PROCEDURE — P9035 PLATELET PHERES LEUKOREDUCED: HCPCS

## 2019-07-26 PROCEDURE — 83880 ASSAY OF NATRIURETIC PEPTIDE: CPT

## 2019-07-26 PROCEDURE — 70450 CT HEAD/BRAIN W/O DYE: CPT

## 2019-07-26 PROCEDURE — 87070 CULTURE OTHR SPECIMN AEROBIC: CPT

## 2019-07-26 PROCEDURE — 96374 THER/PROPH/DIAG INJ IV PUSH: CPT

## 2019-07-26 PROCEDURE — 85610 PROTHROMBIN TIME: CPT

## 2019-07-26 PROCEDURE — 85025 COMPLETE CBC W/AUTO DIFF WBC: CPT

## 2019-07-26 PROCEDURE — 36600 WITHDRAWAL OF ARTERIAL BLOOD: CPT

## 2019-07-26 PROCEDURE — 36415 COLL VENOUS BLD VENIPUNCTURE: CPT

## 2019-07-26 PROCEDURE — 82803 BLOOD GASES ANY COMBINATION: CPT

## 2019-07-26 PROCEDURE — 99291 CRITICAL CARE FIRST HOUR: CPT

## 2019-07-26 PROCEDURE — 84478 ASSAY OF TRIGLYCERIDES: CPT

## 2019-07-26 PROCEDURE — 80047 BASIC METABLC PNL IONIZED CA: CPT

## 2019-07-26 PROCEDURE — 86850 RBC ANTIBODY SCREEN: CPT

## 2019-07-26 PROCEDURE — 86900 BLOOD TYPING SEROLOGIC ABO: CPT

## 2019-07-26 PROCEDURE — 87205 SMEAR GRAM STAIN: CPT

## 2019-07-26 PROCEDURE — 0BH17EZ INSERTION OF ENDOTRACHEAL AIRWAY INTO TRACHEA, VIA NATURAL OR ARTIFICIAL OPENING: ICD-10-PCS | Performed by: HOSPITALIST

## 2019-07-26 PROCEDURE — 0T9B70Z DRAINAGE OF BLADDER WITH DRAINAGE DEVICE, VIA NATURAL OR ARTIFICIAL OPENING: ICD-10-PCS | Performed by: HOSPITALIST

## 2019-07-26 PROCEDURE — 87081 CULTURE SCREEN ONLY: CPT

## 2019-07-31 ENCOUNTER — APPOINTMENT (OUTPATIENT)
Dept: VASCULAR LAB | Facility: MEDICAL CENTER | Age: 79
End: 2019-07-31
Payer: MEDICARE

## 2019-07-31 ENCOUNTER — APPOINTMENT (OUTPATIENT)
Dept: VASCULAR LAB | Facility: MEDICAL CENTER | Age: 79
End: 2019-07-31
Attending: INTERNAL MEDICINE
Payer: MEDICARE

## 2020-07-09 NOTE — PROGRESS NOTES
Detail Level: Generalized · 2 RN skin check complete.   · The following interventions in place Q2 hours turn  · Generalized bruising throughout the extremities. Swelling of the LUE and LLE  · Right IJ in place   Quality 130: Documentation Of Current Medications In The Medical Record: Current Medications Documented Quality 110: Preventive Care And Screening: Influenza Immunization: Influenza Immunization Administered during Influenza season

## 2021-08-25 NOTE — PROGRESS NOTES
Endocrinology Clinic Progress Note  PCP: WENDY Baker    HPI:  Sanjuanita Sosa is a 79 y.o. old patient who comes in today for review of endocrine problems.    Patient was last seen by endocrinoloy in 10/17 by Dr. Albert.     Papillary thyroid carcinoma: She underwent total thyroidectomy in October 2016. She received 100 mCi for radioactive iodine ablation in December 2016, post therapy scan showed uptake only in the neck.     Since that times she has been followed by PCP.     Two months ago, she began complaining of a dull discomfort in the right side of the neck with touching and more awareness with moving her head to the right and left.     Patient denies voice changes, hoarseness,  dysphonia, cough, enlarged cervical lymph nodes, sweating, tremors, heat intolerance or weight loss/gain.     6/17- TG 0.5, AntiTG <0.9    3/17- TG 0.5, Anti TG <0.9     4/19- Thyroid US  The thyroid gland is surgically absent.  There is 0.6x0.8 cm hypoechoic area in the midline lower neck, likely a lymph node.  There are 2 lymph nodes in the left neck with thick cortex and no fatty hilum. The lymph nodes measure 0.9 x 1.0 x 1.6 cm (previously 0.6 7.9 x 1.5 cm) and 0.7 x 1.0 x 1.2 cm( previously 0.7 x 0.8 x 1.0 cm)    Thyroid US: 6/2017  Thyroid gland is surgically absent.  A 0.6 x 0.9 x 1.5 cm lymph node is located left mid neck soft tissues.  A 0.7 x 1.0 x 0.8 cm lymph node is located lower left soft tissues of the neck.  No echogenic rock within the lymph nodes identified.    Papillary thyroid carcinoma  · Status post total thyroidectomy in October 2016  · Status post radioactive iodine ablation with 100 mCi in December 2016, post therapy scan showed uptake only in the neck  · Pathology report from October 2016: 2 cm papillary thyroid carcinoma, classic variant, with extra thyroidal extension into strap muscles and 1/1 perithyroidal lymph node with metastatic papillary thyroid carcinoma  · Thyroid bed ultrasound in  June 2017 showed to borderline enlarged lymph nodes in the left neck      Postsurgical hypothyroidism:   She is currently on synthroid  150 mcg Daily .   She reports compliance with medications.   She complains of fatigue and dry skin.     7/2/19- TSH 40.630, FT4 0.83, T3 57.4, FT3 2.02   6/27/29- TSH 45.7   11/18- TSH 10.340, FT4 1.28  9/2017- TSH 0.090, T4 12.9   6/17- TSH 1.240, FT4 1.72, TG 0.5, AntiTG <0.9    3/17- TG 0.5, Anti TG <0.9      Type 2 diabetes: Recent hemoglobin A1c is 6.8%.   She is on metformin.   Denies hypoglycemic episodes.    Previously on Glimepiride     6/2019- Glucose 104 EGFR 41 A1c 6.4     Hypertension:   Currently on Norvasc   Cozaar 50 mg daily   Current /70   Does not check BP at home       Vitamin D deficiency:   Currently on Vitamin D replacement at 50 K/week     Dyslipidemia   Currenlty on Lipitor 80 mg daily     6/27/19- , , HDL 41, LDL 94     Borderline B12   1/2017- Vitamin B12 290     ROS:  Constitutional: No weight loss or gain,  fever  HEENT: No difficulty with swallowing, change in voice, or swelling in throat area   Cardiac: No chest pain, palpitations, or racing heart  Resp: No shortness of breath  GI: No abdominal pain, nausea, vomiting, or diarrhea   Neuro: No numbness or tinging in feet  Endo: No heat or cold intolerance, no polyuria or polydipsia  All other detailed ROS is otherwise negative       Past Medical History:  Patient Active Problem List    Diagnosis Date Noted   • Atrial fibrillation (HCC) 11/25/2018     Priority: High   • Acute deep vein thrombosis (DVT) of femoral vein of left lower extremity (HCC) 11/25/2018     Priority: High   • Aspirin allergy 04/24/2018     Priority: High   • Hip pain 03/21/2018     Priority: High   • Anemia 03/21/2018     Priority: Medium   • Abdominal aortic aneurysm without rupture (HCC) 04/12/2017     Priority: Medium   • Essential hypertension 08/05/2015     Priority: Medium   • Obesity (BMI 30-39.9) 03/27/2018      Priority: Low   • Moderate aortic stenosis 3/2018    • Pulmonary edema 03/25/2018   • Aortic stenosis, moderate 03/07/2018   • Papillary thyroid carcinoma (HCC) 03/07/2018   • Primary insomnia 07/11/2017   • Chronic midline low back pain without sciatica 01/31/2017   • Postoperative hypothyroidism 01/31/2017   • Chronic anticoagulation 01/31/2017   • Gastroesophageal reflux disease without esophagitis 12/03/2015   • Vitamin D deficiency disease 11/24/2015   • Controlled type 2 diabetes mellitus without complication, without long-term current use of insulin (HCC) 11/24/2015   • Risk for falls 09/08/2015   • Hx pulmonary embolism 05/12/2015   • Dyslipidemia 04/28/2015   • Carotid artery stenosis 10/07/2014       Family Medical History:   Family History   Problem Relation Age of Onset   • Hyperlipidemia Mother    • Stroke Mother    • Hyperlipidemia Father    • Stroke Father    • Heart Disease Brother         CABG       Social History:   Social History     Social History   • Marital status:      Spouse name: N/A   • Number of children: N/A   • Years of education: N/A     Occupational History   • Not on file.     Social History Main Topics   • Smoking status: Current Every Day Smoker     Packs/day: 0.25     Years: 40.00     Types: Cigarettes   • Smokeless tobacco: Never Used   • Alcohol use No   • Drug use: No   • Sexual activity: Not Currently     Other Topics Concern   • Not on file     Social History Narrative   • No narrative on file         Medications:    Current Outpatient Prescriptions:   •  amLODIPine (NORVASC) 5 MG Tab, Take 0.5 Tabs by mouth every day., Disp: 90 Tab, Rfl: 3  •  metFORMIN (GLUCOPHAGE) 500 MG Tab, Take 1 Tab by mouth 2 times a day, with meals., Disp: 60 Tab, Rfl: 11  •  losartan (COZAAR) 50 MG Tab, TAKE ONE TABLET BY MOUTH ONCE A DAY, Disp: 90 Tab, Rfl: 3  •  warfarin (COUMADIN) 5 MG Tab, Take 1-1.5 Tabs by mouth every evening. Or as directed by [facility],  "Disp: 135 Tab, Rfl: 1  •  gabapentin (NEURONTIN) 300 MG Cap, Take 1 Cap by mouth every 8 hours., Disp: 90 Cap, Rfl: 11  •  omeprazole (PRILOSEC) 20 MG delayed-release capsule, Take 1 Cap by mouth every day., Disp: 30 Cap, Rfl: 11  •  Misc. Devices Misc, Cancel home oxygen, Disp: 1 Device, Rfl: 1  •  levothyroxine (SYNTHROID) 150 MCG Tab, Take 1 Tab by mouth every morning before breakfast. ON EMPTY STOMACH, Disp: 90 Tab, Rfl: 1  •  senna-docusate (PERICOLACE OR SENOKOT S) 8.6-50 MG Tab, Take 2 Tabs by mouth 2 Times a Day., Disp: 30 Tab, Rfl: 0  •  clopidogrel (PLAVIX) 75 MG Tab, Take 1 Tab by mouth every day., Disp: 30 Tab, Rfl:   •  calcium carbonate (OS-MARKELL 500) 500 MG Tab, TAKE ONE TABLET BY MOUTH TWICE DAILY WITH MEALS, Disp: 60 Tab, Rfl: 4  •  traZODone (DESYREL) 100 MG Tab, TAKE ONE AND ONE-HALF TABLET BY MOUTH ONCE DAILY AS NEEDED FOR SLEEP., Disp: 45 Tab, Rfl: 10  •  vitamin D, Ergocalciferol, (DRISDOL) 17968 units Cap capsule, TAKE ONE CAPSULE BY MOUTH EVERY 7 DAYS, Disp: 12 Cap, Rfl: 3  •  atorvastatin (LIPITOR) 80 MG tablet, TAKE ONE-HALF TABLET BY MOUTH EVERY EVENING, Disp: 30 Tab, Rfl: 5  No current facility-administered medications for this visit.     Facility-Administered Medications Ordered in Other Visits:   •  iodixanol (VISIPAQUE) SOLN, , , Intra-Op Once PRN, Shekhar Rosado M.D., 10 mL at 01/18/17 0619    Labs: Reviewed as above     Physical Examination:  Vital signs: /70 (BP Location: Left arm, Patient Position: Sitting, BP Cuff Size: Large adult)   Pulse 88   Ht 1.626 m (5' 4\")   Wt 83.9 kg (185 lb)   LMP 10/12/1970   SpO2 92%   BMI 31.76 kg/m²  Body mass index is 31.76 kg/m².  General: No apparent distress, cooperative  Eyes: No scleral icterus, no discharge, normal eyelids  Neck: No abnormal masses on inspection, normal thyroid exam  Resp: Normal effort, clear to auscultation bilaterally  CVS: Regular rate and rhythm, S1 S2 normal, no murmur  Extremities: No lower extremity " edema  Abdomen: abdominal obesity present  Musculoskeletal: Normal digits and nails  Skin: No rash on visible skin  Psych: Alert and oriented, normal mood and affect, intact memory and able to make informed decisions.    Assessment and Plan:  1. Papillary thyroid carcinoma (HCC)  Reviewed history and labs at great length. Discussed case with Dr. Lopez today.   Will recheck TG levels.     - TSH; Future  - FREE THYROXINE; Future  - TRIIDOTHYRONINE; Future  - THYROGLOBULIN, QT; Future  - T3 FREE; Future    2. Postoperative hypothyroidism  Currently on Synthroid 150 mcg daily.   Labs done and repeated. Will increase dose to 175mcg daily     3. Controlled type 2 diabetes mellitus without complication, without long-term current use of insulin (HCC)  Patient with intermittent hyperglycemia. Will continue Metformin. Discussed labs and reviewed with the patient. Will discuss and explore treatment in the future     4. Hypertension, unspecified type  Stable on todays evaluation   Continue current regimen     5. Vitamin D deficiency disease  Continue vitamin replacement     6. Dyslipidemia  Chronic stable condition managed with current medical regimen   Continue current regimen     Return in about 4 weeks (around 7/30/2019).    Thank you for allowing me to participate in the care of this patient.  If you have any questions or concerns please do not hesitate to contact me.    Porsha Byrd P.A.-C.    CC:   RAUL [email or web address].    This note was created using voice recognition software (Dragon). The accuracy of the dictation is limited by the abilities of the software. I have reviewed the note prior to signing, however some errors in grammar and context are still possible. If you have any questions related to this note please do not hesitate to contact our office.    No

## 2022-06-03 NOTE — PROGRESS NOTES
FOLLOW-UP VASCULAR VISIT  Subjective:   Sanjuanita Sosa is a 75 y.o. female who presents today 2/1/17 for   Chief Complaint   Patient presents with   • Amb Vascular Reason For Visit     Discuss Pradaxa and the price of it.     HPI:     Patient here for f/u of AAA, PE, anticoagulation, htn, and dyslipidemia  Had abdominal surgery 3 weeks ago - states having problem recovering - has appt to see surgeon  Will be going back to see nephrology - has an appt to see them    Back on pradaxa - but concerned that it is too expensive.  States she is still taking clopidogrel/plavix although not on her med list  No bleeding.  Still on atorvastatin for cholesterol  States that bp has been ok in other MD offices, but low here today.  Not checking BP at home  Don't know what BP meds she is on.  States she is taking glimiperide and metformin  FS usually around 100 at home  No hypoglycemic epsides  Quit smoking a month ago  States that she did not miss thyroid - increased by PCP this am.  Had surgery and radiation for papillary thyroid cancer    Social History   Substance Use Topics   • Smoking status: Former Smoker -- 0.50 packs/day for 40 years     Types: Cigarettes     Quit date: 02/27/2013   • Smokeless tobacco: Never Used   • Alcohol Use: No     DIET AND EXERCISE:  Weight Change: stable  Diet: Diabetic diet  Exercise: minimal exercise due to back pain    Review of Systems   Constitutional: Positive for malaise/fatigue. Negative for weight loss.   Respiratory: Negative for cough, hemoptysis, shortness of breath and wheezing.    Cardiovascular: Positive for leg swelling. Negative for chest pain, palpitations and claudication.   Musculoskeletal: Positive for back pain and joint pain. Negative for myalgias.   Neurological: Negative for dizziness, speech change, focal weakness and headaches.   Endo/Heme/Allergies: Does not bruise/bleed easily.   Psychiatric/Behavioral: Positive for depression. The patient is not  "nervous/anxious.       Objective:     Filed Vitals:    02/01/17 1408   BP: 84/60   Pulse: 168   Height: 1.575 m (5' 2.01\")   Weight: 83.008 kg (183 lb)      Body mass index is 33.46 kg/(m^2).  Physical Exam   Constitutional: She is oriented to person, place, and time. No distress.   Cardiovascular: Normal rate and regular rhythm.    Murmur heard.  Pulmonary/Chest: Effort normal and breath sounds normal. No respiratory distress. She has no wheezes. She has no rales.   Musculoskeletal: Normal range of motion. She exhibits no edema.   Neurological: She is alert and oriented to person, place, and time. No cranial nerve deficit. Coordination normal.   Skin: She is not diaphoretic.   Psychiatric: Her speech is normal and behavior is normal. Thought content normal. Her mood appears anxious. She exhibits a depressed mood. She expresses no suicidal ideation.     Lab Results   Component Value Date    CHOLSTRLTOT 100 01/31/2017    LDL 36 01/31/2017    HDL 29* 01/31/2017    TRIGLYCERIDE 177* 01/31/2017      Lab Results   Component Value Date    PROTHROMBTM 17.2* 12/28/2016    INR 1.36* 12/28/2016       Lab Results   Component Value Date    HBA1C 6.6* 12/28/2016      Lab Results   Component Value Date    SODIUM 139 01/31/2017    POTASSIUM 3.8 01/31/2017    CHLORIDE 97 01/31/2017    CO2 26 01/31/2017    GLUCOSE 88 01/31/2017    BUN 21 01/31/2017    CREATININE 1.30 01/31/2017    IFAFRICA 48* 01/31/2017    IFNOTAFR 40* 01/31/2017        Lab Results   Component Value Date    WBC 10.7 01/03/2017    RBC 3.57* 01/03/2017    HEMOGLOBIN 11.0* 01/03/2017    HEMATOCRIT 33.0* 01/03/2017    MCV 92.4 01/03/2017    MCH 30.8 01/03/2017    MCHC 33.3* 01/03/2017    MPV 10.2 01/03/2017     CT chest 4/27/2015  There are pulmonary emboli extending into the right upper lobe, right middle lobe, right lower lobe, and left lower lobe. The main pulmonary artery is of normal caliber. No shift of the intraventricular septum or reflux of contrast into the " hepatic veins. There are scattered arterial calcifications. No significant pericardial effusion.  There is mild left basilar atelectasis. No significant pleural fluid. The central airways are clear.  No adenopathy is identified.  Limited visualization of the upper abdomen demonstrates severe atherosclerotic disease.    echo 4/28/2015  Technically difficult study.   Normal left ventricular size and function.  Left ventricular ejection fraction is 60% to 65%.  Grade I diastolic dysfunction - mitral inflow E/A is <1.0.  Mild mitral regurgitation.  Mild aortic stenosis.  Mild to moderate aortic insufficiency.  Moderate tricuspid regurgitation.  Right ventricular systolic pressure is estimated to be 43-48 mmHg.  No prior study is available for comparison.      CT scan chest abdomen and pelvis 10/2015   Thoracic abdominal aortic penetrating atheromatous ulcer without evidence for intramural hematoma or dissection.   Exclusion of early dissection or intramural hematoma (although no evidence for this entity) is not possible on this contrast only study.  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  Ventral hernia containing small bowel and without evidence for obstruction or inflammation    CTA 5/17/2016  1. Considerable partially ulcerated plaque within the aorta similar to previous findings. No dissection. No mediastinal hematoma.  2. The abdominal aorta measures up to 2.6 cm in diameter. No change compared with previous. No retroperitoneal hematoma.  3. High-grade stenosis celiac artery.  4. Atelectasis within both lungs and tiny right apical pleural-based nodule unchanged.  5. Surgical absent spleen. Small splenules and chronic loculated fluid collection left upper quadrant  again demonstrated.  6. Diverticula colon without evidence of diverticulitis.  7. Ventral abdominal wall hernia containing nonobstructed small bowel loops.  8. Large and small bowel incompletely imaged. No free fluid within the abdomen identified.  9. Partial resection pancreas.  10. Tiny gallstones.  11. Coronary artery calcifications.  12. 1.3 cm left lobe thyroid nodule. Ultrasound followup is consideration.    U/S Aorta Dec 2016:  1.  Fusiform infrarenal abdominal aortic aneurysm measures 2.9 x 2.9 cm in maximum axial dimensions and is located at the mid aortic level. Measurement on prior CT was 2.6 cm.  2.  Extensive aortic atherosclerosis.    Medical Decision Making:  Today's Assessment / Status / Plan:     Patient Type: Secondary Prevention    Etiology of Established CVD if Present:   1.  Ulceration thoracic aorta - not necessarily penetrating - stable on serial imaging.  2.  AAA, small  3.  DVT and PE      Lipid Management: Qualifies for Statin Therapy Based on 2013 ACC/AHA Guidelines: yes  Calculated 10-Year Risk of ASCVD: N/A  Currently on Statin: Yes  Perhaps overtreated  - decrease atorvastatin to 40 mg daily (1/2 tab)  -Report any myalgias immediately    Blood Pressure Management:Goal: JNC8 (2013) Office BP Goal:<140/90; Under Control: yes  Perhaps a bit overtreated  Has developed worsening azotemia as well  -Continue Amlodipine 10 mg daily  -Continue losartan 50 mg daily  -Decrease hctz to 12.5 mg daily (1/2 tab)  - continue home BP monitoring    Glycemic Status: Diabetic-  last A1c under excellent control 5.8  -Continue metformin for now, but may need to consider d/c if GFR falls further  -Continue to follow fasting blood sugars at home  -Continue TLC  -Yearly flu shot, eye exam    Anti-Platelet/Anti-Coagulant Tx:   Unable to afford pradaxa  No clear indication for antiplatelet agent + anticoagulant  Patient in agreement that long term benefit of anticoag likely outweighs risk  - stop clopidogrel  -  stop pradaxa  - start xarelto 20 mg daily - recognizes bleeding risk    Smoking:  Continue complete cessation    Physical Activity: Daily walking encouraged-30 minutes 3-4 times a week.    Weight Management and Nutrition: Dietary plan was discussed with patient at this visit including low fats and carb, diabetic diet    Other:     1. Chronic renal disease stage IIIB - will defer management to nephrology who she will be seeing next week  2. Hypocalcemia - defer management to nepnrology  3.  Hypothyroidism, undertreated on most recent blood work.  Dose recently increased by pcp.  Will defer all management to pcp  4.  H/o papillary thyroid cancer - defer to pcp and oncology  5.  Possible poor adherence - importance discussed.  Ask to bring in all meds to next visit  6.  Ulceration, thoracic aorta - stable on surveillance imaging.  Continue medical management  7.  AAA, small and stable on serial imaging - continue medical management and surveillance    Instructed to follow-up with PCP for remainder of adult medical needs: yes  We will partner with other providers in the management of established vascular disease and cardiometabolic risk factors.    Studies to Be Obtained: Abdominal aortic ultrasound 2-3 years  Labs to Be Obtained:  As above prior to next visit    Follow up in: 6 weeks    Michael J Bloch, M.D.     CC:   Dr. Najjar Dr. Devia Dr. Hansen David Burgio     Attending Attestation (For Attendings USE Only)...

## 2023-10-04 NOTE — ED NOTES
"Patient given chapstick upon request. Smiling and pleasant. States her pain is \"much better\"   " Subjective   Rachana Patrick is a 52 y.o. female.     Choking       For the past few days she has felt that something is in her throat  Worse when she tries to eat  Not choking nor coughing  Drooling a little has to sit up to swallow    No fever, no body aches      Pt also complains of ongoing toe pain, has seen podiatry in [place]  Pain is whole toe  hurts to walk      The following portions of the patient's history were reviewed and updated as appropriate: allergies, current medications, past family history, past medical history, past social history, past surgical history, and problem list.    Review of Systems   Respiratory:  Positive for choking.      Objective   Physical Exam  Vitals and nursing note reviewed.   Constitutional:       General: She is not in acute distress.     Appearance: Normal appearance. She is well-developed.   HENT:      Mouth/Throat:      Mouth: Mucous membranes are moist.      Pharynx: Oropharynx is clear. No oropharyngeal exudate or posterior oropharyngeal erythema.   Neck:      Thyroid: No thyromegaly.   Cardiovascular:      Rate and Rhythm: Normal rate and regular rhythm.      Heart sounds: Normal heart sounds.   Pulmonary:      Effort: Pulmonary effort is normal.      Breath sounds: Normal breath sounds.   Musculoskeletal:      Cervical back: Full passive range of motion without pain, normal range of motion and neck supple.        Feet:    Neurological:      Mental Status: She is alert and oriented to person, place, and time.   Psychiatric:         Mood and Affect: Mood normal.         Behavior: Behavior normal.         Thought Content: Thought content normal.         Judgment: Judgment normal.       Assessment & Plan   Diagnoses and all orders for this visit:    1. Throat swelling (Primary)  -     predniSONE (DELTASONE) 20 MG tablet; Take 2 tablets by mouth Daily.  Dispense: 10 tablet; Refill: 0    2. Great toe pain, right  -     Ambulatory Referral to Podiatry    3. Screening  mammogram for breast cancer  -     Mammo Screening Digital Tomosynthesis Bilateral With CAD; Future    Normal exam, I do not see any throat swelling internally nor feel any external swelling.  Pred burst.  Consider ENT INB  Ok podiatry for ongoing toe pain.  I do not now why it is hurting her.  Further work up needed  Mammogram needed as well, will order this

## 2024-06-26 NOTE — PROGRESS NOTES
OP Anticoagulation Telephone Note    Date: 5/7/2018  Anticoagulation Summary  As of 5/7/2018    INR goal:   2.0-3.0   TTR:   76.1 % (1 y)   Today's INR:   2.5   Warfarin maintenance plan:   7.5 mg (5 mg x 1.5) on Wed, Sat; 5 mg (5 mg x 1) all other days   Weekly warfarin total:   40 mg   No change documented:   Gonzalo Fuller Ass't   Plan last modified:   Ezequiel Coombs, PharmD (4/16/2018)   Next INR check:   5/22/2018   Target end date:   Indefinite    Indications    DVT (deep venous thrombosis)  left (Resolved) [I82.402]  Hx pulmonary embolism [Z86.711]             Anticoagulation Episode Summary     INR check location:       Preferred lab:       Send INR reminders to:       Comments:         Anticoagulation Care Providers     Provider Role Specialty Phone number    WENDY Baker Referring Family Medicine [phone]    [facility]ticoagulation Services Responsible  [phone]        Anticoagulation Patient Findings  Patient Findings     Negatives:   Signs/symptoms of thrombosis, Signs/symptoms of bleeding, Laboratory test error suspected, Change in health, Change in alcohol use, Change in activity, Upcoming invasive procedure, Emergency department visit, Upcoming dental procedure, Missed doses, Extra doses, Change in medications, Change in diet/appetite, Hospital admission, Bruising, Other complaints      Plan:  Spoke with patient on the phone. Patient is therapeutic today. Patient denies any changes in medications or diet. Patient denies any signs or symptoms of bleeding or clotting. Instructed patient to call clinic if any unusual bleeding or bruising occurs. Will continue dosing as outlined above. Patient has been dicharged from  will follow-up with patient in 2 weeks in the clinic.    Naa Jaime, Medical Assistant    I have reviewed and am in agreement with the above stated plan on 5-7-18.  Omid Min, GiovannyD     Set up labs sometime - before 10 AM  If want to have this check fatigue AND cholesterol, wait at least 4 weeks from starting increased dose of cholesterol med.  Try duloxetine. Can take awhile to see benefit in pain, so unless having side effects continue med until see me.  Recheck with me in 6-8 weeks or so on pain, energy    Consider asking  or disability advocates what jobs can legally ask about health    [facility] [email or web address] and [employer] [place] - [place] - [phone]  - they can help with finding employment, resume writing, education and lots of other areas.     Care coordinator will call to see if can find any financial resources for you    Sign for me to attempt to obtain records     Will be called to set up colonoscopy    Update pneumonia shot, tetanus shot today   Covid vaccine - wait until fall when updated version     Consider new shingles shot.  Need 2 doses.  Some people don't feel great day of, or for a few days.  How you feel after first dose does not predict whether you'll feel good or bad after next dose.  In case you have side effects, pick a time to get the vaccine that its ok if you feel a bit under the weather.  Take this precaution with both doses of the vaccine, even if you feel great after the first dose.  Pharmacy is often cheaper than clinic and can at least tell you your cost in advance, unlike a clinic.       Patient Education   Preventive Care Advice   This is general advice we often give to help people stay healthy. Your care team may have specific advice just for you. Please talk to your care team about your own preventive care needs.  Lifestyle  Exercise at least 150 minutes each week (30 minutes a day, 5 days a week).  Do muscle strengthening activities 2 days a week. These help control your weight and prevent disease.  No smoking.  Wear sunscreen to prevent skin cancer.  Have your home tested for radon every 2 to 5 years. Radon is a colorless, odorless gas that can harm  "your lungs. To learn more, go to www.health.[facility].mn.us and search for \"Radon in Homes.\"  Keep guns unloaded and locked up in a safe place like a safe or gun vault, or, use a gun lock and hide the keys. Always lock away bullets separately. To learn more, visit [facility].[place].gov and search for \"safe gun storage.\"  Nutrition  Eat 5 or more servings of fruits and vegetables each day.  Try wheat bread, brown rice and whole grain pasta (instead of white bread, rice, and pasta).  Get enough calcium and vitamin D. Check the label on foods and aim for 100% of the RDA (recommended daily allowance).  Regular exams  Have a dental exam and cleaning every 6 months.  See your health care team every year to talk about:  Any changes in your health.  Any medicines your care team has prescribed.  Preventive care, family planning, and ways to prevent chronic diseases.  Shots (vaccines)   HPV shots (up to age 26), if you've never had them before.  Hepatitis B shots (up to age 59), if you've never had them before.  COVID-19 shot: Get this shot when it's due.  Flu shot: Get a flu shot every year.  Tetanus shot: Get a tetanus shot every 10 years.  Pneumococcal, hepatitis A, and RSV shots: Ask your care team if you need these based on your risk.  Shingles shot (for age 50 and up).  General health tests  Diabetes screening:  Starting at age 35, Get screened for diabetes at least every 3 years.  If you are younger than age 35, ask your care team if you should be screened for diabetes.  Cholesterol test: At age 39, start having a cholesterol test every 5 years, or more often if advised.  Bone density scan (DEXA): At age 50, ask your care team if you should have this scan for osteoporosis (brittle bones).  Hepatitis C: Get tested at least once in your life.  Abdominal aortic aneurysm screening: Talk to your doctor about having this screening if you:  Have ever smoked; and  Are biologically male; and  Are between the ages of 65 and 75.  STIs (sexually " transmitted infections)  Before age 24: Ask your care team if you should be screened for STIs.  After age 24: Get screened for STIs if you're at risk. You are at risk for STIs (including HIV) if:  You are sexually active with more than one person.  You don't use condoms every time.  You or a partner was diagnosed with a sexually transmitted infection.  If you are at risk for HIV, ask about PrEP medicine to prevent HIV.  Get tested for HIV at least once in your life, whether you are at risk for HIV or not.  Cancer screening tests  Cervical cancer screening: If you have a cervix, begin getting regular cervical cancer screening tests at age 21. Most people who have regular screenings with normal results can stop after age 65. Talk about this with your provider.  Breast cancer scan (mammogram): If you've ever had breasts, begin having regular mammograms starting at age 40. This is a scan to check for breast cancer.  Colon cancer screening: It is important to start screening for colon cancer at age 45.  Have a colonoscopy test every 10 years (or more often if you're at risk) Or, ask your provider about stool tests like a FIT test every year or Cologuard test every 3 years.  To learn more about your testing options, visit: www.[employer]/264578.pdf.  For help making a decision, visit: ingrid/pb71677.  Prostate cancer screening test: If you have a prostate and are age 55 to 69, ask your provider if you would benefit from a yearly prostate cancer screening test.  Lung cancer screening: If you are a current or former smoker age 50 to 80, ask your care team if ongoing lung cancer screenings are right for you.  For informational purposes only. Not to replace the advice of your health care provider. Copyright   2023 [employer]. All rights reserved. Clinically reviewed by the [facility] Transitions Program. [employer] 990262 - REV 04/24.  Learning About Stress  What is stress?     Stress is your body's  response to a hard situation. Your body can have a physical, emotional, or mental response. Stress is a fact of life for most people, and it affects everyone differently. What causes stress for you may not be stressful for someone else.  A lot of things can cause stress. You may feel stress when you go on a job interview, take a test, or run a race. This kind of short-term stress is normal and even useful. It can help you if you need to work hard or react quickly. For example, stress can help you finish an important job on time.  Long-term stress is caused by ongoing stressful situations or events. Examples of long-term stress include long-term health problems, ongoing problems at work, or conflicts in your family. Long-term stress can harm your health.  How does stress affect your health?  When you are stressed, your body responds as though you are in danger. It makes hormones that speed up your heart, make you breathe faster, and give you a burst of energy. This is called the fight-or-flight stress response. If the stress is over quickly, your body goes back to normal and no harm is done.  But if stress happens too often or lasts too long, it can have bad effects. Long-term stress can make you more likely to get sick, and it can make symptoms of some diseases worse. If you tense up when you are stressed, you may develop neck, shoulder, or low back pain. Stress is linked to high blood pressure and heart disease.  Stress also harms your emotional health. It can make you dasilva, tense, or depressed. Your relationships may suffer, and you may not do well at work or school.  What can you do to manage stress?  You can try these things to help manage stress:   Do something active. Exercise or activity can help reduce stress. Walking is a great way to get started. Even everyday activities such as housecleaning or yard work can help.  Try yoga or carolin chi. These techniques combine exercise and meditation. You may need some  training at first to learn them.  Do something you enjoy. For example, listen to music or go to a movie. Practice your hobby or do volunteer work.  Meditate. This can help you relax, because you are not worrying about what happened before or what may happen in the future.  Do guided imagery. Imagine yourself in any setting that helps you feel calm. You can use online videos, books, or a teacher to guide you.  Do breathing exercises. For example:  From a standing position, bend forward from the waist with your knees slightly bent. Let your arms dangle close to the floor.  Breathe in slowly and deeply as you return to a standing position. Roll up slowly and lift your head last.  Hold your breath for just a few seconds in the standing position.  Breathe out slowly and bend forward from the waist.  Let your feelings out. Talk, laugh, cry, and express anger when you need to. Talking with supportive friends or family, a counselor, or a harika leader about your feelings is a healthy way to relieve stress. Avoid discussing your feelings with people who make you feel worse.  Write. It may help to write about things that are bothering you. This helps you find out how much stress you feel and what is causing it. When you know this, you can find better ways to cope.  What can you do to prevent stress?  You might try some of these things to help prevent stress:  Manage your time. This helps you find time to do the things you want and need to do.  Get enough sleep. Your body recovers from the stresses of the day while you are sleeping.  Get support. Your family, friends, and community can make a difference in how you experience stress.  Limit your news feed. Avoid or limit time on social media or news that may make you feel stressed.  Do something active. Exercise or activity can help reduce stress. Walking is a great way to get started.  Where can you learn more?  Go to https://www.healthwise.net/patiented  Enter N032 in the  "search box to learn more about \"Learning About Stress.\"  Current as of: October 24, 2023               Content Version: 14.0    3957-5505 [employer].   Care instructions adapted under license by your healthcare professional. If you have questions about a medical condition or this instruction, always ask your healthcare professional. [employer] disclaims any warranty or liability for your use of this information.      Learning About Depression Screening  What is depression screening?  Depression screening is a way to see if you have depression symptoms. It may be done by a doctor or counselor. It's often part of a routine checkup. That's because your mental health is just as important as your physical health.  Depression is a mental health condition that affects how you feel, think, and act. You may:  Have less energy.  Lose interest in your daily activities.  Feel sad and grouchy for a long time.  Depression is very common. It affects people of all ages.  Many things can lead to depression. Some people become depressed after they have a stroke or find out they have a major illness like cancer or heart disease. The death of a loved one or a breakup may lead to depression. It can run in families. Most experts believe that a combination of inherited genes and stressful life events can cause it.  What happens during screening?  You may be asked to fill out a form about your depression symptoms. You and the doctor will discuss your answers. The doctor may ask you more questions to learn more about how you think, act, and feel.  What happens after screening?  If you have symptoms of depression, your doctor will talk to you about your options.  Doctors usually treat depression with medicines or counseling. Often, combining the two works best. Many people don't get help because they think that they'll get over the depression on their own. But people with depression may not get better unless they " "get treatment.  The cause of depression is not well understood. There may be many factors involved. But if you have depression, it's not your fault.  A serious symptom of depression is thinking about death or suicide. If you or someone you care about talks about this or about feeling hopeless, get help right away.  It's important to know that depression can be treated. Medicine, counseling, and self-care may help.  Where can you learn more?  Go to https://www.[employer].net/patiented  Enter T185 in the search box to learn more about \"Learning About Depression Screening.\"  Current as of: June 24, 2023               Content Version: 14.0    8187-1476 "[employer]".   Care instructions adapted under license by your healthcare professional. If you have questions about a medical condition or this instruction, always ask your healthcare professional. "[employer]" disclaims any warranty or liability for your use of this information.      Safer Sex: Care Instructions  Overview  Safer sex is a way to reduce your risk of getting a sexually transmitted infection (STI). It can also help prevent pregnancy.  Several products can help you practice safer sex and reduce your chance of STIs. One of the best is a condom. There are internal and external condoms. You can use a special rubber sheet (dental dam) for protection during oral sex. Disposable gloves can keep your hands from touching blood, semen, or other body fluids that can carry infections.  Remember that birth control methods such as diaphragms, IUDs, foams, and birth control pills do not stop you from getting STIs.  Follow-up care is a key part of your treatment and safety. Be sure to make and go to all appointments, and call your doctor if you are having problems. It's also a good idea to know your test results and keep a list of the medicines you take.  How can you care for yourself at home?  Think about getting vaccinated to help prevent " "hepatitis A, hepatitis B, and human papillomavirus (HPV). They can be spread through sex.  Use a condom every time you have sex. Use an external condom, which goes on the penis. Or use an internal condom, which goes into the vagina or anus.  Make sure you use the right size external condom. A condom that's too small can break easily. A condom that's too big can slip off during sex.  Use a new condom each time you have sex. Be careful not to poke a hole in the condom when you open the wrapper.  Don't use an internal condom and an external condom at the same time.  Never use petroleum jelly (such as Vaseline), grease, hand lotion, baby oil, or anything with oil in it. These products can make holes in the condom.  After intercourse, hold the edge of the condom as you remove it. This will help keep semen from spilling out of the condom.  Do not have sex with anyone who has symptoms of an STI, such as sores on the genitals or mouth.  Do not drink a lot of alcohol or use drugs before sex.  Limit your sex partners. Sex with one partner who has sex only with you can reduce your risk of getting an STI.  Don't share sex toys. But if you do share them, use a condom and clean the sex toys between each use.  Talk to any partners before you have sex. Talk about what you feel comfortable with and whether you have any boundaries with sex. And find out if your partner or partners may be at risk for any STI. Keep in mind that a person may be able to spread an STI even if they do not have symptoms. You and any partners may want to get tested for STIs.  Where can you learn more?  Go to https://www.[employer].net/patiented  Enter B608 in the search box to learn more about \"Safer Sex: Care Instructions.\"  Current as of: November 27, 2023               Content Version: 14.0    8673-7590 [employer].   Care instructions adapted under license by your healthcare professional. If you have questions about a medical condition or " this instruction, always ask your healthcare professional. [employer] disclaims any warranty or liability for your use of this information.

## 2025-06-18 NOTE — MR AVS SNAPSHOT
Sanjuanita Sosa   2017 9:45 AM   Anticoagulation Visit   MRN: [id number]    Department:  Vascular Medicine   Dept Phone:  [phone]    Description:  Female : 1940   Provider:  [facility] EXAM 1           Allergies as of 2017     Allergen Noted Reactions    Aspirin 2015   Anaphylaxis    Penicillins 2015   Anaphylaxis    As a child      You were diagnosed with     Hx pulmonary embolism   [[id number]]         Vital Signs     Last Menstrual Period Smoking Status                10/12/1970 Former Smoker          Basic Information     Date Of Birth Sex Race Ethnicity Preferred Language    1940 Female White Non- English      Your appointments     2017  9:45 AM   Established Patient with [facility] EXAM 1   [facility]  (--)    [address] [place] [address]   [phone]            2017 10:00 AM   Established Patient with [facility] EXAM 5   [facility]  (--)    [address] [place] [address]   [phone]            May 09, 2017 10:00 AM   Follow Up Visit with Michael J Bloch, M.D.   [facility]  (--)    [address] [place] [address]   [phone]            2017 10:30 AM   US [id number] with [address]   [facility] IMAGING - ULTRASOUND - [address])    [address]  [facility] [id number] [phone]              Problem List              ICD-10-CM Priority Class Noted - Resolved    Autoimmune hepatitis (CMS-HCC) (Chronic) K75.4 High  3/19/2012 - Present    Carotid artery stenosis I65.29   10/7/2014 - Present    Insomnia G47.00   2014 - Present    History of hepatitis B Z86.19   2015 - Present    Dyslipidemia E78.5   2015 - Present    Hx pulmonary embolism Z86.711   2015 - Present    Essential hypertension I10   2015 - Present    BMI  I spoke with pt regarding report from [facility] plus MRI of Triny, per  [facility] Lumbar spine showed Moderate Left and Mild Right foraminal stenosis. Refer to pain clinic. Referral entered.    33.0-33.9,adult Z68.33   9/8/2015 - Present    Risk for falls Z91.81   9/8/2015 - Present    CKD (chronic kidney disease) stage 3, GFR 30-59 ml/min N18.3   11/19/2015 - Present    Vitamin D deficiency disease E55.9   11/24/2015 - Present    Abdominal aneurysm (HCC) I71.4   11/24/2015 - Present    Diabetes mellitus type II, controlled (CMS-HCC) E11.9   11/24/2015 - Present    Gastroesophageal reflux disease without esophagitis K21.9   12/3/2015 - Present    Anemia D64.9   12/29/2016 - Present    Chronic low back pain without sciatica M54.5, G89.29   1/31/2017 - Present    Postoperative hypothyroidism E89.0   1/31/2017 - Present    Chronic anticoagulation Z79.01   1/31/2017 - Present      Health Maintenance        Date Due Completion Dates    PAP SMEAR 3/16/1963 ---    IMM ZOSTER VACCINE 3/16/2002 ---    MAMMOGRAM 10/7/2015 10/7/2014 (Prv Comp)    Override on 10/7/2014: Previously completed    IMM PNEUMOCOCCAL 65+ (ADULT) LOW/MEDIUM RISK SERIES (2 of 2 - PCV13) 8/5/2016 8/5/2015    DIABETES MONOFILAMENT / LE EXAM 8/5/2016 8/5/2015 (Done)    Override on 8/5/2015: Done    RETINAL SCREENING 8/12/2016 8/12/2015 (Done)    Override on 8/12/2015: Done    BONE DENSITY 10/7/2016 10/7/2011 (Prv Comp)    Override on 10/7/2011: Previously completed    A1C SCREENING 6/28/2017 12/28/2016, 11/16/2016, 8/16/2016, 3/15/2016, 11/17/2015, 4/28/2015, 4/21/2015, 10/14/2014, 3/7/2013    FASTING LIPID PROFILE 1/31/2018 1/31/2017, 12/29/2016, 11/16/2016, 8/16/2016, 6/22/2016, 3/15/2016, 11/17/2015, 4/29/2015, 10/14/2014, 3/14/2013    SERUM CREATININE 3/7/2018 3/7/2017, 1/31/2017, 1/3/2017, 1/2/2017, 1/1/2017, 12/31/2016, 12/30/2016, 12/29/2016, 12/28/2016, 11/16/2016, 10/6/2016, 8/16/2016, 6/22/2016, 5/10/2016, 11/17/2015, 10/12/2015, 10/11/2015, 10/10/2015, 10/9/2015, 10/8/2015, 10/7/2015, 10/6/2015, 4/30/2015, 4/29/2015, 4/28/2015, 4/27/2015, 4/21/2015, 1/31/2015, 10/14/2014, 3/14/2013, 3/13/2013, 3/12/2013, 3/8/2013, 3/7/2013, 3/4/2013,  3/2/2013, 3/1/2013, 2/28/2013, 2/26/2013, 5/19/2009    URINE ACR / MICROALBUMIN 3/8/2018 3/8/2017, 3/16/2016, 11/17/2015, 10/15/2014    COLONOSCOPY 10/7/2019 10/7/2009 (Prv Comp)    Override on 10/7/2009: Previously completed    IMM DTaP/Tdap/Td Vaccine (2 - Td) 3/6/2023 3/6/2013            Results     POCT Protime      Component    INR    1.4                        Current Immunizations     HIB Vaccine (ACTHIB/HIBERIX) 3/6/2013 11:45 AM    INFLUENZA VACCINE H1N1 10/12/2016    Influenza TIV (IM) 11/1/2012    Influenza Vaccine Adult HD 10/12/2015  8:46 AM    Meningococcal Polysaccharide Vaccine MPSV4 3/6/2013 12:00 PM    Pneumococcal Vaccine (PCV7) Historical Data 10/12/2016    Pneumococcal Vaccine (UF)Historical Data 12/1/2012    Pneumococcal polysaccharide vaccine (PPSV-23) 8/5/2015    Tdap Vaccine 3/6/2013 11:45 AM      Below and/or attached are the medications your provider expects you to take. Review all of your home medications and newly ordered medications with your provider and/or pharmacist. Follow medication instructions as directed by your provider and/or pharmacist. Please keep your medication list with you and share with your provider. Update the information when medications are discontinued, doses are changed, or new medications (including over-the-counter products) are added; and carry medication information at all times in the event of emergency situations     Allergies:  ASPIRIN - Anaphylaxis     PENICILLINS - Anaphylaxis               Medications  Valid as of: April 05, 2017 -  8:51 AM    Generic Name Brand Name Tablet Size Instructions for use    AmLODIPine Besylate (Tab) NORVASC 10 MG TAKE ONE TABLET BY MOUTH ONCE DAILY ** GENERIC FOR NORVASC        AmLODIPine Besylate (Tab) NORVASC 10 MG TAKE ONE TABLET BY MOUTH EVERY DAY        Atorvastatin Calcium (Tab) LIPITOR 80 MG Take 0.5 Tabs by mouth every evening.        Cholecalciferol   Spray 1.25 mg in mouth/throat every 7 days.        Docusate Sodium  (Cap)  MG Take 100 mg by mouth every morning.        Glimepiride (Tab) AMARYL 2 MG TAKE 1 TABLET BY MOUTH EACH MORNING ** GENERIC FOR AMARYL        Lancets (Misc) MICROLET LANCETS  TEST BLOOD SUGAR TWO TIMES DAILY        Levothyroxine Sodium (Tab) SYNTHROID 137 MCG Take 1 Tab by mouth Every morning on an empty stomach.        Losartan Potassium (Tab) COZAAR 50 MG TAKE ONE TABLET BY MOUTH ONCE DAILY ** GENERIC FOR COZAAR        MetFORMIN HCl (Tab) GLUCOPHAGE 500 MG TAKE 2 TABLETS BY MOUTH EACH MORNING WITH  BREAKFAST AND TAKE 1 TABLET EACH EVENING   WITH DINNER        Omeprazole (CAPSULE DELAYED RELEASE) PRILOSEC 20 MG TAKE ONE CAPSULE BY MOUTH EVERY DAY        Oxycodone-Acetaminophen (Tab) PERCOCET 7.5-325 MG Take 1 Tab by mouth 2 Times a Day.        Oyster Shell (Tab) OS-MARKELL 500 500 MG Take 1 Tab by mouth 2 times a day, with meals.        TraZODone HCl (Tab) DESYREL 100 MG Take 1 Tab by mouth 1 time daily as needed for Sleep.        Warfarin Sodium (Tab) COUMADIN 5 MG Take 1 tab by mouth daily or as directed by coumadin clinic        .                 Medicines prescribed today were sent to:     [id number] - [place], [place] - [address] [address] [place] [address]    Phone: [phone] Fax: [phone]    Open 24 Hours?: No      Medication refill instructions:       If your prescription bottle indicates you have medication refills left, it is not necessary to call your provider’s office. Please contact your pharmacy and they will refill your medication.    If your prescription bottle indicates you do not have any refills left, you may request refills at any time through one of the following ways: The online Fixed - Parking Tickets system (except Urgent Care), by calling your provider’s office, or by asking your pharmacy to contact your provider’s office with a refill request. Medication refills are processed only during regular business hours and may not be available until the next business day.  Your provider may request additional information or to have a follow-up visit with you prior to refilling your medication.   *Please Note: Medication refills are assigned a new Rx number when refilled electronically. Your pharmacy may indicate that no refills were authorized even though a new prescription for the same medication is available at the pharmacy. Please request the medicine by name with the pharmacy before contacting your provider for a refill.        Warfarin Dosing Calendar   April 2017 Details    Sun Mon Tue Wed Thu Fri Sat           1                 2               3               4               5   1.4   10 mg   See details      6      10 mg         7      7.5 mg         8      7.5 mg           9      7.5 mg         10      5 mg         11      7.5 mg         12               13               14               15                 16               17               18               19               20               21               22                 23               24               25               26               27               28               29                 30                      Date Details   04/05 This INR check   INR: 1.4       Date of next INR:  4/11/2017         How to take your warfarin dose     To take:  5 mg Take 1 of the 5 mg tablets.    To take:  7.5 mg Take 1.5 of the 5 mg tablets.    To take:  10 mg Take 2 of the 5 mg tablets.              MyChart Status: Patient Declined